# Patient Record
Sex: MALE | Race: BLACK OR AFRICAN AMERICAN | NOT HISPANIC OR LATINO | Employment: OTHER | URBAN - METROPOLITAN AREA
[De-identification: names, ages, dates, MRNs, and addresses within clinical notes are randomized per-mention and may not be internally consistent; named-entity substitution may affect disease eponyms.]

---

## 2018-05-12 ENCOUNTER — HOSPITAL ENCOUNTER (OUTPATIENT)
Dept: RADIOLOGY | Facility: MEDICAL CENTER | Age: 48
End: 2018-05-12

## 2018-05-12 ENCOUNTER — APPOINTMENT (OUTPATIENT)
Dept: RADIOLOGY | Facility: MEDICAL CENTER | Age: 48
DRG: 292 | End: 2018-05-12
Attending: INTERNAL MEDICINE
Payer: MEDICARE

## 2018-05-12 ENCOUNTER — HOSPITAL ENCOUNTER (INPATIENT)
Facility: MEDICAL CENTER | Age: 48
LOS: 3 days | DRG: 292 | End: 2018-05-15
Attending: EMERGENCY MEDICINE | Admitting: INTERNAL MEDICINE
Payer: MEDICARE

## 2018-05-12 DIAGNOSIS — I21.4 NON-STEMI (NON-ST ELEVATED MYOCARDIAL INFARCTION) (HCC): ICD-10-CM

## 2018-05-12 DIAGNOSIS — I50.9 CONGESTIVE HEART FAILURE, UNSPECIFIED HF CHRONICITY, UNSPECIFIED HEART FAILURE TYPE (HCC): ICD-10-CM

## 2018-05-12 PROBLEM — E11.29 TYPE 2 DIABETES MELLITUS WITH RENAL COMPLICATION (HCC): Status: ACTIVE | Noted: 2018-05-12

## 2018-05-12 PROBLEM — E11.9 TYPE 2 DIABETES MELLITUS WITHOUT COMPLICATION (HCC): Status: ACTIVE | Noted: 2018-05-12

## 2018-05-12 LAB
ALBUMIN SERPL BCP-MCNC: 3.5 G/DL (ref 3.2–4.9)
ALBUMIN/GLOB SERPL: 1 G/DL
ALP SERPL-CCNC: 156 U/L (ref 30–99)
ALT SERPL-CCNC: 139 U/L (ref 2–50)
ANION GAP SERPL CALC-SCNC: 10 MMOL/L (ref 0–11.9)
APTT PPP: 29.2 SEC (ref 24.7–36)
AST SERPL-CCNC: 130 U/L (ref 12–45)
BASOPHILS # BLD AUTO: 0.6 % (ref 0–1.8)
BASOPHILS # BLD: 0.03 K/UL (ref 0–0.12)
BILIRUB SERPL-MCNC: 1.7 MG/DL (ref 0.1–1.5)
BNP SERPL-MCNC: 871 PG/ML (ref 0–100)
BUN SERPL-MCNC: 24 MG/DL (ref 8–22)
CALCIUM SERPL-MCNC: 8.7 MG/DL (ref 8.5–10.5)
CHLORIDE SERPL-SCNC: 95 MMOL/L (ref 96–112)
CO2 SERPL-SCNC: 24 MMOL/L (ref 20–33)
CREAT SERPL-MCNC: 0.97 MG/DL (ref 0.5–1.4)
EKG IMPRESSION: NORMAL
EOSINOPHIL # BLD AUTO: 0.03 K/UL (ref 0–0.51)
EOSINOPHIL NFR BLD: 0.6 % (ref 0–6.9)
ERYTHROCYTE [DISTWIDTH] IN BLOOD BY AUTOMATED COUNT: 44.8 FL (ref 35.9–50)
ETHANOL BLD-MCNC: 0 G/DL
GLOBULIN SER CALC-MCNC: 3.4 G/DL (ref 1.9–3.5)
GLUCOSE SERPL-MCNC: 197 MG/DL (ref 65–99)
HCT VFR BLD AUTO: 43.3 % (ref 42–52)
HGB BLD-MCNC: 14.5 G/DL (ref 14–18)
IMM GRANULOCYTES # BLD AUTO: 0.01 K/UL (ref 0–0.11)
IMM GRANULOCYTES NFR BLD AUTO: 0.2 % (ref 0–0.9)
INR PPP: 1.72 (ref 0.87–1.13)
LIPASE SERPL-CCNC: 30 U/L (ref 11–82)
LYMPHOCYTES # BLD AUTO: 0.86 K/UL (ref 1–4.8)
LYMPHOCYTES NFR BLD: 17.8 % (ref 22–41)
MCH RBC QN AUTO: 29.1 PG (ref 27–33)
MCHC RBC AUTO-ENTMCNC: 33.5 G/DL (ref 33.7–35.3)
MCV RBC AUTO: 86.9 FL (ref 81.4–97.8)
MONOCYTES # BLD AUTO: 0.73 K/UL (ref 0–0.85)
MONOCYTES NFR BLD AUTO: 15.1 % (ref 0–13.4)
NEUTROPHILS # BLD AUTO: 3.17 K/UL (ref 1.82–7.42)
NEUTROPHILS NFR BLD: 65.7 % (ref 44–72)
NRBC # BLD AUTO: 0.03 K/UL
NRBC BLD-RTO: 0.6 /100 WBC
PLATELET # BLD AUTO: 194 K/UL (ref 164–446)
PMV BLD AUTO: 10.4 FL (ref 9–12.9)
POTASSIUM SERPL-SCNC: 4.3 MMOL/L (ref 3.6–5.5)
PROT SERPL-MCNC: 6.9 G/DL (ref 6–8.2)
PROTHROMBIN TIME: 19.8 SEC (ref 12–14.6)
RBC # BLD AUTO: 4.98 M/UL (ref 4.7–6.1)
SODIUM SERPL-SCNC: 129 MMOL/L (ref 135–145)
TROPONIN I SERPL-MCNC: 0.22 NG/ML (ref 0–0.04)
TROPONIN I SERPL-MCNC: 0.23 NG/ML (ref 0–0.04)
WBC # BLD AUTO: 4.8 K/UL (ref 4.8–10.8)

## 2018-05-12 PROCEDURE — 80053 COMPREHEN METABOLIC PANEL: CPT

## 2018-05-12 PROCEDURE — 700102 HCHG RX REV CODE 250 W/ 637 OVERRIDE(OP): Performed by: INTERNAL MEDICINE

## 2018-05-12 PROCEDURE — 94760 N-INVAS EAR/PLS OXIMETRY 1: CPT

## 2018-05-12 PROCEDURE — 85610 PROTHROMBIN TIME: CPT

## 2018-05-12 PROCEDURE — 700111 HCHG RX REV CODE 636 W/ 250 OVERRIDE (IP): Performed by: INTERNAL MEDICINE

## 2018-05-12 PROCEDURE — 770020 HCHG ROOM/CARE - TELE (206)

## 2018-05-12 PROCEDURE — 85730 THROMBOPLASTIN TIME PARTIAL: CPT

## 2018-05-12 PROCEDURE — 36415 COLL VENOUS BLD VENIPUNCTURE: CPT

## 2018-05-12 PROCEDURE — 99285 EMERGENCY DEPT VISIT HI MDM: CPT

## 2018-05-12 PROCEDURE — A9270 NON-COVERED ITEM OR SERVICE: HCPCS | Performed by: INTERNAL MEDICINE

## 2018-05-12 PROCEDURE — 93005 ELECTROCARDIOGRAM TRACING: CPT | Performed by: EMERGENCY MEDICINE

## 2018-05-12 PROCEDURE — 80307 DRUG TEST PRSMV CHEM ANLYZR: CPT

## 2018-05-12 PROCEDURE — 83690 ASSAY OF LIPASE: CPT

## 2018-05-12 PROCEDURE — 85025 COMPLETE CBC W/AUTO DIFF WBC: CPT

## 2018-05-12 PROCEDURE — 71046 X-RAY EXAM CHEST 2 VIEWS: CPT

## 2018-05-12 PROCEDURE — 84484 ASSAY OF TROPONIN QUANT: CPT

## 2018-05-12 PROCEDURE — 700111 HCHG RX REV CODE 636 W/ 250 OVERRIDE (IP): Performed by: STUDENT IN AN ORGANIZED HEALTH CARE EDUCATION/TRAINING PROGRAM

## 2018-05-12 PROCEDURE — 83880 ASSAY OF NATRIURETIC PEPTIDE: CPT

## 2018-05-12 PROCEDURE — 76700 US EXAM ABDOM COMPLETE: CPT

## 2018-05-12 RX ORDER — ATORVASTATIN CALCIUM 20 MG/1
20 TABLET, FILM COATED ORAL DAILY
COMMUNITY
End: 2018-06-18 | Stop reason: SDUPTHER

## 2018-05-12 RX ORDER — ACETAMINOPHEN 325 MG/1
650 TABLET ORAL EVERY 6 HOURS PRN
Status: DISCONTINUED | OUTPATIENT
Start: 2018-05-12 | End: 2018-05-15 | Stop reason: HOSPADM

## 2018-05-12 RX ORDER — FUROSEMIDE 40 MG/1
40 TABLET ORAL DAILY
Status: DISCONTINUED | OUTPATIENT
Start: 2018-05-13 | End: 2018-05-12

## 2018-05-12 RX ORDER — AMOXICILLIN 250 MG
2 CAPSULE ORAL 2 TIMES DAILY
Status: DISCONTINUED | OUTPATIENT
Start: 2018-05-12 | End: 2018-05-15 | Stop reason: HOSPADM

## 2018-05-12 RX ORDER — FUROSEMIDE 10 MG/ML
40 INJECTION INTRAMUSCULAR; INTRAVENOUS
Status: DISCONTINUED | OUTPATIENT
Start: 2018-05-12 | End: 2018-05-13

## 2018-05-12 RX ORDER — CARVEDILOL 6.25 MG/1
6.25 TABLET ORAL 2 TIMES DAILY WITH MEALS
Status: DISCONTINUED | OUTPATIENT
Start: 2018-05-12 | End: 2018-05-15 | Stop reason: HOSPADM

## 2018-05-12 RX ORDER — POTASSIUM CHLORIDE 20 MEQ/1
10 TABLET, EXTENDED RELEASE ORAL DAILY
Status: DISCONTINUED | OUTPATIENT
Start: 2018-05-13 | End: 2018-05-15 | Stop reason: HOSPADM

## 2018-05-12 RX ORDER — AMIODARONE HYDROCHLORIDE 200 MG/1
200 TABLET ORAL DAILY
Status: DISCONTINUED | OUTPATIENT
Start: 2018-05-13 | End: 2018-05-15 | Stop reason: HOSPADM

## 2018-05-12 RX ORDER — FUROSEMIDE 40 MG/1
40 TABLET ORAL DAILY
COMMUNITY
End: 2018-05-29

## 2018-05-12 RX ORDER — POLYETHYLENE GLYCOL 3350 17 G/17G
1 POWDER, FOR SOLUTION ORAL
Status: DISCONTINUED | OUTPATIENT
Start: 2018-05-12 | End: 2018-05-15 | Stop reason: HOSPADM

## 2018-05-12 RX ORDER — AMIODARONE HYDROCHLORIDE 200 MG/1
200 TABLET ORAL DAILY
Status: ON HOLD | COMMUNITY
End: 2018-06-12

## 2018-05-12 RX ORDER — AMOXICILLIN 250 MG
2 CAPSULE ORAL 2 TIMES DAILY
Status: CANCELLED | OUTPATIENT
Start: 2018-05-12

## 2018-05-12 RX ORDER — BISACODYL 10 MG
10 SUPPOSITORY, RECTAL RECTAL
Status: CANCELLED | OUTPATIENT
Start: 2018-05-12

## 2018-05-12 RX ORDER — ATORVASTATIN CALCIUM 20 MG/1
20 TABLET, FILM COATED ORAL DAILY
Status: DISCONTINUED | OUTPATIENT
Start: 2018-05-13 | End: 2018-05-15 | Stop reason: HOSPADM

## 2018-05-12 RX ORDER — BISACODYL 10 MG
10 SUPPOSITORY, RECTAL RECTAL
Status: DISCONTINUED | OUTPATIENT
Start: 2018-05-12 | End: 2018-05-15 | Stop reason: HOSPADM

## 2018-05-12 RX ORDER — POTASSIUM CHLORIDE 750 MG/1
10 TABLET, EXTENDED RELEASE ORAL DAILY
COMMUNITY
End: 2018-05-29

## 2018-05-12 RX ORDER — POLYETHYLENE GLYCOL 3350 17 G/17G
1 POWDER, FOR SOLUTION ORAL
Status: CANCELLED | OUTPATIENT
Start: 2018-05-12

## 2018-05-12 RX ORDER — CARVEDILOL 6.25 MG/1
6.25 TABLET ORAL 2 TIMES DAILY WITH MEALS
COMMUNITY
End: 2018-05-21

## 2018-05-12 RX ADMIN — CARVEDILOL 6.25 MG: 6.25 TABLET, FILM COATED ORAL at 18:13

## 2018-05-12 RX ADMIN — ENOXAPARIN SODIUM 40 MG: 100 INJECTION SUBCUTANEOUS at 18:13

## 2018-05-12 RX ADMIN — FUROSEMIDE 40 MG: 10 INJECTION, SOLUTION INTRAMUSCULAR; INTRAVENOUS at 18:13

## 2018-05-12 ASSESSMENT — LIFESTYLE VARIABLES
EVER_SMOKED: NEVER
DO YOU DRINK ALCOHOL: NO
SUBSTANCE_ABUSE: 0

## 2018-05-12 ASSESSMENT — COPD QUESTIONNAIRES
COPD SCREENING SCORE: 1
HAVE YOU SMOKED AT LEAST 100 CIGARETTES IN YOUR ENTIRE LIFE: NO/DON'T KNOW
DO YOU EVER COUGH UP ANY MUCUS OR PHLEGM?: NO/ONLY WITH OCCASIONAL COLDS OR INFECTIONS
DURING THE PAST 4 WEEKS HOW MUCH DID YOU FEEL SHORT OF BREATH: NONE/LITTLE OF THE TIME

## 2018-05-12 ASSESSMENT — ENCOUNTER SYMPTOMS
BACK PAIN: 0
SHORTNESS OF BREATH: 1
BLOOD IN STOOL: 0
ABDOMINAL PAIN: 0
DEPRESSION: 0
WEAKNESS: 0
DOUBLE VISION: 0
CLAUDICATION: 0
HALLUCINATIONS: 0
COUGH: 1
PALPITATIONS: 0
WEIGHT LOSS: 0
HEARTBURN: 0
LOSS OF CONSCIOUSNESS: 0
CONSTIPATION: 0
PHOTOPHOBIA: 0
NERVOUS/ANXIOUS: 0
SORE THROAT: 0
DIZZINESS: 0
SINUS PAIN: 0
NECK PAIN: 0
WHEEZING: 0
FOCAL WEAKNESS: 0
VOMITING: 0
SPEECH CHANGE: 0
TREMORS: 0
FEVER: 0
PND: 0
DIARRHEA: 0
HEADACHES: 0
TINGLING: 0
SPUTUM PRODUCTION: 0
SENSORY CHANGE: 0
BLURRED VISION: 0
NAUSEA: 0
MYALGIAS: 0
CHILLS: 0
BRUISES/BLEEDS EASILY: 0
HEMOPTYSIS: 0
ORTHOPNEA: 1
ORTHOPNEA: 0

## 2018-05-12 ASSESSMENT — PATIENT HEALTH QUESTIONNAIRE - PHQ9
2. FEELING DOWN, DEPRESSED, IRRITABLE, OR HOPELESS: NOT AT ALL
1. LITTLE INTEREST OR PLEASURE IN DOING THINGS: NOT AT ALL
SUM OF ALL RESPONSES TO PHQ9 QUESTIONS 1 AND 2: 0

## 2018-05-12 ASSESSMENT — PAIN SCALES - GENERAL
PAINLEVEL_OUTOF10: 0
PAINLEVEL_OUTOF10: 0

## 2018-05-12 NOTE — ED TRIAGE NOTES
.  Chief Complaint   Patient presents with   • Shortness of Breath   • Cough     x 2 days   • Peripheral Edema     x 1-2 days     Transferred from [place]. Pt reports 13 lb wt gain in the last week, significant non productive cough and sob a 2 days. Denies fever. Denies pain.

## 2018-05-12 NOTE — ED PROVIDER NOTES
ED Provider Note    Scribed for Harlan Weber M.D. by Lalo Barber. 5/12/2018  2:46 PM    Means of arrival: EMS  History obtained from: Patient and transferring physician  History limited by: None    CHIEF COMPLAINT  Chief Complaint   Patient presents with   • Shortness of Breath   • Cough     x 2 days   • Peripheral Edema     x 1-2 days       HPI  Lai Dailey is a 48 y.o. male with a history of heart failure and diabetes mellitus who presents to the Emergency Department as a transfer from [facility] complaining of shortness of breath, cough, and wheezing that began about one week ago. His shortness of breath is worse when laying supine and alleviated somewhat when sitting up. The patient reports associated 13 pounds of weight gain over the last two days and leg swelling. He denies chest pain or chest pressure. Patient was diagnosed with heart failure in 2007 after an episode of syncope. He has been compliant with his medications, including Lasix, since then. Patient recently returned from [place] after a two year stay for motorcycle [employer] school. He is otherwise retired.    REVIEW OF SYSTEMS  Pertinent positives include shortness of breath, cough, 13 pounds of weight gain, leg swelling, and wheezing. Pertinent negatives include no chest pain or chest pressure.  All other systems reviewed and negative.  C    PAST MEDICAL HISTORY   has a past medical history of Diabetes.heart failure    SURGICAL HISTORY  patient denies any surgical history    SOCIAL HISTORY  Social History   Substance Use Topics   • Smoking status: None noted.        • Alcohol use None noted.      FAMILY HISTORY  None noted.    CURRENT MEDICATIONS  No current facility-administered medications on file prior to encounter.      Current Outpatient Prescriptions on File Prior to Encounter   Medication Sig Dispense Refill   • aspirin  MG TBEC Take 1 Tab by mouth every day. 60 Tab 0   • metformin (GLUCOPHAGE) 500 MG TABS Take 1 Tab  "by mouth 2 times a day, with meals. 60 Tab 3       ALLERGIES  No Known Allergies    PHYSICAL EXAM  VITAL SIGNS: /92   Pulse 78   Temp 36.4 °C (97.6 °F)   Resp 20   Ht 1.753 m (5' 9\")   SpO2 98%     Vital signs reviewed.  Constitutional:  Sitting up in bed, no acute distress  Head: Atraumatic  Mouth/Throat: Oropharynx is moist.  Neck: Positive JVD  Cardiovascular: Regular rate and rhythm  Pulmonary/Chest: Bibasilar rales  Abdominal: Non tender  Musculoskeletal: 1+ pitting edema up to the knee bilaterally.  Lymphadenopathy: No lymphadenopathy noted.  Neurological: Normal strength and sensation  Skin: Warm and dry.    LABS  Results for orders placed or performed during the hospital encounter of 05/12/18   Troponin   Result Value Ref Range    Troponin I 0.22 (H) 0.00 - 0.04 ng/mL   Btype Natriuretic Peptide   Result Value Ref Range    B Natriuretic Peptide 871 (H) 0 - 100 pg/mL   CBC with Differential   Result Value Ref Range    WBC 4.8 4.8 - 10.8 K/uL    RBC 4.98 4.70 - 6.10 M/uL    Hemoglobin 14.5 14.0 - 18.0 g/dL    Hematocrit 43.3 42.0 - 52.0 %    MCV 86.9 81.4 - 97.8 fL    MCH 29.1 27.0 - 33.0 pg    MCHC 33.5 (L) 33.7 - 35.3 g/dL    RDW 44.8 35.9 - 50.0 fL    Platelet Count 194 164 - 446 K/uL    MPV 10.4 9.0 - 12.9 fL    Neutrophils-Polys 65.70 44.00 - 72.00 %    Lymphocytes 17.80 (L) 22.00 - 41.00 %    Monocytes 15.10 (H) 0.00 - 13.40 %    Eosinophils 0.60 0.00 - 6.90 %    Basophils 0.60 0.00 - 1.80 %    Immature Granulocytes 0.20 0.00 - 0.90 %    Nucleated RBC 0.60 /100 WBC    Neutrophils (Absolute) 3.17 1.82 - 7.42 K/uL    Lymphs (Absolute) 0.86 (L) 1.00 - 4.80 K/uL    Monos (Absolute) 0.73 0.00 - 0.85 K/uL    Eos (Absolute) 0.03 0.00 - 0.51 K/uL    Baso (Absolute) 0.03 0.00 - 0.12 K/uL    Immature Granulocytes (abs) 0.01 0.00 - 0.11 K/uL    NRBC (Absolute) 0.03 K/uL   Complete Metabolic Panel (CMP)   Result Value Ref Range    Sodium 129 (L) 135 - 145 mmol/L    Potassium 4.3 3.6 - 5.5 mmol/L    " Chloride 95 (L) 96 - 112 mmol/L    Co2 24 20 - 33 mmol/L    Anion Gap 10.0 0.0 - 11.9    Glucose 197 (H) 65 - 99 mg/dL    Bun 24 (H) 8 - 22 mg/dL    Creatinine 0.97 0.50 - 1.40 mg/dL    Calcium 8.7 8.5 - 10.5 mg/dL    AST(SGOT) 130 (H) 12 - 45 U/L    ALT(SGPT) 139 (H) 2 - 50 U/L    Alkaline Phosphatase 156 (H) 30 - 99 U/L    Total Bilirubin 1.7 (H) 0.1 - 1.5 mg/dL    Albumin 3.5 3.2 - 4.9 g/dL    Total Protein 6.9 6.0 - 8.2 g/dL    Globulin 3.4 1.9 - 3.5 g/dL    A-G Ratio 1.0 g/dL   Prothrombin Time   Result Value Ref Range    PT 19.8 (H) 12.0 - 14.6 sec    INR 1.72 (H) 0.87 - 1.13   APTT   Result Value Ref Range    APTT 29.2 24.7 - 36.0 sec   Lipase   Result Value Ref Range    Lipase 30 11 - 82 U/L   ESTIMATED GFR   Result Value Ref Range    GFR If African American >60 >60 mL/min/1.73 m 2    GFR If Non African American >60 >60 mL/min/1.73 m 2   DIAGNOSTIC ALCOHOL   Result Value Ref Range    Diagnostic Alcohol 0.00 0.00 g/dL   URINE DRUG SCREEN   Result Value Ref Range    Amphetamines Urine Negative Negative    Barbiturates Negative Negative    Benzodiazepines Negative Negative    Cocaine Metabolite Negative Negative    Methadone Negative Negative    Opiates Negative Negative    Oxycodone Negative Negative    Phencyclidine -Pcp Negative Negative    Propoxyphene Negative Negative    Cannabinoid Metab Negative Negative   TROPONIN   Result Value Ref Range    Troponin I 0.23 (H) 0.00 - 0.04 ng/mL   LIPID PROFILE   Result Value Ref Range    Cholesterol,Tot 76 (L) 100 - 199 mg/dL    Triglycerides 48 0 - 149 mg/dL    HDL 20 (A) >=40 mg/dL    LDL 46 <100 mg/dL   CBC WITH DIFFERENTIAL   Result Value Ref Range    WBC 4.4 (L) 4.8 - 10.8 K/uL    RBC 4.83 4.70 - 6.10 M/uL    Hemoglobin 14.1 14.0 - 18.0 g/dL    Hematocrit 41.8 (L) 42.0 - 52.0 %    MCV 86.5 81.4 - 97.8 fL    MCH 29.2 27.0 - 33.0 pg    MCHC 33.7 33.7 - 35.3 g/dL    RDW 44.3 35.9 - 50.0 fL    Platelet Count 197 164 - 446 K/uL    MPV 10.8 9.0 - 12.9 fL     Neutrophils-Polys 64.10 44.00 - 72.00 %    Lymphocytes 18.00 (L) 22.00 - 41.00 %    Monocytes 16.60 (H) 0.00 - 13.40 %    Eosinophils 0.20 0.00 - 6.90 %    Basophils 0.90 0.00 - 1.80 %    Immature Granulocytes 0.20 0.00 - 0.90 %    Nucleated RBC 0.50 /100 WBC    Neutrophils (Absolute) 2.82 1.82 - 7.42 K/uL    Lymphs (Absolute) 0.79 (L) 1.00 - 4.80 K/uL    Monos (Absolute) 0.73 0.00 - 0.85 K/uL    Eos (Absolute) 0.01 0.00 - 0.51 K/uL    Baso (Absolute) 0.04 0.00 - 0.12 K/uL    Immature Granulocytes (abs) 0.01 0.00 - 0.11 K/uL    NRBC (Absolute) 0.02 K/uL   COMP METABOLIC PANEL   Result Value Ref Range    Sodium 129 (L) 135 - 145 mmol/L    Potassium 3.8 3.6 - 5.5 mmol/L    Chloride 95 (L) 96 - 112 mmol/L    Co2 24 20 - 33 mmol/L    Anion Gap 10.0 0.0 - 11.9    Glucose 288 (H) 65 - 99 mg/dL    Bun 24 (H) 8 - 22 mg/dL    Creatinine 0.97 0.50 - 1.40 mg/dL    Calcium 8.3 (L) 8.5 - 10.5 mg/dL    AST(SGOT) 109 (H) 12 - 45 U/L    ALT(SGPT) 119 (H) 2 - 50 U/L    Alkaline Phosphatase 145 (H) 30 - 99 U/L    Total Bilirubin 1.6 (H) 0.1 - 1.5 mg/dL    Albumin 3.3 3.2 - 4.9 g/dL    Total Protein 6.6 6.0 - 8.2 g/dL    Globulin 3.3 1.9 - 3.5 g/dL    A-G Ratio 1.0 g/dL   TSH   Result Value Ref Range    TSH 1.160 0.380 - 5.330 uIU/mL   ESTIMATED GFR   Result Value Ref Range    GFR If African American >60 >60 mL/min/1.73 m 2    GFR If Non African American >60 >60 mL/min/1.73 m 2   COMP METABOLIC PANEL   Result Value Ref Range    Sodium 131 (L) 135 - 145 mmol/L    Potassium 3.6 3.6 - 5.5 mmol/L    Chloride 96 96 - 112 mmol/L    Co2 26 20 - 33 mmol/L    Anion Gap 9.0 0.0 - 11.9    Glucose 292 (H) 65 - 99 mg/dL    Bun 24 (H) 8 - 22 mg/dL    Creatinine 1.04 0.50 - 1.40 mg/dL    Calcium 8.6 8.5 - 10.5 mg/dL    AST(SGOT) 96 (H) 12 - 45 U/L    ALT(SGPT) 108 (H) 2 - 50 U/L    Alkaline Phosphatase 142 (H) 30 - 99 U/L    Total Bilirubin 1.7 (H) 0.1 - 1.5 mg/dL    Albumin 3.3 3.2 - 4.9 g/dL    Total Protein 6.2 6.0 - 8.2 g/dL    Globulin 2.9 1.9  - 3.5 g/dL    A-G Ratio 1.1 g/dL   D-DIMER   Result Value Ref Range    D-Dimer Screen 348 (H) <250 ng/mL(D-DU)   ESTIMATED GFR   Result Value Ref Range    GFR If African American >60 >60 mL/min/1.73 m 2    GFR If Non African American >60 >60 mL/min/1.73 m 2   ACCU-CHEK GLUCOSE   Result Value Ref Range    Glucose - Accu-Ck 208 (H) 65 - 99 mg/dL   ACCU-CHEK GLUCOSE   Result Value Ref Range    Glucose - Accu-Ck 286 (H) 65 - 99 mg/dL   EKG (ER)   Result Value Ref Range    Report       [facility] Emergency Dept.    Test Date:  2018  Pt Name:    CHESTER PICKARD                 Department: ER  MRN:        [id number]                      Room:       BL 22  Gender:     Male                         Technician: EDSFHR  :        1970                   Requested By:ER TRIAGE PROTOCOL  Order #:    [id number]                    Reading MD:    Measurements  Intervals                                Axis  Rate:       79                           P:          11  MT:         176                          QRS:        -47  QRSD:       126                          T:          59  QT:         460  QTc:        528    Interpretive Statements  SINUS RHYTHM  VENTRICULAR PREMATURE COMPLEX  NONSPECIFIC IVCD WITH LAD  LEFT VENTRICULAR HYPERTROPHY  Compared to ECG 2014 13:23:25  Ventricular premature complex(es) now present  Intraventricular conduction delay now present  T-wave abnormality no longer present  Possible ischemia no longer present     EKG   Result Value Ref Range    Report       [facility]    Test Date:  2018  Pt Name:    CHESTER PICKARD                 Department: 183  MRN:        [id number]                      Room:       T814  Gender:     Male                         Technician: AJ  :        1970                   Requested By:TOPHER NOVA  Order #:    [id number]                    Reading MD: Shon Morgan MD    Measurements  Intervals                                 Axis  Rate:       84                           P:          6  DE:         169                          QRS:        -35  QRSD:       136                          T:          69  QT:         458  QTc:        542    Interpretive Statements  SINUS RHYTHM  LEFT BUNDLE BRANCH BLOCK  Compared to ECG 05/12/2018 14:30:21  Left bundle-branch block now present  Ventricular premature complex(es) no longer present  Intraventricular conduction delay no longer present  Left ventricular hypertrophy no longer present    Electronically Signed On 5- 7:29:36 PDT by Shon Morgan MD         All labs reviewed by me.    EKG  12 Lead EKG interpreted by me to show sinus rhythm at 80. Normal P waves. Abnormal QRS with nonspecific intraventricular conduction delay in leads V1, V2, V3, I, and avl. Normal ST segments. T wave are inverted in v6. Normal EKG. Abnormal EKG showing intraventricular conduction delay.    RADIOLOGY  DX-CHEST-2 VIEWS   Final Result      1.  There is a large cardiac silhouette with central vascular congestion.      US-ABDOMEN COMPLETE SURVEY   Final Result      1.  There is hepatomegaly.   2.  Prominent IVC and hepatic veins with pulsatile hepatopedal flow suggesting possible right cardiac dysfunction.   3.  Gallbladder wall is minimally thickened. This can be seen with hepatitis or hypoalbuminemia.         OUTSIDE IMAGES-DX CHEST   Final Result      ECHOCARDIOGRAM COMP W/O CONT    (Results Pending)     The radiologist's interpretation of all radiological studies have been reviewed by me.    COURSE & MEDICAL DECISION MAKING  Pertinent Labs & Imaging studies reviewed. (See chart for details) The patient's [facility] and past medical records were reviewed. Review of ED tests from [facility] [place] reveals: WBC 5, HCT 46.4%, D-dimer slight elevated, electrolytes are normal, , , Troponin 0.18, Chest x-ray revealed moderate cardiac enlargement    2:46 PM - Patient seen and examined at bedside.  Ordered Troponin, BNP, CBC with differential, CMP, PTT, APTT, Lipase, and EKG to evaluate his symptoms. The differential diagnoses include but are not limited to: Heart failure, pneumonia, unstable angina    Patient was admitted in stable condition    FINAL IMPRESSION  1. Congestive heart failure, unspecified HF chronicity, unspecified heart failure type (HCC)    2. Non-STEMI (non-ST elevated myocardial infarction) (HCC)          Lalo ZAMORA (Scribe), am scribing for, and in the presence of, Hralan Weber M.D..    Electronically signed by: Lalo Barber (Scribe), 5/12/2018    IHarlan M.D. personally performed the services described in this documentation, as scribed by Lalo Barber in my presence, and it is both accurate and complete.    The note accurately reflects work and decisions made by me.  Harlan Weber  5/13/2018  3:15 PM

## 2018-05-12 NOTE — ED NOTES
Med rec complete per pt at bedside with RX bottles  Bottles reviewed and returned  Pt does not have aspirin or metformin with him  Allergies reviewed - NKDA  Pt reports he had some ABX for his tooth while he was in [place], but does not remember what it was  Has been in [place] for only one week. ABX sometime in the last few weeks   Pt reports he would like paper prescriptions if [place] is prescribed any [place]

## 2018-05-13 PROBLEM — Z95.810 IMPLANTABLE CARDIOVERTER-DEFIBRILLATOR (ICD) IN SITU: Status: RESOLVED | Noted: 2017-01-19 | Resolved: 2018-05-13

## 2018-05-13 PROBLEM — I10 ESSENTIAL HYPERTENSION: Status: ACTIVE | Noted: 2017-01-19

## 2018-05-13 PROBLEM — Z95.810 IMPLANTABLE CARDIOVERTER-DEFIBRILLATOR (ICD) IN SITU: Status: ACTIVE | Noted: 2017-01-19

## 2018-05-13 PROBLEM — I49.9 ARRHYTHMIA: Status: ACTIVE | Noted: 2018-05-13

## 2018-05-13 PROBLEM — I42.9 CARDIOMYOPATHY (HCC): Status: ACTIVE | Noted: 2017-01-16

## 2018-05-13 LAB
ALBUMIN SERPL BCP-MCNC: 3.3 G/DL (ref 3.2–4.9)
ALBUMIN SERPL BCP-MCNC: 3.3 G/DL (ref 3.2–4.9)
ALBUMIN/GLOB SERPL: 1 G/DL
ALBUMIN/GLOB SERPL: 1.1 G/DL
ALP SERPL-CCNC: 142 U/L (ref 30–99)
ALP SERPL-CCNC: 145 U/L (ref 30–99)
ALT SERPL-CCNC: 108 U/L (ref 2–50)
ALT SERPL-CCNC: 119 U/L (ref 2–50)
AMPHET UR QL SCN: NEGATIVE
ANION GAP SERPL CALC-SCNC: 10 MMOL/L (ref 0–11.9)
ANION GAP SERPL CALC-SCNC: 9 MMOL/L (ref 0–11.9)
AST SERPL-CCNC: 109 U/L (ref 12–45)
AST SERPL-CCNC: 96 U/L (ref 12–45)
BARBITURATES UR QL SCN: NEGATIVE
BASOPHILS # BLD AUTO: 0.9 % (ref 0–1.8)
BASOPHILS # BLD: 0.04 K/UL (ref 0–0.12)
BENZODIAZ UR QL SCN: NEGATIVE
BILIRUB SERPL-MCNC: 1.6 MG/DL (ref 0.1–1.5)
BILIRUB SERPL-MCNC: 1.7 MG/DL (ref 0.1–1.5)
BUN SERPL-MCNC: 24 MG/DL (ref 8–22)
BUN SERPL-MCNC: 24 MG/DL (ref 8–22)
BZE UR QL SCN: NEGATIVE
CALCIUM SERPL-MCNC: 8.3 MG/DL (ref 8.5–10.5)
CALCIUM SERPL-MCNC: 8.6 MG/DL (ref 8.5–10.5)
CANNABINOIDS UR QL SCN: NEGATIVE
CHLORIDE SERPL-SCNC: 95 MMOL/L (ref 96–112)
CHLORIDE SERPL-SCNC: 96 MMOL/L (ref 96–112)
CHOLEST SERPL-MCNC: 76 MG/DL (ref 100–199)
CO2 SERPL-SCNC: 24 MMOL/L (ref 20–33)
CO2 SERPL-SCNC: 26 MMOL/L (ref 20–33)
CREAT SERPL-MCNC: 0.97 MG/DL (ref 0.5–1.4)
CREAT SERPL-MCNC: 1.04 MG/DL (ref 0.5–1.4)
DEPRECATED D DIMER PPP IA-ACNC: 348 NG/ML(D-DU)
EKG IMPRESSION: NORMAL
EOSINOPHIL # BLD AUTO: 0.01 K/UL (ref 0–0.51)
EOSINOPHIL NFR BLD: 0.2 % (ref 0–6.9)
ERYTHROCYTE [DISTWIDTH] IN BLOOD BY AUTOMATED COUNT: 44.3 FL (ref 35.9–50)
EST. AVERAGE GLUCOSE BLD GHB EST-MCNC: 240 MG/DL
GLOBULIN SER CALC-MCNC: 2.9 G/DL (ref 1.9–3.5)
GLOBULIN SER CALC-MCNC: 3.3 G/DL (ref 1.9–3.5)
GLUCOSE BLD-MCNC: 184 MG/DL (ref 65–99)
GLUCOSE BLD-MCNC: 208 MG/DL (ref 65–99)
GLUCOSE BLD-MCNC: 209 MG/DL (ref 65–99)
GLUCOSE BLD-MCNC: 286 MG/DL (ref 65–99)
GLUCOSE SERPL-MCNC: 288 MG/DL (ref 65–99)
GLUCOSE SERPL-MCNC: 292 MG/DL (ref 65–99)
HBA1C MFR BLD: 10 % (ref 0–5.6)
HCT VFR BLD AUTO: 41.8 % (ref 42–52)
HDLC SERPL-MCNC: 20 MG/DL
HGB BLD-MCNC: 14.1 G/DL (ref 14–18)
IMM GRANULOCYTES # BLD AUTO: 0.01 K/UL (ref 0–0.11)
IMM GRANULOCYTES NFR BLD AUTO: 0.2 % (ref 0–0.9)
LDLC SERPL CALC-MCNC: 46 MG/DL
LV EJECT FRACT  99904: 20
LV EJECT FRACT MOD 2C 99903: 26.87
LV EJECT FRACT MOD 4C 99902: 30.66
LV EJECT FRACT MOD BP 99901: 41.51
LYMPHOCYTES # BLD AUTO: 0.79 K/UL (ref 1–4.8)
LYMPHOCYTES NFR BLD: 18 % (ref 22–41)
MCH RBC QN AUTO: 29.2 PG (ref 27–33)
MCHC RBC AUTO-ENTMCNC: 33.7 G/DL (ref 33.7–35.3)
MCV RBC AUTO: 86.5 FL (ref 81.4–97.8)
METHADONE UR QL SCN: NEGATIVE
MONOCYTES # BLD AUTO: 0.73 K/UL (ref 0–0.85)
MONOCYTES NFR BLD AUTO: 16.6 % (ref 0–13.4)
NEUTROPHILS # BLD AUTO: 2.82 K/UL (ref 1.82–7.42)
NEUTROPHILS NFR BLD: 64.1 % (ref 44–72)
NRBC # BLD AUTO: 0.02 K/UL
NRBC BLD-RTO: 0.5 /100 WBC
OPIATES UR QL SCN: NEGATIVE
OXYCODONE UR QL SCN: NEGATIVE
PCP UR QL SCN: NEGATIVE
PLATELET # BLD AUTO: 197 K/UL (ref 164–446)
PMV BLD AUTO: 10.8 FL (ref 9–12.9)
POTASSIUM SERPL-SCNC: 3.6 MMOL/L (ref 3.6–5.5)
POTASSIUM SERPL-SCNC: 3.8 MMOL/L (ref 3.6–5.5)
PROPOXYPH UR QL SCN: NEGATIVE
PROT SERPL-MCNC: 6.2 G/DL (ref 6–8.2)
PROT SERPL-MCNC: 6.6 G/DL (ref 6–8.2)
RBC # BLD AUTO: 4.83 M/UL (ref 4.7–6.1)
SODIUM SERPL-SCNC: 129 MMOL/L (ref 135–145)
SODIUM SERPL-SCNC: 131 MMOL/L (ref 135–145)
TRIGL SERPL-MCNC: 48 MG/DL (ref 0–149)
TSH SERPL DL<=0.005 MIU/L-ACNC: 1.16 UIU/ML (ref 0.38–5.33)
WBC # BLD AUTO: 4.4 K/UL (ref 4.8–10.8)

## 2018-05-13 PROCEDURE — 700111 HCHG RX REV CODE 636 W/ 250 OVERRIDE (IP): Performed by: INTERNAL MEDICINE

## 2018-05-13 PROCEDURE — 700102 HCHG RX REV CODE 250 W/ 637 OVERRIDE(OP): Performed by: INTERNAL MEDICINE

## 2018-05-13 PROCEDURE — 85025 COMPLETE CBC W/AUTO DIFF WBC: CPT

## 2018-05-13 PROCEDURE — 83036 HEMOGLOBIN GLYCOSYLATED A1C: CPT

## 2018-05-13 PROCEDURE — 700111 HCHG RX REV CODE 636 W/ 250 OVERRIDE (IP): Performed by: STUDENT IN AN ORGANIZED HEALTH CARE EDUCATION/TRAINING PROGRAM

## 2018-05-13 PROCEDURE — 93010 ELECTROCARDIOGRAM REPORT: CPT | Performed by: INTERNAL MEDICINE

## 2018-05-13 PROCEDURE — A9270 NON-COVERED ITEM OR SERVICE: HCPCS | Performed by: INTERNAL MEDICINE

## 2018-05-13 PROCEDURE — 93306 TTE W/DOPPLER COMPLETE: CPT | Mod: 26 | Performed by: INTERNAL MEDICINE

## 2018-05-13 PROCEDURE — 99223 1ST HOSP IP/OBS HIGH 75: CPT | Mod: GC | Performed by: INTERNAL MEDICINE

## 2018-05-13 PROCEDURE — A9270 NON-COVERED ITEM OR SERVICE: HCPCS | Performed by: STUDENT IN AN ORGANIZED HEALTH CARE EDUCATION/TRAINING PROGRAM

## 2018-05-13 PROCEDURE — 36415 COLL VENOUS BLD VENIPUNCTURE: CPT

## 2018-05-13 PROCEDURE — 700102 HCHG RX REV CODE 250 W/ 637 OVERRIDE(OP): Performed by: STUDENT IN AN ORGANIZED HEALTH CARE EDUCATION/TRAINING PROGRAM

## 2018-05-13 PROCEDURE — 770020 HCHG ROOM/CARE - TELE (206)

## 2018-05-13 PROCEDURE — 84443 ASSAY THYROID STIM HORMONE: CPT

## 2018-05-13 PROCEDURE — 80053 COMPREHEN METABOLIC PANEL: CPT

## 2018-05-13 PROCEDURE — 85379 FIBRIN DEGRADATION QUANT: CPT

## 2018-05-13 PROCEDURE — 80307 DRUG TEST PRSMV CHEM ANLYZR: CPT

## 2018-05-13 PROCEDURE — 93005 ELECTROCARDIOGRAM TRACING: CPT | Performed by: INTERNAL MEDICINE

## 2018-05-13 PROCEDURE — 82962 GLUCOSE BLOOD TEST: CPT | Mod: 91

## 2018-05-13 PROCEDURE — 93306 TTE W/DOPPLER COMPLETE: CPT

## 2018-05-13 PROCEDURE — 80061 LIPID PANEL: CPT

## 2018-05-13 RX ORDER — SPIRONOLACTONE 25 MG/1
25 TABLET ORAL
Status: DISCONTINUED | OUTPATIENT
Start: 2018-05-13 | End: 2018-05-15 | Stop reason: HOSPADM

## 2018-05-13 RX ORDER — DEXTROSE MONOHYDRATE 25 G/50ML
25 INJECTION, SOLUTION INTRAVENOUS
Status: DISCONTINUED | OUTPATIENT
Start: 2018-05-13 | End: 2018-05-15 | Stop reason: HOSPADM

## 2018-05-13 RX ORDER — DIPHENHYDRAMINE HCL 25 MG
25 TABLET ORAL
Status: COMPLETED | OUTPATIENT
Start: 2018-05-13 | End: 2018-05-13

## 2018-05-13 RX ORDER — FUROSEMIDE 10 MG/ML
40 INJECTION INTRAMUSCULAR; INTRAVENOUS
Status: DISCONTINUED | OUTPATIENT
Start: 2018-05-13 | End: 2018-05-14

## 2018-05-13 RX ADMIN — INSULIN HUMAN 2 UNITS: 100 INJECTION, SOLUTION PARENTERAL at 08:21

## 2018-05-13 RX ADMIN — ENOXAPARIN SODIUM 40 MG: 100 INJECTION SUBCUTANEOUS at 05:44

## 2018-05-13 RX ADMIN — ACETAMINOPHEN 650 MG: 325 TABLET, FILM COATED ORAL at 05:46

## 2018-05-13 RX ADMIN — INSULIN HUMAN 3 UNITS: 100 INJECTION, SOLUTION PARENTERAL at 11:27

## 2018-05-13 RX ADMIN — FUROSEMIDE 40 MG: 10 INJECTION, SOLUTION INTRAMUSCULAR; INTRAVENOUS at 05:43

## 2018-05-13 RX ADMIN — SPIRONOLACTONE 25 MG: 25 TABLET, FILM COATED ORAL at 18:01

## 2018-05-13 RX ADMIN — FUROSEMIDE 40 MG: 10 INJECTION, SOLUTION INTRAMUSCULAR; INTRAVENOUS at 16:48

## 2018-05-13 RX ADMIN — POTASSIUM CHLORIDE 10 MEQ: 1500 TABLET, EXTENDED RELEASE ORAL at 05:43

## 2018-05-13 RX ADMIN — CARVEDILOL 6.25 MG: 6.25 TABLET, FILM COATED ORAL at 05:42

## 2018-05-13 RX ADMIN — INSULIN HUMAN 2 UNITS: 100 INJECTION, SOLUTION PARENTERAL at 20:11

## 2018-05-13 RX ADMIN — DIPHENHYDRAMINE HCL 25 MG: 25 TABLET ORAL at 20:27

## 2018-05-13 RX ADMIN — ATORVASTATIN CALCIUM 20 MG: 20 TABLET, FILM COATED ORAL at 05:42

## 2018-05-13 RX ADMIN — ASPIRIN 325 MG: 325 TABLET, DELAYED RELEASE ORAL at 05:42

## 2018-05-13 RX ADMIN — INSULIN HUMAN 1 UNITS: 100 INJECTION, SOLUTION PARENTERAL at 16:46

## 2018-05-13 RX ADMIN — CARVEDILOL 6.25 MG: 6.25 TABLET, FILM COATED ORAL at 16:49

## 2018-05-13 RX ADMIN — AMIODARONE HYDROCHLORIDE 200 MG: 200 TABLET ORAL at 05:42

## 2018-05-13 ASSESSMENT — ENCOUNTER SYMPTOMS
ORTHOPNEA: 0
FOCAL WEAKNESS: 0
WHEEZING: 0
PSYCHIATRIC NEGATIVE: 1
TREMORS: 0
DIAPHORESIS: 0
SHORTNESS OF BREATH: 1
SHORTNESS OF BREATH: 0
WHEEZING: 1
SPUTUM PRODUCTION: 1
SORE THROAT: 0
TINGLING: 0
SPEECH CHANGE: 0
NAUSEA: 0
PND: 0
FEVER: 0
BRUISES/BLEEDS EASILY: 0
NERVOUS/ANXIOUS: 0
HEMOPTYSIS: 0
FLANK PAIN: 0
DIARRHEA: 0
FALLS: 0
MYALGIAS: 0
ORTHOPNEA: 1
NECK PAIN: 0
COUGH: 1
ABDOMINAL PAIN: 0
WEIGHT LOSS: 0
CHILLS: 0
INSOMNIA: 0
HEADACHES: 0
VOMITING: 0
PALPITATIONS: 0
BLOOD IN STOOL: 0
BACK PAIN: 0
EYE REDNESS: 0
BLURRED VISION: 0

## 2018-05-13 ASSESSMENT — PAIN SCALES - GENERAL
PAINLEVEL_OUTOF10: 0

## 2018-05-13 NOTE — H&P
Internal Medicine Admitting History and Physical    Note Author: Marco Bazzi M.D.       Name Lai Dailey 1970   Age/Sex 48 y.o. male   MRN [id number]   Code Status full     After 5PM or if no immediate response to page, please call for cross-coverage  Attending/Team:  See Patient List for primary contact information  Call [phone] to page    1st Call - Day Intern (R1):    2nd Call - Day Sr. Resident (R2/R3):          Chief Complaint:  SOB with wheeze since 1 week    HPI:   is a 48 year old male who has a history of congestive heart failure and diabetes mellitus who presented to the ED after being transferred from [facility] with complaints of SOB ,with intermittent cough and wheeze and progressive weight gain since one month.  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  Patient says that he is having SOB with swelling of his legs since one week which is progressively getting worse over time.and is associated with 2 pillow orthopnea ,no PND,and weight gain.Patient has a history of of a cough which is productive of mucoid sputum,not associated with a fever,but causing him to have a bad sleep,Patient denies any chest pains,palpitations,weakness or dizziness or syncopal attacks and says that he has gained more than 13 pounds since the last one week.Patient takes Metformin 500 mg twice daily for his diabetes and he denies any polyuria,polyphagia ,or polydipsia,or weakness in the recent past.  Patient has been on regular cardiology follow up and says that he takes his medications,regularly,but he has not established a  Cardiology follow up in [place].patient also has a history of diabetes on   Patient was seen in the ED  "with vitals of  /92   Pulse 78   Temp 36.4 °C (97.6 °F)   Resp 20   Ht 1.753 m (5' 9\")   SpO2 98% .Patient had EKG done in the ED which revealed abnormal QRS with nonspecific intraventricular conduction delay,and normal ST-T wave segments,and chest x-ray revealing a large cardiac silhouette with central vascular congestion.Abdominal ultrasound revealed hepatomegaly with a distended gallbladder.  Patient was admitted to the medical floor for treatment of his decompensated heart failure.          Review of Systems   Constitutional: Positive for malaise/fatigue. Negative for chills, fever and weight loss.   HENT: Negative for congestion, nosebleeds and sinus pain.    Eyes: Negative for blurred vision and double vision.   Respiratory: Positive for cough and shortness of breath. Negative for hemoptysis, sputum production and wheezing.    Cardiovascular: Positive for orthopnea and leg swelling. Negative for chest pain, palpitations, claudication and PND.   Gastrointestinal: Negative for abdominal pain, heartburn, nausea and vomiting.   Genitourinary: Negative for dysuria, frequency and urgency.   Musculoskeletal: Negative for back pain, myalgias and neck pain.   Skin: Negative for itching and rash.   Neurological: Negative for dizziness, tremors, speech change, weakness and headaches.   Endo/Heme/Allergies: Does not bruise/bleed easily.   Psychiatric/Behavioral: Negative for hallucinations and substance abuse. The patient is not nervous/anxious.              Past Medical History:   Past Medical History:   Diagnosis Date   • Diabetes        Past Surgical History:  No past surgical history on file.    Current Outpatient Medications:  Home Medications     Reviewed by Sobia Vasquez (Pharmacy Tech) on 05/12/18 at 1527  Med List Status: Complete   Medication Last Dose Status   amiodarone (CORDARONE) 200 MG Tab 5/12/2018 Active   aspirin  MG TBEC 5/12/2018 Active   atorvastatin (LIPITOR) 20 MG Tab " "5/12/2018 Active   carvedilol (COREG) 6.25 MG Tab 5/12/2018 Active   furosemide (LASIX) 40 MG Tab 5/12/2018 Active   metformin (GLUCOPHAGE) 500 MG TABS 5/12/2018 Active   potassium chloride SA (K-DUR) 10 MEQ Tab CR 5/12/2018 Active                Medication Allergy/Sensitivities:  No Known Allergies      Family History:  Mother was diagnosed with colon cancer 5 years ago  Father passed away at 73 due to stroke; had HTN   Denies family history of heart conditions      Social History:  Smoking: denies   Alcohol: about 4 beers/week  Illictis: denies   Other: denies   Living situation: moved into a home in [place]; lives with a roommate        Social History     Social History   • Marital status: Single     Spouse name: N/A   • Number of children: N/A   • Years of education: N/A     Occupational History   • Not on file.     Social History Main Topics   • Smoking status: Not on file   • Smokeless tobacco: Not on file   • Alcohol use Not on file   • Drug use: Unknown   • Sexual activity: Not on file     Other Topics Concern   • Not on file     Social History Narrative   • No narrative on file     Living situation: lives with a roommate in [place].  PCP :Pt States None        Physical Exam     Vitals:    05/12/18 1716 05/12/18 1730 05/12/18 1800 05/12/18 1940   BP:   142/95 102/67   Pulse: 77 74 73 77   Resp: 18 18 20 17   Temp:  36.7 °C (98.1 °F) 36.7 °C (98.1 °F) 37 °C (98.6 °F)   SpO2: 95% 92% 96% 94%   Weight:   100.8 kg (222 lb 3.6 oz)    Height:   1.753 m (5' 9.02\")      Body mass index is 32.8 kg/m².  /67   Pulse 77   Temp 37 °C (98.6 °F)   Resp 17   Ht 1.753 m (5' 9.02\")   Wt 100.8 kg (222 lb 3.6 oz)   SpO2 94%   BMI 32.80 kg/m²   O2 therapy: Pulse Oximetry: 94 %, O2 (LPM): 0, FiO2%: 21 %, O2 Delivery: None (Room Air)    Physical Exam   Constitutional: He is oriented to person, place, and time.   HENT:   Head: Normocephalic and atraumatic.   Eyes: EOM are normal. Pupils are equal, round, and reactive to " light.   Neck: Normal range of motion. Neck supple.   Cardiovascular: Normal rate, regular rhythm and normal heart sounds.    Pulmonary/Chest: Effort normal. He has rales.   Bibasilar crepitations seen(+)   Abdominal: Soft. Bowel sounds are normal. He exhibits distension. There is no rebound and no guarding.   Musculoskeletal: Normal range of motion. He exhibits edema. He exhibits no tenderness or deformity.   Bilateral pitting edema (+) to the knees.   Neurological: He is alert and oriented to person, place, and time. No cranial nerve deficit. GCS score is 15.   Skin: Skin is warm and dry.   Psychiatric: Mood and affect normal.             Data Review       Old Records Request:   Completed  Current Records review and summary: Completed    Lab Data Review:  Recent Results (from the past 24 hour(s))   EKG (ER)    Collection Time: 18  2:30 PM   Result Value Ref Range    Report       [facility] Emergency Dept.    Test Date:  2018  Pt Name:    CHESTER PICKARD                 Department: ER  MRN:        [id number]                      Room:        22  Gender:     Male                         Technician: EDSFHR  :        1970                   Requested By:ER TRIAGE PROTOCOL  Order #:    [id number]                    Reading MD:    Measurements  Intervals                                Axis  Rate:       79                           P:          11  ID:         [id number]                          QRS:        -47  QRSD:       126                          T:          59  QT:         460  QTc:        528    Interpretive Statements  SINUS RHYTHM  VENTRICULAR PREMATURE COMPLEX  NONSPECIFIC IVCD WITH LAD  LEFT VENTRICULAR HYPERTROPHY  Compared to ECG 2014 13:23:25  Ventricular premature complex(es) now present  Intraventricular conduction delay now present  T-wave abnormality no longer present  Possible ischemia no longer present     Troponin    Collection Time: 18  2:37 PM   Result Value  Ref Range    Troponin I 0.22 (H) 0.00 - 0.04 ng/mL   Btype Natriuretic Peptide    Collection Time: 05/12/18  2:37 PM   Result Value Ref Range    B Natriuretic Peptide 871 (H) 0 - 100 pg/mL   CBC with Differential    Collection Time: 05/12/18  2:37 PM   Result Value Ref Range    WBC 4.8 4.8 - 10.8 K/uL    RBC 4.98 4.70 - 6.10 M/uL    Hemoglobin 14.5 14.0 - 18.0 g/dL    Hematocrit 43.3 42.0 - 52.0 %    MCV 86.9 81.4 - 97.8 fL    MCH 29.1 27.0 - 33.0 pg    MCHC 33.5 (L) 33.7 - 35.3 g/dL    RDW 44.8 35.9 - 50.0 fL    Platelet Count 194 164 - 446 K/uL    MPV 10.4 9.0 - 12.9 fL    Neutrophils-Polys 65.70 44.00 - 72.00 %    Lymphocytes 17.80 (L) 22.00 - 41.00 %    Monocytes 15.10 (H) 0.00 - 13.40 %    Eosinophils 0.60 0.00 - 6.90 %    Basophils 0.60 0.00 - 1.80 %    Immature Granulocytes 0.20 0.00 - 0.90 %    Nucleated RBC 0.60 /100 WBC    Neutrophils (Absolute) 3.17 1.82 - 7.42 K/uL    Lymphs (Absolute) 0.86 (L) 1.00 - 4.80 K/uL    Monos (Absolute) 0.73 0.00 - 0.85 K/uL    Eos (Absolute) 0.03 0.00 - 0.51 K/uL    Baso (Absolute) 0.03 0.00 - 0.12 K/uL    Immature Granulocytes (abs) 0.01 0.00 - 0.11 K/uL    NRBC (Absolute) 0.03 K/uL   Complete Metabolic Panel (CMP)    Collection Time: 05/12/18  2:37 PM   Result Value Ref Range    Sodium 129 (L) 135 - 145 mmol/L    Potassium 4.3 3.6 - 5.5 mmol/L    Chloride 95 (L) 96 - 112 mmol/L    Co2 24 20 - 33 mmol/L    Anion Gap 10.0 0.0 - 11.9    Glucose 197 (H) 65 - 99 mg/dL    Bun 24 (H) 8 - 22 mg/dL    Creatinine 0.97 0.50 - 1.40 mg/dL    Calcium 8.7 8.5 - 10.5 mg/dL    AST(SGOT) 130 (H) 12 - 45 U/L    ALT(SGPT) 139 (H) 2 - 50 U/L    Alkaline Phosphatase 156 (H) 30 - 99 U/L    Total Bilirubin 1.7 (H) 0.1 - 1.5 mg/dL    Albumin 3.5 3.2 - 4.9 g/dL    Total Protein 6.9 6.0 - 8.2 g/dL    Globulin 3.4 1.9 - 3.5 g/dL    A-G Ratio 1.0 g/dL   Prothrombin Time    Collection Time: 05/12/18  2:37 PM   Result Value Ref Range    PT 19.8 (H) 12.0 - 14.6 sec    INR 1.72 (H) 0.87 - 1.13   APTT     Collection Time: 05/12/18  2:37 PM   Result Value Ref Range    APTT 29.2 24.7 - 36.0 sec   Lipase    Collection Time: 05/12/18  2:37 PM   Result Value Ref Range    Lipase 30 11 - 82 U/L   ESTIMATED GFR    Collection Time: 05/12/18  2:37 PM   Result Value Ref Range    GFR If African American >60 >60 mL/min/1.73 m 2    GFR If Non African American >60 >60 mL/min/1.73 m 2   DIAGNOSTIC ALCOHOL    Collection Time: 05/12/18  2:37 PM   Result Value Ref Range    Diagnostic Alcohol 0.00 0.00 g/dL       Imaging/Procedures Review:    ndependant Imaging Review: Completed  DX-CHEST-2 VIEWS   Final Result      1.  There is a large cardiac silhouette with central vascular congestion.      US-ABDOMEN COMPLETE SURVEY   Final Result      1.  There is hepatomegaly.   2.  Prominent IVC and hepatic veins with pulsatile hepatopedal flow suggesting possible right cardiac dysfunction.   3.  Gallbladder wall is minimally thickened. This can be seen with hepatitis or hypoalbuminemia.         OUTSIDE IMAGES-DX CHEST   Final Result      ECHOCARDIOGRAM COMP W/O CONT    (Results Pending)            EKG:   EKG Independant Review: Completed    Abnormal QRS with nonspecific intraventricular conduction delay in leads V1, V2, V3, I, and AVL. Normal ST segments. T wave are inverted in V6.           Assessment/Plan     Acute on chronic systolic (congestive) heart failure (HCC)- (present on admission)   Assessment & Plan    -SOB with cough and weight gain since one week  -CXR reveals,cardiomegaly with central vascular congestion.  -Elevated liver enzymes  -EKG showed nonspecific intraventricular conduction delay in leads V1-V3, I, and AVL.   -Abdominal U/S showed hepatomegaly with prominent IVC and hepatic veins suggesting Right sided cardiac failure.    -Patient has a history of an IVCD in place .  -BNP -872.  -Troponin-0.22.  Plan   Restrict fluid intake to 1.5 to 2 lts.  Low salt diet.  Iv Lasix 40 mg .  -ASA-81 mg.  -Atarvastatin 20 mg .  -carvidilol  6.25 mg bd.  -Echocardiogram awaited            Type 2 diabetes mellitus with renal complication (HCC)   Assessment & Plan    -patient has a history of Diabetes type 2 on treatment with metformin.  -Well controlled on metformin with blood glucose of 197 on arrival.  -Monitor for renal complications.,elevated BUN and creatinine  Plan:  -for Hba1c and review.  Continue metformin 500 mg bd            Anticipated Hospital stay:  >2 midnights        Quality Measures  Quality-Core Measures   Reviewed items::  EKG reviewed, Labs reviewed, Medications reviewed and Radiology images reviewed  DVT prophylaxis pharmacological::  Enoxaparin (Lovenox)

## 2018-05-13 NOTE — H&P
SENIOR ADMIT NOTE     DATE OF SERVICE: 5/12/18     CHIEF COMPLAINT: cough and worsening swelling of legs     HISTORY OF PRESENT ILLNESS: 48 yr old male patient with PMHx of Chronic systolic CHF,DM type 2, HTN, Afib, ICD was brought in from outside facility for evaluation of ongoing cough and worsening swelling of legs for a week. Associated with weight gain( more than 10 pounds since Friday last week), yellowish sputum production. Denies other symptoms relevant symptoms. At outside facility -WBC 5, HCT 46.4%, D-dimer slight elevated, electrolytes are normal, , , Troponin 0.18, Chest x-ray revealed moderate cardiac enlargement    PHYSICAL EXAMINATION:  VITAL SIGNS:  Blood pressure 102/67, heart rate 77, afebrile, respiratory rate   of 17, oxygen saturation of 94% on room air.  CONSTITUTIONAL:  Appears comfortable.  HEENT:  Normocephalic, atraumatic.  RESPIRATORY: Decreased breath sounds bilaterally with minimal bibasilar crackles.  CARDIOVASCULAR:  Normal rate and rhythm.  No murmurs, rubs or gallops.  ABDOMEN:  Soft, nontender.  Normal bowel sounds. +distended  EXTREMITIES:  + pitting pedal edema bilaterally.  NEUROLOGIC:  Cranial nerves II-XII grossly intact.  Sensation 5/5 and equal   bilaterally.  Reflexes are 2+.     LABORATORY DATA:  WBC 4.8, Hb/Hct 14.5/43.3, , K 4.3, CL 95, Gluc 197, Bun 24, Cr 0.97, , , Alk Phos 156, total [place] 1.7, trop 0.22,      IMAGING STUDIES:     DX-CHEST-2 VIEWS   Final Result      1.  There is a large cardiac silhouette with central vascular congestion.      US-ABDOMEN COMPLETE SURVEY   Final Result      1.  There is hepatomegaly.   2.  Prominent IVC and hepatic veins with pulsatile hepatopedal flow suggesting possible right cardiac dysfunction.   3.  Gallbladder wall is minimally  thickened. This can be seen with hepatitis or hypoalbuminemia.         OUTSIDE IMAGES-DX CHEST   Final Result      ECHOCARDIOGRAM COMP W/O CONT    (Results Pending)     ASSESSMENT AND PLAN:  1. Acute on Chronic systolic CHF with EF of 16% on 12/13/2016.   2. Uncontrolled DM type 2  3. Elevated troponins   4. Transaminitis 2/2 volume overload in the setting of severe systolic CHF   5. Hyperbilirubinemia  6. Mitral regurgitation  7. Cardiomyopathy with AICD    Plan  - Admit to Telemetry  - Strict I/O's, Daily weights, Diurese  - Fluid restriction to 1.5 L  - Echo ordered, repeat EKG in the AM  - continue ASA,Statin, Coreg with holding parameters  - insulin lispro sliding scale        Core measures have been addressed appropriately.The patient is full code.     Please refer to Dr. Rose Mary YUEN and P for more details.

## 2018-05-13 NOTE — PROGRESS NOTES
Internal Medicine Interval Note  Note Author: Marco Bazzi M.D.     Name Lai Dailey       1970   Age/Sex 48 y.o. male   MRN [id number]   Code Status full     After 5PM or if no immediate response to page, please call for cross-coverage  Attending/Team:  See Patient List for primary contact information  Call [phone] to page    1st Call - Day Intern (R1):    2nd Call - Day Sr. Resident (R2/R3):   .         Reason for interval visit  (Principal Problem)   SOB with Wheeze and leg swelling since I week    Interval Problem Daily Status Update  (24 hours)   Swelling in the legs is down and the patient says that his SOB and swelling is going down and he feels much better since being admitted.Patient has a bothersome mucoid cough that he says ,he would like to get treated.Patient had a good overnight sleep.    Review of Systems   Constitutional: Positive for malaise/fatigue. Negative for chills, fever and weight loss.   HENT: Negative for congestion and sore throat.    Eyes: Negative for blurred vision and redness.   Respiratory: Positive for cough, sputum production and wheezing. Negative for hemoptysis and shortness of breath.    Cardiovascular: Positive for orthopnea and leg swelling. Negative for chest pain, palpitations and PND.   Gastrointestinal: Negative for abdominal pain, blood in stool, nausea and vomiting.   Genitourinary: Positive for urgency. Negative for dysuria, flank pain, frequency and hematuria.   Musculoskeletal: Positive for joint pain. Negative for back pain, falls, myalgias and neck pain.   Skin: Negative for itching and rash.   Neurological: Negative for tingling, tremors, speech change, focal weakness and headaches.   Endo/Heme/Allergies: Does not bruise/bleed easily.   Psychiatric/Behavioral: Negative for suicidal ideas. The patient is not nervous/anxious and does not have insomnia.         Consultants/Specialty  none    Disposition  Inpatient pending tests.    Quality Measures  Quality-Core Measures   Reviewed items::  Labs reviewed, Medications reviewed, Radiology images reviewed and EKG reviewed  Jensen catheter::  No Jensen  DVT prophylaxis pharmacological::  Enoxaparin (Lovenox)  Ulcer Prophylaxis::  Not indicated          Physical Exam       Vitals:    05/12/18 1940 05/13/18 0015 05/13/18 0421 05/13/18 0755   BP: 102/67 131/85 130/95 120/79   Pulse: 77 80 84 71   Resp: 17 18 18 19   Temp: 37 °C (98.6 °F) 37.2 °C (99 °F) 36.7 °C (98 °F) 36.7 °C (98.1 °F)   SpO2: 94% 92% 94% 92%   Weight:       Height:         Body mass index is 32.8 kg/m². Weight: 100.8 kg (222 lb 3.6 oz)  Oxygen Therapy:  Pulse Oximetry: 92 %, O2 (LPM): 0, FiO2%: 21 %, O2 Delivery: None (Room Air)    Physical Exam   Constitutional: He is oriented to person, place, and time and well-developed, well-nourished, and in no distress.   HENT:   Head: Normocephalic and atraumatic.   Eyes: Pupils are equal, round, and reactive to light.   Neck: Normal range of motion.   Cardiovascular: Normal rate and regular rhythm.  Exam reveals friction rub.    No murmur heard.  Pulmonary/Chest: No respiratory distress. He has no wheezes. He has no rales.   Bibasilar crepitations with a mild intermittent wheeze(+)   Neurological: He is alert and oriented to person, place, and time. No cranial nerve deficit. Coordination normal.   Skin: Skin is warm and dry.   Psychiatric: Mood and affect normal.         Lab Data Review:         5/13/2018  12:12 PM    Recent Labs      05/12/18   1437  05/13/18   0249  05/13/18   1119   SODIUM  129*  129*  131*   POTASSIUM  4.3  3.8  3.6   CHLORIDE  95*  95*  96   CO2  24  24  26   BUN  24*  24*  24*   CREATININE  0.97  0.97  1.04   CALCIUM  8.7  8.3*  8.6       Recent Labs      05/12/18   1437  05/13/18   0249  05/13/18   1119   ALTSGPT  139*  119*  108*   ASTSGOT  130*  109*  96*   ALKPHOSPHAT  156*  145*  142*    TBILIRUBIN  1.7*  1.6*  1.7*   LIPASE  30   --    --    GLUCOSE  197*  288*  292*       Recent Labs      05/12/18   1437  05/13/18   0249   RBC  4.98  4.83   HEMOGLOBIN  14.5  14.1   HEMATOCRIT  43.3  41.8*   PLATELETCT  194  197   PROTHROMBTM  19.8*   --    APTT  29.2   --    INR  1.72*   --        Recent Labs      05/12/18   1437  05/13/18   0249  05/13/18   1119   WBC  4.8  4.4*   --    NEUTSPOLYS  65.70  64.10   --    LYMPHOCYTES  17.80*  18.00*   --    MONOCYTES  15.10*  16.60*   --    EOSINOPHILS  0.60  0.20   --    BASOPHILS  0.60  0.90   --    ASTSGOT  130*  109*  96*   ALTSGPT  139*  119*  108*   ALKPHOSPHAT  156*  145*  142*   TBILIRUBIN  1.7*  1.6*  1.7*           Assessment/Plan     Acute on chronic systolic (congestive) heart failure (HCC)- (present on admission)   Assessment & Plan    -SOB with cough and weight gain since one week.  -Patient has a history of weight gain since I week-patient has gained 13 pounds prior to presentation.  -Presents with pedal edema and bilateral knee edema.  -CXR reveals,cardiomegaly with central vascular congestion.  -Elevated liver enzymes secondary to right heart failure and hepatomegaly  -BNP-on admission -872  -Troponin-0.22 on admission and 0.23  on  5/13/18.-possibly due to demand ischemia.  Plan   -Restrict fluid intake to 1.5 to 2 lts.  -Low salt diet.  -Iv Lasix 40 mg twice daily.  -spirnolactone 25 mg once daily  -ASA-81 mg.  -Atarvastatin 20 mg .  -carvidilol 6.25 mg bd.  -Echocardiogram awaited.  -For ambulatory pulse oximeter trial today.  -Strict input -output charting.  -monitor weight gain on a daily basis.  -watch for signs of decompensation.  -Awaiting D-Dimers results -CT-PE if needed.          Arrhythmia   Assessment & Plan    -Patient has a history of  Atrial fibrillation with rapid ventricular rate.  -Patient on  Po amiodarone 200 mg.  -Diagnosed with acute-on-chronic congestive heart failure with dyspnea,.  -Recent echocardiogram showed an EF of  16% on 12/13/2016.   -Cardiology follow up on D/C.          Type 2 diabetes mellitus with renal complication (HCC)   Assessment & Plan    -Patient has a history of Diabetes type 2 on treatment with metformin 500 mg twice daily.  -Patient claims compliance on the medications,  -Well controlled on metformin with blood glucose of 197 on arrival.  -patient was started on ISS on hospital admission and Metformin was stopped.  Plan:  -Awaiting - Hba1c and review,to assess long term glycemic control.  -Monitor on sliding scale.  -To schedule retinal check as an outpatient..  -Monitor for long term diabetic sequlae.  -will monitor for hepatorenal syndrome.        AICD (automatic cardioverter/defibrillator) present- (present on admission)   Assessment & Plan    -Patient had a AICD put in place in 2008.  -low EF of 16% on echo.  -Awaiting repeat Echocardiogram.  -Eventually cardiac transplant a high possibility based on low EF and severe cardiomyopathy.  -Cardiology follow up as an outpatient.

## 2018-05-13 NOTE — ASSESSMENT & PLAN NOTE
-Patient has a history of  Atrial fibrillation with rapid ventricular rate.  -Patient on  Po amiodarone 200 mg.  -Diagnosed with acute-on-chronic congestive heart failure with dyspnea,.  -Recent echocardiogram showed an EF of 16% on 12/13/2016.   -Cardiology follow up on D/C.

## 2018-05-13 NOTE — ASSESSMENT & PLAN NOTE
-SOB with cough and weight gain since one week.  -Patient has a history of weight gain since I week-patient has gained 13 pounds prior to presentation.  -Presents with pedal edema and bilateral knee edema.  -CXR reveals,cardiomegaly with central vascular congestion.  -Elevated liver enzymes secondary to right heart failure and hepatomegaly  -BNP-on admission -872  -Troponin-0.22 on admission and 0.23  on  5/13/18.-possibly due to demand ischemia.  Plan   -Restrict fluid intake to 1.5 to 2 lts.  -Low salt diet.  -Iv Lasix 40 mg twice daily.  -spirnolactone 25 mg once daily  -ASA-81 mg.  -Atarvastatin 20 mg .  -carvidilol 6.25 mg bd.  -Echocardiogram awaited.  -For ambulatory pulse oximeter trial today.  -Strict input -output charting.  -monitor weight gain on a daily basis.  -watch for signs of decompensation.  - D-Dimers results-348.  -CT-PE deferred

## 2018-05-13 NOTE — ASSESSMENT & PLAN NOTE
-Patient has a history of Diabetes type 2 on treatment with metformin 500 mg twice daily.  -Patient claims compliance on the medications,  -Well controlled on metformin with blood glucose of 197 on arrival.  -patient was started on ISS on hospital admission and Metformin was stopped.  Plan:  -Awaiting - Hba1c and review,to assess long term glycemic control.  -Monitor on sliding scale.  -To schedule retinal check as an outpatient..  -Monitor for long term diabetic sequlae.  -will monitor for hepatorenal syndrome.

## 2018-05-13 NOTE — PROGRESS NOTES
Initial skin assessment performed alongside REGULO Finley. Patient found to have intact skin with the exception of some minor, scattered scabs on shins. Lower extremities with scattered calluses.

## 2018-05-13 NOTE — ASSESSMENT & PLAN NOTE
-Patient had a AICD put in place in 2008.  -low EF of 16% on echo.  -Awaiting repeat Echocardiogram.  -Eventually cardiac transplant a high possibility based on low EF and severe cardiomyopathy.  -Cardiology follow up as an outpatient.

## 2018-05-13 NOTE — NON-PROVIDER
Internal Medicine Medical Student Note  Note Author: Vik Irwin, Student    Name Lai Dailey       1970   Age/Sex 48 y.o. male   MRN [id number]   Code Status Full     After 5PM or if no immediate response to page, please call for cross-coverage  Attending/Team: Dr. Pham/Al See Patient List for primary contact information  Call [phone] to page after hours   1st Call - Day Intern (R1):   Dr. Bazzi 2nd Call - Day Sr. Resident (R2/R3):   Dr. Hawkins         Reason for interval visit  (Principal Problem)   CHF exacerbation    Interval Problem Daily Status Update  (24 hours)   JESSIKA overnight. Patient continues to have SOB and is complaining of a productive cough with white sputum. Patient states that his cough has worsened and he was unable to sleep last night, but that overall his sx are improving. LE edema and JVD have decreased significantly.    We will continue to treat patient with medical management and supportive therapy today.    Review of Systems   Constitutional: Negative for chills, diaphoresis and fever.   HENT: Negative for congestion.    Eyes:        Denies changes in vision.   Respiratory: Positive for cough, sputum production and shortness of breath. Negative for hemoptysis and wheezing.    Cardiovascular: Positive for leg swelling. Negative for chest pain, palpitations and orthopnea.   Gastrointestinal: Negative for abdominal pain, diarrhea, nausea and vomiting.   Genitourinary: Negative for dysuria.   Musculoskeletal: Negative for back pain.   Skin: Negative for rash.   Neurological: Negative for focal weakness and headaches.   Psychiatric/Behavioral: Negative.                Physical Exam       Vitals:    18 1940 18 0015 18 0421 18 0755   BP: 102/67 131/85 130/95 120/79   Pulse: 77 80 84 71   Resp:  18 19   Temp: 37 °C (98.6 °F) 37.2 °C (99 °F) 36.7 °C (98 °F) 36.7 °C (98.1 °F)   SpO2: 94% 92% 94% 92%   Weight:       Height:          Body mass index is 32.8 kg/m². Weight: 100.8 kg (222 lb 3.6 oz)  Oxygen Therapy:  Pulse Oximetry: 92 %, O2 (LPM): 0, O2 Delivery: None (Room Air)    Physical Exam   Constitutional:   Patient found resting comfortably in bed in no acute distress.   HENT:   Head: Normocephalic and atraumatic.   Eyes: EOM are normal. Pupils are equal, round, and reactive to light.   Neck: Normal range of motion.   Cardiovascular:   RRR, S1 S2 present, no m/r/g. Mild JVD noted on examination.   Pulmonary/Chest:   Decreased breath sounds in the apices bilaterally, otherwise no wheezes, crackles, rhonchi.   Abdominal:   Soft, nontender, nondistended, no rebound or guarding appreciated. BS present and normoactive.   Musculoskeletal: Normal range of motion.   1+ edema in LE noted bilaterally.   Neurological:   Physiologically intact, no focal deficits.   Skin: Skin is warm.   Psychiatric: Mood normal.             Assessment/Plan   # Acute on chronic systolic CHF  - Etiology continues to remain unknown. Patient states that [facility] [place] [facility] and that they were unable to find an etiology.   - Patient appears to be improving clinically, with decreased feeling of SOB.  - Will continue IV Lasix 40mg BID.  - Will start aldactone today to optimize medical management.  - Guaifenesin 600mg TID to help with sputum production.  - Continue fluid restriction, low salt diet.  - Continue ASA, Coreg, statin.   - Pending ECHO results.  - Repeat BNP tomorrow.    # Shortness of breath  - This is likely secondary to Problem #1, but due to patient's recent travel hx (multiple long road trips from [place] to [place]) recently, we have a low suspicion of a PE.  - D-dimer ordered, will follow up results.    # DM2  - Continue metformin 500 mg BID.    # Atrial fibrillation  - Patient has LCVD. Stable.  - Continue amiodarone.

## 2018-05-13 NOTE — NON-PROVIDER
"      Internal Medicine Medical Student Admitting History and Physical  Note Author: Sid Velazquez, Student    Name Lai Dailey       1970   Age/Sex 48 y.o. male   MRN [id number]   Code Status      After 5PM or if no immediate response to page, please call for cross-coverage  Attending/Team: Dr. Saini See Patient List for primary contact information  Call [phone] to page after hours   1st Call - Day Intern (R1):   Dr. Bazzi 2nd Call - Day Sr. Resident (R2/R3):   Dr. Hawkins       Chief Complaint:  Shortness of breath, cough, peripheral edema     HPI:  Mr. Dailey is a 49 yo male with PMH of congestive heart failure and diabetes who came to the ED with shortness of breath, cough, and peripheral edema. He moved to [place] from [place] last Friday (). Since moving to [place], the patient has had shortness of breath. Starting this past Thursday (5/10), the patient started having a cough. The cough would be associated with shortness of breath. It would disrupt his sleep and as a result the patient has not been sleeping well the past several days. The patient states that the shortness of breath would be somewhat relieved when he sits up, but the cough remains relatively constant. His cough would sometimes produce a \"milky white\" sputum, but denies fever or sick contact. The patient also that he has gained about 13 pounds since moving to [place] (over a one week period). The patient was diagnosed with heart failure in  after an episode of syncope. He has been seeing a cardiology every three months since and has been compliant with his medications. The patient also has a pacemaker. He hasn't established a cardiology yet in [place] but plans to do so. The patient does not have chest pain, abdominal pain, sensory changes, or muscle weakness.       Review of Systems   Constitutional: Negative for chills, fever and malaise/fatigue.   HENT: Negative for congestion, ear pain, hearing loss, sore throat and " "tinnitus.    Eyes: Negative for blurred vision, double vision and photophobia.   Respiratory: Positive for cough and shortness of breath. Negative for hemoptysis and sputum production.    Cardiovascular: Positive for leg swelling. Negative for chest pain, palpitations and orthopnea.   Gastrointestinal: Negative for abdominal pain, blood in stool, constipation, diarrhea, nausea and vomiting.   Genitourinary: Negative for dysuria, frequency, hematuria and urgency.   Musculoskeletal: Negative for joint pain and myalgias.   Neurological: Negative for dizziness, tingling, tremors, sensory change, focal weakness, loss of consciousness and headaches.   Psychiatric/Behavioral: Negative for depression and suicidal ideas.             Past Medical History  And current medications (link outpatient medications  with diagnosis)    Medical conditions: Congestive heart failure and diabetes   Medications: metformin, baby aspirin, carvedilol, atorvastatin, amiodarone, furosemide     Past Surgical History:  No past surgical history on file.    Medication Allergy/Sensitivities:  No Known Allergies      Family History:  Mother was diagnosed with colon cancer 5 years ago  Father passed away at 73 due to stroke; had HTN   Denies family history of heart conditions     Social History:  Smoking: denies   Alcohol: about 4 beers/week  Illictis: denies   Other: denies   Living situation: moved into a home in [place]; lives with a roommate     Physical Exam     Vitals:    05/12/18 1630 05/12/18 1700 05/12/18 1716 05/12/18 1730   BP:       Pulse: 74 70 77 74   Resp:   18 18   Temp:    36.7 °C (98.1 °F)   SpO2: 98% 94% 95% 92%   Weight:       Height:         Body mass index is 32.64 kg/m².  /92   Pulse 74   Temp 36.7 °C (98.1 °F)   Resp 18   Ht 1.753 m (5' 9\")   Wt 100.2 kg (221 lb)   SpO2 92%   BMI 32.64 kg/m²   O2 therapy: Pulse Oximetry: 92 %, O2 (LPM): 0, FiO2%: 21 %, O2 Delivery: None (Room Air)    Recent Labs      05/12/18   1437 "   WBC  4.8   RBC  4.98   HEMOGLOBIN  14.5   HEMATOCRIT  43.3   MCV  86.9   MCH  29.1   RDW  44.8   PLATELETCT  194   MPV  10.4   NEUTSPOLYS  65.70   LYMPHOCYTES  17.80*   MONOCYTES  15.10*   EOSINOPHILS  0.60   BASOPHILS  0.60     Recent Labs      05/12/18   1437   SODIUM  129*   POTASSIUM  4.3   CHLORIDE  95*   CO2  24   GLUCOSE  197*   BUN  24*     High AST (130)   High ALT (139)   High ALP (156)   High total bilirubin (1.7)   Negative alcohol test  Normal GFR   Lipase within normal limits   High BNP (891)   High Troponin (0.22)   High prothrombin time (19.8)  High INR  (1.72)   Normal APTT (29.2)     EKG findings   Abnormal QRS with nonspecific intraventricular conduction delay in leads V1, V2, V3, I, and AVL. Normal ST segments. T wave are inverted in V6.    Chest x-ray   There is a large cardiac silhouette with central vascular congestion.     Abdominal ultrasound   1.  There is hepatomegaly.  2.  Prominent IVC and hepatic veins with pulsatile hepatopedal flow suggesting possible right cardiac dysfunction.  3.  Gallbladder wall is minimally thickened. This can be seen with hepatitis or hypoalbuminemia.    Physical Exam   Constitutional: He is oriented to person, place, and time and well-developed, well-nourished, and in no distress.   HENT:   Head: Normocephalic.   Mouth/Throat: Oropharynx is clear and moist.   Eyes: Pupils are equal, round, and reactive to light.   Neck: No JVD present. No tracheal deviation present.   Cardiovascular: Normal rate, regular rhythm and normal heart sounds.    Pulmonary/Chest: Effort normal and breath sounds normal. No respiratory distress.   Abdominal: Soft. Bowel sounds are normal. He exhibits distension. He exhibits no mass. There is no tenderness. There is no rebound.   Musculoskeletal: Normal range of motion. He exhibits edema. He exhibits no tenderness or deformity.   Neurological: He is alert and oriented to person, place, and time.   Skin: Skin is warm and dry. No rash  noted. No erythema.       Assessment/Plan     #Acute on chronic systolic (congestive) heart failure   Patient with PMH of congestive heart failure came in with cough, shortness of breath, and peripheral edema that progressed over the past week. The patient reports that he gained 13 pounds in the past week.   -EKG showed nonspecific intraventricular conduction delay in leads V1-V3, I, and AVL.   -Chest x-ray showed large cardiac silhouette with central vascular congestion.    -increased troponin and BNP  -Abdominal U/S showed hepatomegaly with prominent IVC and hepatic veins suggesting right cardiac dysfunction.    -Gallbladder wall is minimally thickened.    -increased liver enzymes (AST, ALT, ALP), PT and INR    -suggests liver damage secondary to heart congestive heart failure   -increased cardiac enzymes and liver enzymes suggest worsening of congestive heart failure   -echocardiogram   -lipid panel pending   -amiodarone 200mg daily   -aspirin 325mg daily   -atorvastatin 20mg daily   -carvedilol 6.25 mg twice daily   -lovenox 40mg daily   -lasix 40mg daily   -potassium chloride 10mEq daily   -establish care with cardiology     #type II diabetes mellitus   Patient has a PMH of type 2 DM. Glucose 197 on admission  -hemoglobin A1C pending   -metformin     #renal failure   Renal failure is a concern due to the patient's history of congestive heart failure. He states that he has been seeing a PCP consistently in [place] due to renal function monitoring. High BUN on admission.   -establish care with PCP for renal function monitoring

## 2018-05-14 PROBLEM — J40 BRONCHITIS: Status: ACTIVE | Noted: 2018-05-14

## 2018-05-14 LAB
ANION GAP SERPL CALC-SCNC: 8 MMOL/L (ref 0–11.9)
BNP SERPL-MCNC: 729 PG/ML (ref 0–100)
BUN SERPL-MCNC: 25 MG/DL (ref 8–22)
CALCIUM SERPL-MCNC: 8.4 MG/DL (ref 8.5–10.5)
CHLORIDE SERPL-SCNC: 95 MMOL/L (ref 96–112)
CO2 SERPL-SCNC: 27 MMOL/L (ref 20–33)
CREAT SERPL-MCNC: 1.19 MG/DL (ref 0.5–1.4)
ERYTHROCYTE [DISTWIDTH] IN BLOOD BY AUTOMATED COUNT: 46 FL (ref 35.9–50)
GLUCOSE BLD-MCNC: 135 MG/DL (ref 65–99)
GLUCOSE BLD-MCNC: 197 MG/DL (ref 65–99)
GLUCOSE BLD-MCNC: 234 MG/DL (ref 65–99)
GLUCOSE BLD-MCNC: 258 MG/DL (ref 65–99)
GLUCOSE SERPL-MCNC: 155 MG/DL (ref 65–99)
HCT VFR BLD AUTO: 41.7 % (ref 42–52)
HGB BLD-MCNC: 14 G/DL (ref 14–18)
MCH RBC QN AUTO: 29.4 PG (ref 27–33)
MCHC RBC AUTO-ENTMCNC: 33.6 G/DL (ref 33.7–35.3)
MCV RBC AUTO: 87.4 FL (ref 81.4–97.8)
PLATELET # BLD AUTO: 192 K/UL (ref 164–446)
PMV BLD AUTO: 10.7 FL (ref 9–12.9)
POTASSIUM SERPL-SCNC: 3.8 MMOL/L (ref 3.6–5.5)
RBC # BLD AUTO: 4.77 M/UL (ref 4.7–6.1)
SODIUM SERPL-SCNC: 130 MMOL/L (ref 135–145)
WBC # BLD AUTO: 5.1 K/UL (ref 4.8–10.8)

## 2018-05-14 PROCEDURE — 80048 BASIC METABOLIC PNL TOTAL CA: CPT

## 2018-05-14 PROCEDURE — 85027 COMPLETE CBC AUTOMATED: CPT

## 2018-05-14 PROCEDURE — 700102 HCHG RX REV CODE 250 W/ 637 OVERRIDE(OP): Performed by: STUDENT IN AN ORGANIZED HEALTH CARE EDUCATION/TRAINING PROGRAM

## 2018-05-14 PROCEDURE — 82962 GLUCOSE BLOOD TEST: CPT | Mod: 91

## 2018-05-14 PROCEDURE — A9270 NON-COVERED ITEM OR SERVICE: HCPCS | Performed by: INTERNAL MEDICINE

## 2018-05-14 PROCEDURE — 36415 COLL VENOUS BLD VENIPUNCTURE: CPT

## 2018-05-14 PROCEDURE — 700111 HCHG RX REV CODE 636 W/ 250 OVERRIDE (IP): Performed by: INTERNAL MEDICINE

## 2018-05-14 PROCEDURE — 94640 AIRWAY INHALATION TREATMENT: CPT

## 2018-05-14 PROCEDURE — 770020 HCHG ROOM/CARE - TELE (206)

## 2018-05-14 PROCEDURE — 700102 HCHG RX REV CODE 250 W/ 637 OVERRIDE(OP): Performed by: INTERNAL MEDICINE

## 2018-05-14 PROCEDURE — 83880 ASSAY OF NATRIURETIC PEPTIDE: CPT

## 2018-05-14 PROCEDURE — A9270 NON-COVERED ITEM OR SERVICE: HCPCS | Performed by: STUDENT IN AN ORGANIZED HEALTH CARE EDUCATION/TRAINING PROGRAM

## 2018-05-14 PROCEDURE — 700101 HCHG RX REV CODE 250: Performed by: STUDENT IN AN ORGANIZED HEALTH CARE EDUCATION/TRAINING PROGRAM

## 2018-05-14 PROCEDURE — 99232 SBSQ HOSP IP/OBS MODERATE 35: CPT | Mod: GC | Performed by: INTERNAL MEDICINE

## 2018-05-14 PROCEDURE — 700111 HCHG RX REV CODE 636 W/ 250 OVERRIDE (IP): Performed by: STUDENT IN AN ORGANIZED HEALTH CARE EDUCATION/TRAINING PROGRAM

## 2018-05-14 RX ORDER — LOSARTAN POTASSIUM 50 MG/1
50 TABLET ORAL
Status: DISCONTINUED | OUTPATIENT
Start: 2018-05-14 | End: 2018-05-15 | Stop reason: HOSPADM

## 2018-05-14 RX ORDER — DIPHENHYDRAMINE HCL 25 MG
25 TABLET ORAL NIGHTLY PRN
Status: DISCONTINUED | OUTPATIENT
Start: 2018-05-14 | End: 2018-05-15 | Stop reason: HOSPADM

## 2018-05-14 RX ORDER — DEXTROMETHORPHAN HBR. AND GUAIFENESIN 10; 100 MG/5ML; MG/5ML
10 SOLUTION ORAL EVERY 6 HOURS PRN
Status: DISCONTINUED | OUTPATIENT
Start: 2018-05-14 | End: 2018-05-15

## 2018-05-14 RX ORDER — IPRATROPIUM BROMIDE AND ALBUTEROL SULFATE 2.5; .5 MG/3ML; MG/3ML
3 SOLUTION RESPIRATORY (INHALATION)
Status: DISCONTINUED | OUTPATIENT
Start: 2018-05-14 | End: 2018-05-15 | Stop reason: HOSPADM

## 2018-05-14 RX ORDER — FUROSEMIDE 10 MG/ML
40 INJECTION INTRAMUSCULAR; INTRAVENOUS ONCE
Status: COMPLETED | OUTPATIENT
Start: 2018-05-14 | End: 2018-05-14

## 2018-05-14 RX ORDER — AMOXICILLIN AND CLAVULANATE POTASSIUM 875; 125 MG/1; MG/1
1 TABLET, FILM COATED ORAL EVERY 12 HOURS
Status: DISCONTINUED | OUTPATIENT
Start: 2018-05-14 | End: 2018-05-15 | Stop reason: HOSPADM

## 2018-05-14 RX ORDER — BENZONATATE 100 MG/1
100 CAPSULE ORAL 3 TIMES DAILY PRN
Status: DISCONTINUED | OUTPATIENT
Start: 2018-05-14 | End: 2018-05-15

## 2018-05-14 RX ORDER — FUROSEMIDE 40 MG/1
40 TABLET ORAL
Status: DISCONTINUED | OUTPATIENT
Start: 2018-05-15 | End: 2018-05-15

## 2018-05-14 RX ADMIN — ASPIRIN 325 MG: 325 TABLET, DELAYED RELEASE ORAL at 05:54

## 2018-05-14 RX ADMIN — ACETAMINOPHEN 650 MG: 325 TABLET, FILM COATED ORAL at 16:27

## 2018-05-14 RX ADMIN — INSULIN HUMAN 2 UNITS: 100 INJECTION, SOLUTION PARENTERAL at 20:04

## 2018-05-14 RX ADMIN — INSULIN HUMAN 3 UNITS: 100 INJECTION, SOLUTION PARENTERAL at 11:07

## 2018-05-14 RX ADMIN — DEXTROMETHORPHAN HYDROBROMIDE AND GUAIFENESIN 10 ML: 10; 100 LIQUID ORAL at 07:50

## 2018-05-14 RX ADMIN — BENZONATATE 100 MG: 100 CAPSULE ORAL at 21:21

## 2018-05-14 RX ADMIN — SPIRONOLACTONE 25 MG: 25 TABLET, FILM COATED ORAL at 05:55

## 2018-05-14 RX ADMIN — CARVEDILOL 6.25 MG: 6.25 TABLET, FILM COATED ORAL at 05:53

## 2018-05-14 RX ADMIN — CARVEDILOL 6.25 MG: 6.25 TABLET, FILM COATED ORAL at 16:27

## 2018-05-14 RX ADMIN — ENOXAPARIN SODIUM 40 MG: 100 INJECTION SUBCUTANEOUS at 05:59

## 2018-05-14 RX ADMIN — FUROSEMIDE 40 MG: 10 INJECTION, SOLUTION INTRAMUSCULAR; INTRAVENOUS at 05:56

## 2018-05-14 RX ADMIN — FUROSEMIDE 40 MG: 10 INJECTION, SOLUTION INTRAMUSCULAR; INTRAVENOUS at 13:16

## 2018-05-14 RX ADMIN — IPRATROPIUM BROMIDE AND ALBUTEROL SULFATE 3 ML: .5; 3 SOLUTION RESPIRATORY (INHALATION) at 20:55

## 2018-05-14 RX ADMIN — AMOXICILLIN AND CLAVULANATE POTASSIUM 1 TABLET: 875; 125 TABLET, FILM COATED ORAL at 13:16

## 2018-05-14 RX ADMIN — INSULIN HUMAN 1 UNITS: 100 INJECTION, SOLUTION PARENTERAL at 16:33

## 2018-05-14 RX ADMIN — LOSARTAN POTASSIUM 50 MG: 50 TABLET ORAL at 13:16

## 2018-05-14 RX ADMIN — DEXTROMETHORPHAN HYDROBROMIDE AND GUAIFENESIN 10 ML: 10; 100 LIQUID ORAL at 13:22

## 2018-05-14 RX ADMIN — ATORVASTATIN CALCIUM 20 MG: 20 TABLET, FILM COATED ORAL at 05:55

## 2018-05-14 RX ADMIN — POTASSIUM CHLORIDE 10 MEQ: 1500 TABLET, EXTENDED RELEASE ORAL at 05:54

## 2018-05-14 RX ADMIN — AMIODARONE HYDROCHLORIDE 200 MG: 200 TABLET ORAL at 05:54

## 2018-05-14 ASSESSMENT — ENCOUNTER SYMPTOMS
WEIGHT LOSS: 0
SENSORY CHANGE: 0
COUGH: 1
ORTHOPNEA: 0
DEPRESSION: 0
SPEECH CHANGE: 0
CHILLS: 0
HEADACHES: 0
VOMITING: 0
SORE THROAT: 0
DIZZINESS: 0
MYALGIAS: 0
BRUISES/BLEEDS EASILY: 0
TREMORS: 0
HEMOPTYSIS: 0
BLURRED VISION: 0
BACK PAIN: 0
ABDOMINAL PAIN: 0
NAUSEA: 0
TINGLING: 0
HALLUCINATIONS: 0
PALPITATIONS: 0
HEARTBURN: 0
SPUTUM PRODUCTION: 1
NERVOUS/ANXIOUS: 0
FOCAL WEAKNESS: 0
DIARRHEA: 0
NECK PAIN: 0
DOUBLE VISION: 0
FEVER: 0
SHORTNESS OF BREATH: 1
PHOTOPHOBIA: 0

## 2018-05-14 ASSESSMENT — LIFESTYLE VARIABLES
SUBSTANCE_ABUSE: 0
EVER_SMOKED: NEVER

## 2018-05-14 ASSESSMENT — COGNITIVE AND FUNCTIONAL STATUS - GENERAL
DAILY ACTIVITIY SCORE: 24
SUGGESTED CMS G CODE MODIFIER DAILY ACTIVITY: CH
SUGGESTED CMS G CODE MODIFIER MOBILITY: CH
MOBILITY SCORE: 24

## 2018-05-14 ASSESSMENT — PAIN SCALES - GENERAL
PAINLEVEL_OUTOF10: 0

## 2018-05-14 NOTE — HEART FAILURE PROGRAM
Cardiovascular Nurse Navigator () Advanced Heart Failure Program Inpatient Progress Note:   Hospital Admit Date: 5/12/18  New Heart Failure Problems List Date: 5/12/18    Acutely decompensated HFrEF (20%). IV diuresis ordered for 1230 today, Cardiology not following yet. Resident notes were indicating that pt could f/u with cardiology outpatient.      Pending sale to [facility] Plan Notes:   none  Therapy Notes:   none  Insurance Coverage:  Medicare A&B  Patient Residence:  Demographics say MD Nieves notes say lives in [place] with a roommate.   Advanced Care Planning:  No AD on file. Full code status at the time of this note's filing.    The ACC recommends engaging palliative care as part of optimization of HFrEF treatment to solicit goals of care and focus on quality of life throughout the clinical course of HF.    Paged [facility] to inquire if they have had discussions with the patient about this diagnosis and prognosis and if they feel that an order for palliative to discuss Advanced Care Planning is appropriate at this time.     Discussed with Dr. Bazzi: the team did discuss with the patient but he's not sure the patient grasps the gravity of his situation. Dr Bazzi did agree that a palliative consult would be helpful and stated that he would order it.    Follow up appointment:   If discharged from acute care to home (exception hospice discharge), pt must have an appointment scheduled within 7 days of discharge (Cardiology, PCP, or DC Clinic).    If discharged to Transitional Care Facility (LTAC, SNF, IRH), appointment should be made about a month out for after TCF.     This [facility] has placed an order for Hospital Schedulers to arrange f/u appointment within seven calendar days of anticipated discharge date from acute on 5/17. Get into [facility] eventually if not for seven day appt for ongoing HF management.    Bedside Nursing Education:  Please provide HF packet and repeated, ongoing education while administering  "medications, weighing patient, discussing management of symptoms, diet and need to follow up and act on changes.     Bedside Nursing Weights:  Please weigh patient daily (standing scale if not clinically contraindicated) and have him write weight on HF Calendar. RN/CNA to document weight in Epic.    Please assess pt's understanding of what to do if weight gain occurs. Reinforce education to act (call telephone number on their HF Book) if they gain 3 or more pounds in a 24 hr period or 5 or more pounds in a week.    If pt does not own a working scale and cannot afford to purchase one, please provide one. Scales can be found in the Care Coordination Rooms on T[address].    Bedside Nursing Discharge:  When completing the after visit summary (discharge instructions) please select \"Cardiac Diagnosis, and Heart Failure\" in the special instructions section to populate the heart failure specific discharge instructions.     Referrals Placed:    Social Work   Please assess transportation and copay resources for: acquisition of medications and attendance at follow up appointments.  Registered Dietician  To provide education on HF diet.    Thank you and please call MAYELA Keller with any questions: [phone].   "

## 2018-05-14 NOTE — NON-PROVIDER
Internal Medicine Medical Student Note  Note Author: Sid Velazquez, Student    Name Lai Dailey       1970   Age/Sex 48 y.o. male   MRN [id number]   Code Status FULL     After 5PM or if no immediate response to page, please call for cross-coverage  Attending/Team: Dr. Pham See Patient List for primary contact information  Call [phone] to page after hours   1st Call - Day Intern (R1):   Dr. Bazzi 2nd Call - Day Sr. Resident (R2/R3):   Dr. [place]         Reason for interval visit  (Principal Problem)   <principal problem not specified>   CHF exacerbation    Interval Problem Daily Status Update  (24 hours)   No acute events overnight. He continues to have mild SOB and a productive cough with white sputum. He was not able to sleep well from the cough awakening him. However, the patient feels like he is improving from yesterday. His LE edema and JVD have decreased. He denies headache, chest pain, abdominal pain, or sensory changes.     Review of Systems   Constitutional: Negative for chills, fever and weight loss.   HENT: Negative for ear pain, hearing loss, sore throat and tinnitus.    Eyes: Negative for blurred vision, double vision and photophobia.   Respiratory: Positive for cough, sputum production and shortness of breath. Negative for hemoptysis.    Cardiovascular: Positive for leg swelling. Negative for chest pain, palpitations and orthopnea.   Gastrointestinal: Negative for abdominal pain, diarrhea, heartburn, nausea and vomiting.   Genitourinary: Negative for dysuria, frequency, hematuria and urgency.   Musculoskeletal: Negative for back pain, joint pain and neck pain.   Neurological: Negative for dizziness, tingling, tremors, sensory change and headaches.   Psychiatric/Behavioral: Negative for depression and suicidal ideas.       Physical Exam       Vitals:    18 2335 18 0335 18 0713 18 1102   BP: 121/89 143/95 151/123 118/79   Pulse: 77 76 72 69   Resp:  17 17 18 17   Temp: 36.9 °C (98.4 °F) 37.3 °C (99.1 °F) 36.7 °C (98 °F) 37.3 °C (99.1 °F)   SpO2: 93% 93% 93% 95%   Weight:       Height:         Body mass index is 32.61 kg/m². Weight: 100.2 kg (220 lb 14.4 oz)  Oxygen Therapy:  Pulse Oximetry: 95 %, O2 (LPM): 0, O2 Delivery: None (Room Air)     Recent Labs      05/12/18   1437  05/13/18   0249  05/14/18   0256   WBC  4.8  4.4*  5.1   RBC  4.98  4.83  4.77   HEMOGLOBIN  14.5  14.1  14.0   HEMATOCRIT  43.3  41.8*  41.7*   MCV  86.9  86.5  87.4   MCH  29.1  29.2  29.4   RDW  44.8  44.3  46.0   PLATELETCT  194  197  192   MPV  10.4  10.8  10.7   NEUTSPOLYS  65.70  64.10   --    LYMPHOCYTES  17.80*  18.00*   --    MONOCYTES  15.10*  16.60*   --    EOSINOPHILS  0.60  0.20   --    BASOPHILS  0.60  0.90   --      Recent Labs      05/13/18   0249  05/13/18   1119  05/14/18   0256   SODIUM  129*  131*  130*   POTASSIUM  3.8  3.6  3.8   CHLORIDE  95*  96  95*   CO2  24  26  27   GLUCOSE  288*  292*  155*   BUN  24*  24*  25*     High D-Dimer (348)    High BNP (729)   Echo:  Left ventricle is severely dilated. Mild concentric left ventricular   hypertrophy. Grade III diastolic dysfunction (restrictive pattern).   Severely reduced left ventricular systolic function. Left ventricular   ejection fraction is visually estimated to be 20%. Diffuse hypokinesis   with septal dyskinesis.  Severe mitral regurgitation.  Severe tricuspid regurgitation. Estimated right ventricular systolic   pressure  is 50 mmHg.  Compared to the report of the study done - the TR is now severe.     Physical Exam   Constitutional: He is oriented to person, place, and time and well-developed, well-nourished, and in no distress.   HENT:   Head: Normocephalic.   Eyes: Pupils are equal, round, and reactive to light.   Neck: Normal range of motion. JVD present. No tracheal deviation present.   Cardiovascular: Normal rate, regular rhythm, normal heart sounds and intact distal pulses.    Pulmonary/Chest: Effort  normal. He exhibits no tenderness.   Minimal crackles at lung bases   Abdominal: Soft. Bowel sounds are normal. He exhibits no distension. There is no tenderness. There is no rebound.   Musculoskeletal: Normal range of motion. He exhibits edema. He exhibits no tenderness or deformity.   Neurological: He is alert and oriented to person, place, and time.   Skin: Skin is warm and dry. No rash noted. No erythema.         Assessment/Plan     #Acute on chronic systolic CHF  SOB with cough and edema in the past week. Appears to be improving clinically, with decreased feeling of SOB.  -Echo showed dilatation of left ventricle, diastolic dysfunction, and reduced systolic function. EF about 20%.   -D-dimer elevated (348), but chest x-ray not suggestive of PE   -restrict fluid intake, low salt diet   -continue IV Lasix 40mg BID  -spironolactone 25mg once daily   -Continue ASA, Coreg, statin  -Guaifenesin 600mg TID for sputum  -monitor weight gain   -watch for signs of decompensation   -follow cardiology      #Atrial fibrillation  Patient has history of A-fib with rapid ventricular rate   -Continue amiodarone  -f/u with cardiology on d/c     #DM2  -Continue metformin 500 mg BID

## 2018-05-15 ENCOUNTER — APPOINTMENT (OUTPATIENT)
Dept: RADIOLOGY | Facility: MEDICAL CENTER | Age: 48
DRG: 292 | End: 2018-05-15
Attending: STUDENT IN AN ORGANIZED HEALTH CARE EDUCATION/TRAINING PROGRAM
Payer: MEDICARE

## 2018-05-15 ENCOUNTER — PATIENT OUTREACH (OUTPATIENT)
Dept: HEALTH INFORMATION MANAGEMENT | Facility: OTHER | Age: 48
End: 2018-05-15

## 2018-05-15 VITALS
BODY MASS INDEX: 32.59 KG/M2 | OXYGEN SATURATION: 91 % | HEIGHT: 69 IN | DIASTOLIC BLOOD PRESSURE: 74 MMHG | HEART RATE: 66 BPM | WEIGHT: 220.02 LBS | SYSTOLIC BLOOD PRESSURE: 112 MMHG | RESPIRATION RATE: 16 BRPM | TEMPERATURE: 98.3 F

## 2018-05-15 LAB
ANION GAP SERPL CALC-SCNC: 9 MMOL/L (ref 0–11.9)
BUN SERPL-MCNC: 34 MG/DL (ref 8–22)
CALCIUM SERPL-MCNC: 9.2 MG/DL (ref 8.5–10.5)
CHLORIDE SERPL-SCNC: 92 MMOL/L (ref 96–112)
CO2 SERPL-SCNC: 28 MMOL/L (ref 20–33)
CREAT SERPL-MCNC: 1.4 MG/DL (ref 0.5–1.4)
ERYTHROCYTE [DISTWIDTH] IN BLOOD BY AUTOMATED COUNT: 46.6 FL (ref 35.9–50)
GLUCOSE BLD-MCNC: 148 MG/DL (ref 65–99)
GLUCOSE BLD-MCNC: 160 MG/DL (ref 65–99)
GLUCOSE SERPL-MCNC: 163 MG/DL (ref 65–99)
HCT VFR BLD AUTO: 45.4 % (ref 42–52)
HGB BLD-MCNC: 14.7 G/DL (ref 14–18)
LACTATE BLD-SCNC: 2.7 MMOL/L (ref 0.5–2)
MCH RBC QN AUTO: 28.4 PG (ref 27–33)
MCHC RBC AUTO-ENTMCNC: 32.4 G/DL (ref 33.7–35.3)
MCV RBC AUTO: 87.8 FL (ref 81.4–97.8)
PLATELET # BLD AUTO: 208 K/UL (ref 164–446)
PMV BLD AUTO: 10.2 FL (ref 9–12.9)
POTASSIUM SERPL-SCNC: 4.5 MMOL/L (ref 3.6–5.5)
RBC # BLD AUTO: 5.17 M/UL (ref 4.7–6.1)
SODIUM SERPL-SCNC: 129 MMOL/L (ref 135–145)
WBC # BLD AUTO: 5.8 K/UL (ref 4.8–10.8)

## 2018-05-15 PROCEDURE — A9270 NON-COVERED ITEM OR SERVICE: HCPCS | Performed by: STUDENT IN AN ORGANIZED HEALTH CARE EDUCATION/TRAINING PROGRAM

## 2018-05-15 PROCEDURE — 700102 HCHG RX REV CODE 250 W/ 637 OVERRIDE(OP): Performed by: INTERNAL MEDICINE

## 2018-05-15 PROCEDURE — A9270 NON-COVERED ITEM OR SERVICE: HCPCS | Performed by: INTERNAL MEDICINE

## 2018-05-15 PROCEDURE — 83605 ASSAY OF LACTIC ACID: CPT

## 2018-05-15 PROCEDURE — 700111 HCHG RX REV CODE 636 W/ 250 OVERRIDE (IP): Performed by: STUDENT IN AN ORGANIZED HEALTH CARE EDUCATION/TRAINING PROGRAM

## 2018-05-15 PROCEDURE — 82962 GLUCOSE BLOOD TEST: CPT

## 2018-05-15 PROCEDURE — 700102 HCHG RX REV CODE 250 W/ 637 OVERRIDE(OP): Performed by: STUDENT IN AN ORGANIZED HEALTH CARE EDUCATION/TRAINING PROGRAM

## 2018-05-15 PROCEDURE — 36415 COLL VENOUS BLD VENIPUNCTURE: CPT

## 2018-05-15 PROCEDURE — 85027 COMPLETE CBC AUTOMATED: CPT

## 2018-05-15 PROCEDURE — 80048 BASIC METABOLIC PNL TOTAL CA: CPT

## 2018-05-15 PROCEDURE — 71046 X-RAY EXAM CHEST 2 VIEWS: CPT

## 2018-05-15 PROCEDURE — 99239 HOSP IP/OBS DSCHRG MGMT >30: CPT | Mod: GC | Performed by: INTERNAL MEDICINE

## 2018-05-15 RX ORDER — AMOXICILLIN AND CLAVULANATE POTASSIUM 875; 125 MG/1; MG/1
1 TABLET, FILM COATED ORAL 2 TIMES DAILY
Qty: 10 TAB | Refills: 0 | Status: SHIPPED | OUTPATIENT
Start: 2018-05-15 | End: 2018-05-20

## 2018-05-15 RX ORDER — FUROSEMIDE 40 MG/1
40 TABLET ORAL
Status: DISCONTINUED | OUTPATIENT
Start: 2018-05-16 | End: 2018-05-15 | Stop reason: HOSPADM

## 2018-05-15 RX ORDER — FAMOTIDINE 40 MG/1
40 TABLET, FILM COATED ORAL ONCE
Qty: 15 TAB | Refills: 0 | Status: SHIPPED | OUTPATIENT
Start: 2018-05-15 | End: 2018-05-15

## 2018-05-15 RX ORDER — SPIRONOLACTONE 25 MG/1
25 TABLET ORAL DAILY
Qty: 30 TAB | Refills: 1 | Status: SHIPPED | OUTPATIENT
Start: 2018-05-16 | End: 2018-06-18 | Stop reason: SDUPTHER

## 2018-05-15 RX ORDER — LOSARTAN POTASSIUM 50 MG/1
50 TABLET ORAL DAILY
Qty: 30 TAB | Refills: 1 | Status: SHIPPED | OUTPATIENT
Start: 2018-05-16 | End: 2018-05-21

## 2018-05-15 RX ORDER — GUAIFENESIN 600 MG/1
600 TABLET, EXTENDED RELEASE ORAL 3 TIMES DAILY
Status: DISCONTINUED | OUTPATIENT
Start: 2018-05-15 | End: 2018-05-15 | Stop reason: HOSPADM

## 2018-05-15 RX ORDER — GUAIFENESIN 600 MG/1
600 TABLET, EXTENDED RELEASE ORAL 3 TIMES DAILY
Qty: 15 TAB | Refills: 0 | Status: SHIPPED | OUTPATIENT
Start: 2018-05-15 | End: 2018-07-05

## 2018-05-15 RX ORDER — METHYLPREDNISOLONE 4 MG/1
TABLET ORAL
Qty: 1 KIT | Refills: 0 | Status: SHIPPED | OUTPATIENT
Start: 2018-05-15 | End: 2018-05-29

## 2018-05-15 RX ADMIN — AMIODARONE HYDROCHLORIDE 200 MG: 200 TABLET ORAL at 06:20

## 2018-05-15 RX ADMIN — AMOXICILLIN AND CLAVULANATE POTASSIUM 1 TABLET: 875; 125 TABLET, FILM COATED ORAL at 06:19

## 2018-05-15 RX ADMIN — AMOXICILLIN AND CLAVULANATE POTASSIUM 1 TABLET: 875; 125 TABLET, FILM COATED ORAL at 00:04

## 2018-05-15 RX ADMIN — LOSARTAN POTASSIUM 50 MG: 50 TABLET ORAL at 06:20

## 2018-05-15 RX ADMIN — FUROSEMIDE 40 MG: 40 TABLET ORAL at 06:20

## 2018-05-15 RX ADMIN — DEXTROMETHORPHAN HYDROBROMIDE AND GUAIFENESIN 10 ML: 10; 100 LIQUID ORAL at 00:07

## 2018-05-15 RX ADMIN — CARVEDILOL 6.25 MG: 6.25 TABLET, FILM COATED ORAL at 06:19

## 2018-05-15 RX ADMIN — POTASSIUM CHLORIDE 10 MEQ: 1500 TABLET, EXTENDED RELEASE ORAL at 06:19

## 2018-05-15 RX ADMIN — ATORVASTATIN CALCIUM 20 MG: 20 TABLET, FILM COATED ORAL at 06:20

## 2018-05-15 RX ADMIN — INSULIN HUMAN 1 UNITS: 100 INJECTION, SOLUTION PARENTERAL at 11:13

## 2018-05-15 RX ADMIN — ENOXAPARIN SODIUM 40 MG: 100 INJECTION SUBCUTANEOUS at 06:13

## 2018-05-15 RX ADMIN — SPIRONOLACTONE 25 MG: 25 TABLET, FILM COATED ORAL at 06:19

## 2018-05-15 RX ADMIN — ASPIRIN 325 MG: 325 TABLET, DELAYED RELEASE ORAL at 06:20

## 2018-05-15 RX ADMIN — GUAIFENESIN 600 MG: 600 TABLET, EXTENDED RELEASE ORAL at 12:56

## 2018-05-15 ASSESSMENT — PAIN SCALES - GENERAL
PAINLEVEL_OUTOF10: 0

## 2018-05-15 NOTE — ASSESSMENT & PLAN NOTE
-Patient has a history of intermittent cough with whitish sputum.  -occ wheeze seen (+).  On augumentin/guanphesin.  -Will start duonebs as the cough is not resolving.  CTM closely

## 2018-05-15 NOTE — PROGRESS NOTES
1830- Pt's roommate called to say patient fell on floor. Pt reports he was standing up to throw trash away and when he turned to go back to bed he stepped on his bedside table which is on wheels, this tripped him and he had a ground level fall landing on his buttocks. The pt denies hitting his head. Dr Bazzi came to bedside and assessed patient fully. Pt denies any pain or injury. Vitals stable- 110/72, 74, 18, 95% on RA. Pt's mobility status has been up self since admission. Pt is steady on feet and has denied dizziness since admission. Pt calls appropriately when needing assistance. Bed in low position and call light in reach. Pt refuses bed alarm at this time. Pt declines having family notified of event.

## 2018-05-15 NOTE — PROGRESS NOTES
Pt discharged home.  Tele box removed.  IV DC'ed.  Discharge instructions and scripts provided to pt and wife, they verbalize understanding.  Pt walked out with wife.

## 2018-05-15 NOTE — PROGRESS NOTES
Internal Medicine Interval Note  Note Author: Marco Bazzi M.D.     Name Lai Dailey       1970   Age/Sex 48 y.o. male   MRN [id number]   Code Status full     After 5PM or if no immediate response to page, please call for cross-coverage  Attending/Team:  See Patient List for primary contact information  Call [phone] to page    1st Call - Day Intern (R1):    2nd Call - Day Sr. Resident (R2/R3):   Dr.Jaffar Zimmerman         Reason for interval visit  (Principal Problem)   Acute on chronic systolic heart failure.    Interval Problem Daily Status Update  (24 hours)   Patient continues to have mild SOB and a productive cough with white sputum. He was not able to sleep well from the cough awakening him. However, the patient feels like he is improving from yesterday. His LE edema and JVD have decreased. He denies headache, chest pain, abdominal pain, or sensory changes.        Review of Systems   Constitutional: Negative for chills and fever.   HENT: Negative for hearing loss and nosebleeds.    Eyes: Negative for blurred vision and double vision.   Respiratory: Positive for cough and sputum production.    Cardiovascular: Negative for chest pain and palpitations.   Gastrointestinal: Negative for nausea and vomiting.   Genitourinary: Negative for frequency and urgency.   Musculoskeletal: Negative for back pain, myalgias and neck pain.   Skin: Negative for itching and rash.   Neurological: Negative for tingling, tremors, speech change and focal weakness.   Endo/Heme/Allergies: Negative for environmental allergies. Does not bruise/bleed easily.   Psychiatric/Behavioral: Negative for hallucinations and substance abuse. The patient is not nervous/anxious.        Consultants/Specialty      Disposition  inpatient    Quality Measures  Quality-Core Measures   Reviewed items::  EKG reviewed, Labs reviewed, Medications reviewed and Radiology images reviewed  DVT  prophylaxis pharmacological::  Enoxaparin (Lovenox)          Physical Exam       Vitals:    05/14/18 0335 05/14/18 0713 05/14/18 1102 05/14/18 1537   BP: 143/95 151/123 118/79 117/88   Pulse: 76 72 69 85   Resp: 17 18 17 18   Temp: 37.3 °C (99.1 °F) 36.7 °C (98 °F) 37.3 °C (99.1 °F) 36.8 °C (98.3 °F)   SpO2: 93% 93% 95% 95%   Weight:       Height:         Body mass index is 32.61 kg/m². Weight: 100.2 kg (220 lb 14.4 oz)  Oxygen Therapy:  Pulse Oximetry: 95 %, O2 (LPM): 1, O2 Delivery: Silicone Nasal Cannula    Physical Exam   Constitutional: He is oriented to person, place, and time and well-developed, well-nourished, and in no distress.   HENT:   Head: Normocephalic and atraumatic.   Eyes: Pupils are equal, round, and reactive to light.   Neck: Normal range of motion. Neck supple.   Cardiovascular: Normal rate, regular rhythm and normal heart sounds.  Exam reveals no gallop and no friction rub.    No murmur heard.  Pulmonary/Chest: Effort normal and breath sounds normal. No respiratory distress.   Occasional bibasilar fine crepitations with wheeze(+)   Abdominal: Soft. Bowel sounds are normal. He exhibits no distension. There is no tenderness. There is no rebound and no guarding.   Genitourinary: Penis normal.   Musculoskeletal: Normal range of motion.   Neurological: He is alert and oriented to person, place, and time. No cranial nerve deficit. Coordination normal. GCS score is 15.   Skin: Skin is warm and dry.   Psychiatric: Mood and affect normal.         Lab Data Review:         5/14/2018  5:43 PM    Recent Labs      05/13/18   0249  05/13/18   1119  05/14/18   0256   SODIUM  129*  131*  130*   POTASSIUM  3.8  3.6  3.8   CHLORIDE  95*  96  95*   CO2  24  26  27   BUN  24*  24*  25*   CREATININE  0.97  1.04  1.19   CALCIUM  8.3*  8.6  8.4*       Recent Labs      05/12/18   1437  05/13/18   0249  05/13/18   1119  05/14/18   0256   ALTSGPT  139*  119*  108*   --    ASTSGOT  130*  109*  96*   --    ALKPHOSPHAT   156*  145*  142*   --    TBILIRUBIN  1.7*  1.6*  1.7*   --    LIPASE  30   --    --    --    GLUCOSE  197*  288*  292*  155*       Recent Labs      05/12/18 1437 05/13/18 0249 05/14/18   0256   RBC  4.98  4.83  4.77   HEMOGLOBIN  14.5  14.1  14.0   HEMATOCRIT  43.3  41.8*  41.7*   PLATELETCT  194  197  192   PROTHROMBTM  19.8*   --    --    APTT  29.2   --    --    INR  1.72*   --    --        Recent Labs      05/12/18   1437 05/13/18 0249 05/13/18   1119  05/14/18   0256   WBC  4.8  4.4*   --   5.1   NEUTSPOLYS  65.70  64.10   --    --    LYMPHOCYTES  17.80*  18.00*   --    --    MONOCYTES  15.10*  16.60*   --    --    EOSINOPHILS  0.60  0.20   --    --    BASOPHILS  0.60  0.90   --    --    ASTSGOT  130*  109*  96*   --    ALTSGPT  139*  119*  108*   --    ALKPHOSPHAT  156*  145*  142*   --    TBILIRUBIN  1.7*  1.6*  1.7*   --            Assessment/Plan     Acute on chronic systolic (congestive) heart failure (HCC)- (present on admission)   Assessment & Plan    -SOB with cough and weight gain since one week.  -Patient has a history of weight gain since I week-patient has gained 13 pounds prior to presentation.  -Presents with pedal edema and bilateral knee edema.  -CXR reveals,cardiomegaly with central vascular congestion.  -Elevated liver enzymes secondary to right heart failure and hepatomegaly  -BNP-on admission -872  -Troponin-0.22 on admission and 0.23  on  5/13/18.-possibly due to demand ischemia.  Plan   -Restrict fluid intake to 1.5 to 2 lts.  -Low salt diet.  -Iv Lasix 40 mg twice daily.  -spirnolactone 25 mg once daily  -ASA-81 mg.  -Atarvastatin 20 mg .  -carvidilol 6.25 mg bd.  -Echocardiogram awaited.  -For ambulatory pulse oximeter trial today.  -Strict input -output charting.  -monitor weight gain on a daily basis.  -watch for signs of decompensation.  - D-Dimers results-348.  -CT-PE deferred          Arrhythmia   Assessment & Plan    -Patient has a history of  Atrial fibrillation with  rapid ventricular rate.  -Patient on  Po amiodarone 200 mg.  -Diagnosed with acute-on-chronic congestive heart failure with dyspnea,.  -Recent echocardiogram showed an EF of 16% on 12/13/2016.   -Cardiology follow up on D/C.          Type 2 diabetes mellitus with renal complication (HCC)   Assessment & Plan    -Patient has a history of Diabetes type 2 on treatment with metformin 500 mg twice daily.  -Patient claims compliance on the medications,  -Well controlled on metformin with blood glucose of 197 on arrival.  -patient was started on ISS on hospital admission and Metformin was stopped.  Plan:  -Awaiting - Hba1c and review,to assess long term glycemic control.  -Monitor on sliding scale.  -To schedule retinal check as an outpatient..  -Monitor for long term diabetic sequlae.  -will monitor for hepatorenal syndrome.        Bronchitis   Assessment & Plan    -Patient has a history of intermittent cough with whitish sputum.  -occ wheeze seen (+).  On augumentin/guanphesin.  -Will start duonebs as the cough is not resolving.  CTM closely        AICD (automatic cardioverter/defibrillator) present- (present on admission)   Assessment & Plan    -Patient had a AICD put in place in 2008.  -low EF of 16% on echo.  -Awaiting repeat Echocardiogram.  -Eventually cardiac transplant a high possibility based on low EF and severe cardiomyopathy.  -Cardiology follow up as an outpatient.

## 2018-05-15 NOTE — PROGRESS NOTES
2043- Dr. Taylor notified of continued cough post guaifenesin administration. New order for benzonatate received.

## 2018-05-15 NOTE — DISCHARGE SUMMARY
Internal Medicine Discharge Summary  Note Author: Marco Bazzi M.D.       Admit Date:  5/12/2018       Discharge Date: 5/15/18    Service:   R Internal Medicine Green Team  Attending Physician(s):       Senior Resident(s): Dr.Jaafar Bustillos  Jered Resident(s):       Primary Diagnosis:  Acute on chronic systolic heart failure.    Secondary Diagnoses:                Active Problems:    Acute on chronic systolic (congestive) heart failure (HCC) POA: Yes    Type 2 diabetes mellitus with renal complication (HCC) POA: Unknown      Overview: Patient has a history of diabetes type 2  On treatment with metformin.      Denies any sequalae of neuropathy ,nephropathy,or retinopathy.      Patient had a blood glucose of    Arrhythmia POA: Unknown    AICD (automatic cardioverter/defibrillator) present POA: Yes    Essential hypertension POA: Yes    Bronchitis POA: Unknown  Resolved Problems:    Implantable cardioverter-defibrillator (ICD) in situ POA: Yes      Hospital Summary (Brief Narrative):          is a 48 year old male who has a history of congestive heart failure and diabetes mellitus who presented to the ED after being transferred from [facility] with complaints of SOB ,with intermittent cough and wheeze and progressive weight gain since one month.  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  Patient said  that he was  having SOB with swelling of his legs since one week which was progressively getting worse over time.and is associated with 2 pillow orthopnea ,no PND,and weight gain.Patient had a history  of a cough which was productive of mucoid sputum,not associated with a fever,but causing him to have a bad sleep,Patient denied  any  "chest pains,palpitations,weakness or dizziness or syncopal attacks and says that he had gained more than 13 pounds since the last one week.Patient takes Metformin 500 mg twice daily for his diabetes and he denied any polyuria,polyphagia ,or polydipsia,or weakness in the recent past,on presentation.  Patient was seen in the ED with vitals of  /92   Pulse 78   Temp 36.4 °C (97.6 °F)   Resp 20   Ht 1.753 m (5' 9\")   SpO2 98% .Patient had EKG done in the ED which and chest x-ray revealing a large cardiac silhouette with central vascular congestion.Abdominal ultrasound revealed hepatomegaly with a distended gallbladder.    Patient was admitted to the medical floor for treatment of his decompensated heart failure,and he was started on lasix 40 mg twice daily,Carvidilol 6.25 mg twice daily,losartan 50 mg once daily,and continued on his oral potasium of 10 meq daily,ASA ,and amiodarone 200 mg once daily for his chronic atrial fibrillation .Patient had an Echo done which revealed an EF of 20%.,and this  was explained in detail along with  the prognosis and possible interventions that he might need have to discuss with his cardiologist  including a heart transplant and the possibility of palliative care..Patient said  that he would like to see his cardiologist as an outpatient before he makes the decision.  Patient was treated with  Augumentin, one tablet twice daily and Guaifenesin 600 mg tid to take care of his recurrent cough with expectoration while on the medical floor.Patient had repeat X-rays of the chest which revealed clearing  of the bronchitis on the 5/15/18 and he was discharged in stable condition with a advice to follow up with his primary care and Cardiologist in one week time,on follow up antibiotics,and antihypertensives and cough suppressants.    Patient /Hospital Summary (Details -- Problem Oriented) :          No new Assessment & Plan notes have been filed under this hospital service since the " last note was generated.  Service: Hospital Medicine    Acute on chronic systolic (congestive) heart failure (HCC)- (present on admission)   Assessment & Plan     -SOB with cough and weight gain since one week.  -Patient has a history of weight gain since I week-patient has gained 13 pounds prior to presentation.  -Presents with pedal edema and bilateral knee edema.  -CXR reveals,cardiomegaly with central vascular congestion.  -Elevated liver enzymes secondary to right heart failure and hepatomegaly  -BNP-on admission -872  -Troponin-0.22 on admission and 0.23  on  5/13/18.-possibly due to demand ischemia.  Plan   -Restrict fluid intake to 1.5 to 2 lts.  -Low salt diet.  - PO Lasix 40 mg  daily.  -spirnolactone 25 mg once daily  -ASA-81 mg.  -Atarvastatin 20 mg .  -carvidilol 6.25 mg bd.  -Echocardiogram awaited.  -For ambulatory pulse oximeter trial today.  -Strict input -output charting.  -monitor weight gain on a daily basis.  -watch for signs of decompensation.  - D-Dimers results-348.  -CT-PE deferred             Arrhythmia   Assessment & Plan     -Patient has a history of  Atrial fibrillation with rapid ventricular rate.  -Patient on  Po amiodarone 200 mg.  -Diagnosed with acute-on-chronic congestive heart failure with dyspnea,.  -Recent echocardiogram showed an EF of 16% on 12/13/2016.   -Cardiology follow up on D/C.             Type 2 diabetes mellitus with renal complication (HCC)   Assessment & Plan     -Patient has a history of Diabetes type 2 on treatment with metformin 500 mg twice daily.  -Patient claims compliance on the medications,  -Well controlled on metformin with blood glucose of 197 on arrival.  -patient was started on ISS on hospital admission and Metformin was stopped.  Plan:  -Awaiting - Hba1c and review,to assess long term glycemic control.  -Monitor on sliding scale.  -To schedule retinal check as an outpatient..  -Monitor for long term diabetic sequlae.  -will monitor for hepatorenal  syndrome.          Bronchitis   Assessment & Plan     -Patient has a history of intermittent cough with whitish sputum.  -occ wheeze seen (+).  On augumentin/guanphesin.  -Will start duonebs as the cough is not resolving.  CTM closely          AICD (automatic cardioverter/defibrillator) present- (present on admission)   Assessment & Plan     -Patient had a AICD put in place in 2008.  -low EF of 16% on echo.  -Awaiting repeat Echocardiogram.  -Eventually cardiac transplant a high possibility based on low EF and severe cardiomyopathy.  -Cardiology follow up as an outpatient.              Consultants:     None      Procedures:        none    Imaging/ Testing:      Abdominal ultrasound.  Chest  X-ray.  Echocardiogram.    Discharge Medications:         Medication Reconciliation: Completed       Medication List      START taking these medications      Instructions   amoxicillin-clavulanate 875-125 MG Tabs  Commonly known as:  AUGMENTIN   Take 1 Tab by mouth 2 times a day for 5 days.  Dose:  1 Tab     famotidine 40 MG Tabs  Commonly known as:  PEPCID   Take 1 Tab by mouth Once for 1 dose.  Dose:  40 mg     guaiFENesin  MG Tb12  Commonly known as:  MUCINEX   Take 1 Tab by mouth 3 times a day.  Dose:  600 mg     losartan 50 MG Tabs  Start taking on:  5/16/2018  Commonly known as:  COZAAR   Take 1 Tab by mouth every day.  Dose:  50 mg     MethylPREDNISolone 4 MG Tbpk  Commonly known as:  MEDROL DOSEPAK   DOSE PER PHARMACY     spironolactone 25 MG Tabs  Start taking on:  5/16/2018  Commonly known as:  ALDACTONE   Take 1 Tab by mouth every day.  Dose:  25 mg        CONTINUE taking these medications      Instructions   amiodarone 200 MG Tabs  Commonly known as:  CORDARONE   Take 200 mg by mouth every day.  Dose:  200 mg     Aspirin 325 MG Tbec   Take 1 Tab by mouth every day.  Dose:  325 mg     atorvastatin 20 MG Tabs  Commonly known as:  LIPITOR   Take 20 mg by mouth every day.  Dose:  20 mg     carvedilol 6.25 MG  Tabs  Commonly known as:  COREG   Take 6.25 mg by mouth 2 times a day, with meals.  Dose:  6.25 mg     furosemide 40 MG Tabs  Commonly known as:  LASIX   Take 40 mg by mouth every day.  Dose:  40 mg     metFORMIN 500 MG Tabs  Commonly known as:  GLUCOPHAGE   Take 1 Tab by mouth 2 times a day, with meals.  Dose:  500 mg     potassium chloride SA 10 MEQ Tbcr  Commonly known as:  K-DUR   Take 10 mEq by mouth every day.  Dose:  10 mEq            Can use .DISCHARGEMEDSLIST if going to another facility         Disposition:inpatient for home.    Diet: Cardiac-Low salt diet/Diabetic Diet.      Activity: as tolerated    Instructions:      Follow up with primary care and cardiologist as scheduled.  The patient was instructed to return to the ER in the event of worsening symptoms. I have counseled the patient on the importance of compliance and the patient has agreed to proceed with all medical recommendations and follow up plan indicated above.   The patient understands that all medications come with benefits and risks. Risks may include permanent injury or death and these risks can be minimized with close reassessment and monitoring.        Primary Care Provider:  Tor Rdz M.D.  Discharge summary faxed to primary care provider:  Completed  Copy of discharge summary given to the patient: Completed      Follow up appointment details :      As Scheduled with cardiology and primary care.    Pending Studies:        NONE    Time spent on discharge day patient visit, preparing discharge paperwork and arranging for patient follow up.  30  MINUTES.  Summary of follow up issues:   Follow up advised with cardiology    Discharge Time (Minutes) :45 MIN  Hospital Course Type: Inpatient Stay < 2 midnights, patient recovered more rapidly than anticipated      Condition on Discharge    stable  ______________________________________________________________________    Interval history/exam for day of discharge:     Patient continues  to have mild productive cough with white sputum. He was  able to sleep well and had a normal bowel movement.Patient feels like he is improving from yesterday. His LE edema and JVD have decreased. He denies headache, chest pain, abdominal pain, or sensory changes.           Vitals:    05/15/18 0400 05/15/18 0615 05/15/18 0708 05/15/18 1123   BP: 115/86 111/73 131/79 112/74   Pulse: 85 83 70 66   Resp: 16  16 16   Temp: 36.8 °C (98.3 °F)  37.2 °C (99 °F) 36.8 °C (98.3 °F)   SpO2: 92%  97% 91%   Weight:       Height:         Weight/BMI: Body mass index is 32.48 kg/m².  Pulse Oximetry: 91 %, O2 (LPM): 0, O2 Delivery: None (Room Air)    Physical Exam   Constitutional: He is oriented to person, place, and time and well-developed, well-nourished, and in no distress.   HENT:   Head: Normocephalic and atraumatic.   Eyes: Pupils are equal, round, and reactive to light.   Neck: Normal range of motion. Neck supple.   Cardiovascular: Normal rate, regular rhythm and normal heart sounds.  Exam reveals no gallop and no friction rub.    No murmur heard.  Pulmonary/Chest: Effort normal and breath sounds normal. No respiratory distress.   Occasional bibasilar fine crepitations with wheeze absent.  Abdominal: Soft. Bowel sounds are normal. He exhibits no distension. There is no tenderness. There is no rebound and no guarding.   Genitourinary: Penis normal.   Musculoskeletal: Normal range of motion.   Neurological: He is alert and oriented to person, place, and time. No cranial nerve deficit. Coordination normal. GCS score is 15.   Skin: Skin is warm and dry.   Psychiatric: Mood and affect normal.          Most Recent Labs:    Lab Results   Component Value Date/Time    WBC 5.8 05/15/2018 02:36 AM    RBC 5.17 05/15/2018 02:36 AM    HEMOGLOBIN 14.7 05/15/2018 02:36 AM    HEMATOCRIT 45.4 05/15/2018 02:36 AM    MCV 87.8 05/15/2018 02:36 AM    MCH 28.4 05/15/2018 02:36 AM    MCHC 32.4 (L) 05/15/2018 02:36 AM    MPV 10.2 05/15/2018 02:36 AM     NEUTSPOLYS 64.10 05/13/2018 02:49 AM    LYMPHOCYTES 18.00 (L) 05/13/2018 02:49 AM    MONOCYTES 16.60 (H) 05/13/2018 02:49 AM    EOSINOPHILS 0.20 05/13/2018 02:49 AM    BASOPHILS 0.90 05/13/2018 02:49 AM    HYPOCHROMIA 1+ 03/26/2014 02:15 AM      Lab Results   Component Value Date/Time    SODIUM 129 (L) 05/15/2018 02:36 AM    POTASSIUM 4.5 05/15/2018 02:36 AM    CHLORIDE 92 (L) 05/15/2018 02:36 AM    CO2 28 05/15/2018 02:36 AM    GLUCOSE 163 (H) 05/15/2018 02:36 AM    BUN 34 (H) 05/15/2018 02:36 AM    CREATININE 1.40 05/15/2018 02:36 AM      Lab Results   Component Value Date/Time    ALTSGPT 108 (H) 05/13/2018 11:19 AM    ASTSGOT 96 (H) 05/13/2018 11:19 AM    ALKPHOSPHAT 142 (H) 05/13/2018 11:19 AM    TBILIRUBIN 1.7 (H) 05/13/2018 11:19 AM    LIPASE 30 05/12/2018 02:37 PM    ALBUMIN 3.3 05/13/2018 11:19 AM    GLOBULIN 2.9 05/13/2018 11:19 AM    INR 1.72 (H) 05/12/2018 02:37 PM     Lab Results   Component Value Date/Time    PROTHROMBTM 19.8 (H) 05/12/2018 02:37 PM    INR 1.72 (H) 05/12/2018 02:37 PM      DX-CHEST-2 VIEWS   Final Result      Enlarged cardiac silhouette without evidence of acute disease.      ECHOCARDIOGRAM COMP W/O CONT   Final Result      DX-CHEST-2 VIEWS   Final Result      1.  There is a large cardiac silhouette with central vascular congestion.      US-ABDOMEN COMPLETE SURVEY   Final Result      1.  There is hepatomegaly.   2.  Prominent IVC and hepatic veins with pulsatile hepatopedal flow suggesting possible right cardiac dysfunction.   3.  Gallbladder wall is minimally thickened. This can be seen with hepatitis or hypoalbuminemia.         OUTSIDE IMAGES-DX CHEST   Final Result

## 2018-05-15 NOTE — CARE PLAN
Problem: Safety  Goal: Will remain free from falls  Outcome: PROGRESSING AS EXPECTED  Patient fell during the day without injury, educated on fall prevention and precautions. Refused use of bed alarm. Patient ambulating in room with steady gait, treaded socks on, call light within reach.    Problem: Respiratory:  Goal: Respiratory status will improve  Patient on oral antibiotics for treatment of bronchitis. PRN duonebs, tessalon added to medications for cough.

## 2018-05-15 NOTE — DISCHARGE INSTRUCTIONS
Discharge Instructions    Discharged to home by car with relative. Discharged via wheelchair, hospital escort: Yes.  Special equipment needed: Not Applicable    Be sure to schedule a follow-up appointment with your primary care doctor or any specialists as instructed.     Discharge Plan:   Diet Plan: Discussed  Activity Level: Discussed  Confirmed Follow up Appointment: Appointment Scheduled  Confirmed Symptoms Management: Discussed  Medication Reconciliation Updated: Yes  Pneumococcal Vaccine Administered/Refused: Not given - Patient refused pneumococcal vaccine  Influenza Vaccine Indication: Patient Refuses    I understand that a diet low in cholesterol, fat, and sodium is recommended for good health. Unless I have been given specific instructions below for another diet, I accept this instruction as my diet prescription.   Other diet: Cardiac    Special Instructions:   HF Patient Discharge Instructions  · Monitor your weight daily, and maintain a weight chart, to track your weight changes.   · Activity as tolerated, unless your Doctor has ordered otherwise. Other activity order: As tolerated.  · Follow a low fat, low cholesterol, low salt diet unless instructed otherwise by your Doctor. Read the labels on the back of food products and track your intake of fat, cholesterol and salt.   · Fluid Restriction No. If a Fluid Restriction has been ordered by your Doctor, measure fluids with a measuring cup to ensure that you are not exceeding the restriction.   · No smoking.  · Oxygen No. If your Doctor has ordered that you wear Oxygen at home, it is important to wear it as ordered.  · Did you receive an explanation from staff on the importance of taking each of your medications and why it is necessary to keep taking them unless your doctor says to stop? Yes  · Were all of your questions answered about how to manage your heart failure and what to do if you have increased signs and symptoms after you go home? Yes  · Do you  feel like your heart failure care team involved you in the care treatment plan and allowed you to make decisions regarding your care while in the hospital and addressed any discharge needs you might have? Yes    See the educational handout provided at discharge for more information on monitoring your daily weight, activity and diet. This also explains more about Heart Failure, symptoms of a flare-up and some of the tests that you have undergone.     Warning Signs of a Flare-Up include:  · Swelling in the ankles or lower legs.  · Shortness of breath, while at rest, or while doing normal activities.   · Shortness of breath at night when in bed, or coughing in bed.   · Requiring more pillows to sleep at night, or needing to sit up at night to sleep.  · Feeling weak, dizzy or fatigued.     When to call your Doctor:  · Call [facility] seven days a week from 8:00 a.m. to 8:00 p.m. for medical questions [phone].  · Call your Primary Care Physician or Cardiologist if:   1. You experience any pain radiating to your jaw or neck.  2. You have any difficulty breathing.  3. You experience weight gain of 3 lbs in a day or 5 lbs in a week.   4. You feel any palpitations or irregular heartbeats.  5. You become dizzy or lose consciousness.   If you have had an angiogram or had a pacemaker or AICD placed, and experience:  1. Bleeding, drainage or swelling at the surgical / puncture site.  2. Fever greater than 100.0 F  3. Shock from internal defibrillator.  4. Cool and / or numb extremities.      · Is patient discharged on Warfarin / Coumadin?   No     Depression / Suicide Risk    As you are discharged from this [facility], it is important to learn how to keep safe from harming yourself.    Recognize the warning signs:  · Abrupt changes in personality, positive or negative- including increase in energy   · Giving away possessions  · Change in eating patterns- significant weight changes-  positive or  negative  · Change in sleeping patterns- unable to sleep or sleeping all the time   · Unwillingness or inability to communicate  · Depression  · Unusual sadness, discouragement and loneliness  · Talk of wanting to die  · Neglect of personal appearance   · Rebelliousness- reckless behavior  · Withdrawal from people/activities they love  · Confusion- inability to concentrate     If you or a loved one observes any of these behaviors or has concerns about self-harm, here's what you can do:  · Talk about it- your feelings and reasons for harming yourself  · Remove any means that you might use to hurt yourself (examples: pills, rope, extension cords, firearm)  · Get professional help from the community (Mental Health, Substance Abuse, psychological counseling)  · Do not be alone:Call your Safe Contact- someone whom you trust who will be there for you.  · Call your local CRISIS HOTLINE 536-9364 or 150-353-7441  · Call your local Children's Mobile Crisis Response Team [place] (492) 830-5082 or www.[employer]  · Call the toll free National Suicide Prevention Hotlines   · National Suicide Prevention Lifeline 899-726-BVGN (8616)  · National Hope Line Network 800-SUICIDE (615-2591)

## 2018-05-15 NOTE — HEART FAILURE PROGRAM
Cardiovascular Nurse Navigator () Advanced Heart Failure Program Inpatient Progress Note:    May 21, 2018  1:15 PM PDT  Heart Failure New Patient with Joss Solis M.D.  [facility] (--) [address], [address]  [place] [address]  [phone]       No palliative order or consult note at the time of this note's filing.    Thank you and please call with questions, Arianna

## 2018-05-16 NOTE — DOCUMENTATION QUERY
DOCUMENTATION QUERY    PROVIDERS: Please select “Cosign w/ note” to reply to query.    To better represent the severity of illness of your patient, please review the following information and exercise your independent professional judgment in responding to this query.     Non-STEMI is documented in the ED provider note and elevated troponin probably demand related is noted in the H&P. Based upon the clinical findings, risk factors, and treatment, can this diagnosis be further specified?    · Non-STEMI due to demand ischemia (Type 2 MI)  · Demand ischemia without MI  · Findings of no clinical significance  · Other explanation of clinical findings  · Unable to determine    The medical record reflects the following:   Clinical Findings Troponin 0.22 - 0.23    H&P:  · Elevated troponin probably demand related  · No fever or chest pain  · Troponin 0.22    5/13-5/14 Hosp PNs and DC summary:  · Troponin 0.22 on admission and 0.23 on 5/13/18 - possibly due to demand ischemia    ED provider note: Non-STEMI   Treatment ECHO  ASA   EKG  Labs   Risk Factors Acute systolic heart failure  Cardiomyopathy  Hypertension  Mitral regurgitation  Atrial fibrillation   Location within medical record History and Physical, Progress Notes, Discharge Summary and Lab Results      Thank you,   Pau Garrido, RN, BSN  Clinical   [phone]

## 2018-05-21 ENCOUNTER — OFFICE VISIT (OUTPATIENT)
Dept: CARDIOLOGY | Facility: MEDICAL CENTER | Age: 48
End: 2018-05-21
Payer: MEDICARE

## 2018-05-21 VITALS
WEIGHT: 207 LBS | BODY MASS INDEX: 29.63 KG/M2 | HEART RATE: 94 BPM | OXYGEN SATURATION: 93 % | HEIGHT: 70 IN | DIASTOLIC BLOOD PRESSURE: 102 MMHG | SYSTOLIC BLOOD PRESSURE: 130 MMHG

## 2018-05-21 DIAGNOSIS — I48.0 PAROXYSMAL ATRIAL FIBRILLATION (HCC): ICD-10-CM

## 2018-05-21 DIAGNOSIS — Z95.810 AICD (AUTOMATIC CARDIOVERTER/DEFIBRILLATOR) PRESENT: ICD-10-CM

## 2018-05-21 DIAGNOSIS — I50.23 ACUTE ON CHRONIC SYSTOLIC (CONGESTIVE) HEART FAILURE (HCC): ICD-10-CM

## 2018-05-21 DIAGNOSIS — I10 ESSENTIAL HYPERTENSION: ICD-10-CM

## 2018-05-21 PROCEDURE — 99204 OFFICE O/P NEW MOD 45 MIN: CPT | Mod: 25 | Performed by: INTERNAL MEDICINE

## 2018-05-21 PROCEDURE — 94618 PULMONARY STRESS TESTING: CPT | Performed by: INTERNAL MEDICINE

## 2018-05-21 RX ORDER — LOSARTAN POTASSIUM 100 MG/1
100 TABLET ORAL DAILY
Qty: 30 TAB | Refills: 11 | Status: SHIPPED | OUTPATIENT
Start: 2018-05-21 | End: 2018-05-29

## 2018-05-21 RX ORDER — CARVEDILOL 12.5 MG/1
12.5 TABLET ORAL 2 TIMES DAILY WITH MEALS
Qty: 60 TAB | Refills: 11 | Status: ON HOLD | OUTPATIENT
Start: 2018-05-21 | End: 2018-06-12

## 2018-05-21 ASSESSMENT — MINNESOTA LIVING WITH HEART FAILURE QUESTIONNAIRE (MLHF)
MAKING YOU FEEL DEPRESSED: 0
COSTING YOU MONEY FOR MEDICAL CARE: 1
TOTAL_SCORE: 23
EATING LESS FOODS YOU LIKE: 3
WORKING AROUND THE HOUSE OR YARD DIFFICULT: 2
FEELING LIKE A BURDEN TO FAMILY AND FRIENDS: 0
GIVING YOU SIDE EFFECTS FROM TREATMENTS: 0
SWELLING IN ANKLES OR LEGS: 2
WALKING ABOUT OR CLIMBING STAIRS DIFFICULT: 0
DIFFICULTY WORKING TO EARN A LIVING: 3
DIFFICULTY GOING AWAY FROM HOME: 0
HAVING TO SIT OR LIE DOWN DURING THE DAY: 0
LOSS OF SELF CONTROL IN YOUR LIFE: 0
DIFFICULTY SLEEPING WELL AT NIGHT: 4
DIFFICULTY TO CONCENTRATE OR REMEMBERING THINGS: 0
MAKING YOU STAY IN A HOSPITAL: 4
DIFFICULTY SOCIALIZING WITH FAMILY OR FRIENDS: 0
MAKING YOU SHORT OF BREATH: 1
DIFFICULTY WITH SEXUAL ACTIVITIES: 2
TIRED, FATIGUED OR LOW ON ENERGY: 1
MAKING YOU WORRY: 0
DIFFICULTY WITH RECREATIONAL PASTIMES, SPORTS, HOBBIES: 0

## 2018-05-21 ASSESSMENT — ENCOUNTER SYMPTOMS
ABDOMINAL PAIN: 0
EYE PAIN: 0
DEPRESSION: 0
VOMITING: 0
FEVER: 0
NERVOUS/ANXIOUS: 0
CHILLS: 0
PALPITATIONS: 0
BLURRED VISION: 0
HEMOPTYSIS: 0
COUGH: 0
WHEEZING: 0
BRUISES/BLEEDS EASILY: 0
LOSS OF CONSCIOUSNESS: 0
MYALGIAS: 0
EYE DISCHARGE: 0
NAUSEA: 0
SPEECH CHANGE: 0

## 2018-05-21 ASSESSMENT — 6 MINUTE WALK TEST (6MWT): TOTAL DISTANCE WALKED (METERS): 329

## 2018-05-21 NOTE — LETTER
[facility]   [address]  [place] [address]  Phone: [phone]  Fax: [phone]              Lai Dailey  1970    Encounter Date: 5/21/2018    Joss Solis M.D.          PROGRESS NOTE:  Chief Complaint   Patient presents with   • Congestive Heart Failure     NEW HEART FAILURE       Subjective:   Lai Dailey is a 48 y.o. male who presents today for new patient evaluation heart failure clinic because of a recent admission for congestive heart failure.  He is routinely followed by a cardiologist at [facility].  He does not recall the name and has not been seen for a couple of years because he moved to [place].  He moved back to [place] about a week before his hospitalization.    He has a history of a cardiomyopathy since 2007.  He apparently had cardiac catheterization at some point in time and no significant coronary disease.    He made multiple trips back and forth to [place] during his move.  During that time he noted increasing edema and about a 13 pound weight gain.  He actually had no difficulty with dyspnea.    He was awakening at night coughing but had no dyspnea.  He also denies any dyspnea on exertion.  He is noted no palpitations, lightheadedness or chest discomfort.    Past Medical History:   Diagnosis Date   • CHF (congestive heart failure) (HCC)    • Diabetes (HCC)    • Hyperlipidemia    • Hypertension      Past Surgical History:   Procedure Laterality Date   • AICD IMPLANT       Family History   Problem Relation Age of Onset   • Heart Disease Neg Hx      Social History     Social History   • Marital status: Single     Spouse name: N/A   • Number of children: N/A   • Years of education: N/A     Occupational History   • Not on file.     Social History Main Topics   • Smoking status: Never Smoker   • Smokeless tobacco: Never Used   • Alcohol use Yes      Comment: 2 times a week-beer   • Drug use: No   •  Sexual activity: Not on file     Other Topics Concern   • Not on file     Social History Narrative   • No narrative on file     No Known Allergies  Outpatient Encounter Prescriptions as of 5/21/2018   Medication Sig Dispense Refill   • losartan (COZAAR) 50 MG Tab Take 1 Tab by mouth every day. 30 Tab 1   • guaiFENesin LA (MUCINEX) 600 MG TABLET SR 12 HR Take 1 Tab by mouth 3 times a day. 15 Tab 0   • spironolactone (ALDACTONE) 25 MG Tab Take 1 Tab by mouth every day. 30 Tab 1   • MethylPREDNISolone (MEDROL DOSEPAK) 4 MG Tablet Therapy Pack DOSE PER PHARMACY 1 Kit 0   • potassium chloride SA (K-DUR) 10 MEQ Tab CR Take 10 mEq by mouth every day.     • furosemide (LASIX) 40 MG Tab Take 40 mg by mouth every day.     • carvedilol (COREG) 6.25 MG Tab Take 6.25 mg by mouth 2 times a day, with meals.     • atorvastatin (LIPITOR) 20 MG Tab Take 20 mg by mouth every day.     • amiodarone (CORDARONE) 200 MG Tab Take 200 mg by mouth every day.     • aspirin  MG TBEC Take 1 Tab by mouth every day. (Patient taking differently: Take 81 mg by mouth every day.) 60 Tab 0   • metformin (GLUCOPHAGE) 500 MG TABS Take 1 Tab by mouth 2 times a day, with meals. 60 Tab 3     No facility-administered encounter medications on file as of 5/21/2018.      Review of Systems   Constitutional: Negative for chills and fever.   HENT: Negative for congestion.    Eyes: Negative for blurred vision, pain and discharge.   Respiratory: Negative for cough, hemoptysis and wheezing.    Cardiovascular: Negative for chest pain and palpitations.   Gastrointestinal: Negative for abdominal pain, nausea and vomiting.   Musculoskeletal: Negative for joint pain and myalgias.   Skin: Negative for itching and rash.   Neurological: Negative for speech change and loss of consciousness.   Endo/Heme/Allergies: Does not bruise/bleed easily.   Psychiatric/Behavioral: Negative for depression. The patient is not nervous/anxious.    All other systems reviewed and are  "negative.       Objective:   /102   Pulse 94   Ht 1.765 m (5' 9.5\")   Wt 93.9 kg (207 lb)   SpO2 93%   BMI 30.13 kg/m²      Physical Exam   Constitutional: He is oriented to person, place, and time. He appears well-developed and well-nourished. No distress.   HENT:   Head: Normocephalic and atraumatic.   Mouth/Throat: Mucous membranes are normal.   Neck: No JVD present. No thyromegaly present.   Cardiovascular: Normal rate, regular rhythm and intact distal pulses.  Exam reveals no gallop.    Murmur (2/6 to 3/6 holosystolic murmur noted at the apex and radiating towards the left axilla) heard.  Pulmonary/Chest: Effort normal and breath sounds normal. He has no rales.   Abdominal: Soft. There is no splenomegaly or hepatomegaly.   Musculoskeletal: Normal range of motion. He exhibits no edema.   Lymphadenopathy:     He has no cervical adenopathy.   Neurological: He is alert and oriented to person, place, and time. He has normal strength. He exhibits normal muscle tone.   Skin: Skin is warm and dry. No rash noted.   Psychiatric: He has a normal mood and affect. His behavior is normal.     Lab Results   Component Value Date/Time    CHOLSTRLTOT 76 (L) 05/13/2018 02:49 AM    LDL 46 05/13/2018 02:49 AM    HDL 20 (A) 05/13/2018 02:49 AM    TRIGLYCERIDE 48 05/13/2018 02:49 AM       Lab Results   Component Value Date/Time    SODIUM 129 (L) 05/15/2018 02:36 AM    POTASSIUM 4.5 05/15/2018 02:36 AM    CHLORIDE 92 (L) 05/15/2018 02:36 AM    CO2 28 05/15/2018 02:36 AM    GLUCOSE 163 (H) 05/15/2018 02:36 AM    BUN 34 (H) 05/15/2018 02:36 AM    CREATININE 1.40 05/15/2018 02:36 AM     Lab Results   Component Value Date/Time    ALKPHOSPHAT 142 (H) 05/13/2018 11:19 AM    ASTSGOT 96 (H) 05/13/2018 11:19 AM    ALTSGPT 108 (H) 05/13/2018 11:19 AM    TBILIRUBIN 1.7 (H) 05/13/2018 11:19 AM      Lab Results   Component Value Date/Time    BNPBTYPENAT 729 (H) 05/14/2018 02:56 AM    Transthoracic  Echo Report      Echocardiography " Laboratory    CONCLUSIONS  Left ventricle is severely dilated. Mild concentric left ventricular   hypertrophy. Grade III diastolic dysfunction (restrictive pattern).   Severely reduced left ventricular systolic function. Left ventricular   ejection fraction is visually estimated to be 20%. Diffuse hypokinesis   with septal dyskinesis.  Severe mitral regurgitation.  Severe tricuspid regurgitation. Estimated right ventricular systolic   pressure  is 50 mmHg.  Compared to the report of the study done - the TR is now severe.     CHESTER PICKARD  Exam Date:         05/13/2018      Assessment:     1. Acute on chronic systolic (congestive) heart failure (HCC)     2. Paroxysmal atrial fibrillation (HCC)     3. AICD (automatic cardioverter/defibrillator) present     4. Essential hypertension         Medical Decision Making:  Today's Assessment / Status / Plan:     Mr. Pickard is clinically stable.  However, he has developed severe mitral and tricuspid insufficiency.  He has hypertensive and will try to increase his medical therapy.  We will start by increasing carvedilol to 12.5 mg twice daily.  We will also increase losartan to 100 mg daily.  He will follow-up in about a week with a BMP.  Once she is maximized his medical therapy, we will repeat an echocardiogram to reevaluate his mitral insufficiency.  He might be a candidate for mitral valve repair or at least a mitral valve clip.  We will also schedule him for an AICD check.      Tor Rdz M.D.  [address] [facility] [address]  VIA Facsimile: [phone]

## 2018-05-21 NOTE — PROGRESS NOTES
Chief Complaint   Patient presents with   • Congestive Heart Failure     NEW HEART FAILURE       Subjective:   Lai Dailey is a 48 y.o. male who presents today for new patient evaluation heart failure clinic because of a recent admission for congestive heart failure.  He is routinely followed by a cardiologist at [facility].  He does not recall the name and has not been seen for a couple of years because he moved to [place].  He moved back to [place] about a week before his hospitalization.    He has a history of a cardiomyopathy since 2007.  He apparently had cardiac catheterization at some point in time and no significant coronary disease.    He made multiple trips back and forth to [place] during his move.  During that time he noted increasing edema and about a 13 pound weight gain.  He actually had no difficulty with dyspnea.    He was awakening at night coughing but had no dyspnea.  He also denies any dyspnea on exertion.  He is noted no palpitations, lightheadedness or chest discomfort.    Past Medical History:   Diagnosis Date   • CHF (congestive heart failure) (HCC)    • Diabetes (HCC)    • Hyperlipidemia    • Hypertension      Past Surgical History:   Procedure Laterality Date   • AICD IMPLANT       Family History   Problem Relation Age of Onset   • Heart Disease Neg Hx      Social History     Social History   • Marital status: Single     Spouse name: N/A   • Number of children: N/A   • Years of education: N/A     Occupational History   • Not on file.     Social History Main Topics   • Smoking status: Never Smoker   • Smokeless tobacco: Never Used   • Alcohol use Yes      Comment: 2 times a week-beer   • Drug use: No   • Sexual activity: Not on file     Other Topics Concern   • Not on file     Social History Narrative   • No narrative on file     No Known Allergies  Outpatient Encounter Prescriptions as of 5/21/2018   Medication Sig Dispense Refill   • losartan (COZAAR) 50 MG Tab  "Take 1 Tab by mouth every day. 30 Tab 1   • guaiFENesin LA (MUCINEX) 600 MG TABLET SR 12 HR Take 1 Tab by mouth 3 times a day. 15 Tab 0   • spironolactone (ALDACTONE) 25 MG Tab Take 1 Tab by mouth every day. 30 Tab 1   • MethylPREDNISolone (MEDROL DOSEPAK) 4 MG Tablet Therapy Pack DOSE PER PHARMACY 1 Kit 0   • potassium chloride SA (K-DUR) 10 MEQ Tab CR Take 10 mEq by mouth every day.     • furosemide (LASIX) 40 MG Tab Take 40 mg by mouth every day.     • carvedilol (COREG) 6.25 MG Tab Take 6.25 mg by mouth 2 times a day, with meals.     • atorvastatin (LIPITOR) 20 MG Tab Take 20 mg by mouth every day.     • amiodarone (CORDARONE) 200 MG Tab Take 200 mg by mouth every day.     • aspirin  MG TBEC Take 1 Tab by mouth every day. (Patient taking differently: Take 81 mg by mouth every day.) 60 Tab 0   • metformin (GLUCOPHAGE) 500 MG TABS Take 1 Tab by mouth 2 times a day, with meals. 60 Tab 3     No facility-administered encounter medications on file as of 5/21/2018.      Review of Systems   Constitutional: Negative for chills and fever.   HENT: Negative for congestion.    Eyes: Negative for blurred vision, pain and discharge.   Respiratory: Negative for cough, hemoptysis and wheezing.    Cardiovascular: Negative for chest pain and palpitations.   Gastrointestinal: Negative for abdominal pain, nausea and vomiting.   Musculoskeletal: Negative for joint pain and myalgias.   Skin: Negative for itching and rash.   Neurological: Negative for speech change and loss of consciousness.   Endo/Heme/Allergies: Does not bruise/bleed easily.   Psychiatric/Behavioral: Negative for depression. The patient is not nervous/anxious.    All other systems reviewed and are negative.       Objective:   /102   Pulse 94   Ht 1.765 m (5' 9.5\")   Wt 93.9 kg (207 lb)   SpO2 93%   BMI 30.13 kg/m²     Physical Exam   Constitutional: He is oriented to person, place, and time. He appears well-developed and well-nourished. No distress. "   HENT:   Head: Normocephalic and atraumatic.   Mouth/Throat: Mucous membranes are normal.   Neck: No JVD present. No thyromegaly present.   Cardiovascular: Normal rate, regular rhythm and intact distal pulses.  Exam reveals no gallop.    Murmur (2/6 to 3/6 holosystolic murmur noted at the apex and radiating towards the left axilla) heard.  Pulmonary/Chest: Effort normal and breath sounds normal. He has no rales.   Abdominal: Soft. There is no splenomegaly or hepatomegaly.   Musculoskeletal: Normal range of motion. He exhibits no edema.   Lymphadenopathy:     He has no cervical adenopathy.   Neurological: He is alert and oriented to person, place, and time. He has normal strength. He exhibits normal muscle tone.   Skin: Skin is warm and dry. No rash noted.   Psychiatric: He has a normal mood and affect. His behavior is normal.     Lab Results   Component Value Date/Time    CHOLSTRLTOT 76 (L) 05/13/2018 02:49 AM    LDL 46 05/13/2018 02:49 AM    HDL 20 (A) 05/13/2018 02:49 AM    TRIGLYCERIDE 48 05/13/2018 02:49 AM       Lab Results   Component Value Date/Time    SODIUM 129 (L) 05/15/2018 02:36 AM    POTASSIUM 4.5 05/15/2018 02:36 AM    CHLORIDE 92 (L) 05/15/2018 02:36 AM    CO2 28 05/15/2018 02:36 AM    GLUCOSE 163 (H) 05/15/2018 02:36 AM    BUN 34 (H) 05/15/2018 02:36 AM    CREATININE 1.40 05/15/2018 02:36 AM     Lab Results   Component Value Date/Time    ALKPHOSPHAT 142 (H) 05/13/2018 11:19 AM    ASTSGOT 96 (H) 05/13/2018 11:19 AM    ALTSGPT 108 (H) 05/13/2018 11:19 AM    TBILIRUBIN 1.7 (H) 05/13/2018 11:19 AM      Lab Results   Component Value Date/Time    BNPBTYPENAT 729 (H) 05/14/2018 02:56 AM    Transthoracic  Echo Report      Echocardiography Laboratory    CONCLUSIONS  Left ventricle is severely dilated. Mild concentric left ventricular   hypertrophy. Grade III diastolic dysfunction (restrictive pattern).   Severely reduced left ventricular systolic function. Left ventricular   ejection fraction is visually  estimated to be 20%. Diffuse hypokinesis   with septal dyskinesis.  Severe mitral regurgitation.  Severe tricuspid regurgitation. Estimated right ventricular systolic   pressure  is 50 mmHg.  Compared to the report of the study done - the TR is now severe.     CHESTER PICKARD  Exam Date:         05/13/2018      Assessment:     1. Acute on chronic systolic (congestive) heart failure (HCC)     2. Paroxysmal atrial fibrillation (HCC)     3. AICD (automatic cardioverter/defibrillator) present     4. Essential hypertension         Medical Decision Making:  Today's Assessment / Status / Plan:     Mr. Pickard is clinically stable.  However, he has developed severe mitral and tricuspid insufficiency, possibly secondary to his severe left ventricular dysfunction.  He is hypertensive and we will try to increase his medical therapy.  We will start by increasing carvedilol to 12.5 mg twice daily.  We will also increase losartan to 100 mg daily.  He will follow-up in about a week with a BMP.  Once he is maximized on his medical therapy, we will repeat an echocardiogram to reevaluate his mitral insufficiency.  He might be a candidate for mitral valve repair or at least a mitral valve clip.  We will also schedule him for an AICD check.

## 2018-05-23 NOTE — PROGRESS NOTES
"Heart Failure New Appointment     6MWT- 329 meters  MLWHF- 23    OP Heart Failure  Vitals  Appointment Type: Heart Failure New  Weight: 93.9 kg (207 lb)  Weight Source: Stand Up Scale  Height: 176.5 cm (5' 9.5\")  BMI (Calculated): 30.13  Blood Pressure: 130/102  Pulse: 94    System Assessment  NYHA Functional Class Assessment:  (not yet staged/classed)  ACC/AHA HF Stage:  (HFrEF)    Smoking Hx  Have you Ever Smoked: Never     Alcohol Hx  Do you Drink?: No    Illicit Drug Hx  Illicit Drug History: No    Social Hx  Social History: Lives with Other  Level of Support: Unknown  Advance Directives: Began discussion, Paperwork provided    Education  Symptoms: Verbalizes understanding  Weighing: Verbalizes Understanding  Weight Gain Response: Verbalizes Understanding   Recording Data: Verbalizes understanding  Teach Back Failures: Teach Back Successful  Compliance:  (Plans to be compliant from now on)     Medications  Medication Reconciliation : Complete  Medication Counseling Provided: Needs Reinforcement  Able to Accurately Identify Medication Indications: Some  Medication Discrepancies: None  Is Patient on an Evidence Based Beta Blocker: Yes  Is Patient on ACE-1 or ARB: Yes    Dietary Assessment  Food Labels: Verbalizes Understanding  Foods High in Sodium: Verbalizes Understanding  Daily Sodium Intake: Verbalizes Understanding  Diet: Verbalizes Understanding  Food Preparation: Verbalizes Understanding  Eating Out Plan: Verbalizes understanding  Healthier Options: Verbalizes Understanding  Fluid Restriction: Not Applicable    [place] Living with Heart Failure  Swelling in Ankles or Legs: 2  Having to Sit or Lie Down During the Day: 0  Walking About or Climbing Stairs Difficult: 0  Working Around the House or Yard Difficult: 2  Difficulty Going Away from Home: 0  Difficulty Sleeping Well at Night: 4  Difficulty Socializing with Family or Friends: 0  Difficulty Working to Earn a Living: 3  Difficulty with Recreational Pastimes, " Sports, Hobbies: 0  Difficulty with Sexual Activities: 2  Eating Less Foods You Like: 3  Making you Short of Breath: 1  Tired, Fatigued or Low on Energy: 1  Making you Stay in a Hospital: 4  Costing you Money for Medical Care?: 1  Giving you Side Effects from Treatments: 0  Feeling like a [place] to Family and Friends: 0  Loss of Self Control in your Life: 0  Making You Worry: 0  Difficulty to Concentrate or Remembering Things: 0  Making you Feel Depressed: 0  MLHF Total Score : 23    6 Minute Walk Test  Baseline to end of test: 6:00  Total meters walked: 329       Education Narrative  Reviewed anatomy and physiology of heart failure with patient. Went over heart failure worksheet and patient's individual HF diagnosis, EF, risk factors, general medication classes and indications, as well as personal goals.  Goals: Patient's primary goal is to maintain his current state of health.    Discussed daily weights, sodium restriction, worsening signs and symptoms to report to physician, heart medications, and importance of adherence to medication regimen. Emphasized recommendation from AHA/AAHFN to keep daily sodium intake between 1500mg-2000mg.     Reviewed dietary handouts, advanced care planning classes, and advance directive planning handout. Discussed dietary considerations and reviewed Seven Day Heart Healthy Meal Plan by [employer].    Invited patient and family members/friends to HF support group and encouraged patient to call Heart Failure clinic during normal business hours with any questions.  Heart Failure program card with number given to patient.    Patient very knowledgeable about heart failure, able to perform teach-back easily.     Patient states full understanding of all information given.     Mary HF RN  [phone]

## 2018-05-26 ENCOUNTER — HOSPITAL ENCOUNTER (OUTPATIENT)
Dept: LAB | Facility: MEDICAL CENTER | Age: 48
End: 2018-05-26
Attending: INTERNAL MEDICINE
Payer: MEDICARE

## 2018-05-26 DIAGNOSIS — I50.23 ACUTE ON CHRONIC SYSTOLIC (CONGESTIVE) HEART FAILURE (HCC): ICD-10-CM

## 2018-05-26 LAB
ANION GAP SERPL CALC-SCNC: 12 MMOL/L (ref 0–11.9)
BUN SERPL-MCNC: 32 MG/DL (ref 8–22)
CALCIUM SERPL-MCNC: 9.5 MG/DL (ref 8.5–10.5)
CHLORIDE SERPL-SCNC: 86 MMOL/L (ref 96–112)
CO2 SERPL-SCNC: 22 MMOL/L (ref 20–33)
CREAT SERPL-MCNC: 1.29 MG/DL (ref 0.5–1.4)
GLUCOSE SERPL-MCNC: 667 MG/DL (ref 65–99)
POTASSIUM SERPL-SCNC: 6 MMOL/L (ref 3.6–5.5)
SODIUM SERPL-SCNC: 120 MMOL/L (ref 135–145)

## 2018-05-26 PROCEDURE — 36415 COLL VENOUS BLD VENIPUNCTURE: CPT

## 2018-05-26 PROCEDURE — 80048 BASIC METABOLIC PNL TOTAL CA: CPT

## 2018-05-27 ENCOUNTER — TELEPHONE (OUTPATIENT)
Dept: CARDIOLOGY | Facility: MEDICAL CENTER | Age: 48
End: 2018-05-27

## 2018-05-27 NOTE — TELEPHONE ENCOUNTER
Patient has critical lab values.  A potassium of 6 and sodium of 120 with a glucose of 667.  Dr. Solis attempted yesterday to contact the patient numerous times.  Patient's mailbox was full and not responding to his calls.  REGGIE attempted 3 times to contact the patient with no success.  Called his emergency contact Mary Lou and left a voice message to call back as soon as possible.  We will keep trying today to contact the patient.

## 2018-05-29 ENCOUNTER — TELEPHONE (OUTPATIENT)
Dept: CARDIOLOGY | Facility: MEDICAL CENTER | Age: 48
End: 2018-05-29

## 2018-05-29 ENCOUNTER — HOSPITAL ENCOUNTER (OUTPATIENT)
Dept: LAB | Facility: MEDICAL CENTER | Age: 48
End: 2018-05-29
Attending: NURSE PRACTITIONER
Payer: MEDICARE

## 2018-05-29 ENCOUNTER — OFFICE VISIT (OUTPATIENT)
Dept: CARDIOLOGY | Facility: MEDICAL CENTER | Age: 48
End: 2018-05-29
Payer: MEDICARE

## 2018-05-29 VITALS
HEIGHT: 70 IN | BODY MASS INDEX: 29.2 KG/M2 | WEIGHT: 204 LBS | SYSTOLIC BLOOD PRESSURE: 82 MMHG | DIASTOLIC BLOOD PRESSURE: 50 MMHG | HEART RATE: 54 BPM | OXYGEN SATURATION: 94 %

## 2018-05-29 DIAGNOSIS — E87.5 HIGH SERUM POTASSIUM LEVEL: ICD-10-CM

## 2018-05-29 DIAGNOSIS — I50.9 HEART FAILURE, NYHA CLASS 2 (HCC): ICD-10-CM

## 2018-05-29 DIAGNOSIS — Z95.810 AICD (AUTOMATIC CARDIOVERTER/DEFIBRILLATOR) PRESENT: ICD-10-CM

## 2018-05-29 DIAGNOSIS — I42.0 DILATED CARDIOMYOPATHY (HCC): ICD-10-CM

## 2018-05-29 DIAGNOSIS — I50.20 ACC/AHA STAGE C SYSTOLIC HEART FAILURE (HCC): ICD-10-CM

## 2018-05-29 LAB
ANION GAP SERPL CALC-SCNC: 12 MMOL/L (ref 0–11.9)
BUN SERPL-MCNC: 52 MG/DL (ref 8–22)
CALCIUM SERPL-MCNC: 9.8 MG/DL (ref 8.5–10.5)
CHLORIDE SERPL-SCNC: 80 MMOL/L (ref 96–112)
CO2 SERPL-SCNC: 26 MMOL/L (ref 20–33)
CREAT SERPL-MCNC: 1.87 MG/DL (ref 0.5–1.4)
EKG IMPRESSION: NORMAL
GLUCOSE SERPL-MCNC: 593 MG/DL (ref 65–99)
POTASSIUM SERPL-SCNC: 5.4 MMOL/L (ref 3.6–5.5)
SODIUM SERPL-SCNC: 118 MMOL/L (ref 135–145)

## 2018-05-29 PROCEDURE — 36415 COLL VENOUS BLD VENIPUNCTURE: CPT

## 2018-05-29 PROCEDURE — 80048 BASIC METABOLIC PNL TOTAL CA: CPT

## 2018-05-29 PROCEDURE — 99214 OFFICE O/P EST MOD 30 MIN: CPT | Performed by: NURSE PRACTITIONER

## 2018-05-29 PROCEDURE — 93000 ELECTROCARDIOGRAM COMPLETE: CPT | Performed by: NURSE PRACTITIONER

## 2018-05-29 RX ORDER — LOSARTAN POTASSIUM 50 MG/1
50 TABLET ORAL DAILY
Qty: 30 TAB | Refills: 11 | Status: SHIPPED | OUTPATIENT
Start: 2018-05-29 | End: 2018-06-18 | Stop reason: SDUPTHER

## 2018-05-29 ASSESSMENT — ENCOUNTER SYMPTOMS
ABDOMINAL PAIN: 0
DIZZINESS: 0
CLAUDICATION: 0
SHORTNESS OF BREATH: 1
COUGH: 1
PALPITATIONS: 0
SPUTUM PRODUCTION: 1
FEVER: 0
ORTHOPNEA: 0
PND: 0
MYALGIAS: 0

## 2018-05-29 NOTE — TELEPHONE ENCOUNTER
critical results - sodium & glucose   Received: Today   Message Contents   Nathalia Ornelas R.N.   Phone Number: [phone]             DANELLE/diego Perez from [facility] with critical results on sodium (118) & glucose (593) - Please call Ana [phone] to confirm receipt.      Returned call, spoke with Vilma and confirmed above.  APRN  Notified.

## 2018-05-29 NOTE — PROGRESS NOTES
Chief Complaint   Patient presents with   • Pacemaker Check/Dysfunction     cough(pain on ribs)-getting worse       Subjective:   Lai Dailey is a 48 y.o. male who presents today for follow up on his heart failure with his wife, Mary Lou.    New patient to the heart failure clinic. Pt was last seen 5/21/18 with Dr. Solis. During that visit, his medications were adjusted, carvedilol was increased to 12.5 mg twice daily and losartan was increased to 100 mg daily.  Patient reports he been tolerating the dose increase without difficulty.    Patient had some critical labs 3 days ago.  Patient denies any chest pain, palpitations, muscle cramping, orthopnea, PND or edema.  Patient states he was given a prescription of prednisone and would drink orange juice with the medication.  Patient stopped that medication on Friday.    Patient does report a dry cough in the day and some white sputum at night. He does report right sided lower muscle pain with cough.  He does get winded with some of his ADLs and with exertion. He is fatigued.      His home weights have been between 196-200 pounds.    Additonally, patient has the following medical problems:    -Has ICD for dilated cardiomyopathy and nonsustained VT also taking amiodarone    -Diabetes: Taking metformin    -Hypertension    -Hyperlipidemia: Taking atorvastatin 20 mg daily    Past Medical History:   Diagnosis Date   • CHF (congestive heart failure) (HCC)    • Diabetes (HCC)    • Hyperlipidemia    • Hypertension      Past Surgical History:   Procedure Laterality Date   • AICD IMPLANT       Family History   Problem Relation Age of Onset   • Heart Disease Neg Hx      Social History     Social History   • Marital status: Single     Spouse name: N/A   • Number of children: N/A   • Years of education: N/A     Occupational History   • Not on file.     Social History Main Topics   • Smoking status: Never Smoker   • Smokeless tobacco: Never Used   • Alcohol use Yes      Comment: 2  "times a week-beer   • Drug use: No   • Sexual activity: Not on file     Other Topics Concern   • Not on file     Social History Narrative   • No narrative on file     No Known Allergies  Outpatient Encounter Prescriptions as of 5/29/2018   Medication Sig Dispense Refill   • losartan (COZAAR) 100 MG Tab Take 1 Tab by mouth every day. 30 Tab 11   • carvedilol (COREG) 12.5 MG Tab Take 1 Tab by mouth 2 times a day, with meals. 60 Tab 11   • guaiFENesin LA (MUCINEX) 600 MG TABLET SR 12 HR Take 1 Tab by mouth 3 times a day. 15 Tab 0   • spironolactone (ALDACTONE) 25 MG Tab Take 1 Tab by mouth every day. 30 Tab 1   • potassium chloride SA (K-DUR) 10 MEQ Tab CR Take 10 mEq by mouth every day.     • furosemide (LASIX) 40 MG Tab Take 40 mg by mouth every day.     • atorvastatin (LIPITOR) 20 MG Tab Take 20 mg by mouth every day.     • amiodarone (CORDARONE) 200 MG Tab Take 200 mg by mouth every day.     • aspirin  MG TBEC Take 1 Tab by mouth every day. (Patient taking differently: Take 81 mg by mouth every day.) 60 Tab 0   • metformin (GLUCOPHAGE) 500 MG TABS Take 1 Tab by mouth 2 times a day, with meals. 60 Tab 3   • MethylPREDNISolone (MEDROL DOSEPAK) 4 MG Tablet Therapy Pack DOSE PER PHARMACY 1 Kit 0     No facility-administered encounter medications on file as of 5/29/2018.      Review of Systems   Constitutional: Positive for malaise/fatigue. Negative for fever.   Respiratory: Positive for cough, sputum production (white sputum at night) and shortness of breath.    Cardiovascular: Negative for chest pain, palpitations, orthopnea, claudication, leg swelling and PND.   Gastrointestinal: Negative for abdominal pain.   Musculoskeletal: Negative for myalgias.   Neurological: Negative for dizziness.   All other systems reviewed and are negative.       Objective:   BP (!) 82/50   Pulse (!) 54   Ht 1.765 m (5' 9.5\")   Wt 92.5 kg (204 lb)   SpO2 94%   BMI 29.69 kg/m²     Physical Exam   Constitutional: He is oriented to " person, place, and time. He appears well-developed and well-nourished.   HENT:   Head: Normocephalic and atraumatic.   Eyes: EOM are normal. Pupils are equal, round, and reactive to light.   Neck: Normal range of motion. Neck supple. No JVD present.   Cardiovascular: Normal rate, regular rhythm and normal heart sounds.    Pulmonary/Chest: Effort normal and breath sounds normal. No respiratory distress. He has no wheezes. He has no rales.   Musculoskeletal: He exhibits no edema.   Neurological: He is alert and oriented to person, place, and time.   Skin: Skin is warm and dry.   Psychiatric: He has a normal mood and affect. His behavior is normal.     Lab Results   Component Value Date/Time    CHOLSTRLTOT 76 (L) 05/13/2018 02:49 AM    LDL 46 05/13/2018 02:49 AM    HDL 20 (A) 05/13/2018 02:49 AM    TRIGLYCERIDE 48 05/13/2018 02:49 AM       Lab Results   Component Value Date/Time    SODIUM 120 (L) 05/26/2018 11:44 AM    POTASSIUM 6.0 (H) 05/26/2018 11:44 AM    CHLORIDE 86 (L) 05/26/2018 11:44 AM    CO2 22 05/26/2018 11:44 AM    GLUCOSE 667 (HH) 05/26/2018 11:44 AM    BUN 32 (H) 05/26/2018 11:44 AM    CREATININE 1.29 05/26/2018 11:44 AM     Lab Results   Component Value Date/Time    ALKPHOSPHAT 142 (H) 05/13/2018 11:19 AM    ASTSGOT 96 (H) 05/13/2018 11:19 AM    ALTSGPT 108 (H) 05/13/2018 11:19 AM    TBILIRUBIN 1.7 (H) 05/13/2018 11:19 AM      Echocardiogram 3/23/2014  CONCLUSIONS  No prior echo  4 chamber dilation  Left ventricular ejection fraction is 15% to 20%.   Grade III-IV diastolic dysfunction is present. c/w elevated LAp and   LVEDP.   Severe mitral regurgitation.      Transthoracic Echo Report 5/13/18  Left ventricle is severely dilated. Mild concentric left ventricular   hypertrophy. Grade III diastolic dysfunction (restrictive pattern).   Severely reduced left ventricular systolic function. Left ventricular   ejection fraction is visually estimated to be 20%. Diffuse hypokinesis   with septal  dyskinesis.  Severe mitral regurgitation.  Severe tricuspid regurgitation. Estimated right ventricular systolic   pressure  is 50 mmHg.  Compared to the report of the study done - the TR is now severe.     Assessment:     1. ACC/AHA stage C systolic heart failure (HCC)  losartan (COZAAR) 50 MG Tab    BASIC METABOLIC PANEL    [facility] EKG (Clinic Performed)    BASIC METABOLIC PANEL   2. Heart failure, NYHA class 2 (HCC)  losartan (COZAAR) 50 MG Tab    BASIC METABOLIC PANEL    [facility] EKG (Clinic Performed)    BASIC METABOLIC PANEL   3. AICD (automatic cardioverter/defibrillator) present     4. High serum potassium level  BASIC METABOLIC PANEL    [facility] EKG (Clinic Performed)    BASIC METABOLIC PANEL   5. Dilated cardiomyopathy (HCC)         Medical Decision Making:  Today's Assessment / Status / Plan:   1. HFrEF, stage C, class 2: Based on physical examination findings, patient is euvolemic. No JVD, lungs are clear to auscultation, no pitting edema in bilateral lower extremities, no ascites. EKG , pt likely dehydrated  -Stop Potassium and lasix (furosemide)  -Decrease Losartan to 50 mg Daily  -BMP today-STAT  -Repeat BMP in 1 week  -Hold BP/ HF medications if BP < 110 systolic  -Stay Hydrated  -Continue spironolactone 25 mg daily (for now)  -Continue carvedilol 12.5 mg twice a day  Call clinic with problems   -Reinforced s/sx of worsening heart failure with patient and weight monitoring. Pt verbalizes understanding. Pt to call office or RTC if present.   -Has ICD-Needs to Have device check    2. High Potassium level: Patient is currently asymptomatic  -Discussed ER precautions  -Recommendations per above    FU in clinic in 1 week with labs. Sooner if needed.    Patient verbalizes understanding and agrees with the plan of care.     Collaborating MD: Rosales Arriaga MD    Critical labs at about 1:36 pm. Sodium 118 and Glucose 593. Changes per above and repeat labs in 1 week. Pt to follow up with PCP  about high bloodsugars.

## 2018-05-29 NOTE — PATIENT INSTRUCTIONS
Stop Potassium and lasix (furosemide)  Decrease Losartan to 50 mg Daily  BMP today   Repeat BMP in 1 week  Hold BP/ HF medications if BP < 110 systolic  Stay Hydrated  Call clinic with problems

## 2018-05-29 NOTE — LETTER
[facility]   [address] [place] [address]  [place] Hdz [address]  Phone: [phone]  Fax: [phone]              Lai Dailey  1970    Encounter Date: 5/29/2018    SANYA Balderrama          PROGRESS NOTE:  Chief Complaint   Patient presents with   • Pacemaker Check/Dysfunction     cough(pain on ribs)-getting worse       Subjective:   Lai Dailey is a 48 y.o. male who presents today for follow up on his heart failure with his wife, Mary Lou.    New patient to the heart failure clinic. Pt was last seen 5/21/18 with Dr. Solis. During that visit, his medications were adjusted, carvedilol was increased to 12.5 mg twice daily and losartan was increased to 100 mg daily.  Patient reports he been tolerating the dose increase without difficulty.    Patient had some critical labs 3 days ago.  Patient denies any chest pain, palpitations, muscle cramping, orthopnea, PND or edema.  Patient states he was given a prescription of prednisone and would drink orange juice with the medication.  Patient stopped that medication on Friday.    Patient does report a dry cough in the day and some white sputum at night. He does report right sided lower muscle pain with cough.  He does get winded with some of his ADLs and with exertion. He is fatigued.      His home weights have been between 196-200 pounds.    Additonally, patient has the following medical problems:    -Has ICD for dilated cardiomyopathy and nonsustained VT also taking amiodarone    -Diabetes: Taking metformin    -Hypertension    -Hyperlipidemia: Taking atorvastatin 20 mg daily    Past Medical History:   Diagnosis Date   • CHF (congestive heart failure) (HCC)    • Diabetes (HCC)    • Hyperlipidemia    • Hypertension      Past Surgical History:   Procedure Laterality Date   • AICD IMPLANT       Family History   Problem Relation Age of Onset   • Heart Disease Neg Hx      Social History     Social History   • Marital status:  Single     Spouse name: N/A   • Number of children: N/A   • Years of education: N/A     Occupational History   • Not on file.     Social History Main Topics   • Smoking status: Never Smoker   • Smokeless tobacco: Never Used   • Alcohol use Yes      Comment: 2 times a week-beer   • Drug use: No   • Sexual activity: Not on file     Other Topics Concern   • Not on file     Social History Narrative   • No narrative on file     No Known Allergies  Outpatient Encounter Prescriptions as of 5/29/2018   Medication Sig Dispense Refill   • losartan (COZAAR) 100 MG Tab Take 1 Tab by mouth every day. 30 Tab 11   • carvedilol (COREG) 12.5 MG Tab Take 1 Tab by mouth 2 times a day, with meals. 60 Tab 11   • guaiFENesin LA (MUCINEX) 600 MG TABLET SR 12 HR Take 1 Tab by mouth 3 times a day. 15 Tab 0   • spironolactone (ALDACTONE) 25 MG Tab Take 1 Tab by mouth every day. 30 Tab 1   • potassium chloride SA (K-DUR) 10 MEQ Tab CR Take 10 mEq by mouth every day.     • furosemide (LASIX) 40 MG Tab Take 40 mg by mouth every day.     • atorvastatin (LIPITOR) 20 MG Tab Take 20 mg by mouth every day.     • amiodarone (CORDARONE) 200 MG Tab Take 200 mg by mouth every day.     • aspirin  MG TBEC Take 1 Tab by mouth every day. (Patient taking differently: Take 81 mg by mouth every day.) 60 Tab 0   • metformin (GLUCOPHAGE) 500 MG TABS Take 1 Tab by mouth 2 times a day, with meals. 60 Tab 3   • MethylPREDNISolone (MEDROL DOSEPAK) 4 MG Tablet Therapy Pack DOSE PER PHARMACY 1 Kit 0     No facility-administered encounter medications on file as of 5/29/2018.      Review of Systems   Constitutional: Positive for malaise/fatigue. Negative for fever.   Respiratory: Positive for cough, sputum production (white sputum at night) and shortness of breath.    Cardiovascular: Negative for chest pain, palpitations, orthopnea, claudication, leg swelling and PND.   Gastrointestinal: Negative for abdominal pain.   Musculoskeletal: Negative for myalgias.    "  Neurological: Negative for dizziness.   All other systems reviewed and are negative.       Objective:   BP (!) 82/50   Pulse (!) 54   Ht 1.765 m (5' 9.5\")   Wt 92.5 kg (204 lb)   SpO2 94%   BMI 29.69 kg/m²      Physical Exam   Constitutional: He is oriented to person, place, and time. He appears well-developed and well-nourished.   HENT:   Head: Normocephalic and atraumatic.   Eyes: EOM are normal. Pupils are equal, round, and reactive to light.   Neck: Normal range of motion. Neck supple. No JVD present.   Cardiovascular: Normal rate, regular rhythm and normal heart sounds.    Pulmonary/Chest: Effort normal and breath sounds normal. No respiratory distress. He has no wheezes. He has no rales.   Musculoskeletal: He exhibits no edema.   Neurological: He is alert and oriented to person, place, and time.   Skin: Skin is warm and dry.   Psychiatric: He has a normal mood and affect. His behavior is normal.     Lab Results   Component Value Date/Time    CHOLSTRLTOT 76 (L) 05/13/2018 02:49 AM    LDL 46 05/13/2018 02:49 AM    HDL 20 (A) 05/13/2018 02:49 AM    TRIGLYCERIDE 48 05/13/2018 02:49 AM       Lab Results   Component Value Date/Time    SODIUM 120 (L) 05/26/2018 11:44 AM    POTASSIUM 6.0 (H) 05/26/2018 11:44 AM    CHLORIDE 86 (L) 05/26/2018 11:44 AM    CO2 22 05/26/2018 11:44 AM    GLUCOSE 667 (HH) 05/26/2018 11:44 AM    BUN 32 (H) 05/26/2018 11:44 AM    CREATININE 1.29 05/26/2018 11:44 AM     Lab Results   Component Value Date/Time    ALKPHOSPHAT 142 (H) 05/13/2018 11:19 AM    ASTSGOT 96 (H) 05/13/2018 11:19 AM    ALTSGPT 108 (H) 05/13/2018 11:19 AM    TBILIRUBIN 1.7 (H) 05/13/2018 11:19 AM      Echocardiogram 3/23/2014  CONCLUSIONS  No prior echo  4 chamber dilation  Left ventricular ejection fraction is 15% to 20%.   Grade III-IV diastolic dysfunction is present. c/w elevated LAp and   LVEDP.   Severe mitral regurgitation.      Transthoracic Echo Report 5/13/18  Left ventricle is severely dilated. Mild " concentric left ventricular   hypertrophy. Grade III diastolic dysfunction (restrictive pattern).   Severely reduced left ventricular systolic function. Left ventricular   ejection fraction is visually estimated to be 20%. Diffuse hypokinesis   with septal dyskinesis.  Severe mitral regurgitation.  Severe tricuspid regurgitation. Estimated right ventricular systolic   pressure  is 50 mmHg.  Compared to the report of the study done - the TR is now severe.     Assessment:     1. ACC/AHA stage C systolic heart failure (HCC)  losartan (COZAAR) 50 MG Tab    BASIC METABOLIC PANEL    [facility] EKG (Clinic Performed)    BASIC METABOLIC PANEL   2. Heart failure, NYHA class 2 (HCC)  losartan (COZAAR) 50 MG Tab    BASIC METABOLIC PANEL    [facility] EKG (Clinic Performed)    BASIC METABOLIC PANEL   3. AICD (automatic cardioverter/defibrillator) present     4. High serum potassium level  BASIC METABOLIC PANEL    [facility] EKG (Clinic Performed)    BASIC METABOLIC PANEL   5. Dilated cardiomyopathy (HCC)         Medical Decision Making:  Today's Assessment / Status / Plan:   1. HFrEF, stage C, class 2: Based on physical examination findings, patient is euvolemic. No JVD, lungs are clear to auscultation, no pitting edema in bilateral lower extremities, no ascites. EKG , pt likely dehydrated  -Stop Potassium and lasix (furosemide)  -Decrease Losartan to 50 mg Daily  -BMP today-STAT  -Repeat BMP in 1 week  -Hold BP/ HF medications if BP < 110 systolic  -Stay Hydrated  -Continue spironolactone 25 mg daily (for now)  -Continue carvedilol 12.5 mg twice a day  Call clinic with problems   -Reinforced s/sx of worsening heart failure with patient and weight monitoring. Pt verbalizes understanding. Pt to call office or RTC if present.   -Has ICD-Needs to Have device check    2. High Potassium level: Patient is currently asymptomatic  -Discussed ER precautions  -Recommendations per above    FU in clinic in 1 week with labs.  Sooner if needed.    Patient verbalizes understanding and agrees with the plan of care.     Collaborating MD: Rosales Arriaga MD    Critical labs at about 1:36 pm. Sodium 118 and Glucose 593. Changes per above and repeat labs in 1 week. Pt to follow up with PCP about high bloodsugars.         Tor Rdz M.D.  [address] [facility] [address]  VIA Facsimile: [phone]

## 2018-06-04 ENCOUNTER — HOSPITAL ENCOUNTER (OUTPATIENT)
Dept: LAB | Facility: MEDICAL CENTER | Age: 48
DRG: 853 | End: 2018-06-04
Attending: NURSE PRACTITIONER
Payer: MEDICARE

## 2018-06-04 DIAGNOSIS — E87.5 HIGH SERUM POTASSIUM LEVEL: ICD-10-CM

## 2018-06-04 DIAGNOSIS — I50.9 HEART FAILURE, NYHA CLASS 2 (HCC): ICD-10-CM

## 2018-06-04 DIAGNOSIS — I50.20 ACC/AHA STAGE C SYSTOLIC HEART FAILURE (HCC): ICD-10-CM

## 2018-06-04 PROCEDURE — 80048 BASIC METABOLIC PNL TOTAL CA: CPT

## 2018-06-04 PROCEDURE — 36415 COLL VENOUS BLD VENIPUNCTURE: CPT

## 2018-06-05 ENCOUNTER — HOSPITAL ENCOUNTER (INPATIENT)
Facility: MEDICAL CENTER | Age: 48
LOS: 7 days | DRG: 853 | End: 2018-06-12
Attending: EMERGENCY MEDICINE | Admitting: INTERNAL MEDICINE
Payer: MEDICARE

## 2018-06-05 ENCOUNTER — TELEPHONE (OUTPATIENT)
Dept: CARDIOLOGY | Facility: MEDICAL CENTER | Age: 48
End: 2018-06-05

## 2018-06-05 ENCOUNTER — APPOINTMENT (OUTPATIENT)
Dept: RADIOLOGY | Facility: MEDICAL CENTER | Age: 48
DRG: 853 | End: 2018-06-05
Attending: EMERGENCY MEDICINE
Payer: MEDICARE

## 2018-06-05 ENCOUNTER — OFFICE VISIT (OUTPATIENT)
Dept: CARDIOLOGY | Facility: MEDICAL CENTER | Age: 48
End: 2018-06-05
Payer: MEDICARE

## 2018-06-05 VITALS
HEART RATE: 149 BPM | SYSTOLIC BLOOD PRESSURE: 85 MMHG | DIASTOLIC BLOOD PRESSURE: 60 MMHG | BODY MASS INDEX: 29.33 KG/M2 | OXYGEN SATURATION: 94 % | HEIGHT: 69 IN | WEIGHT: 198 LBS

## 2018-06-05 DIAGNOSIS — R09.02 HYPOXIA: ICD-10-CM

## 2018-06-05 DIAGNOSIS — R06.02 SHORTNESS OF BREATH: ICD-10-CM

## 2018-06-05 DIAGNOSIS — R00.0 TACHYCARDIA: ICD-10-CM

## 2018-06-05 DIAGNOSIS — Z95.810 AICD (AUTOMATIC CARDIOVERTER/DEFIBRILLATOR) PRESENT: ICD-10-CM

## 2018-06-05 DIAGNOSIS — R57.0 CARDIOGENIC SHOCK (HCC): ICD-10-CM

## 2018-06-05 DIAGNOSIS — R42 DIZZINESS: ICD-10-CM

## 2018-06-05 DIAGNOSIS — R73.9 HYPERGLYCEMIA: ICD-10-CM

## 2018-06-05 DIAGNOSIS — I50.20 NYHA CLASS 3 SYSTOLIC CONGESTIVE HEART FAILURE WITH REDUCED LEFT VENTRICULAR FUNCTION (HCC): ICD-10-CM

## 2018-06-05 DIAGNOSIS — R31.9 HEMATURIA, UNSPECIFIED TYPE: ICD-10-CM

## 2018-06-05 DIAGNOSIS — I50.20 ACC/AHA STAGE C SYSTOLIC HEART FAILURE (HCC): ICD-10-CM

## 2018-06-05 PROBLEM — A41.9 SEPSIS (HCC): Status: ACTIVE | Noted: 2018-06-05

## 2018-06-05 LAB
ALBUMIN SERPL BCP-MCNC: 3.3 G/DL (ref 3.2–4.9)
ALBUMIN/GLOB SERPL: 0.6 G/DL
ALP SERPL-CCNC: 224 U/L (ref 30–99)
ALT SERPL-CCNC: 20 U/L (ref 2–50)
ANION GAP SERPL CALC-SCNC: 12 MMOL/L (ref 0–11.9)
ANION GAP SERPL CALC-SCNC: 14 MMOL/L (ref 0–11.9)
ANION GAP SERPL CALC-SCNC: 14 MMOL/L (ref 0–11.9)
APPEARANCE UR: ABNORMAL
APTT PPP: 30 SEC (ref 24.7–36)
AST SERPL-CCNC: 20 U/L (ref 12–45)
BACTERIA #/AREA URNS HPF: ABNORMAL /HPF
BASOPHILS # BLD AUTO: 0.7 % (ref 0–1.8)
BASOPHILS # BLD: 0.13 K/UL (ref 0–0.12)
BILIRUB SERPL-MCNC: 1.7 MG/DL (ref 0.1–1.5)
BILIRUB UR QL STRIP.AUTO: NEGATIVE
BNP SERPL-MCNC: 422 PG/ML (ref 0–100)
BUN SERPL-MCNC: 78 MG/DL (ref 8–22)
BUN SERPL-MCNC: 84 MG/DL (ref 8–22)
BUN SERPL-MCNC: 85 MG/DL (ref 8–22)
CALCIUM SERPL-MCNC: 8.8 MG/DL (ref 8.5–10.5)
CALCIUM SERPL-MCNC: 9.4 MG/DL (ref 8.5–10.5)
CALCIUM SERPL-MCNC: 9.6 MG/DL (ref 8.5–10.5)
CHLORIDE SERPL-SCNC: 81 MMOL/L (ref 96–112)
CHLORIDE SERPL-SCNC: 83 MMOL/L (ref 96–112)
CHLORIDE SERPL-SCNC: 86 MMOL/L (ref 96–112)
CO2 SERPL-SCNC: 21 MMOL/L (ref 20–33)
CO2 SERPL-SCNC: 23 MMOL/L (ref 20–33)
CO2 SERPL-SCNC: 24 MMOL/L (ref 20–33)
COLOR UR: YELLOW
CREAT SERPL-MCNC: 1.62 MG/DL (ref 0.5–1.4)
CREAT SERPL-MCNC: 1.65 MG/DL (ref 0.5–1.4)
CREAT SERPL-MCNC: 1.89 MG/DL (ref 0.5–1.4)
EKG IMPRESSION: NORMAL
EOSINOPHIL # BLD AUTO: 0.01 K/UL (ref 0–0.51)
EOSINOPHIL NFR BLD: 0.1 % (ref 0–6.9)
EPI CELLS #/AREA URNS HPF: NEGATIVE /HPF
ERYTHROCYTE [DISTWIDTH] IN BLOOD BY AUTOMATED COUNT: 48.6 FL (ref 35.9–50)
GLOBULIN SER CALC-MCNC: 5.2 G/DL (ref 1.9–3.5)
GLUCOSE BLD-MCNC: 422 MG/DL (ref 65–99)
GLUCOSE SERPL-MCNC: 582 MG/DL (ref 65–99)
GLUCOSE SERPL-MCNC: 597 MG/DL (ref 65–99)
GLUCOSE SERPL-MCNC: 616 MG/DL (ref 65–99)
GLUCOSE UR STRIP.AUTO-MCNC: >=1000 MG/DL
HCT VFR BLD AUTO: 52.1 % (ref 42–52)
HGB BLD-MCNC: 17.4 G/DL (ref 14–18)
HYALINE CASTS #/AREA URNS LPF: ABNORMAL /LPF
IMM GRANULOCYTES # BLD AUTO: 0.33 K/UL (ref 0–0.11)
IMM GRANULOCYTES NFR BLD AUTO: 1.7 % (ref 0–0.9)
INR PPP: 1.28 (ref 0.87–1.13)
KETONES UR STRIP.AUTO-MCNC: NEGATIVE MG/DL
LACTATE BLD-SCNC: 2.1 MMOL/L (ref 0.5–2)
LACTATE BLD-SCNC: 2.5 MMOL/L (ref 0.5–2)
LEUKOCYTE ESTERASE UR QL STRIP.AUTO: NEGATIVE
LYMPHOCYTES # BLD AUTO: 1.84 K/UL (ref 1–4.8)
LYMPHOCYTES NFR BLD: 9.7 % (ref 22–41)
MAGNESIUM SERPL-MCNC: 2.4 MG/DL (ref 1.5–2.5)
MCH RBC QN AUTO: 27.4 PG (ref 27–33)
MCHC RBC AUTO-ENTMCNC: 33.4 G/DL (ref 33.7–35.3)
MCV RBC AUTO: 82.2 FL (ref 81.4–97.8)
MICRO URNS: ABNORMAL
MONOCYTES # BLD AUTO: 0.91 K/UL (ref 0–0.85)
MONOCYTES NFR BLD AUTO: 4.8 % (ref 0–13.4)
NEUTROPHILS # BLD AUTO: 15.73 K/UL (ref 1.82–7.42)
NEUTROPHILS NFR BLD: 83 % (ref 44–72)
NITRITE UR QL STRIP.AUTO: NEGATIVE
NRBC # BLD AUTO: 0 K/UL
NRBC BLD-RTO: 0 /100 WBC
PH UR STRIP.AUTO: 5 [PH]
PHOSPHATE SERPL-MCNC: 3.4 MG/DL (ref 2.5–4.5)
PLATELET # BLD AUTO: 347 K/UL (ref 164–446)
PMV BLD AUTO: 11.9 FL (ref 9–12.9)
POTASSIUM SERPL-SCNC: 4.9 MMOL/L (ref 3.6–5.5)
POTASSIUM SERPL-SCNC: 5.1 MMOL/L (ref 3.6–5.5)
POTASSIUM SERPL-SCNC: 5.2 MMOL/L (ref 3.6–5.5)
PROCALCITONIN SERPL-MCNC: 3.55 NG/ML
PROT SERPL-MCNC: 8.5 G/DL (ref 6–8.2)
PROT UR QL STRIP: NEGATIVE MG/DL
PROTHROMBIN TIME: 15.7 SEC (ref 12–14.6)
RBC # BLD AUTO: 6.34 M/UL (ref 4.7–6.1)
RBC # URNS HPF: >150 /HPF
RBC UR QL AUTO: ABNORMAL
SODIUM SERPL-SCNC: 118 MMOL/L (ref 135–145)
SODIUM SERPL-SCNC: 119 MMOL/L (ref 135–145)
SODIUM SERPL-SCNC: 121 MMOL/L (ref 135–145)
SP GR UR STRIP.AUTO: 1.02
TROPONIN I SERPL-MCNC: 0.07 NG/ML (ref 0–0.04)
TROPONIN I SERPL-MCNC: 0.1 NG/ML (ref 0–0.04)
TSH SERPL DL<=0.005 MIU/L-ACNC: 2.68 UIU/ML (ref 0.38–5.33)
UROBILINOGEN UR STRIP.AUTO-MCNC: 1 MG/DL
WBC # BLD AUTO: 19 K/UL (ref 4.8–10.8)
WBC #/AREA URNS HPF: ABNORMAL /HPF

## 2018-06-05 PROCEDURE — 80053 COMPREHEN METABOLIC PANEL: CPT

## 2018-06-05 PROCEDURE — 700105 HCHG RX REV CODE 258: Performed by: EMERGENCY MEDICINE

## 2018-06-05 PROCEDURE — 82962 GLUCOSE BLOOD TEST: CPT

## 2018-06-05 PROCEDURE — 700101 HCHG RX REV CODE 250: Performed by: EMERGENCY MEDICINE

## 2018-06-05 PROCEDURE — 96375 TX/PRO/DX INJ NEW DRUG ADDON: CPT

## 2018-06-05 PROCEDURE — 96374 THER/PROPH/DIAG INJ IV PUSH: CPT

## 2018-06-05 PROCEDURE — 85610 PROTHROMBIN TIME: CPT

## 2018-06-05 PROCEDURE — 770022 HCHG ROOM/CARE - ICU (200)

## 2018-06-05 PROCEDURE — 96372 THER/PROPH/DIAG INJ SC/IM: CPT

## 2018-06-05 PROCEDURE — 700102 HCHG RX REV CODE 250 W/ 637 OVERRIDE(OP): Performed by: EMERGENCY MEDICINE

## 2018-06-05 PROCEDURE — 99214 OFFICE O/P EST MOD 30 MIN: CPT | Performed by: NURSE PRACTITIONER

## 2018-06-05 PROCEDURE — 85025 COMPLETE CBC W/AUTO DIFF WBC: CPT

## 2018-06-05 PROCEDURE — 93000 ELECTROCARDIOGRAM COMPLETE: CPT | Performed by: NURSE PRACTITIONER

## 2018-06-05 PROCEDURE — 700102 HCHG RX REV CODE 250 W/ 637 OVERRIDE(OP): Performed by: INTERNAL MEDICINE

## 2018-06-05 PROCEDURE — 84484 ASSAY OF TROPONIN QUANT: CPT

## 2018-06-05 PROCEDURE — 87040 BLOOD CULTURE FOR BACTERIA: CPT

## 2018-06-05 PROCEDURE — 71045 X-RAY EXAM CHEST 1 VIEW: CPT

## 2018-06-05 PROCEDURE — 700105 HCHG RX REV CODE 258: Performed by: INTERNAL MEDICINE

## 2018-06-05 PROCEDURE — 84145 PROCALCITONIN (PCT): CPT

## 2018-06-05 PROCEDURE — 93005 ELECTROCARDIOGRAM TRACING: CPT | Performed by: EMERGENCY MEDICINE

## 2018-06-05 PROCEDURE — 36415 COLL VENOUS BLD VENIPUNCTURE: CPT

## 2018-06-05 PROCEDURE — 700111 HCHG RX REV CODE 636 W/ 250 OVERRIDE (IP): Performed by: INTERNAL MEDICINE

## 2018-06-05 PROCEDURE — 700111 HCHG RX REV CODE 636 W/ 250 OVERRIDE (IP): Performed by: EMERGENCY MEDICINE

## 2018-06-05 PROCEDURE — 84443 ASSAY THYROID STIM HORMONE: CPT

## 2018-06-05 PROCEDURE — 99291 CRITICAL CARE FIRST HOUR: CPT

## 2018-06-05 PROCEDURE — 93005 ELECTROCARDIOGRAM TRACING: CPT

## 2018-06-05 PROCEDURE — 81001 URINALYSIS AUTO W/SCOPE: CPT

## 2018-06-05 PROCEDURE — A9270 NON-COVERED ITEM OR SERVICE: HCPCS | Performed by: INTERNAL MEDICINE

## 2018-06-05 PROCEDURE — 83735 ASSAY OF MAGNESIUM: CPT

## 2018-06-05 PROCEDURE — 84100 ASSAY OF PHOSPHORUS: CPT

## 2018-06-05 PROCEDURE — 83880 ASSAY OF NATRIURETIC PEPTIDE: CPT

## 2018-06-05 PROCEDURE — 85730 THROMBOPLASTIN TIME PARTIAL: CPT

## 2018-06-05 PROCEDURE — 99291 CRITICAL CARE FIRST HOUR: CPT | Performed by: INTERNAL MEDICINE

## 2018-06-05 PROCEDURE — 96376 TX/PRO/DX INJ SAME DRUG ADON: CPT

## 2018-06-05 PROCEDURE — A9270 NON-COVERED ITEM OR SERVICE: HCPCS | Performed by: EMERGENCY MEDICINE

## 2018-06-05 PROCEDURE — 80048 BASIC METABOLIC PNL TOTAL CA: CPT

## 2018-06-05 PROCEDURE — 83605 ASSAY OF LACTIC ACID: CPT

## 2018-06-05 RX ORDER — METOPROLOL TARTRATE 1 MG/ML
5 INJECTION, SOLUTION INTRAVENOUS
Status: DISCONTINUED | OUTPATIENT
Start: 2018-06-05 | End: 2018-06-05

## 2018-06-05 RX ORDER — DEXTROSE MONOHYDRATE 25 G/50ML
25 INJECTION, SOLUTION INTRAVENOUS
Status: DISCONTINUED | OUTPATIENT
Start: 2018-06-05 | End: 2018-06-07

## 2018-06-05 RX ORDER — AMIODARONE HYDROCHLORIDE 200 MG/1
200 TABLET ORAL DAILY
Status: DISCONTINUED | OUTPATIENT
Start: 2018-06-06 | End: 2018-06-11

## 2018-06-05 RX ORDER — DOXYCYCLINE 100 MG/1
100 TABLET ORAL EVERY 12 HOURS
Status: DISCONTINUED | OUTPATIENT
Start: 2018-06-05 | End: 2018-06-06

## 2018-06-05 RX ORDER — ATORVASTATIN CALCIUM 20 MG/1
20 TABLET, FILM COATED ORAL DAILY
Status: DISCONTINUED | OUTPATIENT
Start: 2018-06-06 | End: 2018-06-12 | Stop reason: HOSPADM

## 2018-06-05 RX ORDER — SODIUM CHLORIDE 9 MG/ML
INJECTION, SOLUTION INTRAVENOUS ONCE
Status: COMPLETED | OUTPATIENT
Start: 2018-06-05 | End: 2018-06-05

## 2018-06-05 RX ORDER — CEFTRIAXONE 2 G/1
2 INJECTION, POWDER, FOR SOLUTION INTRAMUSCULAR; INTRAVENOUS ONCE
Status: COMPLETED | OUTPATIENT
Start: 2018-06-05 | End: 2018-06-05

## 2018-06-05 RX ORDER — AMIODARONE HYDROCHLORIDE 200 MG/1
200 TABLET ORAL ONCE
Status: COMPLETED | OUTPATIENT
Start: 2018-06-05 | End: 2018-06-05

## 2018-06-05 RX ORDER — SODIUM CHLORIDE 9 MG/ML
INJECTION, SOLUTION INTRAVENOUS CONTINUOUS
Status: DISCONTINUED | OUTPATIENT
Start: 2018-06-05 | End: 2018-06-07

## 2018-06-05 RX ORDER — HEPARIN SODIUM 1000 [USP'U]/ML
3200 INJECTION, SOLUTION INTRAVENOUS; SUBCUTANEOUS PRN
Status: DISCONTINUED | OUTPATIENT
Start: 2018-06-06 | End: 2018-06-07

## 2018-06-05 RX ADMIN — METOPROLOL TARTRATE 5 MG: 1 INJECTION, SOLUTION INTRAVENOUS at 15:06

## 2018-06-05 RX ADMIN — HEPARIN SODIUM 1200 UNITS/HR: 5000 INJECTION, SOLUTION INTRAVENOUS; SUBCUTANEOUS at 19:37

## 2018-06-05 RX ADMIN — SODIUM CHLORIDE: 9 INJECTION, SOLUTION INTRAVENOUS at 14:30

## 2018-06-05 RX ADMIN — AMIODARONE HYDROCHLORIDE 200 MG: 200 TABLET ORAL at 16:41

## 2018-06-05 RX ADMIN — INSULIN HUMAN 9 UNITS: 100 INJECTION, SOLUTION PARENTERAL at 20:24

## 2018-06-05 RX ADMIN — INSULIN HUMAN 8 UNITS: 100 INJECTION, SOLUTION PARENTERAL at 16:42

## 2018-06-05 RX ADMIN — DOXYCYCLINE 100 MG: 100 TABLET ORAL at 20:25

## 2018-06-05 RX ADMIN — SODIUM CHLORIDE: 9 INJECTION, SOLUTION INTRAVENOUS at 19:37

## 2018-06-05 RX ADMIN — METOPROLOL TARTRATE 5 MG: 1 INJECTION, SOLUTION INTRAVENOUS at 16:01

## 2018-06-05 RX ADMIN — CEFTRIAXONE SODIUM 2 G: 2 INJECTION, POWDER, FOR SOLUTION INTRAMUSCULAR; INTRAVENOUS at 16:41

## 2018-06-05 ASSESSMENT — LIFESTYLE VARIABLES
TOTAL SCORE: 0
ON A TYPICAL DAY WHEN YOU DRINK ALCOHOL HOW MANY DRINKS DO YOU HAVE: 1
HAVE YOU EVER FELT YOU SHOULD CUT DOWN ON YOUR DRINKING: NO
DO YOU DRINK ALCOHOL: YES
EVER FELT BAD OR GUILTY ABOUT YOUR DRINKING: NO
EVER HAD A DRINK FIRST THING IN THE MORNING TO STEADY YOUR NERVES TO GET RID OF A HANGOVER: NO
TOTAL SCORE: 0
HOW MANY TIMES IN THE PAST YEAR HAVE YOU HAD 5 OR MORE DRINKS IN A DAY: 0
HAVE PEOPLE ANNOYED YOU BY CRITICIZING YOUR DRINKING: NO
AVERAGE NUMBER OF DAYS PER WEEK YOU HAVE A DRINK CONTAINING ALCOHOL: 1
TOTAL SCORE: 0
CONSUMPTION TOTAL: NEGATIVE

## 2018-06-05 ASSESSMENT — ENCOUNTER SYMPTOMS
SPUTUM PRODUCTION: 1
CLAUDICATION: 0
ORTHOPNEA: 1
COUGH: 1
DIZZINESS: 0
MYALGIAS: 0
ABDOMINAL PAIN: 0
PALPITATIONS: 0
WEAKNESS: 1
PND: 1
FEVER: 0
SHORTNESS OF BREATH: 1

## 2018-06-05 ASSESSMENT — PAIN SCALES - GENERAL
PAINLEVEL_OUTOF10: 0

## 2018-06-05 NOTE — TELEPHONE ENCOUNTER
[facility] has critical glucose results   Received: Today   Message Contents   CANDIDA Rubin/Tessa     Please call Samantha at the [facility] at [phone] asap. She has critical results on the patient's glucose.      Returned call.     Glucose is 582, BUN 78     DANELLE aware.

## 2018-06-05 NOTE — ED PROVIDER NOTES
ED Provider Note    Scribed for Pipe Tucker M.D. by Vi Shay. 6/5/2018  2:52 PM    Primary care provider: Tor Rdz M.D.  Means of arrival: Ambulance  History obtained from: Patient  History limited by: None    CHIEF COMPLAINT  Chief Complaint   Patient presents with   • Cough     PT REPORTS COUGH FOR TWO DAYS WITH BLOOD IN SPUTUM       HPI  Lai Dailey is a 48 y.o. male who presents to the Emergency Department via ambulance for evaluation of exertional shortness of breath, onset 1 week ago. Patient has right lower lateral rib pain secondary to coughing. He has been coughing since early May. He noticed blood in his urine and sputum 2 days ago. He was sent here from his heart doctor's office for A-Fib. Patient had a pacer placed in 2015.     REVIEW OF SYSTEMS  Pertinent negatives include no fevers. As above, all other systems reviewed and are negative.   See HPI for further details. C.     PAST MEDICAL HISTORY   has a past medical history of CHF (congestive heart failure) (HCC); Diabetes (HCC); Hyperlipidemia; and Hypertension.    SURGICAL HISTORY   has a past surgical history that includes aicd implant (2010).    SOCIAL HISTORY  Social History   Substance Use Topics   • Smoking status: Never Smoker   • Smokeless tobacco: Never Used   • Alcohol use No      Comment: 2 times a week-beer, NO ALCOHOL AT THIS TIME      History   Drug Use No     Comment: Marijuana use as youth       FAMILY HISTORY  Family History   Problem Relation Age of Onset   • Colon Cancer Mother    • Hypertension Sister    • Stroke Brother    • Heart Disease Neg Hx        CURRENT MEDICATIONS  No current facility-administered medications for this encounter.     Current Outpatient Prescriptions:   •  aspirin EC (ECOTRIN) 81 MG Tablet Delayed Response, Take 81 mg by mouth every day., Disp: , Rfl:   •  losartan (COZAAR) 50 MG Tab, Take 1 Tab by mouth every day., Disp: 30 Tab, Rfl: 11  •  carvedilol (COREG) 12.5 MG Tab, Take 1 Tab by  "mouth 2 times a day, with meals., Disp: 60 Tab, Rfl: 11  •  guaiFENesin LA (MUCINEX) 600 MG TABLET SR 12 HR, Take 1 Tab by mouth 3 times a day., Disp: 15 Tab, Rfl: 0  •  spironolactone (ALDACTONE) 25 MG Tab, Take 1 Tab by mouth every day., Disp: 30 Tab, Rfl: 1  •  atorvastatin (LIPITOR) 20 MG Tab, Take 20 mg by mouth every day., Disp: , Rfl:   •  amiodarone (CORDARONE) 200 MG Tab, Take 200 mg by mouth every day., Disp: , Rfl:   •  metformin (GLUCOPHAGE) 500 MG TABS, Take 1 Tab by mouth 2 times a day, with meals., Disp: 60 Tab, Rfl: 3    ALLERGIES  No Known Allergies    PHYSICAL EXAM  VITAL SIGNS: /60   Pulse (!) 130   Temp 36.7 °C (98 °F)   Resp 20   Ht 1.753 m (5' 9\")   Wt 87.5 kg (193 lb)   BMI 28.50 kg/m²     Constitutional: Well developed, Well nourished, somewhat uncomfortable appearing, no respiratory distress  HENT: Normocephalic, Atraumatic, Bilateral external ears normal, Oropharynx is clear mucous membranes are moist. No oral exudates or nasal discharge.   Cardiovascular: Tachycardic, irregular rhythm without murmur rub or gallop.  Thorax & Lungs: Clear breath sounds bilaterally without wheezes, rhonchi or rales, no respiratory distress, AICD to upper anterior left chest wall.  Abdomen: Soft non-tender non-distended. There is no rebound or guarding. No organomegaly is appreciated. Bowel sounds are normal.  Skin: Normal without rash.    Extremities: Intact distal pulses, No edema, No tenderness, No cyanosis, No clubbing. Capillary refill is less than 2 seconds.  Neurologic: Alert & oriented x 3, Normal motor function, Normal sensory function, No focal deficits noted. Reflexes are normal.  Psychiatric: Pleasant and normal      DIAGNOSTIC STUDIES / PROCEDURES    LABS  Labs Reviewed   CBC WITH DIFFERENTIAL - Abnormal; Notable for the following:        Result Value    WBC 19.0 (*)     RBC 6.34 (*)     Hematocrit 52.1 (*)     MCHC 33.4 (*)     Neutrophils-Polys 83.00 (*)     Lymphocytes 9.70 (*)     " Immature Granulocytes 1.70 (*)     Neutrophils (Absolute) 15.73 (*)     Monos (Absolute) 0.91 (*)     Baso (Absolute) 0.13 (*)     Immature Granulocytes (abs) 0.33 (*)     All other components within normal limits   BTYPE NATRIURETIC PEPTIDE   COMP METABOLIC PANEL   BLOOD CULTURE   BLOOD CULTURE   TROPONIN    All labs reviewed by me.    EKG Interpretation:  EKG Interpretation    Interpreted by emergency department physician    Rhythm: atrial fibrillation - rapid  Rate: tachycardia  Axis: normal  Ectopy: none  Conduction: normal  ST Segments: no acute change  T Waves: no acute change  Q Waves: nonspecific    Clinical Impression: non-specific EKG        RADIOLOGY  DX-CHEST-LIMITED (1 VIEW)   Final Result      1.  Stable enlarged cardiac silhouette.      The radiologist's interpretation of all radiological studies have been reviewed by me.    COURSE & MEDICAL DECISION MAKING  Nursing notes, VS, PMSFHx reviewed in chart.    2:52 PM Patient seen and examined at bedside. Ordered for DX Chest, BNP, Blood Culture x2, CBC, CMP, EKG to evaluate. Patient was treated with Lopressor 5 mg for his symptoms. Will resuscitate with IV fluids for hypotension.    3:27 PM Patient re-evaluated. Heart rate improved at 120 with second dose of Lopressor 5 mg and Amiodarone 200 mg. Blood pressure 104/56 mmHg. Wife states that patient was instructed not to take his blood pressure medication as his blood pressure was very low last Tuesday, 5/29.     3:55 PM Per nurse, blood pressure at 61/50 mmHg after giving Metoprolol.     4:05 PM Patient reassessed. Blood pressure improved at 92/74 mmHg. Patient is feeling fine. Reviewed lab and imaging results as noted above. Patient with stable, enlarged cardiac silhouette. [facility]. Will treat with 2 g Rocephin.     4:11 PM Patient hypoxic at 88%. Will have pace maker interrogated.     4:15 PM Discussed patient's case with Dr. Shukla (Cardiology) who agreed to see patient at bedside.  Patient has history of cardiomyopathy and CHF. He moved to [place] recently after living in [place] for 2 years. He was seen by Dr. Solis (Cardiology) 5/21. His BNP at that time was 729, ejection fraction 20%. Will perform bedside ultrasound.     Bedside ultrasound reveals an IVC that is distended but still having about 10-15% collapse with cardiac cycle. I will not give any further fluids as a result as I think he is normovolemic or perhaps even slightly hypervolemic.    4:23 PM Dr. Shukla at patient's bedside. Bedside ultrasound shows distended IVC. [facility]  Patient's BNP is elevated at 422 but this is actually down from previous as high as 700s. Troponin is minimally bumped at 0.10. He is hypo-hyponatremic at 118. No evidence of significant acidosis but there is some renal insufficiency with a BUN and creatinine of 84 and 1.89. Glucose is elevated at 616 and I gave him 8 units of regular insulin IV. He will be on sliding scale.    4:49 PM Discussed patient's case and above findings with Dr. Taylor, Hospitalist, who agreed to admit patient.  On reassessment approximately 5:30 PM the patient's blood pressure has stabilized. [employer] notified me that interrogation revealed that he has atrial fibrillation rather than atrial flutter. Patient is pending disposition to the CICU    CRITICAL CARE  The very real possibility of a deterioration of this patient's condition required the highest level of my preparedness for sudden, emergent intervention.  I provided critical care services, which included medication orders, frequent reevaluations of the patient's condition and response to treatment, ordering and reviewing test results, and discussing the case with various consultants.  The critical care time associated with the care of the patient was 30 minutes. Review chart for interventions. This time is exclusive of any other billable procedures.       DISPOSITION:  Patient will be admitted to Dr. Taylor,  Hospitalist, in guarded condition.    FINAL IMPRESSION  1. Cough    2. Leukocytosis, unspecified type    3. Shortness of breath    4. Hypoxia    5. Critical care time, 30 minutes     IVi (Papitoibfidel), am scribing for, and in the presence of, Pipe Tucker M.D..    Electronically signed by: Vi Shay (Papitoibe), 6/5/2018    IPipe M.D. personally performed the services described in this documentation, as scribed by Vi Shay in my presence, and it is both accurate and complete.    The note accurately reflects work and decisions made by me.  Pipe Tucker  6/5/2018  5:34 PM

## 2018-06-05 NOTE — ED NOTES
Barrington from Lab called with critical result of sodium 118, glucose, 616 and Bun 84 at 1624. Critical lab result read back to [facility].   Dr. Tucker notified of critical lab result at 1625.  Critical lab result read back by Dr. Tucker.

## 2018-06-05 NOTE — PROGRESS NOTES
Chief Complaint   Patient presents with   • Congestive Heart Failure     fv, spitting up blood(starting yesterday yesterday), peeing blood(started last night), pain on left side,        Subjective:   Lai Dailey is a 48 y.o. male who presents today for follow up on his heart failure with his wife, Mary Lou.    New patient to the heart failure clinic. Pt was last seen 5/29/18. During that visit, patient was having low blood pressure, his losartan was decreased to 50 mg daily and he was continued on carvedilol 12.5 mg twice a day and spironolactone 25 mg daily.  Patient was recommended to hold BP meds if BP less than 110 systolic.  Patient encouraged to stay hydrated.    Patient and wife report that they held his BP meds for 1 night and his blood pressure improved.  They resumed his medications the following day and have been noticed a slow trend of his blood pressures downward.     Patient reporting bloody cough, and frequent bloody urine. He states he has an infected wisdom tooth that has not been taken care of.  Patient also reporting dizziness, some shortness of breath on occasion at rest but with ADLs and exertion.  Patient also feeling weak today and reporting chest pain with his cough.    Patient denies other chest pain, palpitations, lower extremity edema or dizziness/lightheadedness.    Patient ran out of metformin 2 days ago.    Additonally, patient has the following medical problems:    -Has ICD for dilated cardiomyopathy and nonsustained VT also taking amiodarone    -Diabetes: Taking metformin    -Hypertension    -Hyperlipidemia: Taking atorvastatin 20 mg daily    Past Medical History:   Diagnosis Date   • CHF (congestive heart failure) (HCC)    • Diabetes (HCC)    • Hyperlipidemia    • Hypertension      Past Surgical History:   Procedure Laterality Date   • AICD IMPLANT  2010     Family History   Problem Relation Age of Onset   • Colon Cancer Mother    • Hypertension Sister    • Stroke Brother    • Heart  Disease Neg Hx      Social History     Social History   • Marital status: Single     Spouse name: N/A   • Number of children: N/A   • Years of education: N/A     Occupational History   • Not on file.     Social History Main Topics   • Smoking status: Never Smoker   • Smokeless tobacco: Never Used   • Alcohol use No      Comment: 2 times a week-beer, NO ALCOHOL AT THIS TIME   • Drug use: No      Comment: Marijuana use as youth   • Sexual activity: Not on file     Other Topics Concern   • Not on file     Social History Narrative   • No narrative on file     No Known Allergies  Outpatient Encounter Prescriptions as of 6/5/2018   Medication Sig Dispense Refill   • aspirin EC (ECOTRIN) 81 MG Tablet Delayed Response Take 81 mg by mouth every day.     • losartan (COZAAR) 50 MG Tab Take 1 Tab by mouth every day. 30 Tab 11   • carvedilol (COREG) 12.5 MG Tab Take 1 Tab by mouth 2 times a day, with meals. 60 Tab 11   • atorvastatin (LIPITOR) 20 MG Tab Take 20 mg by mouth every day.     • amiodarone (CORDARONE) 200 MG Tab Take 200 mg by mouth every day.     • guaiFENesin LA (MUCINEX) 600 MG TABLET SR 12 HR Take 1 Tab by mouth 3 times a day. 15 Tab 0   • spironolactone (ALDACTONE) 25 MG Tab Take 1 Tab by mouth every day. 30 Tab 1   • metformin (GLUCOPHAGE) 500 MG TABS Take 1 Tab by mouth 2 times a day, with meals. 60 Tab 3     No facility-administered encounter medications on file as of 6/5/2018.      Review of Systems   Constitutional: Positive for malaise/fatigue. Negative for fever.   Respiratory: Positive for cough, sputum production (bloody sputum) and shortness of breath.    Cardiovascular: Positive for chest pain (with coughing), orthopnea and PND. Negative for palpitations, claudication and leg swelling.   Gastrointestinal: Negative for abdominal pain.   Genitourinary: Positive for frequency and hematuria.   Musculoskeletal: Negative for myalgias.   Neurological: Positive for weakness. Negative for dizziness.   All other  "systems reviewed and are negative.       Objective:   BP (!) 85/60   Pulse (!) 149   Ht 1.753 m (5' 9\")   Wt 89.8 kg (198 lb)   SpO2 94%   BMI 29.24 kg/m²     Physical Exam   Constitutional: He is oriented to person, place, and time. He appears well-developed and well-nourished.   HENT:   Head: Normocephalic and atraumatic.   Eyes: EOM are normal. Pupils are equal, round, and reactive to light.   Neck: Normal range of motion. Neck supple. No JVD present.   Cardiovascular: Regular rhythm and normal heart sounds.  Tachycardia present.    Pulmonary/Chest: Effort normal and breath sounds normal. No respiratory distress. He has no wheezes. He has no rales.   Musculoskeletal: He exhibits no edema.   Neurological: He is alert and oriented to person, place, and time.   Skin: Skin is warm and dry.   Psychiatric: He has a normal mood and affect. His behavior is normal.     Lab Results   Component Value Date/Time    CHOLSTRLTOT 76 (L) 05/13/2018 02:49 AM    LDL 46 05/13/2018 02:49 AM    HDL 20 (A) 05/13/2018 02:49 AM    TRIGLYCERIDE 48 05/13/2018 02:49 AM       Lab Results   Component Value Date/Time    SODIUM 121 (L) 06/04/2018 09:33 AM    POTASSIUM 4.9 06/04/2018 09:33 AM    CHLORIDE 83 (L) 06/04/2018 09:33 AM    CO2 24 06/04/2018 09:33 AM    GLUCOSE 582 (HH) 06/04/2018 09:33 AM    BUN 78 (HH) 06/04/2018 09:33 AM    CREATININE 1.62 (H) 06/04/2018 09:33 AM     Lab Results   Component Value Date/Time    ALKPHOSPHAT 142 (H) 05/13/2018 11:19 AM    ASTSGOT 96 (H) 05/13/2018 11:19 AM    ALTSGPT 108 (H) 05/13/2018 11:19 AM    TBILIRUBIN 1.7 (H) 05/13/2018 11:19 AM      Echocardiogram 3/23/2014  CONCLUSIONS  No prior echo  4 chamber dilation  Left ventricular ejection fraction is 15% to 20%.   Grade III-IV diastolic dysfunction is present. c/w elevated LAp and   LVEDP.   Severe mitral regurgitation.      Transthoracic Echo Report 5/13/18  Left ventricle is severely dilated. Mild concentric left ventricular   hypertrophy. Grade " III diastolic dysfunction (restrictive pattern).   Severely reduced left ventricular systolic function. Left ventricular   ejection fraction is visually estimated to be 20%. Diffuse hypokinesis   with septal dyskinesis.  Severe mitral regurgitation.  Severe tricuspid regurgitation. Estimated right ventricular systolic   pressure  is 50 mmHg.  Compared to the report of the study done - the TR is now severe.     Assessment:     1. Dizziness  [facility] EPIPHANY EKG (Clinic Performed)   2. ACC/AHA stage C systolic heart failure (HCC)     3. NYHA class 3 systolic congestive heart failure with reduced left ventricular function (HCC)         Medical Decision Making:  Today's Assessment / Status / Plan:   EKG shows A. fib with a rate of 140s-150s.  With patient's symptoms and elevated glucose, patient recommended to go the emergency room for further evaluation.    MAMADOU called and transported patient to the ER at [facility].    1. HFrEF, stage C, class 3: Patient does not appear to be having a heart failure exacerbation.  Patient could be dehydrated.  Based on physical examination findings, patient is euvolemic. No JVD, lungs are clear to auscultation, no pitting edema in bilateral lower extremities, no ascites.     FU in clinic after hospitalization. Sooner if needed.    Patient verbalizes understanding and agrees with the plan of care.     Collaborating MD: Joslyn Pérez MD

## 2018-06-05 NOTE — ED NOTES
Med rec updated and complete.  Allergies reviewed.  Pt unable to participate in an interview.  Interviewed ( family) wife at bedside.  Wife stated her  ran out of his metformin and guaifenesin.  Last doses were on 05/29/18.

## 2018-06-05 NOTE — TELEPHONE ENCOUNTER
1400 called to patient room by APRN to double check BP, upon arrival, EKG in progress and APRN asks that I call [facility] for transport.    EMS called.    BP double checked. 80/65 right arm with large cuff.    18 G IV established in right forearm.    1410 Report called to Tammy TADEO-charge RN [facility] ER

## 2018-06-05 NOTE — ED TRIAGE NOTES
Bib ems for cough x 2 days and SOB  pt reporting blood in sputum and urine. Pt was at a check up for heart failure with his PCP and was in A-FIB rate 130-155 and was sent by them to our ER.  jennifer CP, N/V. Pt placed on cardiac monitor, BP cuff and pulse ox, EKG performed.

## 2018-06-06 ENCOUNTER — APPOINTMENT (OUTPATIENT)
Dept: RADIOLOGY | Facility: MEDICAL CENTER | Age: 48
DRG: 853 | End: 2018-06-06
Attending: INTERNAL MEDICINE
Payer: MEDICARE

## 2018-06-06 PROBLEM — R79.89 TROPONIN I ABOVE REFERENCE RANGE: Status: ACTIVE | Noted: 2018-06-06

## 2018-06-06 PROBLEM — R65.20 SEVERE SEPSIS (HCC): Status: ACTIVE | Noted: 2018-06-05

## 2018-06-06 PROBLEM — R17 TOTAL BILIRUBIN, ELEVATED: Status: ACTIVE | Noted: 2018-06-06

## 2018-06-06 PROBLEM — R94.31 PROLONGED Q-T INTERVAL ON ECG: Status: ACTIVE | Noted: 2018-06-06

## 2018-06-06 PROBLEM — E87.1 HYPONATREMIA: Status: ACTIVE | Noted: 2018-06-06

## 2018-06-06 LAB
ANION GAP SERPL CALC-SCNC: 9 MMOL/L (ref 0–11.9)
APTT PPP: 44.1 SEC (ref 24.7–36)
APTT PPP: 46.5 SEC (ref 24.7–36)
APTT PPP: 69.9 SEC (ref 24.7–36)
BASOPHILS # BLD AUTO: 0.3 % (ref 0–1.8)
BASOPHILS # BLD: 0.05 K/UL (ref 0–0.12)
BUN SERPL-MCNC: 68 MG/DL (ref 8–22)
CALCIUM SERPL-MCNC: 9 MG/DL (ref 8.5–10.5)
CHLORIDE SERPL-SCNC: 95 MMOL/L (ref 96–112)
CO2 SERPL-SCNC: 23 MMOL/L (ref 20–33)
CREAT SERPL-MCNC: 1.18 MG/DL (ref 0.5–1.4)
CREAT UR-MCNC: 95.7 MG/DL
EKG IMPRESSION: NORMAL
EKG IMPRESSION: NORMAL
EOSINOPHIL # BLD AUTO: 0.02 K/UL (ref 0–0.51)
EOSINOPHIL NFR BLD: 0.1 % (ref 0–6.9)
ERYTHROCYTE [DISTWIDTH] IN BLOOD BY AUTOMATED COUNT: 46.5 FL (ref 35.9–50)
GLUCOSE BLD-MCNC: 156 MG/DL (ref 65–99)
GLUCOSE BLD-MCNC: 193 MG/DL (ref 65–99)
GLUCOSE BLD-MCNC: 196 MG/DL (ref 65–99)
GLUCOSE BLD-MCNC: 405 MG/DL (ref 65–99)
GLUCOSE SERPL-MCNC: 250 MG/DL (ref 65–99)
HCT VFR BLD AUTO: 43.4 % (ref 42–52)
HGB BLD-MCNC: 15 G/DL (ref 14–18)
IMM GRANULOCYTES # BLD AUTO: 0.21 K/UL (ref 0–0.11)
IMM GRANULOCYTES NFR BLD AUTO: 1.1 % (ref 0–0.9)
LACTATE BLD-SCNC: 1.4 MMOL/L (ref 0.5–2)
LACTATE BLD-SCNC: 2.1 MMOL/L (ref 0.5–2)
LACTATE BLD-SCNC: 2.4 MMOL/L (ref 0.5–2)
LACTATE BLD-SCNC: 2.6 MMOL/L (ref 0.5–2)
LYMPHOCYTES # BLD AUTO: 1.9 K/UL (ref 1–4.8)
LYMPHOCYTES NFR BLD: 10.4 % (ref 22–41)
MAGNESIUM SERPL-MCNC: 2.4 MG/DL (ref 1.5–2.5)
MCH RBC QN AUTO: 28.1 PG (ref 27–33)
MCHC RBC AUTO-ENTMCNC: 34.6 G/DL (ref 33.7–35.3)
MCV RBC AUTO: 81.4 FL (ref 81.4–97.8)
MONOCYTES # BLD AUTO: 1.06 K/UL (ref 0–0.85)
MONOCYTES NFR BLD AUTO: 5.8 % (ref 0–13.4)
NEUTROPHILS # BLD AUTO: 15.05 K/UL (ref 1.82–7.42)
NEUTROPHILS NFR BLD: 82.3 % (ref 44–72)
NRBC # BLD AUTO: 0 K/UL
NRBC BLD-RTO: 0 /100 WBC
OSMOLALITY UR: 631 MOSM/KG H2O (ref 300–900)
PHOSPHATE SERPL-MCNC: 2.5 MG/DL (ref 2.5–4.5)
PLATELET # BLD AUTO: 294 K/UL (ref 164–446)
PMV BLD AUTO: 12.1 FL (ref 9–12.9)
POTASSIUM SERPL-SCNC: 4.3 MMOL/L (ref 3.6–5.5)
RBC # BLD AUTO: 5.33 M/UL (ref 4.7–6.1)
SODIUM SERPL-SCNC: 127 MMOL/L (ref 135–145)
SODIUM UR-SCNC: <10 MMOL/L
TROPONIN I SERPL-MCNC: 0.08 NG/ML (ref 0–0.04)
TROPONIN I SERPL-MCNC: 0.08 NG/ML (ref 0–0.04)
WBC # BLD AUTO: 18.3 K/UL (ref 4.8–10.8)

## 2018-06-06 PROCEDURE — 99291 CRITICAL CARE FIRST HOUR: CPT | Performed by: INTERNAL MEDICINE

## 2018-06-06 PROCEDURE — 85730 THROMBOPLASTIN TIME PARTIAL: CPT | Mod: 91

## 2018-06-06 PROCEDURE — 83735 ASSAY OF MAGNESIUM: CPT

## 2018-06-06 PROCEDURE — 93010 ELECTROCARDIOGRAM REPORT: CPT | Performed by: INTERNAL MEDICINE

## 2018-06-06 PROCEDURE — 83935 ASSAY OF URINE OSMOLALITY: CPT

## 2018-06-06 PROCEDURE — 700111 HCHG RX REV CODE 636 W/ 250 OVERRIDE (IP): Performed by: INTERNAL MEDICINE

## 2018-06-06 PROCEDURE — A9270 NON-COVERED ITEM OR SERVICE: HCPCS | Performed by: INTERNAL MEDICINE

## 2018-06-06 PROCEDURE — 82570 ASSAY OF URINE CREATININE: CPT

## 2018-06-06 PROCEDURE — 99223 1ST HOSP IP/OBS HIGH 75: CPT | Mod: AI | Performed by: INTERNAL MEDICINE

## 2018-06-06 PROCEDURE — 84300 ASSAY OF URINE SODIUM: CPT

## 2018-06-06 PROCEDURE — 700105 HCHG RX REV CODE 258: Performed by: INTERNAL MEDICINE

## 2018-06-06 PROCEDURE — 700102 HCHG RX REV CODE 250 W/ 637 OVERRIDE(OP): Performed by: INTERNAL MEDICINE

## 2018-06-06 PROCEDURE — 85025 COMPLETE CBC W/AUTO DIFF WBC: CPT

## 2018-06-06 PROCEDURE — 84484 ASSAY OF TROPONIN QUANT: CPT

## 2018-06-06 PROCEDURE — 82962 GLUCOSE BLOOD TEST: CPT | Mod: 91

## 2018-06-06 PROCEDURE — 83605 ASSAY OF LACTIC ACID: CPT | Mod: 91

## 2018-06-06 PROCEDURE — 80048 BASIC METABOLIC PNL TOTAL CA: CPT

## 2018-06-06 PROCEDURE — 93005 ELECTROCARDIOGRAM TRACING: CPT | Performed by: INTERNAL MEDICINE

## 2018-06-06 PROCEDURE — 770022 HCHG ROOM/CARE - ICU (200)

## 2018-06-06 PROCEDURE — 84100 ASSAY OF PHOSPHORUS: CPT

## 2018-06-06 PROCEDURE — 76700 US EXAM ABDOM COMPLETE: CPT

## 2018-06-06 RX ORDER — DIGOXIN 0.25 MG/ML
250 INJECTION INTRAMUSCULAR; INTRAVENOUS EVERY 6 HOURS
Status: COMPLETED | OUTPATIENT
Start: 2018-06-06 | End: 2018-06-06

## 2018-06-06 RX ORDER — SODIUM CHLORIDE 9 MG/ML
500 INJECTION, SOLUTION INTRAVENOUS ONCE
Status: COMPLETED | OUTPATIENT
Start: 2018-06-06 | End: 2018-06-06

## 2018-06-06 RX ORDER — DOXYCYCLINE 100 MG/1
100 TABLET ORAL EVERY 12 HOURS
Status: DISCONTINUED | OUTPATIENT
Start: 2018-06-06 | End: 2018-06-07

## 2018-06-06 RX ORDER — DIGOXIN 125 MCG
125 TABLET ORAL EVERY MORNING
Status: DISCONTINUED | OUTPATIENT
Start: 2018-06-07 | End: 2018-06-08

## 2018-06-06 RX ORDER — DIGOXIN 0.25 MG/ML
500 INJECTION INTRAMUSCULAR; INTRAVENOUS EVERY 6 HOURS
Status: COMPLETED | OUTPATIENT
Start: 2018-06-06 | End: 2018-06-06

## 2018-06-06 RX ORDER — DEXTROSE MONOHYDRATE 50 MG/ML
INJECTION, SOLUTION INTRAVENOUS CONTINUOUS
Status: DISCONTINUED | OUTPATIENT
Start: 2018-06-06 | End: 2018-06-06

## 2018-06-06 RX ADMIN — METOPROLOL TARTRATE 12.5 MG: 25 TABLET, FILM COATED ORAL at 14:47

## 2018-06-06 RX ADMIN — ATORVASTATIN CALCIUM 20 MG: 20 TABLET, FILM COATED ORAL at 07:49

## 2018-06-06 RX ADMIN — DIGOXIN 500 MCG: 0.25 INJECTION INTRAMUSCULAR; INTRAVENOUS at 06:21

## 2018-06-06 RX ADMIN — AMIODARONE HYDROCHLORIDE 150 MG: 50 INJECTION, SOLUTION INTRAVENOUS at 04:00

## 2018-06-06 RX ADMIN — DIGOXIN 250 MCG: 0.25 INJECTION INTRAMUSCULAR; INTRAVENOUS at 11:13

## 2018-06-06 RX ADMIN — SODIUM CHLORIDE 500 ML: 9 INJECTION, SOLUTION INTRAVENOUS at 00:50

## 2018-06-06 RX ADMIN — DEXTROSE MONOHYDRATE: 50 INJECTION, SOLUTION INTRAVENOUS at 04:00

## 2018-06-06 RX ADMIN — INSULIN HUMAN 2 UNITS: 100 INJECTION, SOLUTION PARENTERAL at 23:35

## 2018-06-06 RX ADMIN — AMIODARONE HYDROCHLORIDE 200 MG: 200 TABLET ORAL at 07:49

## 2018-06-06 RX ADMIN — INSULIN HUMAN 2 UNITS: 100 INJECTION, SOLUTION PARENTERAL at 11:11

## 2018-06-06 RX ADMIN — CEFTRIAXONE 2 G: 2 INJECTION, POWDER, FOR SOLUTION INTRAMUSCULAR; INTRAVENOUS at 07:54

## 2018-06-06 RX ADMIN — INSULIN HUMAN 2 UNITS: 100 INJECTION, SOLUTION PARENTERAL at 06:20

## 2018-06-06 RX ADMIN — ASPIRIN 81 MG: 81 TABLET, COATED ORAL at 07:50

## 2018-06-06 RX ADMIN — METOPROLOL TARTRATE 12.5 MG: 25 TABLET, FILM COATED ORAL at 21:45

## 2018-06-06 RX ADMIN — DIGOXIN 250 MCG: 0.25 INJECTION INTRAMUSCULAR; INTRAVENOUS at 17:41

## 2018-06-06 RX ADMIN — SODIUM CHLORIDE: 9 INJECTION, SOLUTION INTRAVENOUS at 07:52

## 2018-06-06 RX ADMIN — HEPARIN SODIUM 1450 UNITS/HR: 5000 INJECTION, SOLUTION INTRAVENOUS; SUBCUTANEOUS at 14:46

## 2018-06-06 RX ADMIN — HEPARIN SODIUM 3200 UNITS: 1000 INJECTION, SOLUTION INTRAVENOUS; SUBCUTANEOUS at 17:40

## 2018-06-06 RX ADMIN — INSULIN HUMAN 2 UNITS: 100 INJECTION, SOLUTION PARENTERAL at 17:35

## 2018-06-06 RX ADMIN — DOXYCYCLINE 100 MG: 100 TABLET ORAL at 07:49

## 2018-06-06 RX ADMIN — INSULIN HUMAN 9 UNITS: 100 INJECTION, SOLUTION PARENTERAL at 00:46

## 2018-06-06 RX ADMIN — DOXYCYCLINE 100 MG: 100 TABLET ORAL at 21:45

## 2018-06-06 RX ADMIN — HEPARIN SODIUM 3200 UNITS: 1000 INJECTION, SOLUTION INTRAVENOUS; SUBCUTANEOUS at 03:01

## 2018-06-06 ASSESSMENT — ENCOUNTER SYMPTOMS
MYALGIAS: 0
WEAKNESS: 1
SHORTNESS OF BREATH: 0
DIZZINESS: 0
HEADACHES: 0
SPUTUM PRODUCTION: 1
HEMOPTYSIS: 0
NAUSEA: 0
HEMOPTYSIS: 1
VOMITING: 0
BLURRED VISION: 0
DOUBLE VISION: 0
NERVOUS/ANXIOUS: 0
FEVER: 0
DIARRHEA: 0
COUGH: 0
COUGH: 1
STRIDOR: 0
SHORTNESS OF BREATH: 1
CHILLS: 0
PALPITATIONS: 0
ABDOMINAL PAIN: 0

## 2018-06-06 ASSESSMENT — PAIN SCALES - GENERAL
PAINLEVEL_OUTOF10: 0

## 2018-06-06 NOTE — DISCHARGE PLANNING
Medical SW    Sw attended AM IDT Rounds.    Per face sheet, pt is resident of [facility], admitted 6/5 for sepsis, single 49yo male, who carries Medicare and  INS.     RN reports, pt on 2 LPM nasal canula     Plan: Sw to assist w/ d/c planning as needed.

## 2018-06-06 NOTE — ASSESSMENT & PLAN NOTE
A. fib RVR , rate uncontrolled, EP consulting for biventricular pacemaker placement, NPO at midnight   Cardiology consulting  metoprolol, digoxin, amiodarone, apixaban  Monitoring on telemetry  Normal TSH  SW assisting with apixaban prior auth

## 2018-06-06 NOTE — HEART FAILURE PROGRAM
Cardiovascular Nurse Navigator () Advanced Heart Failure Program Inpatient Progress Note:    Pt sent from [facility] office yesterday. REGGIE Flores noted that patient did not appear to be in HF exacerbation at the time, in fact, appeared to be dehydrated with dizziness, hypotension. She also noted absence of JVD, lungs were clear, no pitting edema in BLE's, no ascites.    Also, noted at this office visit was that patient ran out of his metformin two days prior, not all of his meds four days prior.    Admitting hospitalist same day diagnosed AOC HF in her H&P.    Dr. Rae in A&P of consult note indicates that patient is volume depleted and in AF c RVR.    Therefore, there are conflicting documentations at this time with outpatient and inpatient cardiology saying volume depletion and hospitalist saying HF exacerbation.    Respectfully request clarification as inpatient and follow up plans of care are different for acute vs chronic HF.    Thank you and please call with questions, Arianna

## 2018-06-06 NOTE — ASSESSMENT & PLAN NOTE
This is severe sepsis with the following associated acute organ dysfunction(s): acute kidney failure. Improved   Procalcitonin is elevated and patient did have leukocytosis upon admission  abx dc'ed as cultures negative to date  Care with fluid hydration secondary to congestive heart failure  Monitor vitals and strict I's and O's

## 2018-06-06 NOTE — PROGRESS NOTES
Cardiologist MD Pérez updated regarding patients HR afib 150's with blood pressure drop to 70/50, orders received to give 500 cc bolus.

## 2018-06-06 NOTE — PROGRESS NOTES
Myrna from Lab called with critical result of Na 119, Glc 597, BUN 85 at 1930. Critical lab result read back to Myrna.   Dr. Madrid notified of critical lab result at 2000.  Critical lab result read back by Dr. Madrid. See MAR for new orders.

## 2018-06-06 NOTE — H&P
Hospital Medicine History and Physical    Date of Service  6/6/2018    Chief Complaint  Chief Complaint   Patient presents with   • Cough     PT REPORTS COUGH FOR TWO DAYS WITH BLOOD IN SPUTUM       History of Presenting Illness  48 y.o. male who presented 6/5/2018 with Chest pain and shortness of breath.     He has history of CHF EF 20% status post ICD, hypertension and diabetes.  Patient complains of several days cough right lower chest pain and shortness of breath. More recently he has noticed hemoptysis with this cough. He says he ran out of his medications for the past several days. In the ED he was found hypotensive with A. fib RVR to 140s and hypoxic to 88% on room air. He had a leukocytosis and lactate of 2.5. Glucose was 161 with anion gap of 14, CO2 was 23 and UA was negative for ketones. Chest x-ray showed cardiomegaly. He was given IV metoprolol with improvement of his heart rate and IV fluids with improvement of his blood pressure.    Primary Care Physician  Tor Rdz M.D.    Consultants  Cardiology  intensivist    Code Status  Full    Review of Systems  Review of Systems   Constitutional: Positive for malaise/fatigue. Negative for chills and fever.   Eyes: Negative for blurred vision and double vision.   Respiratory: Positive for cough, hemoptysis and shortness of breath.    Cardiovascular: Positive for chest pain. Negative for leg swelling.   Gastrointestinal: Negative for abdominal pain, diarrhea, nausea and vomiting.   Genitourinary: Positive for hematuria. Negative for dysuria.   Skin: Negative for rash.   Neurological: Positive for weakness. Negative for dizziness and headaches.        Past Medical History  Past Medical History:   Diagnosis Date   • CHF (congestive heart failure) (HCC)    • Diabetes (HCC)    • Hyperlipidemia    • Hypertension        Surgical History  Past Surgical History:   Procedure Laterality Date   • AICD IMPLANT  2010       Medications  No current  facility-administered medications on file prior to encounter.      Current Outpatient Prescriptions on File Prior to Encounter   Medication Sig Dispense Refill   • aspirin EC (ECOTRIN) 81 MG Tablet Delayed Response Take 81 mg by mouth every day.     • losartan (COZAAR) 50 MG Tab Take 1 Tab by mouth every day. 30 Tab 11   • carvedilol (COREG) 12.5 MG Tab Take 1 Tab by mouth 2 times a day, with meals. 60 Tab 11   • guaiFENesin LA (MUCINEX) 600 MG TABLET SR 12 HR Take 1 Tab by mouth 3 times a day. 15 Tab 0   • spironolactone (ALDACTONE) 25 MG Tab Take 1 Tab by mouth every day. 30 Tab 1   • atorvastatin (LIPITOR) 20 MG Tab Take 20 mg by mouth every day.     • amiodarone (CORDARONE) 200 MG Tab Take 200 mg by mouth every day.     • metformin (GLUCOPHAGE) 500 MG TABS Take 1 Tab by mouth 2 times a day, with meals. 60 Tab 3       Family History  Family History   Problem Relation Age of Onset   • Colon Cancer Mother    • Hypertension Sister    • Stroke Brother    • Heart Disease Neg Hx        Social History  Social History   Substance Use Topics   • Smoking status: Never Smoker   • Smokeless tobacco: Never Used   • Alcohol use No      Comment: 2 times a week-beer, NO ALCOHOL AT THIS TIME       Allergies  No Known Allergies     Physical Exam  Laboratory   Hemodynamics  Temp (24hrs), Av.8 °C (98.2 °F), Min:36.7 °C (98 °F), Max:36.8 °C (98.2 °F)   Temperature: 36.8 °C (98.2 °F)  Pulse  Av.5  Min: 46  Max: 143 Heart Rate (Monitored): (!) 122  Blood Pressure: 100/60, NIBP: (!) 97/74      Respiratory      Respiration: (!) 30, Pulse Oximetry: 99 %        RUL Breath Sounds: Clear, RML Breath Sounds: Clear, RLL Breath Sounds: Crackles, MADONNA Breath Sounds: Clear, LLL Breath Sounds: Crackles    Physical Exam   Constitutional: He is oriented to person, place, and time. He appears well-developed and well-nourished. He is cooperative.   Appears uncomfortable   HENT:   Head: Normocephalic and atraumatic.   Eyes: Conjunctivae and EOM  are normal.   Cardiovascular: Intact distal pulses.    Irregularly irregular   Pulmonary/Chest: Effort normal and breath sounds normal. He has no wheezes. He has no rales. He exhibits no tenderness.   Abdominal: Soft. There is no tenderness. There is no rebound and no guarding.   Musculoskeletal: He exhibits no edema.   Neurological: He is alert and oriented to person, place, and time.   Skin: Skin is warm and dry.   Normal cap refill   Nursing note and vitals reviewed.      Recent Labs      06/05/18   1501   WBC  19.0*   RBC  6.34*   HEMOGLOBIN  17.4   HEMATOCRIT  52.1*   MCV  82.2   MCH  27.4   MCHC  33.4*   RDW  48.6   PLATELETCT  347   MPV  11.9     Recent Labs      06/04/18   0933  06/05/18   1501  06/05/18   1749   SODIUM  121*  118*  119*   POTASSIUM  4.9  5.2  5.1   CHLORIDE  83*  81*  86*   CO2  24  23  21   GLUCOSE  582*  616*  597*   BUN  78*  84*  85*   CREATININE  1.62*  1.89*  1.65*   CALCIUM  9.4  9.6  8.8     Recent Labs      06/04/18   0933  06/05/18   1501  06/05/18   1749   ALTSGPT   --   20   --    ASTSGOT   --   20   --    ALKPHOSPHAT   --   224*   --    TBILIRUBIN   --   1.7*   --    GLUCOSE  582*  616*  597*     Recent Labs      06/05/18   1501   APTT  30.0   INR  1.28*     Recent Labs      06/05/18   1501   BNPBTYPENAT  422*         Lab Results   Component Value Date    TROPONINI 0.07 (H) 06/05/2018     Urinalysis:    Lab Results  Component Value Date/Time   SPECGRAVITY 1.023 06/05/2018 1749   GLUCOSEUR >=1000 (A) 06/05/2018 1749   KETONES Negative 06/05/2018 1749   NITRITE Negative 06/05/2018 1749   WBCURINE 2-5 (A) 06/05/2018 1749   RBCURINE >150 (A) 06/05/2018 1749   BACTERIA Few (A) 06/05/2018 1749   EPITHELCELL Negative 06/05/2018 1749        Imaging  DX-CHEST-LIMITED (1 VIEW)   Final Result      1.  Stable enlarged cardiac silhouette.      US-LIVER AND BILIARY TREE    (Results Pending)   US-RENAL    (Results Pending)        Assessment/Plan     I anticipate this patient will require at  least two midnights for appropriate medical management, necessitating inpatient admission.    * Severe sepsis (HCC)- (present on admission)   Assessment & Plan    This is severe sepsis with the following associated acute organ dysfunction(s): acute kidney failure.   Though patient had unremarkable chest x-ray, he has clinical symptoms of pneumonia and will start on ceftriaxone and doxycycline  Elevated T bili and alk phos, checking liver US  UA neg for infection  Blood cultures collected  Trending lactate  Cautious fluid resuscitation given severe systolic heart failure        Paroxysmal atrial fibrillation (HCC)- (present on admission)   Assessment & Plan    A. fib RVR controlled with IV metoprolol given hypotension will avoid AV pato blockers for now  Cardiology was consulted, continue on amiodarone  Heparin ggt        Acute on chronic systolic (congestive) heart failure (HCC)- (present on admission)   Assessment & Plan    Recent TTE showed EF 20%, severe systolic heart failure  BNP is elevated to 400s though has been higher before.  They have been decompensated by medication noncompliance, A. fib RVR, pneumonia complicated by severe MR  Given hypotension we will hold Coreg, losartan, diuretics for now  Cardiology has been consulted        Type 2 diabetes mellitus with renal complication (HCC)- (present on admission)   Assessment & Plan    A1c 10%. Ran out of metformin.  Glucose 600s, normal CO2 and no ketones on UA. Anion gap 14.  ISS        Troponin I above reference range   Assessment & Plan    Trop up to 0.10. Likely demand due to afib RVR and hypotension. Denies CP.  Continue to trend  Is on asa, statin, heparin        Hyponatremia   Assessment & Plan    In setting of heart failure and severe sepsis  Has mild improved after fluid resuscitation  Will check urine studies  Trend BMP          Total bilirubin, elevated   Assessment & Plan    Mild elevation  Check liver US        Prolonged Q-T interval on ECG    Assessment & Plan    QT >600  Maintain normal lytes  Monitor daily EKG        Acute renal failure (HCC)- (present on admission)   Assessment & Plan    Likely prerenal due to hypotension. UA showed hematuria  Monitor BMP  Avoid nephrotoxic medications  Renal US ordered  FeNa ordered        Cardiomyopathy (HCC)- (present on admission)   Assessment & Plan    See above heart failure notes        AICD (automatic cardioverter/defibrillator) present- (present on admission)   Assessment & Plan    ERP requested ICD interrogation, showed atrial fibrillation.            VTE prophylaxis: heparin.

## 2018-06-06 NOTE — PROGRESS NOTES
Pulmonary Critical Care Progress Note        DOA: 6/5/2018    Chief Complaint: Hypotensive and A-fib    History of Present Illness: 48 y.o. male past medical history of nonischemic cardiomyopathy, EF 20%, diabetes, hypertension, hyperlipidemia; presenting to the emergency department from his cardiologist's office after he was found to be hypotensive in A. fib with RVR.  The patient admits to approximately 10 days of worsening weakness, fatigue, malaise, rhinorrhea, right maxillary tooth pain and several days of cough with increasing dyspnea.  He does admit to right chest wall pain which is worsened by deep breaths and coughing.  The patient was given fluid, metoprolol, amiodarone in the emergency department with transiently decreased blood pressure to 61/50.  Cardiology was contacted and a bedside echocardiogram was performed which demonstrated a distended IVC.  His chest x-ray was unremarkable with the exception of cardiomegaly, his labs were notable for a troponin of 0.1, BNP of 422 down from 729, hyponatremia with a sodium of 120, hypochloremia at 80, anion gap 12, glucose 593, BUN, creatinine 1.87, AST and ALT were normal at 20, , total bilirubin 1.7, CBC 19, with 83% neutrophils.  His blood pressure has rebounded, his AICD has been interrogated demonstrating A. fib, and cardiology is seeing the patient.  He is to be admitted to the ICU for further care and stabilization of his hemodynamics status.      ROS:  Respiratory: positive cough, negative pleuritic chest pain and negative shortness of breath, Cardiac: negative chest pain and positive palpitations, GI: negative nausea, negative vomiting and negative abdominal pain.  All other systems negative.    Interval Events:  24 hour interval history reviewed    - fib with RVR (HR 130s with borderline hypotension)   - AAOx4   - pain in R chest from coughing   - decreasing WBC, AF   - abd/renal US pending   - 1 L UOP   - heparin gtt   - increased Na to 127   -  glucose down to 250 (20 units coverage)   - day 2 C3/doxy   - trop remains mildly elevated   - lactic acid remains mildly elevated as well    PFSH:  No change.    Respiratory:   2 lpm n/c  Pulse Oximetry: 95 %          Exam: unlabored respirations, no intercostal retractions or accessory muscle use and rales bibasilar  ImagingCXR  I have personally reviewed the chest x-ray my impression is (Compared to prior film) enlarged cardiac silhouette with mild edema, pacemaker noted        Invalid input(s): [id number]    HemoDynamics:  Pulse: (!) 48, Heart Rate (Monitored): (!) 133  Blood Pressure: 100/60, NIBP: 123/71   Heparin drip, p.o. amiodarone and metoprolol and digoxin    Exam: No edema, atrial fibrillation, murmur, tachycardia  Imaging: Available data reviewed  Recent Labs      06/05/18   1501  06/05/18   2030  06/06/18   0433   TROPONINI  0.10*  0.07*  0.08*   BNPBTYPENAT  422*   --    --        Neuro:  GCS  15       Exam: no focal deficits noted mental status intact oriented for age x3  Imaging: Available data reviewed    Fluids:  Intake/Output       06/04/18 0700 - 06/05/18 0659 (Not Admitted) 06/05/18 0700 - 06/06/18 0659 06/06/18 0700 - 06/07/18 0659      [id number] 0203-6615 Total 4768-5999 7494-0513 Total 6077-2787 1412-9558 Total       Intake    P.O.  --  -- --  --  100 100  --  -- --    P.O. -- -- -- -- 100 100 -- -- --    I.V.  --  -- --  --  953.2 953.2  --  -- --    Heparin Volume -- -- -- -- 153.2 153.2 -- -- --    IV Volume (IVMF) -- -- -- -- 800 800 -- -- --    Total Intake -- -- -- -- 1053.2 1053.2 -- -- --       Output    Urine  --  -- --  --  1000 1000  150  -- 150    Urine Void (mL) (non-catheter) -- -- -- -- 1000 1000 150 -- 150    Total Output -- -- -- -- 1000 1000 150 -- 150       Net I/O     -- -- -- -- 53.2 53.2 -150 -- -150        Weight: 85 kg (187 lb 6.3 oz)  Recent Labs      06/05/18   1501  06/05/18   1749  06/05/18   2030  06/06/18   0433   SODIUM  118*  119*   --   127*   POTASSIUM   5.2  5.1   --   4.3   CHLORIDE  81*  86*   --   95*   CO2  23  21   --   23   BUN  84*  85*   --   68*   CREATININE  1.89*  1.65*   --   1.18   MAGNESIUM   --    --   2.4  2.4   PHOSPHORUS   --    --   3.4  2.5   CALCIUM  9.6  8.8   --   9.0       GI/Nutrition:  Exam: abdomen is soft and non-tender, normal active bowel sounds  Imaging: Available data reviewed  taking PO  Liver Function  Recent Labs      18   1501  18   1749  18   0433   ALTSGPT  20   --    --    ASTSGOT  20   --    --    ALKPHOSPHAT  224*   --    --    TBILIRUBIN  1.7*   --    --    GLUCOSE  616*  597*  250*       Heme:  Recent Labs      18   1501  18   0209  18   0433   RBC  6.34*   --   5.33   HEMOGLOBIN  17.4   --   15.0   HEMATOCRIT  52.1*   --   43.4   PLATELETCT  347   --   294   PROTHROMBTM  15.7*   --    --    APTT  30.0  44.1*   --    INR  1.28*   --    --        Infectious Disease:  Temp  Av.8 °C (98.2 °F)  Min: 36.7 °C (98 °F)  Max: 36.9 °C (98.4 °F)  Micro: reviewed no growth to date  Recent Labs      18   1501  18   0433   WBC  19.0*  18.3*   NEUTSPOLYS  83.00*  82.30*   LYMPHOCYTES  9.70*  10.40*   MONOCYTES  4.80  5.80   EOSINOPHILS  0.10  0.10   BASOPHILS  0.70  0.30   ASTSGOT  20   --    ALTSGPT  20   --    ALKPHOSPHAT  224*   --    TBILIRUBIN  1.7*   --      Current Facility-Administered Medications   Medication Dose Frequency Provider Last Rate Last Dose   • digoxin (LANOXIN) injection 250 mcg  250 mcg Q6HR Bhavin Rae M.D.        Followed by   • digoxin (LANOXIN) injection 250 mcg  250 mcg Q6HR Bhavin Rae M.D.        Followed by   • [START ON 2018] digoxin (LANOXIN) tablet 125 mcg  125 mcg QAM Bhavin Rae M.D.       • metoprolol (LOPRESSOR) tablet 12.5 mg  12.5 mg Q8HRS Bhavin Rae M.D.   Stopped at 18 0630   • amiodarone (CORDARONE) tablet 200 mg  200 mg DAILY Carlos Eduardo Taylor M.D.   200 mg at 18 0749   • aspirin EC (ECOTRIN)  tablet 81 mg  81 mg DAILY Carlos Eduardo Taylor M.D.   81 mg at 06/06/18 0750   • atorvastatin (LIPITOR) tablet 20 mg  20 mg DAILY Carlos Eduardo Taylor M.D.   20 mg at 06/06/18 0749   • NS infusion   Continuous Bhavin Rae M.D. 50 mL/hr at 06/06/18 0752     • heparin injection 3,200 Units  3,200 Units PRN Bhavin Rae M.D.   3,200 Units at 06/06/18 0301    And   • heparin infusion 25,000 units in 500 ml 0.45% nacl   Continuous Bhavin Rae M.D. 29 mL/hr at 06/06/18 0300 1,450 Units/hr at 06/06/18 0300   • cefTRIAXone (ROCEPHIN) syringe 2 g  2 g Q24HRS Flynn Madrid Jr., D.O.   2 g at 06/06/18 0754   • doxycycline monohydrate (ADOXA) tablet 100 mg  100 mg Q12HRS Flynn Madrid Jr., D.O.   100 mg at 06/06/18 0749   • insulin regular (HUMULIN R) injection 2-9 Units  2-9 Units Q6HRS Flynn Madrid Jr., D.O.   2 Units at 06/06/18 0620    And   • glucose 4 g chewable tablet 16 g  16 g Q15 MIN PRN Flynn Madrid Jr., D.O.        And   • dextrose 50% (D50W) injection 25 mL  25 mL Q15 MIN PRN Flynn Madrid Jr., D.O.         Last reviewed on 6/5/2018  4:45 PM by Sobia Houston    Quality  Measures:  Medications reviewed, Labs reviewed and Radiology images reviewed  Jensen catheter: No Jensen      DVT Prophylaxis: Heparin  DVT prophylaxis - mechanical: SCDs  Ulcer prophylaxis: Not indicated  Antibiotics: Treating active infection/contamination beyond 24 hours perioperative coverage  Assessed for rehab: Patient returned to prior level of function, rehabilitation not indicated at this time      Assessment/Plan:  SIRS with possible pneumonia              -Continue empiric antibiotics for today but will likely discontinue tomorrow pending final cultures   New onset A. fib with RVR              -Continue amiodarone, metoprolol, digoxin              - heparin gtt with transition to oral anticoagulant              Acute exacerbation of HFrEF              -Continue to hold losartan, carvedilol, atorvastatin,  spironolactone given kidney injury              -Discontinue IV fluids   Lactic acidosis -secondary to hypoperfusion, doubt sepsis -resolved  Leukocytosis              - Unknown source, query stress response   -Monitor  Acute kidney injury -improving              - renal dose meds, avoid nephrotoxins, maintain perfusion  Elevated alkaline phosphatase - ?  Hepatic congestion   -Avoid hepatotoxins, monitor daily   Hyponatremia -acute on chronic--> improving              - monitor  Severe MR -caution with fluid status  Diabetes              - sliding scale insulin, accuchecks              - hypoglycemia protocol  Hyperlipidemia -atorvastatin   Prophylaxis, diet    Patient remains critically ill today requiring my active management of his arrhythmia, infectious workup, strict fluid status.    Discussed patient condition and risk of morbidity and/or mortality with RN, RT, Pharmacy, Charge nurse / hot rounds, Patient and cardiology and hospitalist.    The patient remains critically ill.  Critical care time = 35 minutes in directly providing and coordinating critical care and extensive data review.  No time overlap and excludes procedures.

## 2018-06-06 NOTE — PROGRESS NOTES
Patient transported to unit via staff. Bedside report received from ER RN. BP stable. SPO2 WNL on 2L. HR 90 - 130 upon transition to unit. Patient alert, oriented, and able to answer questions. Wife at bedside. Denies pain at this time.

## 2018-06-06 NOTE — PROGRESS NOTES
Updated MD Resendez regarding patient blood sugar >400 for last two sticks.  Orders received for one time dose of 8 units Humulin R.

## 2018-06-06 NOTE — PROGRESS NOTES
Cardiology Progress Note               Author: Bhavin Rae Date & Time created: 2018  8:44 AM     Interval History:   The patient is a 48-year-old -American   male with a history of nonischemic cardiomyopathy, severe systolic congestive   heart failure, left ventricular ejection fraction 20%, severe mitral   regurgitation, severe tricuspid regurgitation, AICD implantation, diabetes   mellitus, hypertension, and hyperlipidemia, who presents to [facility] with at least 10-day to 2-week history of progressive fatigue,   weakness, no energy and anorexia in addition to new onset of atrial fibrillation with a rapid ventricular response with a new left bundle branch block.  : Overall feeling better less tired.  No cardiac symptoms.    Chief Complaint:  Tired and dizzy    Review of Systems   Constitutional: Positive for malaise/fatigue.   Respiratory: Negative for cough and shortness of breath.    Cardiovascular: Negative for chest pain and palpitations.   Musculoskeletal: Negative for myalgias.   Neurological: Negative for dizziness and headaches.       Physical Exam   Constitutional: He is oriented to person, place, and time. He appears well-nourished. No distress.   HENT:   Head: Normocephalic and atraumatic.   Eyes: EOM are normal. No scleral icterus.   Neck: No JVD present. Carotid bruit is not present.       Cardiovascular: Normal rate, S1 normal, S2 normal and intact distal pulses.  An irregularly irregular rhythm present. Exam reveals no gallop and no friction rub.    Murmur heard.  Pulmonary/Chest: Effort normal and breath sounds normal. He has no wheezes. He has no rhonchi. He has no rales.   Musculoskeletal: He exhibits no edema.   Neurological: He is alert and oriented to person, place, and time.   Skin: Skin is warm and dry.   Psychiatric: He has a normal mood and affect. His behavior is normal.       Hemodynamics:  Temp (24hrs), Av.8 °C (98.2 °F), Min:36.7 °C (98 °F),  Max:36.9 °C (98.4 °F)  Temperature: 36.9 °C (98.4 °F)  Pulse  Av.2  Min: 37  Max: 148Heart Rate (Monitored): (!) 133  Blood Pressure: 100/60, NIBP: 123/71     Respiratory:    Respiration: (!) 26, Pulse Oximetry: 95 %        RUL Breath Sounds: Clear, RML Breath Sounds: Diminished, RLL Breath Sounds: Diminished, MADONNA Breath Sounds: Diminished, LLL Breath Sounds: Diminished  Fluids:  Date 18 07 - 18 0659   Shift 1284-5153 2457-4032 0468-1922 24 Hour Total   I  N  T  A  K  E   Shift Total       O  U  T  P  U  T   Urine 150   150    Shift Total 150   150   Weight (kg) 85 85 85 85       Weight: 85 kg (187 lb 6.3 oz)  GI/Nutrition:  Orders Placed This Encounter   Procedures   • DIET NPO     Standing Status:   Standing     Number of Occurrences:   1     Order Specific Question:   Restrict to:     Answer:   Strict [1]     Lab Results:  Recent Labs      18   1501  18   0433   WBC  19.0*  18.3*   RBC  6.34*  5.33   HEMOGLOBIN  17.4  15.0   HEMATOCRIT  52.1*  43.4   MCV  82.2  81.4   MCH  27.4  28.1   MCHC  33.4*  34.6   RDW  48.6  46.5   PLATELETCT  347  294   MPV  11.9  12.1     Recent Labs      18   1501  18   1749  18   0433   SODIUM  118*  119*  127*   POTASSIUM  5.2  5.1  4.3   CHLORIDE  81*  86*  95*   CO2  23  21  23   GLUCOSE  616*  597*  250*   BUN  84*  85*  68*   CREATININE  1.89*  1.65*  1.18   CALCIUM  9.6  8.8  9.0     Recent Labs      18   1501  18   0209   APTT  30.0  44.1*   INR  1.28*   --      Recent Labs      18   1501   BNPBTYPENAT  422*     Recent Labs      18   1501  18   2030  18   0433   TROPONINI  0.10*  0.07*  0.08*   BNPBTYPENAT  422*   --    --              Medical Decision Making, by Problem:  Active Hospital Problems    Diagnosis   • *Severe sepsis (HCC) [A41.9, R65.20]   • Paroxysmal atrial fibrillation (HCC) [I48.0]   • Acute on chronic systolic (congestive) heart failure (HCC) [I50.23]   • Type 2 diabetes  mellitus with renal complication (HCC) [E11.29]   • Cardiomyopathy (HCC) [I42.9]   • AICD (automatic cardioverter/defibrillator) present [Z95.810]   • Prolonged Q-T interval on ECG [R94.31]   • Total bilirubin, elevated [R17]   • Hyponatremia [E87.1]   • Troponin I above reference range [R74.8]   • Acute renal failure (HCC) [N17.9]       Assessment and plan:  Atrial fibrillation.  Persistent.  Rapid ventricular response.    Amiodarone therapy.  Chronic since 2016.    Systolic CHF with low cardiac output syndrome with relative volume depletion.    Nonischemic cardiomyopathy.  EF 20%.  Severe.    Mitral regurgitation.  Severe.    Uncontrolled diabetes mellitus.    Renal insufficiency.  Acute on chronic.  Improved.    AICD.  Single chamber.  Functioning normally by interrogation.    Sepsis syndrome.    Plan:  1.  Attempt to improve rate control with metoprolol 12.5 mg 3 times daily and IV digoxin.  2.  Continue amiodarone.  3.  Reintroduce heart failure regimen as hemodynamic status and renal function allow.  4.  Obtain medical records from [facility].  Reviewed and discussed with patient and RN.  Quality-Core Measures

## 2018-06-06 NOTE — ASSESSMENT & PLAN NOTE
Mild elevation, improving  Abdominal ultrasound unremarkable  Likely hepatic congestion  Monitor CMP

## 2018-06-06 NOTE — PROGRESS NOTES
MD Pérez updated regarding HR Afib 140s sustained. Orders received to give amiodarone bolus see MAR.

## 2018-06-06 NOTE — CONSULTS
"DATE OF SERVICE:  06/05/2018    REQUESTING PHYSICIAN:  Pipe Tucker MD, ER physician.    REASON FOR CONSULTATION:  Wide QRS tachycardia, hypotension, respiratory   insufficiency.    HISTORY OF PRESENT ILLNESS:  The patient is a 48-year-old -American   male with a history of nonischemic cardiomyopathy, severe systolic congestive   heart failure, left ventricular ejection fraction 20%, severe mitral   regurgitation, severe tricuspid regurgitation, AICD implantation, diabetes   mellitus, hypertension, and hyperlipidemia, who presents to [facility] with at least 10-day to 2-week history of progressive fatigue,   weakness, no energy and anorexia.  Additionally, the patient reports an intermittent   cough with hemoptysis over the past 2 days and some hematuria.         In the ER today repeat EKG showed a wide QRS, somewhat irregular tachycardia   with a left bundle-branch block pattern and a marginal blood pressure 100/60.  The   patient received one liter of IV fluids and IV metoprolol 10 mg per Dr. Tucker.      He currently denies chest pain, shortness of breath or syncope.  For unclear reasons, the patient has been off of his metformin for approximately 2 weeks.    The patient has been closely followed in the [facility] cardiology clinic on a weekly   basis since his hospital discharge in May for acute systolic congestive heart failure.   He was seen today in the heart failure clinic with continued weakness, fatigue,   dizziness and found to be hypotensive with a BP of 80 systolic and an EKG showing   atrial fibrillation with a rapid ventricular response and was sent to the ER.    Prior to that he was seen on 5/29/2018 with similar symptoms with an EKG showing   \"sinus tachycardia\" with a new left bundle branch block but retrospectively this was   mostly likely atrial flutter. Recent laboratory results on 5/26/2018 were abnormal with   a hyperkalemia of 6.0, hyponatremia of 120, renal insufficiency and " severe hyperglycemia   with a blood glucose of 667. Some of his cardiac medications were adjusted.     ALLERGIES:  No known drug allergies.    PAST MEDICAL HISTORY:  1.  Nonischemic cardiomyopathy diagnosed in 2007 with a reported cardiac   catheterization showing no coronary artery disease.  2.  AICD implantation.  3.  Diabetes mellitus.  4.  Hypertension.  5.  Hyperlipidemia.  6.  Chronic amiodarone therapy. Started in [place] in 2016 for unclear reasons.    CURRENT MEDICATIONS:  1.  Losartan 50 mg daily.  2.  Carvedilol 12.5 mg twice daily.  3.  Aldactone 25 mg daily.  4.  Metformin 500 mg twice daily.  5.  Aspirin 81 mg daily.  6.  Atorvastatin 20 mg daily.  7.  Amiodarone 200 mg daily.    SOCIAL HISTORY:  Never smoked cigarettes, does not drink alcohol.    FAMILY HISTORY:  Mother alive with a history of colon cancer.  Sister with a   history of hypertension, brother with history of stroke.    REVIEW OF SYSTEMS:  GENERAL:  Denies fevers, flu, or infection.  RESPIRATORY:  No history of DVT or pulmonary emboli.  CARDIAC:  As above.  GASTROINTESTINAL:  No nausea, vomiting or diarrhea, see above.  GENITOURINARY:  No kidney or bladder problems.  NEUROLOGIC:  Denies history of stroke or seizures.  ENDOCRINE:  No thyroid disease.  MUSCULOSKELETAL:  No arthritis or gout.  EYES:  No acute visual disturbances.  SKIN:  No rashes.    PHYSICAL EXAMINATION:  VITAL SIGNS:  Blood pressure 92/62, pulse 115.  GENERAL:  The patient is lying comfortably supine in his hospital bed in no   respiratory distress.  HEENT:  Extraocular movements are intact.  Nonicteric sclerae.  Oropharynx,   dry mucous membranes.  NECK:  Slightly elevated jugular venous pressure.  1+ carotid pulses.  CHEST:  Symmetrical.  AICD device in the left subclavian area.  CARDIAC:  Distant heart sounds.  Regular rate and rhythm with a faint systolic   murmur.  No diastolic murmurs, gallops, or rubs.  ABDOMEN:  Soft, nontender, no guarding.  EXTREMITIES:  No  clubbing, cyanosis or edema.  Pulses 1+ radial, femoral, and   pedal pulses.  NEUROLOGIC:  Cranial nerves intact.  Motor function symmetrical.  No   lateralizing findings.  SKIN:  Warm and dry.  Echocardiogram on 05/13/2018, severe left ventricular enlargement, mild   concentric left ventricular hypertrophy, left ventricular ejection fraction   20%, grade III diastolic dysfunction, severe mitral regurgitation, severe   tricuspid regurgitation, estimated right ventricular systolic pressure 20   mmHg.    EKG, wide irregular QRS tachycardia, rate 149 beats per minute.    Rhythm monitor, atrial fibrillation with left bundle-branch block pattern.    LABORATORY DATA:  WBC 19,000, hemoglobin 17, platelets 347,000.  Sodium 118,   potassium 5.2, BUN 84, creatinine 1.89, glucose 616, anion gap 14.    Coagulation pending.  Recent TSH 1.1.  Chest x-ray on 06/05/2018,   cardiomegaly.    ASSESSMENT:  1.  Atrial fibrillation, newly diagnosed, duration unknown, rapid ventricular   response.  2.  Severe systolic congestive heart failure.  3.  Hypotension. Low cardiac output syndrome multifactorial including relative   hypovolemia superimposed on severe left ventricular systolic dysfunction,   severe mitral regurgitation further aggravated by atrial fibrillation.  4.  Probable lactic acidosis.  5.  Renal insufficiency, severe, acute on chronic.  6.  Severe mitral regurgitation.  7.  Automatic implantable cardioverter-defibrillator.  8   Left bundle branch block. New.  9.  Hyponatremia. Secondary to elevated blood sugar.  10. Hyperkalemia.  11.  Uncontrolled diabetes mellitus.   12.  Leukocytosis.    RECOMMENDATIONS:  1.  Admit to CCU.  2.  AICD interrogation.  3.  Continue amiodarone.  4.  Hold current outpatient heart failure regimen including losartan,   carvedilol, spironolactone, and atorvastatin.  5.  Await coagulation profile in the context of prior to consideration of   systemic anticoagulation in the setting of hemoptysis and  hematuria.  6.  Hospitalist service to address the patient's uncontrolled diabetes mellitus and possible infection.  7. The patient may require some vasopressor support to improve cardiac output.  8.  IV fluids with cautious fluid hydration.  9.  Optimally, we would like to attempt to restore normal sinus rhythm, but for   now, will settle for rate control.       ____________________________________     MD KARL IGNACIO / NIKITA    DD:  06/05/2018 17:14:20  DT:  06/05/2018 17:52:23    D#:  [id number]  Job#:  [id number]

## 2018-06-06 NOTE — CARE PLAN
Problem: Safety  Goal: Will remain free from falls  Bed in lowest position. Non-skid socks in place. Personal possessions within reach. Mobility sign on door. Bed-alarm on. Call light within reach. Pt educated regarding fall prevention and states understanding. Aguilar fall assessment complete.     Problem: Infection  Goal: Will remain free from infection  Patients individual infection risk assessed. Monitored for signs and symptoms of infection throughout shift. Washed hands or foamed in and out every encounter. Utilized universal precautions. Scrubbed hub before access to IV lines per protocol.

## 2018-06-06 NOTE — CONSULTS
Pulmonary & Critical Care Consult Note    DATE OF CONSULTATION:  6/5/2018     REFERRING PHYSICIAN:  Carlos Eduardo Taylor MD     CONSULTANT:  Flynn Madrid DO     REASON FOR CONSULTATION: Hypotension, sepsis     HISTORY OF PRESENT ILLNESS: 48-year-old male past medical history of nonischemic cardiomyopathy, EF 20%, diabetes, hypertension, hyperlipidemia; presenting to the emergency department from his cardiologist's office after he was found to be hypotensive in A. fib with RVR.  The patient admits to approximately 10 days of worsening weakness, fatigue, malaise, rhinorrhea, right maxillary tooth pain and several days of cough with increasing dyspnea.  He does admit to right chest wall pain which is worsened by deep breaths and coughing.  The patient was given fluid, metoprolol, amiodarone in the emergency department with transiently decreased blood pressure to 61/50.  Cardiology was contacted and a bedside echocardiogram was performed which demonstrated a distended IVC.  His chest x-ray was unremarkable with the exception of cardiomegaly, his labs were notable for a troponin of 0.1, BNP of 422 down from 729, hyponatremia with a sodium of 120, hypochloremia at 80, anion gap 12, glucose 593, BUN, creatinine 1.87, AST and ALT were normal at 20, , total bilirubin 1.7, CBC 19, with 83% neutrophils.  His blood pressure has rebounded, his AICD has been interrogated demonstrating A. fib, and cardiology is seeing the patient.  He is to be admitted to the ICU for further care and stabilization of his hemodynamics status.     PAST MEDICAL HISTORY:  Past Medical History:   Diagnosis Date   • CHF (congestive heart failure) (HCC)    • Diabetes (HCC)    • Hyperlipidemia    • Hypertension         PAST SURGICAL HISTORY:    Past Surgical History:   Procedure Laterality Date   • AICD IMPLANT  2010        ALLERGIES:    Patient has no known allergies.     MEDICATIONS PRIOR TO ADMISSION:   No current facility-administered medications on  file prior to encounter.      Current Outpatient Prescriptions on File Prior to Encounter   Medication Sig Dispense Refill   • aspirin EC (ECOTRIN) 81 MG Tablet Delayed Response Take 81 mg by mouth every day.     • losartan (COZAAR) 50 MG Tab Take 1 Tab by mouth every day. 30 Tab 11   • carvedilol (COREG) 12.5 MG Tab Take 1 Tab by mouth 2 times a day, with meals. 60 Tab 11   • guaiFENesin LA (MUCINEX) 600 MG TABLET SR 12 HR Take 1 Tab by mouth 3 times a day. 15 Tab 0   • spironolactone (ALDACTONE) 25 MG Tab Take 1 Tab by mouth every day. 30 Tab 1   • atorvastatin (LIPITOR) 20 MG Tab Take 20 mg by mouth every day.     • amiodarone (CORDARONE) 200 MG Tab Take 200 mg by mouth every day.     • metformin (GLUCOPHAGE) 500 MG TABS Take 1 Tab by mouth 2 times a day, with meals. 60 Tab 3       SOCIAL HISTORY:    Social History     Social History   • Marital status: Single     Spouse name: N/A   • Number of children: N/A   • Years of education: N/A     Occupational History   • Not on file.     Social History Main Topics   • Smoking status: Never Smoker   • Smokeless tobacco: Never Used   • Alcohol use No      Comment: 2 times a week-beer, NO ALCOHOL AT THIS TIME   • Drug use: No      Comment: Marijuana use as youth   • Sexual activity: Not on file     Other Topics Concern   • Not on file     Social History Narrative   • No narrative on file       FAMILY HISTORY:   Family History   Problem Relation Age of Onset   • Colon Cancer Mother    • Hypertension Sister    • Stroke Brother    • Heart Disease Neg Hx         REVIEW OF SYSTEMS:    Constitutional: No fever, no chills, +malaise and fatigue  Respiratory: +cough, + mild hemoptysis, +dyspnea  Cardiovascular: +chest pain, no palpitations, +orthopnea, no PND, no lower extremity swelling  GI: No nausea, no vomiting, no abdominal pain, no diarrhea, no constipation, no hematochezia, no melena  : no dysuria, no frequency, no urgency  Endocrine: No polyuria, no polydipsia, no hair  "loss  Hematology: No easy bruising, no bleeding  Musculoskeletal: No joint swelling, no joint erythema, no arthralgia, no myalgias  Neuro: No seizures, no numbness, no tingling sensation, no extremity weakness, no change in vision.  Psychiatry: no depression, no suicidal ideation     PHYSICAL EXAMINATION:  /60   Pulse (!) 55   Temp 36.8 °C (98.2 °F)   Resp 18   Ht 1.753 m (5' 9\")   Wt 85 kg (187 lb 6.3 oz)   SpO2 99%   BMI 27.67 kg/m²   GENERAL: Well-nourished, well-developed, age-appropriate appearing male, in obvious distress  HEENT: Normocephalic, atraumatic, Derrick, EOMI, external ears normal, external nose normal, moist mucous membranes, diffuse dental caries with no erythema or swelling surrounding molars of the right maxillary region where patient indicates pain is most significant.  NECK: Supple, trachea midline, JVD present  PULM:   Equal breath sounds, symmetric expansion of chest wall, diminished breath sounds bilaterally, trace rales  CVS: Tachycardia, irregular regular rhythm, trace systolic murmur  ABDOMEN: No distention, tenderness or guarding  EXTREMITIES: No clubbing, no cyanosis, no edema  SKIN: Warm, dry  NEURO: Alert and oriented ×4, cranial nerves grossly intact, no focal neurologic deficit    LABORATORY DATA:    Lab Results   Component Value Date/Time    WBC 19.0 (H) 06/05/2018 03:01 PM    RBC 6.34 (H) 06/05/2018 03:01 PM    HEMOGLOBIN 17.4 06/05/2018 03:01 PM    HEMATOCRIT 52.1 (H) 06/05/2018 03:01 PM    MCV 82.2 06/05/2018 03:01 PM    MCH 27.4 06/05/2018 03:01 PM    MCHC 33.4 (L) 06/05/2018 03:01 PM    MPV 11.9 06/05/2018 03:01 PM    NEUTSPOLYS 83.00 (H) 06/05/2018 03:01 PM    LYMPHOCYTES 9.70 (L) 06/05/2018 03:01 PM    MONOCYTES 4.80 06/05/2018 03:01 PM    EOSINOPHILS 0.10 06/05/2018 03:01 PM    BASOPHILS 0.70 06/05/2018 03:01 PM    HYPOCHROMIA 1+ 03/26/2014 02:15 AM      Lab Results   Component Value Date/Time    SODIUM 118 (LL) 06/05/2018 03:01 PM    POTASSIUM 5.2 06/05/2018 " 03:01 PM    CHLORIDE 81 (L) 06/05/2018 03:01 PM    CO2 23 06/05/2018 03:01 PM    GLUCOSE 616 (HH) 06/05/2018 03:01 PM    BUN 84 (HH) 06/05/2018 03:01 PM    CREATININE 1.89 (H) 06/05/2018 03:01 PM      Lab Results   Component Value Date/Time    PROTHROMBTM 15.7 (H) 06/05/2018 03:01 PM    INR 1.28 (H) 06/05/2018 03:01 PM         IMAGING:   CXR (personally reviewed)  DX-CHEST-LIMITED (1 VIEW)   Final Result      1.  Stable enlarged cardiac silhouette.         ASSESSMENT/PLAN:  SIRS   - Leukocytosis, tachycardia, tachypnea with hypotension   - Source likely pulmonary given cough and dyspnea; ALP also elevated with right lower chest wall pain, will obtain ultrasound to rule out biliary etiology   - source targeted antibiotics: Rocephin, doxycycline   - 30 mL/kg crystalloid bolus unable to be provided given severe cardiomyopathy   - Pressors if needed to maintain MAP >65 mmHg   - blood, respiratory and urine cultures   - lactate every 4 hours until normalized or downtrending    A. fib with RVR   - Rate control, rhythm control when stable   - heparin gtt   - [id number]: 3; 3.2% CVA risk/year     Acute exacerbation of HFrEF   - Hold losartan, carvedilol, atorvastatin, spironolactone given kidney injury   - Judicious IV fluid use    Lactic acidosis   -Mild, improve hemodynamics   -Trend    Leukocytosis   - Unknown source, labs pending     Acute kidney injury   - renal dose meds, avoid nephrotoxins   - urine studies    Elevated alkaline phosphatase   - ?  Hepatic congestion    Hyponatremia   - acute on chronic   - monitor    Severe MR    Diabetes   - HgA1c 10 3 weeks ago   - sliding scale insulin, accuchecks   - hypoglycemia protocol    Hyperlipidemia    Prophylaxis: heparin    Patient is critically ill at this time.  I have spent 37 minutes examining this patient, all lab data, x-ray, and discussion with RN, RT, ED physician, hospitalist. Critical care time: 37 min. No time overlap. Procedures not included in time. Thank  you for asking me to consult on the patient.  I appreciate the opportunity to assist in their care and will follow along closely with you.    Flynn Madrid, DO  Critical Care Medicine    Please note that this dictation was created using voice recognition software. The accuracy of the dictation is limited to the abilities of the software.I have made every reasonable attempt to correct obvious errors, but I expect that there are errors of grammar and possibly content that I did not discover before finalizing the note.

## 2018-06-06 NOTE — ASSESSMENT & PLAN NOTE
Uncontrolled with hyperglycemia HgbA1c: 15.7  On metformin, monitor accuchecks and cover with SSI, lantus 10 units  Diabetic education ordered

## 2018-06-06 NOTE — ASSESSMENT & PLAN NOTE
5/13/18 transthoracic echocardiogram showed EF 20%, severe systolic heart failure  Cardiology consulting  Metoprolol, apixaban, digoxin, amiodarone  Spironolactone dc'ed due to hyponatremia and concern for hyperkalemia   Monitor vitals, strict I's, daily labs  Monitoring on telemetry

## 2018-06-06 NOTE — ASSESSMENT & PLAN NOTE
Secondary to congestive heart failure and uncontrolled heart rate, improved  Monitor BMP and vitals  Strict I's and O

## 2018-06-06 NOTE — PROGRESS NOTES
[facility] Hospitalist Progress Note    Date of Service: 2018    Chief Complaint  48 y.o. male admitted 2018 with hx of CHF prior EF 20%, DMII, and prior AICD placement here with increase in SOB after today concern of cough with some mild blood in the sputum after significant coughing episodes. He was found to be in atrial fibrillation with a rapid ventricular heart rate of 180 and SPO2 of 88% on room air..    Interval Problem Update  Afib w/ RVR in 130-140's overnight.  BP: 70's overnight but b/n  systolic this am.  Pain in R lower chest from cough.  NPO but awaits U/S renal.  Heparin drip. Up and ambulates on his own.  On 2 L NC.  CXR showed enlarged cardiac silouette.     Consultants/Specialty  Cardiology    Disposition  Monitor in ICU for now.        Review of Systems   Constitutional: Negative for fever and malaise/fatigue.   Respiratory: Positive for cough and sputum production. Negative for hemoptysis, shortness of breath and stridor.    Cardiovascular: Negative for chest pain, palpitations and leg swelling.   Gastrointestinal: Negative for abdominal pain, diarrhea and nausea.   Genitourinary: Negative for dysuria and hematuria.   Musculoskeletal: Negative for joint pain and myalgias.   Neurological: Negative for dizziness and headaches.   Psychiatric/Behavioral: The patient is not nervous/anxious.       Physical Exam  Laboratory/Imaging   Hemodynamics  Temp (24hrs), Av.8 °C (98.2 °F), Min:36.7 °C (98 °F), Max:36.9 °C (98.4 °F)   Temperature: 36.9 °C (98.4 °F)  Pulse  Av.2  Min: 37  Max: 148 Heart Rate (Monitored): (!) 134  Blood Pressure: (!) 95/75, NIBP: 105/77      Respiratory      Respiration: 20, Pulse Oximetry: 93 %        RUL Breath Sounds: Clear, RML Breath Sounds: Diminished, RLL Breath Sounds: Diminished, MADONNA Breath Sounds: Diminished, LLL Breath Sounds: Diminished    Fluids    Intake/Output Summary (Last 24 hours) at 18 1428  Last data filed at 18 1300   Gross per 24  hour   Intake           1173.2 ml   Output             1450 ml   Net           -276.8 ml       Nutrition  Orders Placed This Encounter   Procedures   • DIET ORDER     Standing Status:   Standing     Number of Occurrences:   1     Order Specific Question:   Diet:     Answer:   Cardiac [6]     Order Specific Question:   Diet:     Answer:   Diabetic [3]     Physical Exam   Constitutional: He is oriented to person, place, and time. He appears well-developed and well-nourished. No distress.   HENT:   Head: Normocephalic and atraumatic.   Nose: Nose normal.   Mouth/Throat: Oropharynx is clear and moist.   Eyes: Conjunctivae and EOM are normal. Right eye exhibits no discharge. Left eye exhibits no discharge. No scleral icterus.   Neck: No tracheal deviation present.   Cardiovascular: Normal rate, normal heart sounds and intact distal pulses.  An irregularly irregular rhythm present.   No murmur heard.  Pulses:       Radial pulses are 2+ on the right side, and 2+ on the left side.        Dorsalis pedis pulses are 2+ on the right side, and 2+ on the left side.   Pulmonary/Chest: Effort normal and breath sounds normal. No respiratory distress. He has no wheezes. He has no rales.   Abdominal: Soft. Bowel sounds are normal. He exhibits no distension. There is no tenderness.   Musculoskeletal: He exhibits no edema.   Lymphadenopathy:     He has no cervical adenopathy.   Neurological: He is alert and oriented to person, place, and time. No cranial nerve deficit. Coordination normal.   Skin: Skin is warm. He is not diaphoretic.   Psychiatric: He has a normal mood and affect. His behavior is normal. Thought content normal.   Vitals reviewed.      Recent Labs      06/05/18   1501  06/06/18   0433   WBC  19.0*  18.3*   RBC  6.34*  5.33   HEMOGLOBIN  17.4  15.0   HEMATOCRIT  52.1*  43.4   MCV  82.2  81.4   MCH  27.4  28.1   MCHC  33.4*  34.6   RDW  48.6  46.5   PLATELETCT  347  294   MPV  11.9  12.1     Recent Labs      06/05/18    1501  06/05/18   1749  06/06/18   0433   SODIUM  118*  119*  127*   POTASSIUM  5.2  5.1  4.3   CHLORIDE  81*  86*  95*   CO2  23  21  23   GLUCOSE  616*  597*  250*   BUN  84*  85*  68*   CREATININE  1.89*  1.65*  1.18   CALCIUM  9.6  8.8  9.0     Recent Labs      06/05/18   1501  06/06/18   0209  06/06/18   0915   APTT  30.0  44.1*  69.9*   INR  1.28*   --    --      Recent Labs      06/05/18   1501   BNPBTYPENAT  422*              Assessment/Plan     * Severe sepsis (HCC)- (present on admission)   Assessment & Plan    This is severe sepsis with the following associated acute organ dysfunction(s): acute kidney failure.   Chest x-ray w/ cardiomegally,   the procalcitonin is elevated and patient did have leukocytosis upon admission  Continue antibiotics for now  Care with fluid hydration secondary to congestive heart failure  Monitor vitals and strict I's and O's          Paroxysmal atrial fibrillation (HCC)- (present on admission)   Assessment & Plan    A. fib RVR   Cardiology consulting  Heparin drip, metoprolol, digoxin, amiodarone  Monitoring on telemetry  Normal TSH        Acute on chronic systolic (congestive) heart failure (HCC)- (present on admission)   Assessment & Plan    5/13/18 transthoracic echocardiogram showed EF 20%, severe systolic heart failure  Cardiology consulting  Metoprolol, heparin drip, digoxin, amiodarone  Monitor vitals, strict I's, daily labs  Monitoring on telemetry        Type 2 diabetes mellitus with renal complication (HCC)- (present on admission)   Assessment & Plan    A1c 10%. Ran out of metformin.  Glucose 600s, normal CO2 and no ketones on UA. Anion gap 14.  ISS        Troponin I above reference range   Assessment & Plan    Aspirin, statin, heparin  Concern of demand ischemia due to rapid ventricular rate.  Monitor on telemetry  Downtrending troponin        Hyponatremia   Assessment & Plan    Patient has had improvement with fluids initially  Monitor daily BMP          Total  bilirubin, elevated   Assessment & Plan    Mild elevation  Abdominal ultrasound unremarkable  Likely hepatic congestion  Monitor CMP        Prolonged Q-T interval on ECG   Assessment & Plan    QT >600  Monitor daily EKG and on telemetry        Acute renal failure (HCC)- (present on admission)   Assessment & Plan    Secondary to congestive heart failure and uncontrolled heart rate  Rate control, diuresis  Monitor daily BMP and vitals  Strict I's and O          Cardiomyopathy (HCC)- (present on admission)   Assessment & Plan    See above heart failure notes        AICD (automatic cardioverter/defibrillator) present- (present on admission)   Assessment & Plan    ERP requested ICD interrogation, showed atrial fibrillation.          Quality-Core Measures   Reviewed items::  Labs reviewed, Medications reviewed and Radiology images reviewed  Jensen catheter::  No Jensen  DVT prophylaxis pharmacological::  Heparin  Antibiotics:  Treating active infection/contamination beyond 24 hours perioperative coverage

## 2018-06-06 NOTE — ASSESSMENT & PLAN NOTE
Aspirin, statin, heparin  Due demand ischemia due to afib with rapid ventricular rate.  Monitor on telemetry  Downtrending troponin

## 2018-06-07 LAB
ANION GAP SERPL CALC-SCNC: 10 MMOL/L (ref 0–11.9)
APTT PPP: 47 SEC (ref 24.7–36)
APTT PPP: 66.5 SEC (ref 24.7–36)
BASOPHILS # BLD AUTO: 0.2 % (ref 0–1.8)
BASOPHILS # BLD: 0.03 K/UL (ref 0–0.12)
BUN SERPL-MCNC: 33 MG/DL (ref 8–22)
CALCIUM SERPL-MCNC: 7.3 MG/DL (ref 8.5–10.5)
CHLORIDE SERPL-SCNC: 104 MMOL/L (ref 96–112)
CO2 SERPL-SCNC: 18 MMOL/L (ref 20–33)
CREAT SERPL-MCNC: 0.67 MG/DL (ref 0.5–1.4)
EKG IMPRESSION: NORMAL
EOSINOPHIL # BLD AUTO: 0 K/UL (ref 0–0.51)
EOSINOPHIL NFR BLD: 0 % (ref 0–6.9)
ERYTHROCYTE [DISTWIDTH] IN BLOOD BY AUTOMATED COUNT: 47.5 FL (ref 35.9–50)
GLUCOSE BLD-MCNC: 190 MG/DL (ref 65–99)
GLUCOSE BLD-MCNC: 204 MG/DL (ref 65–99)
GLUCOSE BLD-MCNC: 319 MG/DL (ref 65–99)
GLUCOSE BLD-MCNC: 322 MG/DL (ref 65–99)
GLUCOSE BLD-MCNC: 346 MG/DL (ref 65–99)
GLUCOSE SERPL-MCNC: 251 MG/DL (ref 65–99)
HCT VFR BLD AUTO: 40.9 % (ref 42–52)
HGB BLD-MCNC: 13.9 G/DL (ref 14–18)
IMM GRANULOCYTES # BLD AUTO: 0.15 K/UL (ref 0–0.11)
IMM GRANULOCYTES NFR BLD AUTO: 1 % (ref 0–0.9)
LYMPHOCYTES # BLD AUTO: 1.6 K/UL (ref 1–4.8)
LYMPHOCYTES NFR BLD: 10.2 % (ref 22–41)
MAGNESIUM SERPL-MCNC: 1.5 MG/DL (ref 1.5–2.5)
MCH RBC QN AUTO: 27.9 PG (ref 27–33)
MCHC RBC AUTO-ENTMCNC: 34 G/DL (ref 33.7–35.3)
MCV RBC AUTO: 82.1 FL (ref 81.4–97.8)
MONOCYTES # BLD AUTO: 0.75 K/UL (ref 0–0.85)
MONOCYTES NFR BLD AUTO: 4.8 % (ref 0–13.4)
NEUTROPHILS # BLD AUTO: 13.12 K/UL (ref 1.82–7.42)
NEUTROPHILS NFR BLD: 83.8 % (ref 44–72)
NRBC # BLD AUTO: 0 K/UL
NRBC BLD-RTO: 0 /100 WBC
PHOSPHATE SERPL-MCNC: 1.9 MG/DL (ref 2.5–4.5)
PLATELET # BLD AUTO: 204 K/UL (ref 164–446)
PMV BLD AUTO: 11 FL (ref 9–12.9)
POTASSIUM SERPL-SCNC: 3.9 MMOL/L (ref 3.6–5.5)
RBC # BLD AUTO: 4.98 M/UL (ref 4.7–6.1)
SODIUM SERPL-SCNC: 132 MMOL/L (ref 135–145)
WBC # BLD AUTO: 15.7 K/UL (ref 4.8–10.8)

## 2018-06-07 PROCEDURE — 85025 COMPLETE CBC W/AUTO DIFF WBC: CPT

## 2018-06-07 PROCEDURE — 700111 HCHG RX REV CODE 636 W/ 250 OVERRIDE (IP): Performed by: HOSPITALIST

## 2018-06-07 PROCEDURE — 99233 SBSQ HOSP IP/OBS HIGH 50: CPT | Performed by: INTERNAL MEDICINE

## 2018-06-07 PROCEDURE — 80048 BASIC METABOLIC PNL TOTAL CA: CPT

## 2018-06-07 PROCEDURE — 83735 ASSAY OF MAGNESIUM: CPT

## 2018-06-07 PROCEDURE — 700102 HCHG RX REV CODE 250 W/ 637 OVERRIDE(OP): Performed by: INTERNAL MEDICINE

## 2018-06-07 PROCEDURE — 93005 ELECTROCARDIOGRAM TRACING: CPT | Performed by: INTERNAL MEDICINE

## 2018-06-07 PROCEDURE — A9270 NON-COVERED ITEM OR SERVICE: HCPCS | Performed by: HOSPITALIST

## 2018-06-07 PROCEDURE — 82962 GLUCOSE BLOOD TEST: CPT

## 2018-06-07 PROCEDURE — 700105 HCHG RX REV CODE 258: Performed by: HOSPITALIST

## 2018-06-07 PROCEDURE — 700111 HCHG RX REV CODE 636 W/ 250 OVERRIDE (IP): Performed by: INTERNAL MEDICINE

## 2018-06-07 PROCEDURE — 85730 THROMBOPLASTIN TIME PARTIAL: CPT

## 2018-06-07 PROCEDURE — 93010 ELECTROCARDIOGRAM REPORT: CPT | Performed by: INTERNAL MEDICINE

## 2018-06-07 PROCEDURE — 99232 SBSQ HOSP IP/OBS MODERATE 35: CPT | Performed by: HOSPITALIST

## 2018-06-07 PROCEDURE — A9270 NON-COVERED ITEM OR SERVICE: HCPCS | Performed by: INTERNAL MEDICINE

## 2018-06-07 PROCEDURE — 700102 HCHG RX REV CODE 250 W/ 637 OVERRIDE(OP): Performed by: HOSPITALIST

## 2018-06-07 PROCEDURE — 84100 ASSAY OF PHOSPHORUS: CPT

## 2018-06-07 PROCEDURE — 700101 HCHG RX REV CODE 250: Performed by: HOSPITALIST

## 2018-06-07 PROCEDURE — 700105 HCHG RX REV CODE 258: Performed by: INTERNAL MEDICINE

## 2018-06-07 PROCEDURE — 770020 HCHG ROOM/CARE - TELE (206)

## 2018-06-07 RX ORDER — SODIUM CHLORIDE 9 MG/ML
500 INJECTION, SOLUTION INTRAVENOUS
Status: DISCONTINUED | OUTPATIENT
Start: 2018-06-07 | End: 2018-06-07

## 2018-06-07 RX ORDER — ACETAMINOPHEN 325 MG/1
650 TABLET ORAL EVERY 6 HOURS PRN
Status: DISCONTINUED | OUTPATIENT
Start: 2018-06-07 | End: 2018-06-12 | Stop reason: HOSPADM

## 2018-06-07 RX ORDER — PROMETHAZINE HYDROCHLORIDE 25 MG/1
12.5-25 SUPPOSITORY RECTAL EVERY 4 HOURS PRN
Status: DISCONTINUED | OUTPATIENT
Start: 2018-06-07 | End: 2018-06-12 | Stop reason: HOSPADM

## 2018-06-07 RX ORDER — DEXTROSE MONOHYDRATE 25 G/50ML
25 INJECTION, SOLUTION INTRAVENOUS
Status: DISCONTINUED | OUTPATIENT
Start: 2018-06-07 | End: 2018-06-11

## 2018-06-07 RX ORDER — ONDANSETRON 2 MG/ML
4 INJECTION INTRAMUSCULAR; INTRAVENOUS EVERY 4 HOURS PRN
Status: DISCONTINUED | OUTPATIENT
Start: 2018-06-07 | End: 2018-06-12 | Stop reason: HOSPADM

## 2018-06-07 RX ORDER — MAGNESIUM SULFATE HEPTAHYDRATE 40 MG/ML
4 INJECTION, SOLUTION INTRAVENOUS ONCE
Status: COMPLETED | OUTPATIENT
Start: 2018-06-07 | End: 2018-06-07

## 2018-06-07 RX ORDER — DEXTROSE MONOHYDRATE 25 G/50ML
25 INJECTION, SOLUTION INTRAVENOUS
Status: DISCONTINUED | OUTPATIENT
Start: 2018-06-07 | End: 2018-06-12 | Stop reason: HOSPADM

## 2018-06-07 RX ORDER — ONDANSETRON 4 MG/1
4 TABLET, ORALLY DISINTEGRATING ORAL EVERY 4 HOURS PRN
Status: DISCONTINUED | OUTPATIENT
Start: 2018-06-07 | End: 2018-06-12 | Stop reason: HOSPADM

## 2018-06-07 RX ORDER — BISACODYL 10 MG
10 SUPPOSITORY, RECTAL RECTAL
Status: DISCONTINUED | OUTPATIENT
Start: 2018-06-07 | End: 2018-06-12 | Stop reason: HOSPADM

## 2018-06-07 RX ORDER — GUAIFENESIN 600 MG/1
600 TABLET, EXTENDED RELEASE ORAL EVERY 12 HOURS
Status: DISCONTINUED | OUTPATIENT
Start: 2018-06-07 | End: 2018-06-12 | Stop reason: HOSPADM

## 2018-06-07 RX ORDER — HEPARIN SODIUM 5000 [USP'U]/ML
5000 INJECTION, SOLUTION INTRAVENOUS; SUBCUTANEOUS EVERY 8 HOURS
Status: DISCONTINUED | OUTPATIENT
Start: 2018-06-07 | End: 2018-06-07

## 2018-06-07 RX ORDER — AMOXICILLIN 250 MG
2 CAPSULE ORAL 2 TIMES DAILY
Status: DISCONTINUED | OUTPATIENT
Start: 2018-06-07 | End: 2018-06-12 | Stop reason: HOSPADM

## 2018-06-07 RX ORDER — POLYETHYLENE GLYCOL 3350 17 G/17G
1 POWDER, FOR SOLUTION ORAL
Status: DISCONTINUED | OUTPATIENT
Start: 2018-06-07 | End: 2018-06-12 | Stop reason: HOSPADM

## 2018-06-07 RX ORDER — PROMETHAZINE HYDROCHLORIDE 25 MG/1
12.5-25 TABLET ORAL EVERY 4 HOURS PRN
Status: DISCONTINUED | OUTPATIENT
Start: 2018-06-07 | End: 2018-06-12 | Stop reason: HOSPADM

## 2018-06-07 RX ADMIN — INSULIN HUMAN 4 UNITS: 100 INJECTION, SOLUTION PARENTERAL at 17:28

## 2018-06-07 RX ADMIN — INSULIN HUMAN 4 UNITS: 100 INJECTION, SOLUTION PARENTERAL at 20:55

## 2018-06-07 RX ADMIN — INSULIN HUMAN 3 UNITS: 100 INJECTION, SOLUTION PARENTERAL at 05:44

## 2018-06-07 RX ADMIN — APIXABAN 5 MG: 5 TABLET, FILM COATED ORAL at 08:27

## 2018-06-07 RX ADMIN — DIGOXIN 125 MCG: 125 TABLET ORAL at 08:28

## 2018-06-07 RX ADMIN — METOPROLOL TARTRATE 12.5 MG: 25 TABLET, FILM COATED ORAL at 05:36

## 2018-06-07 RX ADMIN — ASPIRIN 81 MG: 81 TABLET, COATED ORAL at 08:28

## 2018-06-07 RX ADMIN — APIXABAN 5 MG: 5 TABLET, FILM COATED ORAL at 20:58

## 2018-06-07 RX ADMIN — MAGNESIUM SULFATE IN WATER 4 G: 40 INJECTION, SOLUTION INTRAVENOUS at 11:48

## 2018-06-07 RX ADMIN — GUAIFENESIN 600 MG: 600 TABLET, EXTENDED RELEASE ORAL at 20:58

## 2018-06-07 RX ADMIN — CEFTRIAXONE 2 G: 2 INJECTION, POWDER, FOR SOLUTION INTRAMUSCULAR; INTRAVENOUS at 08:28

## 2018-06-07 RX ADMIN — ATORVASTATIN CALCIUM 20 MG: 20 TABLET, FILM COATED ORAL at 08:28

## 2018-06-07 RX ADMIN — POTASSIUM PHOSPHATE, MONOBASIC AND POTASSIUM PHOSPHATE, DIBASIC 30 MMOL: 224; 236 INJECTION, SOLUTION INTRAVENOUS at 11:50

## 2018-06-07 RX ADMIN — METOPROLOL TARTRATE 12.5 MG: 25 TABLET, FILM COATED ORAL at 14:31

## 2018-06-07 RX ADMIN — HEPARIN SODIUM 3200 UNITS: 1000 INJECTION, SOLUTION INTRAVENOUS; SUBCUTANEOUS at 07:06

## 2018-06-07 RX ADMIN — DOXYCYCLINE 100 MG: 100 TABLET ORAL at 08:28

## 2018-06-07 RX ADMIN — AMIODARONE HYDROCHLORIDE 200 MG: 200 TABLET ORAL at 08:27

## 2018-06-07 RX ADMIN — SODIUM CHLORIDE: 9 INJECTION, SOLUTION INTRAVENOUS at 05:50

## 2018-06-07 ASSESSMENT — ENCOUNTER SYMPTOMS
NERVOUS/ANXIOUS: 0
SHORTNESS OF BREATH: 0
COUGH: 0
COUGH: 1
FEVER: 0
DIARRHEA: 0
STRIDOR: 0
PALPITATIONS: 0
NAUSEA: 0
MYALGIAS: 0
ABDOMINAL PAIN: 0
HEADACHES: 0
DIZZINESS: 0

## 2018-06-07 ASSESSMENT — PATIENT HEALTH QUESTIONNAIRE - PHQ9
SUM OF ALL RESPONSES TO PHQ9 QUESTIONS 1 AND 2: 0
2. FEELING DOWN, DEPRESSED, IRRITABLE, OR HOPELESS: NOT AT ALL
1. LITTLE INTEREST OR PLEASURE IN DOING THINGS: NOT AT ALL

## 2018-06-07 ASSESSMENT — PAIN SCALES - GENERAL
PAINLEVEL_OUTOF10: 0

## 2018-06-07 ASSESSMENT — LIFESTYLE VARIABLES: EVER_SMOKED: NEVER

## 2018-06-07 NOTE — PROGRESS NOTES
Pulmonary Critical Care Progress Note        DOA: 6/5/2018    Chief Complaint: Hypotensive and A-fib    History of Present Illness: 48 y.o. male past medical history of nonischemic cardiomyopathy, EF 20%, diabetes, hypertension, hyperlipidemia; presenting to the emergency department from his cardiologist's office after he was found to be hypotensive in A. fib with RVR.  The patient admits to approximately 10 days of worsening weakness, fatigue, malaise, rhinorrhea, right maxillary tooth pain and several days of cough with increasing dyspnea.  He does admit to right chest wall pain which is worsened by deep breaths and coughing.  The patient was given fluid, metoprolol, amiodarone in the emergency department with transiently decreased blood pressure to 61/50.  Cardiology was contacted and a bedside echocardiogram was performed which demonstrated a distended IVC.  His chest x-ray was unremarkable with the exception of cardiomegaly, his labs were notable for a troponin of 0.1, BNP of 422 down from 729, hyponatremia with a sodium of 120, hypochloremia at 80, anion gap 12, glucose 593, BUN, creatinine 1.87, AST and ALT were normal at 20, , total bilirubin 1.7, CBC 19, with 83% neutrophils.  His blood pressure has rebounded, his AICD has been interrogated demonstrating A. fib, and cardiology is seeing the patient.  He is to be admitted to the ICU for further care and stabilization of his hemodynamics status.      ROS:  Respiratory: negative cough, negative hemoptysis and negative shortness of breath, Cardiac: negative orthopnea and negative leg swelling, GI: negative abdominal pain and negative constipation.  All other systems reviewed with patient and are negative.    Interval Events:  24 hour interval history reviewed    - AAOx4   - remains in A-fib with -130 --> eliquis (heparin gtt d/c'd), PO amio   - good UOP   - good diet   - improving WBC, AF (day 2/5 C3/doxy)   - low K/phos/Mag   - elevated glucose  _-> increase SSI to aggressive    PFSH:  No change.    Physical Exam   Constitutional: He is oriented to person, place, and time. He appears well-developed and well-nourished. No distress.   Pleasant, good mood and energy   HENT:   Head: Normocephalic and atraumatic.   Mouth/Throat: Oropharynx is clear and moist.   Eyes: Conjunctivae are normal. Pupils are equal, round, and reactive to light.   Neck: Neck supple. No JVD present.   Cardiovascular: An irregularly irregular rhythm present.  Occasional extrasystoles are present. Tachycardia present.  PMI is displaced.  Exam reveals no distant heart sounds.    No murmur heard.  Pulmonary/Chest: Effort normal. No stridor. No respiratory distress. He has no wheezes. He has rales. He exhibits no tenderness.   Abdominal: Soft. Bowel sounds are normal. He exhibits no distension.   Musculoskeletal: He exhibits no edema or tenderness.   Neurological: He is alert and oriented to person, place, and time. No cranial nerve deficit or sensory deficit.   Skin: Skin is warm and dry. Capillary refill takes less than 2 seconds. No pallor.   Psychiatric: He has a normal mood and affect. His behavior is normal.   Nursing note and vitals reviewed.      Respiratory:   2 lpm n/c  Pulse Oximetry: 94 %          ImagingCXR  I have personally reviewed the chest x-ray my impression is (Compared to prior film) no film today        Invalid input(s): [id number]    HemoDynamics:  Pulse: (!) 113, Heart Rate (Monitored): (!) 113  Blood Pressure: (!) 95/75, NIBP: 125/58   Eliquis, p.o. amiodarone and metoprolol and digoxin    Imaging: Available data reviewed  Recent Labs      06/05/18   1501  06/05/18   2030  06/06/18   0433  06/06/18   0915   TROPONINI  0.10*  0.07*  0.08*  0.08*   BNPBTYPENAT  422*   --    --    --        Neuro:  GCS  15       Imaging: Available data reviewed    Fluids:  Intake/Output       06/05/18 0700 - 06/06/18 0659 06/06/18 0700 - 06/07/18 0659 06/07/18 0700 - 06/08/18 0659       0700-1859 1900-0659 Total 0700-1859 1900-0659 Total 0700-1859 1900-0659 Total       Intake    P.O.  --  100 100  300  250 550  --  -- --    P.O. -- 100 100 300 250 550 -- -- --    I.V.  --  953.2 953.2  500  830.7 1330.7  --  -- --    Heparin Volume -- 153.2 153.2 -- 330.7 330.7 -- -- --    IV Volume (IVMF) -- 800 800  -- -- --    Total Intake -- 1053.2 1053.2 800 1080.7 1880.7 -- -- --       Output    Urine  --  1000 1000  750  1100 1850  --  -- --    Number of Times Voided -- -- -- -- 4 x 4 x -- -- --    Urine Void (mL) (non-catheter) -- 1000 5760 470 1889 1850 -- -- --    Total Output -- 1000 4822 774 8443 1850 -- -- --       Net I/O     -- 53.2 53.2 50 -19.3 30.7 -- -- --           Recent Labs      06/05/18   1749  06/05/18   2030  06/06/18 0433 06/07/18   0545   SODIUM  119*   --   127*  132*   POTASSIUM  5.1   --   4.3  3.9   CHLORIDE  86*   --   95*  104   CO2  21   --   23  18*   BUN  85*   --   68*  33*   CREATININE  1.65*   --   1.18  0.67   MAGNESIUM   --   2.4  2.4  1.5   PHOSPHORUS   --   3.4  2.5  1.9*   CALCIUM  8.8   --   9.0  7.3*       GI/Nutrition:    Imaging: Available data reviewed  taking PO  Liver Function  Recent Labs      06/05/18   1501  06/05/18   1749 06/06/18 0433 06/07/18   0545   ALTSGPT  20   --    --    --    ASTSGOT  20   --    --    --    ALKPHOSPHAT  224*   --    --    --    TBILIRUBIN  1.7*   --    --    --    GLUCOSE  616*  597*  250*  251*       Heme:  Recent Labs      06/05/18   1501   06/06/18   0433   06/06/18   1608  06/06/18   2340  06/07/18   0545   RBC  6.34*   --   5.33   --    --    --   4.98   HEMOGLOBIN  17.4   --   15.0   --    --    --   13.9*   HEMATOCRIT  52.1*   --   43.4   --    --    --   40.9*   PLATELETCT  347   --   294   --    --    --   204   PROTHROMBTM  15.7*   --    --    --    --    --    --    APTT  30.0   < >   --    < >  46.5*  66.5*  47.0*   INR  1.28*   --    --    --    --    --    --     < > = values in this interval not  displayed.       Infectious Disease:  Temp  Av.8 °C (98.3 °F)  Min: 36.8 °C (98.2 °F)  Max: 36.9 °C (98.5 °F)  Micro: reviewed remains no growth to date  Recent Labs      18   1501  18   0433  18   0545   WBC  19.0*  18.3*  15.7*   NEUTSPOLYS  83.00*  82.30*  83.80*   LYMPHOCYTES  9.70*  10.40*  10.20*   MONOCYTES  4.80  5.80  4.80   EOSINOPHILS  0.10  0.10  0.00   BASOPHILS  0.70  0.30  0.20   ASTSGOT  20   --    --    ALTSGPT  20   --    --    ALKPHOSPHAT  224*   --    --    TBILIRUBIN  1.7*   --    --      Current Facility-Administered Medications   Medication Dose Frequency Provider Last Rate Last Dose   • digoxin (LANOXIN) tablet 125 mcg  125 mcg QAM Bhavin Rae M.D.       • metoprolol (LOPRESSOR) tablet 12.5 mg  12.5 mg Q8HRS Bhavin Rae M.D.   12.5 mg at 18 0536   • cefTRIAXone (ROCEPHIN) syringe 2 g  2 g Q24HRS Jeremy M Gonda, M.D.       • doxycycline monohydrate (ADOXA) tablet 100 mg  100 mg Q12HRS Jeremy M Gonda, M.D.   100 mg at 18 2145   • amiodarone (CORDARONE) tablet 200 mg  200 mg DAILY Carlos Eduardo Taylor M.D.   200 mg at 18 0749   • aspirin EC (ECOTRIN) tablet 81 mg  81 mg DAILY Carlos Eduardo Taylor M.D.   81 mg at 18 0750   • atorvastatin (LIPITOR) tablet 20 mg  20 mg DAILY Carlos Eduardo Taylor M.D.   20 mg at 18 0749   • NS infusion   Continuous Bhavin Rae M.D. 50 mL/hr at 18 0550     • heparin injection 3,200 Units  3,200 Units PRN Bhavin Rae M.D.   3,200 Units at 18 0706    And   • heparin infusion 25,000 units in 500 ml 0.45% nacl   Continuous Bhavin Rae M.D. 35 mL/hr at 18 1,750 Units/hr at 18   • insulin regular (HUMULIN R) injection 2-9 Units  2-9 Units Q6HRS Flynn Madrid Jr., D.O.   3 Units at 18 0544    And   • glucose 4 g chewable tablet 16 g  16 g Q15 MIN PRN Flynn Madrid Jr., D.O.        And   • dextrose 50% (D50W) injection 25 mL  25 mL Q15 MIN PRN Flynn Madrid  CORINA SalcidoO.         Last reviewed on 6/5/2018  4:45 PM by Gianna Houser [facility]    Quality  Measures:  Medications reviewed, Labs reviewed and Radiology images reviewed  Jensen catheter: No Jensen      DVT Prophylaxis: Heparin  DVT prophylaxis - mechanical: Not indicated at this time, ambulatory  Ulcer prophylaxis: Not indicated    Assessed for rehab: Patient returned to prior level of function, rehabilitation not indicated at this time      Assessment/Plan:  SIRS with possible pneumonia - ruled out              - discontinue abx today and monitor   New onset A. fib with RVR - improving HR control              - Continue amiodarone, metoprolol, digoxin              - Eliquis              Acute exacerbation of HFrEF              -Continue to hold losartan, carvedilol, atorvastatin, spironolactone given kidney injury              -diuresis  Lactic acidosis -secondary to hypoperfusion, doubt sepsis -resolved  Leukocytosis - improving   - monitor daily for now  Acute kidney injury - resolved              - renal dose meds, avoid nephrotoxins  Elevated alkaline phosphatase - ?  Hepatic congestion   -Avoid hepatotoxins  Hyponatremia -acute on chronic--> improving              - monitor with diuresis  Severe MR -caution with fluid status  Diabetes              - increase aggressiveness of sliding scale insulin, accuchecks              - hypoglycemia protocol  Hyperlipidemia -atorvastatin   HypoK/Phos/Mag - repletion and monitor daily  Prophylaxis, diet    Ok to transfer patient out of ICU today. [facility] will sign off at transfer. Please call with questions.    Discussed patient condition and risk of morbidity and/or mortality with RN, RT, Pharmacy, Charge nurse / hot rounds, Patient and cardiology and hospitalist.

## 2018-06-07 NOTE — PROGRESS NOTES
[facility] Hospitalist Progress Note    Date of Service: 2018  Note signed: 2018    Chief Complaint  48 y.o. male admitted 2018 with hx of CHF prior EF 20%, DMII, and prior AICD placement here with increase in SOB after today concern of cough with some mild blood in the sputum after significant coughing episodes. He was found to be in atrial fibrillation with a rapid ventricular heart rate of 180 and SPO2 of 88% on room air..    Interval Problem Update  Remains in afib.  On 2L NC.  Tolerating oral diet.  Off heparin drip and on eliquis.  Oral amiodarone.  He is awake and alert and with clear speech.  Ambulates on own.      Consultants/Specialty  Cardiology    Disposition  Transferred to telemetry.        Review of Systems   Constitutional: Negative for fever and malaise/fatigue.   HENT: Negative for sore throat.    Respiratory: Positive for cough. Negative for sputum production, shortness of breath and stridor.    Cardiovascular: Negative for chest pain, palpitations and leg swelling.   Gastrointestinal: Negative for abdominal pain, diarrhea and nausea.   Genitourinary: Negative for dysuria.   Musculoskeletal: Negative for myalgias.   Neurological: Negative for dizziness and headaches.   Psychiatric/Behavioral: The patient is not nervous/anxious.       Physical Exam  Laboratory/Imaging   Hemodynamics  Temp (24hrs), Av.4 °C (97.5 °F), Min:35.6 °C (96 °F), Max:36.8 °C (98.2 °F)   Temperature: 36.3 °C (97.3 °F)  Pulse  Av.1  Min: 37  Max: 148 Heart Rate (Monitored): (!) 103  Blood Pressure: 112/69, NIBP: 104/71      Respiratory      Respiration: 18, Pulse Oximetry: 93 %        RUL Breath Sounds: Diminished, RML Breath Sounds: Diminished, RLL Breath Sounds: Diminished, MADONNA Breath Sounds: Diminished, LLL Breath Sounds: Diminished    Fluids    Intake/Output Summary (Last 24 hours) at 18 0620  Last data filed at 18 0000   Gross per 24 hour   Intake             1820 ml   Output             1000  ml   Net              820 ml       Nutrition  Orders Placed This Encounter   Procedures   • Diet Order     Standing Status:   Standing     Number of Occurrences:   1     Order Specific Question:   Diet:     Answer:   Diabetic [3]     Physical Exam   Constitutional: He is oriented to person, place, and time. He appears well-developed and well-nourished. No distress.   HENT:   Head: Normocephalic and atraumatic.   Nose: Nose normal.   Mouth/Throat: Oropharynx is clear and moist.   Eyes: Conjunctivae and EOM are normal. Right eye exhibits no discharge. Left eye exhibits no discharge. No scleral icterus.   Neck: No tracheal deviation present.   Cardiovascular: Normal rate, normal heart sounds and intact distal pulses.  An irregularly irregular rhythm present.   No murmur heard.  Pulses:       Radial pulses are 2+ on the right side, and 2+ on the left side.        Dorsalis pedis pulses are 2+ on the right side, and 2+ on the left side.   Pulmonary/Chest: Effort normal and breath sounds normal. No respiratory distress. He has no wheezes. He has no rales.   Abdominal: Soft. Bowel sounds are normal. He exhibits no distension. There is no tenderness.   Musculoskeletal: He exhibits no edema.   Lymphadenopathy:     He has no cervical adenopathy.   Neurological: He is alert and oriented to person, place, and time. No cranial nerve deficit. Coordination normal.   Skin: Skin is warm. He is not diaphoretic.   Psychiatric: He has a normal mood and affect. His behavior is normal. Thought content normal.   Vitals reviewed.      Recent Labs      06/06/18   0433  06/07/18   0545  06/08/18   0540   WBC  18.3*  15.7*  16.8*   RBC  5.33  4.98  5.51   HEMOGLOBIN  15.0  13.9*  15.3   HEMATOCRIT  43.4  40.9*  45.9   MCV  81.4  82.1  83.3   MCH  28.1  27.9  27.8   MCHC  34.6  34.0  33.3*   RDW  46.5  47.5  48.4   PLATELETCT  294  204  233   MPV  12.1  11.0  10.8     Recent Labs      06/05/18   1749  06/06/18   0433  06/07/18   0545   SODIUM   119*  127*  132*   POTASSIUM  5.1  4.3  3.9   CHLORIDE  86*  95*  104   CO2  21  23  18*   GLUCOSE  597*  250*  251*   BUN  85*  68*  33*   CREATININE  1.65*  1.18  0.67   CALCIUM  8.8  9.0  7.3*     Recent Labs      06/05/18   1501   06/06/18   1608  06/06/18   2340  06/07/18   0545   APTT  30.0   < >  46.5*  66.5*  47.0*   INR  1.28*   --    --    --    --     < > = values in this interval not displayed.     Recent Labs      06/05/18   1501   BNPBTYPENAT  422*              Assessment/Plan     * Severe sepsis (HCC)- (present on admission)   Assessment & Plan    This is severe sepsis with the following associated acute organ dysfunction(s): acute kidney failure.   Chest x-ray w/ cardiomegally,   the procalcitonin is elevated and patient did have leukocytosis upon admission  Continue antibiotics for now  Care with fluid hydration secondary to congestive heart failure  Monitor vitals and strict I's and O's          Paroxysmal atrial fibrillation (HCC)- (present on admission)   Assessment & Plan    A. fib RVR   Cardiology consulting  metoprolol, digoxin, amiodarone, apixaban  Monitoring on telemetry  Normal TSH        Acute on chronic systolic (congestive) heart failure (HCC)- (present on admission)   Assessment & Plan    5/13/18 transthoracic echocardiogram showed EF 20%, severe systolic heart failure  Cardiology consulting  Metoprolol, apixaban, digoxin, amiodarone  Monitor vitals, strict I's, daily labs  Monitoring on telemetry        Type 2 diabetes mellitus with renal complication (HCC)- (present on admission)   Assessment & Plan    HgbA1c: 10%. In past month  Restart metformin, monitor accuchecks and cover with SSI        Troponin I above reference range   Assessment & Plan    Aspirin, statin, heparin  Concern of demand ischemia due to rapid ventricular rate.  Monitor on telemetry  Downtrending troponin        Hyponatremia   Assessment & Plan    Patient has had improvement with fluids initially  Monitor daily  BMP          Total bilirubin, elevated   Assessment & Plan    Mild elevation  Abdominal ultrasound unremarkable  Likely hepatic congestion  Monitor CMP        Prolonged Q-T interval on ECG   Assessment & Plan    QT >600  Monitor daily EKG and on telemetry        Acute renal failure (HCC)- (present on admission)   Assessment & Plan    Secondary to congestive heart failure and uncontrolled heart rate  Rate control, diuresis  Monitor daily BMP and vitals  Strict I's and O          Cardiomyopathy (HCC)- (present on admission)   Assessment & Plan    See above heart failure notes        AICD (automatic cardioverter/defibrillator) present- (present on admission)   Assessment & Plan    ERP requested ICD interrogation, showed atrial fibrillation.          Quality-Core Measures   Reviewed items::  Labs reviewed, Medications reviewed and Radiology images reviewed  Jensen catheter::  No Jensen  DVT prophylaxis pharmacological::  Heparin  Antibiotics:  Treating active infection/contamination beyond 24 hours perioperative coverage

## 2018-06-07 NOTE — PROGRESS NOTES
Cardiology Progress Note               Author: Bhavin Rae Date & Time created: 6/7/2018  8:03 AM     Interval History:   The patient is a 48-year-old -American   male with a history of nonischemic cardiomyopathy, severe systolic congestive   heart failure, left ventricular ejection fraction 20%, severe mitral   regurgitation, severe tricuspid regurgitation, AICD implantation, diabetes   mellitus, hypertension, and hyperlipidemia, who presents to [facility] with at least 10-day to 2-week history of progressive fatigue,   weakness, no energy and anorexia in addition to new onset of atrial fibrillation with a rapid ventricular response with a new left bundle branch block.  6/6: Overall feeling better less tired.  No cardiac symptoms.  6/7: /90.  Pulse 103.  Atrial fibrillation rate improved.  Symptomatically feels much better.  No cardiac symptoms.    Chief Complaint:  Tired and dizzy    Review of Systems   Constitutional: Positive for malaise/fatigue.   Respiratory: Negative for cough and shortness of breath.    Cardiovascular: Negative for chest pain and palpitations.   Musculoskeletal: Negative for myalgias.   Neurological: Negative for dizziness and headaches.       Physical Exam   Constitutional: He is oriented to person, place, and time. He appears well-nourished. No distress.   HENT:   Head: Normocephalic and atraumatic.   Neck: No JVD present. Carotid bruit is not present.       Cardiovascular: Normal rate, S1 normal, S2 normal and intact distal pulses.  An irregularly irregular rhythm present. Exam reveals no gallop and no friction rub.    Murmur heard.  Pulmonary/Chest: Effort normal and breath sounds normal. He has no wheezes. He has no rhonchi. He has no rales.   Musculoskeletal: He exhibits no edema.   Neurological: He is alert and oriented to person, place, and time.   Skin: Skin is warm and dry.   Psychiatric: He has a normal mood and affect. His behavior is normal.        Hemodynamics:  Temp (24hrs), Av.8 °C (98.3 °F), Min:36.8 °C (98.2 °F), Max:36.9 °C (98.5 °F)  Temperature: 36.8 °C (98.2 °F)  Pulse  Av.9  Min: 37  Max: 148Heart Rate (Monitored): (!) 113  Blood Pressure: (!) 95/75, NIBP: 125/58     Respiratory:    Respiration: 12, Pulse Oximetry: 94 %        RUL Breath Sounds: Clear, RML Breath Sounds: Diminished, RLL Breath Sounds: Diminished, MADONNA Breath Sounds: Clear, LLL Breath Sounds: Diminished  Fluids:  Date 18 0700 - 18 0659   Shift 8068-4162 3733-1536 6460-9689 24 Hour Total   I  N  T  A  K  E   Shift Total       O  U  T  P  U  T   Urine 150   150    Shift Total 150   150   Weight (kg) 85 85 85 85          GI/Nutrition:  Orders Placed This Encounter   Procedures   • DIET ORDER     Standing Status:   Standing     Number of Occurrences:   1     Order Specific Question:   Diet:     Answer:   Cardiac [6]     Order Specific Question:   Diet:     Answer:   Diabetic [3]     Lab Results:  Recent Labs      18   1501  18   0433  18   0545   WBC  19.0*  18.3*  15.7*   RBC  6.34*  5.33  4.98   HEMOGLOBIN  17.4  15.0  13.9*   HEMATOCRIT  52.1*  43.4  40.9*   MCV  82.2  81.4  82.1   MCH  27.4  28.1  27.9   MCHC  33.4*  34.6  34.0   RDW  48.6  46.5  47.5   PLATELETCT  347  294  204   MPV  11.9  12.1  11.0     Recent Labs      18   1749  18   0433  18   0545   SODIUM  119*  127*  132*   POTASSIUM  5.1  4.3  3.9   CHLORIDE  86*  95*  104   CO2  21  23  18*   GLUCOSE  597*  250*  251*   BUN  85*  68*  33*   CREATININE  1.65*  1.18  0.67   CALCIUM  8.8  9.0  7.3*     Recent Labs      18   1501   18   1608  18   2340  18   0545   APTT  30.0   < >  46.5*  66.5*  47.0*   INR  1.28*   --    --    --    --     < > = values in this interval not displayed.     Recent Labs      18   1501   BNPBTYPENAT  422*     Recent Labs      18   1501  18   2030  18   0433  18   0915   TROPONINI   0.10*  0.07*  0.08*  0.08*   BNPBTYPENAT  422*   --    --    --              Medical Decision Making, by Problem:  Active Hospital Problems    Diagnosis   • *Severe sepsis (HCC) [A41.9, R65.20]   • Paroxysmal atrial fibrillation (HCC) [I48.0]   • Acute on chronic systolic (congestive) heart failure (HCC) [I50.23]   • Type 2 diabetes mellitus with renal complication (HCC) [E11.29]   • Cardiomyopathy (HCC) [I42.9]   • AICD (automatic cardioverter/defibrillator) present [Z95.810]   • Prolonged Q-T interval on ECG [R94.31]   • Total bilirubin, elevated [R17]   • Hyponatremia [E87.1]   • Troponin I above reference range [R74.8]   • Acute renal failure (HCC) [N17.9]       Assessment and plan:  Atrial fibrillation.  Persistent.  Heart rate improved.    Amiodarone therapy.  Chronic since 2016.    Systolic CHF with low cardiac output syndrome with relative volume depletion.  Improved.    Nonischemic cardiomyopathy.  EF 20%.  Severe.    Mitral regurgitation.  Severe.    Left bundle branch block.    Anticoagulation IV heparin.    Uncontrolled diabetes mellitus.    Renal insufficiency.  Acute on chronic.  Improved.    AICD.  Single chamber.  Functioning normally by interrogation.    Sepsis syndrome.  Improving.    Plan:  1.  Continue rate control with amiodarone and metoprolol.  2.  Add daily digoxin.  3.  Switch IV Lasix to oral anticoagulation with Eliquis.  4.  DC IV fluids.  5.  Stable to be transferred to telemetry unit.  Quality-Core Measures   Reviewed items::  EKG reviewed, Radiology images reviewed, Labs reviewed and Medications reviewed

## 2018-06-07 NOTE — CARE PLAN
Problem: Safety  Goal: Will remain free from injury  Pt mobility assessed at the beginning of the shift. Fall precautions in place, non-slip socks on, bed in lowest, locked position, and call light is within reach. Pt educated to call for assistance, and verbalizes understanding.    Problem: Knowledge Deficit  Goal: Knowledge of disease process/condition, treatment plan, diagnostic tests, and medications will improve  Educated pt on disease process, treatment plan, and reason for medications he is on. Pt educated how to deep breath and cough.

## 2018-06-07 NOTE — PROGRESS NOTES
Pt arrived to the unit. VSS, pt is in a fib. A&O x 4. No signs of distress noted at this time. Tele monitor in place, monitor room notified. Pt denies pain. Pt is oriented to the unit, call light, phone system. Fall precaution in place and appropriate signs in place. Call light within reach. Bed is locked and in the lowest position. Pt is educated regarding fall precautions and importance of calling for assistance. Pt denies any additional needs at this time. Will continue to monitor.

## 2018-06-07 NOTE — PROGRESS NOTES
Report called to [place] RAJPUT. All question answered. Pt is stable and all needs are met. Pt left with 2 black cell phones and black back pack. Pt left via WC. Pt voiding without issues and steady on feet.

## 2018-06-07 NOTE — CARE PLAN
Problem: Pain  Goal: Alleviation of Pain or a reduction in pain to the patient's comfort goal  Assess pain every two hours. Non pharmacological techniques utilized. Reassess pain within two hours of pharmacological administration.     Problem: Self Care Deficit  Goal: Ability to toilet independently or with assistance  Outcome: PROGRESSING AS EXPECTED

## 2018-06-07 NOTE — PROGRESS NOTES
2 RN skin check w/Kayla RAJPUT  Back of the head - old, healed scar  Elbows, knees, feet - dry, flaky  Lower extremities bilateral - dry scabs.

## 2018-06-08 PROBLEM — D72.829 LEUKOCYTOSIS: Status: ACTIVE | Noted: 2018-06-08

## 2018-06-08 LAB
ALBUMIN SERPL BCP-MCNC: 2.7 G/DL (ref 3.2–4.9)
ALBUMIN/GLOB SERPL: 0.7 G/DL
ALP SERPL-CCNC: 183 U/L (ref 30–99)
ALT SERPL-CCNC: 17 U/L (ref 2–50)
ANION GAP SERPL CALC-SCNC: 9 MMOL/L (ref 0–11.9)
AST SERPL-CCNC: 22 U/L (ref 12–45)
BASOPHILS # BLD AUTO: 0.4 % (ref 0–1.8)
BASOPHILS # BLD: 0.06 K/UL (ref 0–0.12)
BILIRUB SERPL-MCNC: 1.3 MG/DL (ref 0.1–1.5)
BNP SERPL-MCNC: 1214 PG/ML (ref 0–100)
BUN SERPL-MCNC: 25 MG/DL (ref 8–22)
CALCIUM SERPL-MCNC: 8.8 MG/DL (ref 8.5–10.5)
CHLORIDE SERPL-SCNC: 99 MMOL/L (ref 96–112)
CO2 SERPL-SCNC: 20 MMOL/L (ref 20–33)
CREAT SERPL-MCNC: 0.8 MG/DL (ref 0.5–1.4)
EOSINOPHIL # BLD AUTO: 0.01 K/UL (ref 0–0.51)
EOSINOPHIL NFR BLD: 0.1 % (ref 0–6.9)
ERYTHROCYTE [DISTWIDTH] IN BLOOD BY AUTOMATED COUNT: 48.4 FL (ref 35.9–50)
EST. AVERAGE GLUCOSE BLD GHB EST-MCNC: 404 MG/DL
GLOBULIN SER CALC-MCNC: 4 G/DL (ref 1.9–3.5)
GLUCOSE BLD-MCNC: 200 MG/DL (ref 65–99)
GLUCOSE BLD-MCNC: 256 MG/DL (ref 65–99)
GLUCOSE BLD-MCNC: 276 MG/DL (ref 65–99)
GLUCOSE BLD-MCNC: 316 MG/DL (ref 65–99)
GLUCOSE SERPL-MCNC: 275 MG/DL (ref 65–99)
HBA1C MFR BLD: 15.7 % (ref 0–5.6)
HCT VFR BLD AUTO: 45.9 % (ref 42–52)
HGB BLD-MCNC: 15.3 G/DL (ref 14–18)
IMM GRANULOCYTES # BLD AUTO: 0.11 K/UL (ref 0–0.11)
IMM GRANULOCYTES NFR BLD AUTO: 0.7 % (ref 0–0.9)
LYMPHOCYTES # BLD AUTO: 1.74 K/UL (ref 1–4.8)
LYMPHOCYTES NFR BLD: 10.4 % (ref 22–41)
MAGNESIUM SERPL-MCNC: 1.9 MG/DL (ref 1.5–2.5)
MCH RBC QN AUTO: 27.8 PG (ref 27–33)
MCHC RBC AUTO-ENTMCNC: 33.3 G/DL (ref 33.7–35.3)
MCV RBC AUTO: 83.3 FL (ref 81.4–97.8)
MONOCYTES # BLD AUTO: 0.9 K/UL (ref 0–0.85)
MONOCYTES NFR BLD AUTO: 5.4 % (ref 0–13.4)
NEUTROPHILS # BLD AUTO: 13.98 K/UL (ref 1.82–7.42)
NEUTROPHILS NFR BLD: 83 % (ref 44–72)
NRBC # BLD AUTO: 0 K/UL
NRBC BLD-RTO: 0 /100 WBC
PHOSPHATE SERPL-MCNC: 2.6 MG/DL (ref 2.5–4.5)
PLATELET # BLD AUTO: 233 K/UL (ref 164–446)
PMV BLD AUTO: 10.8 FL (ref 9–12.9)
POTASSIUM SERPL-SCNC: 4.6 MMOL/L (ref 3.6–5.5)
PROCALCITONIN SERPL-MCNC: 0.87 NG/ML
PROT SERPL-MCNC: 6.7 G/DL (ref 6–8.2)
RBC # BLD AUTO: 5.51 M/UL (ref 4.7–6.1)
SODIUM SERPL-SCNC: 128 MMOL/L (ref 135–145)
WBC # BLD AUTO: 16.8 K/UL (ref 4.8–10.8)

## 2018-06-08 PROCEDURE — 82962 GLUCOSE BLOOD TEST: CPT | Mod: 91

## 2018-06-08 PROCEDURE — A9270 NON-COVERED ITEM OR SERVICE: HCPCS | Performed by: INTERNAL MEDICINE

## 2018-06-08 PROCEDURE — 93010 ELECTROCARDIOGRAM REPORT: CPT | Performed by: INTERNAL MEDICINE

## 2018-06-08 PROCEDURE — 93005 ELECTROCARDIOGRAM TRACING: CPT | Performed by: INTERNAL MEDICINE

## 2018-06-08 PROCEDURE — 700102 HCHG RX REV CODE 250 W/ 637 OVERRIDE(OP): Performed by: HOSPITALIST

## 2018-06-08 PROCEDURE — 99233 SBSQ HOSP IP/OBS HIGH 50: CPT | Performed by: INTERNAL MEDICINE

## 2018-06-08 PROCEDURE — 83880 ASSAY OF NATRIURETIC PEPTIDE: CPT

## 2018-06-08 PROCEDURE — 80053 COMPREHEN METABOLIC PANEL: CPT

## 2018-06-08 PROCEDURE — 770020 HCHG ROOM/CARE - TELE (206)

## 2018-06-08 PROCEDURE — 84100 ASSAY OF PHOSPHORUS: CPT

## 2018-06-08 PROCEDURE — 85025 COMPLETE CBC W/AUTO DIFF WBC: CPT

## 2018-06-08 PROCEDURE — 83036 HEMOGLOBIN GLYCOSYLATED A1C: CPT

## 2018-06-08 PROCEDURE — 84145 PROCALCITONIN (PCT): CPT

## 2018-06-08 PROCEDURE — 83735 ASSAY OF MAGNESIUM: CPT

## 2018-06-08 PROCEDURE — 700102 HCHG RX REV CODE 250 W/ 637 OVERRIDE(OP): Performed by: INTERNAL MEDICINE

## 2018-06-08 PROCEDURE — A9270 NON-COVERED ITEM OR SERVICE: HCPCS | Performed by: HOSPITALIST

## 2018-06-08 PROCEDURE — 36415 COLL VENOUS BLD VENIPUNCTURE: CPT

## 2018-06-08 RX ORDER — DIGOXIN 125 MCG
250 TABLET ORAL EVERY MORNING
Status: DISCONTINUED | OUTPATIENT
Start: 2018-06-09 | End: 2018-06-08

## 2018-06-08 RX ORDER — METOPROLOL SUCCINATE 25 MG/1
25 TABLET, EXTENDED RELEASE ORAL
Status: DISCONTINUED | OUTPATIENT
Start: 2018-06-08 | End: 2018-06-10

## 2018-06-08 RX ORDER — DIGOXIN 125 MCG
125 TABLET ORAL EVERY MORNING
Status: DISCONTINUED | OUTPATIENT
Start: 2018-06-09 | End: 2018-06-12

## 2018-06-08 RX ADMIN — APIXABAN 5 MG: 5 TABLET, FILM COATED ORAL at 18:03

## 2018-06-08 RX ADMIN — GUAIFENESIN 600 MG: 600 TABLET, EXTENDED RELEASE ORAL at 06:16

## 2018-06-08 RX ADMIN — AMIODARONE HYDROCHLORIDE 200 MG: 200 TABLET ORAL at 06:15

## 2018-06-08 RX ADMIN — INSULIN HUMAN 4 UNITS: 100 INJECTION, SOLUTION PARENTERAL at 20:55

## 2018-06-08 RX ADMIN — ACETAMINOPHEN 650 MG: 325 TABLET, FILM COATED ORAL at 02:53

## 2018-06-08 RX ADMIN — INSULIN HUMAN 3 UNITS: 100 INJECTION, SOLUTION PARENTERAL at 18:17

## 2018-06-08 RX ADMIN — ACETAMINOPHEN 650 MG: 325 TABLET, FILM COATED ORAL at 18:04

## 2018-06-08 RX ADMIN — INSULIN HUMAN 1 UNITS: 100 INJECTION, SOLUTION PARENTERAL at 08:49

## 2018-06-08 RX ADMIN — APIXABAN 5 MG: 5 TABLET, FILM COATED ORAL at 06:15

## 2018-06-08 RX ADMIN — DIGOXIN 125 MCG: 125 TABLET ORAL at 06:16

## 2018-06-08 RX ADMIN — ASPIRIN 81 MG: 81 TABLET, COATED ORAL at 06:15

## 2018-06-08 RX ADMIN — INSULIN HUMAN 3 UNITS: 100 INJECTION, SOLUTION PARENTERAL at 12:46

## 2018-06-08 RX ADMIN — SENNOSIDES AND DOCUSATE SODIUM 2 TABLET: 8.6; 5 TABLET ORAL at 06:16

## 2018-06-08 RX ADMIN — METOPROLOL TARTRATE 12.5 MG: 25 TABLET, FILM COATED ORAL at 06:16

## 2018-06-08 RX ADMIN — ATORVASTATIN CALCIUM 20 MG: 20 TABLET, FILM COATED ORAL at 06:15

## 2018-06-08 RX ADMIN — METFORMIN HYDROCHLORIDE 500 MG: 500 TABLET ORAL at 18:04

## 2018-06-08 RX ADMIN — GUAIFENESIN 600 MG: 600 TABLET, EXTENDED RELEASE ORAL at 18:03

## 2018-06-08 RX ADMIN — METFORMIN HYDROCHLORIDE 500 MG: 500 TABLET ORAL at 08:51

## 2018-06-08 ASSESSMENT — PAIN SCALES - GENERAL
PAINLEVEL_OUTOF10: 0
PAINLEVEL_OUTOF10: 0
PAINLEVEL_OUTOF10: 5
PAINLEVEL_OUTOF10: 0
PAINLEVEL_OUTOF10: 0

## 2018-06-08 ASSESSMENT — ENCOUNTER SYMPTOMS
COUGH: 0
CONSTIPATION: 0
MYALGIAS: 0
WEAKNESS: 0
DIZZINESS: 0
EYE PAIN: 0
INSOMNIA: 0
SPUTUM PRODUCTION: 0
CHILLS: 0
NAUSEA: 0
PALPITATIONS: 0
FEVER: 0
SHORTNESS OF BREATH: 0
ABDOMINAL PAIN: 0
NERVOUS/ANXIOUS: 0
HEADACHES: 0
SORE THROAT: 0

## 2018-06-08 NOTE — PROGRESS NOTES
Notified by monitor room of ST elevation on monitor throughout the night. Pt denies chest pain, SOB, and is asymptomatic. STAT EKG ordered.

## 2018-06-08 NOTE — DIETARY
"Nutrition services: Day 3 of admit.  Lai Dailey is a 48 y.o. male with admitting DX of Sepsis.    Unplanned wt loss noted on nutrition admit screen.  Attempted to speak with pt at bedside, but pt was sleeping and did not rouse to name - unable to obtain wt and PO hx.  Pt appeared well nourished.  Per chart review, pt wt on 5/21 was 93.9 kg (207 lbs) - current wt is 89.4 kg (197 lbs).       Assessment:  Height: 175.3 cm (5' 9\")  Weight: 89.4 kg (197 lb 1.5 oz)  Body mass index is 29.11 kg/m².   Diet/Intake: Diabetic; PO >50%    Evaluation:   1. Hx of diabetes.  2. Wt loss of 5% in three weeks is significant.   3. Sodium 128, Glucose 275, BUN 25, Alk phos 183, Albumin 2.7  4. Pertinent meds:  Aspirin, SSI, Metformin, Pericolace    Recommendations/Plan:  1. Encourage intake of meals.  2. Document intake of all meals as % taken in ADLs to provide interdisciplinary communication across all shifts.   3. Monitor weight.  4. Nutrition rep will continue to see patient for ongoing meal and snack preferences.           "

## 2018-06-08 NOTE — PROGRESS NOTES
[facility]    Date of Service: 2018  Note signed: 2018    Chief Complaint  48 y.o. male admitted 2018 with hx of CHF prior EF 20%, DMII, and prior AICD placement here with acute on chronic systolic heart failure, atrial fibrillation with rapid ventricular rate    Interval Problem Update    Patient was transferred out of the ICU yesterday. Patient's heart rate uncontrolled in 110s overnight. Patient on Eliqi,  assisting with prior authorization.       Consultants/Specialty  Cardiology    Disposition  TBD - home once medically clear.        Review of Systems   Constitutional: Negative for chills, fever and malaise/fatigue.   HENT: Negative for congestion.    Eyes: Negative for pain.   Respiratory: Negative for cough and shortness of breath.    Cardiovascular: Negative for chest pain, palpitations and leg swelling.   Gastrointestinal: Negative for abdominal pain, constipation and nausea.   Musculoskeletal: Negative for joint pain.   Neurological: Negative for dizziness, weakness and headaches.   Psychiatric/Behavioral: The patient is not nervous/anxious and does not have insomnia.       Physical Exam  Laboratory/Imaging   Hemodynamics  Temp (24hrs), Av.5 °C (97.7 °F), Min:36.2 °C (97.2 °F), Max:36.8 °C (98.2 °F)   Temperature: 36.6 °C (97.8 °F)  Pulse  Av.7  Min: 37  Max: 148   Blood Pressure: (!) 97/84      Respiratory      Respiration: 19, Pulse Oximetry: 95 %        RUL Breath Sounds: Diminished, RML Breath Sounds: Diminished, RLL Breath Sounds: Diminished, MADONNA Breath Sounds: Diminished, LLL Breath Sounds: Diminished    Fluids    Intake/Output Summary (Last 24 hours) at 18 1413  Last data filed at 18 1000   Gross per 24 hour   Intake             1450 ml   Output              350 ml   Net             1100 ml       Nutrition  Orders Placed This Encounter   Procedures   • Diet Order     Standing Status:   Standing     Number of Occurrences:   1     Order  Specific Question:   Diet:     Answer:   Diabetic [3]     Physical Exam   Constitutional: He is oriented to person, place, and time. He appears well-developed and well-nourished. No distress.   HENT:   Head: Normocephalic and atraumatic.   Right Ear: External ear normal.   Left Ear: External ear normal.   Nose: Nose normal.   Mouth/Throat: Oropharynx is clear and moist.   Eyes: Conjunctivae are normal. Right eye exhibits no discharge. Left eye exhibits no discharge.   Neck: No JVD present.   Cardiovascular: Normal rate, normal heart sounds and intact distal pulses.  An irregularly irregular rhythm present.   No murmur heard.  Pulmonary/Chest: Effort normal and breath sounds normal. No respiratory distress.   Abdominal: Soft. Bowel sounds are normal. There is no tenderness.   Musculoskeletal: He exhibits no edema.   Neurological: He is alert and oriented to person, place, and time.   Skin: Skin is warm. He is not diaphoretic.   Psychiatric: He has a normal mood and affect. His behavior is normal. Thought content normal.   Vitals reviewed.      Recent Labs      06/06/18   0433  06/07/18   0545  06/08/18   0540   WBC  18.3*  15.7*  16.8*   RBC  5.33  4.98  5.51   HEMOGLOBIN  15.0  13.9*  15.3   HEMATOCRIT  43.4  40.9*  45.9   MCV  81.4  82.1  83.3   MCH  28.1  27.9  27.8   MCHC  34.6  34.0  33.3*   RDW  46.5  47.5  48.4   PLATELETCT  294  204  233   MPV  12.1  11.0  10.8     Recent Labs      06/06/18   0433  06/07/18   0545  06/08/18   0540   SODIUM  127*  132*  128*   POTASSIUM  4.3  3.9  4.6   CHLORIDE  95*  104  99   CO2  23  18*  20   GLUCOSE  250*  251*  275*   BUN  68*  33*  25*   CREATININE  1.18  0.67  0.80   CALCIUM  9.0  7.3*  8.8     Recent Labs      06/05/18   1501   06/06/18   1608  06/06/18   2340  06/07/18   0545   APTT  30.0   < >  46.5*  66.5*  47.0*   INR  1.28*   --    --    --    --     < > = values in this interval not displayed.     Recent Labs      06/05/18   1501  06/08/18   0540   BNPBTYPENAT   422*  1214*              Assessment/Plan     * Severe sepsis (HCC)- (present on admission)   Assessment & Plan    This is severe sepsis with the following associated acute organ dysfunction(s): acute kidney failure. Improved   Procalcitonin is elevated and patient did have leukocytosis upon admission  abx dc'ed as cultures negative to date  Care with fluid hydration secondary to congestive heart failure  Monitor vitals and strict I's and O's          Paroxysmal atrial fibrillation (HCC)- (present on admission)   Assessment & Plan    A. fib RVR , rate uncontrolled  Cardiology consulting  metoprolol, digoxin, amiodarone, apixaban  Monitoring on telemetry  Normal TSH  SW assisting with apixaban prior auth        Acute on chronic systolic (congestive) heart failure (HCC)- (present on admission)   Assessment & Plan    5/13/18 transthoracic echocardiogram showed EF 20%, severe systolic heart failure  Cardiology consulting  Metoprolol, apixaban, digoxin, amiodarone  Spironolactone dc'ed due to hyponatremia and concern for hyperkalemia   Monitor vitals, strict I's, daily labs  Monitoring on telemetry        Type 2 diabetes mellitus with renal complication (HCC)- (present on admission)   Assessment & Plan    HgbA1c: 10 May 2018  On metformin, monitor accuchecks and cover with SSI        Leukocytosis   Assessment & Plan    Secondary to above        Troponin I above reference range   Assessment & Plan    Aspirin, statin, heparin  Due demand ischemia due to afib with rapid ventricular rate.  Monitor on telemetry  Downtrending troponin        Hyponatremia   Assessment & Plan    Patient has had improvement with fluids initially  Monitor daily BMP          Total bilirubin, elevated   Assessment & Plan    Mild elevation, improving  Abdominal ultrasound unremarkable  Likely hepatic congestion  Monitor CMP        Prolonged Q-T interval on ECG   Assessment & Plan    QTc improving, monitor on telemetry        Acute renal failure (HCC)-  (present on admission)   Assessment & Plan    Secondary to congestive heart failure and uncontrolled heart rate, improved  Rate control, diuresis  Monitor daily BMP and vitals  Strict I's and O          Cardiomyopathy (HCC)- (present on admission)   Assessment & Plan    See above heart failure notes        AICD (automatic cardioverter/defibrillator) present- (present on admission)   Assessment & Plan    ERP requested ICD interrogation, showed atrial fibrillation.          Quality-Core Measures   Reviewed items::  Labs reviewed and Medications reviewed  Jensen catheter::  No Jensen  DVT prophylaxis pharmacological::  Heparin  Ulcer Prophylaxis::  Not indicated  Antibiotics:  Treating active infection/contamination beyond 24 hours perioperative coverage

## 2018-06-08 NOTE — PROGRESS NOTES
Cardiology Progress Note               Author: Bhavin Rae Date & Time created: 6/8/2018  11:03 AM     Interval History:   The patient is a 48-year-old -American   male with a history of nonischemic cardiomyopathy, severe systolic congestive   heart failure, left ventricular ejection fraction 20%, severe mitral   regurgitation, severe tricuspid regurgitation, AICD implantation, diabetes   mellitus, hypertension, and hyperlipidemia, who presents to [facility] with at least 10-day to 2-week history of progressive fatigue,   weakness, no energy and anorexia in addition to new onset of atrial fibrillation with a rapid ventricular response with a new left bundle branch block.  6/6: Overall feeling better less tired.  No cardiac symptoms.  6/7: /90.  Pulse 103.  Atrial fibrillation rate improved.  Symptomatically feels much better.  No cardiac symptoms.  6/8: /64.  Pulse 123.  Atrial fibrillation.  Continues to improve feeling better.    Chief Complaint:  Tired and dizzy    Review of Systems   Constitutional: Positive for malaise/fatigue.   Respiratory: Negative for cough and shortness of breath.    Cardiovascular: Negative for chest pain and palpitations.   Musculoskeletal: Negative for myalgias.   Neurological: Negative for dizziness and headaches.       Physical Exam   Constitutional: He is oriented to person, place, and time. He appears well-nourished. No distress.   HENT:   Head: Normocephalic and atraumatic.   Neck: No JVD present.       Cardiovascular: Normal rate, S1 normal, S2 normal and intact distal pulses.  An irregularly irregular rhythm present. Exam reveals no gallop and no friction rub.    Murmur heard.  Pulmonary/Chest: Effort normal and breath sounds normal. He has no wheezes. He has no rhonchi. He has no rales.   Musculoskeletal: He exhibits no edema.   Neurological: He is alert and oriented to person, place, and time.   Skin: Skin is warm and dry.   Psychiatric:  He has a normal mood and affect. His behavior is normal.       Hemodynamics:  Temp (24hrs), Av.5 °C (97.7 °F), Min:36.2 °C (97.2 °F), Max:36.8 °C (98.2 °F)  Temperature: 36.4 °C (97.6 °F)  Pulse  Av.2  Min: 37  Max: 148Heart Rate (Monitored): (!) 103  Blood Pressure: 103/64, NIBP: 104/71     Respiratory:    Respiration: 18, Pulse Oximetry: 95 %        RUL Breath Sounds: Diminished, RML Breath Sounds: Diminished, RLL Breath Sounds: Diminished, MADONNA Breath Sounds: Diminished, LLL Breath Sounds: Diminished  Fluids:  Date 18 07 - 18 0659   Shift 5526-7036 5830-8041 1437-9266 24 Hour Total   I  N  T  A  K  E   Shift Total       O  U  T  P  U  T   Urine 150   150    Shift Total 150   150   Weight (kg) 85 85 85 85       Weight: 89.4 kg (197 lb 1.5 oz)  GI/Nutrition:  Orders Placed This Encounter   Procedures   • Diet Order     Standing Status:   Standing     Number of Occurrences:   1     Order Specific Question:   Diet:     Answer:   Diabetic [3]     Lab Results:  Recent Labs      18   0433  18   0545  18   0540   WBC  18.3*  15.7*  16.8*   RBC  5.33  4.98  5.51   HEMOGLOBIN  15.0  13.9*  15.3   HEMATOCRIT  43.4  40.9*  45.9   MCV  81.4  82.1  83.3   MCH  28.1  27.9  27.8   MCHC  34.6  34.0  33.3*   RDW  46.5  47.5  48.4   PLATELETCT  294  204  233   MPV  12.1  11.0  10.8     Recent Labs      18   0433  18   0545  18   0540   SODIUM  127*  132*  128*   POTASSIUM  4.3  3.9  4.6   CHLORIDE  95*  104  99   CO2  23  18*  20   GLUCOSE  250*  251*  275*   BUN  68*  33*  25*   CREATININE  1.18  0.67  0.80   CALCIUM  9.0  7.3*  8.8     Recent Labs      18   1501   06/06/18   1608  18   2340  18   0545   APTT  30.0   < >  46.5*  66.5*  47.0*   INR  1.28*   --    --    --    --     < > = values in this interval not displayed.     Recent Labs      18   1501  18   0540   BNPBTYPENAT  422*  1214*     Recent Labs      18   1501  18    2030  06/06/18   0433  06/06/18   0915  06/08/18   0540   TROPONINI  0.10*  0.07*  0.08*  0.08*   --    BNPBTYPENAT  422*   --    --    --   1214*             Medical Decision Making, by Problem:  Active Hospital Problems    Diagnosis   • *Severe sepsis (HCC) [A41.9, R65.20]   • Paroxysmal atrial fibrillation (HCC) [I48.0]   • Acute on chronic systolic (congestive) heart failure (HCC) [I50.23]   • Type 2 diabetes mellitus with renal complication (HCC) [E11.29]   • Cardiomyopathy (HCC) [I42.9]   • AICD (automatic cardioverter/defibrillator) present [Z95.810]   • Prolonged Q-T interval on ECG [R94.31]   • Total bilirubin, elevated [R17]   • Hyponatremia [E87.1]   • Troponin I above reference range [R74.8]   • Acute renal failure (HCC) [N17.9]       Assessment and plan:  Atrial fibrillation.  Persistent.  Heart rate improved.    Amiodarone therapy.  Chronic since 2016.    Systolic CHF with low cardiac output syndrome with relative volume depletion.  Improved.    Nonischemic cardiomyopathy.  EF 20%.  Severe.    Mitral regurgitation.  Severe.    Left bundle branch block.    Anticoagulation.  On Eliquis.    Uncontrolled diabetes mellitus.  Improving.    Renal insufficiency.  Acute on chronic.  Improved.    AICD.  Single chamber.  Functioning normally by interrogation.    Sepsis syndrome.  Improving.    Plan:  1.  Continue digoxin, amiodarone and metoprolol.  2.  Change metoprolol to metoprolol SR 25 mg.  3.  Spironolactone not indicated because of hyponatremia of 128 and a propensity for hyperkalemia as noted at the time of his 5/2018 hospitalization.      Quality-Core Measures   Reviewed items::  EKG reviewed, Radiology images reviewed, Labs reviewed and Medications reviewed

## 2018-06-08 NOTE — CARE PLAN
Problem: Safety  Goal: Will remain free from falls  Outcome: PROGRESSING AS EXPECTED  Fall precautions in place, call light w/in reach.  Pt calls appropriately.    Problem: Knowledge Deficit  Goal: Knowledge of the prescribed therapeutic regimen will improve  Outcome: PROGRESSING AS EXPECTED  Discussed POC, verbalized understanding.  No further questions at this time.

## 2018-06-08 NOTE — PROGRESS NOTES
Bedside report received from day nurse. Assumed care of pt. Pt awake, laying in bed. A/Ox4, VSS. No complaints at this time. POC reviewed and white board updated. Tele box on. Call light in reach. Bed locked in lowest position with 2 upper bed rails up.

## 2018-06-08 NOTE — PROGRESS NOTES
Updated Dr. Rae of pt's MEWS, bp in the 90's and hr afib 110-130s.  Pt denies any pain or SOB.  MD to place parameters for metoprolol.  Will follow through.  Will continue to monitor.

## 2018-06-09 LAB
ANION GAP SERPL CALC-SCNC: 8 MMOL/L (ref 0–11.9)
BASOPHILS # BLD AUTO: 0.3 % (ref 0–1.8)
BASOPHILS # BLD: 0.05 K/UL (ref 0–0.12)
BUN SERPL-MCNC: 21 MG/DL (ref 8–22)
CALCIUM SERPL-MCNC: 8.7 MG/DL (ref 8.5–10.5)
CHLORIDE SERPL-SCNC: 99 MMOL/L (ref 96–112)
CO2 SERPL-SCNC: 22 MMOL/L (ref 20–33)
CREAT SERPL-MCNC: 0.73 MG/DL (ref 0.5–1.4)
EKG IMPRESSION: NORMAL
EOSINOPHIL # BLD AUTO: 0.02 K/UL (ref 0–0.51)
EOSINOPHIL NFR BLD: 0.1 % (ref 0–6.9)
ERYTHROCYTE [DISTWIDTH] IN BLOOD BY AUTOMATED COUNT: 48.2 FL (ref 35.9–50)
GLUCOSE BLD-MCNC: 184 MG/DL (ref 65–99)
GLUCOSE BLD-MCNC: 238 MG/DL (ref 65–99)
GLUCOSE BLD-MCNC: 334 MG/DL (ref 65–99)
GLUCOSE BLD-MCNC: 353 MG/DL (ref 65–99)
GLUCOSE SERPL-MCNC: 228 MG/DL (ref 65–99)
HCT VFR BLD AUTO: 44.8 % (ref 42–52)
HGB BLD-MCNC: 14.7 G/DL (ref 14–18)
IMM GRANULOCYTES # BLD AUTO: 0.08 K/UL (ref 0–0.11)
IMM GRANULOCYTES NFR BLD AUTO: 0.5 % (ref 0–0.9)
LYMPHOCYTES # BLD AUTO: 1.96 K/UL (ref 1–4.8)
LYMPHOCYTES NFR BLD: 13.4 % (ref 22–41)
MAGNESIUM SERPL-MCNC: 1.5 MG/DL (ref 1.5–2.5)
MCH RBC QN AUTO: 27.3 PG (ref 27–33)
MCHC RBC AUTO-ENTMCNC: 32.8 G/DL (ref 33.7–35.3)
MCV RBC AUTO: 83.3 FL (ref 81.4–97.8)
MONOCYTES # BLD AUTO: 0.88 K/UL (ref 0–0.85)
MONOCYTES NFR BLD AUTO: 6 % (ref 0–13.4)
NEUTROPHILS # BLD AUTO: 11.67 K/UL (ref 1.82–7.42)
NEUTROPHILS NFR BLD: 79.7 % (ref 44–72)
NRBC # BLD AUTO: 0 K/UL
NRBC BLD-RTO: 0 /100 WBC
PHOSPHATE SERPL-MCNC: 2.6 MG/DL (ref 2.5–4.5)
PLATELET # BLD AUTO: 237 K/UL (ref 164–446)
PMV BLD AUTO: 11.1 FL (ref 9–12.9)
POTASSIUM SERPL-SCNC: 4.5 MMOL/L (ref 3.6–5.5)
RBC # BLD AUTO: 5.38 M/UL (ref 4.7–6.1)
SODIUM SERPL-SCNC: 129 MMOL/L (ref 135–145)
WBC # BLD AUTO: 14.7 K/UL (ref 4.8–10.8)

## 2018-06-09 PROCEDURE — A9270 NON-COVERED ITEM OR SERVICE: HCPCS | Performed by: HOSPITALIST

## 2018-06-09 PROCEDURE — 85025 COMPLETE CBC W/AUTO DIFF WBC: CPT

## 2018-06-09 PROCEDURE — 700102 HCHG RX REV CODE 250 W/ 637 OVERRIDE(OP): Performed by: HOSPITALIST

## 2018-06-09 PROCEDURE — 700102 HCHG RX REV CODE 250 W/ 637 OVERRIDE(OP): Performed by: INTERNAL MEDICINE

## 2018-06-09 PROCEDURE — 84100 ASSAY OF PHOSPHORUS: CPT

## 2018-06-09 PROCEDURE — A9270 NON-COVERED ITEM OR SERVICE: HCPCS | Performed by: INTERNAL MEDICINE

## 2018-06-09 PROCEDURE — 80048 BASIC METABOLIC PNL TOTAL CA: CPT

## 2018-06-09 PROCEDURE — 99233 SBSQ HOSP IP/OBS HIGH 50: CPT | Performed by: INTERNAL MEDICINE

## 2018-06-09 PROCEDURE — 36415 COLL VENOUS BLD VENIPUNCTURE: CPT

## 2018-06-09 PROCEDURE — 82962 GLUCOSE BLOOD TEST: CPT | Mod: 91

## 2018-06-09 PROCEDURE — 700101 HCHG RX REV CODE 250: Performed by: INTERNAL MEDICINE

## 2018-06-09 PROCEDURE — 83735 ASSAY OF MAGNESIUM: CPT

## 2018-06-09 PROCEDURE — 770020 HCHG ROOM/CARE - TELE (206)

## 2018-06-09 RX ORDER — METOPROLOL TARTRATE 1 MG/ML
5 INJECTION, SOLUTION INTRAVENOUS EVERY 6 HOURS PRN
Status: DISCONTINUED | OUTPATIENT
Start: 2018-06-09 | End: 2018-06-12 | Stop reason: HOSPADM

## 2018-06-09 RX ADMIN — INSULIN HUMAN 4 UNITS: 100 INJECTION, SOLUTION PARENTERAL at 17:40

## 2018-06-09 RX ADMIN — METFORMIN HYDROCHLORIDE 500 MG: 500 TABLET ORAL at 17:36

## 2018-06-09 RX ADMIN — INSULIN HUMAN 1 UNITS: 100 INJECTION, SOLUTION PARENTERAL at 08:24

## 2018-06-09 RX ADMIN — ACETAMINOPHEN 650 MG: 325 TABLET, FILM COATED ORAL at 20:25

## 2018-06-09 RX ADMIN — GUAIFENESIN 600 MG: 600 TABLET, EXTENDED RELEASE ORAL at 06:34

## 2018-06-09 RX ADMIN — AMIODARONE HYDROCHLORIDE 200 MG: 200 TABLET ORAL at 06:35

## 2018-06-09 RX ADMIN — METOPROLOL TARTRATE 5 MG: 1 INJECTION, SOLUTION INTRAVENOUS at 00:54

## 2018-06-09 RX ADMIN — DIGOXIN 125 MCG: 125 TABLET ORAL at 06:35

## 2018-06-09 RX ADMIN — ASPIRIN 81 MG: 81 TABLET, COATED ORAL at 06:36

## 2018-06-09 RX ADMIN — ATORVASTATIN CALCIUM 20 MG: 20 TABLET, FILM COATED ORAL at 06:36

## 2018-06-09 RX ADMIN — APIXABAN 5 MG: 5 TABLET, FILM COATED ORAL at 20:25

## 2018-06-09 RX ADMIN — METFORMIN HYDROCHLORIDE 500 MG: 500 TABLET ORAL at 08:22

## 2018-06-09 RX ADMIN — GUAIFENESIN 600 MG: 600 TABLET, EXTENDED RELEASE ORAL at 20:25

## 2018-06-09 RX ADMIN — INSULIN HUMAN 2 UNITS: 100 INJECTION, SOLUTION PARENTERAL at 12:01

## 2018-06-09 RX ADMIN — METOPROLOL SUCCINATE 25 MG: 25 TABLET, EXTENDED RELEASE ORAL at 06:34

## 2018-06-09 RX ADMIN — INSULIN HUMAN 5 UNITS: 100 INJECTION, SOLUTION PARENTERAL at 20:29

## 2018-06-09 RX ADMIN — APIXABAN 5 MG: 5 TABLET, FILM COATED ORAL at 06:35

## 2018-06-09 RX ADMIN — ACETAMINOPHEN 650 MG: 325 TABLET, FILM COATED ORAL at 00:54

## 2018-06-09 ASSESSMENT — ENCOUNTER SYMPTOMS
FEVER: 0
CONSTIPATION: 0
HEADACHES: 0
MYALGIAS: 0
SHORTNESS OF BREATH: 0
COUGH: 0
NAUSEA: 0
PALPITATIONS: 0
DIZZINESS: 0
CHILLS: 0
INSOMNIA: 0

## 2018-06-09 ASSESSMENT — PATIENT HEALTH QUESTIONNAIRE - PHQ9
2. FEELING DOWN, DEPRESSED, IRRITABLE, OR HOPELESS: NOT AT ALL
SUM OF ALL RESPONSES TO PHQ9 QUESTIONS 1 AND 2: 0
1. LITTLE INTEREST OR PLEASURE IN DOING THINGS: NOT AT ALL

## 2018-06-09 ASSESSMENT — PAIN SCALES - GENERAL
PAINLEVEL_OUTOF10: 0
PAINLEVEL_OUTOF10: 8
PAINLEVEL_OUTOF10: 0

## 2018-06-09 NOTE — PROGRESS NOTES
Patient received a.fib on the monitor on room air, vitals wnl, denies all pain and sob, alert times 4, compliant with all am meds, encouraged to remain in bed until heart rate is under control, patient appears anxious and restless at times, call light within reach, will continue to monitor.

## 2018-06-09 NOTE — PROGRESS NOTES
Cardiology Progress Note               Author: Bhavin Rae Date & Time created: 6/9/2018  10:50 AM     Interval History:   The patient is a 48-year-old -American   male with a history of nonischemic cardiomyopathy, severe systolic congestive   heart failure, left ventricular ejection fraction 20%, severe mitral   regurgitation, severe tricuspid regurgitation, AICD implantation, diabetes   mellitus, hypertension, and hyperlipidemia, who presents to [facility] with at least 10-day to 2-week history of progressive fatigue,   weakness, no energy and anorexia in addition to new onset of atrial fibrillation with a rapid ventricular response with a new left bundle branch block.  6/6: Overall feeling better less tired.  No cardiac symptoms.  6/7: /90.  Pulse 103.  Atrial fibrillation rate improved.  Symptomatically feels much better.  No cardiac symptoms.  6/8: /64.  Pulse 123.  Atrial fibrillation.  Continues to improve feeling better.  6/8: /88.  Pulse 115.  Atrial fibrillation.  Symptomatically continues to feel stronger.  Ambulating in the hallway.  With ambulation patient's heart rate increases to 160s-170s.  Medications including metoprolol intermittently held due to asymptomatic low blood pressure  Chief Complaint:  Tired and dizzy    Review of Systems   Constitutional: Negative for malaise/fatigue.   Respiratory: Negative for cough and shortness of breath.    Cardiovascular: Negative for chest pain and palpitations.   Musculoskeletal: Negative for myalgias.   Neurological: Negative for dizziness.       Physical Exam   Constitutional: He is oriented to person, place, and time. He appears well-nourished. No distress.   HENT:   Head: Normocephalic and atraumatic.   Neck: No JVD present.       Cardiovascular: Normal rate, S1 normal, S2 normal and intact distal pulses.  An irregularly irregular rhythm present. Exam reveals no gallop and no friction rub.    Murmur  heard.  Pulmonary/Chest: Effort normal and breath sounds normal. He has no wheezes. He has no rhonchi. He has no rales.   Musculoskeletal: He exhibits no edema.   Neurological: He is alert and oriented to person, place, and time.   Skin: Skin is warm and dry.   Psychiatric: He has a normal mood and affect. His behavior is normal.       Hemodynamics:  Temp (24hrs), Av.2 °C (97.2 °F), Min:35.9 °C (96.6 °F), Max:36.6 °C (97.8 °F)  Temperature: 36.2 °C (97.2 °F)  Pulse  Av.8  Min: 37  Max: 148  Blood Pressure: 107/88     Respiratory:    Respiration: 18, Pulse Oximetry: 90 %        RUL Breath Sounds: Diminished, RML Breath Sounds: Diminished, RLL Breath Sounds: Diminished, MADONNA Breath Sounds: Diminished, LLL Breath Sounds: Diminished  Fluids:  Date 18 07 - 18 0659   Shift 8451-2440 8029-4559 7305-5440 24 Hour Total   I  N  T  A  K  E   Shift Total       O  U  T  P  U  T   Urine 150   150    Shift Total 150   150   Weight (kg) 85 85 85 85       Weight: 90 kg (198 lb 6.6 oz)  GI/Nutrition:  Orders Placed This Encounter   Procedures   • Diet Order     Standing Status:   Standing     Number of Occurrences:   1     Order Specific Question:   Diet:     Answer:   Diabetic [3]     Lab Results:  Recent Labs      18   0545  18   0540  18   0505   WBC  15.7*  16.8*  14.7*   RBC  4.98  5.51  5.38   HEMOGLOBIN  13.9*  15.3  14.7   HEMATOCRIT  40.9*  45.9  44.8   MCV  82.1  83.3  83.3   MCH  27.9  27.8  27.3   MCHC  34.0  33.3*  32.8*   RDW  47.5  48.4  48.2   PLATELETCT  204  233  237   MPV  11.0  10.8  11.1     Recent Labs      18   0545  18   0540  18   0505   SODIUM  132*  128*  129*   POTASSIUM  3.9  4.6  4.5   CHLORIDE  104  99  99   CO2  18*  20  22   GLUCOSE  251*  275*  228*   BUN  33*  25*  21   CREATININE  0.67  0.80  0.73   CALCIUM  7.3*  8.8  8.7     Recent Labs      18   1608  18   2340  18   0545   APTT  46.5*  66.5*  47.0*     Recent Labs       06/08/18   0540   BNPBTYPENAT  1214*     Recent Labs      06/08/18   0540   BNPBTYPENAT  1214*             Medical Decision Making, by Problem:  Active Hospital Problems    Diagnosis   • *Severe sepsis (HCC) [A41.9, R65.20]   • Paroxysmal atrial fibrillation (HCC) [I48.0]   • Acute on chronic systolic (congestive) heart failure (HCC) [I50.23]   • Type 2 diabetes mellitus with renal complication (HCC) [E11.29]   • Cardiomyopathy (HCC) [I42.9]   • AICD (automatic cardioverter/defibrillator) present [Z95.810]   • Prolonged Q-T interval on ECG [R94.31]   • Total bilirubin, elevated [R17]   • Hyponatremia [E87.1]   • Troponin I above reference range [R74.8]   • Acute renal failure (HCC) [N17.9]       Assessment and plan:  Atrial fibrillation.  Persistent.  Labile ventricular response.    Amiodarone therapy.  Chronic since 2016.    Systolic CHF with low cardiac output syndrome with relative volume depletion.  Clinically resolving.    Nonischemic cardiomyopathy.  EF 20%.  Severe.    Mitral regurgitation.  Severe.    Left bundle branch block.    Anticoagulation.  On Eliquis.    Uncontrolled diabetes mellitus.  Improving.    Renal insufficiency.  Acute on chronic.  Resolved.    AICD.  Single chamber.  Functioning normally by interrogation.    Sepsis syndrome.  Resolving.    Discussion and Plan:  1.  Atrial fibrillation rate difficult to control despite comprehensive rate control therapy, limited due to low blood pressure.  2.  Options include:  A.  Attempting to restore normal sinus rhythm with electrical cardioversion however based on echocardiographic findings likelihood of acute restoration and long-term maintenance of normal sinus rhythm unlikely.  B.  EP consultation for consideration of AV pato ablation with AICD upgrade to biventricular pacemaker.  3.  Presently continue current therapy.  4.  EP consultation on Monday.  5.  Spironolactone not indicated because of hyponatremia of 128 and a propensity for hyperkalemia  as noted at the time of his 5/2018 hospitalization.      Quality-Core Measures   Reviewed items::  EKG reviewed, Radiology images reviewed, Labs reviewed and Medications reviewed

## 2018-06-09 NOTE — PROGRESS NOTES
[facility]    Date of Service: 2018  Note signed: 2018    Chief Complaint  48 y.o. male admitted 2018 with hx of CHF prior EF 20%, DMII, and prior AICD placement here with acute on chronic systolic heart failure, atrial fibrillation with rapid ventricular rate    Interval Problem Update    Patient's rate uncontrolled 110-130s, electrophysiology consulted for possible AV node ablation with biventricular pacemaker placement. Continue to monitor patient closely.    Consultants/Specialty  Cardiology    Disposition  TBD - home once medically clear.        Review of Systems   Constitutional: Negative for chills and fever.   Respiratory: Negative for cough and shortness of breath.    Cardiovascular: Negative for chest pain, palpitations and leg swelling.   Gastrointestinal: Negative for constipation and nausea.   Neurological: Negative for dizziness and headaches.   Psychiatric/Behavioral: The patient does not have insomnia.       Physical Exam  Laboratory/Imaging   Hemodynamics  Temp (24hrs), Av.2 °C (97.2 °F), Min:35.9 °C (96.6 °F), Max:36.6 °C (97.8 °F)   Temperature: 36.2 °C (97.2 °F)  Pulse  Av.8  Min: 37  Max: 148   Blood Pressure: 107/88      Respiratory      Respiration: 18, Pulse Oximetry: 90 %        RUL Breath Sounds: Diminished, RML Breath Sounds: Diminished, RLL Breath Sounds: Diminished, MADONNA Breath Sounds: Diminished, LLL Breath Sounds: Diminished    Fluids  No intake or output data in the 24 hours ending 18 1117    Nutrition  Orders Placed This Encounter   Procedures   • Diet Order     Standing Status:   Standing     Number of Occurrences:   1     Order Specific Question:   Diet:     Answer:   Diabetic [3]     Physical Exam   Constitutional: He is oriented to person, place, and time. He appears well-developed and well-nourished. No distress.   HENT:   Head: Normocephalic and atraumatic.   Right Ear: External ear normal.   Left Ear: External ear normal.    Mouth/Throat: Oropharynx is clear and moist.   Eyes: Conjunctivae are normal. Right eye exhibits no discharge. Left eye exhibits no discharge.   Neck: Neck supple. No JVD present.   Cardiovascular: Normal heart sounds and intact distal pulses.  An irregularly irregular rhythm present. Tachycardia present.    No murmur heard.  Pacemaker left anterior chest wall   Pulmonary/Chest: Effort normal and breath sounds normal. No respiratory distress.   Abdominal: Soft. Bowel sounds are normal. There is no tenderness.   Musculoskeletal: He exhibits no edema.   Neurological: He is alert and oriented to person, place, and time. No cranial nerve deficit.   Skin: Skin is warm. No rash noted. He is not diaphoretic.   Psychiatric: He has a normal mood and affect. His behavior is normal. Thought content normal.   Vitals reviewed.      Recent Labs      06/07/18   0545  06/08/18   0540  06/09/18   0505   WBC  15.7*  16.8*  14.7*   RBC  4.98  5.51  5.38   HEMOGLOBIN  13.9*  15.3  14.7   HEMATOCRIT  40.9*  45.9  44.8   MCV  82.1  83.3  83.3   MCH  27.9  27.8  27.3   MCHC  34.0  33.3*  32.8*   RDW  47.5  48.4  48.2   PLATELETCT  204  233  237   MPV  11.0  10.8  11.1     Recent Labs      06/07/18   0545  06/08/18   0540  06/09/18   0505   SODIUM  132*  128*  129*   POTASSIUM  3.9  4.6  4.5   CHLORIDE  104  99  99   CO2  18*  20  22   GLUCOSE  251*  275*  228*   BUN  33*  25*  21   CREATININE  0.67  0.80  0.73   CALCIUM  7.3*  8.8  8.7     Recent Labs      06/06/18   1608  06/06/18   2340  06/07/18   0545   APTT  46.5*  66.5*  47.0*     Recent Labs      06/08/18   0540   BNPBTYPENAT  1214*              Assessment/Plan     * Severe sepsis (HCC)- (present on admission)   Assessment & Plan    This is severe sepsis with the following associated acute organ dysfunction(s): acute kidney failure. Improved   Procalcitonin is elevated and patient did have leukocytosis upon admission  abx dc'ed as cultures negative to date  Care with fluid  hydration secondary to congestive heart failure  Monitor vitals and strict I's and O's          Paroxysmal atrial fibrillation (HCC)- (present on admission)   Assessment & Plan    A. fib RVR , rate uncontrolled, EP consulted for possible AV pato ablation with biventricular pacemaker placement   Cardiology consulting  metoprolol, digoxin, amiodarone, apixaban  Monitoring on telemetry  Normal TSH  SW assisting with apixaban prior auth        Acute on chronic systolic (congestive) heart failure (HCC)- (present on admission)   Assessment & Plan    5/13/18 transthoracic echocardiogram showed EF 20%, severe systolic heart failure  Cardiology consulting  Metoprolol, apixaban, digoxin, amiodarone  Spironolactone dc'ed due to hyponatremia and concern for hyperkalemia   Monitor vitals, strict I's, daily labs  Monitoring on telemetry        Type 2 diabetes mellitus with renal complication (HCC)- (present on admission)   Assessment & Plan    HgbA1c: 10 May 2018  On metformin, monitor accuchecks and cover with SSI        Leukocytosis   Assessment & Plan    Secondary to above        Troponin I above reference range   Assessment & Plan    Aspirin, statin, heparin  Due demand ischemia due to afib with rapid ventricular rate.  Monitor on telemetry  Downtrending troponin        Hyponatremia   Assessment & Plan    Patient has had improvement with fluids initially  Monitor  BMP          Total bilirubin, elevated   Assessment & Plan    Mild elevation, improving  Abdominal ultrasound unremarkable  Likely hepatic congestion  Monitor CMP        Prolonged Q-T interval on ECG   Assessment & Plan    QTc improving, monitor on telemetry        Acute renal failure (HCC)- (present on admission)   Assessment & Plan    Secondary to congestive heart failure and uncontrolled heart rate, improved  Monitor BMP and vitals  Strict I's and O          Cardiomyopathy (HCC)- (present on admission)   Assessment & Plan    See above heart failure notes         AICD (automatic cardioverter/defibrillator) present- (present on admission)   Assessment & Plan    ERP requested ICD interrogation, showed atrial fibrillation.          Quality-Core Measures   Reviewed items::  Labs reviewed and Medications reviewed  Jensen catheter::  No Jensen  DVT: Eliquis.  Ulcer Prophylaxis::  Not indicated

## 2018-06-09 NOTE — PROGRESS NOTES
Received report from Herminia at change of shift. Pt sitting on side of bed, call light within reach. Family at bedside. No complaints of pain or sob.

## 2018-06-09 NOTE — PROGRESS NOTES
Called Dr. Keith at 0025. Told him pt had been sustaining in the 120s-130s for the past hour or so. He had been walking earlier this evening and gone up to 160s but he has been sleeping for the past hour. Told him pt had not received po metoprolol today do to low bp. bp now is 111/79 hr 130s. Ordered iv metoprolol.

## 2018-06-10 PROBLEM — E83.42 HYPOMAGNESEMIA: Status: ACTIVE | Noted: 2018-06-10

## 2018-06-10 LAB
ANION GAP SERPL CALC-SCNC: 7 MMOL/L (ref 0–11.9)
BACTERIA BLD CULT: NORMAL
BACTERIA BLD CULT: NORMAL
BASOPHILS # BLD AUTO: 0.2 % (ref 0–1.8)
BASOPHILS # BLD: 0.03 K/UL (ref 0–0.12)
BUN SERPL-MCNC: 16 MG/DL (ref 8–22)
CALCIUM SERPL-MCNC: 8.7 MG/DL (ref 8.5–10.5)
CHLORIDE SERPL-SCNC: 100 MMOL/L (ref 96–112)
CO2 SERPL-SCNC: 23 MMOL/L (ref 20–33)
CREAT SERPL-MCNC: 0.64 MG/DL (ref 0.5–1.4)
DIGOXIN SERPL-MCNC: 0.7 NG/ML (ref 0.8–2)
EOSINOPHIL # BLD AUTO: 0.01 K/UL (ref 0–0.51)
EOSINOPHIL NFR BLD: 0.1 % (ref 0–6.9)
ERYTHROCYTE [DISTWIDTH] IN BLOOD BY AUTOMATED COUNT: 46.3 FL (ref 35.9–50)
GLUCOSE BLD-MCNC: 159 MG/DL (ref 65–99)
GLUCOSE BLD-MCNC: 307 MG/DL (ref 65–99)
GLUCOSE BLD-MCNC: 375 MG/DL (ref 65–99)
GLUCOSE SERPL-MCNC: 188 MG/DL (ref 65–99)
HCT VFR BLD AUTO: 42.9 % (ref 42–52)
HGB BLD-MCNC: 14.7 G/DL (ref 14–18)
IMM GRANULOCYTES # BLD AUTO: 0.06 K/UL (ref 0–0.11)
IMM GRANULOCYTES NFR BLD AUTO: 0.5 % (ref 0–0.9)
LYMPHOCYTES # BLD AUTO: 1.98 K/UL (ref 1–4.8)
LYMPHOCYTES NFR BLD: 15.3 % (ref 22–41)
MAGNESIUM SERPL-MCNC: 1.3 MG/DL (ref 1.5–2.5)
MCH RBC QN AUTO: 27.9 PG (ref 27–33)
MCHC RBC AUTO-ENTMCNC: 34.3 G/DL (ref 33.7–35.3)
MCV RBC AUTO: 81.6 FL (ref 81.4–97.8)
MONOCYTES # BLD AUTO: 0.92 K/UL (ref 0–0.85)
MONOCYTES NFR BLD AUTO: 7.1 % (ref 0–13.4)
NEUTROPHILS # BLD AUTO: 9.9 K/UL (ref 1.82–7.42)
NEUTROPHILS NFR BLD: 76.8 % (ref 44–72)
NRBC # BLD AUTO: 0 K/UL
NRBC BLD-RTO: 0 /100 WBC
PHOSPHATE SERPL-MCNC: 3 MG/DL (ref 2.5–4.5)
PLATELET # BLD AUTO: 235 K/UL (ref 164–446)
PMV BLD AUTO: 11.5 FL (ref 9–12.9)
POTASSIUM SERPL-SCNC: 4.3 MMOL/L (ref 3.6–5.5)
RBC # BLD AUTO: 5.26 M/UL (ref 4.7–6.1)
SIGNIFICANT IND 70042: NORMAL
SIGNIFICANT IND 70042: NORMAL
SITE SITE: NORMAL
SITE SITE: NORMAL
SODIUM SERPL-SCNC: 130 MMOL/L (ref 135–145)
SOURCE SOURCE: NORMAL
SOURCE SOURCE: NORMAL
WBC # BLD AUTO: 12.9 K/UL (ref 4.8–10.8)

## 2018-06-10 PROCEDURE — 700102 HCHG RX REV CODE 250 W/ 637 OVERRIDE(OP): Performed by: INTERNAL MEDICINE

## 2018-06-10 PROCEDURE — A9270 NON-COVERED ITEM OR SERVICE: HCPCS | Performed by: HOSPITALIST

## 2018-06-10 PROCEDURE — 82962 GLUCOSE BLOOD TEST: CPT | Mod: 91

## 2018-06-10 PROCEDURE — 700111 HCHG RX REV CODE 636 W/ 250 OVERRIDE (IP): Performed by: INTERNAL MEDICINE

## 2018-06-10 PROCEDURE — 99233 SBSQ HOSP IP/OBS HIGH 50: CPT | Performed by: INTERNAL MEDICINE

## 2018-06-10 PROCEDURE — A9270 NON-COVERED ITEM OR SERVICE: HCPCS | Performed by: INTERNAL MEDICINE

## 2018-06-10 PROCEDURE — 85025 COMPLETE CBC W/AUTO DIFF WBC: CPT

## 2018-06-10 PROCEDURE — 80048 BASIC METABOLIC PNL TOTAL CA: CPT

## 2018-06-10 PROCEDURE — 770020 HCHG ROOM/CARE - TELE (206)

## 2018-06-10 PROCEDURE — 700102 HCHG RX REV CODE 250 W/ 637 OVERRIDE(OP): Performed by: HOSPITALIST

## 2018-06-10 PROCEDURE — 80162 ASSAY OF DIGOXIN TOTAL: CPT

## 2018-06-10 PROCEDURE — 84100 ASSAY OF PHOSPHORUS: CPT

## 2018-06-10 PROCEDURE — 83735 ASSAY OF MAGNESIUM: CPT

## 2018-06-10 PROCEDURE — 36415 COLL VENOUS BLD VENIPUNCTURE: CPT

## 2018-06-10 RX ORDER — SPIRONOLACTONE 25 MG/1
25 TABLET ORAL
Status: DISCONTINUED | OUTPATIENT
Start: 2018-06-10 | End: 2018-06-12 | Stop reason: HOSPADM

## 2018-06-10 RX ORDER — INSULIN GLARGINE 100 [IU]/ML
10 INJECTION, SOLUTION SUBCUTANEOUS EVERY EVENING
Status: DISCONTINUED | OUTPATIENT
Start: 2018-06-10 | End: 2018-06-12 | Stop reason: HOSPADM

## 2018-06-10 RX ORDER — MAGNESIUM SULFATE HEPTAHYDRATE 40 MG/ML
4 INJECTION, SOLUTION INTRAVENOUS ONCE
Status: COMPLETED | OUTPATIENT
Start: 2018-06-10 | End: 2018-06-10

## 2018-06-10 RX ORDER — METOPROLOL SUCCINATE 25 MG/1
25 TABLET, EXTENDED RELEASE ORAL 2 TIMES DAILY
Status: DISCONTINUED | OUTPATIENT
Start: 2018-06-10 | End: 2018-06-11

## 2018-06-10 RX ADMIN — METOPROLOL SUCCINATE 25 MG: 25 TABLET, EXTENDED RELEASE ORAL at 06:00

## 2018-06-10 RX ADMIN — INSULIN HUMAN 4 UNITS: 100 INJECTION, SOLUTION PARENTERAL at 19:35

## 2018-06-10 RX ADMIN — ACETAMINOPHEN 650 MG: 325 TABLET, FILM COATED ORAL at 19:30

## 2018-06-10 RX ADMIN — AMIODARONE HYDROCHLORIDE 200 MG: 200 TABLET ORAL at 05:10

## 2018-06-10 RX ADMIN — METOPROLOL SUCCINATE 25 MG: 25 TABLET, EXTENDED RELEASE ORAL at 17:06

## 2018-06-10 RX ADMIN — GUAIFENESIN 600 MG: 600 TABLET, EXTENDED RELEASE ORAL at 05:11

## 2018-06-10 RX ADMIN — ASPIRIN 81 MG: 81 TABLET, COATED ORAL at 05:10

## 2018-06-10 RX ADMIN — APIXABAN 5 MG: 5 TABLET, FILM COATED ORAL at 05:12

## 2018-06-10 RX ADMIN — INSULIN GLARGINE 10 UNITS: 100 INJECTION, SOLUTION SUBCUTANEOUS at 19:35

## 2018-06-10 RX ADMIN — GUAIFENESIN 600 MG: 600 TABLET, EXTENDED RELEASE ORAL at 19:30

## 2018-06-10 RX ADMIN — INSULIN HUMAN 5 UNITS: 100 INJECTION, SOLUTION PARENTERAL at 16:59

## 2018-06-10 RX ADMIN — METFORMIN HYDROCHLORIDE 500 MG: 500 TABLET ORAL at 17:30

## 2018-06-10 RX ADMIN — MAGNESIUM SULFATE IN WATER 4 G: 40 INJECTION, SOLUTION INTRAVENOUS at 08:44

## 2018-06-10 RX ADMIN — METFORMIN HYDROCHLORIDE 500 MG: 500 TABLET ORAL at 08:56

## 2018-06-10 RX ADMIN — SPIRONOLACTONE 25 MG: 25 TABLET, FILM COATED ORAL at 15:03

## 2018-06-10 RX ADMIN — DIGOXIN 125 MCG: 125 TABLET ORAL at 05:10

## 2018-06-10 RX ADMIN — APIXABAN 5 MG: 5 TABLET, FILM COATED ORAL at 17:05

## 2018-06-10 RX ADMIN — ATORVASTATIN CALCIUM 20 MG: 20 TABLET, FILM COATED ORAL at 05:11

## 2018-06-10 ASSESSMENT — ENCOUNTER SYMPTOMS
DEPRESSION: 0
ABDOMINAL PAIN: 0
CONSTIPATION: 0
DIZZINESS: 0
COUGH: 0
INSOMNIA: 0
PALPITATIONS: 0
SHORTNESS OF BREATH: 0
HEADACHES: 0
NAUSEA: 0
CHILLS: 0
FEVER: 0
MYALGIAS: 0

## 2018-06-10 ASSESSMENT — PAIN SCALES - GENERAL
PAINLEVEL_OUTOF10: 0
PAINLEVEL_OUTOF10: 8

## 2018-06-10 NOTE — PROGRESS NOTES
Bedside report received, Pt care assumed. Tele box on. VSS. Pt assessment complete. Pt AOX4, no signs of distress noted at this time.  Pt c/o of 8/10 pain in lower back. Tylenol prn administered per patient request see MAR. Pt denies any additional needs at this time. Bed in lowest position, ambulates with standby assistance. POC discussed with Pt/family, verbalizes understanding, no questions at this time. Pt educated on fall risk and verbalizes understanding, call light within reach, will continue to monitor.

## 2018-06-10 NOTE — CARE PLAN
Problem: Safety  Goal: Will remain free from injury  Outcome: PROGRESSING AS EXPECTED  Appropriate fall precautions in place. Bed in lowest position. Treaded slipper socks on both feet. Call light within reach. Hourly rounding in place.    Problem: Venous Thromboembolism (VTW)/Deep Vein Thrombosis (DVT) Prevention:  Goal: Patient will participate in Venous Thrombosis (VTE)/Deep Vein Thrombosis (DVT)Prevention Measures  Outcome: PROGRESSING AS EXPECTED  Pt is on apixaban

## 2018-06-10 NOTE — PROGRESS NOTES
Cardiology Progress Note               Author: Bhavin Rae Date & Time created: 6/10/2018  1:46 PM     Interval History:   The patient is a 48-year-old -American   male with a history of nonischemic cardiomyopathy, severe systolic congestive   heart failure, left ventricular ejection fraction 20%, severe mitral   regurgitation, severe tricuspid regurgitation, AICD implantation, diabetes   mellitus, hypertension, and hyperlipidemia, who presents to [facility] with at least 10-day to 2-week history of progressive fatigue,   weakness, no energy and anorexia in addition to new onset of atrial fibrillation with a rapid ventricular response with a new left bundle branch block.  6/6: Overall feeling better less tired.  No cardiac symptoms.  6/7: /90.  Pulse 103.  Atrial fibrillation rate improved.  Symptomatically feels much better.  No cardiac symptoms.  6/8: /64.  Pulse 123.  Atrial fibrillation.  Continues to improve feeling better.  6/9: /88.  Pulse 115.  Atrial fibrillation.  Symptomatically continues to feel stronger.  Ambulating in the hallway.  With ambulation patient's heart rate increases to 160s-170s.  Medications including metoprolol intermittently held due to asymptomatic low blood pressure.  6/10: /88.  Pulse 114.  No cardiac symptoms.    Chief Complaint:  Tired and dizzy    Review of Systems   Constitutional: Negative for malaise/fatigue.   Respiratory: Negative for cough and shortness of breath.    Cardiovascular: Negative for chest pain and palpitations.   Musculoskeletal: Negative for myalgias.   Neurological: Negative for dizziness.       Physical Exam   Constitutional: He is oriented to person, place, and time. He appears well-nourished. No distress.   HENT:   Head: Normocephalic and atraumatic.   Neck: No JVD present.       Cardiovascular: Normal rate, S1 normal, S2 normal and intact distal pulses.  An irregularly irregular rhythm present. Exam  reveals no gallop and no friction rub.    Murmur heard.  Pulmonary/Chest: Effort normal and breath sounds normal. He has no wheezes. He has no rhonchi. He has no rales.   Musculoskeletal: He exhibits no edema.   Neurological: He is alert and oriented to person, place, and time.   Skin: Skin is warm and dry.   Psychiatric: He has a normal mood and affect. His behavior is normal.       Hemodynamics:  Temp (24hrs), Av.4 °C (97.6 °F), Min:36.1 °C (97 °F), Max:36.8 °C (98.2 °F)  Temperature: 36.3 °C (97.3 °F)  Pulse  Av.9  Min: 37  Max: 148  Blood Pressure: 121/88     Respiratory:    Respiration: 18, Pulse Oximetry: 97 %        RUL Breath Sounds: Diminished, RML Breath Sounds: Diminished, RLL Breath Sounds: Diminished, MADONNA Breath Sounds: Diminished, LLL Breath Sounds: Diminished  Fluids:  Date 18 0700 - 18 0659   Shift 6405-6969 6852-5515 0605-5363 24 Hour Total   I  N  T  A  K  E   Shift Total       O  U  T  P  U  T   Urine 150   150    Shift Total 150   150   Weight (kg) 85 85 85 85          GI/Nutrition:  Orders Placed This Encounter   Procedures   • Diet Order     Standing Status:   Standing     Number of Occurrences:   1     Order Specific Question:   Diet:     Answer:   Diabetic [3]     Lab Results:  Recent Labs      18   0540  18   0505  06/10/18   0457   WBC  16.8*  14.7*  12.9*   RBC  5.51  5.38  5.26   HEMOGLOBIN  15.3  14.7  14.7   HEMATOCRIT  45.9  44.8  42.9   MCV  83.3  83.3  81.6   MCH  27.8  27.3  27.9   MCHC  33.3*  32.8*  34.3   RDW  48.4  48.2  46.3   PLATELETCT  233  237  235   MPV  10.8  11.1  11.5     Recent Labs      18   0540  18   0505  06/10/18   0457   SODIUM  128*  129*  130*   POTASSIUM  4.6  4.5  4.3   CHLORIDE  99  99  100   CO2  20  22  23   GLUCOSE  275*  228*  188*   BUN  25*  21  16   CREATININE  0.80  0.73  0.64   CALCIUM  8.8  8.7  8.7         Recent Labs      18   0540   BNPBTYPENAT  1214*     Recent Labs      18   0540    BNPBTYPENAT  1214*             Medical Decision Making, by Problem:  Active Hospital Problems    Diagnosis   • *Severe sepsis (HCC) [A41.9, R65.20]   • Paroxysmal atrial fibrillation (HCC) [I48.0]   • Acute on chronic systolic (congestive) heart failure (HCC) [I50.23]   • Type 2 diabetes mellitus with renal complication (HCC) [E11.29]   • Cardiomyopathy (HCC) [I42.9]   • AICD (automatic cardioverter/defibrillator) present [Z95.810]   • Prolonged Q-T interval on ECG [R94.31]   • Total bilirubin, elevated [R17]   • Hyponatremia [E87.1]   • Troponin I above reference range [R74.8]   • Acute renal failure (HCC) [N17.9]       Assessment and plan:  Atrial fibrillation.  Persistent.  Labile ventricular response.    Amiodarone therapy.  Chronic since 2016.    Systolic CHF with low cardiac output syndrome with relative volume depletion.  Clinically resolving.    Nonischemic cardiomyopathy.  EF 20%.  Severe.    Mitral regurgitation.  Severe.    Left bundle branch block.    Anticoagulation.  On Eliquis.    Uncontrolled diabetes mellitus.  Improving.    Renal insufficiency.  Acute on chronic.  Resolved.    AICD.  Single chamber.  Functioning normally by interrogation.    Sepsis syndrome.  Resolving.    Discussion and Plan:  1.  Atrial fibrillation variable rate difficult to control despite comprehensive rate control therapy, limited due to low blood pressure.  2.  Options include:  A.  Attempting to restore normal sinus rhythm with electrical cardioversion however based on echocardiographic findings likelihood of acute restoration and long-term maintenance of normal sinus rhythm unlikely.  B.  EP consultation for consideration of AV pato ablation with AICD upgrade to biventricular pacemaker.  3.  Presently continue current therapy.  4.  EP consultation on Monday.  5.  We will try low-dose spironolactone 12.5 mg daily.  6.  We will try increasing metoprolol SR to 25 mg twice daily.  7.  We will try to obtain previous medical  records from [facility] in [facility] with the patient indicates that he had his previous cardiac procedures performed.      Quality-Core Measures   Reviewed items::  EKG reviewed, Radiology images reviewed, Labs reviewed and Medications reviewed

## 2018-06-10 NOTE — PROGRESS NOTES
Received bedside report from RN, pt care assumed, VSS, pt assessment complete. Pt AAOx4, no c/o  pain at this time. No signs of acute distress noted at this time. POC discussed with pt and verbalizes no questions. Pt denies any additional needs at this time. Bed in lowest position, bed alarm on, pt educated on fall risk and verbalized understanding, call light within reach, hourly rounding initiated. In an atrial fib.

## 2018-06-10 NOTE — PROGRESS NOTES
[facility]    Date of Service: 2018  Note signed: 6/10/2018    Chief Complaint  48 y.o. male admitted 2018 with hx of CHF prior EF 20%, DMII, and prior AICD placement here with acute on chronic systolic heart failure, atrial fibrillation with rapid ventricular rate    Interval Problem Update    Patient's rate 70s-120s overnight. Patient to be evaluated by EP tomm for possible AV node ablation and biventricular pacemaker placement. No acute events overnight, pt eating breakfast in bed. Pt's Mg 1.3 this am, repleting.    Consultants/Specialty  Cardiology    Disposition  TBD - home once medically clear.        Review of Systems   Constitutional: Negative for chills and fever.   Respiratory: Negative for shortness of breath.    Cardiovascular: Negative for chest pain, palpitations and leg swelling.   Gastrointestinal: Negative for abdominal pain, constipation and nausea.   Neurological: Negative for headaches.   Psychiatric/Behavioral: Negative for depression. The patient does not have insomnia.       Physical Exam  Laboratory/Imaging   Hemodynamics  Temp (24hrs), Av.4 °C (97.6 °F), Min:36.1 °C (97 °F), Max:36.8 °C (98.2 °F)   Temperature: 36.6 °C (97.8 °F)  Pulse  Av.3  Min: 37  Max: 148   Blood Pressure: 101/82      Respiratory      Respiration: 17, Pulse Oximetry: 97 %        RUL Breath Sounds: Diminished, RML Breath Sounds: Diminished, RLL Breath Sounds: Diminished, MADONNA Breath Sounds: Diminished, LLL Breath Sounds: Diminished    Fluids  No intake or output data in the 24 hours ending 06/10/18 1249    Nutrition  Orders Placed This Encounter   Procedures   • Diet Order     Standing Status:   Standing     Number of Occurrences:   1     Order Specific Question:   Diet:     Answer:   Diabetic [3]     Physical Exam   Constitutional: He is oriented to person, place, and time. He appears well-developed and well-nourished. No distress.   HENT:   Head: Normocephalic and atraumatic.    Right Ear: External ear normal.   Left Ear: External ear normal.   Mouth/Throat: Oropharynx is clear and moist.   Eyes: Conjunctivae are normal. Right eye exhibits no discharge. Left eye exhibits no discharge.   Neck: Neck supple.   Cardiovascular: Normal heart sounds and intact distal pulses.  An irregularly irregular rhythm present.   No murmur heard.  Pacemaker left anterior chest wall   Pulmonary/Chest: Effort normal and breath sounds normal. No respiratory distress.   Abdominal: Soft. Bowel sounds are normal. There is no tenderness.   Musculoskeletal: He exhibits no edema.   Neurological: He is alert and oriented to person, place, and time. No cranial nerve deficit.   Skin: Skin is warm. No rash noted. He is not diaphoretic.   Psychiatric: He has a normal mood and affect. His behavior is normal. Thought content normal.   Vitals reviewed.      Recent Labs      06/08/18   0540  06/09/18   0505  06/10/18   0457   WBC  16.8*  14.7*  12.9*   RBC  5.51  5.38  5.26   HEMOGLOBIN  15.3  14.7  14.7   HEMATOCRIT  45.9  44.8  42.9   MCV  83.3  83.3  81.6   MCH  27.8  27.3  27.9   MCHC  33.3*  32.8*  34.3   RDW  48.4  48.2  46.3   PLATELETCT  233  237  235   MPV  10.8  11.1  11.5     Recent Labs      06/08/18   0540  06/09/18   0505  06/10/18   0457   SODIUM  128*  129*  130*   POTASSIUM  4.6  4.5  4.3   CHLORIDE  99  99  100   CO2  20  22  23   GLUCOSE  275*  228*  188*   BUN  25*  21  16   CREATININE  0.80  0.73  0.64   CALCIUM  8.8  8.7  8.7         Recent Labs      06/08/18   0540   BNPBTYPENAT  1214*              Assessment/Plan     * Severe sepsis (HCC)- (present on admission)   Assessment & Plan    This is severe sepsis with the following associated acute organ dysfunction(s): acute kidney failure. Improved   Procalcitonin is elevated and patient did have leukocytosis upon admission  abx dc'ed as cultures negative to date  Care with fluid hydration secondary to congestive heart failure  Monitor vitals and strict  I's and O's          Paroxysmal atrial fibrillation (HCC)- (present on admission)   Assessment & Plan    A. fib RVR , rate uncontrolled, EP consulted for possible AV pato ablation with biventricular pacemaker placement   Cardiology consulting  metoprolol, digoxin, amiodarone, apixaban  Monitoring on telemetry  Normal TSH  SW assisting with apixaban prior auth        Acute on chronic systolic (congestive) heart failure (HCC)- (present on admission)   Assessment & Plan    5/13/18 transthoracic echocardiogram showed EF 20%, severe systolic heart failure  Cardiology consulting  Metoprolol, apixaban, digoxin, amiodarone  Spironolactone dc'ed due to hyponatremia and concern for hyperkalemia   Monitor vitals, strict I's, daily labs  Monitoring on telemetry        Type 2 diabetes mellitus with renal complication (HCC)- (present on admission)   Assessment & Plan    Uncontrolled with hyperglycemia HgbA1c: 15.7  On metformin, monitor accuchecks and cover with SSI, lantus 10 units  Diabetic education ordered        Hypomagnesemia   Assessment & Plan    Replacing, monitor closely on tele        Leukocytosis   Assessment & Plan    Secondary to above        Troponin I above reference range   Assessment & Plan    Aspirin, statin, heparin  Due demand ischemia due to afib with rapid ventricular rate.  Monitor on telemetry  Downtrending troponin        Hyponatremia   Assessment & Plan    Patient has had improvement with fluids initially  Monitor  BMP          Total bilirubin, elevated   Assessment & Plan    Mild elevation, improving  Abdominal ultrasound unremarkable  Likely hepatic congestion  Monitor CMP        Prolonged Q-T interval on ECG   Assessment & Plan    QTc improving, monitor on telemetry        Acute renal failure (HCC)- (present on admission)   Assessment & Plan    Secondary to congestive heart failure and uncontrolled heart rate, improved  Monitor BMP and vitals  Strict I's and O          Cardiomyopathy (HCC)- (present  on admission)   Assessment & Plan    See above heart failure notes        AICD (automatic cardioverter/defibrillator) present- (present on admission)   Assessment & Plan    ERP requested ICD interrogation, showed atrial fibrillation.          Quality-Core Measures   Reviewed items::  Labs reviewed and Medications reviewed  Jensen catheter::  No Jensen  DVT: Eliquis.  Ulcer Prophylaxis::  Not indicated

## 2018-06-11 ENCOUNTER — APPOINTMENT (OUTPATIENT)
Dept: RADIOLOGY | Facility: MEDICAL CENTER | Age: 48
DRG: 853 | End: 2018-06-11
Attending: INTERNAL MEDICINE
Payer: MEDICARE

## 2018-06-11 LAB
ANION GAP SERPL CALC-SCNC: 9 MMOL/L (ref 0–11.9)
BASOPHILS # BLD AUTO: 0.3 % (ref 0–1.8)
BASOPHILS # BLD: 0.04 K/UL (ref 0–0.12)
BUN SERPL-MCNC: 21 MG/DL (ref 8–22)
CALCIUM SERPL-MCNC: 8.8 MG/DL (ref 8.5–10.5)
CHLORIDE SERPL-SCNC: 94 MMOL/L (ref 96–112)
CO2 SERPL-SCNC: 21 MMOL/L (ref 20–33)
CREAT SERPL-MCNC: 0.89 MG/DL (ref 0.5–1.4)
EKG IMPRESSION: NORMAL
EOSINOPHIL # BLD AUTO: 0 K/UL (ref 0–0.51)
EOSINOPHIL NFR BLD: 0 % (ref 0–6.9)
ERYTHROCYTE [DISTWIDTH] IN BLOOD BY AUTOMATED COUNT: 47.9 FL (ref 35.9–50)
GLUCOSE BLD-MCNC: 165 MG/DL (ref 65–99)
GLUCOSE BLD-MCNC: 181 MG/DL (ref 65–99)
GLUCOSE BLD-MCNC: 183 MG/DL (ref 65–99)
GLUCOSE SERPL-MCNC: 239 MG/DL (ref 65–99)
HCT VFR BLD AUTO: 42.3 % (ref 42–52)
HGB BLD-MCNC: 14.1 G/DL (ref 14–18)
IMM GRANULOCYTES # BLD AUTO: 0.05 K/UL (ref 0–0.11)
IMM GRANULOCYTES NFR BLD AUTO: 0.4 % (ref 0–0.9)
LYMPHOCYTES # BLD AUTO: 2.31 K/UL (ref 1–4.8)
LYMPHOCYTES NFR BLD: 18.9 % (ref 22–41)
MAGNESIUM SERPL-MCNC: 1.6 MG/DL (ref 1.5–2.5)
MCH RBC QN AUTO: 27.6 PG (ref 27–33)
MCHC RBC AUTO-ENTMCNC: 33.3 G/DL (ref 33.7–35.3)
MCV RBC AUTO: 82.9 FL (ref 81.4–97.8)
MONOCYTES # BLD AUTO: 0.92 K/UL (ref 0–0.85)
MONOCYTES NFR BLD AUTO: 7.5 % (ref 0–13.4)
NEUTROPHILS # BLD AUTO: 8.91 K/UL (ref 1.82–7.42)
NEUTROPHILS NFR BLD: 72.9 % (ref 44–72)
NRBC # BLD AUTO: 0 K/UL
NRBC BLD-RTO: 0 /100 WBC
PHOSPHATE SERPL-MCNC: 2.9 MG/DL (ref 2.5–4.5)
PLATELET # BLD AUTO: 246 K/UL (ref 164–446)
PMV BLD AUTO: 10.9 FL (ref 9–12.9)
POTASSIUM SERPL-SCNC: 4.8 MMOL/L (ref 3.6–5.5)
RBC # BLD AUTO: 5.1 M/UL (ref 4.7–6.1)
SODIUM SERPL-SCNC: 124 MMOL/L (ref 135–145)
SODIUM SERPL-SCNC: 126 MMOL/L (ref 135–145)
WBC # BLD AUTO: 12.2 K/UL (ref 4.8–10.8)

## 2018-06-11 PROCEDURE — 33249 INSJ/RPLCMT DEFIB W/LEAD(S): CPT

## 2018-06-11 PROCEDURE — 99153 MOD SED SAME PHYS/QHP EA: CPT

## 2018-06-11 PROCEDURE — 700102 HCHG RX REV CODE 250 W/ 637 OVERRIDE(OP): Performed by: HOSPITALIST

## 2018-06-11 PROCEDURE — 33264 RMVL & RPLCMT DFB GEN MLT LD: CPT

## 2018-06-11 PROCEDURE — 700101 HCHG RX REV CODE 250

## 2018-06-11 PROCEDURE — 4A0234Z MEASUREMENT OF CARDIAC ELECTRICAL ACTIVITY, PERCUTANEOUS APPROACH: ICD-10-PCS | Performed by: INTERNAL MEDICINE

## 2018-06-11 PROCEDURE — B51NZZZ FLUOROSCOPY OF LEFT UPPER EXTREMITY VEINS: ICD-10-PCS | Performed by: INTERNAL MEDICINE

## 2018-06-11 PROCEDURE — 02H43KZ INSERTION OF DEFIBRILLATOR LEAD INTO CORONARY VEIN, PERCUTANEOUS APPROACH: ICD-10-PCS | Performed by: INTERNAL MEDICINE

## 2018-06-11 PROCEDURE — 700102 HCHG RX REV CODE 250 W/ 637 OVERRIDE(OP): Performed by: INTERNAL MEDICINE

## 2018-06-11 PROCEDURE — C2628 CATHETER, OCCLUSION: HCPCS

## 2018-06-11 PROCEDURE — 99233 SBSQ HOSP IP/OBS HIGH 50: CPT | Performed by: INTERNAL MEDICINE

## 2018-06-11 PROCEDURE — A9270 NON-COVERED ITEM OR SERVICE: HCPCS | Performed by: INTERNAL MEDICINE

## 2018-06-11 PROCEDURE — 360979 HCHG DIAGNOSTIC CATH

## 2018-06-11 PROCEDURE — 93640 EP EVAL 1/2CHMBR PACG CVDFB: CPT

## 2018-06-11 PROCEDURE — 305383 HCHG CARTO3 REF PATCH

## 2018-06-11 PROCEDURE — 02K83ZZ MAP CONDUCTION MECHANISM, PERCUTANEOUS APPROACH: ICD-10-PCS | Performed by: INTERNAL MEDICINE

## 2018-06-11 PROCEDURE — 93650 ICAR CATH ABLTJ AV NODE FUNC: CPT

## 2018-06-11 PROCEDURE — 304952 HCHG R 2 PADS

## 2018-06-11 PROCEDURE — 700111 HCHG RX REV CODE 636 W/ 250 OVERRIDE (IP): Performed by: INTERNAL MEDICINE

## 2018-06-11 PROCEDURE — 02WA3MZ REVISION OF CARDIAC LEAD IN HEART, PERCUTANEOUS APPROACH: ICD-10-PCS | Performed by: INTERNAL MEDICINE

## 2018-06-11 PROCEDURE — 83735 ASSAY OF MAGNESIUM: CPT

## 2018-06-11 PROCEDURE — 99152 MOD SED SAME PHYS/QHP 5/>YRS: CPT

## 2018-06-11 PROCEDURE — 0JPT0PZ REMOVAL OF CARDIAC RHYTHM RELATED DEVICE FROM TRUNK SUBCUTANEOUS TISSUE AND FASCIA, OPEN APPROACH: ICD-10-PCS | Performed by: INTERNAL MEDICINE

## 2018-06-11 PROCEDURE — 80048 BASIC METABOLIC PNL TOTAL CA: CPT

## 2018-06-11 PROCEDURE — C1882 AICD, OTHER THAN SING/DUAL: HCPCS

## 2018-06-11 PROCEDURE — C1894 INTRO/SHEATH, NON-LASER: HCPCS

## 2018-06-11 PROCEDURE — 71045 X-RAY EXAM CHEST 1 VIEW: CPT

## 2018-06-11 PROCEDURE — C1769 GUIDE WIRE: HCPCS

## 2018-06-11 PROCEDURE — C1887 CATHETER, GUIDING: HCPCS

## 2018-06-11 PROCEDURE — 700105 HCHG RX REV CODE 258: Performed by: INTERNAL MEDICINE

## 2018-06-11 PROCEDURE — 82962 GLUCOSE BLOOD TEST: CPT | Mod: 91

## 2018-06-11 PROCEDURE — 770020 HCHG ROOM/CARE - TELE (206)

## 2018-06-11 PROCEDURE — 700111 HCHG RX REV CODE 636 W/ 250 OVERRIDE (IP)

## 2018-06-11 PROCEDURE — C1732 CATH, EP, DIAG/ABL, 3D/VECT: HCPCS

## 2018-06-11 PROCEDURE — 700117 HCHG RX CONTRAST REV CODE 255: Performed by: INTERNAL MEDICINE

## 2018-06-11 PROCEDURE — C1900 LEAD, CORONARY VENOUS: HCPCS

## 2018-06-11 PROCEDURE — 305387 HCHG SUTURES

## 2018-06-11 PROCEDURE — 33241 REMOVE PULSE GENERATOR: CPT

## 2018-06-11 PROCEDURE — 02583ZZ DESTRUCTION OF CONDUCTION MECHANISM, PERCUTANEOUS APPROACH: ICD-10-PCS | Performed by: INTERNAL MEDICINE

## 2018-06-11 PROCEDURE — 33215 REPOSITION PACING-DEFIB LEAD: CPT

## 2018-06-11 PROCEDURE — C1892 INTRO/SHEATH,FIXED,PEEL-AWAY: HCPCS

## 2018-06-11 PROCEDURE — A9270 NON-COVERED ITEM OR SERVICE: HCPCS | Performed by: HOSPITALIST

## 2018-06-11 PROCEDURE — 93005 ELECTROCARDIOGRAM TRACING: CPT | Performed by: INTERNAL MEDICINE

## 2018-06-11 PROCEDURE — C1893 INTRO/SHEATH, FIXED,NON-PEEL: HCPCS

## 2018-06-11 PROCEDURE — 33216 INSERT 1 ELECTRODE PM-DEFIB: CPT

## 2018-06-11 PROCEDURE — 0JH609Z INSERTION OF CARDIAC RESYNCHRONIZATION DEFIBRILLATOR PULSE GENERATOR INTO CHEST SUBCUTANEOUS TISSUE AND FASCIA, OPEN APPROACH: ICD-10-PCS | Performed by: INTERNAL MEDICINE

## 2018-06-11 PROCEDURE — 84100 ASSAY OF PHOSPHORUS: CPT

## 2018-06-11 PROCEDURE — 84295 ASSAY OF SERUM SODIUM: CPT

## 2018-06-11 PROCEDURE — 85025 COMPLETE CBC W/AUTO DIFF WBC: CPT

## 2018-06-11 PROCEDURE — 33225 L VENTRIC PACING LEAD ADD-ON: CPT

## 2018-06-11 PROCEDURE — 93010 ELECTROCARDIOGRAM REPORT: CPT | Performed by: INTERNAL MEDICINE

## 2018-06-11 PROCEDURE — 304853 HCHG PPM TEST CABLE

## 2018-06-11 PROCEDURE — C1898 LEAD, PMKR, OTHER THAN TRANS: HCPCS

## 2018-06-11 PROCEDURE — 36415 COLL VENOUS BLD VENIPUNCTURE: CPT

## 2018-06-11 PROCEDURE — 02H63KZ INSERTION OF DEFIBRILLATOR LEAD INTO RIGHT ATRIUM, PERCUTANEOUS APPROACH: ICD-10-PCS | Performed by: INTERNAL MEDICINE

## 2018-06-11 RX ORDER — LIDOCAINE HYDROCHLORIDE 20 MG/ML
INJECTION, SOLUTION INFILTRATION; PERINEURAL
Status: COMPLETED
Start: 2018-06-11 | End: 2018-06-11

## 2018-06-11 RX ORDER — METOPROLOL SUCCINATE 25 MG/1
25 TABLET, EXTENDED RELEASE ORAL 3 TIMES DAILY
Status: DISCONTINUED | OUTPATIENT
Start: 2018-06-11 | End: 2018-06-12 | Stop reason: HOSPADM

## 2018-06-11 RX ORDER — HEPARIN SODIUM,PORCINE 1000/ML
VIAL (ML) INJECTION
Status: COMPLETED
Start: 2018-06-11 | End: 2018-06-11

## 2018-06-11 RX ORDER — LOSARTAN POTASSIUM 25 MG/1
25 TABLET ORAL
Status: DISCONTINUED | OUTPATIENT
Start: 2018-06-11 | End: 2018-06-12

## 2018-06-11 RX ORDER — BUPIVACAINE HYDROCHLORIDE 2.5 MG/ML
INJECTION, SOLUTION EPIDURAL; INFILTRATION; INTRACAUDAL
Status: COMPLETED
Start: 2018-06-11 | End: 2018-06-11

## 2018-06-11 RX ORDER — MIDAZOLAM HYDROCHLORIDE 1 MG/ML
INJECTION INTRAMUSCULAR; INTRAVENOUS
Status: COMPLETED
Start: 2018-06-11 | End: 2018-06-11

## 2018-06-11 RX ORDER — CEFAZOLIN SODIUM 2 G/100ML
2 INJECTION, SOLUTION INTRAVENOUS
Status: DISCONTINUED | OUTPATIENT
Start: 2018-06-11 | End: 2018-06-12 | Stop reason: HOSPADM

## 2018-06-11 RX ADMIN — DIGOXIN 125 MCG: 125 TABLET ORAL at 04:30

## 2018-06-11 RX ADMIN — INSULIN HUMAN 1 UNITS: 100 INJECTION, SOLUTION PARENTERAL at 09:30

## 2018-06-11 RX ADMIN — MIDAZOLAM 2 MG: 1 INJECTION INTRAMUSCULAR; INTRAVENOUS at 15:21

## 2018-06-11 RX ADMIN — GUAIFENESIN 100 MG: 100 SOLUTION ORAL at 04:50

## 2018-06-11 RX ADMIN — MIDAZOLAM 1 MG: 1 INJECTION INTRAMUSCULAR; INTRAVENOUS at 16:18

## 2018-06-11 RX ADMIN — ATORVASTATIN CALCIUM 20 MG: 20 TABLET, FILM COATED ORAL at 04:30

## 2018-06-11 RX ADMIN — BUPIVACAINE HYDROCHLORIDE: 2.5 INJECTION, SOLUTION EPIDURAL; INFILTRATION; INTRACAUDAL; PERINEURAL at 13:44

## 2018-06-11 RX ADMIN — VANCOMYCIN HYDROCHLORIDE 1000 MG: 1 INJECTION, POWDER, LYOPHILIZED, FOR SOLUTION INTRAVENOUS at 13:44

## 2018-06-11 RX ADMIN — FENTANYL CITRATE 25 MCG: 50 INJECTION, SOLUTION INTRAMUSCULAR; INTRAVENOUS at 16:18

## 2018-06-11 RX ADMIN — METOPROLOL SUCCINATE 25 MG: 25 TABLET, EXTENDED RELEASE ORAL at 04:29

## 2018-06-11 RX ADMIN — ACETAMINOPHEN 650 MG: 325 TABLET, FILM COATED ORAL at 04:29

## 2018-06-11 RX ADMIN — FENTANYL CITRATE 200 MCG: 50 INJECTION, SOLUTION INTRAMUSCULAR; INTRAVENOUS at 14:16

## 2018-06-11 RX ADMIN — SPIRONOLACTONE 25 MG: 25 TABLET, FILM COATED ORAL at 04:29

## 2018-06-11 RX ADMIN — LIDOCAINE HYDROCHLORIDE: 20 INJECTION, SOLUTION INFILTRATION; PERINEURAL at 15:51

## 2018-06-11 RX ADMIN — INSULIN GLARGINE 10 UNITS: 100 INJECTION, SOLUTION SUBCUTANEOUS at 23:46

## 2018-06-11 RX ADMIN — VANCOMYCIN HYDROCHLORIDE 1000 MG: 1 INJECTION, POWDER, LYOPHILIZED, FOR SOLUTION INTRAVENOUS at 15:51

## 2018-06-11 RX ADMIN — MAGNESIUM SULFATE HEPTAHYDRATE 2 G: 500 INJECTION, SOLUTION INTRAMUSCULAR; INTRAVENOUS at 09:20

## 2018-06-11 RX ADMIN — LIDOCAINE HYDROCHLORIDE: 20 INJECTION, SOLUTION INFILTRATION; PERINEURAL at 13:44

## 2018-06-11 RX ADMIN — GUAIFENESIN 600 MG: 600 TABLET, EXTENDED RELEASE ORAL at 21:59

## 2018-06-11 RX ADMIN — APIXABAN 5 MG: 5 TABLET, FILM COATED ORAL at 04:29

## 2018-06-11 RX ADMIN — INSULIN HUMAN 1 UNITS: 100 INJECTION, SOLUTION PARENTERAL at 22:03

## 2018-06-11 RX ADMIN — ASPIRIN 81 MG: 81 TABLET, COATED ORAL at 04:30

## 2018-06-11 RX ADMIN — IOHEXOL 12 ML: 350 INJECTION, SOLUTION INTRAVENOUS at 14:48

## 2018-06-11 RX ADMIN — GUAIFENESIN 600 MG: 600 TABLET, EXTENDED RELEASE ORAL at 04:29

## 2018-06-11 RX ADMIN — MIDAZOLAM 6 MG: 1 INJECTION INTRAMUSCULAR; INTRAVENOUS at 14:17

## 2018-06-11 RX ADMIN — METOPROLOL SUCCINATE 25 MG: 25 TABLET, EXTENDED RELEASE ORAL at 18:30

## 2018-06-11 RX ADMIN — AMIODARONE HYDROCHLORIDE 200 MG: 200 TABLET ORAL at 04:30

## 2018-06-11 RX ADMIN — BUPIVACAINE HYDROCHLORIDE: 2.5 INJECTION, SOLUTION EPIDURAL; INFILTRATION; INTRACAUDAL; PERINEURAL at 15:51

## 2018-06-11 RX ADMIN — APIXABAN 5 MG: 5 TABLET, FILM COATED ORAL at 18:31

## 2018-06-11 RX ADMIN — HEPARIN SODIUM 2000 UNITS: 200 INJECTION, SOLUTION INTRAVENOUS at 13:45

## 2018-06-11 RX ADMIN — FENTANYL CITRATE 100 MCG: 50 INJECTION, SOLUTION INTRAMUSCULAR; INTRAVENOUS at 15:21

## 2018-06-11 ASSESSMENT — PAIN SCALES - GENERAL
PAINLEVEL_OUTOF10: 0
PAINLEVEL_OUTOF10: 0

## 2018-06-11 ASSESSMENT — ENCOUNTER SYMPTOMS
CHILLS: 0
ABDOMINAL PAIN: 0
INSOMNIA: 0
NEUROLOGICAL NEGATIVE: 1
CARDIOVASCULAR NEGATIVE: 1
BRUISES/BLEEDS EASILY: 0
COUGH: 1
SHORTNESS OF BREATH: 0
FEVER: 0
SPUTUM PRODUCTION: 0
NAUSEA: 0
PALPITATIONS: 0

## 2018-06-11 NOTE — OP REPORT
[facility] ELECTROPHYSIOLOGY PROCEDURE NOTE    Procedure(s) Performed:   AV node ablation  Mercedes-procedural PPM programing     Indications:   Systolic CHF  Persistent AF with RVR    Physician(s): Angelo Diaz M.D.     Resident/Assistant(s): None     Anesthesia: Local and moderate sedation (start/stop time and total dose to be documented in atrial lead revision note)    Specimen(s) Removed: None     Estimated Blood Loss:  20cc     Procedure:     After informed written consent, the patient was brought to the EP lab in the fasting, non-sedated state. The patient was prepped and draped in the usual sterile fashion. Femoral venous access was obtained using the modified Seldinger technique. In the left femoral vein, an 8 Fr was inserted over 0.035” guidewires. The device was programmed to VVI 40. During insertion of the guidewire, the atrial lead was dislodged and fell into the RV. One 8Fr non-irrigated ablation catheter with 8 mm distal electrode (Bisoense EZ Steer) was inserted into an 8Fr sheath (SR0) for radiofrequency ablation. The his signal was mapped to the high interatrial septum and ablation at just behind and below this signal resulted in complete AV block at At the end of the procedure, the catheter and sheaths were removed, and hemostasis was achieved by manual compression.    Ablation:  Radiofrequency energy was applied using 8 mm non-irrigated catheter, power 60, total 5 RF applications, RF time 155 seconds    Post Ablation Testin. No recurrent AV conduction after > 20 minutes    Fluroscopy time: 2.8 minutes    Complications:  Atrial lead dislodgment    Impressions:    1. Successful AV node ablation     Plan:   1. Atrial lead revision  2. Bedrest x 4 hours

## 2018-06-11 NOTE — OP REPORT
"Electrophysiology Procedure Note  [facility]    Patient ID:  Name:             Lai Dailey     YOB: 1970  Age:                 48 y.o.  male   MRN:               [id number]    Date of procedure: 6/11/2018     Procedure(s) Performed:   1) ICD generator insertion  2) Atrial lead insertion  3) CS lead insertion at time of generator change    Indication(s):  Systolic CHF 2/2 NICM  LBBB  AF with RVR    Physician(s): Angelo Diaz M.D.     Resident/Assistant(s): None     Anesthesia: Local and moderate sedation given at my direction (start and stop time to be documented at end of AV node ablation)     Specimen(s) Removed: None     Estimated Blood Loss:  30cc    Complications: None.    DESCRIPTION OF PROCEDURE: After informed written consent, the patient was brought to the electrophysiology lab in the fasting, unsedated state. The patient was prepped and draped in the usual sterile fashion. The procedure was performed under moderate sedation with local anesthetic. A left upper extremity venogram was performed, demonstrating a patent subclavian vein. A left infraclavicular incision was made with a scalpel and the old pre-pectoral device pocket was accessed using a combination of blunt dissection and electrocautery. The old device was removed from the pocket and the RV lead disconnected from the generator. The modified Seldinger technique was used to gain access to the left axillary vein x 2. A peel-away hemostasis sheath was placed in the vein. Under fluoroscopic guidance, the CS was accessed using the standard curve [place] [employer]ammot[employer] CS sheath and an 0.035\" glide-wire. The CS had a inferior take off and was quite vertical with a stenosis about 3-4 cm in. A venogram was performed demonstrating very small veins posterolaterally. There was a large branch coming from off the MCV that migrated posteriorly to the mid-anterolateral portion of the LV that collateralized with an anterior branch. A 90 " "degree sub-selector was used to direct the 0.035\" glide-wire into this branch and the sub-selector was advanced to the lateral portion of this vein. A spiral curved, tinned, CS lead was advanced to the distal lateral portion of this branch. The lead was tested and had satisfactory pacing parameters. High output ventricular pacing did not produce extracardiac stimulation. The sub-selector and the outer sheath were slit with the supplied cutting tool. The lead was sutured to the underlying pectoral muscle with interrupted silk over a silastic suture sleeve. The atrial lead was advanced to the RAA. Sensing and impedance were within range. High output ventricular pacing did not produce extracardiac stimulation. he lead was sutured to the underlying pectoral muscle with interrupted silk over a silastic suture sleeve. The device pocket was irrigated with antibiotic solution, inspected, and no bleeding was seen. The leads were connected to the new BiV ICD pulse generator and the device was inserted into the pocket. The wound was closed with three layers of absorbable sutures and covered with Steri-Strips. Following device were are proceeding with AV node ablation.    EXPLANTED DEVICE INFORMATION:  [employer] model E160, serial number [id number]    IMPLANTED DEVICE INFORMATION:    Pulse generator is a [employer] model 0293   Serial number [id number]    EXISTING LEAD INFORMATION:    Right ventricular lead is a [employer] model 0293, serial number [id number], R wave 11.7 millivolts, threshold 1.0 Volts at 0.4 milliseconds, pacing impedance 397 Ohms.     NEW LEAD INFORMATION:  1. Right atrial lead is a [employer] model 7741, serial number [id number], P wave 2.0 millivolts, threshold not tested due to AF, pacing impedance 838 Ohms.  2. Coronary sinus lead is a [employer] Sci Model number 4672, serial number [id number], threshold 1.5 volts a@0.4 milliseconds, pacing impedance 1004 Ohms.     DEVICE PROGRAMMING:    Zelalem therapy: DDD  with AMS rate of 90 (VVI " 40 for the duration of the AV node ablation procedure)  Tachy therapy:    VF zone  185 bpm, defib 41 J x 8  VT zone 160 bpm  Monitor only    DEFIBRILLATION THRESHOLD TESTING:    Deferred    FLUOROSCOPY TIME: 12.6 minutes    SUMMARY/CONCLUSIONS:  1. Successful upgrade of single chamber ICD to CRT-D    RECOMMENDATIONS:  1. Proceed with AV node ablation  2. Chest x-ray  3. Implantable cardioverter defibrillator interrogation prior to hospital discharge.  4. Follow-up in device clinic for wound check and device interrogation.

## 2018-06-11 NOTE — PROGRESS NOTES
[facility]    Date of Service: 2018  Note signed: 2018    Chief Complaint  48 y.o. male admitted 2018 with hx of CHF prior EF 20%, DMII, and prior AICD placement here with acute on chronic systolic heart failure, atrial fibrillation with rapid ventricular rate    Interval Problem Update  No acute events overnight. Patient will be made nothing by mouth at midnight, scheduled for biventricular pacemaker tomorrow with Dr. Diaz.    Consultants/Specialty  Cardiology    Disposition  TBD - home once medically clear.        Review of Systems   Constitutional: Negative for chills and fever.   Respiratory: Positive for cough. Negative for sputum production and shortness of breath.    Cardiovascular: Negative for chest pain, palpitations and leg swelling.   Gastrointestinal: Negative for abdominal pain and nausea.   Psychiatric/Behavioral: The patient does not have insomnia.       Physical Exam  Laboratory/Imaging   Hemodynamics  Temp (24hrs), Av.4 °C (97.6 °F), Min:36.2 °C (97.1 °F), Max:36.7 °C (98.1 °F)   Temperature:  (off the floor, cath lab)  Pulse  Av.8  Min: 37  Max: 148   Blood Pressure: 100/82      Respiratory      Respiration: 18, Pulse Oximetry: 95 %        RUL Breath Sounds: Clear, RML Breath Sounds: Clear, RLL Breath Sounds: Diminished, MADONNA Breath Sounds: Clear, LLL Breath Sounds: Diminished    Fluids  No intake or output data in the 24 hours ending 18 1408    Nutrition  Orders Placed This Encounter   Procedures   • DIET NPO     Standing Status:   Standing     Number of Occurrences:   8     Order Specific Question:   Restrict to:     Answer:   Sips with Medications [3]     Physical Exam   Constitutional: He is oriented to person, place, and time. He appears well-developed and well-nourished. No distress.   HENT:   Head: Normocephalic and atraumatic.   Right Ear: External ear normal.   Left Ear: External ear normal.   Mouth/Throat: No oropharyngeal exudate.   Eyes:  Conjunctivae are normal. Right eye exhibits no discharge. Left eye exhibits no discharge. No scleral icterus.   Neck: Neck supple.   Cardiovascular: Normal heart sounds and intact distal pulses.  An irregularly irregular rhythm present.   No murmur heard.  Pacemaker left anterior chest wall   Pulmonary/Chest: Effort normal and breath sounds normal. No respiratory distress.   Abdominal: Soft. Bowel sounds are normal. He exhibits no distension. There is no tenderness.   Musculoskeletal: He exhibits no edema or tenderness.   Neurological: He is alert and oriented to person, place, and time.   Skin: Skin is warm. No rash noted. He is not diaphoretic.   Psychiatric: He has a normal mood and affect. His behavior is normal. Thought content normal.   Vitals reviewed.      Recent Labs      06/09/18   0505  06/10/18   0457  06/11/18   0504   WBC  14.7*  12.9*  12.2*   RBC  5.38  5.26  5.10   HEMOGLOBIN  14.7  14.7  14.1   HEMATOCRIT  44.8  42.9  42.3   MCV  83.3  81.6  82.9   MCH  27.3  27.9  27.6   MCHC  32.8*  34.3  33.3*   RDW  48.2  46.3  47.9   PLATELETCT  237  235  246   MPV  11.1  11.5  10.9     Recent Labs      06/09/18   0505  06/10/18   0457  06/11/18   0504  06/11/18   0806   SODIUM  129*  130*  124*  126*   POTASSIUM  4.5  4.3  4.8   --    CHLORIDE  99  100  94*   --    CO2  22  23  21   --    GLUCOSE  228*  188*  239*   --    BUN  21  16  21   --    CREATININE  0.73  0.64  0.89   --    CALCIUM  8.7  8.7  8.8   --                       Assessment/Plan     * Severe sepsis (HCC)- (present on admission)   Assessment & Plan    This is severe sepsis with the following associated acute organ dysfunction(s): acute kidney failure. Improved   Procalcitonin is elevated and patient did have leukocytosis upon admission  abx dc'ed as cultures negative to date  Care with fluid hydration secondary to congestive heart failure  Monitor vitals and strict I's and O's          Paroxysmal atrial fibrillation (HCC)- (present on  admission)   Assessment & Plan    A. fib RVR , rate uncontrolled, EP consulting for biventricular pacemaker placement, NPO at midnight   Cardiology consulting  metoprolol, digoxin, amiodarone, apixaban  Monitoring on telemetry  Normal TSH  SW assisting with apixaban prior auth        Acute on chronic systolic (congestive) heart failure (HCC)- (present on admission)   Assessment & Plan    5/13/18 transthoracic echocardiogram showed EF 20%, severe systolic heart failure  Cardiology consulting  Metoprolol, apixaban, digoxin, amiodarone  Spironolactone dc'ed due to hyponatremia and concern for hyperkalemia   Monitor vitals, strict I's, daily labs  Monitoring on telemetry        Type 2 diabetes mellitus with renal complication (HCC)- (present on admission)   Assessment & Plan    Uncontrolled with hyperglycemia HgbA1c: 15.7  On metformin, monitor accuchecks and cover with SSI, lantus 10 units  Diabetic education ordered        Hypomagnesemia   Assessment & Plan    Replacing, monitor closely on tele        Leukocytosis   Assessment & Plan    Secondary to above        Troponin I above reference range   Assessment & Plan    Aspirin, statin, heparin  Due demand ischemia due to afib with rapid ventricular rate.  Monitor on telemetry  Downtrending troponin        Hyponatremia   Assessment & Plan    Patient has had improvement with fluids initially  Monitor  BMP          Total bilirubin, elevated   Assessment & Plan    Mild elevation, improving  Abdominal ultrasound unremarkable  Likely hepatic congestion  Monitor CMP        Prolonged Q-T interval on ECG   Assessment & Plan    QTc improving, monitor on telemetry        Acute renal failure (HCC)- (present on admission)   Assessment & Plan    Secondary to congestive heart failure and uncontrolled heart rate, improved  Monitor BMP and vitals  Strict I's and O          Cardiomyopathy (HCC)- (present on admission)   Assessment & Plan    See above heart failure notes        AICD  (automatic cardioverter/defibrillator) present- (present on admission)   Assessment & Plan    ERP requested ICD interrogation, showed atrial fibrillation.          Quality-Core Measures   Reviewed items::  Labs reviewed and Medications reviewed  Jensen catheter::  No Jensen  DVT: Eliquis.  Ulcer Prophylaxis::  Not indicated

## 2018-06-11 NOTE — CARE PLAN
Problem: Safety  Goal: Will remain free from injury  Outcome: PROGRESSING AS EXPECTED  Appropriate fall precautions in place. Bed in lowest position. Call light within reach. Treaded slipper socks on both feet. Hourly rounding in place.     Problem: Pain Management  Goal: Pain level will decrease to patient's comfort goal  Outcome: PROGRESSING AS EXPECTED  Pt c/o of chronic lower back pain. Administered tylenol prn as ordered. Pt states relief achieved.

## 2018-06-11 NOTE — CARE PLAN
Problem: Safety  Goal: Will remain free from falls  Outcome: PROGRESSING AS EXPECTED  [place] fall precautions in place.  Call light w/in reach.  Will continue to monitor.    Problem: Knowledge Deficit  Goal: Knowledge of the prescribed therapeutic regimen will improve  Outcome: PROGRESSING AS EXPECTED  Discussed POC, pt verbalized understanding.

## 2018-06-11 NOTE — OP REPORT
[facility] ELECTROPHYSIOLOGY PROCEDURE NOTE    PROCEDURE PERFORMED:   Atrial lead revision    INDICATION(S): Atrial lead dislodgment    DATE OF SERVICE: 6/11/2018    : Angelo Diaz MD    ASSISTANT: None    ANESTHESIA: Local and moderate sedation (start time 1332, stop time 1626, total dose given 8 mg IV versed 275 IV fentanyl, this was total for 3 procedures)    EBL: 30 cc    SPECIMENS: None    DESCRIPTION OF PROCEDURE:  The patient was prepped and draped in the usual sterile fashion. The procedure was performed under moderate sedation with local anesthetic. The pectoral device pocket was acessed using a combination of blunt dissection and electrocautery. The sutures were removed from the suture sleeve on the atrial lead and the lead disconnected from the header. Over a pre-formed stylet the atrial lead was re-positioned on R atrial appendage. The lead was tested and had satisfactory sensingparameters. High output ventricular pacing did not produce extracardiac stimulation. The leads were sutured to the underlying pectoral muscle with interrupted silk over a silastic suture sleeve. The device pocket was irrigated with antibiotic solution, inspected, and no bleeding was seen. The lead was re-connected to the pacemaker pulse generator and the device was inserted into the pocket. The wound was closed with three layers of absorbable sutures. Following recovery from sedation, the patient was transferred to a monitored bed in good condition.     REVISED LEAD INFORMATION:  Right atrial lead is a [employer] model 7741, serial number [id number], P wave 0.9 millivolts (AF) , threshold not tested due to AF, pacing impedance 640 Ohms.    Please see prior note for generator and other lead information, these remained stable.    DEVICE PROGRAMMING:  Zelalem therapy: DDD  with AMS rate at 90  Tachy therapy:    VF zone  185 bpm, defib 41 J x 8  VT zone 160 bpm  Monitor only    FLUOROSCOPY TIME: 2.1  minutes    IMPRESSIONS:  1. Successful atrial lead revision    RECOMMENDATIONS:  1. Transfer to monitored bed  2. Chest x-ray  3. Device interrogation prior to hospital discharge  4. Followup in device clinic

## 2018-06-11 NOTE — PROGRESS NOTES
Cardiology Progress Note               Author: Flynn Sanon Date & Time created: 2018  12:23 PM       Chief Complaint:  Dyspnea and dependent edema.  48-year-old gentleman with history of known nonischemic myopathy admitted on  with the above complaints.  He was found in atrial fibrillation with rapid ventricular response. Echo shows severe LV dysfunction with EF of 20% range and severe mitral insufficiency. Pulmonary pressures estimated at 50.  Since admission, he is feeling better and has less dyspnea.  He has a primary prevention AICD in place.    Review of Systems   Constitutional: Negative for fever.   Respiratory: Negative for sputum production.    Cardiovascular: Negative.    Neurological: Negative.    Endo/Heme/Allergies: Does not bruise/bleed easily.       Physical Exam   Constitutional: He is oriented to person, place, and time. No distress.   HENT:   Head: Normocephalic and atraumatic.   Eyes: No scleral icterus.   Neck: No JVD present.   Cardiovascular: An irregularly irregular rhythm present. Tachycardia present.    Pulmonary/Chest: No respiratory distress. He has no wheezes.   Abdominal: Soft. He exhibits no distension.   Musculoskeletal: He exhibits no edema.   Neurological: He is alert and oriented to person, place, and time.   Skin: Skin is warm and dry.   Psychiatric: He has a normal mood and affect.       Hemodynamics:  Temp (24hrs), Av.4 °C (97.6 °F), Min:36.2 °C (97.1 °F), Max:36.7 °C (98.1 °F)  Temperature: 36.2 °C (97.1 °F)  Pulse  Av.8  Min: 37  Max: 148  Blood Pressure: 100/82     Respiratory:    Respiration: 18, Pulse Oximetry: 95 %        RUL Breath Sounds: Clear, RML Breath Sounds: Clear, RLL Breath Sounds: Diminished, MADONNA Breath Sounds: Clear, LLL Breath Sounds: Diminished  Fluids:     Weight: 91.3 kg (201 lb 4.5 oz)  GI/Nutrition:  Orders Placed This Encounter   Procedures   • DIET NPO     Standing Status:   Standing     Number of Occurrences:   8     Order  Specific Question:   Restrict to:     Answer:   Sips with Medications [3]     Lab Results:  Recent Labs      06/09/18   0505  06/10/18   0457  06/11/18   0504   WBC  14.7*  12.9*  12.2*   RBC  5.38  5.26  5.10   HEMOGLOBIN  14.7  14.7  14.1   HEMATOCRIT  44.8  42.9  42.3   MCV  83.3  81.6  82.9   MCH  27.3  27.9  27.6   MCHC  32.8*  34.3  33.3*   RDW  48.2  46.3  47.9   PLATELETCT  237  235  246   MPV  11.1  11.5  10.9     Recent Labs      06/09/18   0505  06/10/18   0457  06/11/18   0504  06/11/18   0806   SODIUM  129*  130*  124*  126*   POTASSIUM  4.5  4.3  4.8   --    CHLORIDE  99  100  94*   --    CO2  22  23  21   --    GLUCOSE  228*  188*  239*   --    BUN  21  16  21   --    CREATININE  0.73  0.64  0.89   --    CALCIUM  8.7  8.7  8.8   --                          Medical Decision Making, by Problem:  Active Hospital Problems    Diagnosis   • *Severe sepsis (HCC) [A41.9, R65.20]   • Paroxysmal atrial fibrillation (HCC) [I48.0]   • Acute on chronic systolic (congestive) heart failure (HCC) [I50.23]   • Type 2 diabetes mellitus with renal complication (HCC) [E11.29]   • Cardiomyopathy (HCC) [I42.9]   • AICD (automatic cardioverter/defibrillator) present [Z95.810]   • Hypomagnesemia [E83.42]   • Leukocytosis [D72.829]   • Prolonged Q-T interval on ECG [R94.31]   • Total bilirubin, elevated [R17]   • Hyponatremia [E87.1]   • Troponin I above reference range [R74.8]   • Acute renal failure (HCC) [N17.9]       Plan:  Cardiology myopathy: Severely reduced LV systolic function with EF of 20% range. We have asked electrophysiology to review the case for consideration of placement of biventricular device. He was also on losartan 50 mg a day as an outpatient. This drug will be restarted, initially at a lower dose.    Atrial fibrillation: Now under better control heart rate is still slightly fast in the 100 range. He was on amiodarone for rate control. Recommend stopping this and increase his metoprolol and using  sustained-release because of his LV dysfunction.    Chronic anticoagulation: He is on a NOAC  Increase activity today. EP will evaluate regarding upgrading to a biventricular device.    Quality-Core Measures

## 2018-06-11 NOTE — CONSULTS
EP Consult Note    DOS: [id number]    Consulting physician: Bhavin Arvizu    Chief complaint/Reason for consult: AF w RVR, decompensated CHF    HPI:  Patient is a 47 yo w history of systolic CHF 2/2 NICM (remote cath showing no obstructive CAD per patient). He has had this issue for many years and has been on optimal medical management and is s/p PPM. 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 He is s/p single chamber [employer] ICD. He said his last shock therapy on it was several years ago. He has been on amiodarone as well. He has a history of AF that he thinks is paroxysmal. For the last 3 weeks he has been feeling weaker, more short of breath, increasing orthopnea and PND. He presented with acutely decompensated chronic systolic CHF. He has been found to have AF with difficult to control ventricular rates, LBBB, and EF about 20% with significant MR. EP asked to evaluate for BiV upgrade + AV node ablation.    ROS (+ highlighted in red):  Constitutional: Fevers/chills/fatigue/weightloss  HEENT: Blurry vision/eye pain/sore throat/hearing loss  Respiratory: Shortness of breath/cough  Cardiovascular: Chest pain/palpitations/edema/orthopnea/syncope  GI: Nausea/vomitting/diarrhea  MSK: Arthralgias/myagias/muscle weakness  Skin: Rash/sores  Neurological: Numbness/tremors/vertigo  Endocrine: Excessive thirst/polyuria/cold intolerance/heat intolerance  Psych: Depression/anxiety    Past Medical History:   Diagnosis Date   • CHF (congestive heart failure) (HCC)    • Diabetes (HCC)    • Hyperlipidemia    • Hypertension        Past Surgical History:   Procedure Laterality Date   • AICD IMPLANT  2010       Social History     Social History   • Marital status: Single     Spouse name: N/A   • Number of children: N/A   • Years of education: N/A     Occupational History   • Not on file.     Social History Main Topics   • Smoking status: Never Smoker   • Smokeless tobacco:  Never Used   • Alcohol use No      Comment: 2 times a week-beer, NO ALCOHOL AT THIS TIME   • Drug use: No      Comment: Marijuana use as youth   • Sexual activity: Not on file     Other Topics Concern   • Not on file     Social History Narrative   • No narrative on file       Family History   Problem Relation Age of Onset   • Colon Cancer Mother    • Hypertension Sister    • Stroke Brother    • Heart Disease Neg Hx        No Known Allergies    Current Facility-Administered Medications   Medication Dose Route Frequency Provider Last Rate Last Dose   • HEPARIN 1000 UNITS/ML OR USE ONLY            • HEPARIN (PORCINE) IN NACL 2-0.9 UNIT/ML-% INJ SOLN            • LIDOCAINE HCL 2 % INJ SOLN            • guaiFENesin (ROBITUSSIN) 100 MG/5ML solution 100 mg  5 mL Oral Q4HRS PRN Samantha Patel M.D.   100 mg at 06/11/18 0450   • ceFAZolin (ANCEF) IVPB 2 g  2 g Intravenous Pre-Op Once PRN Ashleypasha Kenny, A.P.R.N.       • vancomycin 1,400 mg in  mL IVPB  15 mg/kg Intravenous Pre-Op Once PRN Ashley L Efraín, A.P.R.N.       • BUPIVACAINE HCL (PF) 0.25 % INJ SOLN            • LIDOCAINE HCL 2 % INJ SOLN            • metoprolol SR (TOPROL XL) tablet 25 mg  25 mg Oral TID Flynn Sanon M.D.       • VANCOMYCIN HCL 1000 MG IV SOLR            • losartan (COZAAR) tablet 25 mg  25 mg Oral Q DAY Flynn Sanon M.D.       • insulin glargine (LANTUS) injection 10 Units  10 Units Subcutaneous Q EVENING Lola Bearden M.D.   10 Units at 06/10/18 1935   • spironolactone (ALDACTONE) tablet 25 mg  25 mg Oral Q DAY Bhavin Rae M.D.   25 mg at 06/11/18 0429   • Metoprolol Tartrate (LOPRESSOR) injection 5 mg  5 mg Intravenous Q6HRS PRN Jaycee Abdalla M.D.   5 mg at 06/09/18 0054   • digoxin (LANOXIN) tablet 125 mcg  125 mcg Oral QAM Bhavin Rae M.D.   125 mcg at 06/11/18 0430   • senna-docusate (PERICOLACE or SENOKOT S) 8.6-50 MG per tablet 2 Tab  2 Tab Oral BID Carlos Eduardo Taylor M.D.   2 Tab at 06/08/18 0616    And   •  polyethylene glycol/lytes (MIRALAX) PACKET 1 Packet  1 Packet Oral QDAY PETER Taylor M.D.        And   • magnesium hydroxide (MILK OF MAGNESIA) suspension 30 mL  30 mL Oral QDAY PETER Taylor M.D.        And   • bisacodyl (DULCOLAX) suppository 10 mg  10 mg Rectal QDAY PETER Taylor M.D.       • acetaminophen (TYLENOL) tablet 650 mg  650 mg Oral Q6HRS PETER Taylor M.D.   650 mg at 06/11/18 0429   • ondansetron (ZOFRAN) syringe/vial injection 4 mg  4 mg Intravenous Q4HRS PETER Taylor M.D.       • ondansetron (ZOFRAN ODT) dispertab 4 mg  4 mg Oral Q4HRS PETER Taylor M.D.       • promethazine (PHENERGAN) tablet 12.5-25 mg  12.5-25 mg Oral Q4HRS PETER Taylor M.D.       • promethazine (PHENERGAN) suppository 12.5-25 mg  12.5-25 mg Rectal Q4HRS PETER Taylor M.D.       • prochlorperazine (COMPAZINE) injection 5-10 mg  5-10 mg Intravenous Q4HRS PETER Taylor M.D.       • insulin regular (HUMULIN R) injection 1-6 Units  1-6 Units Subcutaneous 4X/DAY KWADWO Taylor M.D.   1 Units at 06/11/18 0930    And   • glucose 4 g chewable tablet 16 g  16 g Oral Q15 MIN PETER Taylor M.D.        And   • dextrose 50% (D50W) injection 25 mL  25 mL Intravenous Q15 MIN PETER Taylor M.D.       • apixaban (ELIQUIS) tablet 5 mg  5 mg Oral BID Bhavin Rae M.D.   5 mg at 06/11/18 0429   • metFORMIN (GLUCOPHAGE) tablet 500 mg  500 mg Oral BID WITH MEALS Girma Stuart D.O.   Stopped at 06/11/18 0730   • guaiFENesin LA (MUCINEX) tablet 600 mg  600 mg Oral Q12HRS Girma Stuart D.O.   600 mg at 06/11/18 0429   • aspirin EC (ECOTRIN) tablet 81 mg  81 mg Oral DAILY Carlos Eduardo Taylor M.D.   81 mg at 06/11/18 0430   • atorvastatin (LIPITOR) tablet 20 mg  20 mg Oral DAILY Carlos Eduardo Taylor M.D.   20 mg at 06/11/18 0430       Physical Exam:  Vitals:    06/10/18 2000 06/11/18 0000 06/11/18 0400 06/11/18 0912   BP: 116/88 119/75 113/81 100/82   Pulse: (!) 112 96 94 (!) 55   Resp: 20 16 16 18   Temp: 36.7 °C (98.1 °F) 36.4 °C  (97.6 °F) 36.6 °C (97.9 °F) 36.2 °C (97.1 °F)   SpO2: 97% 96% 92% 95%   Weight: 91.3 kg (201 lb 4.5 oz)      Height:         General appearance: NAD, conversant   Eyes: anicteric sclerae, moist conjunctivae; no lid-lag; PERRLA  HENT: Atraumatic; oropharynx clear with moist mucous membranes and no mucosal ulcerations; normal hard and soft palate  Neck: Trachea midline; FROM, supple, no thyromegaly or lymphadenopathy  Lungs: CTA, with normal respiratory effort and no intercostal retractions  CV: irregularly irregular, tachy, no MRGs, +JVD   Abdomen: Soft, non-tender; no masses or HSM  Extremities: Cool to touch. 1+ bilateral peripheral edema. No extremity lymphadenopathy  Skin: Normal temperature, turgor and texture; no rash, ulcers or subcutaneous nodules  Psych: Appropriate affect, alert and oriented to person, place and time    Data:  Labs reviewed    Prior echo/stress results reviewed:  LVEF 20%    CXR interpreted by me:  Cardiomegaly    EKG interpreted by me:   AF, RVR, LBBB    Impression/Plan:  1)Acute on chronic systolic CHF/Non-ischemic caridomyopathy  2)AF w RVR  3)LBBB  5)Chronic amiodarone therapy  6)Chronic anticoagulation    -He meets indications with his LBBB/NICM/LVEF 20% with NYHA III symptoms for CRT-D upgrade  -His rate control is limited by BP room (I think we can push this, but I think the upgrade is reasonable even without the RVR issue)  -I think rhythm control right now will be difficult as he has been on long term amiodarone and still in fairly rapid AF  -I think his BiV pacing percentage will be low if we cannot control is ventricular rate, so I think AV node ablation is not unreasonable  -That being said, I think if he responds to BiV pacing and his rate is controlled, his cardiomyopathy may reverse remodel at which point rhythm control may be more feasible  -In which case we will plan on atrial lead for this possibility  -Risks/benefits explained to patient, all his questions answered, will  proceed with upgrade + AVFANNY Diaz MD

## 2018-06-11 NOTE — DISCHARGE PLANNING
Transitional Care Navigator:    Chart reviewed for post acute needs.  Pt is a 48 yom who lives in [place] with his s/o, admitted for CHF. LACE+ is 77, and mobility is limited to 9 feet. This patient is an appropriate candidate for home health support to monitor his CHF.  Please consider an order for referral to HH.

## 2018-06-11 NOTE — PROGRESS NOTES
Pt c/o persistent cough with productive clear sputum. Lung sounds shallow but clear bilaterally. Paged Dr. Patel at [phone]. Call back received. Orders for Robitussin received.

## 2018-06-11 NOTE — PROGRESS NOTES
Bedside report received, Pt care assumed. Tele box on. VSS. Pt assessment complete. Pt AOX4, no signs of distress noted at this time.  Pt c/o of 8/10 pain in lower back administered prn tylenol as ordered see MAR. Pt denies any additional needs at this time. Bed in lowest position, ambulates with standby assistance. POC discussed with Pt/family, verbalizes understanding, questions answered regarding potential AV ablation tomorrow AM (decision pending). Pt educated on fall risk and verbalizes understanding, call light within reach, will continue to monitor.

## 2018-06-12 ENCOUNTER — PATIENT OUTREACH (OUTPATIENT)
Dept: HEALTH INFORMATION MANAGEMENT | Facility: OTHER | Age: 48
End: 2018-06-12

## 2018-06-12 ENCOUNTER — APPOINTMENT (OUTPATIENT)
Dept: RADIOLOGY | Facility: MEDICAL CENTER | Age: 48
DRG: 853 | End: 2018-06-12
Attending: INTERNAL MEDICINE
Payer: MEDICARE

## 2018-06-12 VITALS
DIASTOLIC BLOOD PRESSURE: 67 MMHG | HEART RATE: 73 BPM | WEIGHT: 201.28 LBS | HEIGHT: 69 IN | SYSTOLIC BLOOD PRESSURE: 101 MMHG | TEMPERATURE: 97.2 F | OXYGEN SATURATION: 98 % | RESPIRATION RATE: 18 BRPM | BODY MASS INDEX: 29.81 KG/M2

## 2018-06-12 PROBLEM — R65.20 SEVERE SEPSIS (HCC): Status: RESOLVED | Noted: 2018-06-05 | Resolved: 2018-06-12

## 2018-06-12 PROBLEM — A41.9 SEVERE SEPSIS (HCC): Status: RESOLVED | Noted: 2018-06-05 | Resolved: 2018-06-12

## 2018-06-12 LAB
ANION GAP SERPL CALC-SCNC: 10 MMOL/L (ref 0–11.9)
BASOPHILS # BLD AUTO: 0.4 % (ref 0–1.8)
BASOPHILS # BLD: 0.04 K/UL (ref 0–0.12)
BUN SERPL-MCNC: 20 MG/DL (ref 8–22)
CALCIUM SERPL-MCNC: 8.8 MG/DL (ref 8.5–10.5)
CHLORIDE SERPL-SCNC: 94 MMOL/L (ref 96–112)
CO2 SERPL-SCNC: 21 MMOL/L (ref 20–33)
CREAT SERPL-MCNC: 0.86 MG/DL (ref 0.5–1.4)
EKG IMPRESSION: NORMAL
EOSINOPHIL # BLD AUTO: 0.02 K/UL (ref 0–0.51)
EOSINOPHIL NFR BLD: 0.2 % (ref 0–6.9)
ERYTHROCYTE [DISTWIDTH] IN BLOOD BY AUTOMATED COUNT: 49.5 FL (ref 35.9–50)
GLUCOSE BLD-MCNC: 159 MG/DL (ref 65–99)
GLUCOSE BLD-MCNC: 198 MG/DL (ref 65–99)
GLUCOSE BLD-MCNC: 203 MG/DL (ref 65–99)
GLUCOSE SERPL-MCNC: 267 MG/DL (ref 65–99)
HCT VFR BLD AUTO: 43.3 % (ref 42–52)
HGB BLD-MCNC: 14.3 G/DL (ref 14–18)
IMM GRANULOCYTES # BLD AUTO: 0.05 K/UL (ref 0–0.11)
IMM GRANULOCYTES NFR BLD AUTO: 0.5 % (ref 0–0.9)
LYMPHOCYTES # BLD AUTO: 1.71 K/UL (ref 1–4.8)
LYMPHOCYTES NFR BLD: 15.8 % (ref 22–41)
MAGNESIUM SERPL-MCNC: 1.5 MG/DL (ref 1.5–2.5)
MCH RBC QN AUTO: 27.9 PG (ref 27–33)
MCHC RBC AUTO-ENTMCNC: 33 G/DL (ref 33.7–35.3)
MCV RBC AUTO: 84.4 FL (ref 81.4–97.8)
MONOCYTES # BLD AUTO: 0.78 K/UL (ref 0–0.85)
MONOCYTES NFR BLD AUTO: 7.2 % (ref 0–13.4)
NEUTROPHILS # BLD AUTO: 8.21 K/UL (ref 1.82–7.42)
NEUTROPHILS NFR BLD: 75.9 % (ref 44–72)
NRBC # BLD AUTO: 0 K/UL
NRBC BLD-RTO: 0 /100 WBC
PHOSPHATE SERPL-MCNC: 3.2 MG/DL (ref 2.5–4.5)
PLATELET # BLD AUTO: 245 K/UL (ref 164–446)
PMV BLD AUTO: 11.1 FL (ref 9–12.9)
POTASSIUM SERPL-SCNC: 4.9 MMOL/L (ref 3.6–5.5)
RBC # BLD AUTO: 5.13 M/UL (ref 4.7–6.1)
SODIUM SERPL-SCNC: 125 MMOL/L (ref 135–145)
WBC # BLD AUTO: 10.8 K/UL (ref 4.8–10.8)

## 2018-06-12 PROCEDURE — 99239 HOSP IP/OBS DSCHRG MGMT >30: CPT | Performed by: HOSPITALIST

## 2018-06-12 PROCEDURE — 83735 ASSAY OF MAGNESIUM: CPT

## 2018-06-12 PROCEDURE — A9270 NON-COVERED ITEM OR SERVICE: HCPCS | Performed by: INTERNAL MEDICINE

## 2018-06-12 PROCEDURE — 71045 X-RAY EXAM CHEST 1 VIEW: CPT

## 2018-06-12 PROCEDURE — 82962 GLUCOSE BLOOD TEST: CPT | Mod: 91

## 2018-06-12 PROCEDURE — 36415 COLL VENOUS BLD VENIPUNCTURE: CPT

## 2018-06-12 PROCEDURE — 700102 HCHG RX REV CODE 250 W/ 637 OVERRIDE(OP): Performed by: INTERNAL MEDICINE

## 2018-06-12 PROCEDURE — 85025 COMPLETE CBC W/AUTO DIFF WBC: CPT

## 2018-06-12 PROCEDURE — 80048 BASIC METABOLIC PNL TOTAL CA: CPT

## 2018-06-12 PROCEDURE — A9270 NON-COVERED ITEM OR SERVICE: HCPCS | Performed by: HOSPITALIST

## 2018-06-12 PROCEDURE — 700102 HCHG RX REV CODE 250 W/ 637 OVERRIDE(OP): Performed by: HOSPITALIST

## 2018-06-12 PROCEDURE — 700102 HCHG RX REV CODE 250 W/ 637 OVERRIDE(OP): Performed by: NURSE PRACTITIONER

## 2018-06-12 PROCEDURE — 84100 ASSAY OF PHOSPHORUS: CPT

## 2018-06-12 PROCEDURE — 93010 ELECTROCARDIOGRAM REPORT: CPT | Performed by: INTERNAL MEDICINE

## 2018-06-12 PROCEDURE — 93005 ELECTROCARDIOGRAM TRACING: CPT | Performed by: INTERNAL MEDICINE

## 2018-06-12 PROCEDURE — A9270 NON-COVERED ITEM OR SERVICE: HCPCS | Performed by: NURSE PRACTITIONER

## 2018-06-12 RX ORDER — LOSARTAN POTASSIUM 50 MG/1
50 TABLET ORAL
Status: DISCONTINUED | OUTPATIENT
Start: 2018-06-13 | End: 2018-06-12 | Stop reason: HOSPADM

## 2018-06-12 RX ORDER — METOPROLOL SUCCINATE 25 MG/1
25 TABLET, EXTENDED RELEASE ORAL 3 TIMES DAILY
Qty: 90 TAB | Refills: 0 | Status: SHIPPED | OUTPATIENT
Start: 2018-06-12 | End: 2018-06-18

## 2018-06-12 RX ORDER — DIGOXIN 125 MCG
125 TABLET ORAL EVERY MORNING
Qty: 30 TAB | Refills: 0 | Status: SHIPPED | OUTPATIENT
Start: 2018-06-13 | End: 2018-06-18 | Stop reason: SDUPTHER

## 2018-06-12 RX ORDER — DOXYCYCLINE 100 MG/1
100 TABLET ORAL EVERY 12 HOURS
Qty: 8 TAB | Refills: 0 | Status: SHIPPED | OUTPATIENT
Start: 2018-06-12 | End: 2018-06-16

## 2018-06-12 RX ORDER — INSULIN GLARGINE 100 [IU]/ML
10 INJECTION, SOLUTION SUBCUTANEOUS EVERY EVENING
Qty: 10 ML | Refills: 0 | Status: ON HOLD | OUTPATIENT
Start: 2018-06-12 | End: 2024-01-18

## 2018-06-12 RX ORDER — DOXYCYCLINE 100 MG/1
100 TABLET ORAL EVERY 12 HOURS
Status: DISCONTINUED | OUTPATIENT
Start: 2018-06-12 | End: 2018-06-12 | Stop reason: HOSPADM

## 2018-06-12 RX ADMIN — ATORVASTATIN CALCIUM 20 MG: 20 TABLET, FILM COATED ORAL at 06:05

## 2018-06-12 RX ADMIN — INSULIN HUMAN 1 UNITS: 100 INJECTION, SOLUTION PARENTERAL at 08:56

## 2018-06-12 RX ADMIN — LOSARTAN POTASSIUM 25 MG: 25 TABLET, FILM COATED ORAL at 06:05

## 2018-06-12 RX ADMIN — ASPIRIN 81 MG: 81 TABLET, COATED ORAL at 06:05

## 2018-06-12 RX ADMIN — DIGOXIN 125 MCG: 125 TABLET ORAL at 06:05

## 2018-06-12 RX ADMIN — DOXYCYCLINE 100 MG: 100 TABLET ORAL at 08:53

## 2018-06-12 RX ADMIN — ACETAMINOPHEN 650 MG: 325 TABLET, FILM COATED ORAL at 10:09

## 2018-06-12 RX ADMIN — SPIRONOLACTONE 25 MG: 25 TABLET, FILM COATED ORAL at 06:06

## 2018-06-12 RX ADMIN — GUAIFENESIN 600 MG: 600 TABLET, EXTENDED RELEASE ORAL at 06:05

## 2018-06-12 RX ADMIN — INSULIN HUMAN 2 UNITS: 100 INJECTION, SOLUTION PARENTERAL at 13:27

## 2018-06-12 RX ADMIN — METFORMIN HYDROCHLORIDE 500 MG: 500 TABLET ORAL at 08:53

## 2018-06-12 RX ADMIN — APIXABAN 5 MG: 5 TABLET, FILM COATED ORAL at 06:05

## 2018-06-12 RX ADMIN — METOPROLOL SUCCINATE 25 MG: 25 TABLET, EXTENDED RELEASE ORAL at 06:05

## 2018-06-12 RX ADMIN — ACETAMINOPHEN 650 MG: 325 TABLET, FILM COATED ORAL at 03:51

## 2018-06-12 ASSESSMENT — PAIN SCALES - GENERAL
PAINLEVEL_OUTOF10: 8
PAINLEVEL_OUTOF10: 3
PAINLEVEL_OUTOF10: 8
PAINLEVEL_OUTOF10: 3
PAINLEVEL_OUTOF10: 2

## 2018-06-12 ASSESSMENT — ENCOUNTER SYMPTOMS
SPEECH CHANGE: 0
NAUSEA: 0
HEARTBURN: 0
COUGH: 0
WEAKNESS: 0
FEVER: 0
CHILLS: 0
SPUTUM PRODUCTION: 0
VOMITING: 0
TINGLING: 0
BRUISES/BLEEDS EASILY: 0
NEUROLOGICAL NEGATIVE: 1
SORE THROAT: 0
SHORTNESS OF BREATH: 0
STRIDOR: 0
SENSORY CHANGE: 0
DOUBLE VISION: 0
ORTHOPNEA: 0
BLURRED VISION: 0
ABDOMINAL PAIN: 0
WHEEZING: 0
PND: 0
FOCAL WEAKNESS: 0
BLOOD IN STOOL: 0
WEIGHT LOSS: 0
DIARRHEA: 0
LOSS OF CONSCIOUSNESS: 0
HEADACHES: 0
PALPITATIONS: 0
TREMORS: 0
DIZZINESS: 0

## 2018-06-12 NOTE — FACE TO FACE
Face to Face Supporting Documentation - Home Health    The encounter with this patient was in whole or in part the primary reason for home health admission.    Date of encounter:   Patient:                    MRN:                       YOB: 2020  Lai Dailey  [id number]  1970     Home health to see patient for:  Skilled Nursing care for assessment, interventions & education and Wound Care    Skilled need for:  Surgical Aftercare s/p Bivent ICD    Skilled nursing interventions to include:  Wound Care    Homebound status evidenced by:  Need the aid of supportive devices such as crutches, canes, wheelchairs or walkers. Leaving home requires a considerable and taxing effort. There is a normal inability to leave the home.    Community Physician to provide follow up care: Tor Rdz M.D.     Optional Interventions? No      I certify the face to face encounter for this home health care referral meets the CMS requirements and the encounter/clinical assessment with the patient was, in whole, or in part, for the medical condition(s) listed above, which is the primary reason for home health care. Based on my clinical findings: the service(s) are medically necessary, support the need for home health care, and the homebound criteria are met.  I certify that this patient has had a face to face encounter by myself.  Senthil Villavicencio M.D. - NPI: [id number]

## 2018-06-12 NOTE — PROGRESS NOTES
Cardiology Progress Note               Author: Ashley Kenny Date & Time created: 2018  9:11 AM     Interval History:  Patient is a 49 yo w history of systolic CHF 2/2 NICM (remote cath showing no obstructive CAD per patient). He has had this issue for many years and has been on optimal medical management and is s/p PPM. 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 He is s/p single chamber [place] [employer] ICD. He said his last shock therapy on it was several years ago. He has been on amiodarone as well. He has a history of AF that he thinks is paroxysmal. For the last 3 weeks he has been feeling weaker, more short of breath, increasing orthopnea and PND. He presented with acutely decompensated chronic systolic CHF. He has been found to have AF with difficult to control ventricular rates, LBBB, and EF about 20% with significant MR. EP asked to evaluate for BiV upgrade + AV node ablation.    18:  Upgrade to [employer]one CRT-D and AV node ablation by Dr. Diaz.  Right Atrial lead revision as well.  Monitored rhythm: 100% V paced 90.  No ectopy.    CXR Conclusions:  1.  Pulmonary edema and/or infiltrates are identified, which are stable since the prior exam.  2.  Cardiomegaly    BSI Device Interrogation:   Device is working normally.  No programing changes were made.   A: Sensin.0 mV, Threshold: N/A, Impedance: 676 ohms.  RV: Sensing: N/A, Threshold: 1.1 V, Impedance: 398 ohms.   LV: Sensing: N/A, Threshold: 1.8 V, Impedance 768 ohms.     Chief Complaint:  Afib with RVR, decompensated CHF.     Review of Systems   Constitutional: Negative for chills, fever, malaise/fatigue and weight loss.   HENT: Negative for congestion, nosebleeds, sore throat and tinnitus.    Eyes: Negative for blurred vision and double vision.   Respiratory: Negative for cough, sputum production, shortness of breath, wheezing and stridor.    Cardiovascular: Negative for chest pain,  palpitations, orthopnea, leg swelling and PND.   Gastrointestinal: Negative for abdominal pain, blood in stool, diarrhea, heartburn, melena, nausea and vomiting.   Skin: Negative for rash.   Neurological: Negative for dizziness, tingling, tremors, sensory change, speech change, focal weakness, loss of consciousness, weakness and headaches.       Physical Exam   Constitutional: He is oriented to person, place, and time. He appears well-developed and well-nourished.   HENT:   Head: Normocephalic and atraumatic.   Eyes: Conjunctivae and EOM are normal. Pupils are equal, round, and reactive to light.   Neck: Normal range of motion. Neck supple. No JVD present. No tracheal deviation present.   Cardiovascular: Normal rate, regular rhythm, normal heart sounds and intact distal pulses.  Exam reveals no gallop and no friction rub.    No murmur heard.  Pulmonary/Chest: Effort normal and breath sounds normal. No stridor. No respiratory distress. He has no wheezes. He has no rales. He exhibits no tenderness.   L chest ICD site C/D/I, soft.  No erythema or drainage.  Minimal edema. Dressing changed.   Abdominal: Soft. Bowel sounds are normal.   Musculoskeletal: Normal range of motion. He exhibits no edema.   Neurological: He is alert and oriented to person, place, and time.   Skin: Skin is warm and dry. No erythema.   Psychiatric: He has a normal mood and affect. His behavior is normal. Judgment and thought content normal.       Hemodynamics:  Temp (24hrs), Av.6 °C (97.8 °F), Min:36.2 °C (97.1 °F), Max:37.1 °C (98.7 °F)  Temperature: 36.6 °C (97.8 °F)  Pulse  Av.5  Min: 37  Max: 148  Blood Pressure: 113/65     Respiratory:    Respiration: 18, Pulse Oximetry: 97 %        RUL Breath Sounds: Clear, RML Breath Sounds: Clear, RLL Breath Sounds: Diminished, MADONNA Breath Sounds: Clear, LLL Breath Sounds: Diminished  Fluids:        GI/Nutrition:  Orders Placed This Encounter   Procedures   • Diet Order     Standing Status:    Standing     Number of Occurrences:   1     Order Specific Question:   Diet:     Answer:   Cardiac [6]     Lab Results:  Recent Labs      06/10/18   0457  06/11/18   0504  06/12/18   0445   WBC  12.9*  12.2*  10.8   RBC  5.26  5.10  5.13   HEMOGLOBIN  14.7  14.1  14.3   HEMATOCRIT  42.9  42.3  43.3   MCV  81.6  82.9  84.4   MCH  27.9  27.6  27.9   MCHC  34.3  33.3*  33.0*   RDW  46.3  47.9  49.5   PLATELETCT  235  246  245   MPV  11.5  10.9  11.1     Recent Labs      06/10/18   0457  06/11/18   0504  06/11/18   0806  06/12/18   0445   SODIUM  130*  124*  126*  125*   POTASSIUM  4.3  4.8   --   4.9   CHLORIDE  100  94*   --   94*   CO2  23  21   --   21   GLUCOSE  188*  239*   --   267*   BUN  16  21   --   20   CREATININE  0.64  0.89   --   0.86   CALCIUM  8.7  8.8   --   8.8                         Medical Decision Making, by Problem:  Active Hospital Problems    Diagnosis   • *Severe sepsis (HCC) [A41.9, R65.20]   • Paroxysmal atrial fibrillation (HCC) [I48.0]   • Acute on chronic systolic (congestive) heart failure (HCC) [I50.23]   • Type 2 diabetes mellitus with renal complication (HCC) [E11.29]   • Cardiomyopathy (HCC) [I42.9]   • AICD (automatic cardioverter/defibrillator) present [Z95.810]   • Hypomagnesemia [E83.42]   • Leukocytosis [D72.829]   • Prolonged Q-T interval on ECG [R94.31]   • Total bilirubin, elevated [R17]   • Hyponatremia [E87.1]   • Troponin I above reference range [R74.8]   • Acute renal failure (HCC) [N17.9]       Plan:  1. Afib w RVR:  - S/P AV node ablation by Dr. Diaz yesterday.   - Continue Toprol.  - Continue Eliquis.      2. ICD  - S/P upgrade to BiV ICD in the setting of AV node ablation and associated pacemaker dependency.   - Device Interrogation reveals normal function of Device this AM.   - No late PTX on CXR.  Lead position appears stable this AM.    3. Systolic CHF:  - Defer cards management.      ICD restrictions reviewed with patient. Do not raise affected arm above head or  behind back for six weeks. May remove arm sling after 24 hrs, please wear if trouble remembering arm limitations and as needed at night. No heavy lifting/pushing for six weeks. No driving for one week. No showers until seen in follow up. Keep dressing on, clean, and dry until sees us in follow up. Wound care reviewed. Instructed to report s/s of infection such as warmth/redness/drainage/swelling at site or fever/chills.  ICD therapy reviewed with patient: 1 shock: if feeling fine can notify cardiology office and be seen for interrogation.  If feeling poorly needs to call 911.  2 Shocks in 24 hours: call 911.  Patient verbalizes understanding.     EP will sign off.  Thank you for consult to  Dailey's care.  Please call with any questions.  Follow up is arranged in device clinic.     Quality-Core Measures   Reviewed items::  EKG reviewed, Radiology images reviewed, Labs reviewed and Medications reviewed

## 2018-06-12 NOTE — DOCUMENTATION QUERY
"DOCUMENTATION QUERY    PROVIDERS: Please select “Cosign w/ note”to reply to query.    To better represent the severity of illness of your patient, please review the following information and exercise your independent professional judgment in responding to this query.     \"Clinical symptoms of PNA\" (H&P) and \"Pneumonia is ruled out\" (Pulmonary note 6/7) are documented. Based upon the clinical findings, risk factors, and treatment, can this diagnosis be further specified?    • Pneumonia has resolved  • Pneumonia has been ruled in  • Pneumonia has been ruled out  • Other explanation of clinical findings  • Unable to determine      The medical record reflects the following:   Clinical Findings H&P  - clinical symptoms of PNA  -patient had unremarkable chest x-ray    Pulmonology 6/7  -SIRS with possible pneumonia - ruled out   -Lactic acidosis -secondary to hypoperfusion, doubt sepsis -resolved     Treatment ceftriaxone and doxycycline, Sepsis protocol   Risk Factors  hemoptysis with cough, CHF, dyspnea   Location within medical record  History and Physical and Progress Notes     Thank you,   Carla Ravi MSN, RN, CNL, CNML  Clinical   [email or web address]  [phone]            "

## 2018-06-12 NOTE — PROGRESS NOTES
Bedside report received. Family at bedside. Patient sleeping in bed. Post procedure vitals in place. Call light within reach. Need for bed alarm assessed; fall precautions in place as appropriate. Non verbal pain scale indicates no assumed pain present.

## 2018-06-12 NOTE — CARE PLAN
Problem: Knowledge Deficit  Goal: Knowledge of disease process/condition, treatment plan, diagnostic tests, and medications will improve    Intervention: Assess knowledge level of disease process/condition, treatment plan, diagnostic tests, and medications  Rn will provided education to patient regarding disease process, treatment plan, diagnostic tests and medications pertinent to the patient's condition. RN will answer questions that the patient has related to their condition.         Problem: Pain Management  Goal: Pain level will decrease to patient's comfort goal    Intervention: Follow pain managment plan developed in collaboration with patient and Interdisciplinary Team  RN will assess patient's pain level and implement pain strategies according to MAR. Rn will also educate patient regarding both nonpharmacologic and pharmacologic pain strategies.

## 2018-06-12 NOTE — DISCHARGE PLANNING
Received Choice form at 0908  Agency/Facility Name: [facility]  Referral NOT sent need order, YAMILETH Zuñiga notified via voicemail at [phone]

## 2018-06-12 NOTE — PROGRESS NOTES
Shoulder immobilizer was delivered and fitted to patient LUE.  If any further assistance needed, please call extension [phone] or place order for Ortho Technician assistance as a communication order in [employer].

## 2018-06-12 NOTE — DISCHARGE INSTRUCTIONS
Discharge Instructions    Discharged to home by car with relative. Discharged via wheelchair, hospital escort: Yes.  Special equipment needed: Immobilizer    Be sure to schedule a follow-up appointment with your primary care doctor or any specialists as instructed.     Discharge Plan:   Diet Plan: Discussed  Activity Level: Discussed  Confirmed Follow up Appointment: Appointment Scheduled  Confirmed Symptoms Management: Discussed  Medication Reconciliation Updated: Yes  Pneumococcal Vaccine Administered/Refused: Not given - Patient refused pneumococcal vaccine  Influenza Vaccine Indication: Not indicated: Previously immunized this influenza season and > 8 years of age    I understand that a diet low in cholesterol, fat, and sodium is recommended for good health. Unless I have been given specific instructions below for another diet, I accept this instruction as my diet prescription.   Other diet: Diabetic/Cardiac    Special Instructions:   HF Patient Discharge Instructions  · Monitor your weight daily, and maintain a weight chart, to track your weight changes.   · Activity as tolerated, unless your Doctor has ordered otherwise. Other activity order: Cardiac/Diabetic.  · Follow a low fat, low cholesterol, low salt diet unless instructed otherwise by your Doctor. Read the labels on the back of food products and track your intake of fat, cholesterol and salt.   · Fluid Restriction No. If a Fluid Restriction has been ordered by your Doctor, measure fluids with a measuring cup to ensure that you are not exceeding the restriction.   · No smoking.  · Oxygen No. If your Doctor has ordered that you wear Oxygen at home, it is important to wear it as ordered.  · Did you receive an explanation from staff on the importance of taking each of your medications and why it is necessary to keep taking them unless your doctor says to stop? Yes  · Were all of your questions answered about how to manage your heart failure and what to do  if you have increased signs and symptoms after you go home? Yes  · Do you feel like your heart failure care team involved you in the care treatment plan and allowed you to make decisions regarding your care while in the hospital and addressed any discharge needs you might have? Yes    See the educational handout provided at discharge for more information on monitoring your daily weight, activity and diet. This also explains more about Heart Failure, symptoms of a flare-up and some of the tests that you have undergone.     Warning Signs of a Flare-Up include:  · Swelling in the ankles or lower legs.  · Shortness of breath, while at rest, or while doing normal activities.   · Shortness of breath at night when in bed, or coughing in bed.   · Requiring more pillows to sleep at night, or needing to sit up at night to sleep.  · Feeling weak, dizzy or fatigued.     When to call your Doctor:  · Call [facility] seven days a week from 8:00 a.m. to 8:00 p.m. for medical questions [phone].  · Call your Primary Care Physician or Cardiologist if:   1. You experience any pain radiating to your jaw or neck.  2. You have any difficulty breathing.  3. You experience weight gain of 3 lbs in a day or 5 lbs in a week.   4. You feel any palpitations or irregular heartbeats.  5. You become dizzy or lose consciousness.   If you have had an angiogram or had a pacemaker or AICD placed, and experience:  1. Bleeding, drainage or swelling at the surgical / puncture site.  2. Fever greater than 100.0 F  3. Shock from internal defibrillator.  4. Cool and / or numb extremities.      · Is patient discharged on Warfarin / Coumadin?   No       Pacemaker Implantation, Care After  Refer to this sheet over the next few weeks. These instructions provide you with information on caring for yourself after the procedure. Your health care provider may also give you more specific instructions. Your treatment has been planned according to  current medical practices, but problems sometimes occur. Call your health care provider if you have any problems or questions regarding your pacemaker.   WHAT TO EXPECT AFTER THE PROCEDURE  · You may feel pain. Some pain is normal. It may last a few days.  · A slight bump may be seen over the skin where the device was placed. Sometimes, it is possible to feel the device under the skin. This is normal.  · In the months and years afterward, your health care provider will check the device, the leads, and the battery every few months. Eventually, when the battery is low, the device will be replaced.  HOME CARE INSTRUCTIONS  Medicines  · Take medicines only as directed by your health care provider.  · If you were prescribed an antibiotic medicine, finish it all even if you start to feel better.  · Do not take any other medicines without asking your health care provider first. Some medicines, including certain painkillers, can cause bleeding in your stomach after surgery.  Wound Care  · Do not remove the bandage on your chest until directed to do so by your health care provider.  · After your bandage is removed, you may see pieces of tape called skin adhesive strips over the area where the cut was made (incision site). Let them fall off on their own.  · Check the incision site every day to make sure it is not infected, bleeding, or starting to pull apart.  · Do not use lotions or ointments near the incision site unless directed to do so.  · Keep the incision area clean and dry for 2-3 days after the procedure or as directed by your health care provider. It takes several weeks for the incision site to completely heal.  · Do not take baths, swim, or use a hot tub until your health care provider approves.  Activities  · Try to walk a little every day. Exercising is important after this procedure. It is also important to use your shoulder on the side of the pacemaker in daily tasks that do not require exaggerated  motion.  · Avoid sudden jerking, pulling, or chopping movements that pull your upper arm far away from your body for at least 6 weeks.  · Do not lift your upper arm above your shoulders for at least 6 weeks. This means no tennis, golf, or swimming for this period of time. If you sleep with the arm above your head, use a restraint to prevent this from happening as you sleep.  · You may go back to work when your health care provider says it is okay. Check with your health care provider before you start to drive or play sports.  Other Instructions  · Follow diet instructions if they were provided. You should be able to eat what you usually do right away, but you may need to limit your salt intake.  · Weigh yourself every day. If you suddenly gain weight, fluid may be building up in your body.  · Always carry your pacemaker identification card with you. The card should list the implant date, device model, and . Consider wearing a medical alert bracelet or necklace.  · Tell all health care providers that you have a pacemaker. This may prevent them from giving you a magnetic resource imaging scan (MRI) because of the strong magnets used during that test.  · If you must pass through a metal detector, quickly walk through it. Do not stop under the detector or stand near it.  · Avoid places or objects with a strong electric or magnetic field, including:  ¨ Airport security ortega. When at the airport, let officials know you have a pacemaker. Your ID card will let you be checked in a way that is safe for you and that will not damage your pacemaker. Also, do not let a security person wave a magnetic wand near your pacemaker. That can make it stop working.  ¨ Power plants.  ¨ Large electrical generators.  ¨ Radiofrequency transmission towers, such as cell phone and radio towers.  · Do not use amateur (ham) radio equipment or electric (arc) welding torches. Some devices are safe to use if held at least 1 foot from  your pacemaker. These include power tools, lawn mowers, and speakers. If you are unsure of whether something is safe to use, ask your health care provider.  · You may safely use electric blankets, heating pads, computers, and microwave ovens.  · When using your cell phone, hold it to the ear opposite the pacemaker. Do not leave your cell phone in a pocket over the pacemaker.  · Keep all follow-up visits as directed by your health care provider. This is how your health care provider makes sure your chest is healing the way it should. Ask your health care provider when you should come back to have your stitches or staples taken out.  · Have your pacemaker checked every 3-6 months or as directed by your health care provider. Most pacemakers last for 4-8 years before a new one is needed.  SEEK MEDICAL CARE IF:  · You gain weight suddenly.  · Your legs or feet swell more than they have before.  · It feels like your heart is fluttering or skipping beats (heart palpitations).  · You have a fever.  SEEK IMMEDIATE MEDICAL CARE IF:  · You have chest pain.  · You feel more short of breath than you have felt before.  · You feel more light-headed than you have felt before.  · You have problems with your incision site, such as swelling or bleeding, or it starts to open up.  · You have drainage, redness, swelling, or pain at your incision site.     This information is not intended to replace advice given to you by your health care provider. Make sure you discuss any questions you have with your health care provider.     Document Released: 07/07/2006 Document Revised: 01/08/2016 Document Reviewed: 04/19/2013  Elsevier Interactive Patient Education ©2016 [employer]    Atrial Fibrillation  Atrial fibrillation is a type of irregular or rapid heartbeat (arrhythmia). In atrial fibrillation, the heart quivers continuously in a chaotic pattern. This occurs when parts of the heart receive disorganized signals that make the heart unable  to pump blood normally. This can increase the risk for stroke, heart failure, and other heart-related conditions. There are different types of atrial fibrillation, including:  · Paroxysmal atrial fibrillation. This type starts suddenly, and it usually stops on its own shortly after it starts.  · Persistent atrial fibrillation. This type often lasts longer than a week. It may stop on its own or with treatment.  · Long-lasting persistent atrial fibrillation. This type lasts longer than 12 months.  · Permanent atrial fibrillation. This type does not go away.  Talk with your health care provider to learn about the type of atrial fibrillation that you have.  What are the causes?  This condition is caused by some heart-related conditions or procedures, including:  · A heart attack.  · Coronary artery disease.  · Heart failure.  · Heart valve conditions.  · High blood pressure.  · Inflammation of the sac that surrounds the heart (pericarditis).  · Heart surgery.  · Certain heart rhythm disorders, such as Jim-Parkinson-White syndrome.  Other causes include:  · Pneumonia.  · Obstructive sleep apnea.  · Blockage of an artery in the lungs (pulmonary embolism, or PE).  · Lung cancer.  · Chronic lung disease.  · Thyroid problems, especially if the thyroid is overactive (hyperthyroidism).  · Caffeine.  · Excessive alcohol use or illegal drug use.  · Use of some medicines, including certain decongestants and diet pills.  Sometimes, the cause cannot be found.  What increases the risk?  This condition is more likely to develop in:  · People who are older in age.  · People who smoke.  · People who have diabetes mellitus.  · People who are overweight (obese).  · Athletes who exercise vigorously.  What are the signs or symptoms?  Symptoms of this condition include:  · A feeling that your heart is beating rapidly or irregularly.  · A feeling of discomfort or pain in your chest.  · Shortness of breath.  · Sudden light-headedness or  weakness.  · Getting tired easily during exercise.  In some cases, there are no symptoms.  How is this diagnosed?  Your health care provider may be able to detect atrial fibrillation when taking your pulse. If detected, this condition may be diagnosed with:  · An electrocardiogram (ECG).  · A Holter monitor test that records your heartbeat patterns over a 24-hour period.  · Transthoracic echocardiogram (TTE) to evaluate how blood flows through your heart.  · Transesophageal echocardiogram (EMILY) to view more detailed images of your heart.  · A stress test.  · Imaging tests, such as a CT scan or chest X-ray.  · Blood tests.  How is this treated?  The main goals of treatment are to prevent blood clots from forming and to keep your heart beating at a normal rate and rhythm. The type of treatment that you receive depends on many factors, such as your underlying medical conditions and how you feel when you are experiencing atrial fibrillation.  This condition may be treated with:  · Medicine to slow down the heart rate, bring the heart’s rhythm back to normal, or prevent clots from forming.  · Electrical cardioversion. This is a procedure that resets your heart’s rhythm by delivering a controlled, low-energy shock to the heart through your skin.  · Different types of ablation, such as catheter ablation, catheter ablation with pacemaker, or surgical ablation. These procedures destroy the heart tissues that send abnormal signals. When the pacemaker is used, it is placed under your skin to help your heart beat in a regular rhythm.  Follow these instructions at home:  · Take over-the counter and prescription medicines only as told by your health care provider.  · If your health care provider prescribed a blood-thinning medicine (anticoagulant), take it exactly as told. Taking too much blood-thinning medicine can cause bleeding. If you do not take enough blood-thinning medicine, you will not have the protection that you need  against stroke and other problems.  · Do not use tobacco products, including cigarettes, chewing tobacco, and e-cigarettes. If you need help quitting, ask your health care provider.  · If you have obstructive sleep apnea, manage your condition as told by your health care provider.  · Do not drink alcohol.  · Do not drink beverages that contain caffeine, such as coffee, soda, and tea.  · Maintain a healthy weight. Do not use diet pills unless your health care provider approves. Diet pills may make heart problems worse.  · Follow diet instructions as told by your health care provider.  · Exercise regularly as told by your health care provider.  · Keep all follow-up visits as told by your health care provider. This is important.  How is this prevented?  · Avoid drinking beverages that contain caffeine or alcohol.  · Avoid certain medicines, especially medicines that are used for breathing problems.  · Avoid certain herbs and herbal medicines, such as those that contain ephedra or ginseng.  · Do not use illegal drugs, such as cocaine and amphetamines.  · Do not smoke.  · Manage your high blood pressure.  Contact a health care provider if:  · You notice a change in the rate, rhythm, or strength of your heartbeat.  · You are taking an anticoagulant and you notice increased bruising.  · You tire more easily when you exercise or exert yourself.  Get help right away if:  · You have chest pain, abdominal pain, sweating, or weakness.  · You feel nauseous.  · You notice blood in your vomit, bowel movement, or urine.  · You have shortness of breath.  · You suddenly have swollen feet and ankles.  · You feel dizzy.  · You have sudden weakness or numbness of the face, arm, or leg, especially on one side of the body.  · You have trouble speaking, trouble understanding, or both (aphasia).  · Your face or your eyelid droops on one side.  These symptoms may represent a serious problem that is an emergency. Do not wait to see if the  symptoms will go away. Get medical help right away. Call your local emergency services (911 in the [place] Do not drive yourself to the hospital.   This information is not intended to replace advice given to you by your health care provider. Make sure you discuss any questions you have with your health care provider.  Document Released: 12/18/2006 Document Revised: 04/26/2017 Document Reviewed: 04/13/2016  [employer] Interactive Patient Education © 2017 [employer]      Sepsis, Adult  Sepsis is a serious infection of your blood or tissues that affects your whole body. The infection that causes sepsis may be bacterial, viral, fungal, or parasitic. Sepsis may be life threatening. Sepsis can cause your blood pressure to drop. This may result in shock. Shock causes your central nervous system and your organs to stop working correctly.  What increases the risk?  Sepsis can happen in anyone, but it is more likely to happen in people who have weakened immune systems.  What are the signs or symptoms?  Symptoms of sepsis can include:  · Fever or low body temperature (hypothermia).  · Rapid breathing (hyperventilation).  · Chills.  · Rapid heartbeat (tachycardia).  · Confusion or light-headedness.  · Trouble breathing.  · Urinating much less than usual.  · Cool, clammy skin or red, flushed skin.  · Other problems with the heart, kidneys, or brain.  How is this diagnosed?  Your health care provider will likely do tests to look for an infection, to see if the infection has spread to your blood, and to see how serious your condition is. Tests can include:  · Blood tests, including cultures of your blood.  · Cultures of other fluids from your body, such as:  ¨ Urine.  ¨ Pus from wounds.  ¨ Mucus coughed up from your lungs.  · Urine tests other than cultures.  · X-ray exams or other imaging tests.  How is this treated?  Treatment will begin with elimination of the source of infection. If your sepsis is likely caused by a bacterial  or fungal infection, you will be given antibiotic or antifungal medicines.  You may also receive:  · Oxygen.  · Fluids through an IV tube.  · Medicines to increase your blood pressure.  · A machine to clean your blood (dialysis) if your kidneys fail.  · A machine to help you breathe if your lungs fail.  Get help right away if:  You get an infection or develop any of the signs and symptoms of sepsis after surgery or a hospitalization.  This information is not intended to replace advice given to you by your health care provider. Make sure you discuss any questions you have with your health care provider.  Document Released: 09/15/2004 Document Revised: 05/25/2017 Document Reviewed: 08/25/2014  [employer] Interactive Patient Education © 2017 [employer]      Depression / Suicide Risk    As you are discharged from this [facility] facility, it is important to learn how to keep safe from harming yourself.    Recognize the warning signs:  · Abrupt changes in personality, positive or negative- including increase in energy   · Giving away possessions  · Change in eating patterns- significant weight changes-  positive or negative  · Change in sleeping patterns- unable to sleep or sleeping all the time   · Unwillingness or inability to communicate  · Depression  · Unusual sadness, discouragement and loneliness  · Talk of wanting to die  · Neglect of personal appearance   · Rebelliousness- reckless behavior  · Withdrawal from people/activities they love  · Confusion- inability to concentrate     If you or a loved one observes any of these behaviors or has concerns about self-harm, here's what you can do:  · Talk about it- your feelings and reasons for harming yourself  · Remove any means that you might use to hurt yourself (examples: pills, rope, extension cords, firearm)  · Get professional help from the community (Mental Health, Substance Abuse, psychological counseling)  · Do not be alone:Call your Safe Contact- someone  whom you trust who will be there for you.  · Call your local CRISIS HOTLINE 118-6886 or 162-286-0428  · Call your local Children's Mobile Crisis Response Team [place] (347) 707-8163 or www.[employer]  · Call the toll free National Suicide Prevention Hotlines   · National Suicide Prevention Lifeline 117-519-EKCX (9168)  · [employer] [facility] Network 800-SUICIDE (248-6896)

## 2018-06-12 NOTE — DISCHARGE SUMMARY
Discharge Summary    CHIEF COMPLAINT ON ADMISSION  Chief Complaint   Patient presents with   • Cough     PT REPORTS COUGH FOR TWO DAYS WITH BLOOD IN SPUTUM       Reason for Admission  Coughing    Admission Date  6/5/2018    CODE STATUS  Full Code    HPI & HOSPITAL COURSE  This is a 48 y.o. male here with chest pain shortness of breath and a cough. He has a history of an ejection fraction of 20% status post AICD. When he presented he had reported that he ran out of his medications for a few days. In the emergency department he was found to have A. fib with RVR and hypoxia. He is admitted with a diagnosis of severe sepsis due to clinical signs of a pneumonia. His A. fib was controlled with IV metoprolol and his hypotension improved. Cardiology was subsequently consulted and the patient was admitted to telemetry    Over the course of the hospitalization patient slowly improved. He remained in atrial fibrillation and his rate was difficult to control. Symptomatically he continued to feel better throughout the hospitalization but he remained in atrial fibrillation. He would have low blood pressure and would complain of being tired and dizzy. Therefore EP was consulted.    Patient eventually underwent AV pato ablation and his defibrillator was upgraded to a biventricular device. After the procedure he felt much better. His vitals improved quite significantly. After the procedure his device was interrogated and it reveals normal functioning. A chest x-ray was obtained and this did not show any evidence of a pneumothorax and the lead positions appear to be stable. After discussing this with the EP as well as cardiology the patient was cleared for discharge home with home health. He will have ICD restrictions which are listed below. This has been reviewed with the patient.    Regarding his sepsis he has a few more days of antibiotic therapy. His cultures remain negative. The sepsis resolved by the time of discharge. Source  is from a probable pneumonia despite cultures being negative       Therefore, he is discharged in good and stable condition to home with organized home healthcare and close outpatient follow-up.    The patient met 2-midnight criteria for an inpatient stay at the time of discharge.    Discharge Date  June 12, 2018    FOLLOW UP ITEMS POST DISCHARGE  Cardiology follow-up  PCP follow-up  EP follow-up    ICD restrictions reviewed with patient. Do not raise affected arm above head or behind back for six weeks. May remove arm sling after 24 hrs, please wear if trouble remembering arm limitations and as needed at night. No heavy lifting/pushing for six weeks. No driving for one week. No showers until seen in follow up. Keep dressing on, clean, and dry until sees us in follow up. Wound care reviewed. Instructed to report s/s of infection such as warmth/redness/drainage/swelling at site or fever/chills.  ICD therapy reviewed with patient: 1 shock: if feeling fine can notify cardiology office and be seen for interrogation.  If feeling poorly needs to call 911.  2 Shocks in 24 hours: call 911.  Patient verbalizes understanding.     DISCHARGE DIAGNOSES  Principal Problem (Resolved):    Severe sepsis (HCC) POA: Yes  Active Problems:    Acute on chronic systolic (congestive) heart failure (HCC) POA: Yes    Paroxysmal atrial fibrillation (HCC) POA: Yes    Type 2 diabetes mellitus with renal complication (HCC) POA: Yes      Overview: Patient has a history of diabetes type 2  On treatment with metformin.      Denies any sequalae of neuropathy ,nephropathy,or retinopathy.      Patient had a blood glucose of    AICD (automatic cardioverter/defibrillator) present POA: Yes    Cardiomyopathy (HCC) POA: Yes    Acute renal failure (HCC) POA: Yes      Overview: ICD-10 transition    Prolonged Q-T interval on ECG POA: Unknown    Total bilirubin, elevated POA: Unknown    Hyponatremia POA: Unknown    Troponin I above reference range POA:  Unknown    Leukocytosis POA: Unknown    Hypomagnesemia POA: Unknown      FOLLOW UP  Future Appointments  Date Time Provider Department Center   6/18/2018 1:00 PM PACER CHECK-[facility] None   6/18/2018 1:45 PM Joss Solis M.D. [facility] None     Tor Rdz M.D.  [address] [facility] [address]  [phone]      The hospital  left a voicemail with the office to call you directly to schedule a follow up appointment. If you do not receive a call within 2 days please call to arrange a follow up.      MEDICATIONS ON DISCHARGE     Medication List      START taking these medications      Instructions   apixaban 5mg Tabs  Commonly known as:  ELIQUIS   Take 1 Tab by mouth 2 Times a Day.  Dose:  5 mg     digoxin 125 MCG Tabs  Start taking on:  6/13/2018  Commonly known as:  LANOXIN   Take 1 Tab by mouth every morning.  Dose:  125 mcg     doxycycline monohydrate 100 MG tablet  Commonly known as:  ADOXA   Take 1 Tab by mouth every 12 hours for 8 doses.  Dose:  100 mg     insulin glargine 100 UNIT/ML Soln  Commonly known as:  LANTUS   Inject 10 Units as instructed every evening.  Dose:  10 Units     metoprolol SR 25 MG Tb24  Commonly known as:  TOPROL XL   Take 1 Tab by mouth 3 times a day.  Dose:  25 mg        CONTINUE taking these medications      Instructions   aspirin EC 81 MG Tbec  Commonly known as:  ECOTRIN   Take 81 mg by mouth every day.  Dose:  81 mg     atorvastatin 20 MG Tabs  Commonly known as:  LIPITOR   Take 20 mg by mouth every day.  Dose:  20 mg     guaiFENesin  MG Tb12  Commonly known as:  MUCINEX   Take 1 Tab by mouth 3 times a day.  Dose:  600 mg     losartan 50 MG Tabs  Commonly known as:  COZAAR   Take 1 Tab by mouth every day.  Dose:  50 mg     metFORMIN 500 MG Tabs  Commonly known as:  GLUCOPHAGE   Take 1 Tab by mouth 2 times a day, with meals.  Dose:  500 mg     spironolactone 25 MG Tabs  Commonly known as:  ALDACTONE   Take 1 Tab by mouth every day.  Dose:  25 mg         STOP taking these medications    amiodarone 200 MG Tabs  Commonly known as:  CORDARONE     carvedilol 12.5 MG Tabs  Commonly known as:  COREG            Allergies  No Known Allergies    DIET  Orders Placed This Encounter   Procedures   • Diet Order     Standing Status:   Standing     Number of Occurrences:   1     Order Specific Question:   Diet:     Answer:   Cardiac [6]       ACTIVITY  As tolerated.  Weight bearing as tolerated    CONSULTATIONS  Cardiology  EP    PROCEDURES  As per HPI    LABORATORY  Lab Results   Component Value Date    SODIUM 125 (L) 06/12/2018    POTASSIUM 4.9 06/12/2018    CHLORIDE 94 (L) 06/12/2018    CO2 21 06/12/2018    GLUCOSE 267 (H) 06/12/2018    BUN 20 06/12/2018    CREATININE 0.86 06/12/2018        Lab Results   Component Value Date    WBC 10.8 06/12/2018    HEMOGLOBIN 14.3 06/12/2018    HEMATOCRIT 43.3 06/12/2018    PLATELETCT 245 06/12/2018        Total time of the discharge process exceeds 35 minutes.

## 2018-06-12 NOTE — PROGRESS NOTES
Dr. Villavicencio aware of Bilat Upper extremity swelling. Pt denies any pain, does c/o of tightness on left arm.  Order received to continue monitoring BUE.  Will follow through.

## 2018-06-12 NOTE — PROGRESS NOTES
Cardiology Progress Note               Author: Flynn Sanon Date & Time created: 2018  12:20 PM     Interval History:    Chief complaint: Dyspnea and dependent edema.  Patient has a history of known nonischemic myopathy and was admitted with atrial fibrillation rapid ventricular response. EF 20% range by echo.  He underwent AV pato ablation and upgrade of his defibrillator to biventricular device as today. He is feeling much better.  Review of Systems   Constitutional: Negative for fever.   Respiratory: Negative for shortness of breath.    Cardiovascular: Negative for chest pain and palpitations.   Neurological: Negative.    Endo/Heme/Allergies: Does not bruise/bleed easily.       Physical Exam   Constitutional: He is oriented to person, place, and time. No distress.   HENT:   Head: Normocephalic and atraumatic.   Eyes: No scleral icterus.   Neck: No JVD present.   Cardiovascular: Normal rate and regular rhythm.    Pulmonary/Chest: No respiratory distress. He has no wheezes.   No hematoma at the incision site in the left upper chest   Abdominal: Soft. He exhibits no distension.   Musculoskeletal: He exhibits no edema.   Neurological: He is alert and oriented to person, place, and time.   Skin: Skin is warm and dry.   Psychiatric: He has a normal mood and affect.       Hemodynamics:  Temp (24hrs), Av.5 °C (97.7 °F), Min:36.1 °C (96.9 °F), Max:37.1 °C (98.7 °F)  Temperature: 36.1 °C (96.9 °F)  Pulse  Av.4  Min: 37  Max: 148  Blood Pressure: 116/81     Respiratory:    Respiration: 18, Pulse Oximetry: 93 %        RUL Breath Sounds: Clear, RML Breath Sounds: Clear, RLL Breath Sounds: Diminished, MADONNA Breath Sounds: Clear, LLL Breath Sounds: Diminished  Fluids:        GI/Nutrition:  Orders Placed This Encounter   Procedures   • Diet Order     Standing Status:   Standing     Number of Occurrences:   1     Order Specific Question:   Diet:     Answer:   Cardiac [6]     Lab Results:  Recent Labs       06/10/18   0457  06/11/18   0504  06/12/18   0445   WBC  12.9*  12.2*  10.8   RBC  5.26  5.10  5.13   HEMOGLOBIN  14.7  14.1  14.3   HEMATOCRIT  42.9  42.3  43.3   MCV  81.6  82.9  84.4   MCH  27.9  27.6  27.9   MCHC  34.3  33.3*  33.0*   RDW  46.3  47.9  49.5   PLATELETCT  235  246  245   MPV  11.5  10.9  11.1     Recent Labs      06/10/18   0457  06/11/18   0504  06/11/18   0806  06/12/18   0445   SODIUM  130*  124*  126*  125*   POTASSIUM  4.3  4.8   --   4.9   CHLORIDE  100  94*   --   94*   CO2  23  21   --   21   GLUCOSE  188*  239*   --   267*   BUN  16  21   --   20   CREATININE  0.64  0.89   --   0.86   CALCIUM  8.7  8.8   --   8.8                         Medical Decision Making, by Problem:  Active Hospital Problems    Diagnosis   • *Severe sepsis (HCC) [A41.9, R65.20]   • Paroxysmal atrial fibrillation (HCC) [I48.0]   • Acute on chronic systolic (congestive) heart failure (HCC) [I50.23]   • Type 2 diabetes mellitus with renal complication (HCC) [E11.29]   • Cardiomyopathy (HCC) [I42.9]   • AICD (automatic cardioverter/defibrillator) present [Z95.810]   • Hypomagnesemia [E83.42]   • Leukocytosis [D72.829]   • Prolonged Q-T interval on ECG [R94.31]   • Total bilirubin, elevated [R17]   • Hyponatremia [E87.1]   • Troponin I above reference range [R74.8]   • Acute renal failure (HCC) [N17.9]       Plan:  Dilated cardiopathy: No evidence of volume overload. Will increase losartan to 50 mg a day.    Atrial fibrillation: Recently difficult to control he underwent AV pato ablation. His AICD was upgraded to a biventricular devic  Chronic anticoagulation: Aspirin will be discontinued at this point.    Discussed with hospitalist and patient. He can be discharged today we will arrange follow-up with EP and with general cardiology  Quality-Core Measures

## 2018-06-12 NOTE — PROGRESS NOTES
Patient continued to sleep throughout evening. Patient had lead revision for his pacer early on in the day. RN assessed patients LOC; he is easily awakened by calling his name and is able to follow commands. He has not had any new complaints of pain. Patient declines any further needs at this time. Call light within reach. Fall precautions in place.

## 2018-06-12 NOTE — DISCHARGE PLANNING
Received Choice form at 0195  Agency/Facility Name: [facility]  Referral sent per Choice form @ [phone]

## 2018-06-12 NOTE — DISCHARGE PLANNING
Transitional Care Navigator:    TCN met with pt at bedside to discuss transitional care services for discharge planning.  Home health level of care has been recommended.  TCN explained HH level of care in detail.  Pt in agreement.  Choice is [facility].  Choice form completed and faxed to [email or web address] will follow-up as needed.

## 2018-06-13 NOTE — CARE PLAN
Problem: Safety  Goal: Will remain free from falls  Outcome: PROGRESSING AS EXPECTED  Fall precautions in place, call light w/in reach.  Will continue to monitor.    Problem: Discharge Barriers/Planning  Goal: Patient's continuum of care needs will be met  Outcome: PROGRESSING AS EXPECTED  Discussed POC, verbalized understanding and agreement.

## 2018-06-15 ENCOUNTER — TELEPHONE (OUTPATIENT)
Dept: CARDIOLOGY | Facility: MEDICAL CENTER | Age: 48
End: 2018-06-15

## 2018-06-15 NOTE — TELEPHONE ENCOUNTER
[facility]  Address: [address] [place] [address]  Phone: [phone]  MR Dept Fax: [phone]    I sent an TRACI to obtain all cardiac records from 2015-current. Records will be sent to [phone]. I scan the TRACI when the records are received.

## 2018-06-18 ENCOUNTER — NON-PROVIDER VISIT (OUTPATIENT)
Dept: CARDIOLOGY | Facility: MEDICAL CENTER | Age: 48
End: 2018-06-18
Payer: MEDICARE

## 2018-06-18 ENCOUNTER — OFFICE VISIT (OUTPATIENT)
Dept: CARDIOLOGY | Facility: MEDICAL CENTER | Age: 48
End: 2018-06-18
Payer: MEDICARE

## 2018-06-18 VITALS
BODY MASS INDEX: 30.21 KG/M2 | DIASTOLIC BLOOD PRESSURE: 86 MMHG | HEIGHT: 69 IN | OXYGEN SATURATION: 97 % | WEIGHT: 204 LBS | HEART RATE: 92 BPM | SYSTOLIC BLOOD PRESSURE: 130 MMHG

## 2018-06-18 DIAGNOSIS — I50.9 HEART FAILURE, NYHA CLASS 2 (HCC): ICD-10-CM

## 2018-06-18 DIAGNOSIS — I50.23 ACUTE ON CHRONIC SYSTOLIC (CONGESTIVE) HEART FAILURE (HCC): ICD-10-CM

## 2018-06-18 DIAGNOSIS — I50.20 ACC/AHA STAGE C SYSTOLIC HEART FAILURE (HCC): ICD-10-CM

## 2018-06-18 DIAGNOSIS — I10 ESSENTIAL HYPERTENSION: ICD-10-CM

## 2018-06-18 DIAGNOSIS — I48.0 PAROXYSMAL ATRIAL FIBRILLATION (HCC): ICD-10-CM

## 2018-06-18 DIAGNOSIS — Z95.810 AICD (AUTOMATIC CARDIOVERTER/DEFIBRILLATOR) PRESENT: ICD-10-CM

## 2018-06-18 DIAGNOSIS — Z95.810 BIVENTRICULAR AUTOMATIC IMPLANTABLE CARDIOVERTER DEFIBRILLATOR IN SITU: ICD-10-CM

## 2018-06-18 PROCEDURE — 99214 OFFICE O/P EST MOD 30 MIN: CPT | Performed by: INTERNAL MEDICINE

## 2018-06-18 PROCEDURE — 93283 PRGRMG EVAL IMPLANTABLE DFB: CPT | Performed by: INTERNAL MEDICINE

## 2018-06-18 RX ORDER — SPIRONOLACTONE 25 MG/1
25 TABLET ORAL DAILY
Qty: 30 TAB | Refills: 11 | Status: SHIPPED | OUTPATIENT
Start: 2018-06-18 | End: 2019-12-03 | Stop reason: SDUPTHER

## 2018-06-18 RX ORDER — METOPROLOL SUCCINATE 50 MG/1
50 TABLET, EXTENDED RELEASE ORAL DAILY
Qty: 30 TAB | Refills: 11 | Status: SHIPPED | OUTPATIENT
Start: 2018-06-18 | End: 2018-07-13 | Stop reason: SDUPTHER

## 2018-06-18 RX ORDER — ATORVASTATIN CALCIUM 20 MG/1
20 TABLET, FILM COATED ORAL DAILY
Qty: 30 TAB | Refills: 11 | Status: SHIPPED | OUTPATIENT
Start: 2018-06-18 | End: 2018-07-13 | Stop reason: SINTOL

## 2018-06-18 RX ORDER — LOSARTAN POTASSIUM 50 MG/1
50 TABLET ORAL DAILY
Qty: 30 TAB | Refills: 11 | Status: SHIPPED | OUTPATIENT
Start: 2018-06-18 | End: 2018-10-25

## 2018-06-18 RX ORDER — DIGOXIN 125 MCG
125 TABLET ORAL EVERY MORNING
Qty: 30 TAB | Refills: 11 | Status: SHIPPED | OUTPATIENT
Start: 2018-06-18 | End: 2019-12-03 | Stop reason: SDUPTHER

## 2018-06-18 NOTE — LETTER
[facility]   [address]  [place] [address]  Phone: [phone]  Fax: [phone]              Lai Dailey  1970    Encounter Date: 6/18/2018    Joss Solis M.D.          PROGRESS NOTE:  Chief Complaint   Patient presents with   • Congestive Heart Failure     HF est.       Subjective:   Lai Dailey is a 48 y.o. male who presents today for follow-up evaluation heart failure clinic because of a recent admission for congestive heart failure.  He was recently admitted for atrial fibrillation with a rapid ventricular response.  He did undergo an upgrade of his AICD to a biventricular device.    He is feeling somewhat better.  He has dyspnea on exertion at about 2 blocks.  He denies any PND orthopnea but is having difficulty with cough productive of whitish sputum.  He denies any edema.  He has had no chest discomfort, palpitations or lightheadedness.    During his admission, cardioversion was attempted but not successful.    Past Medical History:   Diagnosis Date   • CHF (congestive heart failure) (HCC)    • Diabetes (HCC)    • Hyperlipidemia    • Hypertension      Past Surgical History:   Procedure Laterality Date   • AICD IMPLANT  2010     Family History   Problem Relation Age of Onset   • Colon Cancer Mother    • Hypertension Sister    • Stroke Brother    • Heart Disease Neg Hx      Social History     Social History   • Marital status: Single     Spouse name: N/A   • Number of children: N/A   • Years of education: N/A     Occupational History   • Not on file.     Social History Main Topics   • Smoking status: Never Smoker   • Smokeless tobacco: Never Used   • Alcohol use No      Comment: 2 times a week-beer, NO ALCOHOL AT THIS TIME   • Drug use: No      Comment: Marijuana use as youth   • Sexual activity: Not on file     Other Topics Concern   • Not on file     Social History Narrative   • No narrative on file     No Known Allergies  Outpatient  "Encounter Prescriptions as of 6/18/2018   Medication Sig Dispense Refill   • Dextromethorphan-Guaifenesin (ROBITUSSIN DM SUGAR FREE PO) Take  by mouth.     • metoprolol SR (TOPROL XL) 25 MG TABLET SR 24 HR Take 1 Tab by mouth 3 times a day. 90 Tab 0   • insulin glargine (LANTUS) 100 UNIT/ML Solution Inject 10 Units as instructed every evening. 10 mL 0   • digoxin (LANOXIN) 125 MCG Tab Take 1 Tab by mouth every morning. 30 Tab 0   • apixaban (ELIQUIS) 5mg Tab Take 1 Tab by mouth 2 Times a Day. 60 Tab 0   • aspirin EC (ECOTRIN) 81 MG Tablet Delayed Response Take 81 mg by mouth every day.     • losartan (COZAAR) 50 MG Tab Take 1 Tab by mouth every day. 30 Tab 11   • spironolactone (ALDACTONE) 25 MG Tab Take 1 Tab by mouth every day. 30 Tab 1   • atorvastatin (LIPITOR) 20 MG Tab Take 20 mg by mouth every day.     • guaiFENesin LA (MUCINEX) 600 MG TABLET SR 12 HR Take 1 Tab by mouth 3 times a day. 15 Tab 0   • metformin (GLUCOPHAGE) 500 MG TABS Take 1 Tab by mouth 2 times a day, with meals. 60 Tab 3     No facility-administered encounter medications on file as of 6/18/2018.      ROS     Objective:   /86   Pulse 92   Ht 1.753 m (5' 9\")   Wt 92.5 kg (204 lb)   SpO2 97%   BMI 30.13 kg/m²      Physical Exam   Constitutional: He appears well-developed and well-nourished.   Neck: No JVD present.   Cardiovascular: Normal rate.  An irregularly irregular rhythm present.   No murmur heard.  Pulmonary/Chest: Effort normal and breath sounds normal. He has no rales.   Abdominal: Soft. There is no tenderness.   Musculoskeletal: He exhibits no edema.     Lab Results   Component Value Date/Time    CHOLSTRLTOT 76 (L) 05/13/2018 02:49 AM    LDL 46 05/13/2018 02:49 AM    HDL 20 (A) 05/13/2018 02:49 AM    TRIGLYCERIDE 48 05/13/2018 02:49 AM       Lab Results   Component Value Date/Time    SODIUM 125 (L) 06/12/2018 04:45 AM    POTASSIUM 4.9 06/12/2018 04:45 AM    CHLORIDE 94 (L) 06/12/2018 04:45 AM    CO2 21 06/12/2018 04:45 AM    " GLUCOSE 267 (H) 06/12/2018 04:45 AM    BUN 20 06/12/2018 04:45 AM    CREATININE 0.86 06/12/2018 04:45 AM     Lab Results   Component Value Date/Time    ALKPHOSPHAT 183 (H) 06/08/2018 05:40 AM    ASTSGOT 22 06/08/2018 05:40 AM    ALTSGPT 17 06/08/2018 05:40 AM    TBILIRUBIN 1.3 06/08/2018 05:40 AM      Lab Results   Component Value Date/Time    BNPBTYPENAT 1214 (H) 06/08/2018 05:40 AM        Transthoracic  Echo Report      Echocardiography Laboratory    CONCLUSIONS  Left ventricle is severely dilated. Mild concentric left ventricular   hypertrophy. Grade III diastolic dysfunction (restrictive pattern).   Severely reduced left ventricular systolic function. Left ventricular   ejection fraction is visually estimated to be 20%. Diffuse hypokinesis   with septal dyskinesis.  Severe mitral regurgitation.  Severe tricuspid regurgitation. Estimated right ventricular systolic   pressure  is 50 mmHg.  Compared to the report of the study done - the TR is now severe.     CHESTER PICKARD  Exam Date:         05/13/2018    Assessment:     1. Acute on chronic systolic (congestive) heart failure (HCC)     2. Paroxysmal atrial fibrillation (HCC)     3. AICD (automatic cardioverter/defibrillator) present     4. Essential hypertension         Medical Decision Making:  Today's Assessment / Status / Plan:     Mr. Pickard has had some improvement in his functional status since his biventricular upgrade to his AICD.  He is not well beta blocked and we will increase metoprolol at the present time.  He will follow-up in about a week with a BMP and we will consider progressively increasing his medications.      Tor Rdz M.D.  [address] [facility] [address]  VIA Facsimile: [phone]

## 2018-06-18 NOTE — NON-PROVIDER
S/p new CRT-D, implanted on 6-. Appropriate device function demonstrated. Wound site appears clear. Advised to watch for redness, swelling, oozing or fever. Pt verbalized understanding. See back in 5 weeks for final testing.

## 2018-06-18 NOTE — PROGRESS NOTES
Chief Complaint   Patient presents with   • Congestive Heart Failure     HF est.       Subjective:   Lai Dailey is a 48 y.o. male who presents today for follow-up evaluation heart failure clinic because of a recent admission for congestive heart failure.  He was recently admitted for atrial fibrillation with a rapid ventricular response.  He did undergo an upgrade of his AICD to a biventricular device.    He is feeling somewhat better.  He has dyspnea on exertion at about 2 blocks.  He denies any PND orthopnea but is having difficulty with cough productive of whitish sputum.  He denies any edema.  He has had no chest discomfort, palpitations or lightheadedness.    During his admission, cardioversion was attempted but not successful.    Past Medical History:   Diagnosis Date   • CHF (congestive heart failure) (HCC)    • Diabetes (HCC)    • Hyperlipidemia    • Hypertension      Past Surgical History:   Procedure Laterality Date   • AICD IMPLANT  2010     Family History   Problem Relation Age of Onset   • Colon Cancer Mother    • Hypertension Sister    • Stroke Brother    • Heart Disease Neg Hx      Social History     Social History   • Marital status: Single     Spouse name: N/A   • Number of children: N/A   • Years of education: N/A     Occupational History   • Not on file.     Social History Main Topics   • Smoking status: Never Smoker   • Smokeless tobacco: Never Used   • Alcohol use No      Comment: 2 times a week-beer, NO ALCOHOL AT THIS TIME   • Drug use: No      Comment: Marijuana use as youth   • Sexual activity: Not on file     Other Topics Concern   • Not on file     Social History Narrative   • No narrative on file     No Known Allergies  Outpatient Encounter Prescriptions as of 6/18/2018   Medication Sig Dispense Refill   • Dextromethorphan-Guaifenesin (ROBITUSSIN DM SUGAR FREE PO) Take  by mouth.     • metoprolol SR (TOPROL XL) 25 MG TABLET SR 24 HR Take 1 Tab by mouth 3 times a day. 90 Tab 0   •  "insulin glargine (LANTUS) 100 UNIT/ML Solution Inject 10 Units as instructed every evening. 10 mL 0   • digoxin (LANOXIN) 125 MCG Tab Take 1 Tab by mouth every morning. 30 Tab 0   • apixaban (ELIQUIS) 5mg Tab Take 1 Tab by mouth 2 Times a Day. 60 Tab 0   • aspirin EC (ECOTRIN) 81 MG Tablet Delayed Response Take 81 mg by mouth every day.     • losartan (COZAAR) 50 MG Tab Take 1 Tab by mouth every day. 30 Tab 11   • spironolactone (ALDACTONE) 25 MG Tab Take 1 Tab by mouth every day. 30 Tab 1   • atorvastatin (LIPITOR) 20 MG Tab Take 20 mg by mouth every day.     • guaiFENesin LA (MUCINEX) 600 MG TABLET SR 12 HR Take 1 Tab by mouth 3 times a day. 15 Tab 0   • metformin (GLUCOPHAGE) 500 MG TABS Take 1 Tab by mouth 2 times a day, with meals. 60 Tab 3     No facility-administered encounter medications on file as of 6/18/2018.      ROS     Objective:   /86   Pulse 92   Ht 1.753 m (5' 9\")   Wt 92.5 kg (204 lb)   SpO2 97%   BMI 30.13 kg/m²     Physical Exam   Constitutional: He appears well-developed and well-nourished.   Neck: No JVD present.   Cardiovascular: Normal rate.  An irregularly irregular rhythm present.   No murmur heard.  Pulmonary/Chest: Effort normal and breath sounds normal. He has no rales.   Abdominal: Soft. There is no tenderness.   Musculoskeletal: He exhibits no edema.     Lab Results   Component Value Date/Time    CHOLSTRLTOT 76 (L) 05/13/2018 02:49 AM    LDL 46 05/13/2018 02:49 AM    HDL 20 (A) 05/13/2018 02:49 AM    TRIGLYCERIDE 48 05/13/2018 02:49 AM       Lab Results   Component Value Date/Time    SODIUM 125 (L) 06/12/2018 04:45 AM    POTASSIUM 4.9 06/12/2018 04:45 AM    CHLORIDE 94 (L) 06/12/2018 04:45 AM    CO2 21 06/12/2018 04:45 AM    GLUCOSE 267 (H) 06/12/2018 04:45 AM    BUN 20 06/12/2018 04:45 AM    CREATININE 0.86 06/12/2018 04:45 AM     Lab Results   Component Value Date/Time    ALKPHOSPHAT 183 (H) 06/08/2018 05:40 AM    ASTSGOT 22 06/08/2018 05:40 AM    ALTSGPT 17 06/08/2018 " 05:40 AM    TBILIRUBIN 1.3 06/08/2018 05:40 AM      Lab Results   Component Value Date/Time    BNPBTYPENAT 1214 (H) 06/08/2018 05:40 AM        Transthoracic  Echo Report      Echocardiography Laboratory    CONCLUSIONS  Left ventricle is severely dilated. Mild concentric left ventricular   hypertrophy. Grade III diastolic dysfunction (restrictive pattern).   Severely reduced left ventricular systolic function. Left ventricular   ejection fraction is visually estimated to be 20%. Diffuse hypokinesis   with septal dyskinesis.  Severe mitral regurgitation.  Severe tricuspid regurgitation. Estimated right ventricular systolic   pressure  is 50 mmHg.  Compared to the report of the study done - the TR is now severe.     CHESTER PICKARD  Exam Date:         05/13/2018    Assessment:     1. Acute on chronic systolic (congestive) heart failure (HCC)     2. Paroxysmal atrial fibrillation (HCC)     3. AICD (automatic cardioverter/defibrillator) present     4. Essential hypertension         Medical Decision Making:  Today's Assessment / Status / Plan:     Mr. Pickard has had some improvement in his functional status since his biventricular upgrade to his AICD.  He is not well beta blocked and we will increase metoprolol at the present time.  He will follow-up in about a week with a BMP and we will consider progressively increasing his medications.

## 2018-06-25 ENCOUNTER — HOSPITAL ENCOUNTER (OUTPATIENT)
Dept: LAB | Facility: MEDICAL CENTER | Age: 48
End: 2018-06-25
Attending: INTERNAL MEDICINE
Payer: MEDICARE

## 2018-06-25 DIAGNOSIS — I50.23 ACUTE ON CHRONIC SYSTOLIC (CONGESTIVE) HEART FAILURE (HCC): ICD-10-CM

## 2018-06-25 DIAGNOSIS — I50.20 ACC/AHA STAGE C SYSTOLIC HEART FAILURE (HCC): ICD-10-CM

## 2018-06-25 LAB
ANION GAP SERPL CALC-SCNC: 11 MMOL/L (ref 0–11.9)
BUN SERPL-MCNC: 19 MG/DL (ref 8–22)
CALCIUM SERPL-MCNC: 9 MG/DL (ref 8.5–10.5)
CHLORIDE SERPL-SCNC: 102 MMOL/L (ref 96–112)
CO2 SERPL-SCNC: 23 MMOL/L (ref 20–33)
CREAT SERPL-MCNC: 1.08 MG/DL (ref 0.5–1.4)
GLUCOSE SERPL-MCNC: 150 MG/DL (ref 65–99)
POTASSIUM SERPL-SCNC: 4.3 MMOL/L (ref 3.6–5.5)
SODIUM SERPL-SCNC: 136 MMOL/L (ref 135–145)

## 2018-06-25 PROCEDURE — 36415 COLL VENOUS BLD VENIPUNCTURE: CPT

## 2018-06-25 PROCEDURE — 80048 BASIC METABOLIC PNL TOTAL CA: CPT

## 2018-06-27 ENCOUNTER — OFFICE VISIT (OUTPATIENT)
Dept: CARDIOLOGY | Facility: MEDICAL CENTER | Age: 48
End: 2018-06-27
Payer: MEDICARE

## 2018-06-27 VITALS
HEART RATE: 88 BPM | WEIGHT: 203 LBS | SYSTOLIC BLOOD PRESSURE: 98 MMHG | HEIGHT: 69 IN | OXYGEN SATURATION: 93 % | DIASTOLIC BLOOD PRESSURE: 70 MMHG | BODY MASS INDEX: 30.07 KG/M2

## 2018-06-27 DIAGNOSIS — I50.22 STAGE C CHRONIC SYSTOLIC CONGESTIVE HEART FAILURE (HCC): ICD-10-CM

## 2018-06-27 DIAGNOSIS — I50.9 CONGESTIVE HEART FAILURE, NYHA CLASS 2 AND ACC/AHA STAGE C (HCC): ICD-10-CM

## 2018-06-27 DIAGNOSIS — I48.0 PAROXYSMAL ATRIAL FIBRILLATION (HCC): ICD-10-CM

## 2018-06-27 PROCEDURE — 99214 OFFICE O/P EST MOD 30 MIN: CPT | Performed by: INTERNAL MEDICINE

## 2018-06-27 RX ORDER — FUROSEMIDE 20 MG/1
20 TABLET ORAL 2 TIMES DAILY
Qty: 60 TAB | Refills: 11 | Status: SHIPPED | OUTPATIENT
Start: 2018-06-27 | End: 2018-11-27 | Stop reason: SDUPTHER

## 2018-06-27 NOTE — PROGRESS NOTES
Chief Complaint   Patient presents with   • Congestive Heart Failure     F/V: 1 WEEK/ LABS IN EPIC       Subjective:   Lai Dailey is a 48 y.o. male who presents today for follow-up evaluation heart failure clinic because of a admission on June 5, 2018 for congestive heart failure.  He was recently admitted for atrial fibrillation with a rapid ventricular response.  He did undergo an upgrade of his AICD to a biventricular device.    He has had some fall and his exercise tolerance.  He feels he would be dyspneic if he walked only a block.  Over the last week, he is noted some ankle edema.  He has had some orthopnea but no PND.  He is noted no chest discomfort, palpitations or lightheadedness.        Past Medical History:   Diagnosis Date   • CHF (congestive heart failure) (HCC)    • Diabetes (HCC)    • Hyperlipidemia    • Hypertension      Past Surgical History:   Procedure Laterality Date   • AICD IMPLANT  2010     Family History   Problem Relation Age of Onset   • Colon Cancer Mother    • Hypertension Sister    • Stroke Brother    • Heart Disease Neg Hx      Social History     Social History   • Marital status: Single     Spouse name: N/A   • Number of children: N/A   • Years of education: N/A     Occupational History   • Not on file.     Social History Main Topics   • Smoking status: Never Smoker   • Smokeless tobacco: Never Used   • Alcohol use No      Comment: 2 times a week-beer, NO ALCOHOL AT THIS TIME   • Drug use: No      Comment: Marijuana use as youth   • Sexual activity: Not on file     Other Topics Concern   • Not on file     Social History Narrative   • No narrative on file     No Known Allergies  Outpatient Encounter Prescriptions as of 6/27/2018   Medication Sig Dispense Refill   • Dextromethorphan-Guaifenesin (ROBITUSSIN DM SUGAR FREE PO) Take  by mouth.     • atorvastatin (LIPITOR) 20 MG Tab Take 1 Tab by mouth every day. 30 Tab 11   • spironolactone (ALDACTONE) 25 MG Tab Take 1 Tab by mouth every  "day. 30 Tab 11   • losartan (COZAAR) 50 MG Tab Take 1 Tab by mouth every day. 30 Tab 11   • digoxin (LANOXIN) 125 MCG Tab Take 1 Tab by mouth every morning. 30 Tab 11   • apixaban (ELIQUIS) 5mg Tab Take 1 Tab by mouth 2 Times a Day. 60 Tab 11   • metoprolol SR (TOPROL XL) 50 MG TABLET SR 24 HR Take 1 Tab by mouth every day. 30 Tab 11   • insulin glargine (LANTUS) 100 UNIT/ML Solution Inject 10 Units as instructed every evening. 10 mL 0   • aspirin EC (ECOTRIN) 81 MG Tablet Delayed Response Take 81 mg by mouth every day.     • metformin (GLUCOPHAGE) 500 MG TABS Take 1 Tab by mouth 2 times a day, with meals. 60 Tab 3   • guaiFENesin LA (MUCINEX) 600 MG TABLET SR 12 HR Take 1 Tab by mouth 3 times a day. 15 Tab 0     No facility-administered encounter medications on file as of 6/27/2018.      ROS     Objective:   BP (!) 98/70   Pulse 88   Ht 1.753 m (5' 9\")   Wt 92.1 kg (203 lb)   SpO2 93%   BMI 29.98 kg/m²     Physical Exam   Constitutional: He appears well-developed and well-nourished.   Neck: JVD (To about the mid neck at 45°) present.   Cardiovascular: Normal rate.  An irregular rhythm present.   Murmur (3/6 holosystolic murmur noted at the apex) heard.  Pulmonary/Chest: Effort normal and breath sounds normal. He has no rales.   Abdominal: Soft. There is no tenderness.   Musculoskeletal: He exhibits edema (1+ pretibial).     Lab Results   Component Value Date/Time    CHOLSTRLTOT 76 (L) 05/13/2018 02:49 AM    LDL 46 05/13/2018 02:49 AM    HDL 20 (A) 05/13/2018 02:49 AM    TRIGLYCERIDE 48 05/13/2018 02:49 AM       Lab Results   Component Value Date/Time    SODIUM 136 06/25/2018 10:25 AM    POTASSIUM 4.3 06/25/2018 10:25 AM    CHLORIDE 102 06/25/2018 10:25 AM    CO2 23 06/25/2018 10:25 AM    GLUCOSE 150 (H) 06/25/2018 10:25 AM    BUN 19 06/25/2018 10:25 AM    CREATININE 1.08 06/25/2018 10:25 AM     Lab Results   Component Value Date/Time    ALKPHOSPHAT 183 (H) 06/08/2018 05:40 AM    ASTSGOT 22 06/08/2018 05:40 AM "    ALTSGPT 17 06/08/2018 05:40 AM    TBILIRUBIN 1.3 06/08/2018 05:40 AM      Lab Results   Component Value Date/Time    BNPBTYPENAT 1214 (H) 06/08/2018 05:40 AM      Transthoracic  Echo Report      Echocardiography Laboratory    CONCLUSIONS  Left ventricle is severely dilated. Mild concentric left ventricular   hypertrophy. Grade III diastolic dysfunction (restrictive pattern).   Severely reduced left ventricular systolic function. Left ventricular   ejection fraction is visually estimated to be 20%. Diffuse hypokinesis   with septal dyskinesis.  Severe mitral regurgitation.  Severe tricuspid regurgitation. Estimated right ventricular systolic   pressure  is 50 mmHg.  Compared to the report of the study done - the TR is now severe.     CHESTER PICKARD  Exam Date:         05/13/2018     Assessment:     1. Stage C chronic systolic congestive heart failure (HCC)     2. Paroxysmal atrial fibrillation (HCC)     3. Congestive heart failure, NYHA class 2 and ACC/AHA stage C (HCC)         Medical Decision Making:  Today's Assessment / Status / Plan:     Mr. Pickard is having difficulty with some increasing dyspnea on exertion, orthopnea and edema.  We will start him back on diuretic therapy with furosemide 20 mg twice daily.  He will follow-up in about a week with lab work.  Hopefully we can improve the degree of mitral insufficiency by better diuresis.  His heart rate is irregularly today but there are times where it is more regular.  It is unclear whether or not he is in atrial fibrillation.  We will defer an EKG.

## 2018-06-27 NOTE — LETTER
[facility]   [address]  [place] [address]  Phone: [phone]  Fax: [phone]              Lai Dailey  1970    Encounter Date: 6/27/2018    Joss Solis M.D.          PROGRESS NOTE:  Chief Complaint   Patient presents with   • Congestive Heart Failure     F/V: 1 WEEK/ LABS IN EPIC       Subjective:   Lai Dailey is a 48 y.o. male who presents today for follow-up evaluation heart failure clinic because of a admission on June 5, 2018 for congestive heart failure.  He was recently admitted for atrial fibrillation with a rapid ventricular response.  He did undergo an upgrade of his AICD to a biventricular device.    He has had some fall and his exercise tolerance.  He feels he would be dyspneic if he walked only a block.  Over the last week, he is noted some ankle edema.  He has had some orthopnea but no PND.  He is noted no chest discomfort, palpitations or lightheadedness.        Past Medical History:   Diagnosis Date   • CHF (congestive heart failure) (HCC)    • Diabetes (HCC)    • Hyperlipidemia    • Hypertension      Past Surgical History:   Procedure Laterality Date   • AICD IMPLANT  2010     Family History   Problem Relation Age of Onset   • Colon Cancer Mother    • Hypertension Sister    • Stroke Brother    • Heart Disease Neg Hx      Social History     Social History   • Marital status: Single     Spouse name: N/A   • Number of children: N/A   • Years of education: N/A     Occupational History   • Not on file.     Social History Main Topics   • Smoking status: Never Smoker   • Smokeless tobacco: Never Used   • Alcohol use No      Comment: 2 times a week-beer, NO ALCOHOL AT THIS TIME   • Drug use: No      Comment: Marijuana use as youth   • Sexual activity: Not on file     Other Topics Concern   • Not on file     Social History Narrative   • No narrative on file     No Known Allergies  Outpatient Encounter Prescriptions as of  "6/27/2018   Medication Sig Dispense Refill   • Dextromethorphan-Guaifenesin (ROBITUSSIN DM SUGAR FREE PO) Take  by mouth.     • atorvastatin (LIPITOR) 20 MG Tab Take 1 Tab by mouth every day. 30 Tab 11   • spironolactone (ALDACTONE) 25 MG Tab Take 1 Tab by mouth every day. 30 Tab 11   • losartan (COZAAR) 50 MG Tab Take 1 Tab by mouth every day. 30 Tab 11   • digoxin (LANOXIN) 125 MCG Tab Take 1 Tab by mouth every morning. 30 Tab 11   • apixaban (ELIQUIS) 5mg Tab Take 1 Tab by mouth 2 Times a Day. 60 Tab 11   • metoprolol SR (TOPROL XL) 50 MG TABLET SR 24 HR Take 1 Tab by mouth every day. 30 Tab 11   • insulin glargine (LANTUS) 100 UNIT/ML Solution Inject 10 Units as instructed every evening. 10 mL 0   • aspirin EC (ECOTRIN) 81 MG Tablet Delayed Response Take 81 mg by mouth every day.     • metformin (GLUCOPHAGE) 500 MG TABS Take 1 Tab by mouth 2 times a day, with meals. 60 Tab 3   • guaiFENesin LA (MUCINEX) 600 MG TABLET SR 12 HR Take 1 Tab by mouth 3 times a day. 15 Tab 0     No facility-administered encounter medications on file as of 6/27/2018.      ROS     Objective:   BP (!) 98/70   Pulse 88   Ht 1.753 m (5' 9\")   Wt 92.1 kg (203 lb)   SpO2 93%   BMI 29.98 kg/m²      Physical Exam   Constitutional: He appears well-developed and well-nourished.   Neck: JVD (To about the mid neck at 45°) present.   Cardiovascular: Normal rate.  An irregular rhythm present.   Murmur (3/6 holosystolic murmur noted at the apex) heard.  Pulmonary/Chest: Effort normal and breath sounds normal. He has no rales.   Abdominal: Soft. There is no tenderness.   Musculoskeletal: He exhibits edema (1+ pretibial).     Lab Results   Component Value Date/Time    CHOLSTRLTOT 76 (L) 05/13/2018 02:49 AM    LDL 46 05/13/2018 02:49 AM    HDL 20 (A) 05/13/2018 02:49 AM    TRIGLYCERIDE 48 05/13/2018 02:49 AM       Lab Results   Component Value Date/Time    SODIUM 136 06/25/2018 10:25 AM    POTASSIUM 4.3 06/25/2018 10:25 AM    CHLORIDE 102 " 06/25/2018 10:25 AM    CO2 23 06/25/2018 10:25 AM    GLUCOSE 150 (H) 06/25/2018 10:25 AM    BUN 19 06/25/2018 10:25 AM    CREATININE 1.08 06/25/2018 10:25 AM     Lab Results   Component Value Date/Time    ALKPHOSPHAT 183 (H) 06/08/2018 05:40 AM    ASTSGOT 22 06/08/2018 05:40 AM    ALTSGPT 17 06/08/2018 05:40 AM    TBILIRUBIN 1.3 06/08/2018 05:40 AM      Lab Results   Component Value Date/Time    BNPBTYPENAT 1214 (H) 06/08/2018 05:40 AM      Transthoracic  Echo Report      Echocardiography Laboratory    CONCLUSIONS  Left ventricle is severely dilated. Mild concentric left ventricular   hypertrophy. Grade III diastolic dysfunction (restrictive pattern).   Severely reduced left ventricular systolic function. Left ventricular   ejection fraction is visually estimated to be 20%. Diffuse hypokinesis   with septal dyskinesis.  Severe mitral regurgitation.  Severe tricuspid regurgitation. Estimated right ventricular systolic   pressure  is 50 mmHg.  Compared to the report of the study done - the TR is now severe.     CHESTER PICKARD  Exam Date:         05/13/2018     Assessment:     1. Stage C chronic systolic congestive heart failure (HCC)     2. Paroxysmal atrial fibrillation (HCC)     3. Congestive heart failure, NYHA class 2 and ACC/AHA stage C (HCC)         Medical Decision Making:  Today's Assessment / Status / Plan:     Mr. Pickard is having difficulty with some increasing dyspnea on exertion, orthopnea and and edema.  We will start him back on diuretic therapy with furosemide 20 mg twice daily.  He will follow-up in about a week with lab work.  Hopefully we can improve the degree of mitral insufficiency by better diuresis.  His heart rate is irregularly today but there are times where it is more regular.  It is unclear whether or not he is in atrial fibrillation.  We will defer an EKG.      Tor Rdz M.D.  [address] [address]  VIA Facsimile: [phone]

## 2018-07-05 ENCOUNTER — OFFICE VISIT (OUTPATIENT)
Dept: CARDIOLOGY | Facility: MEDICAL CENTER | Age: 48
End: 2018-07-05
Payer: MEDICARE

## 2018-07-05 ENCOUNTER — HOSPITAL ENCOUNTER (OUTPATIENT)
Dept: LAB | Facility: MEDICAL CENTER | Age: 48
End: 2018-07-05
Attending: INTERNAL MEDICINE
Payer: MEDICARE

## 2018-07-05 VITALS
HEART RATE: 88 BPM | BODY MASS INDEX: 28.58 KG/M2 | SYSTOLIC BLOOD PRESSURE: 104 MMHG | WEIGHT: 193 LBS | OXYGEN SATURATION: 100 % | DIASTOLIC BLOOD PRESSURE: 78 MMHG | HEIGHT: 69 IN

## 2018-07-05 DIAGNOSIS — I42.0 DILATED CARDIOMYOPATHY (HCC): ICD-10-CM

## 2018-07-05 DIAGNOSIS — I10 ESSENTIAL HYPERTENSION: ICD-10-CM

## 2018-07-05 DIAGNOSIS — I48.0 PAROXYSMAL ATRIAL FIBRILLATION (HCC): ICD-10-CM

## 2018-07-05 DIAGNOSIS — I50.9 CONGESTIVE HEART FAILURE, NYHA CLASS 2 AND ACC/AHA STAGE C (HCC): ICD-10-CM

## 2018-07-05 DIAGNOSIS — I50.22 STAGE C CHRONIC SYSTOLIC CONGESTIVE HEART FAILURE (HCC): ICD-10-CM

## 2018-07-05 DIAGNOSIS — Z95.810 AICD (AUTOMATIC CARDIOVERTER/DEFIBRILLATOR) PRESENT: ICD-10-CM

## 2018-07-05 PROBLEM — I49.9 ARRHYTHMIA: Status: RESOLVED | Noted: 2018-05-13 | Resolved: 2018-07-05

## 2018-07-05 PROBLEM — E87.1 HYPONATREMIA: Status: RESOLVED | Noted: 2018-06-06 | Resolved: 2018-07-05

## 2018-07-05 LAB
ALBUMIN SERPL BCP-MCNC: 3.6 G/DL (ref 3.2–4.9)
ALP SERPL-CCNC: 296 U/L (ref 30–99)
ALT SERPL-CCNC: 79 U/L (ref 2–50)
ANION GAP SERPL CALC-SCNC: 10 MMOL/L (ref 0–11.9)
AST SERPL-CCNC: 60 U/L (ref 12–45)
BILIRUB CONJ SERPL-MCNC: 0.4 MG/DL (ref 0.1–0.5)
BILIRUB INDIRECT SERPL-MCNC: 0.7 MG/DL (ref 0–1)
BILIRUB SERPL-MCNC: 1.1 MG/DL (ref 0.1–1.5)
BNP SERPL-MCNC: 845 PG/ML (ref 0–100)
BUN SERPL-MCNC: 22 MG/DL (ref 8–22)
CALCIUM SERPL-MCNC: 9.4 MG/DL (ref 8.5–10.5)
CHLORIDE SERPL-SCNC: 98 MMOL/L (ref 96–112)
CO2 SERPL-SCNC: 25 MMOL/L (ref 20–33)
CREAT SERPL-MCNC: 1.18 MG/DL (ref 0.5–1.4)
GLUCOSE SERPL-MCNC: 273 MG/DL (ref 65–99)
MAGNESIUM SERPL-MCNC: 1.7 MG/DL (ref 1.5–2.5)
POTASSIUM SERPL-SCNC: 4.1 MMOL/L (ref 3.6–5.5)
PROT SERPL-MCNC: 8.1 G/DL (ref 6–8.2)
SODIUM SERPL-SCNC: 133 MMOL/L (ref 135–145)

## 2018-07-05 PROCEDURE — 83880 ASSAY OF NATRIURETIC PEPTIDE: CPT

## 2018-07-05 PROCEDURE — 80048 BASIC METABOLIC PNL TOTAL CA: CPT

## 2018-07-05 PROCEDURE — 83735 ASSAY OF MAGNESIUM: CPT

## 2018-07-05 PROCEDURE — 99214 OFFICE O/P EST MOD 30 MIN: CPT | Performed by: INTERNAL MEDICINE

## 2018-07-05 PROCEDURE — 80076 HEPATIC FUNCTION PANEL: CPT

## 2018-07-05 PROCEDURE — 36415 COLL VENOUS BLD VENIPUNCTURE: CPT

## 2018-07-05 NOTE — PROGRESS NOTES
Cardiology Follow-up Consultation Note    Date of note:    7/5/2018    Primary Care Provider: Tor Rdz M.D.  Referring Provider: Joss Solis M.D.     Patient Name: Lai Dailey     YOB: 1970  MRN:              [id number]    Chief Complaint: CHF    History of Present Illness: Lai Dailey is a 48 y.o. male whose current medical problems include systolic CHF (EF 20%) secondary to non-ischemic cardiomyopathy, ICD placement, atrial fibrillation s/p AV node ablation, diabetes, hyperlipidemia, and hypertension who is here for follow-up.    Last seen by Dr. Solis on 6/27/2018.    Interim Events:  In terms of CHF, weight down 10 pounds. Can walk 2-3 blocks up from 1 block. LE swelling much improved.  3 heart failure hospitalizations in the last year.     In terms of atrial fibrillation, no palpitations.    In terms of defibrillator, no shocks.  Last shock was years ago.     Patient denies chest pain, palpitations, pre-syncope, syncope, or recent weight gain.     In terms of hypertension, well controlled.  BP cuff broke, so has not measured recently.       ROS  Constitution: Negative for chills, fever and night sweats.   HENT: Negative for nosebleeds.    Eyes: Negative for vision loss in left eye and vision loss in right eye.   Respiratory: Negative for hemoptysis.    Gastrointestinal: Negative for hematemesis, hematochezia and melena.   Genitourinary: Negative for hematuria.   Neurological: Negative for focal weakness, numbness and paresthesias.       Past Medical History:   Diagnosis Date   • CHF (congestive heart failure) (HCC)    • Diabetes (HCC)    • Hyperlipidemia    • Hypertension          Past Surgical History:   Procedure Laterality Date   • AICD IMPLANT  2010         Current Outpatient Prescriptions   Medication Sig Dispense Refill   • furosemide (LASIX) 20 MG Tab Take 1 Tab by mouth 2 times a day. 60 Tab 11   • atorvastatin (LIPITOR) 20 MG Tab Take 1 Tab by mouth every day. 30  "Tab 11   • spironolactone (ALDACTONE) 25 MG Tab Take 1 Tab by mouth every day. 30 Tab 11   • losartan (COZAAR) 50 MG Tab Take 1 Tab by mouth every day. 30 Tab 11   • digoxin (LANOXIN) 125 MCG Tab Take 1 Tab by mouth every morning. 30 Tab 11   • apixaban (ELIQUIS) 5mg Tab Take 1 Tab by mouth 2 Times a Day. 60 Tab 11   • metoprolol SR (TOPROL XL) 50 MG TABLET SR 24 HR Take 1 Tab by mouth every day. 30 Tab 11   • insulin glargine (LANTUS) 100 UNIT/ML Solution Inject 10 Units as instructed every evening. 10 mL 0   • aspirin EC (ECOTRIN) 81 MG Tablet Delayed Response Take 81 mg by mouth every day.     • metformin (GLUCOPHAGE) 500 MG TABS Take 1 Tab by mouth 2 times a day, with meals. 60 Tab 3   • Dextromethorphan-Guaifenesin (ROBITUSSIN DM SUGAR FREE PO) Take  by mouth.     • guaiFENesin LA (MUCINEX) 600 MG TABLET SR 12 HR Take 1 Tab by mouth 3 times a day. 15 Tab 0     No current facility-administered medications for this visit.          No Known Allergies      Family History   Problem Relation Age of Onset   • Colon Cancer Mother    • Hypertension Sister    • Stroke Brother    • Heart Disease Neg Hx          Social History     Social History   • Marital status: Single     Spouse name: N/A   • Number of children: N/A   • Years of education: N/A     Occupational History   • Not on file.     Social History Main Topics   • Smoking status: Never Smoker   • Smokeless tobacco: Never Used   • Alcohol use No      Comment: 2 times a week-beer, NO ALCOHOL AT THIS TIME   • Drug use: No      Comment: Marijuana use as youth   • Sexual activity: Not on file     Other Topics Concern   • Not on file     Social History Narrative   • No narrative on file         Physical Exam:  Ambulatory Vitals  Blood pressure 104/78, pulse 88, height 1.753 m (5' 9\"), weight 87.5 kg (193 lb), SpO2 100 %.   Oxygen Therapy:  Pulse Oximetry: 100 %  BP Readings from Last 4 Encounters:   07/05/18 104/78   06/27/18 (!) 98/70   06/18/18 130/86   06/12/18 " 101/67       Weight/BMI: Body mass index is 28.5 kg/m².  Wt Readings from Last 4 Encounters:   07/05/18 87.5 kg (193 lb)   06/27/18 92.1 kg (203 lb)   06/18/18 92.5 kg (204 lb)   06/10/18 91.3 kg (201 lb 4.5 oz)       General: Well appearing and in no apparent distress  Eyes: nl conjunctiva  ENT: OP clear, normal external appearance of nose and ears  Neck: JVP 6-7 cm H2O  Lungs: normal respiratory effort, CTAB  Heart: RRR, 1/6 systolic murmur at apex,  no rubs or gallops, trace edema bilateral lower extremities. No LV/RV heave on cardiac palpatation. 2+ bilateral radial pulses.  2+ bilateral dp pulses.   Abdomen: soft, non tender, non distended, no masses, normal bowel sounds.  No HSM.  Extremities/MSK: no clubbing, no cyanosis  Neurological: No focal sensory deficits  Psychiatric: Appropriate affect, A/O x 3, intact judgement and insight  Skin: Warm extremities    Lab Data Review:  Lab Results   Component Value Date/Time    CHOLSTRLTOT 76 (L) 05/13/2018 02:49 AM    LDL 46 05/13/2018 02:49 AM    HDL 20 (A) 05/13/2018 02:49 AM    TRIGLYCERIDE 48 05/13/2018 02:49 AM       Lab Results   Component Value Date/Time    SODIUM 136 06/25/2018 10:25 AM    POTASSIUM 4.3 06/25/2018 10:25 AM    CHLORIDE 102 06/25/2018 10:25 AM    CO2 23 06/25/2018 10:25 AM    GLUCOSE 150 (H) 06/25/2018 10:25 AM    BUN 19 06/25/2018 10:25 AM    CREATININE 1.08 06/25/2018 10:25 AM     Lab Results   Component Value Date/Time    ALKPHOSPHAT 183 (H) 06/08/2018 05:40 AM    ASTSGOT 22 06/08/2018 05:40 AM    ALTSGPT 17 06/08/2018 05:40 AM    TBILIRUBIN 1.3 06/08/2018 05:40 AM      Lab Results   Component Value Date/Time    WBC 10.8 06/12/2018 04:45 AM     No components found for: HBGA1C  No components found for: TROPONIN  No components found for: BNP      Cardiac Imaging and Procedures Review:    EKG dated 6/12/2018:  AFIB/FLUT AND V-PACED COMPLEXES     Echo dated 5/13/2018:   FINDINGS  Left Ventricle  Left ventricle is severely dilated. Mild concentric  left ventricular   hypertrophy. Grade III diastolic dysfunction (restrictive pattern).   Severely reduced left ventricular systolic function. Left ventricular   ejection fraction is visually estimated to be 20%. Diffuse hypokinesis   with septal dyskinesis.    Right Ventricle  Mildly dilated right ventricle. Normal right ventricular systolic   function. Pacer/ICD wire seen in right ventricle.    Right Atrium  Enlarged right atrium. Dilated inferior vena cava without inspiratory   collapse. Catheter/pacemaker wire present in the right atrial cavity.    Left Atrium  Severely dilated left atrium.    Mitral Valve  Thickened mitral valve leaflets. Severe mitral regurgitation. The   regurgitation volume is 66 ml. The calculated effective regurgitant   orifice is  .5 sq cm.    Aortic Valve  Aortic sclerosis without stenosis or regurgitation.    Tricuspid Valve  Severe tricuspid regurgitation. Estimated right ventricular systolic   pressure  is 50 mmHg.    Pulmonic Valve  Structurally normal pulmonic valve without significant stenosis or   regurgitation.    Pericardium  Normal pericardium without effusion.    Aorta  The aortic root is normal.      Radiology test Review:  CXR: 2018  1.  Pulmonary edema and/or infiltrates are identified, which are stable since the prior exam.  2.  Cardiomegaly       Medical Decision Makin. Stage C chronic systolic congestive heart failure (HCC)  NYHA class III symptoms, improving.  -s/p CRT-D upgrade 2018, will recheck EF 10/2018  -continue lasix 20mg PO bid, extra doses for weight gain  -continue spironolactone 25mg PO daily  -continue metoprolol XL 50mg PO daily.  Could possibly uptitrate in the future.   -continue losartan 50mg PO daily, consider switch to ARNI if labs stable, he will get them checked today.   -digoxin to help prevent rehospitalizations  -discussed possibility of transplant eval referral if LVEF and symptoms don't recover significantly.  -c/b severe TR/MR, in  setting of volume overload, will re-evaluation with next echo  -added mag, LFTs, BNP onto labs today.     2. Paroxysmal atrial fibrillation (HCC)  s/p AV pato ablation  -continue apixaban  -stop aspirin due to risk of bleeding.  No clinical atherosclerosis.     3. Essential hypertension  Well controlled    4. Dilated cardiomyopathy (HCC)  Unknown etiology per patient    5. AICD (automatic cardioverter/defibrillator) present  f/u with ICD clinic 2 weeks.       Return in about 2 weeks (around 7/19/2018).      Karl Holley MD, [facility] [address]  [address] [address]  Phone: [phone]  Fax: [phone]

## 2018-07-11 ENCOUNTER — TELEPHONE (OUTPATIENT)
Dept: CARDIOLOGY | Facility: MEDICAL CENTER | Age: 48
End: 2018-07-11

## 2018-07-11 NOTE — TELEPHONE ENCOUNTER
bnp improving, electrolytes stable.    Noted rising alk phos and LFTs.  Please have him stop lipitor, and Gianna maybe you can see if he's having any abdominal symptoms which need investigation when you see him on Friday. Thanks!

## 2018-07-11 NOTE — TELEPHONE ENCOUNTER
----- Message from Tessa Ornelas R.N. sent at 7/6/2018 12:12 PM PDT -----  Pt scheduled to see DANELLE 7/13

## 2018-07-11 NOTE — TELEPHONE ENCOUNTER
Pt called. Pt informed of recent laboratory changes. Pt understands to stop Lipitor (Atorvastatin) at once. Pt spells it back to me to make sure we are discussing the same medication and he is stopping the correct one. Discussed his 7/13 740 am FU appt x 2 and pt confirms he will be here at that time.

## 2018-07-13 ENCOUNTER — OFFICE VISIT (OUTPATIENT)
Dept: CARDIOLOGY | Facility: MEDICAL CENTER | Age: 48
End: 2018-07-13
Payer: MEDICARE

## 2018-07-13 VITALS
OXYGEN SATURATION: 99 % | HEIGHT: 69 IN | WEIGHT: 189.2 LBS | SYSTOLIC BLOOD PRESSURE: 108 MMHG | DIASTOLIC BLOOD PRESSURE: 88 MMHG | BODY MASS INDEX: 28.02 KG/M2 | HEART RATE: 98 BPM

## 2018-07-13 DIAGNOSIS — I50.22 STAGE C CHRONIC SYSTOLIC CONGESTIVE HEART FAILURE (HCC): ICD-10-CM

## 2018-07-13 DIAGNOSIS — I42.0 DILATED CARDIOMYOPATHY (HCC): ICD-10-CM

## 2018-07-13 DIAGNOSIS — I48.0 PAROXYSMAL ATRIAL FIBRILLATION (HCC): ICD-10-CM

## 2018-07-13 DIAGNOSIS — Z95.810 BIVENTRICULAR AUTOMATIC IMPLANTABLE CARDIOVERTER DEFIBRILLATOR IN SITU: ICD-10-CM

## 2018-07-13 DIAGNOSIS — I50.9 HEART FAILURE, NYHA CLASS 2 (HCC): ICD-10-CM

## 2018-07-13 DIAGNOSIS — I10 ESSENTIAL HYPERTENSION: ICD-10-CM

## 2018-07-13 PROCEDURE — 99214 OFFICE O/P EST MOD 30 MIN: CPT | Performed by: NURSE PRACTITIONER

## 2018-07-13 RX ORDER — METOPROLOL SUCCINATE 100 MG/1
100 TABLET, EXTENDED RELEASE ORAL DAILY
Qty: 30 TAB | Refills: 11 | Status: SHIPPED | OUTPATIENT
Start: 2018-07-13 | End: 2018-09-13 | Stop reason: SDUPTHER

## 2018-07-13 ASSESSMENT — ENCOUNTER SYMPTOMS
PALPITATIONS: 0
MYALGIAS: 0
FEVER: 0
CLAUDICATION: 0
PND: 0
ABDOMINAL PAIN: 0
SHORTNESS OF BREATH: 0
COUGH: 0
DIZZINESS: 0
ORTHOPNEA: 0
NAUSEA: 0

## 2018-07-13 NOTE — PROGRESS NOTES
Chief Complaint   Patient presents with   • Congestive Heart Failure     HF est.       Subjective:   Lai Dailey is a 48 y.o. male who presents today for follow-up on his heart failure with his wife, Mary Lou.    Patient of the heart failure clinic, patient was last seen in clinic on 7/5/2018.  Patient did obtain some lab testing after his last visit and had some elevated liver function tests.  Patient was recommended to stop his atorvastatin.  Patient has stopped his atorvastatin.  Patient denies any abdominal pain or discomfort.    Since his last visit, patient has been feeling much better.  He reports his shortness of breath has improved.  He he denies shortness of breath with exertion however, patient has not exerted himself too much these days.  Patient is slowly doing more.  Patient denies fatigue, chest pain, palpitations, orthopnea, PND and dizziness/lightheadedness. Patient reports his lower extremity edema comes and goes but is mild.    Patient denies any shocks from his device/ICD.    Patient reports his home weights are stable between 185-189 pounds.    Patient is planning on following up with his PCP soon for his diabetes.    Additonally, patient has the following medical problems:     -Has ICD for dilated cardiomyopathy and nonsustained VT also taking amiodarone     -Diabetes: Taking metformin, insulin     -Hypertension     -Hyperlipidemia: Was taking atorvastatin, stopped at this time due to increased liver function tests    Past Medical History:   Diagnosis Date   • CHF (congestive heart failure) (HCC)    • Diabetes (HCC)    • Hyperlipidemia    • Hypertension      Past Surgical History:   Procedure Laterality Date   • AICD IMPLANT  2010     Family History   Problem Relation Age of Onset   • Colon Cancer Mother    • Hypertension Sister    • Stroke Brother    • Heart Disease Neg Hx      Social History     Social History   • Marital status: Single     Spouse name: N/A   • Number of children: N/A   • Years  "of education: N/A     Occupational History   • Not on file.     Social History Main Topics   • Smoking status: Never Smoker   • Smokeless tobacco: Never Used   • Alcohol use No      Comment: 2 times a week-beer, NO ALCOHOL AT THIS TIME   • Drug use: No      Comment: Marijuana use as youth   • Sexual activity: Not on file     Other Topics Concern   • Not on file     Social History Narrative   • No narrative on file     No Known Allergies  Outpatient Encounter Prescriptions as of 7/13/2018   Medication Sig Dispense Refill   • furosemide (LASIX) 20 MG Tab Take 1 Tab by mouth 2 times a day. 60 Tab 11   • spironolactone (ALDACTONE) 25 MG Tab Take 1 Tab by mouth every day. 30 Tab 11   • losartan (COZAAR) 50 MG Tab Take 1 Tab by mouth every day. 30 Tab 11   • digoxin (LANOXIN) 125 MCG Tab Take 1 Tab by mouth every morning. 30 Tab 11   • apixaban (ELIQUIS) 5mg Tab Take 1 Tab by mouth 2 Times a Day. 60 Tab 11   • metoprolol SR (TOPROL XL) 50 MG TABLET SR 24 HR Take 1 Tab by mouth every day. 30 Tab 11   • insulin glargine (LANTUS) 100 UNIT/ML Solution Inject 10 Units as instructed every evening. 10 mL 0   • metformin (GLUCOPHAGE) 500 MG TABS Take 1 Tab by mouth 2 times a day, with meals. 60 Tab 3   • atorvastatin (LIPITOR) 20 MG Tab Take 1 Tab by mouth every day. 30 Tab 11     No facility-administered encounter medications on file as of 7/13/2018.      Review of Systems   Constitutional: Negative for fever and malaise/fatigue.   Respiratory: Negative for cough and shortness of breath.    Cardiovascular: Positive for leg swelling (Comes and goes). Negative for chest pain, palpitations, orthopnea, claudication and PND.   Gastrointestinal: Negative for abdominal pain and nausea.   Musculoskeletal: Negative for myalgias.   Neurological: Negative for dizziness.   All other systems reviewed and are negative.       Objective:   /88   Pulse 98   Ht 1.753 m (5' 9\")   Wt 85.8 kg (189 lb 3.2 oz)   SpO2 99%   BMI 27.94 kg/m² "     Physical Exam   Constitutional: He is oriented to person, place, and time. He appears well-developed and well-nourished.   HENT:   Head: Normocephalic and atraumatic.   Eyes: EOM are normal. Pupils are equal, round, and reactive to light.   Neck: Normal range of motion. Neck supple. JVD present.   Cardiovascular: Normal rate, regular rhythm and normal heart sounds.    Pulmonary/Chest: Effort normal and breath sounds normal. No respiratory distress. He has no wheezes. He has no rales.   Left chest ICD-no erosion, drainage or erythema   Abdominal: Soft. Bowel sounds are normal.   Musculoskeletal: He exhibits edema (Trace bilateral ankle edema).   Neurological: He is alert and oriented to person, place, and time.   Skin: Skin is warm and dry.   Psychiatric: He has a normal mood and affect. His behavior is normal.   Vitals reviewed.    Lab Results   Component Value Date/Time    CHOLSTRLTOT 76 (L) 05/13/2018 02:49 AM    LDL 46 05/13/2018 02:49 AM    HDL 20 (A) 05/13/2018 02:49 AM    TRIGLYCERIDE 48 05/13/2018 02:49 AM       Lab Results   Component Value Date/Time    SODIUM 133 (L) 07/05/2018 02:27 PM    POTASSIUM 4.1 07/05/2018 02:27 PM    CHLORIDE 98 07/05/2018 02:27 PM    CO2 25 07/05/2018 02:27 PM    GLUCOSE 273 (H) 07/05/2018 02:27 PM    BUN 22 07/05/2018 02:27 PM    CREATININE 1.18 07/05/2018 02:27 PM     Lab Results   Component Value Date/Time    ALKPHOSPHAT 296 (H) 07/05/2018 02:28 PM    ASTSGOT 60 (H) 07/05/2018 02:28 PM    ALTSGPT 79 (H) 07/05/2018 02:28 PM    TBILIRUBIN 1.1 07/05/2018 02:28 PM      Echocardiogram 3/23/2014  CONCLUSIONS  No prior echo  4 chamber dilation  Left ventricular ejection fraction is 15% to 20%.   Grade III-IV diastolic dysfunction is present. c/w elevated LAp and   LVEDP.   Severe mitral regurgitation.        Transthoracic Echo Report 5/13/18  Left ventricle is severely dilated. Mild concentric left ventricular hypertrophy. Grade III diastolic dysfunction (restrictive pattern).    Severely reduced left ventricular systolic function. Left ventricular ejection fraction is visually estimated to be 20%. Diffuse hypokinesis with septal dyskinesis.  Severe mitral regurgitation.  Severe tricuspid regurgitation. Estimated right ventricular systolic pressure  is 50 mmHg.  Compared to the report of the study done - the TR is now severe.      Assessment:     1. Stage C chronic systolic congestive heart failure (HCC)  metoprolol SR (TOPROL XL) 100 MG TABLET SR 24 HR   2. Heart failure, NYHA class 2 (HCC)  metoprolol SR (TOPROL XL) 100 MG TABLET SR 24 HR   3. Dilated cardiomyopathy (HCC)     4. Biventricular automatic implantable cardioverter defibrillator in situ     5. Paroxysmal atrial fibrillation (HCC)     6. Essential hypertension         Medical Decision Making:  Today's Assessment / Status / Plan:   1. HFrEF, Stage C, Class 2, LVEF 20%: Patient does have some JVD.  -Increase metoprolol XL to 100 mg daily (recommended to patient to start taking at night if fatigue develops.  Patient and wife to monitor BP and if symptoms are low BP, to call office)  -Continue losartan 50 mg daily  -Continue spironolactone 25 mg daily  -Continue digoxin 125 mcg daily  -Continue furosemide 20 mg twice a day  -Has BiV ICD-next device check 7/20/2018  -Reinforced s/sx of worsening heart failure with patient and weight monitoring. Pt verbalizes understanding. Pt to call office or RTC if present.   -Patient symptoms better, will defer referral for transplant/advanced heart failure therapies at this time.  -Repeat echo in the next 2 months  -Patient has follow-up labs do with PCP in the next few weeks, patient to provide results at his next visit    2.  Paroxysmal A. Fib, s/p AV node ablation:  Continue Eliquis 5 mg twice a day-    3 hypertension: Stable  -Recommendations per above    FU in clinic in 1 month. Sooner if needed.    Patient verbalizes understanding and agrees with the plan of care.     Collaborating MD:  Bhavin Rae MD

## 2018-07-20 ENCOUNTER — NON-PROVIDER VISIT (OUTPATIENT)
Dept: CARDIOLOGY | Facility: MEDICAL CENTER | Age: 48
End: 2018-07-20
Payer: MEDICARE

## 2018-07-20 ENCOUNTER — TELEPHONE (OUTPATIENT)
Dept: CARDIOLOGY | Facility: MEDICAL CENTER | Age: 48
End: 2018-07-20

## 2018-07-20 DIAGNOSIS — Z95.810 AICD (AUTOMATIC CARDIOVERTER/DEFIBRILLATOR) PRESENT: ICD-10-CM

## 2018-07-20 PROCEDURE — 93283 PRGRMG EVAL IMPLANTABLE DFB: CPT | Performed by: INTERNAL MEDICINE

## 2018-07-20 NOTE — NON-PROVIDER
Final threshold testing. Appropriate device function demonstrated. Wound site appears clear. See back in one month along with DANELLE.

## 2018-07-20 NOTE — TELEPHONE ENCOUNTER
"CRT-D, implanted on 6-. AF/, pt states \"feeling better.\"  LR 90 ppm, pt rtc in one month. ? Decrease LR at that time to 80 ppm?  No episodes (perm AF.)  ramez vela  "

## 2018-08-03 ENCOUNTER — HOSPITAL ENCOUNTER (OUTPATIENT)
Dept: LAB | Facility: MEDICAL CENTER | Age: 48
End: 2018-08-03
Attending: INTERNAL MEDICINE
Payer: MEDICARE

## 2018-08-03 LAB
ALBUMIN SERPL BCP-MCNC: 3.8 G/DL (ref 3.2–4.9)
ALBUMIN/GLOB SERPL: 0.9 G/DL
ALP SERPL-CCNC: 244 U/L (ref 30–99)
ALT SERPL-CCNC: 17 U/L (ref 2–50)
ANION GAP SERPL CALC-SCNC: 8 MMOL/L (ref 0–11.9)
AST SERPL-CCNC: 21 U/L (ref 12–45)
BASOPHILS # BLD AUTO: 0.5 % (ref 0–1.8)
BASOPHILS # BLD: 0.06 K/UL (ref 0–0.12)
BILIRUB SERPL-MCNC: 1.8 MG/DL (ref 0.1–1.5)
BUN SERPL-MCNC: 21 MG/DL (ref 8–22)
CALCIUM SERPL-MCNC: 9.5 MG/DL (ref 8.5–10.5)
CHLORIDE SERPL-SCNC: 97 MMOL/L (ref 96–112)
CHOLEST SERPL-MCNC: 107 MG/DL (ref 100–199)
CO2 SERPL-SCNC: 27 MMOL/L (ref 20–33)
CREAT SERPL-MCNC: 0.93 MG/DL (ref 0.5–1.4)
CREAT UR-MCNC: 119.5 MG/DL
EOSINOPHIL # BLD AUTO: 0.1 K/UL (ref 0–0.51)
EOSINOPHIL NFR BLD: 0.8 % (ref 0–6.9)
ERYTHROCYTE [DISTWIDTH] IN BLOOD BY AUTOMATED COUNT: 59.8 FL (ref 35.9–50)
EST. AVERAGE GLUCOSE BLD GHB EST-MCNC: 226 MG/DL
GLOBULIN SER CALC-MCNC: 4.3 G/DL (ref 1.9–3.5)
GLUCOSE SERPL-MCNC: 116 MG/DL (ref 65–99)
HBA1C MFR BLD: 9.5 % (ref 0–5.6)
HCT VFR BLD AUTO: 40.5 % (ref 42–52)
HDLC SERPL-MCNC: 29 MG/DL
HGB BLD-MCNC: 13.1 G/DL (ref 14–18)
IMM GRANULOCYTES # BLD AUTO: 0.04 K/UL (ref 0–0.11)
IMM GRANULOCYTES NFR BLD AUTO: 0.3 % (ref 0–0.9)
LDLC SERPL CALC-MCNC: 69 MG/DL
LYMPHOCYTES # BLD AUTO: 3.19 K/UL (ref 1–4.8)
LYMPHOCYTES NFR BLD: 27.1 % (ref 22–41)
MCH RBC QN AUTO: 28.1 PG (ref 27–33)
MCHC RBC AUTO-ENTMCNC: 32.3 G/DL (ref 33.7–35.3)
MCV RBC AUTO: 86.7 FL (ref 81.4–97.8)
MICROALBUMIN UR-MCNC: 1.6 MG/DL
MICROALBUMIN/CREAT UR: 13 MG/G (ref 0–30)
MONOCYTES # BLD AUTO: 0.95 K/UL (ref 0–0.85)
MONOCYTES NFR BLD AUTO: 8.1 % (ref 0–13.4)
NEUTROPHILS # BLD AUTO: 7.45 K/UL (ref 1.82–7.42)
NEUTROPHILS NFR BLD: 63.2 % (ref 44–72)
NRBC # BLD AUTO: 0 K/UL
NRBC BLD-RTO: 0 /100 WBC
PLATELET # BLD AUTO: 246 K/UL (ref 164–446)
PMV BLD AUTO: 10.3 FL (ref 9–12.9)
POTASSIUM SERPL-SCNC: 4.6 MMOL/L (ref 3.6–5.5)
PROT SERPL-MCNC: 8.1 G/DL (ref 6–8.2)
PSA SERPL-MCNC: 0.25 NG/ML (ref 0–4)
RBC # BLD AUTO: 4.67 M/UL (ref 4.7–6.1)
SODIUM SERPL-SCNC: 132 MMOL/L (ref 135–145)
TRIGL SERPL-MCNC: 47 MG/DL (ref 0–149)
WBC # BLD AUTO: 11.8 K/UL (ref 4.8–10.8)

## 2018-08-03 PROCEDURE — 80061 LIPID PANEL: CPT

## 2018-08-03 PROCEDURE — 82043 UR ALBUMIN QUANTITATIVE: CPT

## 2018-08-03 PROCEDURE — 83036 HEMOGLOBIN GLYCOSYLATED A1C: CPT | Mod: GA

## 2018-08-03 PROCEDURE — 36415 COLL VENOUS BLD VENIPUNCTURE: CPT

## 2018-08-03 PROCEDURE — 84153 ASSAY OF PSA TOTAL: CPT | Mod: GA

## 2018-08-03 PROCEDURE — 85025 COMPLETE CBC W/AUTO DIFF WBC: CPT

## 2018-08-03 PROCEDURE — 82570 ASSAY OF URINE CREATININE: CPT

## 2018-08-03 PROCEDURE — 80053 COMPREHEN METABOLIC PANEL: CPT

## 2018-09-13 ENCOUNTER — NON-PROVIDER VISIT (OUTPATIENT)
Dept: CARDIOLOGY | Facility: MEDICAL CENTER | Age: 48
End: 2018-09-13
Payer: MEDICARE

## 2018-09-13 ENCOUNTER — OFFICE VISIT (OUTPATIENT)
Dept: CARDIOLOGY | Facility: MEDICAL CENTER | Age: 48
End: 2018-09-13
Payer: MEDICARE

## 2018-09-13 VITALS
OXYGEN SATURATION: 96 % | SYSTOLIC BLOOD PRESSURE: 120 MMHG | HEART RATE: 100 BPM | WEIGHT: 184 LBS | HEIGHT: 69 IN | DIASTOLIC BLOOD PRESSURE: 68 MMHG | BODY MASS INDEX: 27.25 KG/M2

## 2018-09-13 DIAGNOSIS — I48.0 PAROXYSMAL ATRIAL FIBRILLATION (HCC): ICD-10-CM

## 2018-09-13 DIAGNOSIS — Z95.810 BIVENTRICULAR AUTOMATIC IMPLANTABLE CARDIOVERTER DEFIBRILLATOR IN SITU: ICD-10-CM

## 2018-09-13 DIAGNOSIS — I50.9 HEART FAILURE, NYHA CLASS 2 (HCC): ICD-10-CM

## 2018-09-13 DIAGNOSIS — I44.2 AV BLOCK, 3RD DEGREE (HCC): ICD-10-CM

## 2018-09-13 DIAGNOSIS — I50.22 STAGE C CHRONIC SYSTOLIC CONGESTIVE HEART FAILURE (HCC): ICD-10-CM

## 2018-09-13 DIAGNOSIS — E78.2 MIXED HYPERLIPIDEMIA: ICD-10-CM

## 2018-09-13 DIAGNOSIS — Z95.810 PRESENCE OF BIVENTRICULAR AUTOMATIC CARDIOVERTER/DEFIBRILLATOR (AICD): ICD-10-CM

## 2018-09-13 DIAGNOSIS — I42.0 DILATED CARDIOMYOPATHY (HCC): ICD-10-CM

## 2018-09-13 DIAGNOSIS — I10 ESSENTIAL HYPERTENSION: ICD-10-CM

## 2018-09-13 DIAGNOSIS — R06.09 DYSPNEA ON EXERTION: ICD-10-CM

## 2018-09-13 PROCEDURE — 94618 PULMONARY STRESS TESTING: CPT | Mod: GZ | Performed by: NURSE PRACTITIONER

## 2018-09-13 PROCEDURE — 99214 OFFICE O/P EST MOD 30 MIN: CPT | Mod: 25 | Performed by: NURSE PRACTITIONER

## 2018-09-13 PROCEDURE — 93283 PRGRMG EVAL IMPLANTABLE DFB: CPT | Performed by: INTERNAL MEDICINE

## 2018-09-13 RX ORDER — METOPROLOL SUCCINATE 200 MG/1
200 TABLET, EXTENDED RELEASE ORAL DAILY
Qty: 30 TAB | Refills: 11 | Status: SHIPPED | OUTPATIENT
Start: 2018-09-13 | End: 2019-12-03 | Stop reason: SDUPTHER

## 2018-09-13 RX ORDER — TAMSULOSIN HYDROCHLORIDE 0.4 MG/1
0.4 CAPSULE ORAL EVERY MORNING
COMMUNITY
Start: 2018-09-11 | End: 2020-04-03

## 2018-09-13 RX ORDER — SULFAMETHOXAZOLE AND TRIMETHOPRIM 800; 160 MG/1; MG/1
TABLET ORAL
COMMUNITY
Start: 2018-09-11 | End: 2018-09-13

## 2018-09-13 ASSESSMENT — MINNESOTA LIVING WITH HEART FAILURE QUESTIONNAIRE (MLHF)
MAKING YOU STAY IN A HOSPITAL: 0
WORKING AROUND THE HOUSE OR YARD DIFFICULT: 0
GIVING YOU SIDE EFFECTS FROM TREATMENTS: 0
FEELING LIKE A BURDEN TO FAMILY AND FRIENDS: 0
EATING LESS FOODS YOU LIKE: 4
MAKING YOU FEEL DEPRESSED: 0
TOTAL_SCORE: 7
MAKING YOU SHORT OF BREATH: 1
COSTING YOU MONEY FOR MEDICAL CARE: 0
DIFFICULTY WORKING TO EARN A LIVING: 0
TIRED, FATIGUED OR LOW ON ENERGY: 1
WALKING ABOUT OR CLIMBING STAIRS DIFFICULT: 1
DIFFICULTY WITH SEXUAL ACTIVITIES: 0
DIFFICULTY SLEEPING WELL AT NIGHT: 0
DIFFICULTY TO CONCENTRATE OR REMEMBERING THINGS: 0
HAVING TO SIT OR LIE DOWN DURING THE DAY: 0
DIFFICULTY SOCIALIZING WITH FAMILY OR FRIENDS: 0
SWELLING IN ANKLES OR LEGS: 0
LOSS OF SELF CONTROL IN YOUR LIFE: 0
DIFFICULTY WITH RECREATIONAL PASTIMES, SPORTS, HOBBIES: 0
MAKING YOU WORRY: 0
DIFFICULTY GOING AWAY FROM HOME: 0

## 2018-09-13 ASSESSMENT — ENCOUNTER SYMPTOMS
ORTHOPNEA: 0
DIZZINESS: 0
PND: 0
CLAUDICATION: 0
PALPITATIONS: 0
MYALGIAS: 0
ABDOMINAL PAIN: 0
SHORTNESS OF BREATH: 1
FEVER: 0
COUGH: 0

## 2018-09-13 ASSESSMENT — 6 MINUTE WALK TEST (6MWT): TOTAL DISTANCE WALKED (METERS): 372

## 2018-09-13 NOTE — PROGRESS NOTES
Chief Complaint   Patient presents with   • Congestive Heart Failure       Subjective:   Lai Dailey is a 48 y.o. male who presents today for follow up on his Heart Failure.    Patient of the heart failure clinic.  Patient was last seen in clinic on 7/13/2018.  During his last visit, Toprol-XL was increased to 100 mg daily.  Patient reports no problems with the dose increase.    Since his last visit, patient reports he has been feeling much better.  Patient reports he has been exercising and reports minimal shortness breath with exertion.  He denies chest pain, palpitations, orthopnea, PND, edema or dizziness/lightheadedness.    Patient has also lost some weight and his weights have been stable around 179 pounds.    Patient does report he has been having difficulty with his prostate.  Patient is going to follow-up with the urologist.  Patient currently has an indwelling catheter.    Patient denies any shocks from his ICD.    Patient also reports his diabetes has been more stable and controlled.    At 6 minute walk test re-evaluation, patient was able to complete 372 m during his 6 minute walk test. His O2 saturation at baseline was 96% and at the end of the test, the O2 saturation was 96%. He reported level 1 of dyspnea on [place] scale. MLWHF 7    Additonally, patient has the following medical problems:     -Has ICD for dilated cardiomyopathy and nonsustained VT also taking amiodarone     -Diabetes: Taking metformin, insulin     -Hypertension: Taking losartan, metoprolol XL, spironolactone     -Hyperlipidemia: Was taking atorvastatin, stopped at this time due to increased liver function tests       Past Medical History:   Diagnosis Date   • CHF (congestive heart failure) (HCC)    • Diabetes (HCC)    • Hyperlipidemia    • Hypertension      Past Surgical History:   Procedure Laterality Date   • AICD IMPLANT  2010     Family History   Problem Relation Age of Onset   • Colon Cancer Mother    • Hypertension Sister    •  Stroke Brother    • Heart Disease Neg Hx      Social History     Social History   • Marital status: Single     Spouse name: N/A   • Number of children: N/A   • Years of education: N/A     Occupational History   • Not on file.     Social History Main Topics   • Smoking status: Never Smoker   • Smokeless tobacco: Never Used   • Alcohol use No      Comment: 2 times a week-beer, NO ALCOHOL AT THIS TIME   • Drug use: No      Comment: Marijuana use as youth   • Sexual activity: Not on file     Other Topics Concern   • Not on file     Social History Narrative   • No narrative on file     No Known Allergies  Outpatient Encounter Prescriptions as of 9/13/2018   Medication Sig Dispense Refill   • tamsulosin (FLOMAX) 0.4 MG capsule      • metoprolol SR (TOPROL XL) 100 MG TABLET SR 24 HR Take 1 Tab by mouth every day. 30 Tab 11   • furosemide (LASIX) 20 MG Tab Take 1 Tab by mouth 2 times a day. 60 Tab 11   • spironolactone (ALDACTONE) 25 MG Tab Take 1 Tab by mouth every day. 30 Tab 11   • losartan (COZAAR) 50 MG Tab Take 1 Tab by mouth every day. 30 Tab 11   • digoxin (LANOXIN) 125 MCG Tab Take 1 Tab by mouth every morning. 30 Tab 11   • apixaban (ELIQUIS) 5mg Tab Take 1 Tab by mouth 2 Times a Day. 60 Tab 11   • insulin glargine (LANTUS) 100 UNIT/ML Solution Inject 10 Units as instructed every evening. 10 mL 0   • metformin (GLUCOPHAGE) 500 MG TABS Take 1 Tab by mouth 2 times a day, with meals. 60 Tab 3   • sulfamethoxazole-trimethoprim (BACTRIM DS) 800-160 MG tablet        No facility-administered encounter medications on file as of 9/13/2018.      Review of Systems   Constitutional: Negative for fever and malaise/fatigue.   Respiratory: Positive for shortness of breath (mild). Negative for cough.    Cardiovascular: Negative for chest pain, palpitations, orthopnea, claudication, leg swelling and PND.   Gastrointestinal: Negative for abdominal pain.   Musculoskeletal: Negative for myalgias.   Neurological: Negative for  "dizziness.   All other systems reviewed and are negative.       Objective:   /68   Pulse 100   Ht 1.753 m (5' 9\")   Wt 83.5 kg (184 lb)   SpO2 96%   BMI 27.17 kg/m²     Physical Exam   Constitutional: He is oriented to person, place, and time. He appears well-developed and well-nourished.   HENT:   Head: Normocephalic and atraumatic.   Eyes: Pupils are equal, round, and reactive to light. EOM are normal.   Neck: Normal range of motion. Neck supple. No JVD present.   Cardiovascular: Normal rate, regular rhythm and normal heart sounds.    Pulmonary/Chest: Effort normal and breath sounds normal. No respiratory distress. He has no wheezes. He has no rales.   Left chest ICD-no erosion, drainage or Erythema   Abdominal: Soft. Bowel sounds are normal.   Musculoskeletal: He exhibits no edema.   Neurological: He is alert and oriented to person, place, and time.   Skin: Skin is warm and dry.   Psychiatric: He has a normal mood and affect. His behavior is normal.   Vitals reviewed.    Lab Results   Component Value Date/Time    CHOLSTRLTOT 107 08/03/2018 10:12 AM    LDL 69 08/03/2018 10:12 AM    HDL 29 (A) 08/03/2018 10:12 AM    TRIGLYCERIDE 47 08/03/2018 10:12 AM       Lab Results   Component Value Date/Time    SODIUM 132 (L) 08/03/2018 10:12 AM    POTASSIUM 4.6 08/03/2018 10:12 AM    CHLORIDE 97 08/03/2018 10:12 AM    CO2 27 08/03/2018 10:12 AM    GLUCOSE 116 (H) 08/03/2018 10:12 AM    BUN 21 08/03/2018 10:12 AM    CREATININE 0.93 08/03/2018 10:12 AM     Lab Results   Component Value Date/Time    ALKPHOSPHAT 244 (H) 08/03/2018 10:12 AM    ASTSGOT 21 08/03/2018 10:12 AM    ALTSGPT 17 08/03/2018 10:12 AM    TBILIRUBIN 1.8 (H) 08/03/2018 10:12 AM        Heart Cath 3/24/2014  FINDINGS:  Right heart catheterization wedge pressure was about 21.  Pulmonary   artery pressure is 51/40.  RV pressure is 50/4.  Right atrial pressure was   about 10.  The left ventricle was severely dilated with global hypokinesis and   ejection " fraction of 13%.  There is mild-to-moderate mitral regurgitation   noted; however, it may be because the ventricle is under field.  The left   coronary ostium is normal without significant atherosclerosis.  It divides   into large circumflex branch, ramus branch, and then LAD that gives off large   diagonal branch.  There is no atherosclerosis in any of these vessels.  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  There is no   atherosclerosis in the right coronary.       CONCLUSION:  Severe nonischemic cardiomyopathy with ejection fraction about   13%, at least mild-to-moderate mitral regurgitation, and no gradient across   the aortic valve, and elevated right-sided pressures.  The patient will need   aggressive heart failure treatment.    Echocardiogram 3/23/2014  CONCLUSIONS  No prior echo  4 chamber dilation  Left ventricular ejection fraction is 15% to 20%.   Grade III-IV diastolic dysfunction is present. c/w elevated LAp and   LVEDP.   Severe mitral regurgitation.        Transthoracic Echo Report 5/13/18  Left ventricle is severely dilated. Mild concentric left ventricular hypertrophy. Grade III diastolic dysfunction (restrictive pattern).   Severely reduced left ventricular systolic function. Left ventricular ejection fraction is visually estimated to be 20%. Diffuse hypokinesis with septal dyskinesis.  Severe mitral regurgitation.  Severe tricuspid regurgitation. Estimated right ventricular systolic pressure  is 50 mmHg.  Compared to the report of the study done - the TR is now severe.      Assessment:     1. Stage C chronic systolic congestive heart failure (HCC)  Echocardiogram Comp w/o Cont    BASIC METABOLIC PANEL    metoprolol SR (TOPROL-XL) 200 MG XL tablet   2. Heart failure, NYHA class 2 (HCC)  Echocardiogram Comp w/o Cont    BASIC METABOLIC PANEL    metoprolol SR (TOPROL-XL) 200 MG XL tablet   3. Dilated cardiomyopathy (HCC)     4. Biventricular automatic implantable  cardioverter defibrillator in situ     5. Paroxysmal atrial fibrillation (HCC)     6. Essential hypertension     7. Mixed hyperlipidemia     8. Dyspnea on exertion         Medical Decision Making:  Today's Assessment / Status / Plan:   1. HFrEF, Stage C, Class 1-2, LVEF 20%: Based on physical examination findings, patient is euvolemic. No JVD, lungs are clear to auscultation, no pitting edema in bilateral lower extremities, no ascites.  -Increase metoprolol XL to 200 mg daily  -Continue losartan 50 mg daily  -Continue spironolactone 25 mg daily  -Continue digoxin 125 mcg daily  -Continue furosemide 20 mg twice a day  -Has a bi-V ICD-next check today  -BMP in 1 month  -Repeat echocardiogram  -Reinforced s/sx of worsening heart failure with patient and weight monitoring. Pt verbalizes understanding. Pt to call office or RTC if present.     2.  Paroxysmal A. Fib, s/p AV node ablation:  -Continue Eliquis 5 mg twice a day    3.  Hypertension: Stable  -Recommendations per above    4.  Hyperlipidemia: Last LDL 69 on 8/3/2018  -Not currently taking atorvastatin due to elevated liver function tests.  -Patient to follow-up with PCP regarding elevated liver function    FU in clinic in 4-6 weeks with Dr. Solis after testing completed. Sooner if needed.    Patient verbalizes understanding and agrees with the plan of care.     Collaborating MD: Shon Morgan MD

## 2018-10-25 ENCOUNTER — OFFICE VISIT (OUTPATIENT)
Dept: CARDIOLOGY | Facility: MEDICAL CENTER | Age: 48
End: 2018-10-25
Payer: MEDICARE

## 2018-10-25 VITALS
BODY MASS INDEX: 27.99 KG/M2 | HEIGHT: 69 IN | SYSTOLIC BLOOD PRESSURE: 112 MMHG | OXYGEN SATURATION: 96 % | WEIGHT: 189 LBS | HEART RATE: 78 BPM | DIASTOLIC BLOOD PRESSURE: 84 MMHG

## 2018-10-25 DIAGNOSIS — I51.89 LEFT VENTRICULAR SYSTOLIC DYSFUNCTION, NYHA CLASS 2: ICD-10-CM

## 2018-10-25 DIAGNOSIS — I10 ESSENTIAL HYPERTENSION: ICD-10-CM

## 2018-10-25 DIAGNOSIS — I48.0 PAROXYSMAL ATRIAL FIBRILLATION (HCC): ICD-10-CM

## 2018-10-25 DIAGNOSIS — Z95.810 AICD (AUTOMATIC CARDIOVERTER/DEFIBRILLATOR) PRESENT: ICD-10-CM

## 2018-10-25 DIAGNOSIS — I50.22 STAGE C CHRONIC SYSTOLIC CONGESTIVE HEART FAILURE (HCC): ICD-10-CM

## 2018-10-25 PROCEDURE — 99214 OFFICE O/P EST MOD 30 MIN: CPT | Performed by: INTERNAL MEDICINE

## 2018-10-25 RX ORDER — LOSARTAN POTASSIUM 100 MG/1
100 TABLET ORAL DAILY
Qty: 30 TAB | Refills: 11 | Status: SHIPPED | OUTPATIENT
Start: 2018-10-25 | End: 2019-11-18

## 2018-10-25 NOTE — PROGRESS NOTES
Chief Complaint   Patient presents with   • Atrial Fibrillation   • HTN (Controlled)       Subjective:   Lai Dailey is a 48 y.o. male who presents today for follow-up evaluation heart failure clinic because of a admission on June 5, 2018 for congestive heart failure.  He was recently admitted for atrial fibrillation with a rapid ventricular response.  He did undergo an upgrade of his AICD to a biventricular device.  He states he did have an angiogram over 5 years ago which showed normal coronary arteries.    He has dyspnea on exertion at about 100-200 yards.  This is a significant improvement since his biventricular upgrade to his defibrillator.  He has had no PND orthopnea but occasionally notes some mild edema.  He denies any chest discomfort, palpitations or lightheadedness.      Past Medical History:   Diagnosis Date   • CHF (congestive heart failure) (HCC)    • Diabetes (HCC)    • Hyperlipidemia    • Hypertension      Past Surgical History:   Procedure Laterality Date   • AICD IMPLANT  2010     Family History   Problem Relation Age of Onset   • Colon Cancer Mother    • Hypertension Sister    • Stroke Brother    • Heart Disease Neg Hx      Social History     Social History   • Marital status: Single     Spouse name: N/A   • Number of children: N/A   • Years of education: N/A     Occupational History   • Not on file.     Social History Main Topics   • Smoking status: Never Smoker   • Smokeless tobacco: Never Used   • Alcohol use No      Comment: 2 times a week-beer, NO ALCOHOL AT THIS TIME   • Drug use: No      Comment: Marijuana use as youth   • Sexual activity: Not on file     Other Topics Concern   • Not on file     Social History Narrative   • No narrative on file     No Known Allergies  Outpatient Encounter Prescriptions as of 10/25/2018   Medication Sig Dispense Refill   • tamsulosin (FLOMAX) 0.4 MG capsule      • metoprolol SR (TOPROL-XL) 200 MG XL tablet Take 1 Tab by mouth every day. 30 Tab 11   •  "furosemide (LASIX) 20 MG Tab Take 1 Tab by mouth 2 times a day. (Patient taking differently: Take 20 mg by mouth every day.) 60 Tab 11   • spironolactone (ALDACTONE) 25 MG Tab Take 1 Tab by mouth every day. 30 Tab 11   • losartan (COZAAR) 50 MG Tab Take 1 Tab by mouth every day. 30 Tab 11   • digoxin (LANOXIN) 125 MCG Tab Take 1 Tab by mouth every morning. 30 Tab 11   • apixaban (ELIQUIS) 5mg Tab Take 1 Tab by mouth 2 Times a Day. 60 Tab 11   • insulin glargine (LANTUS) 100 UNIT/ML Solution Inject 10 Units as instructed every evening. 10 mL 0   • metformin (GLUCOPHAGE) 500 MG TABS Take 1 Tab by mouth 2 times a day, with meals. 60 Tab 3     No facility-administered encounter medications on file as of 10/25/2018.      ROS     Objective:   /84 (BP Location: Left arm, Patient Position: Sitting, BP Cuff Size: Adult)   Pulse 78   Ht 1.753 m (5' 9\")   Wt 85.7 kg (189 lb)   SpO2 96%   BMI 27.91 kg/m²     Physical Exam   Constitutional: He appears well-developed and well-nourished.   Neck: No JVD present.   Cardiovascular: Normal rate and regular rhythm.    Murmur (3/6 systolic at the apex and radiating towards the left sternal border.) heard.  Pulmonary/Chest: Effort normal and breath sounds normal. He has no rales.   Abdominal: Soft. There is no tenderness.   Musculoskeletal: He exhibits no edema.     Lab Results   Component Value Date/Time    CHOLSTRLTOT 107 08/03/2018 10:12 AM    LDL 69 08/03/2018 10:12 AM    HDL 29 (A) 08/03/2018 10:12 AM    TRIGLYCERIDE 47 08/03/2018 10:12 AM       Lab Results   Component Value Date/Time    SODIUM 132 (L) 08/03/2018 10:12 AM    POTASSIUM 4.6 08/03/2018 10:12 AM    CHLORIDE 97 08/03/2018 10:12 AM    CO2 27 08/03/2018 10:12 AM    GLUCOSE 116 (H) 08/03/2018 10:12 AM    BUN 21 08/03/2018 10:12 AM    CREATININE 0.93 08/03/2018 10:12 AM     Lab Results   Component Value Date/Time    ALKPHOSPHAT 244 (H) 08/03/2018 10:12 AM    ASTSGOT 21 08/03/2018 10:12 AM    ALTSGPT 17 08/03/2018 " 10:12 AM    TBILIRUBIN 1.8 (H) 08/03/2018 10:12 AM      Lab Results   Component Value Date/Time    BNPBTYPENAT 845 (H) 07/05/2018 02:28 PM      Transthoracic  Echo Report      Echocardiography Laboratory    CONCLUSIONS  Left ventricle is severely dilated. Mild concentric left ventricular   hypertrophy. Grade III diastolic dysfunction (restrictive pattern).   Severely reduced left ventricular systolic function. Left ventricular   ejection fraction is visually estimated to be 20%. Diffuse hypokinesis   with septal dyskinesis.  Severe mitral regurgitation.  Severe tricuspid regurgitation. Estimated right ventricular systolic   pressure  is 50 mmHg.  Compared to the report of the study done - the TR is now severe.     CHESTER PICKARD  Exam Date:         05/13/2018     Assessment:     1. Stage C chronic systolic congestive heart failure (HCC)     2. Left ventricular systolic dysfunction, NYHA class 2     3. AICD (automatic cardioverter/defibrillator) present     4. Essential hypertension     5. Paroxysmal atrial fibrillation (HCC)         Medical Decision Making:  Today's Assessment / Status / Plan:     Mr. Pickard is clinically stable.  He has had significant improvement in his exercise tolerance since his defibrillator was upgraded to a biventricular device.  However, he previously was noted to have severe mitral insufficiency.  I feel he should be reevaluated with an echocardiogram.  In addition, his blood pressure appears adequate for an increase in his ARB therapy.  He will follow-up in about a month with repeat lab work.

## 2018-11-26 ENCOUNTER — HOSPITAL ENCOUNTER (OUTPATIENT)
Dept: LAB | Facility: MEDICAL CENTER | Age: 48
End: 2018-11-26
Attending: INTERNAL MEDICINE
Payer: MEDICARE

## 2018-11-26 DIAGNOSIS — I50.22 STAGE C CHRONIC SYSTOLIC CONGESTIVE HEART FAILURE (HCC): ICD-10-CM

## 2018-11-26 DIAGNOSIS — I10 ESSENTIAL HYPERTENSION: ICD-10-CM

## 2018-11-26 DIAGNOSIS — I51.89 LEFT VENTRICULAR SYSTOLIC DYSFUNCTION, NYHA CLASS 2: ICD-10-CM

## 2018-11-26 DIAGNOSIS — I48.0 PAROXYSMAL ATRIAL FIBRILLATION (HCC): ICD-10-CM

## 2018-11-26 PROCEDURE — 36415 COLL VENOUS BLD VENIPUNCTURE: CPT

## 2018-11-26 PROCEDURE — 83880 ASSAY OF NATRIURETIC PEPTIDE: CPT

## 2018-11-26 PROCEDURE — 80053 COMPREHEN METABOLIC PANEL: CPT

## 2018-11-27 ENCOUNTER — OFFICE VISIT (OUTPATIENT)
Dept: CARDIOLOGY | Facility: MEDICAL CENTER | Age: 48
End: 2018-11-27
Payer: MEDICARE

## 2018-11-27 VITALS
DIASTOLIC BLOOD PRESSURE: 70 MMHG | SYSTOLIC BLOOD PRESSURE: 110 MMHG | HEIGHT: 69 IN | WEIGHT: 189.8 LBS | OXYGEN SATURATION: 97 % | HEART RATE: 80 BPM | BODY MASS INDEX: 28.11 KG/M2

## 2018-11-27 DIAGNOSIS — I10 ESSENTIAL HYPERTENSION: ICD-10-CM

## 2018-11-27 DIAGNOSIS — I51.89 LEFT VENTRICULAR SYSTOLIC DYSFUNCTION, NYHA CLASS 2: ICD-10-CM

## 2018-11-27 DIAGNOSIS — E78.2 MIXED HYPERLIPIDEMIA: ICD-10-CM

## 2018-11-27 DIAGNOSIS — I48.0 PAROXYSMAL ATRIAL FIBRILLATION (HCC): ICD-10-CM

## 2018-11-27 DIAGNOSIS — I42.0 DILATED CARDIOMYOPATHY (HCC): ICD-10-CM

## 2018-11-27 DIAGNOSIS — Z95.810 PRESENCE OF BIVENTRICULAR AUTOMATIC CARDIOVERTER/DEFIBRILLATOR (AICD): ICD-10-CM

## 2018-11-27 DIAGNOSIS — I50.22 STAGE C CHRONIC SYSTOLIC CONGESTIVE HEART FAILURE (HCC): ICD-10-CM

## 2018-11-27 LAB
ALBUMIN SERPL BCP-MCNC: 3.9 G/DL (ref 3.2–4.9)
ALBUMIN/GLOB SERPL: 1 G/DL
ALP SERPL-CCNC: 162 U/L (ref 30–99)
ALT SERPL-CCNC: 16 U/L (ref 2–50)
ANION GAP SERPL CALC-SCNC: 7 MMOL/L (ref 0–11.9)
AST SERPL-CCNC: 23 U/L (ref 12–45)
BILIRUB SERPL-MCNC: 0.7 MG/DL (ref 0.1–1.5)
BNP SERPL-MCNC: 1109 PG/ML (ref 0–100)
BUN SERPL-MCNC: 23 MG/DL (ref 8–22)
CALCIUM SERPL-MCNC: 9.7 MG/DL (ref 8.5–10.5)
CHLORIDE SERPL-SCNC: 105 MMOL/L (ref 96–112)
CO2 SERPL-SCNC: 27 MMOL/L (ref 20–33)
CREAT SERPL-MCNC: 0.88 MG/DL (ref 0.5–1.4)
FASTING STATUS PATIENT QL REPORTED: NORMAL
GLOBULIN SER CALC-MCNC: 4 G/DL (ref 1.9–3.5)
GLUCOSE SERPL-MCNC: 123 MG/DL (ref 65–99)
POTASSIUM SERPL-SCNC: 4.1 MMOL/L (ref 3.6–5.5)
PROT SERPL-MCNC: 7.9 G/DL (ref 6–8.2)
SODIUM SERPL-SCNC: 139 MMOL/L (ref 135–145)

## 2018-11-27 PROCEDURE — 99214 OFFICE O/P EST MOD 30 MIN: CPT | Performed by: NURSE PRACTITIONER

## 2018-11-27 RX ORDER — FUROSEMIDE 20 MG/1
20 TABLET ORAL
Qty: 30 TAB | Refills: 11 | Status: SHIPPED | OUTPATIENT
Start: 2018-11-27 | End: 2019-10-11

## 2018-11-27 ASSESSMENT — ENCOUNTER SYMPTOMS
ORTHOPNEA: 0
SHORTNESS OF BREATH: 1
MYALGIAS: 0
CLAUDICATION: 0
COUGH: 0
PALPITATIONS: 0
PND: 0
ABDOMINAL PAIN: 0
DIZZINESS: 0
FEVER: 0

## 2018-11-27 NOTE — PROGRESS NOTES
No chief complaint on file.      Subjective:   Lai Dailey is a 48 y.o. male who presents today for follow-up on his heart failure.    Patient in the heart failure clinic.  Patient was last seen in clinic on 10/25/2018 with Dr. Solis.  During his last visit, losartan was increased to 100 mg daily.  Patient reports no problems with the dose increase.  Patient did go for some lab testing yesterday.  Lab results are not available at the time of this visit.    For his symptoms, patient continues to report mild shortness of breath with exertion.  Otherwise, he denies chest pain, palpitations, orthopnea, PND, edema or dizziness/lightheadedness.    Patient reports his home weights have been stable between 180-185 pounds.    Patient reports he has been taking his furosemide daily as needed for increased weight, swelling or shortness of breath.  Patient reports only taking it a couple times.    He denies any shocks from his ICD.    Patient missed his appointment for his repeat echocardiogram.    Additonally, patient has the following medical problems:     -Has ICD for dilated cardiomyopathy and nonsustained VT also taking amiodarone     -Diabetes: Taking metformin, insulin     -Hypertension: Taking losartan, metoprolol XL, spironolactone     -Hyperlipidemia: Was taking atorvastatin, stopped at this time due to increased liver function tests       Past Medical History:   Diagnosis Date   • CHF (congestive heart failure) (HCC)    • Diabetes (HCC)    • Hyperlipidemia    • Hypertension      Past Surgical History:   Procedure Laterality Date   • AICD IMPLANT  2010     Family History   Problem Relation Age of Onset   • Colon Cancer Mother    • Hypertension Sister    • Stroke Brother    • Heart Disease Neg Hx      Social History     Social History   • Marital status: Single     Spouse name: N/A   • Number of children: N/A   • Years of education: N/A     Occupational History   • Not on file.     Social History Main Topics   •  "Smoking status: Never Smoker   • Smokeless tobacco: Never Used   • Alcohol use No      Comment: 2 times a week-beer, NO ALCOHOL AT THIS TIME   • Drug use: No      Comment: Marijuana use as youth   • Sexual activity: Not on file     Other Topics Concern   • Not on file     Social History Narrative   • No narrative on file     No Known Allergies  Outpatient Encounter Prescriptions as of 11/27/2018   Medication Sig Dispense Refill   • losartan (COZAAR) 100 MG Tab Take 1 Tab by mouth every day. 30 Tab 11   • tamsulosin (FLOMAX) 0.4 MG capsule      • metoprolol SR (TOPROL-XL) 200 MG XL tablet Take 1 Tab by mouth every day. 30 Tab 11   • furosemide (LASIX) 20 MG Tab Take 1 Tab by mouth 2 times a day. (Patient taking differently: Take 20 mg by mouth every day.) 60 Tab 11   • spironolactone (ALDACTONE) 25 MG Tab Take 1 Tab by mouth every day. 30 Tab 11   • digoxin (LANOXIN) 125 MCG Tab Take 1 Tab by mouth every morning. 30 Tab 11   • apixaban (ELIQUIS) 5mg Tab Take 1 Tab by mouth 2 Times a Day. 60 Tab 11   • insulin glargine (LANTUS) 100 UNIT/ML Solution Inject 10 Units as instructed every evening. 10 mL 0   • metformin (GLUCOPHAGE) 500 MG TABS Take 1 Tab by mouth 2 times a day, with meals. 60 Tab 3     No facility-administered encounter medications on file as of 11/27/2018.      Review of Systems   Constitutional: Negative for fever and malaise/fatigue.   Respiratory: Positive for shortness of breath (mild with exertion). Negative for cough.    Cardiovascular: Negative for chest pain, palpitations, orthopnea, claudication, leg swelling and PND.   Gastrointestinal: Negative for abdominal pain.   Musculoskeletal: Negative for myalgias.   Neurological: Negative for dizziness.   All other systems reviewed and are negative.       Objective:   /70 (BP Location: Left arm, Patient Position: Sitting, BP Cuff Size: Adult)   Pulse 80   Ht 1.753 m (5' 9\")   Wt 86.1 kg (189 lb 12.8 oz)   SpO2 97%   BMI 28.03 kg/m² "     Physical Exam   Constitutional: He is oriented to person, place, and time. He appears well-developed and well-nourished.   HENT:   Head: Normocephalic and atraumatic.   Eyes: Pupils are equal, round, and reactive to light. EOM are normal.   Neck: Normal range of motion. Neck supple. No JVD present.   Cardiovascular: Normal rate and regular rhythm.    Murmur heard.  Pulmonary/Chest: Effort normal and breath sounds normal. No respiratory distress. He has no wheezes. He has no rales.   Left chest ICD-no erosion, drainage or Erythema   Abdominal: Soft. Bowel sounds are normal.   Musculoskeletal: He exhibits no edema.   Neurological: He is alert and oriented to person, place, and time.   Skin: Skin is warm and dry.   Psychiatric: He has a normal mood and affect. His behavior is normal.   Vitals reviewed.    Lab Results   Component Value Date/Time    CHOLSTRLTOT 107 08/03/2018 10:12 AM    LDL 69 08/03/2018 10:12 AM    HDL 29 (A) 08/03/2018 10:12 AM    TRIGLYCERIDE 47 08/03/2018 10:12 AM       Lab Results   Component Value Date/Time    SODIUM 132 (L) 08/03/2018 10:12 AM    POTASSIUM 4.6 08/03/2018 10:12 AM    CHLORIDE 97 08/03/2018 10:12 AM    CO2 27 08/03/2018 10:12 AM    GLUCOSE 116 (H) 08/03/2018 10:12 AM    BUN 21 08/03/2018 10:12 AM    CREATININE 0.93 08/03/2018 10:12 AM     Lab Results   Component Value Date/Time    ALKPHOSPHAT 244 (H) 08/03/2018 10:12 AM    ASTSGOT 21 08/03/2018 10:12 AM    ALTSGPT 17 08/03/2018 10:12 AM    TBILIRUBIN 1.8 (H) 08/03/2018 10:12 AM      Heart Cath 3/24/2014  FINDINGS:  Right heart catheterization wedge pressure was about 21.  Pulmonary   artery pressure is 51/40.  RV pressure is 50/4.  Right atrial pressure was   about 10.  The left ventricle was severely dilated with global hypokinesis and   ejection fraction of 13%.  There is mild-to-moderate mitral regurgitation   noted; however, it may be because the ventricle is under field.  The left   coronary ostium is normal without  significant atherosclerosis.  It divides   into large circumflex branch, ramus branch, and then LAD that gives off large   diagonal branch.  There is no atherosclerosis in any of these vessels.  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  There is no   atherosclerosis in the right coronary.       CONCLUSION:  Severe nonischemic cardiomyopathy with ejection fraction about   13%, at least mild-to-moderate mitral regurgitation, and no gradient across   the aortic valve, and elevated right-sided pressures.  The patient will need   aggressive heart failure treatment.     Echocardiogram 3/23/2014  CONCLUSIONS  No prior echo  4 chamber dilation  Left ventricular ejection fraction is 15% to 20%.   Grade III-IV diastolic dysfunction is present. c/w elevated LAp and   LVEDP.   Severe mitral regurgitation.        Transthoracic Echo Report 5/13/18  Left ventricle is severely dilated. Mild concentric left ventricular hypertrophy. Grade III diastolic dysfunction (restrictive pattern).   Severely reduced left ventricular systolic function. Left ventricular ejection fraction is visually estimated to be 20%. Diffuse hypokinesis with septal dyskinesis.  Severe mitral regurgitation.  Severe tricuspid regurgitation. Estimated right ventricular systolic pressure  is 50 mmHg.  Compared to the report of the study done - the TR is now severe.      Assessment:     1. Stage C chronic systolic congestive heart failure (HCC)  furosemide (LASIX) 20 MG Tab   2. Left ventricular systolic dysfunction, NYHA class 2  furosemide (LASIX) 20 MG Tab   3. Dilated cardiomyopathy (HCC)  furosemide (LASIX) 20 MG Tab   4. Paroxysmal atrial fibrillation (HCC)     5. Presence of biventricular automatic cardioverter/defibrillator (AICD)     6. Essential hypertension     7. Mixed hyperlipidemia         Medical Decision Making:  Today's Assessment / Status / Plan:   1. HFrEF, Stage C, Class 2, LVEF 20%: Based on physical examination  findings, patient is euvolemic. No JVD, lungs are clear to auscultation, no pitting edema in bilateral lower extremities, no ascites.  -Pt is maximized on medical therapy at this time  -Continue Toprol- mg daily  -Continue losartan 100 mg daily  -Continue spironolactone 25 mg daily  -Continue digoxin 125 mcg daily  -Continue furosemide 20 mg Daily PRN  -Will try to add on a BMP to his labs that were done yesterday  -Has a biventricular ICD, patient denies shocks, has an appointment with Dr. Diaz on 1/4/2019  -Reschedule echocardiogram  -Reinforced s/sx of worsening heart failure with patient and weight monitoring. Pt verbalizes understanding. Pt to call office or RTC if present.     2.  Paroxysmal A. Fib, s/p AV node ablation:  -Continue Eliquis 5 mg twice a day    3.  Hypertension: Stable  -Continue recommendations per above    4.  Hyperlipidemia: Last LDL 69 on 8/3/2018  -Not currently on statin medication due to elevated liver function   -Continue to follow-up with PCP to follow.    FU in clinic in 3 months with Dr. Solis. Sooner if needed.    Patient verbalizes understanding and agrees with the plan of care.     Collaborating MD: Shon Morgan MD

## 2019-02-26 ENCOUNTER — OFFICE VISIT (OUTPATIENT)
Dept: CARDIOLOGY | Facility: MEDICAL CENTER | Age: 49
End: 2019-02-26
Payer: MEDICARE

## 2019-02-26 VITALS
OXYGEN SATURATION: 92 % | HEART RATE: 84 BPM | HEIGHT: 69 IN | DIASTOLIC BLOOD PRESSURE: 72 MMHG | WEIGHT: 200 LBS | BODY MASS INDEX: 29.62 KG/M2 | SYSTOLIC BLOOD PRESSURE: 100 MMHG

## 2019-02-26 DIAGNOSIS — I50.22 STAGE C CHRONIC SYSTOLIC CONGESTIVE HEART FAILURE (HCC): ICD-10-CM

## 2019-02-26 DIAGNOSIS — I48.0 PAROXYSMAL ATRIAL FIBRILLATION (HCC): ICD-10-CM

## 2019-02-26 DIAGNOSIS — I10 ESSENTIAL HYPERTENSION: ICD-10-CM

## 2019-02-26 DIAGNOSIS — Z95.810 AICD (AUTOMATIC CARDIOVERTER/DEFIBRILLATOR) PRESENT: ICD-10-CM

## 2019-02-26 DIAGNOSIS — I51.89 LEFT VENTRICULAR SYSTOLIC DYSFUNCTION, NYHA CLASS 2: ICD-10-CM

## 2019-02-26 PROCEDURE — 99214 OFFICE O/P EST MOD 30 MIN: CPT | Performed by: INTERNAL MEDICINE

## 2019-02-26 NOTE — PROGRESS NOTES
Chief Complaint   Patient presents with   • Congestive Heart Failure     F/V: 3 MO/ NO SHOWED ECHO       Subjective:   Lai Dailey is a 49 y.o. male who presents today for follow-up evaluation heart failure clinic because of a admission on June 5, 2018 for congestive heart failure.  He was recently admitted for atrial fibrillation with a rapid ventricular response.  He did undergo an upgrade of his AICD to a biventricular device.  He did have an angiogram in 2014 which showed normal coronary arteries.    He denies any chest discomfort, palpitations or lightheadedness.  He does have some occasional dependent edema.  He notes no dyspnea with walking but states that he will become dyspneic if he tries to jog.  He has had no PND or orthopnea.    His blood pressures been somewhat low over the last couple of weeks.  Is been running from approximately 100-115 systolic.  Unfortunately, he did not get his echocardiogram because of the weather.    Past Medical History:   Diagnosis Date   • CHF (congestive heart failure) (HCC)    • Diabetes (HCC)    • Hyperlipidemia    • Hypertension      Past Surgical History:   Procedure Laterality Date   • AICD IMPLANT  2010     Family History   Problem Relation Age of Onset   • Colon Cancer Mother    • Hypertension Sister    • Stroke Brother    • Heart Disease Neg Hx      Social History     Social History   • Marital status: Single     Spouse name: N/A   • Number of children: N/A   • Years of education: N/A     Occupational History   • Not on file.     Social History Main Topics   • Smoking status: Never Smoker   • Smokeless tobacco: Never Used   • Alcohol use No      Comment: 2 times a week-beer, NO ALCOHOL AT THIS TIME   • Drug use: No      Comment: Marijuana use as youth   • Sexual activity: Not on file     Other Topics Concern   • Not on file     Social History Narrative   • No narrative on file     No Known Allergies  Outpatient Encounter Prescriptions as of 2/26/2019   Medication  "Sig Dispense Refill   • losartan (COZAAR) 100 MG Tab Take 1 Tab by mouth every day. 30 Tab 11   • tamsulosin (FLOMAX) 0.4 MG capsule Take 0.4 mg by mouth every day.     • metoprolol SR (TOPROL-XL) 200 MG XL tablet Take 1 Tab by mouth every day. 30 Tab 11   • spironolactone (ALDACTONE) 25 MG Tab Take 1 Tab by mouth every day. 30 Tab 11   • digoxin (LANOXIN) 125 MCG Tab Take 1 Tab by mouth every morning. 30 Tab 11   • apixaban (ELIQUIS) 5mg Tab Take 1 Tab by mouth 2 Times a Day. 60 Tab 11   • insulin glargine (LANTUS) 100 UNIT/ML Solution Inject 10 Units as instructed every evening. 10 mL 0   • metformin (GLUCOPHAGE) 500 MG TABS Take 1 Tab by mouth 2 times a day, with meals. 60 Tab 3   • furosemide (LASIX) 20 MG Tab Take 1 Tab by mouth 1 time daily as needed. 30 Tab 11     No facility-administered encounter medications on file as of 2/26/2019.      ROS     Objective:   /72 (BP Location: Left arm, Patient Position: Sitting, BP Cuff Size: Adult)   Pulse 84   Ht 1.753 m (5' 9\")   Wt 90.7 kg (200 lb)   SpO2 92%   BMI 29.53 kg/m²     Physical Exam   Constitutional: He appears well-developed and well-nourished.   Neck: No JVD present.   Cardiovascular: Normal rate and regular rhythm.    No murmur heard.  Pulmonary/Chest: Effort normal and breath sounds normal. He has no rales.   Abdominal: Soft. There is no tenderness.   Musculoskeletal: He exhibits no edema.     Lab Results   Component Value Date/Time    CHOLSTRLTOT 107 08/03/2018 10:12 AM    LDL 69 08/03/2018 10:12 AM    HDL 29 (A) 08/03/2018 10:12 AM    TRIGLYCERIDE 47 08/03/2018 10:12 AM       Lab Results   Component Value Date/Time    SODIUM 139 11/26/2018 02:55 PM    POTASSIUM 4.1 11/26/2018 02:55 PM    CHLORIDE 105 11/26/2018 02:55 PM    CO2 27 11/26/2018 02:55 PM    GLUCOSE 123 (H) 11/26/2018 02:55 PM    BUN 23 (H) 11/26/2018 02:55 PM    CREATININE 0.88 11/26/2018 02:55 PM     Lab Results   Component Value Date/Time    ALKPHOSPHAT 162 (H) 11/26/2018 02:55 " PM    ASTSGOT 23 11/26/2018 02:55 PM    ALTSGPT 16 11/26/2018 02:55 PM    TBILIRUBIN 0.7 11/26/2018 02:55 PM      Lab Results   Component Value Date/Time    BNPBTYPENAT 1109 (H) 11/26/2018 02:55 PM          Assessment:     1. Stage C chronic systolic congestive heart failure (HCC)     2. Paroxysmal atrial fibrillation (HCC)     3. Left ventricular systolic dysfunction, NYHA class 2     4. AICD (automatic cardioverter/defibrillator) present     5. Essential hypertension         Medical Decision Making:  Today's Assessment / Status / Plan:     Mr. Dailey is clinically stable.  However, he has had a drop in his blood pressure.  In addition, he has not had a recent AICD check.  We will see if we can get him scheduled for the echocardiogram within the next few weeks.  He will follow-up in about a month with an AICD check prior.  He is not optimally beta blocked we may increase his beta-blocker therapy at the time of follow-up.

## 2019-02-26 NOTE — LETTER
[facility]   [address] [facility] [address]  [place] [address]  Phone: [phone]  Fax: [phone]              Lai Dailey  1970    Encounter Date: 2/26/2019    Joss Solis M.D.          PROGRESS NOTE:  Chief Complaint   Patient presents with   • Congestive Heart Failure     F/V: 3 MO/ NO SHOWED ECHO       Subjective:   Lai Dailey is a 49 y.o. male who presents today for follow-up evaluation heart failure clinic because of a admission on June 5, 2018 for congestive heart failure.  He was recently admitted for atrial fibrillation with a rapid ventricular response.  He did undergo an upgrade of his AICD to a biventricular device.  He did have an angiogram in 2014 which showed normal coronary arteries.    He denies any chest discomfort, palpitations or lightheadedness.  He does have some occasional dependent edema.  He notes no dyspnea with walking but states that he will become dyspneic if he tries to jog.  He has had no PND or orthopnea.    His blood pressures been somewhat low over the last couple of weeks.  Is been running from approximately 100-115 systolic.  Unfortunately, he did not get his echocardiogram because of the weather.    Past Medical History:   Diagnosis Date   • CHF (congestive heart failure) (HCC)    • Diabetes (HCC)    • Hyperlipidemia    • Hypertension      Past Surgical History:   Procedure Laterality Date   • AICD IMPLANT  2010     Family History   Problem Relation Age of Onset   • Colon Cancer Mother    • Hypertension Sister    • Stroke Brother    • Heart Disease Neg Hx      Social History     Social History   • Marital status: Single     Spouse name: N/A   • Number of children: N/A   • Years of education: N/A     Occupational History   • Not on file.     Social History Main Topics   • Smoking status: Never Smoker   • Smokeless tobacco: Never Used   • Alcohol use No      Comment: 2 times a week-beer, NO ALCOHOL AT THIS TIME   • Drug  "use: No      Comment: Marijuana use as youth   • Sexual activity: Not on file     Other Topics Concern   • Not on file     Social History Narrative   • No narrative on file     No Known Allergies  Outpatient Encounter Prescriptions as of 2/26/2019   Medication Sig Dispense Refill   • losartan (COZAAR) 100 MG Tab Take 1 Tab by mouth every day. 30 Tab 11   • tamsulosin (FLOMAX) 0.4 MG capsule Take 0.4 mg by mouth every day.     • metoprolol SR (TOPROL-XL) 200 MG XL tablet Take 1 Tab by mouth every day. 30 Tab 11   • spironolactone (ALDACTONE) 25 MG Tab Take 1 Tab by mouth every day. 30 Tab 11   • digoxin (LANOXIN) 125 MCG Tab Take 1 Tab by mouth every morning. 30 Tab 11   • apixaban (ELIQUIS) 5mg Tab Take 1 Tab by mouth 2 Times a Day. 60 Tab 11   • insulin glargine (LANTUS) 100 UNIT/ML Solution Inject 10 Units as instructed every evening. 10 mL 0   • metformin (GLUCOPHAGE) 500 MG TABS Take 1 Tab by mouth 2 times a day, with meals. 60 Tab 3   • furosemide (LASIX) 20 MG Tab Take 1 Tab by mouth 1 time daily as needed. 30 Tab 11     No facility-administered encounter medications on file as of 2/26/2019.      ROS     Objective:   /72 (BP Location: Left arm, Patient Position: Sitting, BP Cuff Size: Adult)   Pulse 84   Ht 1.753 m (5' 9\")   Wt 90.7 kg (200 lb)   SpO2 92%   BMI 29.53 kg/m²      Physical Exam   Constitutional: He appears well-developed and well-nourished.   Neck: No JVD present.   Cardiovascular: Normal rate and regular rhythm.    No murmur heard.  Pulmonary/Chest: Effort normal and breath sounds normal. He has no rales.   Abdominal: Soft. There is no tenderness.   Musculoskeletal: He exhibits no edema.     Lab Results   Component Value Date/Time    CHOLSTRLTOT 107 08/03/2018 10:12 AM    LDL 69 08/03/2018 10:12 AM    HDL 29 (A) 08/03/2018 10:12 AM    TRIGLYCERIDE 47 08/03/2018 10:12 AM       Lab Results   Component Value Date/Time    SODIUM 139 11/26/2018 02:55 PM    POTASSIUM 4.1 11/26/2018 02:55 PM "    CHLORIDE 105 11/26/2018 02:55 PM    CO2 27 11/26/2018 02:55 PM    GLUCOSE 123 (H) 11/26/2018 02:55 PM    BUN 23 (H) 11/26/2018 02:55 PM    CREATININE 0.88 11/26/2018 02:55 PM     Lab Results   Component Value Date/Time    ALKPHOSPHAT 162 (H) 11/26/2018 02:55 PM    ASTSGOT 23 11/26/2018 02:55 PM    ALTSGPT 16 11/26/2018 02:55 PM    TBILIRUBIN 0.7 11/26/2018 02:55 PM      Lab Results   Component Value Date/Time    BNPBTYPENAT 1109 (H) 11/26/2018 02:55 PM          Assessment:     1. Stage C chronic systolic congestive heart failure (HCC)     2. Paroxysmal atrial fibrillation (HCC)     3. Left ventricular systolic dysfunction, NYHA class 2     4. AICD (automatic cardioverter/defibrillator) present     5. Essential hypertension         Medical Decision Making:  Today's Assessment / Status / Plan:     Mr. Dailey is clinically stable.  However, he has had a drop in his blood pressure.  In addition, he has not had a recent AICD check.  We will see if we can get him scheduled for the echocardiogram within the next few weeks.  He will follow-up in about a month with an AICD check prior.  He is not optimally beta blocked we may increase his beta-blocker therapy at the time of follow-up.      Tor [place] M.D.  [address] [facility] [address]  VIA Facsimile: [phone]

## 2019-04-12 ENCOUNTER — HOSPITAL ENCOUNTER (OUTPATIENT)
Dept: LAB | Facility: MEDICAL CENTER | Age: 49
End: 2019-04-12
Attending: INTERNAL MEDICINE
Payer: MEDICARE

## 2019-04-12 LAB
EST. AVERAGE GLUCOSE BLD GHB EST-MCNC: 163 MG/DL
HBA1C MFR BLD: 7.3 % (ref 0–5.6)

## 2019-04-12 PROCEDURE — 83036 HEMOGLOBIN GLYCOSYLATED A1C: CPT

## 2019-04-12 PROCEDURE — 36415 COLL VENOUS BLD VENIPUNCTURE: CPT

## 2019-05-09 ENCOUNTER — NON-PROVIDER VISIT (OUTPATIENT)
Dept: CARDIOLOGY | Facility: MEDICAL CENTER | Age: 49
End: 2019-05-09
Payer: MEDICARE

## 2019-05-09 ENCOUNTER — OFFICE VISIT (OUTPATIENT)
Dept: CARDIOLOGY | Facility: MEDICAL CENTER | Age: 49
End: 2019-05-09
Payer: MEDICARE

## 2019-05-09 VITALS
DIASTOLIC BLOOD PRESSURE: 86 MMHG | OXYGEN SATURATION: 96 % | BODY MASS INDEX: 30.51 KG/M2 | HEIGHT: 69 IN | SYSTOLIC BLOOD PRESSURE: 104 MMHG | WEIGHT: 206 LBS | HEART RATE: 92 BPM

## 2019-05-09 DIAGNOSIS — I48.0 PAROXYSMAL ATRIAL FIBRILLATION (HCC): ICD-10-CM

## 2019-05-09 DIAGNOSIS — Z95.810 AICD (AUTOMATIC CARDIOVERTER/DEFIBRILLATOR) PRESENT: ICD-10-CM

## 2019-05-09 DIAGNOSIS — I42.0 DILATED CARDIOMYOPATHY (HCC): ICD-10-CM

## 2019-05-09 DIAGNOSIS — I10 ESSENTIAL HYPERTENSION: ICD-10-CM

## 2019-05-09 DIAGNOSIS — I51.89 LEFT VENTRICULAR SYSTOLIC DYSFUNCTION, NYHA CLASS 2: ICD-10-CM

## 2019-05-09 DIAGNOSIS — I50.22 STAGE C CHRONIC SYSTOLIC CONGESTIVE HEART FAILURE (HCC): ICD-10-CM

## 2019-05-09 PROCEDURE — 99214 OFFICE O/P EST MOD 30 MIN: CPT | Performed by: INTERNAL MEDICINE

## 2019-05-09 PROCEDURE — 93283 PRGRMG EVAL IMPLANTABLE DFB: CPT | Performed by: INTERNAL MEDICINE

## 2019-05-09 NOTE — PROGRESS NOTES
Chief Complaint   Patient presents with   • CHF (Systolic)     HF Est.        Subjective:   Lai Dailey is a 49 y.o. male who presents today for follow-up evaluation heart failure clinic because of a admission on June 5, 2018 for congestive heart failure.  He did undergo an upgrade of his AICD to a biventricular device.  He did have an angiogram in 2014 which showed normal coronary arteries.    He is walking about 5 days a week for a half a mile.  He becomes dyspneic about that distance.  Couple of times a week he will wake up dyspneic.  He will sit up and this resolves after about a minute.  He has noted some right ankle edema.  He denies any palpitations, lightheadedness or chest discomfort.        Past Medical History:   Diagnosis Date   • CHF (congestive heart failure) (HCC)    • Diabetes (HCC)    • Hyperlipidemia    • Hypertension      Past Surgical History:   Procedure Laterality Date   • AICD IMPLANT  2010     Family History   Problem Relation Age of Onset   • Colon Cancer Mother    • Hypertension Sister    • Stroke Brother    • Heart Disease Neg Hx      Social History     Social History   • Marital status: Single     Spouse name: N/A   • Number of children: N/A   • Years of education: N/A     Occupational History   • Not on file.     Social History Main Topics   • Smoking status: Never Smoker   • Smokeless tobacco: Never Used   • Alcohol use No      Comment: 2 times a week-beer, NO ALCOHOL AT THIS TIME   • Drug use: No      Comment: Marijuana use as youth   • Sexual activity: Not on file     Other Topics Concern   • Not on file     Social History Narrative   • No narrative on file     No Known Allergies  Outpatient Encounter Prescriptions as of 5/9/2019   Medication Sig Dispense Refill   • losartan (COZAAR) 100 MG Tab Take 1 Tab by mouth every day. 30 Tab 11   • tamsulosin (FLOMAX) 0.4 MG capsule Take 0.4 mg by mouth every day.     • metoprolol SR (TOPROL-XL) 200 MG XL tablet Take 1 Tab by mouth every day.  "30 Tab 11   • spironolactone (ALDACTONE) 25 MG Tab Take 1 Tab by mouth every day. 30 Tab 11   • digoxin (LANOXIN) 125 MCG Tab Take 1 Tab by mouth every morning. 30 Tab 11   • apixaban (ELIQUIS) 5mg Tab Take 1 Tab by mouth 2 Times a Day. 60 Tab 11   • insulin glargine (LANTUS) 100 UNIT/ML Solution Inject 10 Units as instructed every evening. 10 mL 0   • metformin (GLUCOPHAGE) 500 MG TABS Take 1 Tab by mouth 2 times a day, with meals. 60 Tab 3   • furosemide (LASIX) 20 MG Tab Take 1 Tab by mouth 1 time daily as needed. 30 Tab 11     No facility-administered encounter medications on file as of 5/9/2019.      ROS     Objective:   /86 (BP Location: Left arm, Patient Position: Sitting, BP Cuff Size: Adult)   Pulse 92   Ht 1.753 m (5' 9\")   Wt 93.4 kg (206 lb)   SpO2 96%   BMI 30.42 kg/m²     Physical Exam   Constitutional: He appears well-developed and well-nourished.   Neck: No JVD present.   Cardiovascular: Normal rate and regular rhythm.    No murmur heard.  Pulmonary/Chest: Effort normal and breath sounds normal. He has no rales.   Abdominal: Soft. There is no tenderness.   Musculoskeletal: He exhibits no edema (No pretibial edema is present.).     Lab Results   Component Value Date/Time    CHOLSTRLTOT 107 08/03/2018 10:12 AM    LDL 69 08/03/2018 10:12 AM    HDL 29 (A) 08/03/2018 10:12 AM    TRIGLYCERIDE 47 08/03/2018 10:12 AM       Lab Results   Component Value Date/Time    SODIUM 139 11/26/2018 02:55 PM    POTASSIUM 4.1 11/26/2018 02:55 PM    CHLORIDE 105 11/26/2018 02:55 PM    CO2 27 11/26/2018 02:55 PM    GLUCOSE 123 (H) 11/26/2018 02:55 PM    BUN 23 (H) 11/26/2018 02:55 PM    CREATININE 0.88 11/26/2018 02:55 PM     Lab Results   Component Value Date/Time    ALKPHOSPHAT 162 (H) 11/26/2018 02:55 PM    ASTSGOT 23 11/26/2018 02:55 PM    ALTSGPT 16 11/26/2018 02:55 PM    TBILIRUBIN 0.7 11/26/2018 02:55 PM      Lab Results   Component Value Date/Time    BNPBTYPENAT 1109 (H) 11/26/2018 02:55 PM    "   Echocardiography Laboratory    CONCLUSIONS  Left ventricle is severely dilated. Mild concentric left ventricular   hypertrophy. Grade III diastolic dysfunction (restrictive pattern).   Severely reduced left ventricular systolic function. Left ventricular   ejection fraction is visually estimated to be 20%. Diffuse hypokinesis   with septal dyskinesis.  Severe mitral regurgitation.  Severe tricuspid regurgitation. Estimated right ventricular systolic   pressure  is 50 mmHg.  Compared to the report of the study done - the TR is now severe.     CHESTER PICKARD  Exam Date:         05/13/2018     Assessment:     1. Stage C chronic systolic congestive heart failure (HCC)     2. Paroxysmal atrial fibrillation (HCC)     3. AICD (automatic cardioverter/defibrillator) present     4. Essential hypertension     5. Left ventricular systolic dysfunction, NYHA class 2     6. Dilated cardiomyopathy (HCC)         Medical Decision Making:  Today's Assessment / Status / Plan:     Mr. Pickard continues have difficulty with dyspnea on exertion.  He appears to be functional class II.  His heart rate is not under good control and we will start him on ivabradine 5 mg twice daily.  He will follow-up in a couple of weeks we may adjust this medication to improve his heart rate.  We will see what his back-up rate is on his ICD.  We may need to have this reduced to 60 bpm.  In addition, after we have a lower heart rate, we will consider changing him to Entresto.

## 2019-05-09 NOTE — LETTER
[facility]   [address] [facility] [address] [facility] [address]  [place] [address]  Phone: [phone]  Fax: [phone]              Lai Dailey  1970    Encounter Date: 5/9/2019    Joss Solis M.D.          PROGRESS NOTE:  Chief Complaint   Patient presents with   • CHF (Systolic)     HF Est.        Subjective:   Lai Dailey is a 49 y.o. male who presents today for follow-up evaluation heart failure clinic because of a admission on June 5, 2018 for congestive heart failure.  He did undergo an upgrade of his AICD to a biventricular device.  He did have an angiogram in 2014 which showed normal coronary arteries.    He is walking about 5 days a week for a half a mile.  He becomes dyspneic about that distance.  Couple of times a week he will wake up dyspneic.  He will sit up and this resolves after about a minute.  He has noted some right ankle edema.  He denies any palpitations, lightheadedness or chest discomfort.        Past Medical History:   Diagnosis Date   • CHF (congestive heart failure) (HCC)    • Diabetes (HCC)    • Hyperlipidemia    • Hypertension      Past Surgical History:   Procedure Laterality Date   • AICD IMPLANT  2010     Family History   Problem Relation Age of Onset   • Colon Cancer Mother    • Hypertension Sister    • Stroke Brother    • Heart Disease Neg Hx      Social History     Social History   • Marital status: Single     Spouse name: N/A   • Number of children: N/A   • Years of education: N/A     Occupational History   • Not on file.     Social History Main Topics   • Smoking status: Never Smoker   • Smokeless tobacco: Never Used   • Alcohol use No      Comment: 2 times a week-beer, NO ALCOHOL AT THIS TIME   • Drug use: No      Comment: Marijuana use as youth   • Sexual activity: Not on file     Other Topics Concern   • Not on file     Social History Narrative   • No narrative on file     No Known Allergies  Outpatient Encounter Prescriptions as of  "5/9/2019   Medication Sig Dispense Refill   • losartan (COZAAR) 100 MG Tab Take 1 Tab by mouth every day. 30 Tab 11   • tamsulosin (FLOMAX) 0.4 MG capsule Take 0.4 mg by mouth every day.     • metoprolol SR (TOPROL-XL) 200 MG XL tablet Take 1 Tab by mouth every day. 30 Tab 11   • spironolactone (ALDACTONE) 25 MG Tab Take 1 Tab by mouth every day. 30 Tab 11   • digoxin (LANOXIN) 125 MCG Tab Take 1 Tab by mouth every morning. 30 Tab 11   • apixaban (ELIQUIS) 5mg Tab Take 1 Tab by mouth 2 Times a Day. 60 Tab 11   • insulin glargine (LANTUS) 100 UNIT/ML Solution Inject 10 Units as instructed every evening. 10 mL 0   • metformin (GLUCOPHAGE) 500 MG TABS Take 1 Tab by mouth 2 times a day, with meals. 60 Tab 3   • furosemide (LASIX) 20 MG Tab Take 1 Tab by mouth 1 time daily as needed. 30 Tab 11     No facility-administered encounter medications on file as of 5/9/2019.      ROS     Objective:   /86 (BP Location: Left arm, Patient Position: Sitting, BP Cuff Size: Adult)   Pulse 92   Ht 1.753 m (5' 9\")   Wt 93.4 kg (206 lb)   SpO2 96%   BMI 30.42 kg/m²      Physical Exam   Constitutional: He appears well-developed and well-nourished.   Neck: No JVD present.   Cardiovascular: Normal rate and regular rhythm.    No murmur heard.  Pulmonary/Chest: Effort normal and breath sounds normal. He has no rales.   Abdominal: Soft. There is no tenderness.   Musculoskeletal: He exhibits no edema (No pretibial edema is present.).     Lab Results   Component Value Date/Time    CHOLSTRLTOT 107 08/03/2018 10:12 AM    LDL 69 08/03/2018 10:12 AM    HDL 29 (A) 08/03/2018 10:12 AM    TRIGLYCERIDE 47 08/03/2018 10:12 AM       Lab Results   Component Value Date/Time    SODIUM 139 11/26/2018 02:55 PM    POTASSIUM 4.1 11/26/2018 02:55 PM    CHLORIDE 105 11/26/2018 02:55 PM    CO2 27 11/26/2018 02:55 PM    GLUCOSE 123 (H) 11/26/2018 02:55 PM    BUN 23 (H) 11/26/2018 02:55 PM    CREATININE 0.88 11/26/2018 02:55 PM     Lab Results   Component " Value Date/Time    ALKPHOSPHAT 162 (H) 11/26/2018 02:55 PM    ASTSGOT 23 11/26/2018 02:55 PM    ALTSGPT 16 11/26/2018 02:55 PM    TBILIRUBIN 0.7 11/26/2018 02:55 PM      Lab Results   Component Value Date/Time    BNPBTYPENAT 1109 (H) 11/26/2018 02:55 PM      Echocardiography Laboratory    CONCLUSIONS  Left ventricle is severely dilated. Mild concentric left ventricular   hypertrophy. Grade III diastolic dysfunction (restrictive pattern).   Severely reduced left ventricular systolic function. Left ventricular   ejection fraction is visually estimated to be 20%. Diffuse hypokinesis   with septal dyskinesis.  Severe mitral regurgitation.  Severe tricuspid regurgitation. Estimated right ventricular systolic   pressure  is 50 mmHg.  Compared to the report of the study done - the TR is now severe.     CHESTER PICKARD  Exam Date:         05/13/2018     Assessment:     1. Stage C chronic systolic congestive heart failure (HCC)     2. Paroxysmal atrial fibrillation (HCC)     3. AICD (automatic cardioverter/defibrillator) present     4. Essential hypertension     5. Left ventricular systolic dysfunction, NYHA class 2     6. Dilated cardiomyopathy (HCC)         Medical Decision Making:  Today's Assessment / Status / Plan:     Mr. Pickard continues have difficulty with dyspnea on exertion.  He appears to be functional class II.  His heart rate is not under good control and we will start him on ivabradine 5 mg twice daily.  He will follow-up in a couple of weeks we may adjust this medication to improve his heart rate.  We will see what his back-up rate is on his ICD.  We may need to have this reduced to 60 bpm.  In addition, after we have a lower heart rate, we will consider changing him to Entresto.      Tor Rdz M.D.  [address] [facility] [address]  VIA Facsimile: [phone]

## 2019-07-26 ENCOUNTER — TELEPHONE (OUTPATIENT)
Dept: CARDIOLOGY | Facility: MEDICAL CENTER | Age: 49
End: 2019-07-26

## 2019-07-26 NOTE — TELEPHONE ENCOUNTER
Sent patient letter regarding [facility] late cancellations/no shows. Patient unable to schedule w/Cardiology w/o leadership approval.

## 2019-10-11 ENCOUNTER — APPOINTMENT (OUTPATIENT)
Dept: RADIOLOGY | Facility: MEDICAL CENTER | Age: 49
DRG: 291 | End: 2019-10-11
Attending: EMERGENCY MEDICINE
Payer: OTHER GOVERNMENT

## 2019-10-11 ENCOUNTER — HOSPITAL ENCOUNTER (INPATIENT)
Facility: MEDICAL CENTER | Age: 49
LOS: 3 days | DRG: 291 | End: 2019-10-14
Attending: EMERGENCY MEDICINE | Admitting: INTERNAL MEDICINE
Payer: OTHER GOVERNMENT

## 2019-10-11 DIAGNOSIS — I50.33 ACUTE ON CHRONIC DIASTOLIC ACC/AHA STAGE C CONGESTIVE HEART FAILURE (HCC): ICD-10-CM

## 2019-10-11 DIAGNOSIS — E83.42 HYPOMAGNESEMIA: ICD-10-CM

## 2019-10-11 DIAGNOSIS — R74.8 ALKALINE PHOSPHATASE ELEVATION: ICD-10-CM

## 2019-10-11 LAB
ALBUMIN SERPL BCP-MCNC: 4.3 G/DL (ref 3.2–4.9)
ALBUMIN/GLOB SERPL: 1.2 G/DL
ALP SERPL-CCNC: 137 U/L (ref 30–99)
ALT SERPL-CCNC: 27 U/L (ref 2–50)
ANION GAP SERPL CALC-SCNC: 11 MMOL/L (ref 0–11.9)
AST SERPL-CCNC: 45 U/L (ref 12–45)
BASOPHILS # BLD AUTO: 0.8 % (ref 0–1.8)
BASOPHILS # BLD: 0.05 K/UL (ref 0–0.12)
BILIRUB SERPL-MCNC: 2.7 MG/DL (ref 0.1–1.5)
BUN SERPL-MCNC: 17 MG/DL (ref 8–22)
CALCIUM SERPL-MCNC: 9.8 MG/DL (ref 8.5–10.5)
CHLORIDE SERPL-SCNC: 102 MMOL/L (ref 96–112)
CO2 SERPL-SCNC: 22 MMOL/L (ref 20–33)
CREAT SERPL-MCNC: 1.02 MG/DL (ref 0.5–1.4)
D DIMER PPP IA.FEU-MCNC: 1.05 UG/ML (FEU) (ref 0–0.5)
DIGOXIN SERPL-MCNC: <0.1 NG/ML (ref 0.8–2)
EKG IMPRESSION: NORMAL
EOSINOPHIL # BLD AUTO: 0.12 K/UL (ref 0–0.51)
EOSINOPHIL NFR BLD: 1.8 % (ref 0–6.9)
ERYTHROCYTE [DISTWIDTH] IN BLOOD BY AUTOMATED COUNT: 49.6 FL (ref 35.9–50)
GLOBULIN SER CALC-MCNC: 3.6 G/DL (ref 1.9–3.5)
GLUCOSE BLD-MCNC: 123 MG/DL (ref 65–99)
GLUCOSE SERPL-MCNC: 117 MG/DL (ref 65–99)
HCT VFR BLD AUTO: 47 % (ref 42–52)
HGB BLD-MCNC: 14.7 G/DL (ref 14–18)
IMM GRANULOCYTES # BLD AUTO: 0.02 K/UL (ref 0–0.11)
IMM GRANULOCYTES NFR BLD AUTO: 0.3 % (ref 0–0.9)
LYMPHOCYTES # BLD AUTO: 1.91 K/UL (ref 1–4.8)
LYMPHOCYTES NFR BLD: 29.2 % (ref 22–41)
MAGNESIUM SERPL-MCNC: 1.5 MG/DL (ref 1.5–2.5)
MCH RBC QN AUTO: 29.2 PG (ref 27–33)
MCHC RBC AUTO-ENTMCNC: 31.3 G/DL (ref 33.7–35.3)
MCV RBC AUTO: 93.4 FL (ref 81.4–97.8)
MONOCYTES # BLD AUTO: 0.69 K/UL (ref 0–0.85)
MONOCYTES NFR BLD AUTO: 10.6 % (ref 0–13.4)
NEUTROPHILS # BLD AUTO: 3.74 K/UL (ref 1.82–7.42)
NEUTROPHILS NFR BLD: 57.3 % (ref 44–72)
NRBC # BLD AUTO: 0.03 K/UL
NRBC BLD-RTO: 0.5 /100 WBC
NT-PROBNP SERPL IA-MCNC: 3563 PG/ML (ref 0–125)
PLATELET # BLD AUTO: 205 K/UL (ref 164–446)
PMV BLD AUTO: 10.8 FL (ref 9–12.9)
POTASSIUM SERPL-SCNC: 4.3 MMOL/L (ref 3.6–5.5)
PROCALCITONIN SERPL-MCNC: 0.08 NG/ML
PROT SERPL-MCNC: 7.9 G/DL (ref 6–8.2)
RBC # BLD AUTO: 5.03 M/UL (ref 4.7–6.1)
SODIUM SERPL-SCNC: 135 MMOL/L (ref 135–145)
TROPONIN T SERPL-MCNC: 29 NG/L (ref 6–19)
WBC # BLD AUTO: 6.5 K/UL (ref 4.8–10.8)

## 2019-10-11 PROCEDURE — 84145 PROCALCITONIN (PCT): CPT

## 2019-10-11 PROCEDURE — 700117 HCHG RX CONTRAST REV CODE 255: Performed by: EMERGENCY MEDICINE

## 2019-10-11 PROCEDURE — 36415 COLL VENOUS BLD VENIPUNCTURE: CPT

## 2019-10-11 PROCEDURE — 71045 X-RAY EXAM CHEST 1 VIEW: CPT

## 2019-10-11 PROCEDURE — 82962 GLUCOSE BLOOD TEST: CPT

## 2019-10-11 PROCEDURE — 700111 HCHG RX REV CODE 636 W/ 250 OVERRIDE (IP): Performed by: EMERGENCY MEDICINE

## 2019-10-11 PROCEDURE — 770020 HCHG ROOM/CARE - TELE (206)

## 2019-10-11 PROCEDURE — 87040 BLOOD CULTURE FOR BACTERIA: CPT

## 2019-10-11 PROCEDURE — 71275 CT ANGIOGRAPHY CHEST: CPT

## 2019-10-11 PROCEDURE — 93005 ELECTROCARDIOGRAM TRACING: CPT

## 2019-10-11 PROCEDURE — 85025 COMPLETE CBC W/AUTO DIFF WBC: CPT

## 2019-10-11 PROCEDURE — 93005 ELECTROCARDIOGRAM TRACING: CPT | Performed by: EMERGENCY MEDICINE

## 2019-10-11 PROCEDURE — 80053 COMPREHEN METABOLIC PANEL: CPT

## 2019-10-11 PROCEDURE — 96374 THER/PROPH/DIAG INJ IV PUSH: CPT

## 2019-10-11 PROCEDURE — 85379 FIBRIN DEGRADATION QUANT: CPT

## 2019-10-11 PROCEDURE — A9270 NON-COVERED ITEM OR SERVICE: HCPCS | Performed by: STUDENT IN AN ORGANIZED HEALTH CARE EDUCATION/TRAINING PROGRAM

## 2019-10-11 PROCEDURE — 80162 ASSAY OF DIGOXIN TOTAL: CPT

## 2019-10-11 PROCEDURE — 83735 ASSAY OF MAGNESIUM: CPT

## 2019-10-11 PROCEDURE — 83880 ASSAY OF NATRIURETIC PEPTIDE: CPT

## 2019-10-11 PROCEDURE — 700102 HCHG RX REV CODE 250 W/ 637 OVERRIDE(OP): Performed by: STUDENT IN AN ORGANIZED HEALTH CARE EDUCATION/TRAINING PROGRAM

## 2019-10-11 PROCEDURE — 84484 ASSAY OF TROPONIN QUANT: CPT

## 2019-10-11 PROCEDURE — 99285 EMERGENCY DEPT VISIT HI MDM: CPT

## 2019-10-11 RX ORDER — DIGOXIN 125 MCG
125 TABLET ORAL EVERY MORNING
Status: DISCONTINUED | OUTPATIENT
Start: 2019-10-12 | End: 2019-10-14 | Stop reason: HOSPADM

## 2019-10-11 RX ORDER — BISACODYL 10 MG
10 SUPPOSITORY, RECTAL RECTAL
Status: DISCONTINUED | OUTPATIENT
Start: 2019-10-11 | End: 2019-10-14 | Stop reason: HOSPADM

## 2019-10-11 RX ORDER — LOSARTAN POTASSIUM 50 MG/1
100 TABLET ORAL DAILY
Status: DISCONTINUED | OUTPATIENT
Start: 2019-10-12 | End: 2019-10-14 | Stop reason: HOSPADM

## 2019-10-11 RX ORDER — FUROSEMIDE 20 MG/1
10 TABLET ORAL
Status: DISCONTINUED | OUTPATIENT
Start: 2019-10-11 | End: 2019-10-11

## 2019-10-11 RX ORDER — TAMSULOSIN HYDROCHLORIDE 0.4 MG/1
0.4 CAPSULE ORAL EVERY MORNING
Status: DISCONTINUED | OUTPATIENT
Start: 2019-10-12 | End: 2019-10-14 | Stop reason: HOSPADM

## 2019-10-11 RX ORDER — AMOXICILLIN 250 MG
2 CAPSULE ORAL 2 TIMES DAILY
Status: DISCONTINUED | OUTPATIENT
Start: 2019-10-11 | End: 2019-10-14 | Stop reason: HOSPADM

## 2019-10-11 RX ORDER — POLYETHYLENE GLYCOL 3350 17 G/17G
1 POWDER, FOR SOLUTION ORAL
Status: DISCONTINUED | OUTPATIENT
Start: 2019-10-11 | End: 2019-10-14 | Stop reason: HOSPADM

## 2019-10-11 RX ORDER — FUROSEMIDE 10 MG/ML
20 INJECTION INTRAMUSCULAR; INTRAVENOUS
Status: DISCONTINUED | OUTPATIENT
Start: 2019-10-12 | End: 2019-10-13

## 2019-10-11 RX ORDER — SPIRONOLACTONE 25 MG/1
25 TABLET ORAL DAILY
Status: DISCONTINUED | OUTPATIENT
Start: 2019-10-12 | End: 2019-10-14 | Stop reason: HOSPADM

## 2019-10-11 RX ORDER — FUROSEMIDE 10 MG/ML
20 INJECTION INTRAMUSCULAR; INTRAVENOUS ONCE
Status: COMPLETED | OUTPATIENT
Start: 2019-10-11 | End: 2019-10-11

## 2019-10-11 RX ORDER — FUROSEMIDE 20 MG/1
10 TABLET ORAL 2 TIMES DAILY PRN
Status: ON HOLD | COMMUNITY
End: 2019-10-14 | Stop reason: SDUPTHER

## 2019-10-11 RX ORDER — METOPROLOL SUCCINATE 50 MG/1
200 TABLET, EXTENDED RELEASE ORAL DAILY
Status: DISCONTINUED | OUTPATIENT
Start: 2019-10-12 | End: 2019-10-14 | Stop reason: HOSPADM

## 2019-10-11 RX ADMIN — FUROSEMIDE 20 MG: 10 INJECTION, SOLUTION INTRAMUSCULAR; INTRAVENOUS at 20:04

## 2019-10-11 RX ADMIN — IOHEXOL 60 ML: 350 INJECTION, SOLUTION INTRAVENOUS at 21:05

## 2019-10-11 RX ADMIN — APIXABAN 5 MG: 5 TABLET, FILM COATED ORAL at 23:36

## 2019-10-11 ASSESSMENT — ENCOUNTER SYMPTOMS
PND: 1
NAUSEA: 0
ORTHOPNEA: 1
SHORTNESS OF BREATH: 1
PALPITATIONS: 0

## 2019-10-11 ASSESSMENT — LIFESTYLE VARIABLES: EVER_SMOKED: NEVER

## 2019-10-11 NOTE — ED TRIAGE NOTES
".  Chief Complaint   Patient presents with   • Shortness of Breath   • Weight Gain     .BP (!) 163/104   Pulse 75   Temp 36.8 °C (98.2 °F) (Temporal)   Resp 18   Ht 1.753 m (5' 9\")   Wt 104.7 kg (230 lb 13.2 oz)   SpO2 96%   BMI 34.09 kg/m²     Ambulatory to triage with above complaints, hx of CHF, taking Lasix as prescribed, called for EKG  "

## 2019-10-12 ENCOUNTER — APPOINTMENT (OUTPATIENT)
Dept: CARDIOLOGY | Facility: MEDICAL CENTER | Age: 49
DRG: 291 | End: 2019-10-12
Attending: STUDENT IN AN ORGANIZED HEALTH CARE EDUCATION/TRAINING PROGRAM
Payer: OTHER GOVERNMENT

## 2019-10-12 PROBLEM — I34.0 SEVERE MITRAL REGURGITATION: Status: ACTIVE | Noted: 2019-10-12

## 2019-10-12 PROBLEM — R74.8 ALKALINE PHOSPHATASE ELEVATION: Status: ACTIVE | Noted: 2019-10-12

## 2019-10-12 PROBLEM — I50.33 ACUTE ON CHRONIC DIASTOLIC ACC/AHA STAGE C CONGESTIVE HEART FAILURE (HCC): Status: ACTIVE | Noted: 2019-10-12

## 2019-10-12 LAB
GLUCOSE BLD-MCNC: 145 MG/DL (ref 65–99)
GLUCOSE BLD-MCNC: 170 MG/DL (ref 65–99)
GLUCOSE BLD-MCNC: 190 MG/DL (ref 65–99)
GLUCOSE BLD-MCNC: 239 MG/DL (ref 65–99)
LV EJECT FRACT  99904: 20

## 2019-10-12 PROCEDURE — 700102 HCHG RX REV CODE 250 W/ 637 OVERRIDE(OP): Performed by: STUDENT IN AN ORGANIZED HEALTH CARE EDUCATION/TRAINING PROGRAM

## 2019-10-12 PROCEDURE — 82962 GLUCOSE BLOOD TEST: CPT

## 2019-10-12 PROCEDURE — 93306 TTE W/DOPPLER COMPLETE: CPT

## 2019-10-12 PROCEDURE — 93306 TTE W/DOPPLER COMPLETE: CPT | Mod: 26 | Performed by: INTERNAL MEDICINE

## 2019-10-12 PROCEDURE — A9270 NON-COVERED ITEM OR SERVICE: HCPCS | Performed by: STUDENT IN AN ORGANIZED HEALTH CARE EDUCATION/TRAINING PROGRAM

## 2019-10-12 PROCEDURE — 770020 HCHG ROOM/CARE - TELE (206)

## 2019-10-12 PROCEDURE — 99223 1ST HOSP IP/OBS HIGH 75: CPT | Mod: GC | Performed by: INTERNAL MEDICINE

## 2019-10-12 PROCEDURE — 700111 HCHG RX REV CODE 636 W/ 250 OVERRIDE (IP): Performed by: STUDENT IN AN ORGANIZED HEALTH CARE EDUCATION/TRAINING PROGRAM

## 2019-10-12 PROCEDURE — 700117 HCHG RX CONTRAST REV CODE 255: Performed by: STUDENT IN AN ORGANIZED HEALTH CARE EDUCATION/TRAINING PROGRAM

## 2019-10-12 RX ORDER — MAGNESIUM SULFATE HEPTAHYDRATE 40 MG/ML
2 INJECTION, SOLUTION INTRAVENOUS ONCE
Status: COMPLETED | OUTPATIENT
Start: 2019-10-12 | End: 2019-10-12

## 2019-10-12 RX ADMIN — METOPROLOL SUCCINATE 200 MG: 50 TABLET, EXTENDED RELEASE ORAL at 05:10

## 2019-10-12 RX ADMIN — FUROSEMIDE 20 MG: 10 INJECTION, SOLUTION INTRAMUSCULAR; INTRAVENOUS at 17:22

## 2019-10-12 RX ADMIN — LOSARTAN POTASSIUM 100 MG: 50 TABLET ORAL at 05:11

## 2019-10-12 RX ADMIN — DIGOXIN 125 MCG: 125 TABLET ORAL at 05:11

## 2019-10-12 RX ADMIN — APIXABAN 5 MG: 5 TABLET, FILM COATED ORAL at 17:22

## 2019-10-12 RX ADMIN — TAMSULOSIN HYDROCHLORIDE 0.4 MG: 0.4 CAPSULE ORAL at 05:11

## 2019-10-12 RX ADMIN — FUROSEMIDE 20 MG: 10 INJECTION, SOLUTION INTRAMUSCULAR; INTRAVENOUS at 05:11

## 2019-10-12 RX ADMIN — INSULIN LISPRO 1 UNITS: 100 INJECTION, SOLUTION INTRAVENOUS; SUBCUTANEOUS at 20:53

## 2019-10-12 RX ADMIN — MAGNESIUM SULFATE IN WATER 2 G: 40 INJECTION, SOLUTION INTRAVENOUS at 03:41

## 2019-10-12 RX ADMIN — APIXABAN 5 MG: 5 TABLET, FILM COATED ORAL at 05:11

## 2019-10-12 RX ADMIN — HUMAN ALBUMIN MICROSPHERES AND PERFLUTREN 3 ML: 10; .22 INJECTION, SOLUTION INTRAVENOUS at 12:15

## 2019-10-12 RX ADMIN — INSULIN LISPRO 2 UNITS: 100 INJECTION, SOLUTION INTRAVENOUS; SUBCUTANEOUS at 13:12

## 2019-10-12 RX ADMIN — INSULIN LISPRO 1 UNITS: 100 INJECTION, SOLUTION INTRAVENOUS; SUBCUTANEOUS at 17:19

## 2019-10-12 RX ADMIN — SPIRONOLACTONE 25 MG: 25 TABLET ORAL at 05:11

## 2019-10-12 ASSESSMENT — ENCOUNTER SYMPTOMS
PALPITATIONS: 0
DIZZINESS: 0
HEADACHES: 0
CHILLS: 0
WEAKNESS: 0
FEVER: 0
DOUBLE VISION: 0
PHOTOPHOBIA: 0
HEARTBURN: 0
NAUSEA: 0
BLOOD IN STOOL: 0
BRUISES/BLEEDS EASILY: 0
SHORTNESS OF BREATH: 1
COUGH: 0
ORTHOPNEA: 0
DEPRESSION: 0
DIARRHEA: 0
BACK PAIN: 0
VOMITING: 0
SPUTUM PRODUCTION: 0
TINGLING: 0
WEIGHT LOSS: 0
FOCAL WEAKNESS: 0
ABDOMINAL PAIN: 0
MYALGIAS: 0
BLURRED VISION: 0
HEMOPTYSIS: 0

## 2019-10-12 ASSESSMENT — COGNITIVE AND FUNCTIONAL STATUS - GENERAL
SUGGESTED CMS G CODE MODIFIER DAILY ACTIVITY: CH
DAILY ACTIVITIY SCORE: 24
MOBILITY SCORE: 24
SUGGESTED CMS G CODE MODIFIER MOBILITY: CH

## 2019-10-12 ASSESSMENT — LIFESTYLE VARIABLES
SUBSTANCE_ABUSE: 0
DOES PATIENT WANT TO STOP DRINKING: NO
TOTAL SCORE: 0
TOTAL SCORE: 0
EVER FELT BAD OR GUILTY ABOUT YOUR DRINKING: NO
EVER HAD A DRINK FIRST THING IN THE MORNING TO STEADY YOUR NERVES TO GET RID OF A HANGOVER: NO
ON A TYPICAL DAY WHEN YOU DRINK ALCOHOL HOW MANY DRINKS DO YOU HAVE: 1
HOW MANY TIMES IN THE PAST YEAR HAVE YOU HAD 5 OR MORE DRINKS IN A DAY: 0
ALCOHOL_USE: NO
CONSUMPTION TOTAL: NEGATIVE
TOTAL SCORE: 0
HAVE PEOPLE ANNOYED YOU BY CRITICIZING YOUR DRINKING: NO
HAVE YOU EVER FELT YOU SHOULD CUT DOWN ON YOUR DRINKING: NO
AVERAGE NUMBER OF DAYS PER WEEK YOU HAVE A DRINK CONTAINING ALCOHOL: 2

## 2019-10-12 ASSESSMENT — PATIENT HEALTH QUESTIONNAIRE - PHQ9
1. LITTLE INTEREST OR PLEASURE IN DOING THINGS: NOT AT ALL
SUM OF ALL RESPONSES TO PHQ9 QUESTIONS 1 AND 2: 0
2. FEELING DOWN, DEPRESSED, IRRITABLE, OR HOPELESS: NOT AT ALL

## 2019-10-12 NOTE — PROGRESS NOTES
Internal Medicine Interval Note  Note Author: Jennifer Solis M.D.     Name Lai Dialey 1970   Age/Sex 49 y.o. male   MRN [id number]   Code Status FULL     After 5PM or if no immediate response to page, please call for cross-coverage  Attending/Team: Dr. Reyes/Marquis See Patient List for primary contact information  Call [phone] to page    1st Call - Day Intern (R1):   N/A 2nd Call - Day Sr. Resident (R2/R3):   Dr. 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   - he reports weight peaked at 230 lb; dry weight 195-200 lbs.  - started on 20 mg IV lasix at time of admission, has since put out >3L fluid. Weight down to 217 this morning. Patient feeling mildly improved, with decreased BLE edema and improving SOB; supplemental O2 being incrementally down-titrated as well.  - he reports previously told not a surgical candidate for MR about 10 years ago; consulting Cardiology today to assess whether MR may be contributing to sx and whether he would be a surgical candidate (possible clip?)    Review of Systems   Constitutional: Negative for chills and fever.   Respiratory: Positive for shortness of breath. Negative for cough.    Cardiovascular: Negative for chest pain and palpitations.   Gastrointestinal: Negative for abdominal pain, diarrhea, nausea and vomiting.   Neurological: Negative for dizziness and headaches.   All other systems reviewed and are negative.      Disposition/Barriers to discharge:   - requires further diuresis to dry weight    Consultants/Specialty  Cardiolgoy  PCP: Tor  DUSTIN Rdz M.D.      Quality Measures  Quality-Core Measures   Reviewed items::  EKG reviewed, Medications reviewed and Labs reviewed  Jensen catheter::  No Jensen  DVT: on eliquis (therapeutic for AF)          Physical Exam       Vitals:    10/12/19 0436 10/12/19 0800 10/12/19 0902 10/12/19 1150   BP: 147/95  128/92 112/71   Pulse: 80  81 82   Resp: 18  18 18   Temp: 36.7 °C (98 °F)  36.7 °C (98 °F) 36.7 °C (98.1 °F)   TempSrc: Temporal  Temporal Temporal   SpO2: 99%  93% 97%   Weight:  98.8 kg (217 lb 13 oz)     Height:         Body mass index is 32.17 kg/m². Weight: 98.8 kg (217 lb 13 oz)  Oxygen Therapy:  Pulse Oximetry: 97 %, O2 (LPM): 3, O2 Delivery: Oxymask    Physical Exam   Constitutional: He is oriented to person, place, and time.   Pleasant middle aged male in NAD wearing O2 mask   HENT:   Head: Normocephalic and atraumatic.   Eyes: Conjunctivae are normal. Right eye exhibits no discharge. Left eye exhibits no discharge. No scleral icterus.   Cardiovascular: Normal rate and regular rhythm. Exam reveals no gallop and no friction rub.   Murmur heard.  Pulmonary/Chest: Effort normal. No respiratory distress. He has no wheezes. He has rales.   Abdominal: Soft. Bowel sounds are normal. He exhibits no distension. There is no tenderness. There is no rebound and no guarding.   Musculoskeletal: He exhibits edema. He exhibits no tenderness or deformity.   1+ BLE edema   Neurological: He is alert and oriented to person, place, and time.   Skin: Skin is warm and dry. No rash noted. No erythema. No pallor.   Psychiatric: Mood, memory, affect and judgment normal.             Assessment/Plan     * Acute on chronic diastolic ACC/AHA stage C congestive heart failure (HCC)- (present on admission)  Assessment & Plan  Mr Dailey Presented to the ED for SOB on exertion for the past 48 hrs  Recent history of Road Trip to [place]  Edema of lower extremities and recent weight gain noticed.  Patient states compliance with Diet and  Medications  Trop: 29; BNP:3563; Most recent EF in 2018 20%               Plan:  - Lasix 20mg IV BID  - Resume Home Medications except Metformin  - Losartan, Metoprolol, Spironolactone, Apixaban, Digoxin, Flomax.  - Echo  - Fluid Restriction  - Low Salt Diet  - Telemetry  - Trend Troponin  - Routine Labs      Essential hypertension- (present on admission)  Assessment & Plan  Patient's BP in the ED at arrival was 163/104  Patient denied headache, tinnitus, nauseas or changes in vision.      Plan:  - Continue Home Meds  - Metoprolol, Losartan, Lasix  - Low Salt Diet  - Fluid restriction  - Monitoring      Alkaline phosphatase elevation- (present on admission)  Assessment & Plan  Patient's Alk Phosp is 137  History of Elevation previous labs, lower this time than previous readings.       Plan:  - Monitoring   - Routine Labs      Hypomagnesemia- (present on admission)  Assessment & Plan  Labs showed Magnesium of 1.5          PLAN:  -  Mag IV 2gr  - Monitor morning Mag and assess  - Trend morning labs    Diabetes (HCC)- (present on admission)  Assessment & Plan  History of DM requiring Insulin   Blood Glucose 117  Last A1C in April was 7.3      Plan:  - Insulin Lispro correction scale  - Monitor premeals BS  - Low Carbs Diet  - Hypoglycemia protocol  -

## 2019-10-12 NOTE — ASSESSMENT & PLAN NOTE
History of diabetes mellitus requiring insulin.  Hemoglobin A1c was less than 7.3 in April.  He was maintained on a lispro insulin sliding scale with good blood glucose control during his hospitalization.

## 2019-10-12 NOTE — ASSESSMENT & PLAN NOTE
Patient had a history of elevated alkaline phosphatase in the past.  His alkaline phosphatase levels remained in the level of 126-137.  AST and ALT levels were within normal limits indicating a cholestasis picture.  Recommend outpatient follow-up and further work-up if indicated.

## 2019-10-12 NOTE — SENIOR ADMIT NOTE
Senior Admit Note    Pertinent History  Lai Dailey is a 49 y.o . male with a history of dilated cardiomyopathy status post AICD, insulin dependent diabetes mellitus, hypertension, hyperlipidemia, atrial fibrillation on Eliquis who presented to the hospital with two day history of increased dyspnea with minimal exertion, weight gain (206 pounds at last cardiology visit) and orthopnea after he returned from driving from [place]. 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 He started noticing pedal edema about one week ago at which time he started taking double his Lasix dose. He reports that he had been taking his medications as prescribed, including Digoxin. Patient was last seen by cardiology in May at which time he was prescribed Ivabradine for better heart rate control, however patient reports that the [facility] did not have this medication in stock (patient is a ).    ROS: Patient reported that he had recent cold infection, but denies any recent fevers, chills, productive cough, chest pain, nausea and vomiting. Denies any shocks from device.     In the ED, patient was noted to be hemodynamically stable. He was noted to have elevated troponin of 29, BNP of 3563 and D-demier of 1.05. Chest x-ray showed cardiomegaly without any infiltrates. Due to elevated troponin, patient had a negative CTPE, but opacities in the right middle lobe and left upper lobes. EKG showed V paced rhythm, unchanged from previous EKG.     Last Echo: 5/2018: EF 20%, RVSP 50mmHg, grade III diastolic dysfunction, severe MR    Cath 2014: negative for macrovascular coronary artery disease     Physical Exam:   General: Sitting up in bed in no acute distress  Neck: JVD anterior to mid ear   Heart: Regular rhythm with holosystolic murmur, equal pulses bilaterally   Lungs: Clear to auscultation bilaterally   Extremities; 1+ pitting edema to mid  magali    Assessment & Plan  # Acute on chronic diastolic heart failure   # Elevated Troponin   # Subtherapeutic digoxin level   # Abnormal CT   Patient with history of dilated cardiomyopathy presents with fluid overload after traveling for the past one month to [place] and back. Patient lungs are clear and there is JVD/lower extremity edema present on exam. This is likely multifactorial to diastolic and right sided heart failure, perhaps secondary to the elevation change. Although it is unclear why patient's digoxin level is undetectable. Elevated troponin likely secondary to fluid overload, patient without chest pain or EKG changes.   - Admit to Telemetry   - Is/Os  Daily weights  Fluid/salt restriction  - Light diuresis with IV Lasix IV 20mg BID   - Continue home medications of Metoprolol, digoxin, Eliquis, Losartan and Aldactone   - Heart rate during encounter 70s, will hold off on starting Ivabradine   - Obtain Echocardiogram   - Trend troponin and EKG   Please see Dr. Nair' H&P for full details

## 2019-10-12 NOTE — ED NOTES
Rounded on patient and significant other, updated on POC, apologized for wait times, thanked for patience.

## 2019-10-12 NOTE — H&P
Internal Medicine Admitting History and Physical    Note Author: Victorino Nair M.D.       Name Lai Dailey       1970   Age/Sex 49 y.o. male   MRN [id number]   Code Status Full Code     After 5PM or if no immediate response to page, please call for cross-coverage  Attending/Team: Dr. Reyes/Marquis See Patient List for primary contact information  Call [phone] to page    1st Call - Day Intern (R1):   Dr. Solis 2nd Call - Day Sr. Resident (R2/R3):   Dr. Buck       Chief Complaint:   Shortness of Breath    HPI:  Mr Dailey is a 49 year old male, presents to the ED for Shortness of Breath with Exertion.  Patient has a significant medical history of Cardiomyopathy, HTN, DM, A Fib, Bronchitis, Stage C Chronic Systolic CHF, AICD placement.  Mr Dailey states that he recently went for a trip across the country ([place]), he left town on  and just came back from his road trip last Tuesday the  very early morning. Mr Dailey states that on his way back from [place], about half way the drive  he started noticing bilateral ankle edema, by the time of his arrival home his ankles were significantly swollen, stating that his socks were making marks on his skin. Tuesday night he started noticing Shortness of Breath with exertion, it happened when he was moving some heavy objects at home, he says that by doing so he was short of breath which is unusual for him, the following 48 hours he continued to experience Shortness of Breath with exertion and decided to present to the ED for medical evaluation.  He denies fever, chills. Night sweats, Chest Pain, SOB at rest, nauseas, vomiting, abdominal pain, diarrhea, or any urinary symptoms.  Patient states that he is complaint with his Home Medications, and Low sodium diet, also states that his normal weight is around 200 lbs, patient weigh today is 221, most likely fully clothing and shoes. Most recent weight at his Cardiology clinic was 206.  Patient is  alert and oriented times 4, he is lying on bed comfortably, does not show any signs of acute distress, he is RA with normal O2 Sats, denies any CP, SOB while on bed or other symptoms at this time.  In the ED Mr Dailey BP at presentation was 163/104; HR:75; O2:96%; RR:18; Temp:98.2  Labs: WBC:6.5; Hgb:14.7; Gluc:117; Alk P:137; TBil:2.7; Ma.5; Trop:29; BNP:3563; D Dim:1.05;              Procal:0.08;   EKG: Vent paced rhythm.  CXR: Enlarge Card Silhouette w/o evidence of Acute Disease.  CTA:  No evidence of PE.    Review of Systems   Constitutional: Positive for malaise/fatigue. Negative for chills, fever and weight loss.   HENT: Negative for ear discharge, ear pain, hearing loss, nosebleeds and tinnitus.    Eyes: Negative for blurred vision, double vision and photophobia.   Respiratory: Positive for shortness of breath. Negative for cough, hemoptysis and sputum production.    Cardiovascular: Positive for leg swelling. Negative for chest pain, palpitations and orthopnea.   Gastrointestinal: Negative for abdominal pain, blood in stool, heartburn, nausea and vomiting.   Genitourinary: Negative for dysuria, frequency, hematuria and urgency.   Musculoskeletal: Negative for back pain, joint pain and myalgias.   Skin: Negative for itching and rash.   Neurological: Negative for dizziness, tingling, focal weakness, weakness and headaches.   Endo/Heme/Allergies: Does not bruise/bleed easily.   Psychiatric/Behavioral: Negative for depression, substance abuse and suicidal ideas.             Past Medical History (Chronic medical problem, known complications and current treatment)    Chronic Stage C Sistolic CHF, Hypertension, Viventricular AICD placement, Hx of A Fib, Cardiomyopathy, DM requiring Insulin, Bronchitis.     Past Surgical History  AICD Placement  Umbilical Hernia Repair        Current Outpatient Medications:  Home Medications     Reviewed by Sobia Vasquez (Pharmacy Tech) on 10/11/19 at [facility]  Status: Complete   Medication Last Dose Status   apixaban (ELIQUIS) 5mg Tab 10/11/2019 Active   digoxin (LANOXIN) 125 MCG Tab 10/11/2019 Active   furosemide (LASIX) 20 MG Tab 10/11/2019 Active   insulin glargine (LANTUS) 100 UNIT/ML Solution 10/10/2019 Active   losartan (COZAAR) 100 MG Tab 10/11/2019 Active   metformin (GLUCOPHAGE) 500 MG TABS 10/11/2019 Active   metoprolol SR (TOPROL-XL) 200 MG XL tablet 10/11/2019 Active   spironolactone (ALDACTONE) 25 MG Tab 10/11/2019 Active   tamsulosin (FLOMAX) 0.4 MG capsule 10/11/2019 Active                Medication Allergy/Sensitivities:  No Known Allergies      Family History (mandatory)   Family History   Problem Relation Age of Onset   • Colon Cancer Mother    • Hypertension Sister    • Stroke Brother    • Heart Disease Neg Hx        Social History (mandatory)   ETOH: Occasionally use of alcohol, maybe once a month.  No Drugs or Smoking    lives in a house with his wife  Sexually active with wife      Living situation: Lives in [place] with his wife    PCP : Tor Rdz M.D.    Physical Exam     Vitals:    10/11/19 2230 10/11/19 2300 10/11/19 2330 10/12/19 0037   BP: 156/99 139/90 159/98    Pulse: 80 80 82    Resp: (!) 21 20 17    Temp:   37.6 °C (99.7 °F)    TempSrc:   Temporal    SpO2: 96% 93% 94%    Weight:   100.6 kg (221 lb 12.5 oz) 100.6 kg (221 lb 12.5 oz)   Height:         Body mass index is 32.75 kg/m².  O2 therapy: Pulse Oximetry: 94 %, O2 (LPM): 0, O2 Delivery: None (Room Air)    Physical Exam   Constitutional: He is oriented to person, place, and time and well-developed, well-nourished, and in no distress. No distress.   HENT:   Head: Normocephalic and atraumatic.   Mouth/Throat: Oropharynx is clear and moist. No oropharyngeal exudate.   Mallampati sore 2   Eyes: Pupils are equal, round, and reactive to light. Conjunctivae and EOM are normal. No scleral icterus.   Neck: Normal range of motion. Neck supple. JVD present.   JVD above  sternocleidomastoid muscle.   Cardiovascular: Normal rate, regular rhythm and intact distal pulses.   Murmur heard.  Pulmonary/Chest: Effort normal and breath sounds normal. No stridor. No respiratory distress. He has no wheezes. He has no rales. He exhibits no tenderness.   Abdominal: Soft. Bowel sounds are normal. He exhibits no distension and no mass. There is no tenderness. There is no rebound and no guarding.   Genitourinary: Rectum normal. Rectal exam shows guaiac negative stool.   Genitourinary Comments: Rectal exam performed, with next findings: minimal amount of stool in rectal vault, no masses or hemorrhoids appreciated, prostate mild/moderately enlarged, prostate not tender to palpation, rectal tone intact.   Musculoskeletal: Normal range of motion. He exhibits edema. He exhibits no tenderness or deformity.   Bilateral lower extremity edema to mid leg ++   Neurological: He is alert and oriented to person, place, and time. He has normal reflexes. He displays normal reflexes. No cranial nerve deficit. He exhibits normal muscle tone. Gait normal. Coordination normal. GCS score is 15.   Skin: Skin is warm. No rash noted. He is not diaphoretic. There is erythema.   Psychiatric: Mood, memory, affect and judgment normal.         Data Review       Old Records Request:   Completed  Current Records review/summary: Completed    Lab Data Review:  Recent Results (from the past 24 hour(s))   EKG (NOW)    Collection Time: 10/11/19  4:00 PM   Result Value Ref Range    Report       [facility]    Test Date:  2019-10-11  Pt Name:    CHESTER PICKARD                 Department: ER  MRN:        [id number]                      Room:  Gender:     Male                         Technician: [id number]  :        1970                   Requested By:ER TRIAGE PROTOCOL  Order #:    [id number]                    Jorge MD: Jyoti Claros MD    Measurements  Intervals                                 Axis  Rate:       80                           P:  ID:                                      QRS:        200  QRSD:       136                          T:          19  QT:         484  QTc:        559    Interpretive Statements  Ventricular paced rhythm, scarbssa criteria negative, underlying atrial  fibrillation  Electronically Signed On 10- 19:50:49 PDT by Jyoti Claros MD     CBC with Differential    Collection Time: 10/11/19  4:30 PM   Result Value Ref Range    WBC 6.5 4.8 - 10.8 K/uL    RBC 5.03 4.70 - 6.10 M/uL    Hemoglobin 14.7 14.0 - 18.0 g/dL    Hematocrit 47.0 42.0 - 52.0 %    MCV 93.4 81.4 - 97.8 fL    MCH 29.2 27.0 - 33.0 pg    MCHC 31.3 (L) 33.7 - 35.3 g/dL    RDW 49.6 35.9 - 50.0 fL    Platelet Count 205 164 - 446 K/uL    MPV 10.8 9.0 - 12.9 fL    Neutrophils-Polys 57.30 44.00 - 72.00 %    Lymphocytes 29.20 22.00 - 41.00 %    Monocytes 10.60 0.00 - 13.40 %    Eosinophils 1.80 0.00 - 6.90 %    Basophils 0.80 0.00 - 1.80 %    Immature Granulocytes 0.30 0.00 - 0.90 %    Nucleated RBC 0.50 /100 WBC    Neutrophils (Absolute) 3.74 1.82 - 7.42 K/uL    Lymphs (Absolute) 1.91 1.00 - 4.80 K/uL    Monos (Absolute) 0.69 0.00 - 0.85 K/uL    Eos (Absolute) 0.12 0.00 - 0.51 K/uL    Baso (Absolute) 0.05 0.00 - 0.12 K/uL    Immature Granulocytes (abs) 0.02 0.00 - 0.11 K/uL    NRBC (Absolute) 0.03 K/uL   Complete Metabolic Panel (CMP)    Collection Time: 10/11/19  4:30 PM   Result Value Ref Range    Sodium 135 135 - 145 mmol/L    Potassium 4.3 3.6 - 5.5 mmol/L    Chloride 102 96 - 112 mmol/L    Co2 22 20 - 33 mmol/L    Anion Gap 11.0 0.0 - 11.9    Glucose 117 (H) 65 - 99 mg/dL    Bun 17 8 - 22 mg/dL    Creatinine 1.02 0.50 - 1.40 mg/dL    Calcium 9.8 8.5 - 10.5 mg/dL    AST(SGOT) 45 12 - 45 U/L    ALT(SGPT) 27 2 - 50 U/L    Alkaline Phosphatase 137 (H) 30 - 99 U/L    Total Bilirubin 2.7 (H) 0.1 - 1.5 mg/dL    Albumin 4.3 3.2 - 4.9 g/dL    Total Protein 7.9 6.0 - 8.2 g/dL    Globulin 3.6 (H) 1.9 - 3.5 g/dL    A-G  Ratio 1.2 g/dL   Troponin    Collection Time: 10/11/19  4:30 PM   Result Value Ref Range    Troponin T 29 (H) 6 - 19 ng/L   ESTIMATED GFR    Collection Time: 10/11/19  4:30 PM   Result Value Ref Range    GFR If African American >60 >60 mL/min/1.73 m 2    GFR If Non African American >60 >60 mL/min/1.73 m 2   proBrain Natriuretic Peptide, NT    Collection Time: 10/11/19  4:30 PM   Result Value Ref Range    NT-proBNP 3563 (H) 0 - 125 pg/mL   D-DIMER    Collection Time: 10/11/19  7:28 PM   Result Value Ref Range    D-Dimer Screen 1.05 (H) 0.00 - 0.50 ug/mL (FEU)   DIGOXIN    Collection Time: 10/11/19  8:03 PM   Result Value Ref Range    Digoxin <0.1 (L) 0.8 - 2.0 ng/mL   PROCALCITONIN    Collection Time: 10/11/19 10:10 PM   Result Value Ref Range    Procalcitonin 0.08 <0.25 ng/mL   Magnesium    Collection Time: 10/11/19 10:10 PM   Result Value Ref Range    Magnesium 1.5 1.5 - 2.5 mg/dL   ACCU-CHEK GLUCOSE    Collection Time: 10/11/19 11:33 PM   Result Value Ref Range    Glucose - Accu-Ck 123 (H) 65 - 99 mg/dL       Imaging/Procedures Review:    Independant Imaging Review: Completed  CT-CTA CHEST PULMONARY ARTERY W/ RECONS   Final Result         1. No CT evidence of pulmonary embolism.      2. Patchy opacity in the right middle lobe and linear opacity in the left upper lobe could relate to atelectasis. Infection is in the differential.      3. Cardiomegaly.            DX-CHEST-PORTABLE (1 VIEW)   Final Result      Enlarged cardiac silhouette without evidence of acute disease.      EC-ECHOCARDIOGRAM COMPLETE W/O CONT    (Results Pending)            EKG:   EKG Independent Review: Completed  QTc:559, HR: 80, Venticular Rate paced, no ST/T changes     Records reviewed and summarized in current documentation :  Yes  [employer] teaching service handout given to patient:  Yes         Assessment/Plan     * Acute on chronic diastolic ACC/AHA stage C congestive heart failure (HCC)- (present on admission)  Assessment & Plan  Mr Dailey  Presented to the ED for SOB on exertion for the past 48 hrs  Recent history of Road Trip to [place]  Edema of lower extremities and recent weight gain noticed.  Patient states compliance with Diet and Medications  Trop: 29; BNP:3563; Most recent EF in 2018 20%               Plan:  - Lasix 20mg IV BID  - Resume Home Medications except Metformin  - Losartan, Metoprolol, Spironolactone, Apixaban, Digoxin, Flomax.  - Echo  - Fluid Restriction  - Low Salt Diet  - Telemetry  - Trend Troponin  - Routine Labs      Essential hypertension- (present on admission)  Assessment & Plan  Patient's BP in the ED at arrival was 163/104  Patient denied headache, tinnitus, nauseas or changes in vision.      Plan:  - Continue Home Meds  - Metoprolol, Losartan, Lasix  - Low Salt Diet  - Fluid restriction  - Monitoring      Alkaline phosphatase elevation- (present on admission)  Assessment & Plan  Patient's Alk Phosp is 137  History of Elevation previous labs, lower this time than previous readings.       Plan:  - Monitoring   - Routine Labs      Hypomagnesemia- (present on admission)  Assessment & Plan  Labs showed Magnesium of 1.5          PLAN:  -  Mag IV 2gr  - Monitor morning Mag and assess  - Trend morning labs    Diabetes (HCC)- (present on admission)  Assessment & Plan  History of DM requiring Insulin   Blood Glucose 117  Last A1C in April was 7.3      Plan:  - Insulin Lispro correction scale  - Monitor premeals BS  - Low Carbs Diet  - Hypoglycemia protocol  -      Anticipated Hospital stay:  >2 midnights        Quality Measures  Quality-Core Measures   Reviewed items::  EKG reviewed, Labs reviewed, Medications reviewed and Radiology images reviewed  Jensen catheter::  No Jensen  DVT: On chronic anticoagulation for A fib.  Ulcer Prophylaxis::  No    PCP: Tor Rdz M.D.

## 2019-10-12 NOTE — CARE PLAN
Problem: Respiratory:  Goal: Respiratory status will improve  Outcome: PROGRESSING AS EXPECTED     Problem: Fluid Volume:  Goal: Will maintain balanced intake and output  Outcome: PROGRESSING AS EXPECTED     Problem: Venous Thromboembolism (VTW)/Deep Vein Thrombosis (DVT) Prevention:  Goal: Patient will participate in Venous Thrombosis (VTE)/Deep Vein Thrombosis (DVT)Prevention Measures  Outcome: PROGRESSING AS EXPECTED

## 2019-10-12 NOTE — PROGRESS NOTES
Patient belongings on arrival: cell phone, cell phone , leather wallet, 2 credit cards, t-shirt, jeans, and black boots.

## 2019-10-12 NOTE — ASSESSMENT & PLAN NOTE
Presented with magnesium level 1.5 which was repleted to level of 1.7.  He was discharged on oral magnesium oxide for continued replacement.

## 2019-10-12 NOTE — ED NOTES
Pt resting quietly in gurney with eyes closed, full even resp noted. VSS. Tele notified pt is ready for transport.

## 2019-10-12 NOTE — ASSESSMENT & PLAN NOTE
Patient's BP in the ED at arrival was 163/104 and improved with further diuresis and continuation of his home medications.

## 2019-10-12 NOTE — PROGRESS NOTES
Bedside report received from night RN; assumed pt care. Pt assessment complete. Pt A&Ox4. Reviewed plan of care with pt. Tele box on and rhythm verified. Chart and labs reviewed. Bed in lowest position, and 2 side rails up. Pt educated on call light; call light with in reach.

## 2019-10-12 NOTE — ED PROVIDER NOTES
ED Provider Note    CHIEF COMPLAINT  Chief Complaint   Patient presents with   • Shortness of Breath   • Weight Gain       HPI  Lai Dailey is a 49 y.o. male who presents with chief complaint of decreased exertional capacity, shortness of breath, lower extremity edema and orthopnea.  Patient with long-standing history of heart failure, last echo with a EF of 20% done 2018.  Patient reports he takes his Lasix and medication regularly.  He reports that he drove all the way to [place] and back within the last month, he reports that he has been short of breath since that time.  He reports considerable orthopnea and is sleeping in a chair secondary to this.  He denies any associated chest pain.  He denies any unilateral extremity edema but does report bilateral extremity edema.  He reports he can only walk about half a block prior to fatigue.  He denies any major change in his medications.  He has a pacemaker placed for atrial fibrillation.  He is on Eliquis.  He has a history of mitral regurgitation.  He also has a history of diabetes.    REVIEW OF SYSTEMS  Review of Systems   Respiratory: Positive for shortness of breath.    Cardiovascular: Positive for orthopnea, leg swelling and PND. Negative for chest pain and palpitations.   Gastrointestinal: Negative for nausea.   Genitourinary: Negative for dysuria, frequency and urgency.       See HPI for further details. All other systems are negative.     PAST MEDICAL HISTORY   has a past medical history of CHF (congestive heart failure) ([facility]), Diabetes ([facility]), Hyperlipidemia, and Hypertension.    SOCIAL HISTORY  Social History     Tobacco Use   • Smoking status: Never Smoker   • Smokeless tobacco: Never Used   Substance and Sexual Activity   • Alcohol use: No     Comment: 2 times a week-beer, NO ALCOHOL AT THIS TIME   • Drug use: No     Comment: Marijuana use as youth   • Sexual activity: Not on file       SURGICAL HISTORY   has a past surgical history that includes aicd  implant (2010).    CURRENT MEDICATIONS  Home Medications     Reviewed by Joslyn Esparza R.N. (Registered Nurse) on 10/11/19 at 1617  Med List Status: Partial   Medication Last Dose Status   apixaban (ELIQUIS) 5mg Tab  Active   digoxin (LANOXIN) 125 MCG Tab  Active   furosemide (LASIX) 20 MG Tab 10/11/2019 Active   insulin glargine (LANTUS) 100 UNIT/ML Solution  Active   ivabradine (CORLANOR) 5 MG Tab tablet  Active   losartan (COZAAR) 100 MG Tab  Active   metformin (GLUCOPHAGE) 500 MG TABS  Active   metoprolol SR (TOPROL-XL) 200 MG XL tablet  Active   spironolactone (ALDACTONE) 25 MG Tab 10/11/2019 Active   tamsulosin (FLOMAX) 0.4 MG capsule  Active                ALLERGIES  No Known Allergies    PHYSICAL EXAM  Physical Exam   Constitutional: He is oriented to person, place, and time. He appears well-developed and well-nourished.   HENT:   Head: Normocephalic and atraumatic.   Eyes: Pupils are equal, round, and reactive to light. Conjunctivae are normal.   Neck: Normal range of motion. Neck supple.   Cardiovascular: Normal rate and regular rhythm. Exam reveals no gallop and no friction rub.   No murmur heard.  Pulmonary/Chest: Effort normal and breath sounds normal. No respiratory distress. He has no wheezes.   Minimal crackles bilaterally, when patient lies supine he developed considerable increased work of breathing   Abdominal: Soft. Bowel sounds are normal. He exhibits no distension. There is no tenderness. There is no rebound.   Musculoskeletal:   Trace pitting edema bilateral lower extrema   Neurological: He is alert and oriented to person, place, and time.   Skin: Skin is warm and dry.   Psychiatric: He has a normal mood and affect. His behavior is normal.         DIAGNOSTIC STUDIES / PROCEDURES    EKG  See below    LABS  Results for orders placed or performed during the hospital encounter of 10/11/19   CBC with Differential   Result Value Ref Range    WBC 6.5 4.8 - 10.8 K/uL    RBC 5.03 4.70 - 6.10 M/uL     Hemoglobin 14.7 14.0 - 18.0 g/dL    Hematocrit 47.0 42.0 - 52.0 %    MCV 93.4 81.4 - 97.8 fL    MCH 29.2 27.0 - 33.0 pg    MCHC 31.3 (L) 33.7 - 35.3 g/dL    RDW 49.6 35.9 - 50.0 fL    Platelet Count 205 164 - 446 K/uL    MPV 10.8 9.0 - 12.9 fL    Neutrophils-Polys 57.30 44.00 - 72.00 %    Lymphocytes 29.20 22.00 - 41.00 %    Monocytes 10.60 0.00 - 13.40 %    Eosinophils 1.80 0.00 - 6.90 %    Basophils 0.80 0.00 - 1.80 %    Immature Granulocytes 0.30 0.00 - 0.90 %    Nucleated RBC 0.50 /100 WBC    Neutrophils (Absolute) 3.74 1.82 - 7.42 K/uL    Lymphs (Absolute) 1.91 1.00 - 4.80 K/uL    Monos (Absolute) 0.69 0.00 - 0.85 K/uL    Eos (Absolute) 0.12 0.00 - 0.51 K/uL    Baso (Absolute) 0.05 0.00 - 0.12 K/uL    Immature Granulocytes (abs) 0.02 0.00 - 0.11 K/uL    NRBC (Absolute) 0.03 K/uL   Complete Metabolic Panel (CMP)   Result Value Ref Range    Sodium 135 135 - 145 mmol/L    Potassium 4.3 3.6 - 5.5 mmol/L    Chloride 102 96 - 112 mmol/L    Co2 22 20 - 33 mmol/L    Anion Gap 11.0 0.0 - 11.9    Glucose 117 (H) 65 - 99 mg/dL    Bun 17 8 - 22 mg/dL    Creatinine 1.02 0.50 - 1.40 mg/dL    Calcium 9.8 8.5 - 10.5 mg/dL    AST(SGOT) 45 12 - 45 U/L    ALT(SGPT) 27 2 - 50 U/L    Alkaline Phosphatase 137 (H) 30 - 99 U/L    Total Bilirubin 2.7 (H) 0.1 - 1.5 mg/dL    Albumin 4.3 3.2 - 4.9 g/dL    Total Protein 7.9 6.0 - 8.2 g/dL    Globulin 3.6 (H) 1.9 - 3.5 g/dL    A-G Ratio 1.2 g/dL   Troponin   Result Value Ref Range    Troponin T 29 (H) 6 - 19 ng/L   ESTIMATED GFR   Result Value Ref Range    GFR If African American >60 >60 mL/min/1.73 m 2    GFR If Non African American >60 >60 mL/min/1.73 m 2   proBrain Natriuretic Peptide, NT   Result Value Ref Range    NT-proBNP 3563 (H) 0 - 125 pg/mL   D-DIMER   Result Value Ref Range    D-Dimer Screen 1.05 (H) 0.00 - 0.50 ug/mL (FEU)   DIGOXIN   Result Value Ref Range    Digoxin <0.1 (L) 0.8 - 2.0 ng/mL   PROCALCITONIN   Result Value Ref Range    Procalcitonin 0.08 <0.25 ng/mL    Magnesium   Result Value Ref Range    Magnesium 1.5 1.5 - 2.5 mg/dL   ACCU-CHEK GLUCOSE   Result Value Ref Range    Glucose - Accu-Ck 123 (H) 65 - 99 mg/dL   EKG (NOW)   Result Value Ref Range    Report       [facility] Emergency Dept.    Test Date:  2019-10-11  Pt Name:    CHESTER PICKARD                 Department: ER  MRN:        [id number]                      Room:  Gender:     Male                         Technician: [id number]  :        1970                   Requested By:ER TRIAGE PROTOCOL  Order #:    [id number]                    Reading MD: Jyoti Claros MD    Measurements  Intervals                                Axis  Rate:       80                           P:  SD:                                      QRS:        200  QRSD:       136                          T:          19  QT:         484  QTc:        559    Interpretive Statements  Ventricular paced rhythm, scarbssa criteria negative, underlying atrial  fibrillation  Electronically Signed On 10- 19:50:49 PDT by Jyoti Claros MD           RADIOLOGY  CT-CTA CHEST PULMONARY ARTERY W/ RECONS   Final Result         1. No CT evidence of pulmonary embolism.      2. Patchy opacity in the right middle lobe and linear opacity in the left upper lobe could relate to atelectasis. Infection is in the differential.      3. Cardiomegaly.            DX-CHEST-PORTABLE (1 VIEW)   Final Result      Enlarged cardiac silhouette without evidence of acute disease.      EC-ECHOCARDIOGRAM COMPLETE W/O CONT    (Results Pending)           COURSE & MEDICAL DECISION MAKING  Pertinent Labs & Imaging studies reviewed. (See chart for details)  Patient here with symptoms consistent with heart failure exacerbation.  Heart failure exacerbation likely related to patient's long trip and probably poor diet while he was driving although patient does report he has had a reasonably good diet despite being on the road for as long as he was.  Patient did have a  incredibly long drive which would put him at risk for possible PE as a cause of etiology of patient's worsening heart failure, he does report that he has taking his medications regularly.  I will check a d-dimer given I have a low suspicion but pulmonary embolus remains on my differential.  Will give IV dose of patient's Lasix, 20 mg  Patient has had 2 episodes of nonsustained V. tach, longest run of 9 beats while here in the emergency department, will admit for ongoing telemetry and urgent echo  D-dimer is significantly elevated, checking CT PE  CT PE is negative for pulmonary embolus.  Patient troponin is elevated, this is consistent with heart failure, will trend on the floor.  Patient admitted to [facility] medicine for ongoing care telemetry monitoring and urgent echo tomorrow morning.      FINAL IMPRESSION  1.  Volume overload, elevated troponin    Electronically signed by: Harlan Montes, 10/11/2019 7:51 PM

## 2019-10-12 NOTE — ED NOTES
Pt ambulatory to room 2 with ED tech. Pt in gown, c/o SOB x 1.5 days, worse when laying flat. Bilat LE swelling x 3 days. Intermittent productive cough x 2 days. Denies chest pain or discomfort. PIV established, blood drawn, sent to lab.

## 2019-10-12 NOTE — ED NOTES
Pt had a 9 beat run of [employer], asymptomatic. Denies chest pain or discomfort. Dr. Montes notified.

## 2019-10-13 LAB
ALBUMIN SERPL BCP-MCNC: 4 G/DL (ref 3.2–4.9)
ALBUMIN/GLOB SERPL: 1 G/DL
ALP SERPL-CCNC: 126 U/L (ref 30–99)
ALT SERPL-CCNC: 23 U/L (ref 2–50)
ANION GAP SERPL CALC-SCNC: 8 MMOL/L (ref 0–11.9)
AST SERPL-CCNC: 33 U/L (ref 12–45)
BASOPHILS # BLD AUTO: 1 % (ref 0–1.8)
BASOPHILS # BLD: 0.05 K/UL (ref 0–0.12)
BILIRUB SERPL-MCNC: 1.8 MG/DL (ref 0.1–1.5)
BUN SERPL-MCNC: 21 MG/DL (ref 8–22)
CALCIUM SERPL-MCNC: 8.9 MG/DL (ref 8.5–10.5)
CHLORIDE SERPL-SCNC: 98 MMOL/L (ref 96–112)
CO2 SERPL-SCNC: 26 MMOL/L (ref 20–33)
CREAT SERPL-MCNC: 1.01 MG/DL (ref 0.5–1.4)
EOSINOPHIL # BLD AUTO: 0.09 K/UL (ref 0–0.51)
EOSINOPHIL NFR BLD: 1.8 % (ref 0–6.9)
ERYTHROCYTE [DISTWIDTH] IN BLOOD BY AUTOMATED COUNT: 49.4 FL (ref 35.9–50)
GLOBULIN SER CALC-MCNC: 3.9 G/DL (ref 1.9–3.5)
GLUCOSE BLD-MCNC: 132 MG/DL (ref 65–99)
GLUCOSE BLD-MCNC: 140 MG/DL (ref 65–99)
GLUCOSE BLD-MCNC: 142 MG/DL (ref 65–99)
GLUCOSE BLD-MCNC: 185 MG/DL (ref 65–99)
GLUCOSE SERPL-MCNC: 155 MG/DL (ref 65–99)
HCT VFR BLD AUTO: 50.3 % (ref 42–52)
HGB BLD-MCNC: 15.8 G/DL (ref 14–18)
IMM GRANULOCYTES # BLD AUTO: 0.01 K/UL (ref 0–0.11)
IMM GRANULOCYTES NFR BLD AUTO: 0.2 % (ref 0–0.9)
LYMPHOCYTES # BLD AUTO: 1.58 K/UL (ref 1–4.8)
LYMPHOCYTES NFR BLD: 31.4 % (ref 22–41)
MCH RBC QN AUTO: 29.3 PG (ref 27–33)
MCHC RBC AUTO-ENTMCNC: 31.4 G/DL (ref 33.7–35.3)
MCV RBC AUTO: 93.3 FL (ref 81.4–97.8)
MONOCYTES # BLD AUTO: 0.89 K/UL (ref 0–0.85)
MONOCYTES NFR BLD AUTO: 17.7 % (ref 0–13.4)
NEUTROPHILS # BLD AUTO: 2.41 K/UL (ref 1.82–7.42)
NEUTROPHILS NFR BLD: 47.9 % (ref 44–72)
NRBC # BLD AUTO: 0 K/UL
NRBC BLD-RTO: 0 /100 WBC
PLATELET # BLD AUTO: 202 K/UL (ref 164–446)
PMV BLD AUTO: 10.2 FL (ref 9–12.9)
POTASSIUM SERPL-SCNC: 4 MMOL/L (ref 3.6–5.5)
PROT SERPL-MCNC: 7.9 G/DL (ref 6–8.2)
RBC # BLD AUTO: 5.39 M/UL (ref 4.7–6.1)
SODIUM SERPL-SCNC: 132 MMOL/L (ref 135–145)
WBC # BLD AUTO: 5 K/UL (ref 4.8–10.8)

## 2019-10-13 PROCEDURE — 700111 HCHG RX REV CODE 636 W/ 250 OVERRIDE (IP): Performed by: STUDENT IN AN ORGANIZED HEALTH CARE EDUCATION/TRAINING PROGRAM

## 2019-10-13 PROCEDURE — 36415 COLL VENOUS BLD VENIPUNCTURE: CPT

## 2019-10-13 PROCEDURE — 700111 HCHG RX REV CODE 636 W/ 250 OVERRIDE (IP): Performed by: INTERNAL MEDICINE

## 2019-10-13 PROCEDURE — 99232 SBSQ HOSP IP/OBS MODERATE 35: CPT | Mod: GC | Performed by: INTERNAL MEDICINE

## 2019-10-13 PROCEDURE — 3E02340 INTRODUCTION OF INFLUENZA VACCINE INTO MUSCLE, PERCUTANEOUS APPROACH: ICD-10-PCS | Performed by: INTERNAL MEDICINE

## 2019-10-13 PROCEDURE — 770020 HCHG ROOM/CARE - TELE (206)

## 2019-10-13 PROCEDURE — 700102 HCHG RX REV CODE 250 W/ 637 OVERRIDE(OP): Performed by: STUDENT IN AN ORGANIZED HEALTH CARE EDUCATION/TRAINING PROGRAM

## 2019-10-13 PROCEDURE — 85025 COMPLETE CBC W/AUTO DIFF WBC: CPT

## 2019-10-13 PROCEDURE — 82962 GLUCOSE BLOOD TEST: CPT | Mod: 91

## 2019-10-13 PROCEDURE — A9270 NON-COVERED ITEM OR SERVICE: HCPCS | Performed by: STUDENT IN AN ORGANIZED HEALTH CARE EDUCATION/TRAINING PROGRAM

## 2019-10-13 PROCEDURE — 90686 IIV4 VACC NO PRSV 0.5 ML IM: CPT | Performed by: INTERNAL MEDICINE

## 2019-10-13 PROCEDURE — 80053 COMPREHEN METABOLIC PANEL: CPT

## 2019-10-13 RX ORDER — FUROSEMIDE 10 MG/ML
40 INJECTION INTRAMUSCULAR; INTRAVENOUS
Status: DISCONTINUED | OUTPATIENT
Start: 2019-10-13 | End: 2019-10-14 | Stop reason: HOSPADM

## 2019-10-13 RX ADMIN — TAMSULOSIN HYDROCHLORIDE 0.4 MG: 0.4 CAPSULE ORAL at 05:11

## 2019-10-13 RX ADMIN — LOSARTAN POTASSIUM 100 MG: 50 TABLET ORAL at 05:11

## 2019-10-13 RX ADMIN — INSULIN LISPRO 1 UNITS: 100 INJECTION, SOLUTION INTRAVENOUS; SUBCUTANEOUS at 20:46

## 2019-10-13 RX ADMIN — DIGOXIN 125 MCG: 125 TABLET ORAL at 05:11

## 2019-10-13 RX ADMIN — APIXABAN 5 MG: 5 TABLET, FILM COATED ORAL at 05:12

## 2019-10-13 RX ADMIN — APIXABAN 5 MG: 5 TABLET, FILM COATED ORAL at 17:57

## 2019-10-13 RX ADMIN — SPIRONOLACTONE 25 MG: 25 TABLET ORAL at 05:10

## 2019-10-13 RX ADMIN — FUROSEMIDE 20 MG: 10 INJECTION, SOLUTION INTRAMUSCULAR; INTRAVENOUS at 05:07

## 2019-10-13 RX ADMIN — INFLUENZA A VIRUS A/BRISBANE/02/2018 IVR-190 (H1N1) ANTIGEN (FORMALDEHYDE INACTIVATED), INFLUENZA A VIRUS A/KANSAS/14/2017 X-327 (H3N2) ANTIGEN (FORMALDEHYDE INACTIVATED), INFLUENZA B VIRUS B/PHUKET/3073/2013 ANTIGEN (FORMALDEHYDE INACTIVATED), AND INFLUENZA B VIRUS B/MARYLAND/15/2016 BX-69A ANTIGEN (FORMALDEHYDE INACTIVATED) 0.5 ML: 15; 15; 15; 15 INJECTION, SUSPENSION INTRAMUSCULAR at 16:28

## 2019-10-13 RX ADMIN — FUROSEMIDE 40 MG: 10 INJECTION, SOLUTION INTRAMUSCULAR; INTRAVENOUS at 16:28

## 2019-10-13 RX ADMIN — METOPROLOL SUCCINATE 200 MG: 50 TABLET, EXTENDED RELEASE ORAL at 05:10

## 2019-10-13 ASSESSMENT — ENCOUNTER SYMPTOMS
DIARRHEA: 0
HEADACHES: 0
SHORTNESS OF BREATH: 1
MYALGIAS: 0
PALPITATIONS: 0
DOUBLE VISION: 0
VOMITING: 0
CHILLS: 0
SPUTUM PRODUCTION: 0
NAUSEA: 0
ABDOMINAL PAIN: 0
HALLUCINATIONS: 0
COUGH: 1
HEMOPTYSIS: 0
FEVER: 0
DIZZINESS: 0
BLURRED VISION: 0

## 2019-10-13 NOTE — CARE PLAN
Problem: Safety  Goal: Will remain free from injury  Outcome: PROGRESSING AS EXPECTED   Patient will call for assistance as needed. Bed is locked and in lowest position. Call light within reach. Hourly rounds in place.       Problem: Knowledge Deficit  Goal: Knowledge of disease process/condition, treatment plan, diagnostic tests, and medications will improve  Outcome: PROGRESSING AS EXPECTED   Encourage pt to voice feelings or concerns regarding treatment plan, diagnostic testing and medications. Answer accordingly.

## 2019-10-13 NOTE — PROGRESS NOTES
Internal Medicine Interval Note  Note Author: Jennifer Solis M.D.     Name Lai Dailey 1970   Age/Sex 49 y.o. male   MRN [id number]   Code Status FULL     After 5PM or if no immediate response to page, please call for cross-coverage  Attending/Team: Dr. Reyes/Marquis See Patient List for primary contact information  Call [phone] to page    1st Call - Day Intern (R1):   N/A 2nd Call - Day Sr. Resident (R2/R3):   Dr. 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   - he reports weight peaked at 230 lb; dry weight 195-200 lbs.   - So far, weight has decreased from 230 on admission to 213 today, with 2.025L more out in last 24H (ins not being recorded, but he is on fluid-restricted diet). This is still 13-18 lbs above patient's reported dry weight.  - per CMP this morning patient's Cr and K remain stable.  - increasing lasix to 40 IV bid for tonight and tomorrow morning with possible discharge tomorrow pending diuresis to as close to dry weight as possible.  - O2 requirements increased last night from 0L to 3L, but unclear whether given for symptomatic reasons or true hypoxia (O2 sats quite high on 3L at 98-99%, so O2 likely can be titrated down)  - today will f/u Cardiology consult yesterday re: whether severe MR/TR may be contributing to patient's sx and whether he is possibly surgical candidate for mitral clip. If Cardiology not able to see patient prior to discharge recommend oncoming team refer to Cardiology outpatient for this evaluation.    Review of Systems   Constitutional: Negative  for chills and fever.   Eyes: Negative for blurred vision and double vision.   Respiratory: Positive for cough and shortness of breath. Negative for hemoptysis and sputum production.    Cardiovascular: Negative for chest pain and palpitations.   Gastrointestinal: Negative for abdominal pain, diarrhea, nausea and vomiting.   Musculoskeletal: Negative for joint pain and myalgias.        Peripheral edema much improved.   Neurological: Negative for dizziness and headaches.   Psychiatric/Behavioral: Negative for hallucinations.   All other systems reviewed and are negative.      Disposition/Barriers to discharge:   - requires further diuresis to dry weight    Consultants/Specialty  Cardiolgoy  PCP: Tor Rdz M.D.      Quality Measures  Quality-Core Measures   Reviewed items::  EKG reviewed, Medications reviewed and Labs reviewed  Jensen catheter::  No Jensen  DVT: on eliquis (therapeutic for AF)          Physical Exam       Vitals:    10/12/19 1548 10/12/19 2100 10/13/19 0100 10/13/19 0500   BP: 127/80 120/82 119/92 117/82   Pulse: 82 80 75 79   Resp: 20 18 16 16   Temp: 37.8 °C (100.1 °F) 37 °C (98.6 °F) 36.8 °C (98.3 °F) 36.2 °C (97.2 °F)   TempSrc: Temporal Temporal Temporal Temporal   SpO2: 100% 100% 100% 99%   Weight:  100.5 kg (221 lb 9 oz)     Height:         Body mass index is 32.72 kg/m². Weight: 100.5 kg (221 lb 9 oz)  Oxygen Therapy:  Pulse Oximetry: 99 %, O2 (LPM): 3, O2 Delivery: Silicone Nasal Cannula    Physical Exam   Constitutional: He is oriented to person, place, and time.   Pleasant middle aged male in NAD wearing O2 mask   HENT:   Head: Normocephalic and atraumatic.   Eyes: Conjunctivae are normal. Right eye exhibits no discharge. Left eye exhibits no discharge. No scleral icterus.   Cardiovascular: Normal rate and regular rhythm. Exam reveals no gallop and no friction rub.   Murmur heard.  Pulmonary/Chest: Effort normal. No respiratory distress. He has no wheezes. He has rales.   Abdominal:  Soft. Bowel sounds are normal. He exhibits no distension. There is no tenderness. There is no rebound and no guarding.   Musculoskeletal: He exhibits edema. He exhibits no tenderness or deformity.   Trace edema, much improved from prior.   Neurological: He is alert and oriented to person, place, and time.   Skin: Skin is warm and dry. No rash noted. No erythema. No pallor.   Psychiatric: Mood, memory, affect and judgment normal.             Assessment/Plan     * Acute on chronic diastolic ACC/AHA stage C congestive heart failure (HCC)- (present on admission)  Assessment & Plan  Mr Dailey Presented to the ED for SOB on exertion for the past 48 hrs  Recent history of Road Trip to [place]  Edema of lower extremities and recent weight gain noticed.  Patient states compliance with Diet and Medications  Trop: 29; BNP:3563; Most recent EF in 2018 20%               Plan:  - Resume Home Medications except Metformin  - Losartan, Metoprolol, Spironolactone, Apixaban, Digoxin, Flomax.  - continue to follow for TTE results  - cont fluid restriction and low Na diet  - cont to follow daily weights, I/O  - increased lasix to 40 bid for tonight and tomorrow morning to bring weight down closer to dry weight (currently 13-18 lbs above patient's reported dry weight after a significant diuresis so far).      Essential hypertension- (present on admission)  Assessment & Plan  Patient's BP in the ED at arrival was 163/104  Patient denied headache, tinnitus, nauseas or changes in vision.      Plan:  - Continue Home Meds  - Metoprolol, Losartan, Lasix  - Low Salt Diet  - Fluid restriction  - Monitoring      Severe mitral regurgitation  Assessment & Plan  Consulting Cardiology today (10/12) to assess whether MR may be contributing to sx and whether he would be a surgical candidate (possible clip?)    Alkaline phosphatase elevation- (present on admission)  Assessment & Plan  Patient's Alk Phosp is 137  History of Elevation previous labs,  lower this time than previous readings.       Plan:  - Monitoring   - Routine Labs      Hypomagnesemia- (present on admission)  Assessment & Plan  Labs showed Magnesium of 1.5          PLAN:  -  Mag IV 2gr  - Monitor morning Mag and assess  - Trend morning labs    Diabetes (HCC)- (present on admission)  Assessment & Plan  History of DM requiring Insulin   Blood Glucose 117  Last A1C in April was 7.3      Plan:  - Insulin Lispro correction scale  - Monitor premeals BS  - Low Carbs Diet  - Hypoglycemia protocol  -

## 2019-10-13 NOTE — PROGRESS NOTES
Handoff report received. Assumed patient care. Patient resting in bed. Patient not in distress, AAOX 4. Safety precautions in place. Call light and personal belongings within reach. Bed locked and in lowest position. Educated to call for assistance if needed.

## 2019-10-14 ENCOUNTER — PATIENT OUTREACH (OUTPATIENT)
Dept: HEALTH INFORMATION MANAGEMENT | Facility: OTHER | Age: 49
End: 2019-10-14

## 2019-10-14 VITALS
TEMPERATURE: 98.1 F | HEIGHT: 69 IN | DIASTOLIC BLOOD PRESSURE: 77 MMHG | OXYGEN SATURATION: 95 % | HEART RATE: 80 BPM | SYSTOLIC BLOOD PRESSURE: 107 MMHG | BODY MASS INDEX: 30.89 KG/M2 | WEIGHT: 208.56 LBS | RESPIRATION RATE: 16 BRPM

## 2019-10-14 PROBLEM — E83.42 HYPOMAGNESEMIA: Status: RESOLVED | Noted: 2018-06-10 | Resolved: 2019-10-14

## 2019-10-14 LAB
ANION GAP SERPL CALC-SCNC: 9 MMOL/L (ref 0–11.9)
BUN SERPL-MCNC: 22 MG/DL (ref 8–22)
CALCIUM SERPL-MCNC: 9.2 MG/DL (ref 8.5–10.5)
CHLORIDE SERPL-SCNC: 98 MMOL/L (ref 96–112)
CO2 SERPL-SCNC: 26 MMOL/L (ref 20–33)
CREAT SERPL-MCNC: 0.95 MG/DL (ref 0.5–1.4)
GLUCOSE BLD-MCNC: 171 MG/DL (ref 65–99)
GLUCOSE SERPL-MCNC: 148 MG/DL (ref 65–99)
MAGNESIUM SERPL-MCNC: 1.7 MG/DL (ref 1.5–2.5)
POTASSIUM SERPL-SCNC: 3.8 MMOL/L (ref 3.6–5.5)
SODIUM SERPL-SCNC: 133 MMOL/L (ref 135–145)

## 2019-10-14 PROCEDURE — 80048 BASIC METABOLIC PNL TOTAL CA: CPT

## 2019-10-14 PROCEDURE — A9270 NON-COVERED ITEM OR SERVICE: HCPCS | Performed by: STUDENT IN AN ORGANIZED HEALTH CARE EDUCATION/TRAINING PROGRAM

## 2019-10-14 PROCEDURE — 700102 HCHG RX REV CODE 250 W/ 637 OVERRIDE(OP): Performed by: STUDENT IN AN ORGANIZED HEALTH CARE EDUCATION/TRAINING PROGRAM

## 2019-10-14 PROCEDURE — 83735 ASSAY OF MAGNESIUM: CPT

## 2019-10-14 PROCEDURE — 36415 COLL VENOUS BLD VENIPUNCTURE: CPT

## 2019-10-14 PROCEDURE — 700111 HCHG RX REV CODE 636 W/ 250 OVERRIDE (IP): Performed by: STUDENT IN AN ORGANIZED HEALTH CARE EDUCATION/TRAINING PROGRAM

## 2019-10-14 PROCEDURE — 99239 HOSP IP/OBS DSCHRG MGMT >30: CPT | Mod: GC | Performed by: INTERNAL MEDICINE

## 2019-10-14 PROCEDURE — 99222 1ST HOSP IP/OBS MODERATE 55: CPT | Performed by: INTERNAL MEDICINE

## 2019-10-14 PROCEDURE — 82962 GLUCOSE BLOOD TEST: CPT

## 2019-10-14 RX ORDER — POTASSIUM CHLORIDE 20 MEQ/1
20 TABLET, EXTENDED RELEASE ORAL ONCE
Status: DISCONTINUED | OUTPATIENT
Start: 2019-10-14 | End: 2019-10-14

## 2019-10-14 RX ORDER — FUROSEMIDE 20 MG/1
20 TABLET ORAL 2 TIMES DAILY PRN
Qty: 60 TAB | Refills: 1 | Status: SHIPPED | OUTPATIENT
Start: 2019-10-14 | End: 2019-10-14 | Stop reason: SDUPTHER

## 2019-10-14 RX ORDER — FUROSEMIDE 20 MG/1
10 TABLET ORAL 2 TIMES DAILY PRN
Qty: 60 TAB | Refills: 1 | Status: SHIPPED | OUTPATIENT
Start: 2019-10-14 | End: 2019-10-14 | Stop reason: SDUPTHER

## 2019-10-14 RX ORDER — POTASSIUM CHLORIDE 20 MEQ/1
40 TABLET, EXTENDED RELEASE ORAL ONCE
Status: COMPLETED | OUTPATIENT
Start: 2019-10-14 | End: 2019-10-14

## 2019-10-14 RX ORDER — FUROSEMIDE 20 MG/1
10 TABLET ORAL 2 TIMES DAILY PRN
Qty: 60 TAB | Refills: 1 | Status: SHIPPED | OUTPATIENT
Start: 2019-10-14 | End: 2019-11-18

## 2019-10-14 RX ADMIN — DIGOXIN 125 MCG: 125 TABLET ORAL at 05:38

## 2019-10-14 RX ADMIN — SPIRONOLACTONE 25 MG: 25 TABLET ORAL at 05:38

## 2019-10-14 RX ADMIN — TAMSULOSIN HYDROCHLORIDE 0.4 MG: 0.4 CAPSULE ORAL at 06:00

## 2019-10-14 RX ADMIN — POTASSIUM CHLORIDE 40 MEQ: 1500 TABLET, EXTENDED RELEASE ORAL at 08:26

## 2019-10-14 RX ADMIN — METOPROLOL SUCCINATE 200 MG: 50 TABLET, EXTENDED RELEASE ORAL at 05:37

## 2019-10-14 RX ADMIN — APIXABAN 5 MG: 5 TABLET, FILM COATED ORAL at 05:38

## 2019-10-14 RX ADMIN — LOSARTAN POTASSIUM 100 MG: 50 TABLET ORAL at 05:37

## 2019-10-14 RX ADMIN — FUROSEMIDE 40 MG: 10 INJECTION, SOLUTION INTRAMUSCULAR; INTRAVENOUS at 05:38

## 2019-10-14 RX ADMIN — INSULIN LISPRO 1 UNITS: 100 INJECTION, SOLUTION INTRAVENOUS; SUBCUTANEOUS at 08:29

## 2019-10-14 NOTE — DISCHARGE INSTRUCTIONS
Discharge Instructions    Discharged to home by car with relative. Discharged via walking, hospital escort: Refused.  Special equipment needed: Not Applicable    Be sure to schedule a follow-up appointment with your primary care doctor or any specialists as instructed.     Discharge Plan:   Diet Plan: Discussed  Activity Level: Discussed  Confirmed Follow up Appointment: Appointment Scheduled  Confirmed Symptoms Management: Discussed  Medication Reconciliation Updated: Yes  Influenza Vaccine Indication: Not indicated: Previously immunized this influenza season and > 8 years of age  Influenza Vaccine Given - only chart on this line when given: Influenza Vaccine Given (See MAR)    I understand that a diet low in cholesterol, fat, and sodium is recommended for good health. Unless I have been given specific instructions below for another diet, I accept this instruction as my diet prescription.   Other diet: Low Salt    Special Instructions:   HF Patient Discharge Instructions  · Monitor your weight daily, and maintain a weight chart, to track your weight changes.   · Activity as tolerated, unless your Doctor has ordered otherwise. Other activity order: N/A.  · Follow a low fat, low cholesterol, low salt diet unless instructed otherwise by your Doctor. Read the labels on the back of food products and track your intake of fat, cholesterol and salt.   · Fluid Restriction Yes. If a Fluid Restriction has been ordered by your Doctor, measure fluids with a measuring cup to ensure that you are not exceeding the restriction.   · No smoking.  · Oxygen No. If your Doctor has ordered that you wear Oxygen at home, it is important to wear it as ordered.  · Did you receive an explanation from staff on the importance of taking each of your medications and why it is necessary to keep taking them unless your doctor says to stop? Yes  · Were all of your questions answered about how to manage your heart failure and what to do if you have  increased signs and symptoms after you go home? Yes  · Do you feel like your heart failure care team involved you in the care treatment plan and allowed you to make decisions regarding your care while in the hospital and addressed any discharge needs you might have? Yes    See the educational handout provided at discharge for more information on monitoring your daily weight, activity and diet. This also explains more about Heart Failure, symptoms of a flare-up and some of the tests that you have undergone.     Warning Signs of a Flare-Up include:  · Swelling in the ankles or lower legs.  · Shortness of breath, while at rest, or while doing normal activities.   · Shortness of breath at night when in bed, or coughing in bed.   · Requiring more pillows to sleep at night, or needing to sit up at night to sleep.  · Feeling weak, dizzy or fatigued.     When to call your Doctor:  · Call [facility] seven days a week from 8:00 a.m. to 8:00 p.m. for medical questions [phone].  · Call your Primary Care Physician or Cardiologist if:   1. You experience any pain radiating to your jaw or neck.  2. You have any difficulty breathing.  3. You experience weight gain of 3 lbs in a day or 5 lbs in a week.   4. You feel any palpitations or irregular heartbeats.  5. You become dizzy or lose consciousness.   If you have had an angiogram or had a pacemaker or AICD placed, and experience:  1. Bleeding, drainage or swelling at the surgical / puncture site.  2. Fever greater than 100.0 F  3. Shock from internal defibrillator.  4. Cool and / or numb extremities.      · Is patient discharged on Warfarin / Coumadin?   No     Depression / Suicide Risk    As you are discharged from this [facility], it is important to learn how to keep safe from harming yourself.    Recognize the warning signs:  · Abrupt changes in personality, positive or negative- including increase in energy   · Giving away possessions  · Change in  eating patterns- significant weight changes-  positive or negative  · Change in sleeping patterns- unable to sleep or sleeping all the time   · Unwillingness or inability to communicate  · Depression  · Unusual sadness, discouragement and loneliness  · Talk of wanting to die  · Neglect of personal appearance   · Rebelliousness- reckless behavior  · Withdrawal from people/activities they love  · Confusion- inability to concentrate     If you or a loved one observes any of these behaviors or has concerns about self-harm, here's what you can do:  · Talk about it- your feelings and reasons for harming yourself  · Remove any means that you might use to hurt yourself (examples: pills, rope, extension cords, firearm)  · Get professional help from the community (Mental Health, Substance Abuse, psychological counseling)  · Do not be alone:Call your Safe Contact- someone whom you trust who will be there for you.  · Call your local CRISIS HOTLINE 745-2952 or 740-890-5497  · Call your local Children's Mobile Crisis Response Team [place] (190) 353-2530 or [employer]  · Call the toll free National Suicide Prevention Hotlines   · National Suicide Prevention Lifeline 117-202-WTSX (1563)  · National Hope Line Network 800-SUICIDE (227-4671)                Heart Failure  Heart failure is a condition in which the heart has trouble pumping blood because it has become weak or stiff. This means that the heart does not pump blood efficiently for the body to work well. For some people with heart failure, fluid may back up into the lungs and there may be swelling (edema) in the lower legs. Heart failure is usually a long-term (chronic) condition. It is important for you to take good care of yourself and follow the treatment plan from your health care provider.  What are the causes?  This condition is caused by some health problems, including:  · High blood pressure (hypertension). Hypertension causes the heart muscle to work  harder than normal. High blood pressure eventually causes the heart to become stiff and weak.  · Coronary artery disease (CAD). CAD is the buildup of cholesterol and fat (plaques) in the arteries of the heart.  · Heart attack (myocardial infarction). Injured tissue, which is caused by the heart attack, does not contract as well and the heart's ability to pump blood is weakened.  · Abnormal heart valves. When the heart valves do not open and close properly, the heart muscle must pump harder to keep the blood flowing.  · Heart muscle disease (cardiomyopathy or myocarditis). Heart muscle disease is damage to the heart muscle from a variety of causes, such as drug or alcohol abuse, infections, or unknown causes. These can increase the risk of heart failure.  · Lung disease. When the lungs do not work properly, the heart must work harder.  What increases the risk?  Risk of heart failure increases as a person ages. This condition is also more likely to develop in people who:  · Are overweight.  · Are male.  · Smoke or chew tobacco.  · Abuse alcohol or illegal drugs.  · Have taken medicines that can damage the heart, such as chemotherapy drugs.  · Have diabetes.  ¨ High blood sugar (glucose) is associated with high fat (lipid) levels in the blood.  ¨ Diabetes can also damage tiny blood vessels that carry nutrients to the heart muscle.  · Have abnormal heart rhythms.  · Have thyroid problems.  · Have low blood counts (anemia).  What are the signs or symptoms?  Symptoms of this condition include:  · Shortness of breath with activity, such as when climbing stairs.  · Persistent cough.  · Swelling of the feet, ankles, legs, or abdomen.  · Unexplained weight gain.  · Difficulty breathing when lying flat (orthopnea).  · Waking from sleep because of the need to sit up and get more air.  · Rapid heartbeat.  · Fatigue and loss of energy.  · Feeling light-headed, dizzy, or close to fainting.  · Loss of  appetite.  · Nausea.  · Increased urination during the night (nocturia).  · Confusion.  How is this diagnosed?  This condition is diagnosed based on:  · Medical history, symptoms, and a physical exam.  · Diagnostic tests, which may include:  ¨ Echocardiogram.  ¨ Electrocardiogram (ECG).  ¨ Chest X-ray.  ¨ Blood tests.  ¨ Exercise stress test.  ¨ Radionuclide scans.  ¨ Cardiac catheterization and angiogram.  How is this treated?  Treatment for this condition is aimed at managing the symptoms of heart failure. Medicines, behavioral changes, or other treatments may be necessary to treat heart failure.  Medicines   These may include:  · Angiotensin-converting enzyme (ACE) inhibitors. This type of medicine blocks the effects of a blood protein called angiotensin-converting enzyme. ACE inhibitors relax (dilate) the blood vessels and help to lower blood pressure.  · Angiotensin receptor blockers (ARBs). This type of medicine blocks the actions of a blood protein called angiotensin. ARBs dilate the blood vessels and help to lower blood pressure.  · Water pills (diuretics). Diuretics cause the kidneys to remove salt and water from the blood. The extra fluid is removed through urination, leaving a lower volume of blood that the heart has to pump.  · Beta blockers. These improve heart muscle strength and they prevent the heart from beating too quickly.  · Digoxin. This increases the force of the heartbeat.  Healthy behavior changes   These may include:  · Reaching and maintaining a healthy weight.  · Stopping smoking or chewing tobacco.  · Eating heart-healthy foods.  · Limiting or avoiding alcohol.  · Stopping use of street drugs (illegal drugs).  · Physical activity.  Other treatments   These may include:  · Surgery to open blocked coronary arteries or repair damaged heart valves.  · Placement of a biventricular pacemaker to improve heart muscle function (cardiac resynchronization therapy). This device paces both the right  ventricle and left ventricle.  · Placement of a device to treat serious abnormal heart rhythms (implantable cardioverter defibrillator, or ICD).  · Placement of a device to improve the pumping ability of the heart (left ventricular assist device, or LVAD).  · Heart transplant. This can cure heart failure, and it is considered for certain patients who do not improve with other therapies.  Follow these instructions at home:  Medicines  · Take over-the-counter and prescription medicines only as told by your health care provider. Medicines are important in reducing the workload of your heart, slowing the progression of heart failure, and improving your symptoms.  ¨ Do not stop taking your medicine unless your health care provider told you to do that.  ¨ Do not skip any dose of medicine.  ¨ Refill your prescriptions before you run out of medicine. You need your medicines every day.  Eating and drinking  · Eat heart-healthy foods. Talk with a dietitian to make an eating plan that is right for you.  ¨ Choose foods that contain no trans fat and are low in saturated fat and cholesterol. Healthy choices include fresh or frozen fruits and vegetables, fish, lean meats, legumes, fat-free or low-fat dairy products, and whole-grain or high-fiber foods.  ¨ Limit salt (sodium) if directed by your health care provider. Sodium restriction may reduce symptoms of heart failure. Ask a dietitian to recommend heart-healthy seasonings.  ¨ Use healthy cooking methods instead of frying. Healthy methods include roasting, grilling, broiling, baking, poaching, steaming, and stir-frying.  · Limit your fluid intake if directed by your health care provider. Fluid restriction may reduce symptoms of heart failure.  Lifestyle  · Stop smoking or using chewing tobacco. Nicotine and tobacco can damage your heart and your blood vessels. Do not use nicotine gum or patches before talking to your health care provider.  · Limit alcohol intake to no more than  1 drink per day for non-pregnant women and 2 drinks per day for men. One drink equals 12 oz of beer, 5 oz of wine, or 1½ oz of hard liquor.  ¨ Drinking more than that is harmful to your heart. Tell your health care provider if you drink alcohol several times a week.  ¨ Talk with your health care provider about whether any level of alcohol use is safe for you.  ¨ If your heart has already been damaged by alcohol or you have severe heart failure, drinking alcohol should be stopped completely.  · Stop use of illegal drugs.  · Lose weight if directed by your health care provider. Weight loss may reduce symptoms of heart failure.  · Do moderate physical activity if directed by your health care provider. People who are elderly and people with severe heart failure should consult with a health care provider for physical activity recommendations.  Monitor important information  · Weigh yourself every day. Keeping track of your weight daily helps you to notice excess fluid sooner.  ¨ Weigh yourself every morning after you urinate and before you eat breakfast.  ¨ Wear the same amount of clothing each time you weigh yourself.  ¨ Record your daily weight. Provide your health care provider with your weight record.  · Monitor and record your blood pressure as told by your health care provider.  · Check your pulse as told by your health care provider.  Dealing with extreme temperatures  · If the weather is extremely hot:  ¨ Avoid vigorous physical activity.  ¨ Use air conditioning or fans or seek a cooler location.  ¨ Avoid caffeine and alcohol.  ¨ Wear loose-fitting, lightweight, and light-colored clothing.  · If the weather is extremely cold:  ¨ Avoid vigorous physical activity.  ¨ Layer your clothes.  ¨ Wear mittens or gloves, a hat, and a scarf when you go outside.  ¨ Avoid alcohol.  General instructions  · Manage other health conditions such as hypertension, diabetes, thyroid disease, or abnormal heart rhythms as told by your  health care provider.  · Learn to manage stress. If you need help to do this, ask your health care provider.  · Plan rest periods when fatigued.  · Get ongoing education and support as needed.  · Participate in or seek rehabilitation as needed to maintain or improve independence and quality of life.  · Stay up to date with immunizations. Keeping current on pneumococcal and influenza immunizations is especially important to prevent respiratory infections.  · Keep all follow-up visits as told by your health care provider. This is important.  Contact a health care provider if:  · You have a rapid weight gain.  · You have increasing shortness of breath that is unusual for you.  · You are unable to participate in your usual physical activities.  · You tire easily.  · You cough more than normal, especially with physical activity.  · You have any swelling or more swelling in areas such as your hands, feet, ankles, or abdomen.  · You are unable to sleep because it is hard to breathe.  · You feel like your heart is beating quickly (palpitations).  · You become dizzy or light-headed when you stand up.  Get help right away if:  · You have difficulty breathing.  · You notice or your family notices a change in your awareness, such as having trouble staying awake or having difficulty with concentration.  · You have pain or discomfort in your chest.  · You have an episode of fainting (syncope).  This information is not intended to replace advice given to you by your health care provider. Make sure you discuss any questions you have with your health care provider.  Document Released: 12/18/2006 Document Revised: 08/22/2017 Document Reviewed: 07/12/2017  [employer] Interactive Patient Education © 2017 [employer]            2 Gram Low Sodium Diet  A 2 gram sodium diet restricts the amount of sodium in the diet to no more than 2 g or 2000 mg daily. Limiting the amount of sodium is often used to help lower blood pressure. It is  "important if you have heart, liver, or kidney problems. Many foods contain sodium for flavor and sometimes as a preservative. When the amount of sodium in a diet needs to be low, it is important to know what to look for when choosing foods and drinks. The following includes some information and guidelines to help make it easier for you to adapt to a low sodium diet.  QUICK TIPS  · Do not add salt to food.  · Avoid convenience items and fast food.  · Choose unsalted snack foods.  · Buy lower sodium products, often labeled as \"lower sodium\" or \"no salt added.\"  · Check food labels to learn how much sodium is in 1 serving.  · When eating at a restaurant, ask that your food be prepared with less salt or none, if possible.  READING FOOD LABELS FOR SODIUM INFORMATION  The nutrition facts label is a good place to find how much sodium is in foods. Look for products with no more than 500 to 600 mg of sodium per meal and no more than 150 mg per serving.  Remember that 2 g = 2000 mg.  The food label may also list foods as:  · Sodium-free: Less than 5 mg in a serving.  · Very low sodium: 35 mg or less in a serving.  · Low-sodium: 140 mg or less in a serving.  · Light in sodium: 50% less sodium in a serving. For example, if a food that usually has 300 mg of sodium is changed to become light in sodium, it will have 150 mg of sodium.  · Reduced sodium: 25% less sodium in a serving. For example, if a food that usually has 400 mg of sodium is changed to reduced sodium, it will have 300 mg of sodium.  CHOOSING FOODS  Grains  · Avoid: Salted crackers and snack items. Some cereals, including instant hot cereals. Bread stuffing and biscuit mixes. Seasoned rice or pasta mixes.  · Choose: Unsalted snack items. Low-sodium cereals, oats, puffed wheat and rice, shredded wheat. English muffins and bread. Pasta.  Meats  · Avoid: Salted, canned, smoked, spiced, pickled meats, including fish and poultry. Conte, ham, sausage, cold cuts, hot dogs, " anchovies.  · Choose: Low-sodium canned tuna and salmon. Fresh or frozen meat, poultry, and fish.  Dairy  · Avoid: Processed cheese and spreads. Cottage cheese. Buttermilk and condensed milk. Regular cheese.  · Choose: Milk. Low-sodium cottage cheese. Yogurt. Sour cream. Low-sodium cheese.  Fruits and Vegetables  · Avoid: Regular canned vegetables. Regular canned tomato sauce and paste. Frozen vegetables in sauces. Olives. Pickles. Relishes. Sauerkraut.  · Choose: Low-sodium canned vegetables. Low-sodium tomato sauce and paste. Frozen or fresh vegetables. Fresh and frozen fruit.  Condiments  · Avoid: Canned and packaged gravies. Worcestershire sauce. Tartar sauce. Barbecue sauce. Soy sauce. Steak sauce. Ketchup. Onion, garlic, and table salt. Meat flavorings and tenderizers.  · Choose: Fresh and dried herbs and spices. Low-sodium varieties of mustard and ketchup. Lemon juice. Tabasco sauce. Horseradish.  SAMPLE 2 GRAM SODIUM MEAL PLAN  Breakfast / Sodium (mg)  · 1 cup low-fat milk / 143 mg  · 2 slices whole-wheat toast / 270 mg  · 1 tbs heart-healthy margarine / 153 mg  · 1 hard-boiled egg / 139 mg  · 1 small orange / 0 mg  Lunch / Sodium (mg)  · 1 cup raw carrots / 76 mg  · ½ cup hummus / 298 mg  · 1 cup low-fat milk / 143 mg  · ½ cup red grapes / 2 mg  · 1 whole-wheat alan bread / 356 mg  Dinner / Sodium (mg)  · 1 cup whole-wheat pasta / 2 mg  · 1 cup low-sodium tomato sauce / 73 mg  · 3 oz lean ground beef / 57 mg  · 1 small side salad (1 cup raw spinach leaves, ½ cup cucumber, ¼ cup yellow bell pepper) with 1 tsp olive oil and 1 tsp red wine vinegar / 25 mg  Snack / Sodium (mg)  · 1 container low-fat vanilla yogurt / 107 mg  · 3 eneida cracker squares / 127 mg  Nutrient Analysis  · Calories: 2033  · Protein: 77 g  · Carbohydrate: 282 g  · Fat: 72 g  · Sodium: 1971 mg  Document Released: 12/18/2006 Document Revised: 03/11/2013 Document Reviewed: 03/21/2011  [employer]® Patient Information ©2014 [facility]  [facility].

## 2019-10-14 NOTE — CARE PLAN
Problem: Safety  Goal: Will remain free from injury  Outcome: PROGRESSING AS EXPECTED  Note:   Patient's risk for injury and falls assessed. Appropriate safety precautions in place. Patient educated to utilize call light for needs. Patient verbalizes understanding.      Problem: Bowel/Gastric:  Goal: Will not experience complications related to bowel motility  Outcome: PROGRESSING AS EXPECTED  Note:   Patient bowel function is assessed. Education provided on diet, fluid intake and activities that promote bowel function. Toileting at scheduled intervals in place for patient. Patient verbalizes understanding.

## 2019-10-14 NOTE — DISCHARGE SUMMARY
Internal Medicine Discharge Summary  Note Author: Lai Buck M.D.       Name Lai Dailey 1970   Age/Sex 49 y.o. male   MRN [id number]         Admit Date:  10/11/2019       Discharge Date:   10/14/2019    Service:   [facility]  Attending Physician(s):   Elliot Reyes M.D., Jeb Posey M.D.       Senior Resident(s):   Jennifer Solis M.D., Lai Buck M.D.  Jered Resident(s):   Ani Coronel M.D.  PCP: Tor Rdz M.D.      Primary Diagnosis:   Acute on chronic diastolic ACC/AHA stage C congestive heart failure (HCC)    Secondary Diagnoses:                Principal Problem:    Acute on chronic diastolic ACC/AHA stage C congestive heart failure (HCC) POA: Yes  Active Problems:    Severe mitral regurgitation POA: Yes    Essential hypertension POA: Yes    Diabetes (HCC) POA: Yes    Alkaline phosphatase elevation POA: Yes  Resolved Problems:    Hypomagnesemia POA: Yes        Hospital Summary (Brief Narrative):       Mr. Dailey is a pleasant 49-year-old gentleman with a history of HFrEF 20% secondary to nonischemic cardiomyopathy status post AICD placement, paroxysmal atrial fibrillation on apixaban, on diabetes mellitus type 2, who was admitted for acute exacerbation of CHF.  His symptoms included worsening shortness of breath on exertion as well as increasing lower extremity edema and weight gain for past 48 hours.  BMP was 3563 with mild elevation of troponin T at 29 likely from demand ischemia.  EKG showed ventricular paced rhythm and underlying atrial fibrillation with no significant ST changes.  Patient was aggressively diuresed with IV furosemide up to 40 mg IV twice daily.  Patient's weight dropped from 230 pounds to a dry weight of 208 by the day of his discharge.  His home medications were resumed.  Repeat echocardiogram showed no significant change with EF 20%, global hypokinesis with regional variation, abnormal diastolic function, and reduced right  ventricular systolic function.  Patient was also noted to have severe mitral and tricuspid regurgitation.  Cardiology was consulted for consideration of severe mitral clipping.  The option of mitral valve clipping was discussed with the patient, but the final decision was deferred until further discussion with his regular cardiologist, Dr. Solis.  Patient continued to recover well and was discharged in stable medical condition.          Patient /Hospital Summary (Details -- Problem Oriented) :          Severe mitral regurgitation  Assessment & Plan  As per above.    * Acute on chronic diastolic ACC/AHA stage C congestive heart failure (HCC)  Assessment & Plan  As per above.    Essential hypertension  Assessment & Plan  Patient's BP in the ED at arrival was 163/104 and improved with further diuresis and continuation of his home medications.    Alkaline phosphatase elevation  Assessment & Plan  Patient had a history of elevated alkaline phosphatase in the past.  His alkaline phosphatase levels remained in the level of 126-137.  AST and ALT levels were within normal limits indicating a cholestasis picture.  Recommend outpatient follow-up and further work-up if indicated.    Diabetes (HCC)  Assessment & Plan  History of diabetes mellitus requiring insulin.  Hemoglobin A1c was less than 7.3 in April.  He was maintained on a lispro insulin sliding scale with good blood glucose control during his hospitalization.    Hypomagnesemia-resolved as of 10/14/2019  Assessment & Plan  Presented with magnesium level 1.5 which was repleted to level of 1.7.  He was discharged on oral magnesium oxide for continued replacement.      Consultants:     Cardiology-Alejandro Salas M.D.    Procedures:        None.    Imaging/ Testing:      EC-ECHOCARDIOGRAM COMPLETE W/ CONT   Final Result   Transthoracic  Echo Report      Echocardiography Laboratory    CONCLUSIONS  Severely reduced left ventricular systolic function.  Left ventricular  ejection fraction is visually estimated to be 20%.  Global hypokinesis with regional variation.  Diastolic function is abnormal, but grade cannot be determined.  Reduced right ventricular systolic function.  Severe mitral regurgitation.  Severe tricuspid regurgitation.  Compared to the images of the prior study done 5/13/18 -  there has   been no significant change.        CT-CTA CHEST PULMONARY ARTERY W/ RECONS   Final Result         1. No CT evidence of pulmonary embolism.      2. Patchy opacity in the right middle lobe and linear opacity in the left upper lobe could relate to atelectasis. Infection is in the differential.      3. Cardiomegaly.            DX-CHEST-PORTABLE (1 VIEW)   Final Result      Enlarged cardiac silhouette without evidence of acute disease.          Discharge Medications:         Medication Reconciliation: Completed       Medication List      Start taking these medications      Instructions   magnesium oxide 400 (241.3 Mg) MG Tabs tablet  Commonly known as:  MAG-OX   Take 0.5 Tabs by mouth 2 times a day.  Dose:  200 mg        Continue taking these medications      Instructions   apixaban 5mg Tabs  Commonly known as:  ELIQUIS   Take 1 Tab by mouth 2 Times a Day.  Dose:  5 mg     digoxin 125 MCG Tabs  Commonly known as:  LANOXIN   Take 1 Tab by mouth every morning.  Dose:  125 mcg     furosemide 20 MG Tabs  Commonly known as:  LASIX   Take 0.5 Tabs by mouth 2 times a day as needed (swelling).  Dose:  10 mg     insulin glargine 100 UNIT/ML Soln  Commonly known as:  LANTUS   Inject 10 Units as instructed every evening.  Dose:  10 Units     losartan 100 MG Tabs  Commonly known as:  COZAAR   Take 1 Tab by mouth every day.  Dose:  100 mg     metFORMIN 500 MG Tabs  Commonly known as:  GLUCOPHAGE   Take 1 Tab by mouth 2 times a day, with meals.  Dose:  500 mg     metoprolol 200 MG XL tablet  Commonly known as:  TOPROL-XL   Take 1 Tab by mouth every day.  Dose:  200 mg     spironolactone 25 MG  Tabs  Commonly known as:  ALDACTONE   Take 1 Tab by mouth every day.  Dose:  25 mg     tamsulosin 0.4 MG capsule  Commonly known as:  FLOMAX   Take 0.4 mg by mouth every morning.  Dose:  0.4 mg          Disposition:   To home.    Diet:   Diabetic, cardiac, low sodium diet.    Activity:   As tolerated    Instructions:      The patient was instructed to return to the ER in the event of worsening symptoms. I have counseled the patient on the importance of compliance and the patient has agreed to proceed with all medical recommendations and follow up plan indicated above.   The patient understands that all medications come with benefits and risks. Risks may include permanent injury or death and these risks can be minimized with close reassessment and monitoring.        Primary Care Provider:    Tor Rdz M.D.  Discharge summary faxed to primary care provider:  Completed  Copy of discharge summary given to the patient: Deferred      Follow up appointment details :      No future appointments.  Tor Rdz M.D.  [address] [facility] [address]  [phone]    Go on 10/17/2019  Please arrive at 8:30 am for your appointment . Thank you     Joss Solis M.D.  [address]  [address]  [facility] [address]  [phone]    In 1 week        Pending Studies:        None.    Time spent on discharge day patient visit, preparing discharge paperwork and arranging for patient follow up.    Summary of follow up issues:   -Consider increase in dose of furosemide if signs of fluid overload.  -Follow-up with Dr. Solis for further discussion regarding the option of a mitral valve clip.    Discharge Time (Minutes) :    52  Hospital Course Type:  Inpatient Stay >2 midnights      Condition on Discharge    ______________________________________________________________________    Interval history/exam for day of discharge:    Patient's lower extremity pitting edema resolved.  He had no JVD.  His shortness of breath  resolved.  He was ambulating and tolerating a diet.  He was resumed on his home medications on the day of his discharge.  Patient's weight was 217 pounds on the day of admission and 208 pounds on the day of discharge.      Vitals:    10/14/19 0325 10/14/19 0800 10/14/19 0900 10/14/19 1203   BP: 110/80 111/81  107/77   Pulse: 72 80  80   Resp: 18 18  16   Temp: 37 °C (98.6 °F) 37 °C (98.6 °F)  36.7 °C (98.1 °F)   TempSrc: Temporal Temporal  Temporal   SpO2: 96% 97%  95%   Weight:   94.6 kg (208 lb 8.9 oz)    Height:           Physical Exam   Constitutional: He is oriented to person, place, and time. He appears well-developed and well-nourished. No distress.   HENT:   Head: Normocephalic and atraumatic.   Right Ear: External ear normal.   Left Ear: External ear normal.   Nose: Nose normal.   Mouth/Throat: Oropharynx is clear and moist. No oropharyngeal exudate.   Eyes: Pupils are equal, round, and reactive to light. Conjunctivae and EOM are normal. Right eye exhibits no discharge. Left eye exhibits no discharge. No scleral icterus.   Neck: No JVD present. No tracheal deviation present. No thyromegaly present.   Cardiovascular: Normal rate, regular rhythm, normal heart sounds and intact distal pulses. Exam reveals no gallop and no friction rub.   No murmur heard.  Ventricularly paced   Pulmonary/Chest: Effort normal and breath sounds normal. No stridor. No respiratory distress. He has no wheezes. He has no rales.   Abdominal: Soft. Bowel sounds are normal. He exhibits no distension and no mass. There is no tenderness. There is no rebound and no guarding.   Musculoskeletal: He exhibits no edema, tenderness or deformity.   Lymphadenopathy:     He has no cervical adenopathy.   Neurological: He is alert and oriented to person, place, and time. No cranial nerve deficit. He exhibits normal muscle tone.   Skin: Skin is warm and dry. No rash noted. He is not diaphoretic. No erythema. No pallor.   Psychiatric: He has a normal  mood and affect. His behavior is normal. Judgment and thought content normal.   Nursing note and vitals reviewed.      Most Recent Labs:    Lab Results   Component Value Date/Time    WBC 5.0 10/13/2019 11:44 AM    RBC 5.39 10/13/2019 11:44 AM    HEMOGLOBIN 15.8 10/13/2019 11:44 AM    HEMATOCRIT 50.3 10/13/2019 11:44 AM    MCV 93.3 10/13/2019 11:44 AM    MCH 29.3 10/13/2019 11:44 AM    MCHC 31.4 (L) 10/13/2019 11:44 AM    MPV 10.2 10/13/2019 11:44 AM    NEUTSPOLYS 47.90 10/13/2019 11:44 AM    LYMPHOCYTES 31.40 10/13/2019 11:44 AM    MONOCYTES 17.70 (H) 10/13/2019 11:44 AM    EOSINOPHILS 1.80 10/13/2019 11:44 AM    BASOPHILS 1.00 10/13/2019 11:44 AM    HYPOCHROMIA 1+ 03/26/2014 02:15 AM      Lab Results   Component Value Date/Time    SODIUM 133 (L) 10/14/2019 03:38 AM    POTASSIUM 3.8 10/14/2019 03:38 AM    CHLORIDE 98 10/14/2019 03:38 AM    CO2 26 10/14/2019 03:38 AM    GLUCOSE 148 (H) 10/14/2019 03:38 AM    BUN 22 10/14/2019 03:38 AM    CREATININE 0.95 10/14/2019 03:38 AM      Lab Results   Component Value Date/Time    ALTSGPT 23 10/13/2019 11:44 AM    ASTSGOT 33 10/13/2019 11:44 AM    ALKPHOSPHAT 126 (H) 10/13/2019 11:44 AM    TBILIRUBIN 1.8 (H) 10/13/2019 11:44 AM    DBILIRUBIN 0.4 07/05/2018 02:28 PM    LIPASE 30 05/12/2018 02:37 PM    ALBUMIN 4.0 10/13/2019 11:44 AM    GLOBULIN 3.9 (H) 10/13/2019 11:44 AM    INR 1.28 (H) 06/05/2018 03:01 PM     Lab Results   Component Value Date/Time    PROTHROMBTM 15.7 (H) 06/05/2018 03:01 PM    INR 1.28 (H) 06/05/2018 03:01 PM

## 2019-10-14 NOTE — CONSULTS
"CARDIAC CONSULTATION    Requesting Provider: Dr. Buck  Reason for consultation: Severe mitral regurgitation, ? mitraclip    Impressions:  #. Acute decompensated systolic heart failure   No euvolemic  #. Non-ischemic cardiomyopathy with biventricular dysfunction   Severely Dilated LV and moderately dilated RV   Normal coronary angiogram in 2014  #. Chronic atrial fibrillation  #. Severe MR and TR  #. CRT-D Device in place with underlying heart block  #. Diabetes      Recommendations:  Resume home meds    Spoke to patient and son at length regarding mitraclip and its use in functional MR. We discussed the discordant clinical trials and my estimation that he is more like the participants in the Neutral byron-FR trial than the COAPT trial. Despite this, mitraclip could be used. I advised he discuss this with Dr. Solis at his next appointment.     Will sign off. Please call with questions. Thank you.     History: Lai Dailey is a 49 y.o. male with a past medical history of diabetes, systolic heart failure and non-ischemic cardiomyopathy who I have been consulted regarding mitraclip. Recently, he took a car trip to [place] during which he developed substantial edema and gained 30 lbs which did not respond to diuretic adjustments. He has been hospitalized since late last week and returned to his dry weight after a brisk diuresis. An echocardiogram showed stable cardiac function with chronic MR/TR.     ROS:   All other systems reviewed and negative except as per the HPI    PE:  /81   Pulse 80   Temp 37 °C (98.6 °F) (Temporal)   Resp 18   Ht 1.753 m (5' 9\")   Wt 94.6 kg (208 lb 8.9 oz)   SpO2 97%   BMI 30.80 kg/m²   GEN: Well appearing  HEENT: Symmetric face. Anicteric sclerae. Moist mucus membranes  NECK: No JVD No lymphadenopathy  CARDIAC: enlarged PMI, regular, normal S1, S2. Holosystolic murmur at the apex  VASCULATURE: small volume carotid amplitude without bruit. Peripheral pulses normal  RESP: Clear to " auscultation bilaterally  ABD: Soft, non-tender, non-distended  EXT: No edema, no clubbing or cyanosis  SKIN: Warm and dry  NEURO: No gross deficits   PSYCH: Appropriate affect, participates in conversation      Past Medical History:   Diagnosis Date   • CHF (congestive heart failure) (HCC)    • Diabetes (HCC)    • Hyperlipidemia    • Hypertension      Past Surgical History:   Procedure Laterality Date   • AICD IMPLANT  2010     No Known Allergies      Current Facility-Administered Medications:   •  furosemide (LASIX) injection 40 mg, 40 mg, Intravenous, BID DIURETIC, Jennifer Solis M.D., 40 mg at 10/14/19 0538  •  senna-docusate (PERICOLACE or SENOKOT S) 8.6-50 MG per tablet 2 Tab, 2 Tab, Oral, BID **AND** polyethylene glycol/lytes (MIRALAX) PACKET 1 Packet, 1 Packet, Oral, QDAY PRN **AND** magnesium hydroxide (MILK OF MAGNESIA) suspension 30 mL, 30 mL, Oral, QDAY PRN **AND** bisacodyl (DULCOLAX) suppository 10 mg, 10 mg, Rectal, QDAY PRN, Victorino Nair M.D.  •  apixaban (ELIQUIS) tablet 5 mg, 5 mg, Oral, BID, Victorino Nair M.D., 5 mg at 10/14/19 0538  •  digoxin (LANOXIN) tablet 125 mcg, 125 mcg, Oral, QALYNNETTE, Victorino Nair M.D., 125 mcg at 10/14/19 0538  •  tamsulosin (FLOMAX) capsule 0.4 mg, 0.4 mg, Oral, QAM, Victorino Nair M.D., 0.4 mg at 10/14/19 0600  •  spironolactone (ALDACTONE) tablet 25 mg, 25 mg, Oral, DAILY, Victorino Nair M.D., 25 mg at 10/14/19 0538  •  metoprolol SR (TOPROL XL) tablet 200 mg, 200 mg, Oral, DAILY, Victorino Nair M.D., 200 mg at 10/14/19 0537  •  losartan (COZAAR) tablet 100 mg, 100 mg, Oral, DAILY, Victorino Nair M.D., 100 mg at 10/14/19 0537  •  insulin lispro (HUMALOG) injection 1-6 Units, 1-6 Units, Subcutaneous, 4X/DAY ACHS, 1 Units at 10/14/19 0829 **AND** Accu-Chek ACHS, , , TID PC(S) **AND** NOTIFY MD and PharmD, , , Once **AND** glucose 4 g chewable tablet 16 g, 16 g, Oral, Q15 MIN PRN **AND** DEXTROSE 10% BOLUS 250 mL, 250 mL, Intravenous, Q15 MIN PRN, Victorino UMANA  TIFFANIE Nair  Medications Prior to Admission   Medication Sig Dispense Refill Last Dose   • furosemide (LASIX) 20 MG Tab Take 10 mg by mouth 2 times a day as needed (swelling).   10/11/2019 at 1500   • losartan (COZAAR) 100 MG Tab Take 1 Tab by mouth every day. 30 Tab 11 10/11/2019 at 0830   • tamsulosin (FLOMAX) 0.4 MG capsule Take 0.4 mg by mouth every morning.   10/11/2019 at 0830   • metoprolol SR (TOPROL-XL) 200 MG XL tablet Take 1 Tab by mouth every day. 30 Tab 11 10/11/2019 at 0830   • spironolactone (ALDACTONE) 25 MG Tab Take 1 Tab by mouth every day. 30 Tab 11 10/11/2019 at 0830   • digoxin (LANOXIN) 125 MCG Tab Take 1 Tab by mouth every morning. 30 Tab 11 10/11/2019 at 0830   • apixaban (ELIQUIS) 5mg Tab Take 1 Tab by mouth 2 Times a Day. 60 Tab 11 10/11/2019 at 0830   • insulin glargine (LANTUS) 100 UNIT/ML Solution Inject 10 Units as instructed every evening. 10 mL 0 10/10/2019 at pm   • metformin (GLUCOPHAGE) 500 MG TABS Take 1 Tab by mouth 2 times a day, with meals. 60 Tab 3 10/11/2019 at 0830        Family History   Problem Relation Age of Onset   • Colon Cancer Mother    • Hypertension Sister    • Stroke Brother    • Heart Disease Neg Hx      Social History     Socioeconomic History   • Marital status: Single     Spouse name: Not on file   • Number of children: Not on file   • Years of education: Not on file   • Highest education level: Not on file   Occupational History   • Not on file   Social Needs   • Financial resource strain: Not on file   • Food insecurity:     Worry: Not on file     Inability: Not on file   • Transportation needs:     Medical: Not on file     Non-medical: Not on file   Tobacco Use   • Smoking status: Never Smoker   • Smokeless tobacco: Never Used   Substance and Sexual Activity   • Alcohol use: No     Comment: 2 times a week-beer, NO ALCOHOL AT THIS TIME   • Drug use: No     Comment: Marijuana use as youth   • Sexual activity: Not on file   Lifestyle   • Physical activity:      Days per week: Not on file     Minutes per session: Not on file   • Stress: Not on file   Relationships   • Social connections:     Talks on phone: Not on file     Gets together: Not on file     Attends Buddhism service: Not on file     Active member of club or organization: Not on file     Attends meetings of clubs or organizations: Not on file     Relationship status: Not on file   • Intimate partner violence:     Fear of current or ex partner: Not on file     Emotionally abused: Not on file     Physically abused: Not on file     Forced sexual activity: Not on file   Other Topics Concern   • Not on file   Social History Narrative   • Not on file        Studies  Lab Results   Component Value Date/Time    CHOLSTRLTOT 107 08/03/2018 10:12 AM    LDL 69 08/03/2018 10:12 AM    HDL 29 (A) 08/03/2018 10:12 AM    TRIGLYCERIDE 47 08/03/2018 10:12 AM       Lab Results   Component Value Date/Time    SODIUM 133 (L) 10/14/2019 03:38 AM    POTASSIUM 3.8 10/14/2019 03:38 AM    CHLORIDE 98 10/14/2019 03:38 AM    CO2 26 10/14/2019 03:38 AM    GLUCOSE 148 (H) 10/14/2019 03:38 AM    BUN 22 10/14/2019 03:38 AM    CREATININE 0.95 10/14/2019 03:38 AM     Lab Results   Component Value Date/Time    ALKPHOSPHAT 126 (H) 10/13/2019 11:44 AM    ASTSGOT 33 10/13/2019 11:44 AM    ALTSGPT 23 10/13/2019 11:44 AM    TBILIRUBIN 1.8 (H) 10/13/2019 11:44 AM      Lab Results   Component Value Date/Time    BNPBTYPENAT 1109 (H) 11/26/2018 02:55 PM       For this encounter I directly reviewed ECG tracings, Echo images and medical records. I agree with the interpretations in the electronic health record      Case discussed with Dr. Buck

## 2019-10-14 NOTE — PROGRESS NOTES
Report received from night shift RN, assumed care of pt. Pt A&O4, in no apparent distress, no reports of pain. Plan of care discussed with pt, no questions or needs at this time. Tele box on, rhythm verified. Call light within reach, bed locked and in lowest position. All fall precautions and hourly rounding in place. Will continue to monitor.

## 2019-10-14 NOTE — PROGRESS NOTES
Pt A&Ox4, given discharge instructions. Discussed follow-up, diet, activity, symptoms and management and prescriptions provided, hard copy prescriptions given to patient. Pt verbalized understanding of all discharging instructions. All questions answered. IV d/c'd, tele monitor off, monitor room notified. Pt ambulated off unit with wife.

## 2019-10-14 NOTE — PROGRESS NOTES
Handoff report received. Assumed patient care. PT is sitting in a chair at bedside, dinner complete, AAOx4, no complaints of pain or discomfort. POC discussed with PT and all questions addressed. Safety precautions in place. Call light and personal belongings within reach. Educated to call for assistance if needed.

## 2019-10-14 NOTE — CARE PLAN
Problem: Fluid Volume:  Goal: Will maintain balanced intake and output  Outcome: PROGRESSING AS EXPECTED  Note:   Strict I&Os, pt having adequate urine output, on a 2L fluid restriction. No reports of SOB, pt on Ra.      Problem: Infection  Goal: Will remain free from infection  Outcome: PROGRESSING AS EXPECTED  Note:   Hand hygiene completed before and after patient care. Pt educated about infection prevention and verbalized understanding. RN follows all protocols for infection prevention.

## 2019-10-16 LAB
BACTERIA BLD CULT: NORMAL
BACTERIA BLD CULT: NORMAL
SIGNIFICANT IND 70042: NORMAL
SIGNIFICANT IND 70042: NORMAL
SITE SITE: NORMAL
SITE SITE: NORMAL
SOURCE SOURCE: NORMAL
SOURCE SOURCE: NORMAL

## 2019-11-18 ENCOUNTER — OFFICE VISIT (OUTPATIENT)
Dept: CARDIOLOGY | Facility: MEDICAL CENTER | Age: 49
End: 2019-11-18
Payer: OTHER GOVERNMENT

## 2019-11-18 VITALS
SYSTOLIC BLOOD PRESSURE: 130 MMHG | HEART RATE: 80 BPM | OXYGEN SATURATION: 95 % | WEIGHT: 219 LBS | BODY MASS INDEX: 32.44 KG/M2 | HEIGHT: 69 IN | DIASTOLIC BLOOD PRESSURE: 90 MMHG

## 2019-11-18 DIAGNOSIS — I42.8 NON-ISCHEMIC CARDIOMYOPATHY (HCC): ICD-10-CM

## 2019-11-18 DIAGNOSIS — Z95.810 AICD (AUTOMATIC CARDIOVERTER/DEFIBRILLATOR) PRESENT: ICD-10-CM

## 2019-11-18 DIAGNOSIS — I50.22 STAGE C CHRONIC SYSTOLIC CONGESTIVE HEART FAILURE (HCC): ICD-10-CM

## 2019-11-18 DIAGNOSIS — I48.11 LONGSTANDING PERSISTENT ATRIAL FIBRILLATION (HCC): ICD-10-CM

## 2019-11-18 DIAGNOSIS — I50.9 HEART FAILURE, NYHA CLASS 2 (HCC): ICD-10-CM

## 2019-11-18 DIAGNOSIS — Z95.810 PRESENCE OF BIVENTRICULAR AUTOMATIC CARDIOVERTER/DEFIBRILLATOR (AICD): ICD-10-CM

## 2019-11-18 PROCEDURE — 99215 OFFICE O/P EST HI 40 MIN: CPT | Performed by: INTERNAL MEDICINE

## 2019-11-18 RX ORDER — FUROSEMIDE 20 MG/1
10 TABLET ORAL DAILY
Qty: 30 TAB | Refills: 11 | Status: SHIPPED | OUTPATIENT
Start: 2019-11-18 | End: 2019-12-03 | Stop reason: SDUPTHER

## 2019-11-18 ASSESSMENT — ENCOUNTER SYMPTOMS
BRUISES/BLEEDS EASILY: 0
DIZZINESS: 0
MYALGIAS: 0
DEPRESSION: 0
SHORTNESS OF BREATH: 1
DIAPHORESIS: 0
ABDOMINAL PAIN: 0
BLURRED VISION: 0
FALLS: 0
HEADACHES: 0
MEMORY LOSS: 0
FEVER: 0
COUGH: 0
SENSORY CHANGE: 0
DOUBLE VISION: 0
PALPITATIONS: 0

## 2019-11-19 NOTE — PROGRESS NOTES
Chief Complaint   Patient presents with   • Congestive Heart Failure       Subjective:   Lai Dailey is a 49 y.o. male who presents today cardiac care management in a heart failure program due to prior history of nonischemic cardiomyopathy, mitral valve regurgitation and persistent atrial fibrillation.  Patient does have a left ventricular systolic function of 20%.  He was recently in the hospital for heart failure exacerbation in October 2019.  Per patient's report, he did miss some of his medications prior to his hospitalization because he started working again.    He did have a negative coronary angiogram in 2014 per report.    Patient is feeling better these days. Does get winded upon walking up inclines or for distance. No symptoms at rest or with daily living activities.    I personally interpreted his device interrogation which showed persistent atrial fibrillation, 98% ventricular pacing.    Past Medical History:   Diagnosis Date   • CHF (congestive heart failure) (HCC)    • Diabetes (HCC)    • Hyperlipidemia    • Hypertension      Past Surgical History:   Procedure Laterality Date   • AICD IMPLANT  2010     Family History   Problem Relation Age of Onset   • Colon Cancer Mother    • Hypertension Sister    • Stroke Brother    • Heart Disease Neg Hx      Social History     Socioeconomic History   • Marital status: Single     Spouse name: Not on file   • Number of children: Not on file   • Years of education: Not on file   • Highest education level: Not on file   Occupational History   • Not on file   Social Needs   • Financial resource strain: Not on file   • Food insecurity:     Worry: Not on file     Inability: Not on file   • Transportation needs:     Medical: Not on file     Non-medical: Not on file   Tobacco Use   • Smoking status: Never Smoker   • Smokeless tobacco: Never Used   Substance and Sexual Activity   • Alcohol use: No     Comment: 2 times a week-beer, NO ALCOHOL AT THIS TIME   • Drug use: No      Comment: Marijuana use as youth   • Sexual activity: Not on file   Lifestyle   • Physical activity:     Days per week: Not on file     Minutes per session: Not on file   • Stress: Not on file   Relationships   • Social connections:     Talks on phone: Not on file     Gets together: Not on file     Attends Oriental orthodox service: Not on file     Active member of club or organization: Not on file     Attends meetings of clubs or organizations: Not on file     Relationship status: Not on file   • Intimate partner violence:     Fear of current or ex partner: Not on file     Emotionally abused: Not on file     Physically abused: Not on file     Forced sexual activity: Not on file   Other Topics Concern   • Not on file   Social History Narrative   • Not on file     No Known Allergies  Outpatient Encounter Medications as of 11/18/2019   Medication Sig Dispense Refill   • sacubitril-valsartan (ENTRESTO) 49-51 MG Tab tablet Take 1 Tab by mouth 2 Times a Day. 60 Tab 11   • furosemide (LASIX) 20 MG Tab Take 0.5 Tabs by mouth every day. 30 Tab 11   • magnesium oxide (MAG-OX) 400 (241.3 Mg) MG Tab tablet Take 0.5 Tabs by mouth 2 times a day. 30 Tab 0   • tamsulosin (FLOMAX) 0.4 MG capsule Take 0.4 mg by mouth every morning.     • metoprolol SR (TOPROL-XL) 200 MG XL tablet Take 1 Tab by mouth every day. 30 Tab 11   • spironolactone (ALDACTONE) 25 MG Tab Take 1 Tab by mouth every day. 30 Tab 11   • digoxin (LANOXIN) 125 MCG Tab Take 1 Tab by mouth every morning. 30 Tab 11   • apixaban (ELIQUIS) 5mg Tab Take 1 Tab by mouth 2 Times a Day. 60 Tab 11   • insulin glargine (LANTUS) 100 UNIT/ML Solution Inject 10 Units as instructed every evening. 10 mL 0   • metformin (GLUCOPHAGE) 500 MG TABS Take 1 Tab by mouth 2 times a day, with meals. 60 Tab 3   • [DISCONTINUED] furosemide (LASIX) 20 MG Tab Take 0.5 Tabs by mouth 2 times a day as needed (swelling). 60 Tab 1   • [DISCONTINUED] losartan (COZAAR) 100 MG Tab Take 1 Tab by mouth every  "day. 30 Tab 11     No facility-administered encounter medications on file as of 11/18/2019.      Review of Systems   Constitutional: Negative for diaphoresis and fever.   HENT: Negative for nosebleeds.    Eyes: Negative for blurred vision and double vision.   Respiratory: Positive for shortness of breath. Negative for cough.    Cardiovascular: Negative for chest pain and palpitations.   Gastrointestinal: Negative for abdominal pain.   Genitourinary: Negative for dysuria and frequency.   Musculoskeletal: Negative for falls and myalgias.   Skin: Negative for rash.   Neurological: Negative for dizziness, sensory change and headaches.   Endo/Heme/Allergies: Does not bruise/bleed easily.   Psychiatric/Behavioral: Negative for depression and memory loss.        Objective:   /90 (BP Location: Right arm, Patient Position: Sitting, BP Cuff Size: Adult)   Pulse 80   Ht 1.753 m (5' 9\")   Wt 99.3 kg (219 lb)   SpO2 95%   BMI 32.34 kg/m²     Physical Exam   Constitutional: He is oriented to person, place, and time. No distress.   HENT:   Head: Normocephalic and atraumatic.   Right Ear: External ear normal.   Left Ear: External ear normal.   Eyes: Right eye exhibits no discharge. Left eye exhibits no discharge.   Neck: No JVD present. No thyromegaly present.   Cardiovascular: Normal rate, regular rhythm, normal heart sounds and intact distal pulses. Exam reveals no gallop and no friction rub.   No murmur heard.  Pulmonary/Chest: Breath sounds normal. No respiratory distress.   Abdominal: Bowel sounds are normal. He exhibits no distension. There is no tenderness.   Musculoskeletal:         General: No tenderness or edema.   Neurological: He is alert and oriented to person, place, and time. No cranial nerve deficit.   Skin: Skin is warm and dry. He is not diaphoretic.   Psychiatric: He has a normal mood and affect. His behavior is normal.   Nursing note and vitals reviewed.      Assessment:     1. Stage C chronic " systolic congestive heart failure (HCC)  Basic Metabolic Panel    REFERRAL TO SLEEP STUDIES   2. Heart failure, NYHA class 2 (HCC)  Basic Metabolic Panel    REFERRAL TO SLEEP STUDIES   3. Longstanding persistent atrial fibrillation  REFERRAL TO SLEEP STUDIES   4. AICD (automatic cardioverter/defibrillator) present     5. Non-ischemic cardiomyopathy (HCC)     6. Presence of biventricular automatic cardioverter/defibrillator (AICD)         Medical Decision Making:  Today's Assessment / Status / Plan:   Non-Ischemic Cardiomyopathy:  Chronically illed condition which requires ongoing close monitoring and treatment to improve survival rate along with decreasing risk of clinical decompensation and hospitalization.    Today, based on physical examination findings, patient is euvolemic. No JVD, lungs are clear to auscultation, no pitting edema in bilateral lower extremities, no ascites.     Dry weight is 219 lbs.    Will stop Losartan.    Will start Entresto 49/51 mg twice a day.     Continue Toprol 200 mg po daily.     Diuretic with Furosemide 20 mg 1x daily.     Aldactone antagonist with Spironolactone / Eplerenone 25 mg daily.    At this time, I want to optimize his medical therapy before thinking about mitral clip at this time.  Patient does have occasional missed dosing at home.  Therefore, I advised patient to take all of his medications as he is all the time without missing doses.  In the future, we will also consider cardioversion to treat persistent atrial fibrillation.  However, as mentioned above, I do want patient to be completely compliant with his current medical regimen before thinking about invasive procedures.    Persistent Atrial Fibrillation:  Anticoagulation with Eliquis 5 mg 2x daily.    Hypertension:  Optimize control using cardiomyopathy medical regimen as well.    Will continue to closely monitor for side effects of patient's high risk medication(s) including liver, renal function and  electrolytes.    I will see patient back in our Heart Failure Clinic with lab tests and studies results in 4 weeks.    I thank you for referring patient to our Heart Failure Clinic today.

## 2019-12-03 DIAGNOSIS — I50.9 HEART FAILURE, NYHA CLASS 2 (HCC): ICD-10-CM

## 2019-12-03 DIAGNOSIS — I50.22 STAGE C CHRONIC SYSTOLIC CONGESTIVE HEART FAILURE (HCC): ICD-10-CM

## 2019-12-03 RX ORDER — DIGOXIN 125 MCG
125 TABLET ORAL EVERY MORNING
Qty: 90 TAB | Refills: 3 | Status: SHIPPED | OUTPATIENT
Start: 2019-12-03 | End: 2021-03-29 | Stop reason: SDUPTHER

## 2019-12-03 RX ORDER — METOPROLOL SUCCINATE 200 MG/1
200 TABLET, EXTENDED RELEASE ORAL DAILY
Qty: 90 TAB | Refills: 3 | Status: SHIPPED | OUTPATIENT
Start: 2019-12-03 | End: 2021-03-29 | Stop reason: SDUPTHER

## 2019-12-03 RX ORDER — SPIRONOLACTONE 25 MG/1
25 TABLET ORAL DAILY
Qty: 90 TAB | Refills: 3 | Status: SHIPPED | OUTPATIENT
Start: 2019-12-03 | End: 2021-03-29 | Stop reason: SDUPTHER

## 2019-12-03 RX ORDER — FUROSEMIDE 20 MG/1
20 TABLET ORAL DAILY
Qty: 90 TAB | Refills: 3 | Status: SHIPPED | OUTPATIENT
Start: 2019-12-03 | End: 2021-03-29 | Stop reason: SDUPTHER

## 2019-12-03 NOTE — PROGRESS NOTES
Patient walks-in to clinic today and states he has been out of his medications because refills never reached his pharmacy at [facility]. He states the furosemide was received but the dose was incorrect. He states he ran out of Digoxin prior to his last visit here and has called and left messages but received no call back. After a thorough med rec review against MD note and recent hosp discharge. Patient reassured I would re-send all of his listed medications to [facility] again and to please check with them this afternoon re: receipt of prescriptions. Contact information provided to him on a card to call me if NOT received. Patient states understanding and is appreciative.

## 2019-12-16 ENCOUNTER — TELEPHONE (OUTPATIENT)
Dept: CARDIOLOGY | Facility: MEDICAL CENTER | Age: 49
End: 2019-12-16

## 2019-12-16 NOTE — TELEPHONE ENCOUNTER
S/w patient about non-fasting labs, he stated that he will be completing his labs tomorrow (12/17). Patient is aware of upcoming appt with REGGIE aBlderrama on 12/18.

## 2019-12-17 ENCOUNTER — HOSPITAL ENCOUNTER (OUTPATIENT)
Dept: LAB | Facility: MEDICAL CENTER | Age: 49
End: 2019-12-17
Attending: INTERNAL MEDICINE
Payer: OTHER GOVERNMENT

## 2019-12-17 DIAGNOSIS — I50.22 STAGE C CHRONIC SYSTOLIC CONGESTIVE HEART FAILURE (HCC): ICD-10-CM

## 2019-12-17 DIAGNOSIS — I50.9 HEART FAILURE, NYHA CLASS 2 (HCC): ICD-10-CM

## 2019-12-17 LAB
ANION GAP SERPL CALC-SCNC: 8 MMOL/L (ref 0–11.9)
BUN SERPL-MCNC: 21 MG/DL (ref 8–22)
CALCIUM SERPL-MCNC: 9.6 MG/DL (ref 8.5–10.5)
CHLORIDE SERPL-SCNC: 101 MMOL/L (ref 96–112)
CO2 SERPL-SCNC: 30 MMOL/L (ref 20–33)
CREAT SERPL-MCNC: 1.07 MG/DL (ref 0.5–1.4)
GLUCOSE SERPL-MCNC: 109 MG/DL (ref 65–99)
POTASSIUM SERPL-SCNC: 4.6 MMOL/L (ref 3.6–5.5)
SODIUM SERPL-SCNC: 139 MMOL/L (ref 135–145)

## 2019-12-17 PROCEDURE — 36415 COLL VENOUS BLD VENIPUNCTURE: CPT

## 2019-12-17 PROCEDURE — 80048 BASIC METABOLIC PNL TOTAL CA: CPT

## 2019-12-19 ENCOUNTER — OFFICE VISIT (OUTPATIENT)
Dept: CARDIOLOGY | Facility: MEDICAL CENTER | Age: 49
End: 2019-12-19
Payer: OTHER GOVERNMENT

## 2019-12-19 VITALS
SYSTOLIC BLOOD PRESSURE: 122 MMHG | WEIGHT: 216 LBS | OXYGEN SATURATION: 94 % | BODY MASS INDEX: 31.99 KG/M2 | HEIGHT: 69 IN | HEART RATE: 80 BPM | DIASTOLIC BLOOD PRESSURE: 80 MMHG

## 2019-12-19 DIAGNOSIS — E78.2 MIXED HYPERLIPIDEMIA: ICD-10-CM

## 2019-12-19 DIAGNOSIS — Z95.810 PRESENCE OF BIVENTRICULAR AUTOMATIC CARDIOVERTER/DEFIBRILLATOR (AICD): ICD-10-CM

## 2019-12-19 DIAGNOSIS — I48.19 PERSISTENT ATRIAL FIBRILLATION (HCC): ICD-10-CM

## 2019-12-19 DIAGNOSIS — Z79.899 HIGH RISK MEDICATION USE: ICD-10-CM

## 2019-12-19 DIAGNOSIS — R06.02 SOB (SHORTNESS OF BREATH): ICD-10-CM

## 2019-12-19 DIAGNOSIS — I42.8 NON-ISCHEMIC CARDIOMYOPATHY (HCC): ICD-10-CM

## 2019-12-19 DIAGNOSIS — I50.9 HEART FAILURE, NYHA CLASS 2 (HCC): ICD-10-CM

## 2019-12-19 DIAGNOSIS — I50.22 STAGE C CHRONIC SYSTOLIC CONGESTIVE HEART FAILURE (HCC): ICD-10-CM

## 2019-12-19 DIAGNOSIS — I10 ESSENTIAL HYPERTENSION: ICD-10-CM

## 2019-12-19 DIAGNOSIS — I48.11 LONGSTANDING PERSISTENT ATRIAL FIBRILLATION (HCC): ICD-10-CM

## 2019-12-19 LAB — EKG IMPRESSION: NORMAL

## 2019-12-19 PROCEDURE — 99214 OFFICE O/P EST MOD 30 MIN: CPT | Mod: 25 | Performed by: NURSE PRACTITIONER

## 2019-12-19 PROCEDURE — 93000 ELECTROCARDIOGRAM COMPLETE: CPT | Performed by: INTERNAL MEDICINE

## 2019-12-19 PROCEDURE — 94618 PULMONARY STRESS TESTING: CPT | Performed by: NURSE PRACTITIONER

## 2019-12-19 RX ORDER — LANOLIN ALCOHOL/MO/W.PET/CERES
CREAM (GRAM) TOPICAL
COMMUNITY
Start: 2019-12-03 | End: 2019-12-19

## 2019-12-19 ASSESSMENT — MINNESOTA LIVING WITH HEART FAILURE QUESTIONNAIRE (MLHF)
MAKING YOU SHORT OF BREATH: 4
FEELING LIKE A BURDEN TO FAMILY AND FRIENDS: 3
DIFFICULTY TO CONCENTRATE OR REMEMBERING THINGS: 2
TIRED, FATIGUED OR LOW ON ENERGY: 4
COSTING YOU MONEY FOR MEDICAL CARE: 4
GIVING YOU SIDE EFFECTS FROM TREATMENTS: 4
WORKING AROUND THE HOUSE OR YARD DIFFICULT: 4
DIFFICULTY WORKING TO EARN A LIVING: 5
DIFFICULTY SLEEPING WELL AT NIGHT: 5
WALKING ABOUT OR CLIMBING STAIRS DIFFICULT: 3
SWELLING IN ANKLES OR LEGS: 4
EATING LESS FOODS YOU LIKE: 4
HAVING TO SIT OR LIE DOWN DURING THE DAY: 4
TOTAL_SCORE: 73
MAKING YOU WORRY: 3
MAKING YOU STAY IN A HOSPITAL: 3
LOSS OF SELF CONTROL IN YOUR LIFE: 1
MAKING YOU FEEL DEPRESSED: 2
DIFFICULTY WITH RECREATIONAL PASTIMES, SPORTS, HOBBIES: 4
DIFFICULTY WITH SEXUAL ACTIVITIES: 5
DIFFICULTY GOING AWAY FROM HOME: 2
DIFFICULTY SOCIALIZING WITH FAMILY OR FRIENDS: 3

## 2019-12-19 ASSESSMENT — ENCOUNTER SYMPTOMS
SHORTNESS OF BREATH: 1
MYALGIAS: 0
PND: 0
CLAUDICATION: 0
COUGH: 0
DIZZINESS: 0
PALPITATIONS: 0
FEVER: 0
ORTHOPNEA: 0
ABDOMINAL PAIN: 0

## 2019-12-19 ASSESSMENT — 6 MINUTE WALK TEST (6MWT): TOTAL DISTANCE WALKED (METERS): 402

## 2019-12-19 NOTE — PROGRESS NOTES
Chief Complaint   Patient presents with   • Congestive Heart Failure       Subjective:   Lai Dailey is a 49 y.o. male who presents today for follow up on his Heart Failure.    Patient of the heart failure clinic.  Patient was last seen in clinic on 11/18/2019 with Dr. Arriaga.  During his last visit, his losartan was discontinued and he was started on Entresto 49/51 mg BID. He reports no problems with the med change, but states he is feeling more fatigued.     For his symptoms, he does report having Mild SIERRA. He states he walks his dogs daily for about 1 mile daily around his neighborhood.  He denies chest pain, palpitations, orthopnea, PND, edema or dizziness/lightheadedness.    Patient reports he only drinks about 1 bottle of water per day.    He reports his weights have been stable around 216 pounds at home.    Patient plans on scheduling a sleep study soon    Patient denies any shocks from his ICD.    At 6 minute walk test re-evaluation, patient was able to complete 402 m during his 6 minute walk test.  His O2 saturation at baseline was 94 % and at the end of the test, the O2 saturation was 93 %.  He reported level 3 of dyspnea on Kol[place] scale. MLWHF 73    Additonally, patient has the following medical problems:     -Has ICD for dilated cardiomyopathy and nonsustained VT     -Diabetes: Taking metformin, insulin, he reports controlled     -Hypertension: Taking metoprolol XL, spironolactone     -Hyperlipidemia: Was taking atorvastatin, stopped at this time due to increased liver function tests       Past Medical History:   Diagnosis Date   • CHF (congestive heart failure) (HCC)    • Diabetes (HCC)    • Hyperlipidemia    • Hypertension      Past Surgical History:   Procedure Laterality Date   • AICD IMPLANT  2010     Family History   Problem Relation Age of Onset   • Colon Cancer Mother    • Hypertension Sister    • Stroke Brother    • Heart Disease Neg Hx      Social History     Socioeconomic History   • Marital  status: Single     Spouse name: Not on file   • Number of children: Not on file   • Years of education: Not on file   • Highest education level: Not on file   Occupational History   • Not on file   Social Needs   • Financial resource strain: Not on file   • Food insecurity:     Worry: Not on file     Inability: Not on file   • Transportation needs:     Medical: Not on file     Non-medical: Not on file   Tobacco Use   • Smoking status: Never Smoker   • Smokeless tobacco: Never Used   Substance and Sexual Activity   • Alcohol use: No     Comment: 2 times a week-beer, NO ALCOHOL AT THIS TIME   • Drug use: No     Comment: Marijuana use as youth   • Sexual activity: Not on file   Lifestyle   • Physical activity:     Days per week: Not on file     Minutes per session: Not on file   • Stress: Not on file   Relationships   • Social connections:     Talks on phone: Not on file     Gets together: Not on file     Attends Hinduism service: Not on file     Active member of club or organization: Not on file     Attends meetings of clubs or organizations: Not on file     Relationship status: Not on file   • Intimate partner violence:     Fear of current or ex partner: Not on file     Emotionally abused: Not on file     Physically abused: Not on file     Forced sexual activity: Not on file   Other Topics Concern   • Not on file   Social History Narrative   • Not on file     No Known Allergies  Outpatient Encounter Medications as of 12/19/2019   Medication Sig Dispense Refill   • sacubitril-valsartan (ENTRESTO) 49-51 MG Tab tablet Take 1 Tab by mouth 2 Times a Day. 180 Tab 3   • furosemide (LASIX) 20 MG Tab Take 1 Tab by mouth every day. 90 Tab 3   • metoprolol (TOPROL-XL) 200 MG XL tablet Take 1 Tab by mouth every day. 90 Tab 3   • spironolactone (ALDACTONE) 25 MG Tab Take 1 Tab by mouth every day. 90 Tab 3   • digoxin (LANOXIN) 125 MCG Tab Take 1 Tab by mouth every morning. 90 Tab 3   • apixaban (ELIQUIS) 5mg Tab Take 1 Tab by  "mouth 2 Times a Day. 180 Tab 3   • magnesium oxide (MAG-OX) 400 (241.3 Mg) MG Tab tablet Take 0.5 Tabs by mouth 2 times a day. 30 Tab 0   • insulin glargine (LANTUS) 100 UNIT/ML Solution Inject 10 Units as instructed every evening. 10 mL 0   • metformin (GLUCOPHAGE) 500 MG TABS Take 1 Tab by mouth 2 times a day, with meals. 60 Tab 3   • Magnesium Oxide 400 (240 Mg) MG Tab      • tamsulosin (FLOMAX) 0.4 MG capsule Take 0.4 mg by mouth every morning.       No facility-administered encounter medications on file as of 12/19/2019.      Review of Systems   Constitutional: Positive for malaise/fatigue. Negative for fever.   Respiratory: Positive for shortness of breath (mild). Negative for cough.    Cardiovascular: Negative for chest pain, palpitations, orthopnea, claudication, leg swelling and PND.   Gastrointestinal: Negative for abdominal pain.   Musculoskeletal: Negative for myalgias.   Neurological: Negative for dizziness.   All other systems reviewed and are negative.       Objective:   /80 (BP Location: Left arm, Patient Position: Sitting, BP Cuff Size: Adult)   Pulse 80   Ht 1.753 m (5' 9\")   Wt 98 kg (216 lb)   SpO2 94%   BMI 31.90 kg/m²     Physical Exam   Constitutional: He is oriented to person, place, and time. He appears well-developed and well-nourished.   HENT:   Head: Normocephalic and atraumatic.   Eyes: Pupils are equal, round, and reactive to light. EOM are normal.   Neck: Normal range of motion. Neck supple. No JVD present.   Cardiovascular: Normal rate, regular rhythm and normal heart sounds.   Pulmonary/Chest: Effort normal and breath sounds normal. No respiratory distress. He has no wheezes. He has no rales.   left chest ICD-no erosion, drainage or Erythema   Abdominal: Soft. Bowel sounds are normal.   Musculoskeletal:         General: Edema (Trace lower extremity edema at ankles) present.   Neurological: He is alert and oriented to person, place, and time.   Skin: Skin is warm and dry. "   Psychiatric: He has a normal mood and affect. His behavior is normal.   Vitals reviewed.    Lab Results   Component Value Date/Time    CHOLSTRLTOT 107 08/03/2018 10:12 AM    LDL 69 08/03/2018 10:12 AM    HDL 29 (A) 08/03/2018 10:12 AM    TRIGLYCERIDE 47 08/03/2018 10:12 AM       Lab Results   Component Value Date/Time    SODIUM 139 12/17/2019 01:08 PM    POTASSIUM 4.6 12/17/2019 01:08 PM    CHLORIDE 101 12/17/2019 01:08 PM    CO2 30 12/17/2019 01:08 PM    GLUCOSE 109 (H) 12/17/2019 01:08 PM    BUN 21 12/17/2019 01:08 PM    CREATININE 1.07 12/17/2019 01:08 PM     Lab Results   Component Value Date/Time    ALKPHOSPHAT 126 (H) 10/13/2019 11:44 AM    ASTSGOT 33 10/13/2019 11:44 AM    ALTSGPT 23 10/13/2019 11:44 AM    TBILIRUBIN 1.8 (H) 10/13/2019 11:44 AM        Heart Cath 3/24/2014  FINDINGS:  Right heart catheterization wedge pressure was about 21.  Pulmonary   artery pressure is 51/40.  RV pressure is 50/4.  Right atrial pressure was   about 10.  The left ventricle was severely dilated with global hypokinesis and   ejection fraction of 13%.  There is mild-to-moderate mitral regurgitation   noted; however, it may be because the ventricle is under field.  The left   coronary ostium is normal without significant atherosclerosis.  It divides   into large circumflex branch, ramus branch, and then LAD that gives off large   diagonal branch.  There is no atherosclerosis in any of these vessels.  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  There is no   atherosclerosis in the right coronary.       CONCLUSION:  Severe nonischemic cardiomyopathy with ejection fraction about   13%, at least mild-to-moderate mitral regurgitation, and no gradient across   the aortic valve, and elevated right-sided pressures.  The patient will need   aggressive heart failure treatment.    Echocardiogram 3/23/2014  CONCLUSIONS  No prior echo  4 chamber dilation  Left ventricular ejection fraction is 15% to 20%.   Grade  III-IV diastolic dysfunction is present. c/w elevated LAp and   LVEDP.   Severe mitral regurgitation.        Transthoracic Echo Report 5/13/18  Left ventricle is severely dilated. Mild concentric left ventricular hypertrophy. Grade III diastolic dysfunction (restrictive pattern).   Severely reduced left ventricular systolic function. Left ventricular ejection fraction is visually estimated to be 20%. Diffuse hypokinesis with septal dyskinesis.  Severe mitral regurgitation.  Severe tricuspid regurgitation. Estimated right ventricular systolic pressure  is 50 mmHg.  Compared to the report of the study done - the TR is now severe.     Transthoracic Echo Report 10/12/2019  Severely reduced left ventricular systolic function.  Left ventricular ejection fraction is visually estimated to be 20%.  Global hypokinesis with regional variation.  Diastolic function is abnormal, but grade cannot be determined.  Reduced right ventricular systolic function.  Severe mitral regurgitation.  Severe tricuspid regurgitation.  Compared to the images of the prior study done 5/13/18 -  there has   been no significant change.       Assessment:     1. Stage C chronic systolic congestive heart failure (HCC)  Basic Metabolic Panel    sacubitril-valsartan (ENTRESTO)  MG Tab tablet   2. Heart failure, NYHA class 2 (HCC)  Basic Metabolic Panel    sacubitril-valsartan (ENTRESTO)  MG Tab tablet   3. Longstanding persistent atrial fibrillation  Basic Metabolic Panel    EKG    EKG   4. Non-ischemic cardiomyopathy (HCC)     5. Presence of biventricular automatic cardioverter/defibrillator (AICD)     6. Persistent atrial fibrillation     7. Essential hypertension     8. Mixed hyperlipidemia     9. SOB (shortness of breath)     10. High risk medication use         Medical Decision Making:  Today's Assessment / Status / Plan:   1. HFrEF, Stage C, Class 2-3, LVEF 20%: Based on physical examination findings, patient is euvolemic. No JVD, lungs  are clear to auscultation, no pitting edema in bilateral lower extremities, no ascites.  -Increase Entresto to  mg twice a day  -Continue metoprolol  mg daily  -Continue spironolactone 25 mg daily  -Continue digoxin 125 mcg daily  -Continue furosemide 20 mg daily  -Consider starting hydralazine and isosorbide nitrate at next visit  -Has a bi-V ICD- will schedule next device check  -Obtain a BMP in 2 weeks  -Discussed repeating echocardiogram once he is optimized on additional medical therapy  -Reinforced s/sx of worsening heart failure with patient and weight monitoring. Pt verbalizes understanding. Pt to call office or RTC if present.     2.  Paroxysmal A. Fib, s/p AV node ablation: EKG today shows A. fib/flutter  -Continue Eliquis 5 mg twice a day  -Discussed with Dr. Arriaga, will consider cardioversion once patient is optimized and compliant on medical therapy    3.  Hypertension: Stable  -Recommendations per above    4.  Hyperlipidemia: Last LDL 69 on 8/3/2018  -Not currently on statin, due to history of elevated LFTs  -We will likely repeat CMP and lipid panel in the future, or see if he has had testing done with his PCP    FU in clinic in 3 to 4 weeks with labs in 2 weeks. Sooner if needed.    Patient verbalizes understanding and agrees with the plan of care.     Collaborating MD: Rosales Arriaga MD

## 2020-01-03 ENCOUNTER — HOSPITAL ENCOUNTER (OUTPATIENT)
Dept: LAB | Facility: MEDICAL CENTER | Age: 50
End: 2020-01-03
Attending: NURSE PRACTITIONER
Payer: OTHER GOVERNMENT

## 2020-01-03 DIAGNOSIS — I48.11 LONGSTANDING PERSISTENT ATRIAL FIBRILLATION (HCC): ICD-10-CM

## 2020-01-03 DIAGNOSIS — I50.22 STAGE C CHRONIC SYSTOLIC CONGESTIVE HEART FAILURE (HCC): ICD-10-CM

## 2020-01-03 DIAGNOSIS — I50.9 HEART FAILURE, NYHA CLASS 2 (HCC): ICD-10-CM

## 2020-01-03 LAB
ANION GAP SERPL CALC-SCNC: 9 MMOL/L (ref 0–11.9)
BUN SERPL-MCNC: 25 MG/DL (ref 8–22)
CALCIUM SERPL-MCNC: 9.7 MG/DL (ref 8.5–10.5)
CHLORIDE SERPL-SCNC: 102 MMOL/L (ref 96–112)
CO2 SERPL-SCNC: 26 MMOL/L (ref 20–33)
CREAT SERPL-MCNC: 1.43 MG/DL (ref 0.5–1.4)
GLUCOSE SERPL-MCNC: 154 MG/DL (ref 65–99)
POTASSIUM SERPL-SCNC: 4.5 MMOL/L (ref 3.6–5.5)
SODIUM SERPL-SCNC: 137 MMOL/L (ref 135–145)

## 2020-01-03 PROCEDURE — 36415 COLL VENOUS BLD VENIPUNCTURE: CPT

## 2020-01-03 PROCEDURE — 80048 BASIC METABOLIC PNL TOTAL CA: CPT

## 2020-01-24 ENCOUNTER — OFFICE VISIT (OUTPATIENT)
Dept: CARDIOLOGY | Facility: MEDICAL CENTER | Age: 50
End: 2020-01-24
Payer: OTHER GOVERNMENT

## 2020-01-24 ENCOUNTER — NON-PROVIDER VISIT (OUTPATIENT)
Dept: CARDIOLOGY | Facility: MEDICAL CENTER | Age: 50
End: 2020-01-24
Payer: OTHER GOVERNMENT

## 2020-01-24 VITALS
SYSTOLIC BLOOD PRESSURE: 126 MMHG | BODY MASS INDEX: 34.51 KG/M2 | OXYGEN SATURATION: 93 % | HEIGHT: 69 IN | WEIGHT: 233 LBS | RESPIRATION RATE: 16 BRPM | HEART RATE: 86 BPM | DIASTOLIC BLOOD PRESSURE: 90 MMHG

## 2020-01-24 DIAGNOSIS — I50.22 STAGE C CHRONIC SYSTOLIC CONGESTIVE HEART FAILURE (HCC): ICD-10-CM

## 2020-01-24 DIAGNOSIS — E78.2 MIXED HYPERLIPIDEMIA: ICD-10-CM

## 2020-01-24 DIAGNOSIS — I42.8 NON-ISCHEMIC CARDIOMYOPATHY (HCC): ICD-10-CM

## 2020-01-24 DIAGNOSIS — I50.9 HEART FAILURE, NYHA CLASS 2 (HCC): ICD-10-CM

## 2020-01-24 DIAGNOSIS — I10 ESSENTIAL HYPERTENSION: ICD-10-CM

## 2020-01-24 DIAGNOSIS — Z95.810 PRESENCE OF BIVENTRICULAR AUTOMATIC CARDIOVERTER/DEFIBRILLATOR (AICD): ICD-10-CM

## 2020-01-24 DIAGNOSIS — Z95.810 AICD (AUTOMATIC CARDIOVERTER/DEFIBRILLATOR) PRESENT: ICD-10-CM

## 2020-01-24 DIAGNOSIS — Z79.899 HIGH RISK MEDICATION USE: ICD-10-CM

## 2020-01-24 DIAGNOSIS — I48.19 PERSISTENT ATRIAL FIBRILLATION (HCC): ICD-10-CM

## 2020-01-24 PROCEDURE — 93283 PRGRMG EVAL IMPLANTABLE DFB: CPT | Performed by: INTERNAL MEDICINE

## 2020-01-24 PROCEDURE — 99214 OFFICE O/P EST MOD 30 MIN: CPT | Performed by: NURSE PRACTITIONER

## 2020-01-24 RX ORDER — ISOSORBIDE DINITRATE 10 MG/1
10 TABLET ORAL 3 TIMES DAILY
Qty: 90 TAB | Refills: 11 | Status: SHIPPED | OUTPATIENT
Start: 2020-01-24 | End: 2021-03-29 | Stop reason: SDUPTHER

## 2020-01-24 RX ORDER — HYDRALAZINE HYDROCHLORIDE 25 MG/1
25 TABLET, FILM COATED ORAL 3 TIMES DAILY
Qty: 90 TAB | Refills: 11 | Status: SHIPPED | OUTPATIENT
Start: 2020-01-24 | End: 2021-03-29 | Stop reason: SDUPTHER

## 2020-01-24 ASSESSMENT — ENCOUNTER SYMPTOMS
COUGH: 1
MYALGIAS: 0
SHORTNESS OF BREATH: 1
DIZZINESS: 0
CLAUDICATION: 0
ORTHOPNEA: 0
ABDOMINAL PAIN: 0
PND: 0
FEVER: 0
PALPITATIONS: 0

## 2020-01-24 NOTE — PATIENT INSTRUCTIONS
Restart Toprol XL at 100 mg for a few day then if feeling ok, increase to 200 mg daily at night  After 1 week then start hydralazine 25 mg three times per day and Isosorbide 10 mg Three times per day.   Labs in 2 weeks

## 2020-01-24 NOTE — PROGRESS NOTES
Chief Complaint   Patient presents with   • Congestive Heart Failure       Subjective:   Lai Dailey is a 50 y.o. male who presents today for follow up on his Heart Failure.    Patient of the heart failure clinic.  Patient was last seen in clinic on 12/19/2019.  During his last visit, Entresto was increased to  mg BID.  Patient reports when he went to go  the increased dose Entresto, they had Toprol- mg there and patient reports he was taking it and he felt joint pain and he had a dry cough.  Patient discontinued the Toprol and felt better within a day.  Patient was not sure if his symptoms were related to increased dose of Entresto.  Patient has not been taking Toprol-XL for about 1 week.    Pt reported not problems with the dose increase of the Toprol in te past.     For his symptoms, he continues have mild dyspnea with exertion, but otherwise denies chest pain, palpitations, orthopnea, PND, edema or dizziness/lightheadedness.    His home weights are ranging between 215-220 pounds.    He is doing better on his hydration.    Patient reports he missed his sleep study.    Patient denies any shocks from his ICD.  He has a device check today    Additonally, patient has the following medical problems:     -Has ICD for dilated cardiomyopathy and nonsustained VT     -Diabetes: Taking metformin, insulin, he reports controlled     -Hypertension: Taking metoprolol XL, spironolactone     -Hyperlipidemia: Was taking atorvastatin, stopped at this time due to increased liver function tests       Past Medical History:   Diagnosis Date   • CHF (congestive heart failure) (HCC)    • Diabetes (HCC)    • Hyperlipidemia    • Hypertension      Past Surgical History:   Procedure Laterality Date   • AICD IMPLANT  2010     Family History   Problem Relation Age of Onset   • Colon Cancer Mother    • Hypertension Sister    • Stroke Brother    • Heart Disease Neg Hx      Social History     Socioeconomic History   • Marital  status: Single     Spouse name: Not on file   • Number of children: Not on file   • Years of education: Not on file   • Highest education level: Not on file   Occupational History   • Not on file   Social Needs   • Financial resource strain: Not on file   • Food insecurity:     Worry: Not on file     Inability: Not on file   • Transportation needs:     Medical: Not on file     Non-medical: Not on file   Tobacco Use   • Smoking status: Never Smoker   • Smokeless tobacco: Never Used   Substance and Sexual Activity   • Alcohol use: No     Comment: 2 times a week-beer, NO ALCOHOL AT THIS TIME   • Drug use: No     Comment: Marijuana use as youth   • Sexual activity: Not on file   Lifestyle   • Physical activity:     Days per week: Not on file     Minutes per session: Not on file   • Stress: Not on file   Relationships   • Social connections:     Talks on phone: Not on file     Gets together: Not on file     Attends [employer] service: Not on file     Active member of club or organization: Not on file     Attends meetings of clubs or organizations: Not on file     Relationship status: Not on file   • Intimate partner violence:     Fear of current or ex partner: Not on file     Emotionally abused: Not on file     Physically abused: Not on file     Forced sexual activity: Not on file   Other Topics Concern   • Not on file   Social History Narrative   • Not on file     No Known Allergies  Outpatient Encounter Medications as of 1/24/2020   Medication Sig Dispense Refill   • sacubitril-valsartan (ENTRESTO)  MG Tab tablet Take 1 Tab by mouth 2 Times a Day. 180 Tab 3   • furosemide (LASIX) 20 MG Tab Take 1 Tab by mouth every day. 90 Tab 3   • spironolactone (ALDACTONE) 25 MG Tab Take 1 Tab by mouth every day. 90 Tab 3   • digoxin (LANOXIN) 125 MCG Tab Take 1 Tab by mouth every morning. 90 Tab 3   • apixaban (ELIQUIS) 5mg Tab Take 1 Tab by mouth 2 Times a Day. 180 Tab 3   • magnesium oxide (MAG-OX) 400 (241.3 Mg) MG Tab  "tablet Take 0.5 Tabs by mouth 2 times a day. 30 Tab 0   • tamsulosin (FLOMAX) 0.4 MG capsule Take 0.4 mg by mouth every morning.     • insulin glargine (LANTUS) 100 UNIT/ML Solution Inject 10 Units as instructed every evening. 10 mL 0   • metformin (GLUCOPHAGE) 500 MG TABS Take 1 Tab by mouth 2 times a day, with meals. 60 Tab 3   • metoprolol (TOPROL-XL) 200 MG XL tablet Take 1 Tab by mouth every day. (Patient not taking: Reported on 1/24/2020) 90 Tab 3     No facility-administered encounter medications on file as of 1/24/2020.      Review of Systems   Constitutional: Positive for malaise/fatigue. Negative for fever.   Respiratory: Positive for cough (dry) and shortness of breath (mild).    Cardiovascular: Negative for chest pain, palpitations, orthopnea, claudication, leg swelling and PND.   Gastrointestinal: Negative for abdominal pain.   Musculoskeletal: Positive for joint pain. Negative for myalgias.   Neurological: Negative for dizziness.   All other systems reviewed and are negative.       Objective:   /90 (BP Location: Right arm, Patient Position: Sitting, BP Cuff Size: Adult)   Pulse 86   Resp 16   Ht 1.753 m (5' 9\")   Wt 105.7 kg (233 lb)   SpO2 93%   BMI 34.41 kg/m²     Physical Exam   Constitutional: He is oriented to person, place, and time. He appears well-developed and well-nourished.   HENT:   Head: Normocephalic and atraumatic.   Eyes: Pupils are equal, round, and reactive to light. EOM are normal.   Neck: Normal range of motion. Neck supple. No JVD present.   Cardiovascular: Normal rate, regular rhythm and normal heart sounds.   Pulmonary/Chest: Effort normal and breath sounds normal. No respiratory distress. He has no wheezes. He has no rales.   Abdominal: Soft. Bowel sounds are normal.   Musculoskeletal:         General: No edema.   Neurological: He is alert and oriented to person, place, and time.   Skin: Skin is warm and dry.   Psychiatric: He has a normal mood and affect. His " behavior is normal.   Vitals reviewed.    Lab Results   Component Value Date/Time    CHOLSTRLTOT 107 08/03/2018 10:12 AM    LDL 69 08/03/2018 10:12 AM    HDL 29 (A) 08/03/2018 10:12 AM    TRIGLYCERIDE 47 08/03/2018 10:12 AM       Lab Results   Component Value Date/Time    SODIUM 137 01/03/2020 09:29 AM    POTASSIUM 4.5 01/03/2020 09:29 AM    CHLORIDE 102 01/03/2020 09:29 AM    CO2 26 01/03/2020 09:29 AM    GLUCOSE 154 (H) 01/03/2020 09:29 AM    BUN 25 (H) 01/03/2020 09:29 AM    CREATININE 1.43 (H) 01/03/2020 09:29 AM     Lab Results   Component Value Date/Time    ALKPHOSPHAT 126 (H) 10/13/2019 11:44 AM    ASTSGOT 33 10/13/2019 11:44 AM    ALTSGPT 23 10/13/2019 11:44 AM    TBILIRUBIN 1.8 (H) 10/13/2019 11:44 AM        Heart Cath 3/24/2014  FINDINGS:  Right heart catheterization wedge pressure was about 21.  Pulmonary   artery pressure is 51/40.  RV pressure is 50/4.  Right atrial pressure was   about 10.  The left ventricle was severely dilated with global hypokinesis and   ejection fraction of 13%.  There is mild-to-moderate mitral regurgitation   noted; however, it may be because the ventricle is under field.  The left   coronary ostium is normal without significant atherosclerosis.  It divides   into large circumflex branch, ramus branch, and then LAD that gives off large   diagonal branch.  There is no atherosclerosis in any of these vessels.  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  There is no   atherosclerosis in the right coronary.       CONCLUSION:  Severe nonischemic cardiomyopathy with ejection fraction about   13%, at least mild-to-moderate mitral regurgitation, and no gradient across   the aortic valve, and elevated right-sided pressures.  The patient will need   aggressive heart failure treatment.    Echocardiogram 3/23/2014  CONCLUSIONS  No prior echo  4 chamber dilation  Left ventricular ejection fraction is 15% to 20%.   Grade III-IV diastolic dysfunction is present. c/w  elevated LAp and   LVEDP.   Severe mitral regurgitation.        Transthoracic Echo Report 5/13/18  Left ventricle is severely dilated. Mild concentric left ventricular hypertrophy. Grade III diastolic dysfunction (restrictive pattern).   Severely reduced left ventricular systolic function. Left ventricular ejection fraction is visually estimated to be 20%. Diffuse hypokinesis with septal dyskinesis.  Severe mitral regurgitation.  Severe tricuspid regurgitation. Estimated right ventricular systolic pressure  is 50 mmHg.  Compared to the report of the study done - the TR is now severe.     Transthoracic Echo Report 10/12/2019  Severely reduced left ventricular systolic function.  Left ventricular ejection fraction is visually estimated to be 20%.  Global hypokinesis with regional variation.  Diastolic function is abnormal, but grade cannot be determined.  Reduced right ventricular systolic function.  Severe mitral regurgitation.  Severe tricuspid regurgitation.  Compared to the images of the prior study done 5/13/18 -  there has   been no significant change.       Assessment:     1. Stage C chronic systolic congestive heart failure (HCC)  hydrALAZINE (APRESOLINE) 25 MG Tab    isosorbide dinitrate (ISORDIL) 10 MG Tab    Comp Metabolic Panel    Lipid Profile   2. Heart failure, NYHA class 2 (HCC)  hydrALAZINE (APRESOLINE) 25 MG Tab    isosorbide dinitrate (ISORDIL) 10 MG Tab    Comp Metabolic Panel    Lipid Profile   3. Non-ischemic cardiomyopathy (HCC)  hydrALAZINE (APRESOLINE) 25 MG Tab    isosorbide dinitrate (ISORDIL) 10 MG Tab    Comp Metabolic Panel    Lipid Profile   4. Mixed hyperlipidemia  Comp Metabolic Panel    Lipid Profile   5. Presence of biventricular automatic cardioverter/defibrillator (AICD)     6. Persistent atrial fibrillation     7. Essential hypertension     8. High risk medication use         Medical Decision Making:  Today's Assessment / Status / Plan:   1. HFrEF, Stage C, Class 2-3, LVEF 20%:  Based on physical examination findings, patient is euvolemic. No JVD, lungs are clear to auscultation, no pitting edema in bilateral lower extremities, no ascites.  -Restart Toprol XL at 100 mg for a few day then if feeling ok, increase to 200 mg daily at night  -After 1 week then start hydralazine 25 mg three times per day and Isosorbide 10 mg Three times per day.   -Continue Entresto  mg twice a day (will have staff follow-up on the status of this prescription)  -Continue spironolactone 25 mg daily  -Continue digoxin 125 mcg daily  -Continue furosemide 20 mg daily (will consider stopping this in the future)  -Has a bi-V ICD- has a device check today  -Obtain a CMP in 2 weeks  -Discussed repeating echocardiogram once he is optimized on additional medical therapy  -Reinforced s/sx of worsening heart failure with patient and weight monitoring. Pt verbalizes understanding. Pt to call office or RTC if present.     2.  Paroxysmal A. Fib, s/p AV node ablation:   -Continue Eliquis 5 mg twice a day  -Discussed with Dr. Arriaga, will consider cardioversion once patient is optimized and compliant on medical therapy    3.  Hypertension: Stable  -Recommendations per above    4.  Hyperlipidemia: Last LDL 69 on 8/3/2018  -Not currently on statin, due to history of elevated LFTs  -We will repeat CMP and lipid panel in 2 weeks    FU in clinic in 4 weeks with labs in 2 weeks. Sooner if needed.    Patient verbalizes understanding and agrees with the plan of care.     Collaborating MD: Shon Morgan MD

## 2020-01-24 NOTE — PROGRESS NOTES
Resending Entresto  mg tablet dose to [facility] in [place] because the [facility] pharmacy does not carry that dose.

## 2020-01-28 ENCOUNTER — TELEPHONE (OUTPATIENT)
Dept: CARDIOLOGY | Facility: MEDICAL CENTER | Age: 50
End: 2020-01-28

## 2020-01-28 NOTE — TELEPHONE ENCOUNTER
Fyi:  CRT-D check on 1--wn.  CAF  Lower rate: 80 ppm  Any changes to Lower rate at next visit?  JACKIE Lopez

## 2020-02-12 ENCOUNTER — TELEPHONE (OUTPATIENT)
Dept: CARDIOLOGY | Facility: MEDICAL CENTER | Age: 50
End: 2020-02-12

## 2020-02-12 NOTE — TELEPHONE ENCOUNTER
PAR approved:  Lai Urbina (Key: [id number])   Rx #: [id number]   Entresto 97-103MG tablets   Form   Electronic PA Form   Created   1 hour ago   Sent to Plan   32 minutes ago   Plan Response   32 minutes ago   Submit Clinical Questions   1 minute ago   Determination   Favorable   less than a minute ago   Message from Plan  CaseId:[id number];Status:Approved;Review Type:Prior Auth;Coverage Start Date:01/13/2020;Coverage End Date:12/31/2099;

## 2020-02-14 ENCOUNTER — TELEPHONE (OUTPATIENT)
Dept: CARDIOLOGY | Facility: MEDICAL CENTER | Age: 50
End: 2020-02-14

## 2020-02-14 NOTE — TELEPHONE ENCOUNTER
LM to remind patient to complete fasting labs before upcoming appt with REGGIE Balderrama on 02/21/2020.

## 2020-02-21 ENCOUNTER — APPOINTMENT (OUTPATIENT)
Dept: CARDIOLOGY | Facility: MEDICAL CENTER | Age: 50
End: 2020-02-21
Payer: OTHER GOVERNMENT

## 2020-03-03 ENCOUNTER — TELEPHONE (OUTPATIENT)
Dept: CARDIOLOGY | Facility: MEDICAL CENTER | Age: 50
End: 2020-03-03

## 2020-03-03 NOTE — TELEPHONE ENCOUNTER
S/w patient about fasting labs, he stated that he will be completing his labs before his appt with REGGIE Balderrama on 03/12/2020.

## 2020-03-08 ENCOUNTER — HOSPITAL ENCOUNTER (EMERGENCY)
Facility: MEDICAL CENTER | Age: 50
End: 2020-03-09
Attending: EMERGENCY MEDICINE
Payer: OTHER GOVERNMENT

## 2020-03-08 ENCOUNTER — APPOINTMENT (OUTPATIENT)
Dept: RADIOLOGY | Facility: MEDICAL CENTER | Age: 50
End: 2020-03-08
Attending: EMERGENCY MEDICINE
Payer: OTHER GOVERNMENT

## 2020-03-08 DIAGNOSIS — R09.02 HYPOXIA: ICD-10-CM

## 2020-03-08 DIAGNOSIS — R06.02 SHORTNESS OF BREATH: ICD-10-CM

## 2020-03-08 DIAGNOSIS — R06.2 WHEEZING: ICD-10-CM

## 2020-03-08 DIAGNOSIS — I50.21 ACUTE SYSTOLIC CONGESTIVE HEART FAILURE (HCC): ICD-10-CM

## 2020-03-08 DIAGNOSIS — Z95.810 HISTORY OF AUTOMATIC INTERNAL CARDIAC DEFIBRILLATOR (AICD): ICD-10-CM

## 2020-03-08 DIAGNOSIS — R05.9 COUGH: ICD-10-CM

## 2020-03-08 LAB
BASOPHILS # BLD AUTO: 0.7 % (ref 0–1.8)
BASOPHILS # BLD: 0.05 K/UL (ref 0–0.12)
EKG IMPRESSION: NORMAL
EOSINOPHIL # BLD AUTO: 0.1 K/UL (ref 0–0.51)
EOSINOPHIL NFR BLD: 1.4 % (ref 0–6.9)
ERYTHROCYTE [DISTWIDTH] IN BLOOD BY AUTOMATED COUNT: 47 FL (ref 35.9–50)
HCT VFR BLD AUTO: 47.1 % (ref 42–52)
HGB BLD-MCNC: 15.6 G/DL (ref 14–18)
IMM GRANULOCYTES # BLD AUTO: 0.02 K/UL (ref 0–0.11)
IMM GRANULOCYTES NFR BLD AUTO: 0.3 % (ref 0–0.9)
LYMPHOCYTES # BLD AUTO: 2.29 K/UL (ref 1–4.8)
LYMPHOCYTES NFR BLD: 31.8 % (ref 22–41)
MCH RBC QN AUTO: 30.4 PG (ref 27–33)
MCHC RBC AUTO-ENTMCNC: 33.1 G/DL (ref 33.7–35.3)
MCV RBC AUTO: 91.8 FL (ref 81.4–97.8)
MONOCYTES # BLD AUTO: 1.09 K/UL (ref 0–0.85)
MONOCYTES NFR BLD AUTO: 15.2 % (ref 0–13.4)
NEUTROPHILS # BLD AUTO: 3.64 K/UL (ref 1.82–7.42)
NEUTROPHILS NFR BLD: 50.6 % (ref 44–72)
NRBC # BLD AUTO: 0 K/UL
NRBC BLD-RTO: 0 /100 WBC
PLATELET # BLD AUTO: 167 K/UL (ref 164–446)
PMV BLD AUTO: 10.5 FL (ref 9–12.9)
RBC # BLD AUTO: 5.13 M/UL (ref 4.7–6.1)
WBC # BLD AUTO: 7.2 K/UL (ref 4.8–10.8)

## 2020-03-08 PROCEDURE — 71045 X-RAY EXAM CHEST 1 VIEW: CPT

## 2020-03-08 PROCEDURE — 80053 COMPREHEN METABOLIC PANEL: CPT

## 2020-03-08 PROCEDURE — 36415 COLL VENOUS BLD VENIPUNCTURE: CPT

## 2020-03-08 PROCEDURE — 83880 ASSAY OF NATRIURETIC PEPTIDE: CPT

## 2020-03-08 PROCEDURE — 85025 COMPLETE CBC W/AUTO DIFF WBC: CPT

## 2020-03-08 PROCEDURE — 94760 N-INVAS EAR/PLS OXIMETRY 1: CPT

## 2020-03-08 PROCEDURE — 93005 ELECTROCARDIOGRAM TRACING: CPT | Performed by: EMERGENCY MEDICINE

## 2020-03-08 PROCEDURE — 99285 EMERGENCY DEPT VISIT HI MDM: CPT

## 2020-03-08 ASSESSMENT — FIBROSIS 4 INDEX: FIB4 SCORE: 1.77

## 2020-03-09 VITALS
WEIGHT: 229.28 LBS | BODY MASS INDEX: 33.96 KG/M2 | HEIGHT: 69 IN | OXYGEN SATURATION: 94 % | DIASTOLIC BLOOD PRESSURE: 73 MMHG | RESPIRATION RATE: 22 BRPM | HEART RATE: 76 BPM | TEMPERATURE: 97.3 F | SYSTOLIC BLOOD PRESSURE: 123 MMHG

## 2020-03-09 LAB
ALBUMIN SERPL BCP-MCNC: 4.2 G/DL (ref 3.2–4.9)
ALBUMIN/GLOB SERPL: 1.1 G/DL
ALP SERPL-CCNC: 87 U/L (ref 30–99)
ALT SERPL-CCNC: 13 U/L (ref 2–50)
ANION GAP SERPL CALC-SCNC: 9 MMOL/L (ref 0–11.9)
AST SERPL-CCNC: 32 U/L (ref 12–45)
BILIRUB SERPL-MCNC: 1.1 MG/DL (ref 0.1–1.5)
BUN SERPL-MCNC: 23 MG/DL (ref 8–22)
CALCIUM SERPL-MCNC: 9.3 MG/DL (ref 8.5–10.5)
CHLORIDE SERPL-SCNC: 100 MMOL/L (ref 96–112)
CO2 SERPL-SCNC: 22 MMOL/L (ref 20–33)
CREAT SERPL-MCNC: 1.27 MG/DL (ref 0.5–1.4)
GLOBULIN SER CALC-MCNC: 4 G/DL (ref 1.9–3.5)
GLUCOSE SERPL-MCNC: 157 MG/DL (ref 65–99)
NT-PROBNP SERPL IA-MCNC: 2750 PG/ML (ref 0–125)
POTASSIUM SERPL-SCNC: 4.9 MMOL/L (ref 3.6–5.5)
PROT SERPL-MCNC: 8.2 G/DL (ref 6–8.2)
SODIUM SERPL-SCNC: 131 MMOL/L (ref 135–145)

## 2020-03-09 PROCEDURE — 96365 THER/PROPH/DIAG IV INF INIT: CPT

## 2020-03-09 PROCEDURE — 700111 HCHG RX REV CODE 636 W/ 250 OVERRIDE (IP): Performed by: EMERGENCY MEDICINE

## 2020-03-09 PROCEDURE — A9270 NON-COVERED ITEM OR SERVICE: HCPCS | Performed by: EMERGENCY MEDICINE

## 2020-03-09 PROCEDURE — 96375 TX/PRO/DX INJ NEW DRUG ADDON: CPT

## 2020-03-09 PROCEDURE — 94640 AIRWAY INHALATION TREATMENT: CPT

## 2020-03-09 PROCEDURE — 700101 HCHG RX REV CODE 250: Performed by: EMERGENCY MEDICINE

## 2020-03-09 PROCEDURE — 700102 HCHG RX REV CODE 250 W/ 637 OVERRIDE(OP): Performed by: EMERGENCY MEDICINE

## 2020-03-09 PROCEDURE — 700105 HCHG RX REV CODE 258: Performed by: EMERGENCY MEDICINE

## 2020-03-09 RX ORDER — IPRATROPIUM BROMIDE AND ALBUTEROL SULFATE 2.5; .5 MG/3ML; MG/3ML
3 SOLUTION RESPIRATORY (INHALATION) ONCE
Status: COMPLETED | OUTPATIENT
Start: 2020-03-09 | End: 2020-03-09

## 2020-03-09 RX ORDER — METHYLPREDNISOLONE SODIUM SUCCINATE 125 MG/2ML
125 INJECTION, POWDER, LYOPHILIZED, FOR SOLUTION INTRAMUSCULAR; INTRAVENOUS ONCE
Status: COMPLETED | OUTPATIENT
Start: 2020-03-09 | End: 2020-03-09

## 2020-03-09 RX ORDER — ACETAMINOPHEN 500 MG
1000 TABLET ORAL ONCE
Status: COMPLETED | OUTPATIENT
Start: 2020-03-09 | End: 2020-03-09

## 2020-03-09 RX ORDER — BENZONATATE 100 MG/1
200 CAPSULE ORAL 3 TIMES DAILY PRN
Qty: 90 CAP | Refills: 0 | Status: SHIPPED | OUTPATIENT
Start: 2020-03-09 | End: 2020-04-03

## 2020-03-09 RX ORDER — METHYLPREDNISOLONE 4 MG/1
TABLET ORAL
Qty: 1 PACKAGE | Refills: 0 | Status: SHIPPED | OUTPATIENT
Start: 2020-03-09 | End: 2020-04-03

## 2020-03-09 RX ORDER — FUROSEMIDE 10 MG/ML
40 INJECTION INTRAMUSCULAR; INTRAVENOUS ONCE
Status: COMPLETED | OUTPATIENT
Start: 2020-03-09 | End: 2020-03-09

## 2020-03-09 RX ORDER — ALBUTEROL SULFATE 90 UG/1
2 AEROSOL, METERED RESPIRATORY (INHALATION) EVERY 6 HOURS PRN
Qty: 8.5 G | Refills: 0 | Status: SHIPPED | OUTPATIENT
Start: 2020-03-09 | End: 2021-06-25

## 2020-03-09 RX ORDER — AMOXICILLIN AND CLAVULANATE POTASSIUM 875; 125 MG/1; MG/1
1 TABLET, FILM COATED ORAL 2 TIMES DAILY
Qty: 20 TAB | Refills: 0 | Status: SHIPPED | OUTPATIENT
Start: 2020-03-09 | End: 2020-04-03

## 2020-03-09 RX ADMIN — METHYLPREDNISOLONE SODIUM SUCCINATE 125 MG: 125 INJECTION, POWDER, FOR SOLUTION INTRAMUSCULAR; INTRAVENOUS at 00:17

## 2020-03-09 RX ADMIN — FUROSEMIDE 40 MG: 10 INJECTION, SOLUTION INTRAMUSCULAR; INTRAVENOUS at 00:22

## 2020-03-09 RX ADMIN — ACETAMINOPHEN 1000 MG: 500 TABLET ORAL at 01:11

## 2020-03-09 RX ADMIN — IPRATROPIUM BROMIDE AND ALBUTEROL SULFATE 3 ML: .5; 3 SOLUTION RESPIRATORY (INHALATION) at 00:57

## 2020-03-09 RX ADMIN — AMPICILLIN SODIUM AND SULBACTAM SODIUM 3 G: 2; 1 INJECTION, POWDER, FOR SOLUTION INTRAMUSCULAR; INTRAVENOUS at 00:22

## 2020-03-09 ASSESSMENT — COPD QUESTIONNAIRES
COPD SCREENING SCORE: 2
HAVE YOU SMOKED AT LEAST 100 CIGARETTES IN YOUR ENTIRE LIFE: NO/DON'T KNOW
DO YOU EVER COUGH UP ANY MUCUS OR PHLEGM?: NO/ONLY WITH OCCASIONAL COLDS OR INFECTIONS
DURING THE PAST 4 WEEKS HOW MUCH DID YOU FEEL SHORT OF BREATH: SOME OF THE TIME

## 2020-03-09 ASSESSMENT — LIFESTYLE VARIABLES: EVER_SMOKED: NEVER

## 2020-03-09 NOTE — ED PROVIDER NOTES
"ED Provider Note     Scribed for Bren Sylvester D.O. by Angie Garsia. 3/8/2020, 11:34 PM.     Primary care provider: Tor Rdz M.D.  Means of arrival: Walk-in         History obtained from: Patient  History limited by: None    CHIEF COMPLAINT  Chief Complaint   Patient presents with   • Cough     x2 days   • Chest Wall Pain     \"from coughing so much\"   • Shortness of Breath     x2 days       HPI  Lai Dailey is a 50 y.o. male with a history of diabetes, CHF, hypertension, and hyperlipidemia who presents to the Emergency Department complaining of cough, chest wall pain, and shortness of breath onset 2 days ago. Patient states his cough is mainly dry but he occasionally has production of yellow sputum. He notes he has chest wall pain secondary to excessive coughing. The patient denies any associated hemoptysis, nausea, vomiting, diarrhea, or abdominal pain. He denies any recent foreign travel. The patient does not smoke cigarettes and has no history of asthma. His oxygen saturation was 90% at triage.     REVIEW OF SYSTEMS  Pertinent positives include cough, shortness of breath, and chest pain. Pertinent negatives include no nausea, hemoptysis, vomiting, diarrhea, or abdominal pain.   See HPI for further details. All other systems are negative.    PAST MEDICAL HISTORY  Past Medical History:   Diagnosis Date   • CHF (congestive heart failure) (HCC)    • Diabetes (HCC)    • Hyperlipidemia    • Hypertension        FAMILY HISTORY  Family History   Problem Relation Age of Onset   • Colon Cancer Mother    • Hypertension Sister    • Stroke Brother    • Heart Disease Neg Hx        SOCIAL HISTORY  Social History     Tobacco Use   • Smoking status: Never Smoker   • Smokeless tobacco: Never Used   Substance Use Topics   • Alcohol use: No     Comment: 2 times a week-beer, NO ALCOHOL AT THIS TIME (last drink 3/1/2020)   • Drug use: No     Comment: Marijuana use as youth      Social History     Substance and Sexual " Activity   Drug Use No    Comment: Marijuana use as youth       SURGICAL HISTORY  Past Surgical History:   Procedure Laterality Date   • AICD IMPLANT  2010       CURRENT MEDICATIONS    Current Facility-Administered Medications:   •  ipratropium-albuterol (DUONEB) nebulizer solution, 3 mL, Nebulization, Once, Bren Sylvester D.O.  •  methylPREDNISolone sod succ (SOLU-MEDROL) 125 MG injection 125 mg, 125 mg, Intravenous, Once, Bren Sylvester D.O.  •  ampicillin/sulbactam (UNASYN) 3 g in  mL IVPB, 3 g, Intravenous, Once, Bren Sylvester D.O.    Current Outpatient Medications:   •  hydrALAZINE (APRESOLINE) 25 MG Tab, Take 1 Tab by mouth 3 times a day., Disp: 90 Tab, Rfl: 11  •  isosorbide dinitrate (ISORDIL) 10 MG Tab, Take 1 Tab by mouth 3 times a day., Disp: 90 Tab, Rfl: 11  •  sacubitril-valsartan (ENTRESTO)  MG Tab tablet, Take 1 Tab by mouth 2 Times a Day., Disp: 180 Tab, Rfl: 3  •  furosemide (LASIX) 20 MG Tab, Take 1 Tab by mouth every day., Disp: 90 Tab, Rfl: 3  •  metoprolol (TOPROL-XL) 200 MG XL tablet, Take 1 Tab by mouth every day. (Patient not taking: Reported on 1/24/2020), Disp: 90 Tab, Rfl: 3  •  spironolactone (ALDACTONE) 25 MG Tab, Take 1 Tab by mouth every day., Disp: 90 Tab, Rfl: 3  •  digoxin (LANOXIN) 125 MCG Tab, Take 1 Tab by mouth every morning., Disp: 90 Tab, Rfl: 3  •  apixaban (ELIQUIS) 5mg Tab, Take 1 Tab by mouth 2 Times a Day., Disp: 180 Tab, Rfl: 3  •  magnesium oxide (MAG-OX) 400 (241.3 Mg) MG Tab tablet, Take 0.5 Tabs by mouth 2 times a day., Disp: 30 Tab, Rfl: 0  •  tamsulosin (FLOMAX) 0.4 MG capsule, Take 0.4 mg by mouth every morning., Disp: , Rfl:   •  insulin glargine (LANTUS) 100 UNIT/ML Solution, Inject 10 Units as instructed every evening., Disp: 10 mL, Rfl: 0  •  metformin (GLUCOPHAGE) 500 MG TABS, Take 1 Tab by mouth 2 times a day, with meals., Disp: 60 Tab, Rfl: 3    ALLERGIES  No Known Allergies    PHYSICAL EXAM  VITAL SIGNS: /77   Pulse 80   Temp 36.3 °C  "(97.3 °F) (Temporal)   Resp (!) 33   Ht 1.753 m (5' 9\")   Wt 104 kg (229 lb 4.5 oz)   SpO2 100%   BMI 33.86 kg/m²     Constitutional: Patient is well developed, well nourished in moderate respiratory distress with audible wheezing and dry bronchial cough.  HENT: Normocephalic,  TM's visualized without erythema. Nose normal with no mucosal edema or drainage. Oropharynx moist without erythema.  Eyes: PERRL, EOMI, Conjunctiva without erythema.   Neck: Supple with no cervical adenopathy. Normal range of motion in flexion, extension and lateral rotation. No tenderness along the bony prominences or paraspinal muscles.  Lymphatic: No lymphadenopathy noted.   Cardiovascular: Normal heart rate and Regular rhythm. Grade 2 systolic ejection mumur   Thorax & Lungs: Moderate respiratory distress with dry harsh bronchial cough, Inspiratory and expiratory wheezing with coarse rhonchi in upper airways, No chest tenderness.  Abdomen: Obese, Bowel sounds normal in all four quadrants. Soft,nontender, no rebound , guarding  Skin: Warm, Dry, No rashes.   Back: No cervical, thoracic, or lumbosacral tenderness.   Extremities: No ankle edema, Peripheral pulses 4/4 No pretibial edema.  Normal range of motion with no tenderness.  Neurologic: Alert & oriented x 3, Normal motor function, Normal sensory function,  DTR's 4/4 bilaterally.  Psychiatric: Affect normal, Judgment normal, Mood normal.     DIAGNOSTICS/PROCEDURES    LABS  Results for orders placed or performed during the hospital encounter of 03/08/20   CBC WITH DIFFERENTIAL   Result Value Ref Range    WBC 7.2 4.8 - 10.8 K/uL    RBC 5.13 4.70 - 6.10 M/uL    Hemoglobin 15.6 14.0 - 18.0 g/dL    Hematocrit 47.1 42.0 - 52.0 %    MCV 91.8 81.4 - 97.8 fL    MCH 30.4 27.0 - 33.0 pg    MCHC 33.1 (L) 33.7 - 35.3 g/dL    RDW 47.0 35.9 - 50.0 fL    Platelet Count 167 164 - 446 K/uL    MPV 10.5 9.0 - 12.9 fL    Neutrophils-Polys 50.60 44.00 - 72.00 %    Lymphocytes 31.80 22.00 - 41.00 %    " Monocytes 15.20 (H) 0.00 - 13.40 %    Eosinophils 1.40 0.00 - 6.90 %    Basophils 0.70 0.00 - 1.80 %    Immature Granulocytes 0.30 0.00 - 0.90 %    Nucleated RBC 0.00 /100 WBC    Neutrophils (Absolute) 3.64 1.82 - 7.42 K/uL    Lymphs (Absolute) 2.29 1.00 - 4.80 K/uL    Monos (Absolute) 1.09 (H) 0.00 - 0.85 K/uL    Eos (Absolute) 0.10 0.00 - 0.51 K/uL    Baso (Absolute) 0.05 0.00 - 0.12 K/uL    Immature Granulocytes (abs) 0.02 0.00 - 0.11 K/uL    NRBC (Absolute) 0.00 K/uL   COMP METABOLIC PANEL   Result Value Ref Range    Sodium 131 (L) 135 - 145 mmol/L    Potassium 4.9 3.6 - 5.5 mmol/L    Chloride 100 96 - 112 mmol/L    Co2 22 20 - 33 mmol/L    Anion Gap 9.0 0.0 - 11.9    Glucose 157 (H) 65 - 99 mg/dL    Bun 23 (H) 8 - 22 mg/dL    Creatinine 1.27 0.50 - 1.40 mg/dL    Calcium 9.3 8.5 - 10.5 mg/dL    AST(SGOT) 32 12 - 45 U/L    ALT(SGPT) 13 2 - 50 U/L    Alkaline Phosphatase 87 30 - 99 U/L    Total Bilirubin 1.1 0.1 - 1.5 mg/dL    Albumin 4.2 3.2 - 4.9 g/dL    Total Protein 8.2 6.0 - 8.2 g/dL    Globulin 4.0 (H) 1.9 - 3.5 g/dL    A-G Ratio 1.1 g/dL   ESTIMATED GFR   Result Value Ref Range    GFR If African American >60 >60 mL/min/1.73 m 2    GFR If Non African American 60 >60 mL/min/1.73 m 2   EKG   Result Value Ref Range    Report       [facility].    Test Date:  2020  Pt Name:    CHESTER PICKARD                 Department: ER  MRN:        [id number]                      Room:       [facility]  Gender:     Male                         Technician: [id number]  :        1970                   Requested By:FRANK JEAN-BAPTISTE  Order #:    [id number]                    Reading MD:    Measurements  Intervals                                Axis  Rate:       80                           P:  SD:                                      QRS:        191  QRSD:       154                          T:          18  QT:         436  QTc:        503    Interpretive Statements  AFIB/FLUTTER AND VENTRICULAR-PACED  RHYTHM  NO FURTHER ANALYSIS ATTEMPTED DUE TO PACED RHYTHM  Compared to ECG 12/19/2019 15:41:48  Sinus rhythm no longer present       Labs reviewed by me    EKG  Interpreted by me, as shown above.    RADIOLOGY/PROCEDURES  DX-CHEST-PORTABLE (1 VIEW)   Final Result      Stable cardiac silhouette enlargement and slight minor fissure prominence which could indicate mild interstitial edema        Results and radiologist interpretation reviewed by me.     COURSE & MEDICAL DECISION MAKING  Pertinent Labs & Imaging studies reviewed. (See chart for details)    11:34 PM - Patient seen and evaluated at bedside. Discussed with patient I will give him a breathing treatment and start him on antibitoics. Will also order a chest x-ray and labs to further evaluate. Patient consents to plan of care. Ordered for DX-Chest, NT, Sputum with GS, Estimated GFR, CBC with differential, CMP, and EKG to evaluate. Patient will be treated with Duoneb, Lasix IV 40 mg, Solumedrol  mg, and Unasyn IV 3 g for his symptoms. Differential diagnoses include, but are not limited to, Pneumonia, CHF, and Viral respiratory illness.   Patient's laboratories were really unremarkable.  He has a normal white blood cell count with a stable H&H.  His glucose is 157, BUN and creatinine are 23 and 1.27.  His BNP is somewhat elevated and so I gave him an additional dose of Lasix 40 mg IV push.  I also treated him with Unasyn 3 g IV piggyback for potential pneumonia and a DuoNeb treatment with some Solu-Medrol.  His O2 sats are improved upon recheck.  The patient does not want to stay in the hospital.  My plan will be to send him home with prescriptions for Augmentin, he is to double his Lasix dose for the next 5 days, Medrol Dosepak will be given and he will be given an albuterol inhaler.  He is to follow-up with his primary care provider this week for recheck and return immediately should he have any change or worsening his condition.  He is stable and improved  upon discharge.      FINAL IMPRESSION  Mild CHF  Cough  Hypoxia  Acute bronchitis  History of pacemaker     IAngie (Scribe), am scribing for, and in the presence of, Bren Sylvester D.O..    Electronically signed by: Angie Garsia (Scribe), 3/8/2020    Bren ZAMORA D.O. personally performed the services described in this documentation, as scribed by Angie Garsia in my presence, and it is both accurate and complete.        The note accurately reflects work and decisions made by me.  Bren Sylvester D.O.  3/9/2020  1:42 AM

## 2020-03-09 NOTE — DISCHARGE INSTRUCTIONS
Low-sodium diet, fluid restriction  Double your Lasix dose for the next 5 days.  Take it in the morning so you are not up all night urinating.  Take the antibiotics and the steroids until completely gone with food  Use the inhaler as needed for wheezing every 6 hours.  Follow-up with your primary care provider this week for recheck and return if any elevated fever, worsening chest pain.  Ibuprofen as needed for chest wall pain.

## 2020-03-09 NOTE — ED TRIAGE NOTES
"Chief Complaint   Patient presents with   • Cough     x2 days   • Chest Wall Pain     \"from coughing so much\"   • Shortness of Breath     x2 days       Pt ambulatory to triage for above complaint. Pt states he saw his PCM 2-3 weeks ago for CHF checkup and was put on new medications but pt does not remember what they are.     Pt is alert/oriented and follows commands. Pt speaking in full sentences and responds appropriately to questions. No acute distress noted in triage but pt is coughing and does have labored breathing. Pt placed on O2 via NC out front due to low SpO2.    Charge RN notified of pt.  "

## 2020-03-11 ENCOUNTER — TELEPHONE (OUTPATIENT)
Dept: CARDIOLOGY | Facility: MEDICAL CENTER | Age: 50
End: 2020-03-11

## 2020-03-12 ENCOUNTER — HOSPITAL ENCOUNTER (OUTPATIENT)
Dept: LAB | Facility: MEDICAL CENTER | Age: 50
End: 2020-03-12
Attending: NURSE PRACTITIONER
Payer: OTHER GOVERNMENT

## 2020-03-12 DIAGNOSIS — I50.9 HEART FAILURE, NYHA CLASS 2 (HCC): ICD-10-CM

## 2020-03-12 DIAGNOSIS — I50.22 STAGE C CHRONIC SYSTOLIC CONGESTIVE HEART FAILURE (HCC): ICD-10-CM

## 2020-03-12 DIAGNOSIS — I42.8 NON-ISCHEMIC CARDIOMYOPATHY (HCC): ICD-10-CM

## 2020-03-12 DIAGNOSIS — E78.2 MIXED HYPERLIPIDEMIA: ICD-10-CM

## 2020-03-12 LAB
ALBUMIN SERPL BCP-MCNC: 4.1 G/DL (ref 3.2–4.9)
ALBUMIN/GLOB SERPL: 1 G/DL
ALP SERPL-CCNC: 81 U/L (ref 30–99)
ALT SERPL-CCNC: 37 U/L (ref 2–50)
ANION GAP SERPL CALC-SCNC: 9 MMOL/L (ref 7–16)
AST SERPL-CCNC: 53 U/L (ref 12–45)
BILIRUB SERPL-MCNC: 0.9 MG/DL (ref 0.1–1.5)
BUN SERPL-MCNC: 24 MG/DL (ref 8–22)
CALCIUM SERPL-MCNC: 9.9 MG/DL (ref 8.5–10.5)
CHLORIDE SERPL-SCNC: 98 MMOL/L (ref 96–112)
CHOLEST SERPL-MCNC: 176 MG/DL (ref 100–199)
CO2 SERPL-SCNC: 27 MMOL/L (ref 20–33)
CREAT SERPL-MCNC: 0.97 MG/DL (ref 0.5–1.4)
FASTING STATUS PATIENT QL REPORTED: NORMAL
GLOBULIN SER CALC-MCNC: 4.2 G/DL (ref 1.9–3.5)
GLUCOSE SERPL-MCNC: 171 MG/DL (ref 65–99)
HDLC SERPL-MCNC: 36 MG/DL
LDLC SERPL CALC-MCNC: 128 MG/DL
POTASSIUM SERPL-SCNC: 4 MMOL/L (ref 3.6–5.5)
PROT SERPL-MCNC: 8.3 G/DL (ref 6–8.2)
SODIUM SERPL-SCNC: 134 MMOL/L (ref 135–145)
TRIGL SERPL-MCNC: 62 MG/DL (ref 0–149)

## 2020-03-12 PROCEDURE — 80061 LIPID PANEL: CPT

## 2020-03-12 PROCEDURE — 36415 COLL VENOUS BLD VENIPUNCTURE: CPT

## 2020-03-12 PROCEDURE — 80053 COMPREHEN METABOLIC PANEL: CPT

## 2020-03-13 ENCOUNTER — OFFICE VISIT (OUTPATIENT)
Dept: CARDIOLOGY | Facility: MEDICAL CENTER | Age: 50
End: 2020-03-13
Payer: OTHER GOVERNMENT

## 2020-03-13 VITALS
BODY MASS INDEX: 33.92 KG/M2 | WEIGHT: 229 LBS | HEIGHT: 69 IN | DIASTOLIC BLOOD PRESSURE: 100 MMHG | RESPIRATION RATE: 18 BRPM | HEART RATE: 80 BPM | SYSTOLIC BLOOD PRESSURE: 142 MMHG | OXYGEN SATURATION: 95 %

## 2020-03-13 DIAGNOSIS — I42.8 NON-ISCHEMIC CARDIOMYOPATHY (HCC): ICD-10-CM

## 2020-03-13 DIAGNOSIS — I50.9 HEART FAILURE, NYHA CLASS 2 (HCC): ICD-10-CM

## 2020-03-13 DIAGNOSIS — I10 ESSENTIAL HYPERTENSION: ICD-10-CM

## 2020-03-13 DIAGNOSIS — Z95.810 PRESENCE OF BIVENTRICULAR AUTOMATIC CARDIOVERTER/DEFIBRILLATOR (AICD): ICD-10-CM

## 2020-03-13 DIAGNOSIS — E78.2 MIXED HYPERLIPIDEMIA: ICD-10-CM

## 2020-03-13 DIAGNOSIS — Z79.899 HIGH RISK MEDICATION USE: ICD-10-CM

## 2020-03-13 DIAGNOSIS — I48.19 PERSISTENT ATRIAL FIBRILLATION (HCC): ICD-10-CM

## 2020-03-13 DIAGNOSIS — I50.22 STAGE C CHRONIC SYSTOLIC CONGESTIVE HEART FAILURE (HCC): ICD-10-CM

## 2020-03-13 PROCEDURE — 99214 OFFICE O/P EST MOD 30 MIN: CPT | Performed by: NURSE PRACTITIONER

## 2020-03-13 ASSESSMENT — ENCOUNTER SYMPTOMS
COUGH: 0
DIZZINESS: 0
ORTHOPNEA: 0
PND: 0
PALPITATIONS: 0
CLAUDICATION: 0
ABDOMINAL PAIN: 0
MYALGIAS: 0
FEVER: 0
SHORTNESS OF BREATH: 1

## 2020-03-13 ASSESSMENT — FIBROSIS 4 INDEX: FIB4 SCORE: 2.71

## 2020-03-13 NOTE — PROGRESS NOTES
Chief Complaint   Patient presents with   • Congestive Heart Failure       Subjective:   Lai Dailey is a 50 y.o. male who presents today for follow up on his Heart Failure.    Patient of the heart failure clinic.  Patient was last seen in clinic on 1/24/2020.  During his last visit, Toprol-XL was restarted at 200 mg daily then he started hydralazine 25 mg 3 times a day and isosorbide 10 mg 3 times a day.  Patient reports no problems restarting the Toprol, he denies the joint pain and muscle aches he previously had. He was also sent for lab testing.    For his symptoms, patient reports his been feeling better.  He did have a ER visit last week after he was in the hospital with his daughter who had RSV for the week.  He was reporting chest pain, dizziness and palpitations, but those have always resolved.  He was prescribed antibiotics and he is feeling better.    Patient reports mild dyspnea with exertion and fatigue.  He states he was able to cut his grass last week.  He denies chest pain, palpitations, orthopnea, PND, edema or dizziness/lightheadedness.    His home weights are ranging between 218 to 220 pounds.    His home blood pressures have been stable around 120/80.    He did not take his morning meds today.    He reports missing his sleep study again, he plans on rescheduling.    Patient denies any shocks from his ICD.  He had a device check at his last visit.      Additonally, patient has the following medical problems:     -Has ICD for dilated cardiomyopathy and nonsustained VT     -Diabetes: Taking metformin, insulin, he reports controlled     -Hypertension: Taking metoprolol XL, spironolactone     -Hyperlipidemia: Was taking atorvastatin, stopped at this time due to increased liver function tests       Past Medical History:   Diagnosis Date   • CHF (congestive heart failure) (HCC)    • Diabetes (HCC)    • Hyperlipidemia    • Hypertension      Past Surgical History:   Procedure Laterality Date   • AICD  IMPLANT  2010     Family History   Problem Relation Age of Onset   • Colon Cancer Mother    • Hypertension Sister    • Stroke Brother    • Heart Disease Neg Hx      Social History     Socioeconomic History   • Marital status: Single     Spouse name: Not on file   • Number of children: Not on file   • Years of education: Not on file   • Highest education level: Not on file   Occupational History   • Not on file   Social Needs   • Financial resource strain: Not on file   • Food insecurity     Worry: Not on file     Inability: Not on file   • Transportation needs     Medical: Not on file     Non-medical: Not on file   Tobacco Use   • Smoking status: Never Smoker   • Smokeless tobacco: Never Used   Substance and Sexual Activity   • Alcohol use: No     Comment: 2 times a week-beer, NO ALCOHOL AT THIS TIME (last drink 3/1/2020)   • Drug use: No     Comment: Marijuana use as youth   • Sexual activity: Not on file   Lifestyle   • Physical activity     Days per week: Not on file     Minutes per session: Not on file   • Stress: Not on file   Relationships   • Social connections     Talks on phone: Not on file     Gets together: Not on file     Attends [employer] service: Not on file     Active member of club or organization: Not on file     Attends meetings of clubs or organizations: Not on file     Relationship status: Not on file   • Intimate partner violence     Fear of current or ex partner: Not on file     Emotionally abused: Not on file     Physically abused: Not on file     Forced sexual activity: Not on file   Other Topics Concern   • Not on file   Social History Narrative   • Not on file     No Known Allergies  Outpatient Encounter Medications as of 3/13/2020   Medication Sig Dispense Refill   • methylPREDNISolone (MEDROL DOSEPAK) 4 MG Tablet Therapy Pack As directed with food 1 Package 0   • amoxicillin-clavulanate (AUGMENTIN) 875-125 MG Tab Take 1 Tab by mouth 2 times a day. 20 Tab 0   • benzonatate (TESSALON) 100  MG Cap Take 2 Caps by mouth 3 times a day as needed for Cough. 90 Cap 0   • albuterol 108 (90 Base) MCG/ACT Aero Soln inhalation aerosol Inhale 2 Puffs by mouth every 6 hours as needed for Shortness of Breath. 8.5 g 0   • hydrALAZINE (APRESOLINE) 25 MG Tab Take 1 Tab by mouth 3 times a day. 90 Tab 11   • isosorbide dinitrate (ISORDIL) 10 MG Tab Take 1 Tab by mouth 3 times a day. 90 Tab 11   • sacubitril-valsartan (ENTRESTO)  MG Tab tablet Take 1 Tab by mouth 2 Times a Day. 180 Tab 3   • furosemide (LASIX) 20 MG Tab Take 1 Tab by mouth every day. 90 Tab 3   • spironolactone (ALDACTONE) 25 MG Tab Take 1 Tab by mouth every day. 90 Tab 3   • digoxin (LANOXIN) 125 MCG Tab Take 1 Tab by mouth every morning. 90 Tab 3   • apixaban (ELIQUIS) 5mg Tab Take 1 Tab by mouth 2 Times a Day. 180 Tab 3   • magnesium oxide (MAG-OX) 400 (241.3 Mg) MG Tab tablet Take 0.5 Tabs by mouth 2 times a day. 30 Tab 0   • tamsulosin (FLOMAX) 0.4 MG capsule Take 0.4 mg by mouth every morning.     • insulin glargine (LANTUS) 100 UNIT/ML Solution Inject 10 Units as instructed every evening. 10 mL 0   • metformin (GLUCOPHAGE) 500 MG TABS Take 1 Tab by mouth 2 times a day, with meals. 60 Tab 3   • metoprolol (TOPROL-XL) 200 MG XL tablet Take 1 Tab by mouth every day. (Patient not taking: Reported on 1/24/2020) 90 Tab 3     No facility-administered encounter medications on file as of 3/13/2020.      Review of Systems   Constitutional: Positive for malaise/fatigue. Negative for fever.   Respiratory: Positive for shortness of breath (mild). Negative for cough.    Cardiovascular: Negative for chest pain, palpitations, orthopnea, claudication, leg swelling and PND.   Gastrointestinal: Negative for abdominal pain.   Musculoskeletal: Negative for myalgias.   Neurological: Negative for dizziness.   All other systems reviewed and are negative.       Objective:   /100 (BP Location: Left arm, Patient Position: Sitting, BP Cuff Size: Adult)   Pulse  "80   Resp 18   Ht 1.753 m (5' 9\")   Wt 103.9 kg (229 lb)   SpO2 95%   BMI 33.82 kg/m²     Physical Exam   Constitutional: He is oriented to person, place, and time. He appears well-developed and well-nourished.   HENT:   Head: Normocephalic and atraumatic.   Eyes: Pupils are equal, round, and reactive to light. EOM are normal.   Neck: Normal range of motion. Neck supple. No JVD present.   Cardiovascular: Normal rate, regular rhythm and normal heart sounds.   Pulmonary/Chest: Effort normal and breath sounds normal. No respiratory distress. He has no wheezes. He has no rales.   Left chest BiV-ICD-no erosion, drainage or Erythema   Abdominal: Soft. Bowel sounds are normal.   Musculoskeletal:         General: No edema.   Neurological: He is alert and oriented to person, place, and time.   Skin: Skin is warm and dry.   Psychiatric: He has a normal mood and affect. His behavior is normal.   Vitals reviewed.    Lab Results   Component Value Date/Time    CHOLSTRLTOT 176 03/12/2020 10:24 AM     (H) 03/12/2020 10:24 AM    HDL 36 (A) 03/12/2020 10:24 AM    TRIGLYCERIDE 62 03/12/2020 10:24 AM       Lab Results   Component Value Date/Time    SODIUM 134 (L) 03/12/2020 10:24 AM    POTASSIUM 4.0 03/12/2020 10:24 AM    CHLORIDE 98 03/12/2020 10:24 AM    CO2 27 03/12/2020 10:24 AM    GLUCOSE 171 (H) 03/12/2020 10:24 AM    BUN 24 (H) 03/12/2020 10:24 AM    CREATININE 0.97 03/12/2020 10:24 AM     Lab Results   Component Value Date/Time    ALKPHOSPHAT 81 03/12/2020 10:24 AM    ASTSGOT 53 (H) 03/12/2020 10:24 AM    ALTSGPT 37 03/12/2020 10:24 AM    TBILIRUBIN 0.9 03/12/2020 10:24 AM        Heart Cath 3/24/2014  FINDINGS:  Right heart catheterization wedge pressure was about 21.  Pulmonary   artery pressure is 51/40.  RV pressure is 50/4.  Right atrial pressure was   about 10.  The left ventricle was severely dilated with global hypokinesis and   ejection fraction of 13%.  There is mild-to-moderate mitral regurgitation "   noted; however, it may be because the ventricle is under field.  The left   coronary ostium is normal without significant atherosclerosis.  It divides   into large circumflex branch, ramus branch, and then LAD that gives off large   diagonal branch.  There is no atherosclerosis in any of these vessels.  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  There is no   atherosclerosis in the right coronary.       CONCLUSION:  Severe nonischemic cardiomyopathy with ejection fraction about   13%, at least mild-to-moderate mitral regurgitation, and no gradient across   the aortic valve, and elevated right-sided pressures.  The patient will need   aggressive heart failure treatment.    Echocardiogram 3/23/2014  CONCLUSIONS  No prior echo  4 chamber dilation  Left ventricular ejection fraction is 15% to 20%.   Grade III-IV diastolic dysfunction is present. c/w elevated LAp and   LVEDP.   Severe mitral regurgitation.        Transthoracic Echo Report 5/13/18  Left ventricle is severely dilated. Mild concentric left ventricular hypertrophy. Grade III diastolic dysfunction (restrictive pattern).   Severely reduced left ventricular systolic function. Left ventricular ejection fraction is visually estimated to be 20%. Diffuse hypokinesis with septal dyskinesis.  Severe mitral regurgitation.  Severe tricuspid regurgitation. Estimated right ventricular systolic pressure  is 50 mmHg.  Compared to the report of the study done - the TR is now severe.     Transthoracic Echo Report 10/12/2019  Severely reduced left ventricular systolic function.  Left ventricular ejection fraction is visually estimated to be 20%.  Global hypokinesis with regional variation.  Diastolic function is abnormal, but grade cannot be determined.  Reduced right ventricular systolic function.  Severe mitral regurgitation.  Severe tricuspid regurgitation.  Compared to the images of the prior study done 5/13/18 -  there has been no significant  change.       Assessment:     1. Stage C chronic systolic congestive heart failure (HCC)  EC-ECHOCARDIOGRAM COMPLETE W/O CONT   2. Heart failure, NYHA class 2 (HCC)  EC-ECHOCARDIOGRAM COMPLETE W/O CONT   3. Non-ischemic cardiomyopathy (HCC)  EC-ECHOCARDIOGRAM COMPLETE W/O CONT   4. Presence of biventricular automatic cardioverter/defibrillator (AICD)     5. Persistent atrial fibrillation     6. Mixed hyperlipidemia     7. Essential hypertension     8. High risk medication use         Medical Decision Making:  Today's Assessment / Status / Plan:   1. HFrEF, Stage C, Class 1-2, LVEF 20%: Based on physical examination findings, patient is euvolemic. No JVD, lungs are clear to auscultation, no pitting edema in bilateral lower extremities, no ascites.  -Continue Toprol  mg daily  -Continue hydralazine 25 mg three times  -Continue Isosorbide 10 mg Three times per day.   -Continue Entresto  mg twice a day   -Continue spironolactone 25 mg daily  -Continue digoxin 125 mcg daily  -Continue furosemide 20 mg daily (will consider stopping this in the future)  -Has a bi-V ICD- has a device check today  -Repeat Echo  -Reinforced s/sx of worsening heart failure with patient and weight monitoring. Pt verbalizes understanding. Pt to call office or RTC if present.     2.  Paroxysmal A. Fib, s/p AV node ablation:   -Continue Eliquis 5 mg twice a day    3.  Hypertension: BP slightly elevated d/t pt not taking med yest this am.  -Recommendations per above    4.  Hyperlipidemia: Recent LDL was 128 on 3/12/2020  -Not currently on statin, due to history of elevated LFTs  -Encouraged lifestyle modifications  -will follow    FU in clinic in 1-2 months with echo. Sooner if needed.    Patient verbalizes understanding and agrees with the plan of care.     Collaborating MD: JOHNSON Sanon MD

## 2020-03-26 ENCOUNTER — HOSPITAL ENCOUNTER (OUTPATIENT)
Dept: CARDIOLOGY | Facility: MEDICAL CENTER | Age: 50
End: 2020-03-26
Attending: NURSE PRACTITIONER
Payer: OTHER GOVERNMENT

## 2020-03-26 DIAGNOSIS — I50.9 HEART FAILURE, NYHA CLASS 2 (HCC): ICD-10-CM

## 2020-03-26 DIAGNOSIS — I50.22 STAGE C CHRONIC SYSTOLIC CONGESTIVE HEART FAILURE (HCC): ICD-10-CM

## 2020-03-26 DIAGNOSIS — I42.8 NON-ISCHEMIC CARDIOMYOPATHY (HCC): ICD-10-CM

## 2020-03-26 LAB
LV EJECT FRACT  99904: 15
LV EJECT FRACT MOD 2C 99903: 18.56
LV EJECT FRACT MOD 4C 99902: 15.75
LV EJECT FRACT MOD BP 99901: 15.23

## 2020-03-26 PROCEDURE — 93306 TTE W/DOPPLER COMPLETE: CPT

## 2020-03-26 PROCEDURE — 93306 TTE W/DOPPLER COMPLETE: CPT | Mod: 26 | Performed by: INTERNAL MEDICINE

## 2020-04-03 ENCOUNTER — OFFICE VISIT (OUTPATIENT)
Dept: CARDIOLOGY | Facility: MEDICAL CENTER | Age: 50
End: 2020-04-03
Payer: OTHER GOVERNMENT

## 2020-04-03 VITALS
HEIGHT: 69 IN | OXYGEN SATURATION: 96 % | HEART RATE: 80 BPM | RESPIRATION RATE: 16 BRPM | WEIGHT: 226.6 LBS | DIASTOLIC BLOOD PRESSURE: 80 MMHG | BODY MASS INDEX: 33.56 KG/M2 | SYSTOLIC BLOOD PRESSURE: 110 MMHG

## 2020-04-03 DIAGNOSIS — Z79.899 HIGH RISK MEDICATION USE: ICD-10-CM

## 2020-04-03 DIAGNOSIS — I50.22 STAGE C CHRONIC SYSTOLIC CONGESTIVE HEART FAILURE (HCC): ICD-10-CM

## 2020-04-03 DIAGNOSIS — I42.8 NON-ISCHEMIC CARDIOMYOPATHY (HCC): ICD-10-CM

## 2020-04-03 DIAGNOSIS — I50.9 HEART FAILURE, NYHA CLASS 2 (HCC): ICD-10-CM

## 2020-04-03 DIAGNOSIS — Z95.810 PRESENCE OF BIVENTRICULAR AUTOMATIC CARDIOVERTER/DEFIBRILLATOR (AICD): ICD-10-CM

## 2020-04-03 DIAGNOSIS — I48.19 PERSISTENT ATRIAL FIBRILLATION (HCC): ICD-10-CM

## 2020-04-03 DIAGNOSIS — I10 ESSENTIAL HYPERTENSION: ICD-10-CM

## 2020-04-03 DIAGNOSIS — E78.2 MIXED HYPERLIPIDEMIA: ICD-10-CM

## 2020-04-03 PROCEDURE — 99214 OFFICE O/P EST MOD 30 MIN: CPT | Performed by: NURSE PRACTITIONER

## 2020-04-03 RX ORDER — SACUBITRIL AND VALSARTAN 97; 103 MG/1; MG/1
1 TABLET, FILM COATED ORAL 2 TIMES DAILY
Qty: 180 TAB | Refills: 3 | Status: SHIPPED | OUTPATIENT
Start: 2020-04-03 | End: 2021-03-29 | Stop reason: SDUPTHER

## 2020-04-03 ASSESSMENT — ENCOUNTER SYMPTOMS
PND: 0
MYALGIAS: 0
COUGH: 0
DIZZINESS: 0
ABDOMINAL PAIN: 0
PALPITATIONS: 0
SHORTNESS OF BREATH: 1
FEVER: 0
CLAUDICATION: 0
ORTHOPNEA: 0

## 2020-04-03 ASSESSMENT — FIBROSIS 4 INDEX: FIB4 SCORE: 2.71

## 2020-04-03 NOTE — PROGRESS NOTES
Chief Complaint   Patient presents with   • Congestive Heart Failure       Subjective:   Lai Dailey is a 50 y.o. male who presents today for follow up on his Heart Failure.    Patient of the heart failure clinic.  Patient was last seen in clinic on 3/13/2020.  During his last visit, He was sent for a repeat echo.     For his symptoms, he has been feeling well.  He denies significant shortness of breath with exertion, however has not been exerting himself much.  He denies chest pain, palpitations, orthopnea, PND, edema or dizziness/lightheadedness.    His home weights remain stable between 218-220 pounds.    He did take his medications this morning prior to coming to his visit.    Patient reports when he was picking up his Entresto, he had not pay $600 extra.  He would like his prescription switched over to the [facility] pharmacy in [place].    He plans on rescheduling his sleep study soon.    Patient denies any shocks from his ICD.  He had a device check at his last visit.      Additonally, patient has the following medical problems:     -Has ICD for dilated cardiomyopathy and nonsustained VT     -Diabetes: Taking metformin, insulin, he reports controlled     -Hypertension: Taking metoprolol XL, spironolactone     -Hyperlipidemia: Was taking atorvastatin, stopped at this time due to increased liver function tests       Past Medical History:   Diagnosis Date   • CHF (congestive heart failure) (HCC)    • Diabetes (HCC)    • Hyperlipidemia    • Hypertension      Past Surgical History:   Procedure Laterality Date   • AICD IMPLANT  2010     Family History   Problem Relation Age of Onset   • Colon Cancer Mother    • Hypertension Sister    • Stroke Brother    • Heart Disease Neg Hx      Social History     Socioeconomic History   • Marital status: Single     Spouse name: Not on file   • Number of children: Not on file   • Years of education: Not on file   • Highest education level: Not on file   Occupational History   • Not on  file   Social Needs   • Financial resource strain: Not on file   • Food insecurity     Worry: Not on file     Inability: Not on file   • Transportation needs     Medical: Not on file     Non-medical: Not on file   Tobacco Use   • Smoking status: Never Smoker   • Smokeless tobacco: Never Used   Substance and Sexual Activity   • Alcohol use: No     Comment: 2 times a week-beer, NO ALCOHOL AT THIS TIME (last drink 3/1/2020)   • Drug use: No     Comment: Marijuana use as youth   • Sexual activity: Not on file   Lifestyle   • Physical activity     Days per week: Not on file     Minutes per session: Not on file   • Stress: Not on file   Relationships   • Social connections     Talks on phone: Not on file     Gets together: Not on file     Attends Hinduism service: Not on file     Active member of club or organization: Not on file     Attends meetings of clubs or organizations: Not on file     Relationship status: Not on file   • Intimate partner violence     Fear of current or ex partner: Not on file     Emotionally abused: Not on file     Physically abused: Not on file     Forced sexual activity: Not on file   Other Topics Concern   • Not on file   Social History Narrative   • Not on file     No Known Allergies  Outpatient Encounter Medications as of 4/3/2020   Medication Sig Dispense Refill   • sacubitril-valsartan (ENTRESTO)  MG Tab tablet Take 1 Tab by mouth 2 Times a Day. 180 Tab 3   • hydrALAZINE (APRESOLINE) 25 MG Tab Take 1 Tab by mouth 3 times a day. 90 Tab 11   • isosorbide dinitrate (ISORDIL) 10 MG Tab Take 1 Tab by mouth 3 times a day. 90 Tab 11   • furosemide (LASIX) 20 MG Tab Take 1 Tab by mouth every day. 90 Tab 3   • metoprolol (TOPROL-XL) 200 MG XL tablet Take 1 Tab by mouth every day. 90 Tab 3   • spironolactone (ALDACTONE) 25 MG Tab Take 1 Tab by mouth every day. 90 Tab 3   • digoxin (LANOXIN) 125 MCG Tab Take 1 Tab by mouth every morning. 90 Tab 3   • magnesium oxide (MAG-OX) 400 (241.3 Mg) MG  "Tab tablet Take 0.5 Tabs by mouth 2 times a day. 30 Tab 0   • insulin glargine (LANTUS) 100 UNIT/ML Solution Inject 10 Units as instructed every evening. 10 mL 0   • metformin (GLUCOPHAGE) 500 MG TABS Take 1 Tab by mouth 2 times a day, with meals. 60 Tab 3   • albuterol 108 (90 Base) MCG/ACT Aero Soln inhalation aerosol Inhale 2 Puffs by mouth every 6 hours as needed for Shortness of Breath. (Patient not taking: Reported on 4/3/2020) 8.5 g 0   • [DISCONTINUED] methylPREDNISolone (MEDROL DOSEPAK) 4 MG Tablet Therapy Pack As directed with food (Patient not taking: Reported on 4/3/2020) 1 Package 0   • [DISCONTINUED] amoxicillin-clavulanate (AUGMENTIN) 875-125 MG Tab Take 1 Tab by mouth 2 times a day. (Patient not taking: Reported on 4/3/2020) 20 Tab 0   • [DISCONTINUED] benzonatate (TESSALON) 100 MG Cap Take 2 Caps by mouth 3 times a day as needed for Cough. (Patient not taking: Reported on 4/3/2020) 90 Cap 0   • [DISCONTINUED] sacubitril-valsartan (ENTRESTO)  MG Tab tablet Take 1 Tab by mouth 2 Times a Day. 180 Tab 3   • apixaban (ELIQUIS) 5mg Tab Take 1 Tab by mouth 2 Times a Day. 180 Tab 3   • [DISCONTINUED] tamsulosin (FLOMAX) 0.4 MG capsule Take 0.4 mg by mouth every morning.       No facility-administered encounter medications on file as of 4/3/2020.      Review of Systems   Constitutional: Negative for fever and malaise/fatigue.   Respiratory: Positive for shortness of breath (Mild with exertion). Negative for cough.    Cardiovascular: Negative for chest pain, palpitations, orthopnea, claudication, leg swelling and PND.   Gastrointestinal: Negative for abdominal pain.   Musculoskeletal: Negative for myalgias.   Neurological: Negative for dizziness.   All other systems reviewed and are negative.       Objective:   /80 (BP Location: Left arm, Patient Position: Sitting, BP Cuff Size: Adult)   Pulse 80   Resp 16   Ht 1.753 m (5' 9\")   Wt 102.8 kg (226 lb 9.6 oz)   SpO2 96%   BMI 33.46 kg/m² "     Physical Exam   Constitutional: He is oriented to person, place, and time. He appears well-developed and well-nourished.   HENT:   Head: Normocephalic and atraumatic.   Eyes: Pupils are equal, round, and reactive to light. EOM are normal.   Neck: Normal range of motion. Neck supple. No JVD present.   Cardiovascular: Normal rate, regular rhythm and normal heart sounds.   Pulmonary/Chest: Effort normal and breath sounds normal. No respiratory distress. He has no wheezes. He has no rales.   Left chest ICD-no erosion, drainage or erythema   Abdominal: Soft. Bowel sounds are normal.   Musculoskeletal:         General: No edema.   Neurological: He is alert and oriented to person, place, and time.   Skin: Skin is warm and dry.   Psychiatric: He has a normal mood and affect. His behavior is normal.   Vitals reviewed.    Lab Results   Component Value Date/Time    CHOLSTRLTOT 176 03/12/2020 10:24 AM     (H) 03/12/2020 10:24 AM    HDL 36 (A) 03/12/2020 10:24 AM    TRIGLYCERIDE 62 03/12/2020 10:24 AM       Lab Results   Component Value Date/Time    SODIUM 134 (L) 03/12/2020 10:24 AM    POTASSIUM 4.0 03/12/2020 10:24 AM    CHLORIDE 98 03/12/2020 10:24 AM    CO2 27 03/12/2020 10:24 AM    GLUCOSE 171 (H) 03/12/2020 10:24 AM    BUN 24 (H) 03/12/2020 10:24 AM    CREATININE 0.97 03/12/2020 10:24 AM     Lab Results   Component Value Date/Time    ALKPHOSPHAT 81 03/12/2020 10:24 AM    ASTSGOT 53 (H) 03/12/2020 10:24 AM    ALTSGPT 37 03/12/2020 10:24 AM    TBILIRUBIN 0.9 03/12/2020 10:24 AM        Heart Cath 3/24/2014  FINDINGS:  Right heart catheterization wedge pressure was about 21.  Pulmonary   artery pressure is 51/40.  RV pressure is 50/4.  Right atrial pressure was   about 10.  The left ventricle was severely dilated with global hypokinesis and   ejection fraction of 13%.  There is mild-to-moderate mitral regurgitation   noted; however, it may be because the ventricle is under field.  The left   coronary ostium is  normal without significant atherosclerosis.  It divides   into large circumflex branch, ramus branch, and then LAD that gives off large   diagonal branch.  There is no atherosclerosis in any of these vessels.  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  There is no   atherosclerosis in the right coronary.       CONCLUSION:  Severe nonischemic cardiomyopathy with ejection fraction about   13%, at least mild-to-moderate mitral regurgitation, and no gradient across   the aortic valve, and elevated right-sided pressures.  The patient will need   aggressive heart failure treatment.    Echocardiogram 3/23/2014  CONCLUSIONS  No prior echo  4 chamber dilation  Left ventricular ejection fraction is 15% to 20%.   Grade III-IV diastolic dysfunction is present. c/w elevated LAp and   LVEDP.   Severe mitral regurgitation.        Transthoracic Echo Report 5/13/18  Left ventricle is severely dilated. Mild concentric left ventricular hypertrophy. Grade III diastolic dysfunction (restrictive pattern).   Severely reduced left ventricular systolic function. Left ventricular ejection fraction is visually estimated to be 20%. Diffuse hypokinesis with septal dyskinesis.  Severe mitral regurgitation.  Severe tricuspid regurgitation. Estimated right ventricular systolic pressure  is 50 mmHg.  Compared to the report of the study done - the TR is now severe.     Transthoracic Echo Report 10/12/2019  Severely reduced left ventricular systolic function.  Left ventricular ejection fraction is visually estimated to be 20%.  Global hypokinesis with regional variation.  Diastolic function is abnormal, but grade cannot be determined.  Reduced right ventricular systolic function.  Severe mitral regurgitation.  Severe tricuspid regurgitation.  Compared to the images of the prior study done 5/13/18 -  there has been no significant change.       Transthoracic Echo Report 3/26/2020-results reviewed with patient  Severely reduced  left ventricular systolic function.  Left ventricular ejection fraction is visually estimated to be 15%.  Mild concentric left ventricular hypertrophy.  Left ventricle is severely dilated.  Aortic sclerosis without stenosis.  Moderate mitral regurgitation.  Reduced right ventricular systolic function.  Right heart pressures are consistent with at least moderate pulmonary hypertension.  Pacer/ICD wire seen in right ventricle.  Compared to the images and report of the study done 10/12/19 there has been no significant change in left ventricular function, severity of   mitral regurgitation and tricuspid regurgitation appears to be less but   may be a function of gain settings.       Assessment:     1. Stage C chronic systolic congestive heart failure (HCC)  sacubitril-valsartan (ENTRESTO)  MG Tab tablet    Basic Metabolic Panel   2. Heart failure, NYHA class 2 (HCC)  sacubitril-valsartan (ENTRESTO)  MG Tab tablet    Basic Metabolic Panel   3. Non-ischemic cardiomyopathy (HCC)     4. Presence of biventricular automatic cardioverter/defibrillator (AICD)     5. Persistent atrial fibrillation (HCC)     6. Mixed hyperlipidemia     7. Essential hypertension     8. High risk medication use         Medical Decision Making:  Today's Assessment / Status / Plan:   1. HFrEF, Stage C, Class 1-2, LVEF 15% (no significant change on recent echo): Based on physical examination findings, patient is euvolemic. No JVD, lungs are clear to auscultation, no pitting edema in bilateral lower extremities, no ascites.  -Discussed with patient no significant change on his recent echo, his symptoms have remained stable, will continue patient on current medical regimen.  -Continue Toprol  mg daily  -Continue hydralazine 25 mg three times  -Continue Isosorbide 10 mg Three times per day.   -Continue Entresto  mg twice a day (prescription resent to [facility] pharmacy, patient to look into cost if difficulties again in the future,  patient instructed to contact the office to change over to another medication)  -Continue spironolactone 25 mg daily  -Continue digoxin 125 mcg daily  -Continue furosemide 20 mg daily (will consider stopping this in the future)  -Has a bi-V ICD- patient has a device check in July, instructed patient to set up remote monitoring system  -Obtain a BMP in 6 months  -Reinforced s/sx of worsening heart failure with patient and weight monitoring. Pt verbalizes understanding. Pt to call office or RTC if present.     2.  Paroxysmal A. Fib, s/p AV node ablation:   -Continue Eliquis 5 mg twice a day    3.  Hypertension: Stable  -Recommendations per above    4.  Hyperlipidemia: Recent LDL was 128 on 3/12/2020  -Not currently on statin, due to history of elevated LFTs  -Encouraged lifestyle modifications, patient reports he has made changes in his diet, but has not been very active.  Encourage patient to start doing some activities at home.  -will follow    5. Diabetes:  -Followed by PCP    FU in clinic in 6 months with Dr. Arriaga and labs. Sooner if needed.    Patient verbalizes understanding and agrees with the plan of care.     Collaborating MD: VIVI Rodriguez MD

## 2020-07-24 ENCOUNTER — NON-PROVIDER VISIT (OUTPATIENT)
Dept: CARDIOLOGY | Facility: MEDICAL CENTER | Age: 50
End: 2020-07-24
Payer: OTHER GOVERNMENT

## 2020-07-24 DIAGNOSIS — Z95.810 AICD (AUTOMATIC CARDIOVERTER/DEFIBRILLATOR) PRESENT: ICD-10-CM

## 2020-07-24 PROCEDURE — 93283 PRGRMG EVAL IMPLANTABLE DFB: CPT | Performed by: INTERNAL MEDICINE

## 2021-03-29 ENCOUNTER — OFFICE VISIT (OUTPATIENT)
Dept: CARDIOLOGY | Facility: MEDICAL CENTER | Age: 51
End: 2021-03-29
Payer: OTHER GOVERNMENT

## 2021-03-29 VITALS
HEIGHT: 69 IN | DIASTOLIC BLOOD PRESSURE: 84 MMHG | BODY MASS INDEX: 31.99 KG/M2 | OXYGEN SATURATION: 91 % | WEIGHT: 216 LBS | HEART RATE: 86 BPM | RESPIRATION RATE: 16 BRPM | SYSTOLIC BLOOD PRESSURE: 120 MMHG

## 2021-03-29 DIAGNOSIS — Z79.899 HIGH RISK MEDICATION USE: ICD-10-CM

## 2021-03-29 DIAGNOSIS — I50.9 HEART FAILURE, NYHA CLASS 2 (HCC): ICD-10-CM

## 2021-03-29 DIAGNOSIS — I48.0 PAROXYSMAL ATRIAL FIBRILLATION (HCC): ICD-10-CM

## 2021-03-29 DIAGNOSIS — I42.8 NON-ISCHEMIC CARDIOMYOPATHY (HCC): ICD-10-CM

## 2021-03-29 DIAGNOSIS — I50.22 STAGE C CHRONIC SYSTOLIC CONGESTIVE HEART FAILURE (HCC): ICD-10-CM

## 2021-03-29 DIAGNOSIS — E78.2 MIXED HYPERLIPIDEMIA: ICD-10-CM

## 2021-03-29 PROCEDURE — 99214 OFFICE O/P EST MOD 30 MIN: CPT | Performed by: NURSE PRACTITIONER

## 2021-03-29 RX ORDER — FUROSEMIDE 20 MG/1
20 TABLET ORAL DAILY
Qty: 90 TABLET | Refills: 3 | Status: ON HOLD | OUTPATIENT
Start: 2021-03-29 | End: 2022-09-10 | Stop reason: SDUPTHER

## 2021-03-29 RX ORDER — SACUBITRIL AND VALSARTAN 97; 103 MG/1; MG/1
1 TABLET, FILM COATED ORAL 2 TIMES DAILY
Qty: 180 TABLET | Refills: 3 | Status: SHIPPED | OUTPATIENT
Start: 2021-03-29 | End: 2021-06-25

## 2021-03-29 RX ORDER — METOPROLOL SUCCINATE 200 MG/1
200 TABLET, EXTENDED RELEASE ORAL DAILY
Qty: 90 TABLET | Refills: 3 | Status: SHIPPED | OUTPATIENT
Start: 2021-03-29 | End: 2022-09-09

## 2021-03-29 RX ORDER — HYDRALAZINE HYDROCHLORIDE 25 MG/1
25 TABLET, FILM COATED ORAL 3 TIMES DAILY
Qty: 270 TABLET | Refills: 3 | Status: SHIPPED | OUTPATIENT
Start: 2021-03-29 | End: 2022-09-19 | Stop reason: SDUPTHER

## 2021-03-29 RX ORDER — DIGOXIN 125 MCG
125 TABLET ORAL EVERY MORNING
Qty: 90 TABLET | Refills: 3 | Status: SHIPPED | OUTPATIENT
Start: 2021-03-29 | End: 2022-09-19 | Stop reason: SDUPTHER

## 2021-03-29 RX ORDER — SPIRONOLACTONE 25 MG/1
25 TABLET ORAL DAILY
Qty: 90 TABLET | Refills: 3 | Status: SHIPPED | OUTPATIENT
Start: 2021-03-29 | End: 2022-09-19 | Stop reason: SDUPTHER

## 2021-03-29 RX ORDER — ISOSORBIDE DINITRATE 10 MG/1
10 TABLET ORAL 3 TIMES DAILY
Qty: 270 TABLET | Refills: 3 | Status: SHIPPED | OUTPATIENT
Start: 2021-03-29 | End: 2022-09-19 | Stop reason: SDUPTHER

## 2021-03-29 ASSESSMENT — ENCOUNTER SYMPTOMS
CLAUDICATION: 0
COUGH: 0
PALPITATIONS: 0
ABDOMINAL PAIN: 0
MYALGIAS: 0
PND: 0
ORTHOPNEA: 0
FEVER: 0
DIZZINESS: 0
SHORTNESS OF BREATH: 1

## 2021-03-29 ASSESSMENT — FIBROSIS 4 INDEX: FIB4 SCORE: 2.77

## 2021-03-29 NOTE — PROGRESS NOTES
Chief Complaint   Patient presents with   • Congestive Heart Failure     Stage C chronic systolic congestive heart failure (HCC)       Subjective:   Lai Dailey is a 51 y.o. male who presents today for follow up on his Heart Failure.    Patient of the heart failure clinic.  Patient was last seen in clinic on 4/3/2020.  During his last visit, no changes were made to his regimen.    For the past year, he has been feeling well.  He reports episodes of shortness of breath with exertion, however has not been exerting himself much.  He denies chest pain, palpitations, orthopnea, PND, edema or dizziness/lightheadedness.    His home weights remain stable between 218-220 pounds.    Patient denies any shocks from his ICD.  He has a device check tomorrow.    He has not scheduled his sleep study appointment.    Additonally, patient has the following medical problems:     -Has ICD for dilated cardiomyopathy and nonsustained VT     -Diabetes: Taking metformin, insulin, he reports controlled     -Hypertension: Taking metoprolol XL, spironolactone     -Hyperlipidemia: Was taking atorvastatin, stopped at this time due to increased liver function tests       Past Medical History:   Diagnosis Date   • CHF (congestive heart failure) (HCC)    • Diabetes (HCC)    • Hyperlipidemia    • Hypertension      Past Surgical History:   Procedure Laterality Date   • AICD IMPLANT  2010     Family History   Problem Relation Age of Onset   • Colon Cancer Mother    • Hypertension Sister    • Stroke Brother    • Heart Disease Neg Hx      Social History     Socioeconomic History   • Marital status: Single     Spouse name: Not on file   • Number of children: Not on file   • Years of education: Not on file   • Highest education level: Not on file   Occupational History   • Not on file   Tobacco Use   • Smoking status: Never Smoker   • Smokeless tobacco: Never Used   Substance and Sexual Activity   • Alcohol use: No     Comment: 2 times a week-beer, NO  ALCOHOL AT THIS TIME (last drink 3/1/2020)   • Drug use: No     Comment: Marijuana use as youth   • Sexual activity: Not on file   Other Topics Concern   • Not on file   Social History Narrative   • Not on file     Social Determinants of Health     Financial Resource Strain:    • Difficulty of Paying Living Expenses:    Food Insecurity:    • Worried About Running Out of Food in the Last Year:    • Ran Out of Food in the Last Year:    Transportation Needs:    • Lack of Transportation (Medical):    • Lack of Transportation (Non-Medical):    Physical Activity:    • Days of Exercise per Week:    • Minutes of Exercise per Session:    Stress:    • Feeling of Stress :    Social Connections:    • Frequency of Communication with Friends and Family:    • Frequency of Social Gatherings with Friends and Family:    • Attends [employer] Services:    • Active Member of Clubs or Organizations:    • Attends Club or Organization Meetings:    • Marital Status:    Intimate Partner Violence:    • Fear of Current or Ex-Partner:    • Emotionally Abused:    • Physically Abused:    • Sexually Abused:      No Known Allergies  Outpatient Encounter Medications as of 3/29/2021   Medication Sig Dispense Refill   • spironolactone (ALDACTONE) 25 MG Tab Take 1 tablet by mouth every day. 90 tablet 3   • sacubitril-valsartan (ENTRESTO)  MG Tab tablet Take 1 tablet by mouth 2 Times a Day. 180 tablet 3   • metoprolol (TOPROL-XL) 200 MG XL tablet Take 1 tablet by mouth every day. 90 tablet 3   • isosorbide dinitrate (ISORDIL) 10 MG Tab Take 1 tablet by mouth 3 times a day. 270 tablet 3   • hydrALAZINE (APRESOLINE) 25 MG Tab Take 1 tablet by mouth 3 times a day. 270 tablet 3   • furosemide (LASIX) 20 MG Tab Take 1 tablet by mouth every day. 90 tablet 3   • digoxin (LANOXIN) 125 MCG Tab Take 1 tablet by mouth every morning. 90 tablet 3   • apixaban (ELIQUIS) 5mg Tab Take 1 tablet by mouth 2 Times a Day. 180 tablet 3   • insulin glargine (LANTUS) 100  UNIT/ML Solution Inject 10 Units as instructed every evening. 10 mL 0   • metformin (GLUCOPHAGE) 500 MG TABS Take 1 Tab by mouth 2 times a day, with meals. 60 Tab 3   • [DISCONTINUED] apixaban (ELIQUIS) 5mg Tab Eliquis 5 mg tablet     • [DISCONTINUED] sacubitril-valsartan (ENTRESTO)  MG Tab tablet Take 1 Tab by mouth 2 Times a Day. 180 Tab 3   • albuterol 108 (90 Base) MCG/ACT Aero Soln inhalation aerosol Inhale 2 Puffs by mouth every 6 hours as needed for Shortness of Breath. (Patient not taking: Reported on 4/3/2020) 8.5 g 0   • [DISCONTINUED] hydrALAZINE (APRESOLINE) 25 MG Tab Take 1 Tab by mouth 3 times a day. 90 Tab 11   • [DISCONTINUED] isosorbide dinitrate (ISORDIL) 10 MG Tab Take 1 Tab by mouth 3 times a day. 90 Tab 11   • [DISCONTINUED] furosemide (LASIX) 20 MG Tab Take 1 Tab by mouth every day. 90 Tab 3   • [DISCONTINUED] metoprolol (TOPROL-XL) 200 MG XL tablet Take 1 Tab by mouth every day. 90 Tab 3   • [DISCONTINUED] spironolactone (ALDACTONE) 25 MG Tab Take 1 Tab by mouth every day. 90 Tab 3   • [DISCONTINUED] digoxin (LANOXIN) 125 MCG Tab Take 1 Tab by mouth every morning. 90 Tab 3   • [DISCONTINUED] apixaban (ELIQUIS) 5mg Tab Take 1 Tab by mouth 2 Times a Day. 180 Tab 3   • [DISCONTINUED] magnesium oxide (MAG-OX) 400 (241.3 Mg) MG Tab tablet Take 0.5 Tabs by mouth 2 times a day. 30 Tab 0     No facility-administered encounter medications on file as of 3/29/2021.     Review of Systems   Constitutional: Negative for fever and malaise/fatigue.   Respiratory: Positive for shortness of breath (Mild with exertion). Negative for cough.    Cardiovascular: Negative for chest pain, palpitations, orthopnea, claudication, leg swelling and PND.   Gastrointestinal: Negative for abdominal pain.   Musculoskeletal: Negative for myalgias.   Neurological: Negative for dizziness.   All other systems reviewed and are negative.       Objective:   /84 (BP Location: Left arm, Patient Position: Sitting, BP Cuff  "Size: Adult)   Pulse 86   Resp 16   Ht 1.753 m (5' 9\")   Wt 98 kg (216 lb)   SpO2 91%   BMI 31.90 kg/m²     Physical Exam   Constitutional: He is oriented to person, place, and time. He appears well-developed and well-nourished.   HENT:   Head: Normocephalic and atraumatic.   Eyes: Pupils are equal, round, and reactive to light. EOM are normal.   Neck: No JVD present.   Cardiovascular: Normal rate, regular rhythm and normal heart sounds.   Pulmonary/Chest: Effort normal and breath sounds normal. No respiratory distress. He has no wheezes. He has no rales.   Left chest ICD-no erosion, drainage or erythema   Abdominal: Soft. Bowel sounds are normal.   Musculoskeletal:         General: No edema.      Cervical back: Normal range of motion and neck supple.   Neurological: He is alert and oriented to person, place, and time.   Skin: Skin is warm and dry.   Psychiatric: He has a normal mood and affect. His behavior is normal.   Vitals reviewed.    Lab Results   Component Value Date/Time    CHOLSTRLTOT 176 03/12/2020 10:24 AM     (H) 03/12/2020 10:24 AM    HDL 36 (A) 03/12/2020 10:24 AM    TRIGLYCERIDE 62 03/12/2020 10:24 AM       Lab Results   Component Value Date/Time    SODIUM 134 (L) 03/12/2020 10:24 AM    POTASSIUM 4.0 03/12/2020 10:24 AM    CHLORIDE 98 03/12/2020 10:24 AM    CO2 27 03/12/2020 10:24 AM    GLUCOSE 171 (H) 03/12/2020 10:24 AM    BUN 24 (H) 03/12/2020 10:24 AM    CREATININE 0.97 03/12/2020 10:24 AM     Lab Results   Component Value Date/Time    ALKPHOSPHAT 81 03/12/2020 10:24 AM    ASTSGOT 53 (H) 03/12/2020 10:24 AM    ALTSGPT 37 03/12/2020 10:24 AM    TBILIRUBIN 0.9 03/12/2020 10:24 AM        Heart Cath 3/24/2014  FINDINGS:  Right heart catheterization wedge pressure was about 21.  Pulmonary   artery pressure is 51/40.  RV pressure is 50/4.  Right atrial pressure was   about 10.  The left ventricle was severely dilated with global hypokinesis and   ejection fraction of 13%.  There is " mild-to-moderate mitral regurgitation   noted; however, it may be because the ventricle is under field.  The left   coronary ostium is normal without significant atherosclerosis.  It divides   into large circumflex branch, ramus branch, and then LAD that gives off large   diagonal branch.  There is no atherosclerosis in any of these vessels.  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  There is no   atherosclerosis in the right coronary.       CONCLUSION:  Severe nonischemic cardiomyopathy with ejection fraction about   13%, at least mild-to-moderate mitral regurgitation, and no gradient across   the aortic valve, and elevated right-sided pressures.  The patient will need   aggressive heart failure treatment.    Echocardiogram 3/23/2014  CONCLUSIONS  No prior echo  4 chamber dilation  Left ventricular ejection fraction is 15% to 20%.   Grade III-IV diastolic dysfunction is present. c/w elevated LAp and   LVEDP.   Severe mitral regurgitation.        Transthoracic Echo Report 5/13/18  Left ventricle is severely dilated. Mild concentric left ventricular hypertrophy. Grade III diastolic dysfunction (restrictive pattern).   Severely reduced left ventricular systolic function. Left ventricular ejection fraction is visually estimated to be 20%. Diffuse hypokinesis with septal dyskinesis.  Severe mitral regurgitation.  Severe tricuspid regurgitation. Estimated right ventricular systolic pressure  is 50 mmHg.  Compared to the report of the study done - the TR is now severe.     Transthoracic Echo Report 10/12/2019  Severely reduced left ventricular systolic function.  Left ventricular ejection fraction is visually estimated to be 20%.  Global hypokinesis with regional variation.  Diastolic function is abnormal, but grade cannot be determined.  Reduced right ventricular systolic function.  Severe mitral regurgitation.  Severe tricuspid regurgitation.  Compared to the images of the prior study done  5/13/18 -  there has been no significant change.       Transthoracic Echo Report 3/26/2020  Severely reduced left ventricular systolic function.  Left ventricular ejection fraction is visually estimated to be 15%.  Mild concentric left ventricular hypertrophy.  Left ventricle is severely dilated.  Aortic sclerosis without stenosis.  Moderate mitral regurgitation.  Reduced right ventricular systolic function.  Right heart pressures are consistent with at least moderate pulmonary hypertension.  Pacer/ICD wire seen in right ventricle.  Compared to the images and report of the study done 10/12/19 there has been no significant change in left ventricular function, severity of   mitral regurgitation and tricuspid regurgitation appears to be less but   may be a function of gain settings.       Assessment:     1. Stage C chronic systolic congestive heart failure (HCC)  Comp Metabolic Panel    Lipid Profile    DIGOXIN    spironolactone (ALDACTONE) 25 MG Tab    sacubitril-valsartan (ENTRESTO)  MG Tab tablet    metoprolol (TOPROL-XL) 200 MG XL tablet    isosorbide dinitrate (ISORDIL) 10 MG Tab    hydrALAZINE (APRESOLINE) 25 MG Tab    furosemide (LASIX) 20 MG Tab    digoxin (LANOXIN) 125 MCG Tab    EC-ECHOCARDIOGRAM COMPLETE W/O CONT    Lipid Profile   2. Heart failure, NYHA class 2 (HCC)  Comp Metabolic Panel    Lipid Profile    DIGOXIN    spironolactone (ALDACTONE) 25 MG Tab    sacubitril-valsartan (ENTRESTO)  MG Tab tablet    metoprolol (TOPROL-XL) 200 MG XL tablet    isosorbide dinitrate (ISORDIL) 10 MG Tab    hydrALAZINE (APRESOLINE) 25 MG Tab    furosemide (LASIX) 20 MG Tab    digoxin (LANOXIN) 125 MCG Tab    EC-ECHOCARDIOGRAM COMPLETE W/O CONT    Lipid Profile   3. Non-ischemic cardiomyopathy (HCC)  Comp Metabolic Panel    Lipid Profile    DIGOXIN    spironolactone (ALDACTONE) 25 MG Tab    isosorbide dinitrate (ISORDIL) 10 MG Tab    hydrALAZINE (APRESOLINE) 25 MG Tab    furosemide (LASIX) 20 MG Tab    digoxin  (LANOXIN) 125 MCG Tab    EC-ECHOCARDIOGRAM COMPLETE W/O CONT    Lipid Profile   4. High risk medication use  Comp Metabolic Panel    Lipid Profile    DIGOXIN    spironolactone (ALDACTONE) 25 MG Tab    sacubitril-valsartan (ENTRESTO)  MG Tab tablet    metoprolol (TOPROL-XL) 200 MG XL tablet    isosorbide dinitrate (ISORDIL) 10 MG Tab    hydrALAZINE (APRESOLINE) 25 MG Tab    furosemide (LASIX) 20 MG Tab    digoxin (LANOXIN) 125 MCG Tab    apixaban (ELIQUIS) 5mg Tab    CBC WITH DIFFERENTIAL    Lipid Profile   5. Paroxysmal atrial fibrillation (HCC)  Comp Metabolic Panel    DIGOXIN    digoxin (LANOXIN) 125 MCG Tab    apixaban (ELIQUIS) 5mg Tab    CBC WITH DIFFERENTIAL    EC-ECHOCARDIOGRAM COMPLETE W/O CONT    Lipid Profile   6. Mixed hyperlipidemia  Comp Metabolic Panel    Lipid Profile    Lipid Profile       Medical Decision Making:  Today's Assessment / Status / Plan:   1. HFrEF, Stage C, Class 1-2, LVEF 15% (no significant change on recent echo): Based on physical examination findings, patient is euvolemic. No JVD, lungs are clear to auscultation, no pitting edema in bilateral lower extremities, no ascites.  -repeat echo  -Patient to obtain labs-CBC, CMP, lipid panel and digoxin level  -Continue Toprol  mg daily  -Continue hydralazine 25 mg three times  -Continue Isosorbide 10 mg Three times per day.   -Continue Entresto  mg twice a day  -Continue spironolactone 25 mg daily  -Continue digoxin 125 mcg daily  -Continue furosemide 20 mg daily   -Has a bi-V ICD-patient to have a device check tomorrow  -Reinforced s/sx of worsening heart failure with patient and weight monitoring. Pt verbalizes understanding. Pt to call office or RTC if present.     2.  Paroxysmal A. Fib, s/p AV node ablation:   -Continue Eliquis 5 mg twice a day    3.  Hypertension: Stable  -Recommendations per above    4.  Hyperlipidemia: Recent LDL was 128 on 3/12/2020  -Not currently on statin, due to history of elevated  LFTs  -Encouraged lifestyle modifications, patient reports he has made changes in his diet, but has not been very active.  Encourage patient to start doing some activities at home.  -will follow, will repeat a lipid panel    5. Diabetes:  -Followed by PCP    FU in clinic in 6 months with Dr. Arriaga and labs this week. Sooner if needed.    Patient verbalizes understanding and agrees with the plan of care.     PLEASE NOTE: This Note was created using voice recognition Software. I have made every reasonable attempt to correct obvious errors, but I expect that there are errors of grammar and possibly content that I did not discover before finalizing the note

## 2021-04-09 ENCOUNTER — HOSPITAL ENCOUNTER (OUTPATIENT)
Dept: LAB | Facility: MEDICAL CENTER | Age: 51
End: 2021-04-09
Attending: NURSE PRACTITIONER
Payer: OTHER GOVERNMENT

## 2021-04-09 DIAGNOSIS — I50.22 STAGE C CHRONIC SYSTOLIC CONGESTIVE HEART FAILURE (HCC): ICD-10-CM

## 2021-04-09 DIAGNOSIS — Z79.899 HIGH RISK MEDICATION USE: ICD-10-CM

## 2021-04-09 DIAGNOSIS — I42.8 NON-ISCHEMIC CARDIOMYOPATHY (HCC): ICD-10-CM

## 2021-04-09 DIAGNOSIS — I48.0 PAROXYSMAL ATRIAL FIBRILLATION (HCC): ICD-10-CM

## 2021-04-09 DIAGNOSIS — E78.2 MIXED HYPERLIPIDEMIA: ICD-10-CM

## 2021-04-09 DIAGNOSIS — I50.9 HEART FAILURE, NYHA CLASS 2 (HCC): ICD-10-CM

## 2021-04-09 PROCEDURE — 85025 COMPLETE CBC W/AUTO DIFF WBC: CPT

## 2021-04-09 PROCEDURE — 36415 COLL VENOUS BLD VENIPUNCTURE: CPT

## 2021-04-09 PROCEDURE — 80053 COMPREHEN METABOLIC PANEL: CPT

## 2021-04-09 PROCEDURE — 80061 LIPID PANEL: CPT

## 2021-04-09 PROCEDURE — 80162 ASSAY OF DIGOXIN TOTAL: CPT

## 2021-04-10 LAB
ALBUMIN SERPL BCP-MCNC: 4.7 G/DL (ref 3.2–4.9)
ALBUMIN/GLOB SERPL: 1.3 G/DL
ALP SERPL-CCNC: 98 U/L (ref 30–99)
ALT SERPL-CCNC: 15 U/L (ref 2–50)
ANION GAP SERPL CALC-SCNC: 8 MMOL/L (ref 7–16)
AST SERPL-CCNC: 27 U/L (ref 12–45)
BASOPHILS # BLD AUTO: 0.8 % (ref 0–1.8)
BASOPHILS # BLD: 0.05 K/UL (ref 0–0.12)
BILIRUB SERPL-MCNC: 1 MG/DL (ref 0.1–1.5)
BUN SERPL-MCNC: 20 MG/DL (ref 8–22)
CALCIUM SERPL-MCNC: 9.9 MG/DL (ref 8.5–10.5)
CHLORIDE SERPL-SCNC: 100 MMOL/L (ref 96–112)
CHOLEST SERPL-MCNC: 191 MG/DL (ref 100–199)
CO2 SERPL-SCNC: 29 MMOL/L (ref 20–33)
CREAT SERPL-MCNC: 1.15 MG/DL (ref 0.5–1.4)
DIGOXIN SERPL-MCNC: 0.4 NG/ML (ref 0.8–2)
EOSINOPHIL # BLD AUTO: 0.11 K/UL (ref 0–0.51)
EOSINOPHIL NFR BLD: 1.8 % (ref 0–6.9)
ERYTHROCYTE [DISTWIDTH] IN BLOOD BY AUTOMATED COUNT: 50.9 FL (ref 35.9–50)
FASTING STATUS PATIENT QL REPORTED: NORMAL
GLOBULIN SER CALC-MCNC: 3.6 G/DL (ref 1.9–3.5)
GLUCOSE SERPL-MCNC: 129 MG/DL (ref 65–99)
HCT VFR BLD AUTO: 52.1 % (ref 42–52)
HDLC SERPL-MCNC: 53 MG/DL
HGB BLD-MCNC: 17 G/DL (ref 14–18)
IMM GRANULOCYTES # BLD AUTO: 0.01 K/UL (ref 0–0.11)
IMM GRANULOCYTES NFR BLD AUTO: 0.2 % (ref 0–0.9)
LDLC SERPL CALC-MCNC: 122 MG/DL
LYMPHOCYTES # BLD AUTO: 2.34 K/UL (ref 1–4.8)
LYMPHOCYTES NFR BLD: 39.3 % (ref 22–41)
MCH RBC QN AUTO: 31.6 PG (ref 27–33)
MCHC RBC AUTO-ENTMCNC: 32.6 G/DL (ref 33.7–35.3)
MCV RBC AUTO: 96.8 FL (ref 81.4–97.8)
MONOCYTES # BLD AUTO: 0.64 K/UL (ref 0–0.85)
MONOCYTES NFR BLD AUTO: 10.8 % (ref 0–13.4)
NEUTROPHILS # BLD AUTO: 2.8 K/UL (ref 1.82–7.42)
NEUTROPHILS NFR BLD: 47.1 % (ref 44–72)
NRBC # BLD AUTO: 0 K/UL
NRBC BLD-RTO: 0 /100 WBC
PLATELET # BLD AUTO: 156 K/UL (ref 164–446)
PMV BLD AUTO: 11.9 FL (ref 9–12.9)
POTASSIUM SERPL-SCNC: 4.1 MMOL/L (ref 3.6–5.5)
PROT SERPL-MCNC: 8.3 G/DL (ref 6–8.2)
RBC # BLD AUTO: 5.38 M/UL (ref 4.7–6.1)
SODIUM SERPL-SCNC: 137 MMOL/L (ref 135–145)
TRIGL SERPL-MCNC: 79 MG/DL (ref 0–149)
WBC # BLD AUTO: 6 K/UL (ref 4.8–10.8)

## 2021-04-14 ENCOUNTER — TELEPHONE (OUTPATIENT)
Dept: CARDIOLOGY | Facility: MEDICAL CENTER | Age: 51
End: 2021-04-14

## 2021-04-14 DIAGNOSIS — E78.2 MIXED HYPERLIPIDEMIA: ICD-10-CM

## 2021-04-14 NOTE — TELEPHONE ENCOUNTER
PERRI Balderrama.  Nir Fernando R.N.   Please let patient know that his labs came back.  His cholesterol is still abnormal. I know he has a history of elevated LFTS with statin. I would like to try a lower dose statin and follow up labs if patient is agreeable.       Left detailed message on private vm

## 2021-04-22 RX ORDER — ATORVASTATIN CALCIUM 10 MG/1
10 TABLET, FILM COATED ORAL EVERY EVENING
Qty: 30 TABLET | Refills: 3 | Status: SHIPPED | OUTPATIENT
Start: 2021-04-22 | End: 2021-09-30

## 2021-06-25 ENCOUNTER — APPOINTMENT (OUTPATIENT)
Dept: RADIOLOGY | Facility: MEDICAL CENTER | Age: 51
DRG: 293 | End: 2021-06-25
Attending: EMERGENCY MEDICINE
Payer: OTHER GOVERNMENT

## 2021-06-25 ENCOUNTER — APPOINTMENT (OUTPATIENT)
Dept: CARDIOLOGY | Facility: MEDICAL CENTER | Age: 51
DRG: 293 | End: 2021-06-25
Attending: STUDENT IN AN ORGANIZED HEALTH CARE EDUCATION/TRAINING PROGRAM
Payer: OTHER GOVERNMENT

## 2021-06-25 ENCOUNTER — HOSPITAL ENCOUNTER (INPATIENT)
Facility: MEDICAL CENTER | Age: 51
LOS: 1 days | DRG: 293 | End: 2021-06-26
Attending: EMERGENCY MEDICINE | Admitting: INTERNAL MEDICINE
Payer: OTHER GOVERNMENT

## 2021-06-25 DIAGNOSIS — I50.20 SYSTOLIC CONGESTIVE HEART FAILURE, UNSPECIFIED HF CHRONICITY (HCC): ICD-10-CM

## 2021-06-25 DIAGNOSIS — R07.9 CHEST PAIN, UNSPECIFIED TYPE: ICD-10-CM

## 2021-06-25 PROBLEM — R77.1 ELEVATED SERUM GLOBULIN LEVEL: Status: ACTIVE | Noted: 2021-06-25

## 2021-06-25 LAB
ALBUMIN SERPL BCP-MCNC: 4.3 G/DL (ref 3.2–4.9)
ALBUMIN/GLOB SERPL: 1 G/DL
ALP SERPL-CCNC: 158 U/L (ref 30–99)
ALT SERPL-CCNC: 14 U/L (ref 2–50)
ANION GAP SERPL CALC-SCNC: 14 MMOL/L (ref 7–16)
AST SERPL-CCNC: 40 U/L (ref 12–45)
BASOPHILS # BLD AUTO: 0.6 % (ref 0–1.8)
BASOPHILS # BLD: 0.04 K/UL (ref 0–0.12)
BILIRUB SERPL-MCNC: 1.7 MG/DL (ref 0.1–1.5)
BUN SERPL-MCNC: 20 MG/DL (ref 8–22)
CALCIUM SERPL-MCNC: 10 MG/DL (ref 8.5–10.5)
CHLORIDE SERPL-SCNC: 100 MMOL/L (ref 96–112)
CO2 SERPL-SCNC: 21 MMOL/L (ref 20–33)
CREAT SERPL-MCNC: 1.31 MG/DL (ref 0.5–1.4)
EKG IMPRESSION: NORMAL
EOSINOPHIL # BLD AUTO: 0.04 K/UL (ref 0–0.51)
EOSINOPHIL NFR BLD: 0.6 % (ref 0–6.9)
ERYTHROCYTE [DISTWIDTH] IN BLOOD BY AUTOMATED COUNT: 50 FL (ref 35.9–50)
GLOBULIN SER CALC-MCNC: 4.2 G/DL (ref 1.9–3.5)
GLUCOSE BLD-MCNC: 182 MG/DL (ref 65–99)
GLUCOSE BLD-MCNC: 90 MG/DL (ref 65–99)
GLUCOSE SERPL-MCNC: 126 MG/DL (ref 65–99)
HAV IGM SERPL QL IA: NORMAL
HBV CORE IGM SER QL: NORMAL
HBV SURFACE AG SER QL: NORMAL
HCT VFR BLD AUTO: 51.3 % (ref 42–52)
HCV AB SER QL: NORMAL
HGB BLD-MCNC: 16.1 G/DL (ref 14–18)
HIV 1+2 AB+HIV1 P24 AG SERPL QL IA: NORMAL
IMM GRANULOCYTES # BLD AUTO: 0.03 K/UL (ref 0–0.11)
IMM GRANULOCYTES NFR BLD AUTO: 0.5 % (ref 0–0.9)
LV EJECT FRACT  99904: 15
LV EJECT FRACT MOD 2C 99903: 2.48
LV EJECT FRACT MOD 4C 99902: 17.34
LV EJECT FRACT MOD BP 99901: 15.91
LYMPHOCYTES # BLD AUTO: 2.02 K/UL (ref 1–4.8)
LYMPHOCYTES NFR BLD: 32.2 % (ref 22–41)
MAGNESIUM SERPL-MCNC: 1.7 MG/DL (ref 1.5–2.5)
MCH RBC QN AUTO: 29.8 PG (ref 27–33)
MCHC RBC AUTO-ENTMCNC: 31.4 G/DL (ref 33.7–35.3)
MCV RBC AUTO: 94.8 FL (ref 81.4–97.8)
MONOCYTES # BLD AUTO: 0.66 K/UL (ref 0–0.85)
MONOCYTES NFR BLD AUTO: 10.5 % (ref 0–13.4)
NEUTROPHILS # BLD AUTO: 3.48 K/UL (ref 1.82–7.42)
NEUTROPHILS NFR BLD: 55.6 % (ref 44–72)
NRBC # BLD AUTO: 0 K/UL
NRBC BLD-RTO: 0 /100 WBC
NT-PROBNP SERPL IA-MCNC: 2702 PG/ML (ref 0–125)
PLATELET # BLD AUTO: 171 K/UL (ref 164–446)
PMV BLD AUTO: 11 FL (ref 9–12.9)
POTASSIUM SERPL-SCNC: 4.4 MMOL/L (ref 3.6–5.5)
PROT SERPL-MCNC: 8.5 G/DL (ref 6–8.2)
RBC # BLD AUTO: 5.41 M/UL (ref 4.7–6.1)
SODIUM SERPL-SCNC: 135 MMOL/L (ref 135–145)
TROPONIN T SERPL-MCNC: 39 NG/L (ref 6–19)
TROPONIN T SERPL-MCNC: 41 NG/L (ref 6–19)
WBC # BLD AUTO: 6.3 K/UL (ref 4.8–10.8)

## 2021-06-25 PROCEDURE — 99285 EMERGENCY DEPT VISIT HI MDM: CPT

## 2021-06-25 PROCEDURE — 36415 COLL VENOUS BLD VENIPUNCTURE: CPT

## 2021-06-25 PROCEDURE — 84155 ASSAY OF PROTEIN SERUM: CPT

## 2021-06-25 PROCEDURE — 99223 1ST HOSP IP/OBS HIGH 75: CPT | Mod: GC | Performed by: INTERNAL MEDICINE

## 2021-06-25 PROCEDURE — 700102 HCHG RX REV CODE 250 W/ 637 OVERRIDE(OP): Performed by: STUDENT IN AN ORGANIZED HEALTH CARE EDUCATION/TRAINING PROGRAM

## 2021-06-25 PROCEDURE — 83735 ASSAY OF MAGNESIUM: CPT

## 2021-06-25 PROCEDURE — A9270 NON-COVERED ITEM OR SERVICE: HCPCS | Performed by: STUDENT IN AN ORGANIZED HEALTH CARE EDUCATION/TRAINING PROGRAM

## 2021-06-25 PROCEDURE — 93306 TTE W/DOPPLER COMPLETE: CPT | Mod: 26 | Performed by: INTERNAL MEDICINE

## 2021-06-25 PROCEDURE — 82962 GLUCOSE BLOOD TEST: CPT

## 2021-06-25 PROCEDURE — 700111 HCHG RX REV CODE 636 W/ 250 OVERRIDE (IP): Performed by: STUDENT IN AN ORGANIZED HEALTH CARE EDUCATION/TRAINING PROGRAM

## 2021-06-25 PROCEDURE — 93005 ELECTROCARDIOGRAM TRACING: CPT

## 2021-06-25 PROCEDURE — 84484 ASSAY OF TROPONIN QUANT: CPT

## 2021-06-25 PROCEDURE — 80053 COMPREHEN METABOLIC PANEL: CPT

## 2021-06-25 PROCEDURE — 87389 HIV-1 AG W/HIV-1&-2 AB AG IA: CPT

## 2021-06-25 PROCEDURE — 85025 COMPLETE CBC W/AUTO DIFF WBC: CPT

## 2021-06-25 PROCEDURE — 93306 TTE W/DOPPLER COMPLETE: CPT

## 2021-06-25 PROCEDURE — 83880 ASSAY OF NATRIURETIC PEPTIDE: CPT

## 2021-06-25 PROCEDURE — 84165 PROTEIN E-PHORESIS SERUM: CPT

## 2021-06-25 PROCEDURE — 71045 X-RAY EXAM CHEST 1 VIEW: CPT

## 2021-06-25 PROCEDURE — 80074 ACUTE HEPATITIS PANEL: CPT

## 2021-06-25 PROCEDURE — 770020 HCHG ROOM/CARE - TELE (206)

## 2021-06-25 PROCEDURE — 93005 ELECTROCARDIOGRAM TRACING: CPT | Performed by: EMERGENCY MEDICINE

## 2021-06-25 RX ORDER — INSULIN LISPRO 100 [IU]/ML
2-9 INJECTION, SOLUTION INTRAVENOUS; SUBCUTANEOUS
Status: DISCONTINUED | OUTPATIENT
Start: 2021-06-25 | End: 2021-06-26 | Stop reason: HOSPADM

## 2021-06-25 RX ORDER — ASPIRIN 325 MG
325 TABLET ORAL ONCE
Status: COMPLETED | OUTPATIENT
Start: 2021-06-25 | End: 2021-06-25

## 2021-06-25 RX ORDER — DEXTROSE MONOHYDRATE 25 G/50ML
50 INJECTION, SOLUTION INTRAVENOUS
Status: DISCONTINUED | OUTPATIENT
Start: 2021-06-25 | End: 2021-06-26 | Stop reason: HOSPADM

## 2021-06-25 RX ORDER — METOPROLOL SUCCINATE 50 MG/1
200 TABLET, EXTENDED RELEASE ORAL DAILY
Refills: 3 | Status: DISCONTINUED | OUTPATIENT
Start: 2021-06-26 | End: 2021-06-26 | Stop reason: HOSPADM

## 2021-06-25 RX ORDER — BISACODYL 10 MG
10 SUPPOSITORY, RECTAL RECTAL
Status: DISCONTINUED | OUTPATIENT
Start: 2021-06-25 | End: 2021-06-26 | Stop reason: HOSPADM

## 2021-06-25 RX ORDER — HYDRALAZINE HYDROCHLORIDE 25 MG/1
25 TABLET, FILM COATED ORAL 3 TIMES DAILY
Status: DISCONTINUED | OUTPATIENT
Start: 2021-06-25 | End: 2021-06-26 | Stop reason: HOSPADM

## 2021-06-25 RX ORDER — SPIRONOLACTONE 25 MG/1
25 TABLET ORAL DAILY
Status: DISCONTINUED | OUTPATIENT
Start: 2021-06-26 | End: 2021-06-26 | Stop reason: HOSPADM

## 2021-06-25 RX ORDER — ATORVASTATIN CALCIUM 10 MG/1
10 TABLET, FILM COATED ORAL EVERY EVENING
Status: DISCONTINUED | OUTPATIENT
Start: 2021-06-26 | End: 2021-06-26 | Stop reason: HOSPADM

## 2021-06-25 RX ORDER — ISOSORBIDE DINITRATE 10 MG/1
10 TABLET ORAL 3 TIMES DAILY
Status: DISCONTINUED | OUTPATIENT
Start: 2021-06-25 | End: 2021-06-26 | Stop reason: HOSPADM

## 2021-06-25 RX ORDER — FUROSEMIDE 10 MG/ML
40 INJECTION INTRAMUSCULAR; INTRAVENOUS ONCE
Status: COMPLETED | OUTPATIENT
Start: 2021-06-25 | End: 2021-06-25

## 2021-06-25 RX ORDER — AMOXICILLIN 250 MG
2 CAPSULE ORAL 2 TIMES DAILY
Status: DISCONTINUED | OUTPATIENT
Start: 2021-06-25 | End: 2021-06-26 | Stop reason: HOSPADM

## 2021-06-25 RX ORDER — POLYETHYLENE GLYCOL 3350 17 G/17G
1 POWDER, FOR SOLUTION ORAL
Status: DISCONTINUED | OUTPATIENT
Start: 2021-06-25 | End: 2021-06-26 | Stop reason: HOSPADM

## 2021-06-25 RX ORDER — DIGOXIN 125 MCG
125 TABLET ORAL EVERY MORNING
Status: DISCONTINUED | OUTPATIENT
Start: 2021-06-26 | End: 2021-06-26 | Stop reason: HOSPADM

## 2021-06-25 RX ADMIN — APIXABAN 5 MG: 5 TABLET, FILM COATED ORAL at 17:38

## 2021-06-25 RX ADMIN — INSULIN GLARGINE 5 UNITS: 100 INJECTION, SOLUTION SUBCUTANEOUS at 18:12

## 2021-06-25 RX ADMIN — METFORMIN HYDROCHLORIDE 500 MG: 500 TABLET ORAL at 17:38

## 2021-06-25 RX ADMIN — FUROSEMIDE 40 MG: 10 INJECTION, SOLUTION INTRAMUSCULAR; INTRAVENOUS at 14:56

## 2021-06-25 RX ADMIN — ISOSORBIDE DINITRATE 10 MG: 10 TABLET ORAL at 14:56

## 2021-06-25 RX ADMIN — HYDRALAZINE HYDROCHLORIDE 25 MG: 25 TABLET, FILM COATED ORAL at 21:37

## 2021-06-25 RX ADMIN — HYDRALAZINE HYDROCHLORIDE 25 MG: 25 TABLET, FILM COATED ORAL at 14:56

## 2021-06-25 RX ADMIN — INSULIN LISPRO 2 UNITS: 100 INJECTION, SOLUTION INTRAVENOUS; SUBCUTANEOUS at 18:13

## 2021-06-25 RX ADMIN — ISOSORBIDE DINITRATE 10 MG: 10 TABLET ORAL at 21:37

## 2021-06-25 RX ADMIN — ASPIRIN 325 MG ORAL TABLET 325 MG: 325 PILL ORAL at 17:38

## 2021-06-25 RX ADMIN — THERA TABS 1 TABLET: TAB at 14:56

## 2021-06-25 ASSESSMENT — COGNITIVE AND FUNCTIONAL STATUS - GENERAL
SUGGESTED CMS G CODE MODIFIER MOBILITY: CH
SUGGESTED CMS G CODE MODIFIER DAILY ACTIVITY: CH
DAILY ACTIVITIY SCORE: 24
MOBILITY SCORE: 24

## 2021-06-25 ASSESSMENT — CHA2DS2 SCORE
DIABETES: YES
AGE 65 TO 74: NO
HYPERTENSION: YES
CHF OR LEFT VENTRICULAR DYSFUNCTION: YES
CHA2DS2 VASC SCORE: 3
VASCULAR DISEASE: NO
SEX: MALE
AGE 75 OR GREATER: NO
PRIOR STROKE OR TIA OR THROMBOEMBOLISM: NO

## 2021-06-25 ASSESSMENT — LIFESTYLE VARIABLES
SUBSTANCE_ABUSE: 0
CONSUMPTION TOTAL: NEGATIVE
DOES PATIENT WANT TO STOP DRINKING: NO
AVERAGE NUMBER OF DAYS PER WEEK YOU HAVE A DRINK CONTAINING ALCOHOL: 0
EVER FELT BAD OR GUILTY ABOUT YOUR DRINKING: NO
HOW MANY TIMES IN THE PAST YEAR HAVE YOU HAD 5 OR MORE DRINKS IN A DAY: 0
HAVE PEOPLE ANNOYED YOU BY CRITICIZING YOUR DRINKING: NO
TOTAL SCORE: 0
EVER HAD A DRINK FIRST THING IN THE MORNING TO STEADY YOUR NERVES TO GET RID OF A HANGOVER: NO
TOTAL SCORE: 0
ON A TYPICAL DAY WHEN YOU DRINK ALCOHOL HOW MANY DRINKS DO YOU HAVE: 0
ALCOHOL_USE: NO
HAVE YOU EVER FELT YOU SHOULD CUT DOWN ON YOUR DRINKING: NO
TOTAL SCORE: 0

## 2021-06-25 ASSESSMENT — ENCOUNTER SYMPTOMS
DIZZINESS: 0
HEADACHES: 0
FEVER: 0
COUGH: 0
DIARRHEA: 0
CONSTIPATION: 0
CHILLS: 0
WEAKNESS: 0
BACK PAIN: 0
BLURRED VISION: 0
PALPITATIONS: 0
DOUBLE VISION: 0
VOMITING: 0
SHORTNESS OF BREATH: 1
WHEEZING: 0
NAUSEA: 0
DEPRESSION: 0
ABDOMINAL PAIN: 0

## 2021-06-25 ASSESSMENT — FIBROSIS 4 INDEX
FIB4 SCORE: 2.37
FIB4 SCORE: 3.31
FIB4 SCORE: 3.31

## 2021-06-25 ASSESSMENT — PAIN DESCRIPTION - PAIN TYPE: TYPE: ACUTE PAIN

## 2021-06-25 NOTE — ASSESSMENT & PLAN NOTE
Patient with type 2 diabetes on Metformin and 10 units of Lantus  -Last A1c in 2019 7.3    Plan:  -A1c, lipid panel  -5 units Lantus, okay for Metformin, sliding scale

## 2021-06-25 NOTE — PROGRESS NOTES
1216: ER RN called to notify pt ready, room still dirty and in the process of being cleaned.  Once room cleaned went to  pt; pt in the middle of echo, will  pt once echo is complete

## 2021-06-25 NOTE — ED TRIAGE NOTES
Lai Dailey.51 y.o.  Chief Complaint   Patient presents with   • Chest Pain     c/o R sided chest pain upon waking up this morning, worsening pain with exertion. + sob + cough (non productive). denies fever/chills/ha/dizziness/extremity swelling      Patient changed to hospital gown  Placed on cardiac monitor.   V/s stable. Afebrile  Pending MD hough.     ekg done, signed off by provider, placed in chart

## 2021-06-25 NOTE — ASSESSMENT & PLAN NOTE
Stage C chronic heart failure with reduced ejection fraction 15% status post AICD with history of V. tach  -Secondary to exposure likely  -Continue home meds

## 2021-06-25 NOTE — CARE PLAN
The patient is Stable - Low risk of patient condition declining or worsening         Progress made toward(s) clinical / shift goals:  Pt updated on plan of care, awaiting results of echo    Patient is not progressing towards the following goals:  N/A

## 2021-06-25 NOTE — ED PROVIDER NOTES
ED Provider Note    CHIEF COMPLAINT  Chief Complaint   Patient presents with   • Chest Pain     c/o R sided chest pain upon waking up this morning, worsening pain with exertion. + sob + cough (non productive). denies fever/chills/ha/dizziness/extremity swelling        HPI  Lai Dailey is a 51 y.o. male who presents for evaluation of chest pain, shortness of breath with exertion orthopnea.  The patient is a complex medical history including CHF diabetes hyperlipidemia and hypertension.  He is followed by cardiology.  He has been compliant with all of his medications.  Pain is described as squeezing.  Worse with exertion.  No fevers chills or productive cough  REVIEW OF SYSTEMS  See HPI for further details.  No high fevers chills night sweats weight loss all other systems are negative.     PAST MEDICAL HISTORY  Past Medical History:   Diagnosis Date   • CHF (congestive heart failure) (HCC)    • Diabetes (HCC)    • Hyperlipidemia    • Hypertension        FAMILY HISTORY  Family history of heart failure  SOCIAL HISTORY  Social History     Socioeconomic History   • Marital status: Single     Spouse name: Not on file   • Number of children: Not on file   • Years of education: Not on file   • Highest education level: Not on file   Occupational History   • Not on file   Tobacco Use   • Smoking status: Never Smoker   • Smokeless tobacco: Never Used   Vaping Use   • Vaping Use: Never used   Substance and Sexual Activity   • Alcohol use: No     Comment: 2 times a week-beer, NO ALCOHOL AT THIS TIME (last drink 3/1/2020)   • Drug use: No     Comment: Marijuana use as youth   • Sexual activity: Not on file   Other Topics Concern   • Not on file   Social History Narrative   • Not on file     Social Determinants of Health     Financial Resource Strain:    • Difficulty of Paying Living Expenses:    Food Insecurity:    • Worried About Running Out of Food in the Last Year:    • Ran Out of Food in the Last Year:    Transportation  Needs:    • Lack of Transportation (Medical):    • Lack of Transportation (Non-Medical):    Physical Activity:    • Days of Exercise per Week:    • Minutes of Exercise per Session:    Stress:    • Feeling of Stress :    Social Connections:    • Frequency of Communication with Friends and Family:    • Frequency of Social Gatherings with Friends and Family:    • Attends [employer] Services:    • Active Member of Clubs or Organizations:    • Attends Club or Organization Meetings:    • Marital Status:    Intimate Partner Violence:    • Fear of Current or Ex-Partner:    • Emotionally Abused:    • Physically Abused:    • Sexually Abused:      No alcohol or tobacco  SURGICAL HISTORY  Past Surgical History:   Procedure Laterality Date   • AICD IMPLANT  2010       CURRENT MEDICATIONS  Home Medications    **Home medications have not yet been reviewed for this encounter**     No current facility-administered medications for this encounter.    Current Outpatient Medications:   •  atorvastatin (LIPITOR) 10 MG Tab, Take 1 tablet by mouth every evening., Disp: 30 tablet, Rfl: 3  •  spironolactone (ALDACTONE) 25 MG Tab, Take 1 tablet by mouth every day., Disp: 90 tablet, Rfl: 3  •  sacubitril-valsartan (ENTRESTO)  MG Tab tablet, Take 1 tablet by mouth 2 Times a Day., Disp: 180 tablet, Rfl: 3  •  metoprolol (TOPROL-XL) 200 MG XL tablet, Take 1 tablet by mouth every day., Disp: 90 tablet, Rfl: 3  •  isosorbide dinitrate (ISORDIL) 10 MG Tab, Take 1 tablet by mouth 3 times a day., Disp: 270 tablet, Rfl: 3  •  hydrALAZINE (APRESOLINE) 25 MG Tab, Take 1 tablet by mouth 3 times a day., Disp: 270 tablet, Rfl: 3  •  furosemide (LASIX) 20 MG Tab, Take 1 tablet by mouth every day., Disp: 90 tablet, Rfl: 3  •  digoxin (LANOXIN) 125 MCG Tab, Take 1 tablet by mouth every morning., Disp: 90 tablet, Rfl: 3  •  apixaban (ELIQUIS) 5mg Tab, Take 1 tablet by mouth 2 Times a Day., Disp: 180 tablet, Rfl: 3  •  albuterol 108 (90 Base) MCG/ACT Aero  "Soln inhalation aerosol, Inhale 2 Puffs by mouth every 6 hours as needed for Shortness of Breath. (Patient not taking: Reported on 4/3/2020), Disp: 8.5 g, Rfl: 0  •  insulin glargine (LANTUS) 100 UNIT/ML Solution, Inject 10 Units as instructed every evening., Disp: 10 mL, Rfl: 0  •  metformin (GLUCOPHAGE) 500 MG TABS, Take 1 Tab by mouth 2 times a day, with meals., Disp: 60 Tab, Rfl: 3      ALLERGIES  No Known Allergies    PHYSICAL EXAM  VITAL SIGNS: /95   Pulse 80   Resp (!) 28   Ht 1.753 m (5' 9\")   Wt 96.6 kg (213 lb)   SpO2 94%   BMI 31.45 kg/m²       Constitutional: Patient appears chronically ill  HENT: Normocephalic, Atraumatic, Bilateral external ears normal, Oropharynx moist, No oral exudates, Nose normal.   Eyes: PERRLA, EOMI, Conjunctiva normal, No discharge.   Neck: Normal range of motion, No tenderness, Supple, No stridor.   Cardiovascular: Normal heart rate, Normal rhythm, No murmurs, No rubs, No gallops.   Thorax & Lungs: Normal breath sounds, No respiratory distress, No wheezing, No chest tenderness.  AICD pocket in the left anterior chest wall is clean dry and intact  Abdomen: Bowel sounds normal, Soft, No tenderness, No masses, No pulsatile masses.   Skin: Warm, Dry, No erythema, No rash.   Back: No tenderness, No CVA tenderness.   Extremities: Intact distal pulses, No edema, No tenderness, No cyanosis, No clubbing.   Musculoskeletal: Good range of motion in all major joints. No tenderness to palpation or major deformities noted.   Neurologic: Alert & oriented x 3, Normal motor function, Normal sensory function, No focal deficits noted.   Psychiatric: Anxious    DX-CHEST-PORTABLE (1 VIEW)   Final Result      1.  Persistently enlarged cardiac silhouette   2.  Central vascular congestion        Results for orders placed or performed during the hospital encounter of 06/25/21   proBrain Natriuretic Peptide, NT   Result Value Ref Range    NT-proBNP 2702 (H) 0 - 125 pg/mL   CBC WITH " DIFFERENTIAL   Result Value Ref Range    WBC 6.3 4.8 - 10.8 K/uL    RBC 5.41 4.70 - 6.10 M/uL    Hemoglobin 16.1 14.0 - 18.0 g/dL    Hematocrit 51.3 42.0 - 52.0 %    MCV 94.8 81.4 - 97.8 fL    MCH 29.8 27.0 - 33.0 pg    MCHC 31.4 (L) 33.7 - 35.3 g/dL    RDW 50.0 35.9 - 50.0 fL    Platelet Count 171 164 - 446 K/uL    MPV 11.0 9.0 - 12.9 fL    Neutrophils-Polys 55.60 44.00 - 72.00 %    Lymphocytes 32.20 22.00 - 41.00 %    Monocytes 10.50 0.00 - 13.40 %    Eosinophils 0.60 0.00 - 6.90 %    Basophils 0.60 0.00 - 1.80 %    Immature Granulocytes 0.50 0.00 - 0.90 %    Nucleated RBC 0.00 /100 WBC    Neutrophils (Absolute) 3.48 1.82 - 7.42 K/uL    Lymphs (Absolute) 2.02 1.00 - 4.80 K/uL    Monos (Absolute) 0.66 0.00 - 0.85 K/uL    Eos (Absolute) 0.04 0.00 - 0.51 K/uL    Baso (Absolute) 0.04 0.00 - 0.12 K/uL    Immature Granulocytes (abs) 0.03 0.00 - 0.11 K/uL    NRBC (Absolute) 0.00 K/uL   Comp Metabolic Panel   Result Value Ref Range    Sodium 135 135 - 145 mmol/L    Potassium 4.4 3.6 - 5.5 mmol/L    Chloride 100 96 - 112 mmol/L    Co2 21 20 - 33 mmol/L    Anion Gap 14.0 7.0 - 16.0    Glucose 126 (H) 65 - 99 mg/dL    Bun 20 8 - 22 mg/dL    Creatinine 1.31 0.50 - 1.40 mg/dL    Calcium 10.0 8.5 - 10.5 mg/dL    AST(SGOT) 40 12 - 45 U/L    ALT(SGPT) 14 2 - 50 U/L    Alkaline Phosphatase 158 (H) 30 - 99 U/L    Total Bilirubin 1.7 (H) 0.1 - 1.5 mg/dL    Albumin 4.3 3.2 - 4.9 g/dL    Total Protein 8.5 (H) 6.0 - 8.2 g/dL    Globulin 4.2 (H) 1.9 - 3.5 g/dL    A-G Ratio 1.0 g/dL   TROPONIN   Result Value Ref Range    Troponin T 41 (H) 6 - 19 ng/L   ESTIMATED GFR   Result Value Ref Range    GFR If African American >60 >60 mL/min/1.73 m 2    GFR If Non  57 (A) >60 mL/min/1.73 m 2   EKG   Result Value Ref Range    Report       [facility] Emergency Dept.    Test Date:  2021-06-25  Pt Name:    CHESTER PICKARD                 Department: ER  MRN:        [id number]                      Room:         27  Gender:     Male                         Technician: [id number]  :        1970                   Requested By:ER TRIAGE PROTOCOL  Order #:    [id number]                    Reading MD:    Measurements  Intervals                                Axis  Rate:       80                           P:  TN:                                      QRS:        200  QRSD:       150                          T:          29  QT:         468  QTc:        540    Interpretive Statements  AFIB/FLUTTER AND VENTRICULAR-PACED RHYTHM  NO FURTHER ANALYSIS ATTEMPTED DUE TO PACED RHYTHM  Compared to ECG 2020 23:29:02  No significant changes        Results for orders placed or performed during the hospital encounter of 21   proBrain Natriuretic Peptide, NT   Result Value Ref Range    NT-proBNP 2702 (H) 0 - 125 pg/mL   CBC WITH DIFFERENTIAL   Result Value Ref Range    WBC 6.3 4.8 - 10.8 K/uL    RBC 5.41 4.70 - 6.10 M/uL    Hemoglobin 16.1 14.0 - 18.0 g/dL    Hematocrit 51.3 42.0 - 52.0 %    MCV 94.8 81.4 - 97.8 fL    MCH 29.8 27.0 - 33.0 pg    MCHC 31.4 (L) 33.7 - 35.3 g/dL    RDW 50.0 35.9 - 50.0 fL    Platelet Count 171 164 - 446 K/uL    MPV 11.0 9.0 - 12.9 fL    Neutrophils-Polys 55.60 44.00 - 72.00 %    Lymphocytes 32.20 22.00 - 41.00 %    Monocytes 10.50 0.00 - 13.40 %    Eosinophils 0.60 0.00 - 6.90 %    Basophils 0.60 0.00 - 1.80 %    Immature Granulocytes 0.50 0.00 - 0.90 %    Nucleated RBC 0.00 /100 WBC    Neutrophils (Absolute) 3.48 1.82 - 7.42 K/uL    Lymphs (Absolute) 2.02 1.00 - 4.80 K/uL    Monos (Absolute) 0.66 0.00 - 0.85 K/uL    Eos (Absolute) 0.04 0.00 - 0.51 K/uL    Baso (Absolute) 0.04 0.00 - 0.12 K/uL    Immature Granulocytes (abs) 0.03 0.00 - 0.11 K/uL    NRBC (Absolute) 0.00 K/uL   Comp Metabolic Panel   Result Value Ref Range    Sodium 135 135 - 145 mmol/L    Potassium 4.4 3.6 - 5.5 mmol/L    Chloride 100 96 - 112 mmol/L    Co2 21 20 - 33 mmol/L    Anion Gap 14.0 7.0 - 16.0    Glucose 126 (H) 65 - 99 mg/dL     Bun 20 8 - 22 mg/dL    Creatinine 1.31 0.50 - 1.40 mg/dL    Calcium 10.0 8.5 - 10.5 mg/dL    AST(SGOT) 40 12 - 45 U/L    ALT(SGPT) 14 2 - 50 U/L    Alkaline Phosphatase 158 (H) 30 - 99 U/L    Total Bilirubin 1.7 (H) 0.1 - 1.5 mg/dL    Albumin 4.3 3.2 - 4.9 g/dL    Total Protein 8.5 (H) 6.0 - 8.2 g/dL    Globulin 4.2 (H) 1.9 - 3.5 g/dL    A-G Ratio 1.0 g/dL   TROPONIN   Result Value Ref Range    Troponin T 41 (H) 6 - 19 ng/L   ESTIMATED GFR   Result Value Ref Range    GFR If African American >60 >60 mL/min/1.73 m 2    GFR If Non  57 (A) >60 mL/min/1.73 m 2   EKG   Result Value Ref Range    Report       [facility] Emergency Dept.    Test Date:  2021  Pt Name:    CHESTER PICKARD                 Department: ER  MRN:        [id number]                      Room:       [facility]  Gender:     Male                         Technician: [id number]  :        1970                   Requested By:ER TRIAGE PROTOCOL  Order #:    [id number]                    Reading MD:    Measurements  Intervals                                Axis  Rate:       80                           P:  NE:                                      QRS:        200  QRSD:       150                          T:          29  QT:         468  QTc:        540    Interpretive Statements  AFIB/FLUTTER AND VENTRICULAR-PACED RHYTHM  NO FURTHER ANALYSIS ATTEMPTED DUE TO PACED RHYTHM  Compared to ECG 2020 23:29:02  No significant changes       DX-CHEST-PORTABLE (1 VIEW)   Final Result      1.  Persistently enlarged cardiac silhouette   2.  Central vascular congestion          COURSE & MEDICAL DECISION MAKING  Pertinent Labs & Imaging studies reviewed. (See chart for details)  Patient presents here with chest discomfort.  His EKG is paced his troponin is indeterminate.  BNP is elevated and he has evidence of acute CHF.  Given his comorbidities chest pain elevated cardiac markers he will be admitted to the hospital for diuresis and  further clarification of chest pain possible stress test and/or heart catheterization if indicated    FINAL IMPRESSION  1.  Chest pain     2. Acute CHF         Electronically signed by: Neto Pineda M.D., 6/25/2021 10:12 AM

## 2021-06-25 NOTE — H&P
History & Physical Note    Date of Admission: 6/25/2021  Admission Status: Inpatient  [facility] Team: JOSE  Attending: Dr. Reyes  Senior Resident: Humberto Mueller D.O.    Contact Number: [phone]    Chief Complaint: dyspnea on exertion     History of Present Illness (HPI):   Lai is a -American 51 y.o. male who presented 6/25/2021 with dyspnea on exertion.  Patient has a past medical history of heart failure with reduced ejection fraction 15% EF with nonischemic cardiomyopathy secondary to unclear etiology likely related to past /occupational history status post AICD for history of V. tach, A. fib/flutter status post ablation on anticoagulation, hypertension, and dyslipidemia.    Patient states that he has been dealing with a cough over the last few days, he has been working outside and yesterday was using wasp spray and was drinking water and tea to keep hydrated.  He notes that he had a right sided pressure like chest pain last night that went away on its own that appeared to be worse with exertion.  Patient went on a walk this morning and could barely walk 50 feet before he was profoundly short of breath which brought him into the ED, with small amount of return of right-sided chest pain.    In the ED, tachypneic, without new changes on EKG with A. fib/flutter and heart rate at 80 paced.  He was feeling better, however he was at rest.     Review of Systems:   Review of Systems   Constitutional: Negative for chills, fever and malaise/fatigue.   Eyes: Negative for blurred vision and double vision.   Respiratory: Positive for shortness of breath. Negative for cough and wheezing.    Cardiovascular: Positive for chest pain and leg swelling (chronic). Negative for palpitations.   Gastrointestinal: Negative for abdominal pain, constipation, diarrhea, nausea and vomiting.   Musculoskeletal: Negative for back pain and joint pain.   Skin: Negative for itching and rash.   Neurological: Negative for  dizziness, weakness and headaches.   Psychiatric/Behavioral: Negative for depression and substance abuse.       Past Medical History:   Past Medical History was reviewed with patient.   has a past medical history of CHF (congestive heart failure) (HCC), Diabetes (HCC), Hyperlipidemia, and Hypertension.    Past Surgical History:   Past Surgical History was reviewed with patient.   has a past surgical history that includes aicd implant (2010).    Medications:   Medications have been reviewed with patient.  Prior to Admission Medications   Prescriptions Last Dose Informant Patient Reported? Taking?   apixaban (ELIQUIS) 5mg Tab 6/25/2021 at am Patient No No   Sig: Take 1 tablet by mouth 2 Times a Day.   atorvastatin (LIPITOR) 10 MG Tab 6/25/2021 at am Patient No No   Sig: Take 1 tablet by mouth every evening.   digoxin (LANOXIN) 125 MCG Tab 6/25/2021 at am Patient No No   Sig: Take 1 tablet by mouth every morning.   furosemide (LASIX) 20 MG Tab 6/25/2021 at am Patient No No   Sig: Take 1 tablet by mouth every day.   hydrALAZINE (APRESOLINE) 25 MG Tab 6/25/2021 at am Patient No No   Sig: Take 1 tablet by mouth 3 times a day.   insulin glargine (LANTUS) 100 UNIT/ML Solution 6/24/2021 at pm Patient No No   Sig: Inject 10 Units as instructed every evening.   isosorbide dinitrate (ISORDIL) 10 MG Tab 6/25/2021 at am Patient No No   Sig: Take 1 tablet by mouth 3 times a day.   metformin (GLUCOPHAGE) 500 MG TABS 6/25/2021 at am Patient No No   Sig: Take 1 Tab by mouth 2 times a day, with meals.   metoprolol (TOPROL-XL) 200 MG XL tablet 6/25/2021 at am Patient No No   Sig: Take 1 tablet by mouth every day.   multivitamin (THERAGRAN) Tab 6/25/2021 at am Patient Yes Yes   Sig: Take 1 tablet by mouth every day.   spironolactone (ALDACTONE) 25 MG Tab 6/25/2021 at am Patient No No   Sig: Take 1 tablet by mouth every day.      Facility-Administered Medications: None        Allergies:   Allergies have been reviewed with patient.  No  Known Allergies    Family History:   family history includes Colon Cancer in his mother; Hypertension in his sister; Stroke in his brother.     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    Objective:  Physical Exam:  Temp:  [36.4 °C (97.5 °F)] 36.4 °C (97.5 °F)  Pulse:  [77-80] 77  Resp:  [18-41] 18  BP: (125-143)/(82-98) 143/96  SpO2:  [93 %-95 %] 95 %    Physical Exam  Constitutional:       Appearance: Normal appearance.   HENT:      Head: Normocephalic and atraumatic.      Mouth/Throat:      Mouth: Mucous membranes are dry.      Pharynx: No oropharyngeal exudate or posterior oropharyngeal erythema.      Comments: Mallampati II  Eyes:      Extraocular Movements: Extraocular movements intact.      Pupils: Pupils are equal, round, and reactive to light.   Neck:      Comments: 3 cm JVD  Cardiovascular:      Rate and Rhythm: Normal rate.      Pulses: Normal pulses.      Heart sounds: Murmur (at LSB holosystolic) heard.        Comments: paced  Pulmonary:      Effort: Pulmonary effort is normal. No respiratory distress.      Breath sounds: Normal breath sounds. No wheezing or rales.   Abdominal:      General: Abdomen is flat. Bowel sounds are normal. There is no distension.      Palpations: Abdomen is soft.      Tenderness: There is no abdominal tenderness.   Genitourinary:     Comments: Rectal examination deferred  Musculoskeletal:         General: Swelling (1+ bilaterally) present.      Cervical back: Normal range of motion and neck supple.   Skin:     General: Skin is warm.      Capillary Refill: Capillary refill takes less than 2 seconds.      Findings: No  erythema or rash.   Neurological:      General: No focal deficit present.      Mental Status: He is alert and oriented to person, place, and time.         Labs:   Recent Labs     06/25/21  0957   WBC 6.3   RBC 5.41   HEMOGLOBIN 16.1   HEMATOCRIT 51.3   MCV 94.8   MCH 29.8   RDW 50.0   PLATELETCT 171   MPV 11.0   NEUTSPOLYS 55.60   LYMPHOCYTES 32.20   MONOCYTES 10.50   EOSINOPHILS 0.60   BASOPHILS 0.60     Recent Labs     06/25/21  0957   SODIUM 135   POTASSIUM 4.4   CHLORIDE 100   CO2 21   GLUCOSE 126*   BUN 20      Recent Labs     06/25/21  0957   ALBUMIN 4.3   TBILIRUBIN 1.7*   ALKPHOSPHAT 158*   TOTPROTEIN 8.5*   ALTSGPT 14   ASTSGOT 40   CREATININE 1.31        EKG: Per my read, v paced HR 80 a fib/flutter    Imaging:   DX-CHEST-PORTABLE (1 VIEW)   Final Result      1.  Persistently enlarged cardiac silhouette   2.  Central vascular congestion      EC-ECHOCARDIOGRAM COMPLETE W/O CONT    (Results Pending)       Previous Data Review: reviewed    Assessment and Plan:  Chest pain on exertion  Assessment & Plan  ACS rule out, most likely related to work and stress on heart  -History of negative cath in 2014  -Troponin likely elevated in setting of heart failure    Plan:  -Trend troponin to plateau or downtrend  -No indication for stress testing at this time relatively low suspicion for ACS even prior documentation history positing lower pretest probability    Acute on chronic diastolic ACC/AHA stage C congestive heart failure (HCC)- (present on admission)  Assessment & Plan  Dyspnea on exertion, elevated BNP and slightly elevated weight above normal, not consistent with severe exacerbation  -Not hypoxic at rest, concern for hypoxic with an exertion  -Possibly caused by bronchitis, possibly by increased fluids recently, less likely ACS with a negative cath history in 2014 and likely chronic troponin elevation    Plan:  -IV Lasix 40 mg spot dose today goal net -1 to -2 L  -Strict I and O, daily standing  weight  -Continue heart failure medications  -Repeat echocardiogram    Elevated serum globulin level  Assessment & Plan  Chronic and unclear etiology, now with elevated total protein as well.  Possibly underlying etiology  -SPEP, follow outpatient    Bronchitis- (present on admission)  Assessment & Plan  Cough likely secondary to mild ongoing bronchitis  -Appears to be improving on its own, self-limited    Paroxysmal atrial fibrillation (HCC)- (present on admission)  Assessment & Plan  To new anticoagulation  -Low likelihood of PE given anticoagulation chronically    Essential hypertension- (present on admission)  Assessment & Plan  Systolic heart failure, keep blood pressure less than 130/80    AICD (automatic cardioverter/defibrillator) present- (present on admission)  Assessment & Plan  Secondary to history of V. tach in the past    Type 2 diabetes mellitus with renal complication (HCC)- (present on admission)  Assessment & Plan  Patient with type 2 diabetes on Metformin and 10 units of Lantus  -Last A1c in 2019 7.3    Plan:  -A1c, lipid panel  -5 units Lantus, okay for Metformin, sliding scale    Stage C chronic systolic congestive heart failure (HCC)- (present on admission)  Assessment & Plan  Stage C chronic heart failure with reduced ejection fraction 15% status post AICD with history of V. tach  -Secondary to exposure likely  -Continue home meds

## 2021-06-25 NOTE — PROGRESS NOTES
4 Eyes Skin Assessment Completed by REGULO Maier and REGULO Dey.    Head WDL  Ears WDL  Nose WDL  Mouth WDL  Neck WDL  Breast/Chest Scar AICD left side  Shoulder Blades WDL  Spine WDL  (R) Arm/Elbow/Hand WDL  (L) Arm/Elbow/Hand Scar scar on left forearm and hand  Abdomen WDL  Groin WDL  Scrotum/Coccyx/Buttocks WDL  (R) Leg WDL  (L) Leg WDL  (R) Heel/Foot/Toe WDL dry; flaky heels  (L) Heel/Foot/Toe WDL dry; f;aky heels          Devices In Places Tele Box      Interventions In Place Pillows    Possible Skin Injury No    Pictures Uploaded Into Epic N/A  Wound Consult Placed N/A  RN Wound Prevention Protocol Ordered No

## 2021-06-25 NOTE — ASSESSMENT & PLAN NOTE
Cough likely secondary to mild ongoing bronchitis  -Appears to be improving on its own, self-limited

## 2021-06-25 NOTE — ED NOTES
Med Rec completed: per pt at bedside     No ORAL antibiotics in last 30 days    Preferred Pharmacy: [facility] P: [phone]    Pt confirmed following allergies:  No Known Allergies     Pt's home medications:   Medication Sig   • multivitamin (THERAGRAN) Tab Take 1 tablet by mouth every day.   • atorvastatin (LIPITOR) 10 MG Tab Take 1 tablet by mouth every evening.   • spironolactone (ALDACTONE) 25 MG Tab Take 1 tablet by mouth every day.   • metoprolol (TOPROL-XL) 200 MG XL tablet Take 1 tablet by mouth every day.   • isosorbide dinitrate (ISORDIL) 10 MG Tab Take 1 tablet by mouth 3 times a day.   • hydrALAZINE (APRESOLINE) 25 MG Tab Take 1 tablet by mouth 3 times a day.   • furosemide (LASIX) 20 MG Tab Take 1 tablet by mouth every day.   • digoxin (LANOXIN) 125 MCG Tab Take 1 tablet by mouth every morning.   • apixaban (ELIQUIS) 5mg Tab Take 1 tablet by mouth 2 Times a Day.   • insulin glargine (LANTUS) 100 UNIT/ML Solution Inject 10 Units as instructed every evening.   • metformin (GLUCOPHAGE) 500 MG TABS Take 1 Tab by mouth 2 times a day, with meals.     Removed medications:   • [DISCONTINUED] sacubitril-valsartan (ENTRESTO)  MG Tab tablet Take 1 tablet by mouth 2 Times a Day.   • [DISCONTINUED] albuterol 108 (90 Base) MCG/ACT Aero Soln inhalation aerosol Inhale 2 Puffs by mouth every 6 hours as needed for Shortness of Breath. (Patient not taking: Reported on 4/3/2020)

## 2021-06-25 NOTE — ED NOTES
Called unit for report, per unit clerk, will obtain bedside report and will send down flex nurse to take patient and receive report

## 2021-06-25 NOTE — ASSESSMENT & PLAN NOTE
Chronic and unclear etiology, now with elevated total protein as well.  Possibly underlying etiology  -SPEP, follow outpatient

## 2021-06-25 NOTE — ASSESSMENT & PLAN NOTE
ACS rule out, most likely related to work and stress on heart  -History of negative cath in 2014  -Troponin likely elevated in setting of heart failure    Plan:  -Trend troponin to plateau or downtrend  -No indication for stress testing at this time relatively low suspicion for ACS even prior documentation history positing lower pretest probability

## 2021-06-25 NOTE — ASSESSMENT & PLAN NOTE
Dyspnea on exertion, elevated BNP and slightly elevated weight above normal, not consistent with severe exacerbation  -Not hypoxic at rest, concern for hypoxic with an exertion  -Possibly caused by bronchitis, possibly by increased fluids recently, less likely ACS with a negative cath history in 2014 and likely chronic troponin elevation    Plan:  -IV Lasix 40 mg spot dose today goal net -1 to -2 L  -Strict I and O, daily standing weight  -Continue heart failure medications  -Repeat echocardiogram

## 2021-06-26 VITALS
DIASTOLIC BLOOD PRESSURE: 84 MMHG | WEIGHT: 206.79 LBS | SYSTOLIC BLOOD PRESSURE: 116 MMHG | BODY MASS INDEX: 30.63 KG/M2 | TEMPERATURE: 96.6 F | HEART RATE: 79 BPM | OXYGEN SATURATION: 96 % | RESPIRATION RATE: 15 BRPM | HEIGHT: 69 IN

## 2021-06-26 LAB
ALBUMIN SERPL BCP-MCNC: 3.8 G/DL (ref 3.2–4.9)
ALBUMIN/GLOB SERPL: 1 G/DL
ALP SERPL-CCNC: 135 U/L (ref 30–99)
ALT SERPL-CCNC: 20 U/L (ref 2–50)
ANION GAP SERPL CALC-SCNC: 12 MMOL/L (ref 7–16)
AST SERPL-CCNC: 50 U/L (ref 12–45)
BASOPHILS # BLD AUTO: 0.8 % (ref 0–1.8)
BASOPHILS # BLD: 0.04 K/UL (ref 0–0.12)
BILIRUB SERPL-MCNC: 1.5 MG/DL (ref 0.1–1.5)
BUN SERPL-MCNC: 25 MG/DL (ref 8–22)
CALCIUM SERPL-MCNC: 9 MG/DL (ref 8.5–10.5)
CHLORIDE SERPL-SCNC: 97 MMOL/L (ref 96–112)
CHOLEST SERPL-MCNC: 113 MG/DL (ref 100–199)
CO2 SERPL-SCNC: 23 MMOL/L (ref 20–33)
CREAT SERPL-MCNC: 1.24 MG/DL (ref 0.5–1.4)
EOSINOPHIL # BLD AUTO: 0.09 K/UL (ref 0–0.51)
EOSINOPHIL NFR BLD: 1.8 % (ref 0–6.9)
ERYTHROCYTE [DISTWIDTH] IN BLOOD BY AUTOMATED COUNT: 48.7 FL (ref 35.9–50)
EST. AVERAGE GLUCOSE BLD GHB EST-MCNC: 166 MG/DL
GLOBULIN SER CALC-MCNC: 3.8 G/DL (ref 1.9–3.5)
GLUCOSE BLD-MCNC: 97 MG/DL (ref 65–99)
GLUCOSE SERPL-MCNC: 208 MG/DL (ref 65–99)
HBA1C MFR BLD: 7.4 % (ref 4–5.6)
HCT VFR BLD AUTO: 48.4 % (ref 42–52)
HDLC SERPL-MCNC: 35 MG/DL
HGB BLD-MCNC: 15.5 G/DL (ref 14–18)
IMM GRANULOCYTES # BLD AUTO: 0.02 K/UL (ref 0–0.11)
IMM GRANULOCYTES NFR BLD AUTO: 0.4 % (ref 0–0.9)
LDLC SERPL CALC-MCNC: 65 MG/DL
LYMPHOCYTES # BLD AUTO: 1.74 K/UL (ref 1–4.8)
LYMPHOCYTES NFR BLD: 35.5 % (ref 22–41)
MCH RBC QN AUTO: 30.1 PG (ref 27–33)
MCHC RBC AUTO-ENTMCNC: 32 G/DL (ref 33.7–35.3)
MCV RBC AUTO: 94 FL (ref 81.4–97.8)
MONOCYTES # BLD AUTO: 0.51 K/UL (ref 0–0.85)
MONOCYTES NFR BLD AUTO: 10.4 % (ref 0–13.4)
NEUTROPHILS # BLD AUTO: 2.5 K/UL (ref 1.82–7.42)
NEUTROPHILS NFR BLD: 51.1 % (ref 44–72)
NRBC # BLD AUTO: 0 K/UL
NRBC BLD-RTO: 0 /100 WBC
PLATELET # BLD AUTO: 166 K/UL (ref 164–446)
PMV BLD AUTO: 10.6 FL (ref 9–12.9)
POTASSIUM SERPL-SCNC: 4.4 MMOL/L (ref 3.6–5.5)
PROT SERPL-MCNC: 7.6 G/DL (ref 6–8.2)
RBC # BLD AUTO: 5.15 M/UL (ref 4.7–6.1)
SODIUM SERPL-SCNC: 132 MMOL/L (ref 135–145)
TRIGL SERPL-MCNC: 63 MG/DL (ref 0–149)
WBC # BLD AUTO: 4.9 K/UL (ref 4.8–10.8)

## 2021-06-26 PROCEDURE — 700111 HCHG RX REV CODE 636 W/ 250 OVERRIDE (IP): Performed by: STUDENT IN AN ORGANIZED HEALTH CARE EDUCATION/TRAINING PROGRAM

## 2021-06-26 PROCEDURE — 83036 HEMOGLOBIN GLYCOSYLATED A1C: CPT

## 2021-06-26 PROCEDURE — 700102 HCHG RX REV CODE 250 W/ 637 OVERRIDE(OP): Performed by: STUDENT IN AN ORGANIZED HEALTH CARE EDUCATION/TRAINING PROGRAM

## 2021-06-26 PROCEDURE — 99238 HOSP IP/OBS DSCHRG MGMT 30/<: CPT | Mod: GC | Performed by: HOSPITALIST

## 2021-06-26 PROCEDURE — A9270 NON-COVERED ITEM OR SERVICE: HCPCS | Performed by: STUDENT IN AN ORGANIZED HEALTH CARE EDUCATION/TRAINING PROGRAM

## 2021-06-26 PROCEDURE — 82962 GLUCOSE BLOOD TEST: CPT

## 2021-06-26 PROCEDURE — 85025 COMPLETE CBC W/AUTO DIFF WBC: CPT

## 2021-06-26 PROCEDURE — 80061 LIPID PANEL: CPT

## 2021-06-26 PROCEDURE — 36415 COLL VENOUS BLD VENIPUNCTURE: CPT

## 2021-06-26 PROCEDURE — 80053 COMPREHEN METABOLIC PANEL: CPT

## 2021-06-26 RX ORDER — FUROSEMIDE 10 MG/ML
40 INJECTION INTRAMUSCULAR; INTRAVENOUS
Status: DISCONTINUED | OUTPATIENT
Start: 2021-06-26 | End: 2021-06-26 | Stop reason: HOSPADM

## 2021-06-26 RX ORDER — LISINOPRIL 5 MG/1
5 TABLET ORAL DAILY
Qty: 30 TABLET | Refills: 2 | Status: SHIPPED | OUTPATIENT
Start: 2021-06-26 | End: 2021-06-29

## 2021-06-26 RX ADMIN — APIXABAN 5 MG: 5 TABLET, FILM COATED ORAL at 05:15

## 2021-06-26 RX ADMIN — THERA TABS 1 TABLET: TAB at 05:15

## 2021-06-26 RX ADMIN — DOCUSATE SODIUM 50 MG AND SENNOSIDES 8.6 MG 2 TABLET: 8.6; 5 TABLET, FILM COATED ORAL at 05:15

## 2021-06-26 RX ADMIN — METOPROLOL SUCCINATE 200 MG: 50 TABLET, EXTENDED RELEASE ORAL at 05:14

## 2021-06-26 RX ADMIN — FUROSEMIDE 40 MG: 10 INJECTION, SOLUTION INTRAMUSCULAR; INTRAVENOUS at 07:57

## 2021-06-26 RX ADMIN — METFORMIN HYDROCHLORIDE 500 MG: 500 TABLET ORAL at 07:57

## 2021-06-26 RX ADMIN — SPIRONOLACTONE 25 MG: 25 TABLET ORAL at 05:15

## 2021-06-26 RX ADMIN — DIGOXIN 125 MCG: 125 TABLET ORAL at 05:15

## 2021-06-26 ASSESSMENT — FIBROSIS 4 INDEX: FIB4 SCORE: 3.57

## 2021-06-26 NOTE — DISCHARGE INSTRUCTIONS
Discharge Instructions    Discharged to home by car with relative. Discharged via wheelchair, hospital escort: Yes.  Special equipment needed: Not Applicable    Be sure to schedule a follow-up appointment with your primary care doctor or any specialists as instructed.     Discharge Plan:   Diet Plan: Discussed  Activity Level: Discussed  Confirmed Follow up Appointment: Patient to Call and Schedule Appointment  Confirmed Symptoms Management: Discussed  Medication Reconciliation Updated: Yes    I understand that a diet low in cholesterol, fat, and sodium is recommended for good health. Unless I have been given specific instructions below for another diet, I accept this instruction as my diet prescription.   Other diet: Heart healthy    Special Instructions:   HF Patient Discharge Instructions  · Monitor your weight daily, and maintain a weight chart, to track your weight changes.   · Activity as tolerated, unless your Doctor has ordered otherwise. Other activity order: as tolerated.  · Follow a low fat, low cholesterol, low salt diet unless instructed otherwise by your Doctor. Read the labels on the back of food products and track your intake of fat, cholesterol and salt.   · Fluid Restriction Yes. If a Fluid Restriction has been ordered by your Doctor, measure fluids with a measuring cup to ensure that you are not exceeding the restriction.   · No smoking.  · Oxygen No. If your Doctor has ordered that you wear Oxygen at home, it is important to wear it as ordered.  · Did you receive an explanation from staff on the importance of taking each of your medications and why it is necessary to keep taking them unless your doctor says to stop? Yes  · Were all of your questions answered about how to manage your heart failure and what to do if you have increased signs and symptoms after you go home? Yes  · Do you feel like your heart failure care team involved you in the care treatment plan and allowed you to make decisions  regarding your care while in the hospital and addressed any discharge needs you might have? Yes    See the educational handout provided at discharge for more information on monitoring your daily weight, activity and diet. This also explains more about Heart Failure, symptoms of a flare-up and some of the tests that you have undergone.     Warning Signs of a Flare-Up include:  · Swelling in the ankles or lower legs.  · Shortness of breath, while at rest, or while doing normal activities.   · Shortness of breath at night when in bed, or coughing in bed.   · Requiring more pillows to sleep at night, or needing to sit up at night to sleep.  · Feeling weak, dizzy or fatigued.     When to call your Doctor:  · Call [facility] Everywhere seven days a week from 8:00 a.m. to 8:00 p.m. for medical questions [phone].  · Call your Primary Care Physician or Cardiologist if:   1. You experience any pain radiating to your jaw or neck.  2. You have any difficulty breathing.  3. You experience weight gain of 3 lbs in a day or 5 lbs in a week.   4. You feel any palpitations or irregular heartbeats.  5. You become dizzy or lose consciousness.   If you have had an angiogram or had a pacemaker or AICD placed, and experience:  1. Bleeding, drainage or swelling at the surgical / puncture site.  2. Fever greater than 100.0 F  3. Shock from internal defibrillator.  4. Cool and / or numb extremities.      · Is patient discharged on Warfarin / Coumadin?   No       Heart Failure, Self Care  Heart failure is a serious condition. This sheet explains things you need to do to take care of yourself at home. To help you stay as healthy as possible, you may be asked to change your diet, take certain medicines, and make other changes in your life. Your doctor may also give you more specific instructions. If you have problems or questions, call your doctor.  What are the risks?  Having heart failure makes it more likely for you to have some  problems. These problems can get worse if you do not take good care of yourself. Problems may include:  · Blood clotting problems. This may cause a stroke.  · Damage to the kidneys, liver, or lungs.  · Abnormal heart rhythms.  Supplies needed:  · Scale for weighing yourself.  · Blood pressure monitor.  · Notebook.  · Medicines.  How to care for yourself when you have heart failure  Medicines  Take over-the-counter and prescription medicines only as told by your doctor. Take your medicines every day.  · Do not stop taking your medicine unless your doctor tells you to do so.  · Do not skip any medicines.  · Get your prescriptions refilled before you run out of medicine. This is important.  Eating and drinking    · Eat heart-healthy foods. Talk with a diet specialist (dietitian) to create an eating plan.  · Choose foods that:  ? Have no trans fat.  ? Are low in saturated fat and cholesterol.  · Choose healthy foods, such as:  ? Fresh or frozen fruits and vegetables.  ? Fish.  ? Low-fat (lean) meats.  ? Legumes, such as beans, peas, and lentils.  ? Fat-free or low-fat dairy products.  ? Whole-grain foods.  ? High-fiber foods.  · Limit salt (sodium) if told by your doctor. Ask your diet specialist to tell you which seasonings are healthy for your heart.  · Cook in healthy ways instead of frying. Healthy ways of cooking include roasting, grilling, broiling, baking, poaching, steaming, and stir-frying.  · Limit how much fluid you drink, if told by your doctor.  Alcohol use  · Do not drink alcohol if:  ? Your doctor tells you not to drink.  ? Your heart was damaged by alcohol, or you have very bad heart failure.  ? You are pregnant, may be pregnant, or are planning to become pregnant.  · If you drink alcohol:  ? Limit how much you use to:  § 0-1 drink a day for women.  § 0-2 drinks a day for men.  ? Be aware of how much alcohol is in your drink. In the [place] one drink equals one 12 oz bottle of beer (355 mL), one 5 oz glass  of wine (148 mL), or one 1½ oz glass of hard liquor (44 mL).  Lifestyle    · Do not use any products that contain nicotine or tobacco, such as cigarettes, e-cigarettes, and chewing tobacco. If you need help quitting, ask your doctor.  ? Do not use nicotine gum or patches before talking to your doctor.  · Do not use illegal drugs.  · Lose weight if told by your doctor.  · Do physical activity if told by your doctor. Talk to your doctor before you begin an exercise if:  ? You are an older adult.  ? You have very bad heart failure.  · Learn to manage stress. If you need help, ask your doctor.  · Get rehab (rehabilitation) to help you stay independent and to help with your quality of life.  · Plan time to rest when you get tired.  Check weight and blood pressure    · Weigh yourself every day. This will help you to know if fluid is building up in your body.  ? Weigh yourself every morning after you pee (urinate) and before you eat breakfast.  ? Wear the same amount of clothing each time.  ? Write down your daily weight. Give your record to your doctor.  · Check and write down your blood pressure as told by your doctor.  · Check your pulse as told by your doctor.  Dealing with very hot and very cold weather  · If it is very hot:  ? Avoid activities that take a lot of energy.  ? Use air conditioning or fans, or find a cooler place.  ? Avoid caffeine and alcohol.  ? Wear clothing that is loose-fitting, lightweight, and light-colored.  · If it is very cold:  ? Avoid activities that take a lot of energy.  ? Layer your clothes.  ? Wear mittens or gloves, a hat, and a scarf when you go outside.  ? Avoid alcohol.  Follow these instructions at home:  · Stay up to date with shots (vaccines). Get pneumococcal and flu (influenza) shots.  · Keep all follow-up visits as told by your doctor. This is important.  Contact a doctor if:  · You gain weight quickly.  · You have increasing shortness of breath.  · You cannot do your normal  activities.  · You get tired easily.  · You cough a lot.  · You don't feel like eating or feel like you may vomit (nauseous).  · You become puffy (swell) in your hands, feet, ankles, or belly (abdomen).  · You cannot sleep well because it is hard to breathe.  · You feel like your heart is beating fast (palpitations).  · You get dizzy when you stand up.  Get help right away if:  · You have trouble breathing.  · You or someone else notices a change in your behavior, such as having trouble staying awake.  · You have chest pain or discomfort.  · You pass out (faint).  These symptoms may be an emergency. Do not wait to see if the symptoms will go away. Get medical help right away. Call your local emergency services (911 in the [place] Do not drive yourself to the hospital.  Summary  · Heart failure is a serious condition. To care for yourself, you may have to change your diet, take medicines, and make other lifestyle changes.  · Take your medicines every day. Do not stop taking them unless your doctor tells you to do so.  · Eat heart-healthy foods, such as fresh or frozen fruits and vegetables, fish, lean meats, legumes, fat-free or low-fat dairy products, and whole-grain or high-fiber foods.  · Ask your doctor if you can drink alcohol. You may have to stop alcohol use if you have very bad heart failure.  · Contact your doctor if you gain weight quickly or feel that your heart is beating too fast. Get help right away if you pass out, or have chest pain or trouble breathing.  This information is not intended to replace advice given to you by your health care provider. Make sure you discuss any questions you have with your health care provider.  Document Released: 04/01/2020 Document Revised: 03/31/2020 Document Reviewed: 04/01/2020  Elsevier Patient Education © 2020 [employer]      Fluid Restriction  With some health conditions, you must restrict your fluid intake. This means that you need to limit the amount of fluid  that you drink each day (fluid restriction). When you have a fluid restriction, you must carefully measure and keep track of the amount of fluid that you drink. Your health care provider will identify the specific amount of fluid you are allowed each day (fluid allowance). This amount may depend on several things, such as:  · How well your kidneys function.  · How much fluid you are keeping (retaining) in your body tissues.  · Your blood pressure.  · Your heart function.  · Your blood sodium level.  What is my plan?  Your health care provider recommends that you limit your fluid intake to ___2L____ per day.  What counts toward my fluid intake?  Your fluid intake includes all liquids that you drink, as well as any foods that become liquid at room temperature.  The following are examples of some fluids that you will have to restrict:  · Tea, coffee, soda, lemonade, milk, water, juice, sports drinks, and nutritional supplement beverages.  · Alcoholic beverages.  · Cream.  · Gravy.  · Ice cubes.  · Soup and broth.  The following are examples of foods that become liquid at room temperature. These foods will also count toward your fluid intake.  · Ice cream and ice milk.  · Frozen yogurt and sherbet.  · Frozen ice pops.  · Flavored gelatin.  How do I keep track of my fluid intake?  Each morning, fill a jug with the amount of water that is equal to your daily fluid allowance. You can use this water as a guideline for fluid allowance. Each time you take in any form of fluid (including ice cubes and foods that become liquid at room temperature), pour an equal amount of water out of the container. This helps you to see how much fluid you are taking in. It also helps you to see how much more fluid you can take in during the rest of the day.  The following conversions may also be helpful in measuring your fluid intake:  · 1 cup equals 8 oz (240 mL).  · ¾ cup equals 6 oz (180 mL).  · ? cup equals 5? oz (160 mL).  · ½ cup equals  4 oz (120 mL).  · ? cup equals 2? oz (80 mL).  · ¼ cup equals 2 oz (60 mL).  · 2 Tbsp equals 1 oz (30 mL).  What are tips for following this plan?  General instructions  · Make sure that you stay within your recommended fluid allowance each day. Always measure and keep track of your fluids (including ice cubes and foods that become liquid at room temperature).  · Use small cups and glasses and learn to sip fluids slowly.  · Try frozen fruits between meals, such as grapes or strawberries. These can satisfy thirst without adding to your fluid intake.  · Swallow your pills along with meals or soft foods such as applesauce or mashed potatoes, instead of with liquids. Doing this helps you to save your fluid allowance for something that you enjoy.  Weigh yourself each day         Weigh yourself every day. Keeping track of your daily weight can help you and your health care provider to notice as soon as possible if you are retaining too much fluid in your body.  · Follow this sequence every mornin. Urinate.  2. Weigh yourself.  3. Eat breakfast.  · Wear the same amount of clothing each time you weigh yourself.  · Write down your daily weight. Give this weight record to your health care provider. If your weight is going up, you may be retaining too much fluid. Every 1 lb (0.45 kg) of body weight that you gain is a sign that your body is retaining 2 cups (480 mL) of fluid.    Manage your thirst  · Add lemon juice or a slice of fresh lemon to water or ice. Doing this helps to satisfy your thirst.  · Freeze fruit juice or water in an ice cube tray. Use this as part of your fluid allowance. These cubes are useful for quenching your thirst. Before you freeze the juice or water, measure how much liquid you use to fill a cube section of the ice tray. Subtract this amount from your day's allowance each time you consume a frozen cube.  · Avoid salty (high-sodium) foods. These foods make you thirsty and make it more difficult to  stay within your daily fluid allowance.  · Keep the temperature in your home at a cooler level.  · Keep the air in your home as humid as possible. Dry air increases thirst.  · Avoid being out in the hot sun, which can cause you to sweat and become thirsty.  · To help avoid dry mouth, brush your teeth often or rinse out your mouth with mouthwash. Lemon wedges, hard sour candies, chewing gum, or breath spray may also help to moisten your mouth.  What are some signs that I may be taking in too much fluid?  You may be taking in too much fluid if:  · Your weight increases. Contact your health care provider if you gain weight rapidly.  · Your face, hands, legs, feet, and abdomen start to swell.  · You have trouble breathing.  Summary  · With some health conditions, you must limit (restrict) your fluid intake. This means that you need to limit the amount of fluid you drink each day (fluid restriction). Your health care provider will identify the specific amount of fluid that you are allowed each day.  · When you have a fluid restriction, you must carefully measure and keep track of the amount of fluid that you drink.  · Your fluid intake includes all liquids that you drink, as well as any foods that become liquid at room temperature (such as ice cream and gelatin).  · You may be taking in too much fluid if your weight increases, your body starts to swell, or you have trouble breathing.  This information is not intended to replace advice given to you by your health care provider. Make sure you discuss any questions you have with your health care provider.  Document Released: 10/14/2008 Document Revised: 04/09/2020 Document Reviewed: 08/22/2018  Elsevier Patient Education © 2020 [employer]      2 Gram Low Sodium Diet  A 2 gram sodium diet restricts the amount of sodium in the diet to no more than 2 g or 2000 mg daily. Limiting the amount of sodium is often used to help lower blood pressure. It is important if you have  "heart, liver, or kidney problems. Many foods contain sodium for flavor and sometimes as a preservative. When the amount of sodium in a diet needs to be low, it is important to know what to look for when choosing foods and drinks. The following includes some information and guidelines to help make it easier for you to adapt to a low sodium diet.  QUICK TIPS  · Do not add salt to food.  · Avoid convenience items and fast food.  · Choose unsalted snack foods.  · Buy lower sodium products, often labeled as \"lower sodium\" or \"no salt added.\"  · Check food labels to learn how much sodium is in 1 serving.  · When eating at a restaurant, ask that your food be prepared with less salt or none, if possible.  READING FOOD LABELS FOR SODIUM INFORMATION  The nutrition facts label is a good place to find how much sodium is in foods. Look for products with no more than 500 to 600 mg of sodium per meal and no more than 150 mg per serving.  Remember that 2 g = 2000 mg.  The food label may also list foods as:  · Sodium-free: Less than 5 mg in a serving.  · Very low sodium: 35 mg or less in a serving.  · Low-sodium: 140 mg or less in a serving.  · Light in sodium: 50% less sodium in a serving. For example, if a food that usually has 300 mg of sodium is changed to become light in sodium, it will have 150 mg of sodium.  · Reduced sodium: 25% less sodium in a serving. For example, if a food that usually has 400 mg of sodium is changed to reduced sodium, it will have 300 mg of sodium.  CHOOSING FOODS  Grains  · Avoid: Salted crackers and snack items. Some cereals, including instant hot cereals. Bread stuffing and biscuit mixes. Seasoned rice or pasta mixes.  · Choose: Unsalted snack items. Low-sodium cereals, oats, puffed wheat and rice, shredded wheat. English muffins and bread. Pasta.  Meats  · Avoid: Salted, canned, smoked, spiced, pickled meats, including fish and poultry. Conte, ham, sausage, cold cuts, hot dogs, anchovies.  · Choose: " Low-sodium canned tuna and salmon. Fresh or frozen meat, poultry, and fish.  Dairy  · Avoid: Processed cheese and spreads. Cottage cheese. Buttermilk and condensed milk. Regular cheese.  · Choose: Milk. Low-sodium cottage cheese. Yogurt. Sour cream. Low-sodium cheese.  Fruits and Vegetables  · Avoid: Regular canned vegetables. Regular canned tomato sauce and paste. Frozen vegetables in sauces. Olives. Pickles. Relishes. Sauerkraut.  · Choose: Low-sodium canned vegetables. Low-sodium tomato sauce and paste. Frozen or fresh vegetables. Fresh and frozen fruit.  Condiments  · Avoid: Canned and packaged gravies. Worcestershire sauce. Tartar sauce. Barbecue sauce. Soy sauce. Steak sauce. Ketchup. Onion, garlic, and table salt. Meat flavorings and tenderizers.  · Choose: Fresh and dried herbs and spices. Low-sodium varieties of mustard and ketchup. Lemon juice. Tabasco sauce. Horseradish.  SAMPLE 2 GRAM SODIUM MEAL PLAN  Breakfast / Sodium (mg)  · 1 cup low-fat milk / 143 mg  · 2 slices whole-wheat toast / 270 mg  · 1 tbs heart-healthy margarine / 153 mg  · 1 hard-boiled egg / 139 mg  · 1 small orange / 0 mg  Lunch / Sodium (mg)  · 1 cup raw carrots / 76 mg  · ½ cup hummus / 298 mg  · 1 cup low-fat milk / 143 mg  · ½ cup red grapes / 2 mg  · 1 whole-wheat alan bread / 356 mg  Dinner / Sodium (mg)  · 1 cup whole-wheat pasta / 2 mg  · 1 cup low-sodium tomato sauce / 73 mg  · 3 oz lean ground beef / 57 mg  · 1 small side salad (1 cup raw spinach leaves, ½ cup cucumber, ¼ cup yellow bell pepper) with 1 tsp olive oil and 1 tsp red wine vinegar / 25 mg  Snack / Sodium (mg)  · 1 container low-fat vanilla yogurt / 107 mg  · 3 eneida cracker squares / 127 mg  Nutrient Analysis  · Calories: 2033  · Protein: 77 g  · Carbohydrate: 282 g  · Fat: 72 g  · Sodium: 1971 mg  Document Released: 12/18/2006 Document Revised: 03/11/2013 Document Reviewed: 03/21/2011  [employer]® Patient Information ©2014 [email or web address]      Depression / Suicide  Risk    As you are discharged from this [facility] facility, it is important to learn how to keep safe from harming yourself.    Recognize the warning signs:  · Abrupt changes in personality, positive or negative- including increase in energy   · Giving away possessions  · Change in eating patterns- significant weight changes-  positive or negative  · Change in sleeping patterns- unable to sleep or sleeping all the time   · Unwillingness or inability to communicate  · Depression  · Unusual sadness, discouragement and loneliness  · Talk of wanting to die  · Neglect of personal appearance   · Rebelliousness- reckless behavior  · Withdrawal from people/activities they love  · Confusion- inability to concentrate     If you or a loved one observes any of these behaviors or has concerns about self-harm, here's what you can do:  · Talk about it- your feelings and reasons for harming yourself  · Remove any means that you might use to hurt yourself (examples: pills, rope, extension cords, firearm)  · Get professional help from the community (Mental Health, Substance Abuse, psychological counseling)  · Do not be alone:Call your Safe Contact- someone whom you trust who will be there for you.  · Call your local CRISIS HOTLINE 876-4640 or 352-851-4816  · Call your local Children's Mobile Crisis Response Team [place] (574) 322-6884 or www.[employer]  · Call the toll free National Suicide Prevention Hotlines   · National Suicide Prevention Lifeline 861-592-YWWC (5009)  · National Hope Line Network 800-SUICIDE (777-0250)

## 2021-06-26 NOTE — CARE PLAN
The patient is Stable - Low risk of patient condition declining or worsening         Progress made toward(s) clinical / shift goals:  Patient remains free from falls, injury, and pain.     Patient is not progressing towards the following goals:

## 2021-06-26 NOTE — DISCHARGE SUMMARY
Discharge Summary    Date of Admission: 6/25/2021  Date of Discharge: 06/26/21  Discharging Attending: Kelly Adler M.D.   Discharging Senior Resident: Dr. Juan Leslie MD  Discharging Intern: Dr. Jonathan Thompson MD    CHIEF COMPLAINT ON ADMISSION  Chief Complaint   Patient presents with   • Chest Pain     c/o R sided chest pain upon waking up this morning, worsening pain with exertion. + sob + cough (non productive). denies fever/chills/ha/dizziness/extremity swelling        Reason for Admission  Heart failure exacerbation    Admission Date  6/25/2021    CODE STATUS  Full Code    HPI & HOSPITAL COURSE    Lai Dailey is a 52yo M with hx of CHF (15%, 3lead paced, apixaban), atrial fibrillation (digoxin, toprolol, paced), DM2, HTN; admitted 6/25 for acute heart failure exacerbation of 2 days duration, presenting with shortness of breath, orthopnea; responsive to lasix with subsequent improvement of symptoms.    Presented 6/25 after 2 days of noticeable shortness of breath on exertion with typical chest pain features. His shortness of breath started after working in his yard, and progressed over the next few days, to the point where he was having chest pressure worsened exertions, better with rest. At baseline is able to walk 0.5 to 1 mile, but prior to arrival was estimating being able to walk 25ft. Was given IV Lasix with symptomatic improvement. On morning of discharge, was able to ambulate >400ft without oxygen or assistance without symptoms.    Heart failure, 15% EF  Long standing history of HF, no ischemic events/cardiomyopathy. Reports good adherence with medications and daily weights, however, has not been on entresto due to cost. Improved symptomatically with IV lasix, and was near baseline the next morning and prior to discharge.  -apixaban 5mg PO BID  -atorvastatin 10mg PO qD  -Lasix 20mg PO qD  -Hydralazine 25mg PO TID  -Isordil 10mg PO TID   -Lisinopril 5mg PO qD  -Spironolactone 25mg PO  qD  -Heart failure clinic follow up in 1 week  -PCP follow up for lisinopril dose changes as needed    Atrial fibrillation  Ventricular paced rhythm with atrial fibrillation background pattern. No acute changes, does not report symptoms of syncope/presyncope, and no thromboembolic events. Most likely related to heart failure.  -apixaban 5mg PO qD  -Toprolol XL 200mg PO qD  -Digoxin 125mcg PO qD  -Cardiology follow up of 3 lead pacer    DM2:  Stable diabetes type 2. A1c during admission of 7.4, recommend metformin increase as tolerated.  -metformin 500mg PO BID; consider increased to 1000mg PO BID  -lantus 10u qHS    HTN:  Stable and normotensive hypertension during admission, within goals.  -hydralazine 25mg PO TID  -isordil 10mg PO TID  -spironolactone 25mg PO qD  -lasix 20mg PO qD  -lisinopril 5mg PO qD  -PCP follow up of lisinopril titration      Therefore, he is discharged in fair and stable condition to home with close outpatient follow-up.    The patient recovered much more quickly than anticipated on admission.    PHYSICAL EXAM ON DISCHARGE  Temp:  [35.9 °C (96.6 °F)-36.8 °C (98.2 °F)] 35.9 °C (96.6 °F)  Pulse:  [77-82] 79  Resp:  [15-41] 15  BP: (108-144)/(70-98) 116/84  SpO2:  [91 %-97 %] 96 %    Physical Exam    GEN: well appearing, no acute distress  CV: RRR, no m/g/r  Pulm: CTAB, no rales, wheeze, crackles  Abd: soft, nontender, nondistended  Extremities: shoulder/elbow flexion/extension 5/5 bilaterally, knee flexion/extension 5/5 bilaterally, trace peripheral edema  Neuro: CN II-XII intact, no focal deficits, Aox4  Psych: Denies any SI/HI morning of discharge    Discharge Date  06/26/21      FOLLOW UP ITEMS POST DISCHARGE  Heart failure clinic follow up for medication titration, prevention planning  Cardiology for atrial fibrillation/heart failure as indicated  PCP for hospitalisation, new medication of lisinopril, STOP of entresto; heart failure.    DISCHARGE DIAGNOSES  Active Problems:    Stage C  chronic systolic congestive heart failure (HCC) POA: Yes    Type 2 diabetes mellitus with renal complication (HCC) POA: Yes      Overview: Patient has a history of diabetes type 2  On treatment with metformin.      Denies any sequalae of neuropathy ,nephropathy,or retinopathy.      Patient had a blood glucose of    AICD (automatic cardioverter/defibrillator) present POA: Yes    Essential hypertension POA: Yes    Paroxysmal atrial fibrillation (HCC) POA: Yes    Bronchitis POA: Yes    Acute on chronic diastolic ACC/AHA stage C congestive heart failure (HCC) POA: Yes    Elevated serum globulin level POA: Unknown    Chest pain on exertion POA: Unknown  Resolved Problems:    * No resolved hospital problems. *      FOLLOW UP  Future Appointments   Date Time Provider Department Center   9/30/2021  9:00 AM TIFFANIE RayCB None     No follow-up provider specified.    MEDICATIONS ON DISCHARGE     Medication List      START taking these medications      Instructions   lisinopril 5 MG Tabs  Commonly known as: PRINIVIL   Take 1 tablet by mouth every day.  Dose: 5 mg        CONTINUE taking these medications      Instructions   apixaban 5mg Tabs  Commonly known as: ELIQUIS   Take 1 tablet by mouth 2 Times a Day.  Dose: 5 mg     atorvastatin 10 MG Tabs  Commonly known as: LIPITOR   Take 1 tablet by mouth every evening.  Dose: 10 mg     digoxin 125 MCG Tabs  Commonly known as: LANOXIN   Take 1 tablet by mouth every morning.  Dose: 125 mcg     furosemide 20 MG Tabs  Commonly known as: LASIX   Take 1 tablet by mouth every day.  Dose: 20 mg     hydrALAZINE 25 MG Tabs  Commonly known as: APRESOLINE   Take 1 tablet by mouth 3 times a day.  Dose: 25 mg     insulin glargine 100 UNIT/ML Soln  Commonly known as: Lantus   Inject 10 Units as instructed every evening.  Dose: 10 Units     isosorbide dinitrate 10 MG Tabs  Commonly known as: ISORDIL   Take 1 tablet by mouth 3 times a day.  Dose: 10 mg     metFORMIN 500 MG  Tabs  Commonly known as: GLUCOPHAGE   Take 1 Tab by mouth 2 times a day, with meals.  Dose: 500 mg     metoprolol 200 MG XL tablet  Commonly known as: TOPROL-XL   Take 1 tablet by mouth every day.  Dose: 200 mg     multivitamin Tabs   Take 1 tablet by mouth every day.  Dose: 1 tablet     spironolactone 25 MG Tabs  Commonly known as: ALDACTONE   Take 1 tablet by mouth every day.  Dose: 25 mg            Allergies  No Known Allergies    DIET  Orders Placed This Encounter   Procedures   • Diet Order Diet: Cardiac; Second Modifier: (optional): Consistent CHO (Diabetic); Fluid modifications: (optional): 2000 ml Fluid Restriction     Standing Status:   Standing     Number of Occurrences:   1     Order Specific Question:   Diet:     Answer:   Cardiac [6]     Order Specific Question:   Second Modifier: (optional)     Answer:   Consistent CHO (Diabetic) [4]     Order Specific Question:   Fluid modifications: (optional)     Answer:   2000 ml Fluid Restriction [11]       ACTIVITY  As tolerated.  Weight bearing as tolerated    CONSULTATIONS      PROCEDURES  ECHO 6/25/21:  Left ventricular ejection fraction is visually estimated to be 15%.  Grade III diastolic dysfunction (restrictive pattern).  Mildly dilated right ventricle.  Moderate mitral regurgitation.  Moderate tricuspid regurgitation.

## 2021-06-29 ENCOUNTER — OFFICE VISIT (OUTPATIENT)
Dept: CARDIOLOGY | Facility: MEDICAL CENTER | Age: 51
End: 2021-06-29
Payer: OTHER GOVERNMENT

## 2021-06-29 VITALS
DIASTOLIC BLOOD PRESSURE: 70 MMHG | HEART RATE: 80 BPM | HEIGHT: 69 IN | SYSTOLIC BLOOD PRESSURE: 120 MMHG | OXYGEN SATURATION: 98 % | WEIGHT: 213.6 LBS | BODY MASS INDEX: 31.64 KG/M2

## 2021-06-29 DIAGNOSIS — I50.20 ACC/AHA STAGE C SYSTOLIC HEART FAILURE (HCC): ICD-10-CM

## 2021-06-29 DIAGNOSIS — I48.0 PAROXYSMAL ATRIAL FIBRILLATION (HCC): ICD-10-CM

## 2021-06-29 DIAGNOSIS — Z95.810 AICD (AUTOMATIC CARDIOVERTER/DEFIBRILLATOR) PRESENT: ICD-10-CM

## 2021-06-29 DIAGNOSIS — Z95.810 PRESENCE OF BIVENTRICULAR AUTOMATIC CARDIOVERTER/DEFIBRILLATOR (AICD): ICD-10-CM

## 2021-06-29 DIAGNOSIS — I50.22 STAGE C CHRONIC SYSTOLIC CONGESTIVE HEART FAILURE (HCC): ICD-10-CM

## 2021-06-29 DIAGNOSIS — I42.8 NON-ISCHEMIC CARDIOMYOPATHY (HCC): ICD-10-CM

## 2021-06-29 DIAGNOSIS — Z79.899 HIGH RISK MEDICATION USE: ICD-10-CM

## 2021-06-29 DIAGNOSIS — I34.0 MODERATE MITRAL REGURGITATION: ICD-10-CM

## 2021-06-29 DIAGNOSIS — E11.22 TYPE 2 DIABETES MELLITUS WITH CHRONIC KIDNEY DISEASE, WITHOUT LONG-TERM CURRENT USE OF INSULIN, UNSPECIFIED CKD STAGE (HCC): ICD-10-CM

## 2021-06-29 DIAGNOSIS — I10 ESSENTIAL HYPERTENSION, BENIGN: ICD-10-CM

## 2021-06-29 DIAGNOSIS — E78.2 MIXED HYPERLIPIDEMIA: ICD-10-CM

## 2021-06-29 DIAGNOSIS — I51.89 LEFT VENTRICULAR SYSTOLIC DYSFUNCTION, NYHA CLASS 2: ICD-10-CM

## 2021-06-29 LAB
ALBUMIN SERPL ELPH-MCNC: 4.07 G/DL (ref 3.75–5.01)
ALPHA1 GLOB SERPL ELPH-MCNC: 0.34 G/DL (ref 0.19–0.46)
ALPHA2 GLOB SERPL ELPH-MCNC: 0.66 G/DL (ref 0.48–1.05)
B-GLOBULIN SERPL ELPH-MCNC: 0.96 G/DL (ref 0.48–1.1)
EER PROT ELECT SER Q1092: ABNORMAL
GAMMA GLOB SERPL ELPH-MCNC: 1.78 G/DL (ref 0.62–1.51)
INTERPRETATION SERPL IFE-IMP: ABNORMAL
PROT SERPL-MCNC: 7.8 G/DL (ref 6.3–8.2)

## 2021-06-29 PROCEDURE — 99214 OFFICE O/P EST MOD 30 MIN: CPT | Performed by: NURSE PRACTITIONER

## 2021-06-29 RX ORDER — LISINOPRIL 10 MG/1
10 TABLET ORAL DAILY
Qty: 100 TABLET | Refills: 2 | Status: ON HOLD | OUTPATIENT
Start: 2021-06-29 | End: 2022-09-10

## 2021-06-29 RX ORDER — EMPAGLIFLOZIN 10 MG/1
10 TABLET, FILM COATED ORAL DAILY
Qty: 30 TABLET | Refills: 2 | Status: SHIPPED | OUTPATIENT
Start: 2021-06-29 | End: 2021-09-30 | Stop reason: SDUPTHER

## 2021-06-29 ASSESSMENT — FIBROSIS 4 INDEX: FIB4 SCORE: 3.57

## 2021-06-29 NOTE — PATIENT INSTRUCTIONS
Increase lisinopril to 10 mg daily   Take extra Furosemide 20 mg (in afternoon) as needed for weight gain greater than 3 pounds in 1 day or 5 pounds in 1 week (aproximately at 208 lbs- at home).    Start Jardiance 10 mg daily - Let us know if too expensive    Blood work in 2 weeks    Talk to Dr. Mcneil ([facility] clinic) about byron-clip

## 2021-06-29 NOTE — PROGRESS NOTES
Chief Complaint   Patient presents with   • Congestive Heart Failure     F/V DX:Stage C chronic systolic congestive heart failure (HCC)       Subjective:   Lai Dailey is a 51 y.o. male who presents today for heart failure follow-up.  Patient was last seen by Gianna Flores on 3/29/2021.  Since patient was last seen, he was seen in the hospital on 6/25/2021 for fluid volume overload.  Patient responded well to IV diuresis and was quickly discharged.  Patient is also followed by Dr. Arriaga    Patient has past medical history of s/p ICD, DM 2, hypertension, hyperlipidemia, A. Fib.    Today, patient feels well, denies chest pain, shortness of breath, palpitations, dizziness/lightheadedness, orthopnea, PND or Edema.  Patient reports he is able to ambulate approximately quarter mile.  Patient noted only about 40 feet prior to admission.  Patient reports his home weight is 200-205 pounds.  Patient reports his blood pressure is 138/92 at home patient reports the Entresto is too expensive thus he was restarted on his lisinopril we will increase his lisinopril today to promote GDMT.  We will also provide written instructions for extra as needed Lasix for weight gain.  Discussed Jardiance; patient agreeable; patient to reach out to the office if medication is too expensive.    Based on physical examination findings, patient is euvolemic. No JVD, lungs are clear to auscultation, no pitting edema in bilateral lower extremities, no ascites. Dry weight is 200 lbs.    Follow-up echo in June shows moderate MR.  Will refer to structural heart clinic for monitoring; patient agreeable.    Past Medical History:   Diagnosis Date   • CHF (congestive heart failure) (HCC)    • Diabetes (HCC)    • Hyperlipidemia    • Hypertension      Past Surgical History:   Procedure Laterality Date   • AICD IMPLANT  2010     Family History   Problem Relation Age of Onset   • Colon Cancer Mother    • Hypertension Sister    • Stroke Brother    • Heart Disease  Neg Hx      Social History     Socioeconomic History   • Marital status: Single     Spouse name: Not on file   • Number of children: Not on file   • Years of education: Not on file   • Highest education level: Not on file   Occupational History   • Not on file   Tobacco Use   • Smoking status: Never Smoker   • Smokeless tobacco: Never Used   Vaping Use   • Vaping Use: Never used   Substance and Sexual Activity   • Alcohol use: No     Comment: 2 times a week-beer, NO ALCOHOL AT THIS TIME (last drink 3/1/2020)   • Drug use: No     Comment: Marijuana use as youth   • Sexual activity: Not on file   Other Topics Concern   • Not on file   Social History Narrative   • Not on file     Social Determinants of Health     Financial Resource Strain:    • Difficulty of Paying Living Expenses:    Food Insecurity:    • Worried About Running Out of Food in the Last Year:    • Ran Out of Food in the Last Year:    Transportation Needs:    • Lack of Transportation (Medical):    • Lack of Transportation (Non-Medical):    Physical Activity:    • Days of Exercise per Week:    • Minutes of Exercise per Session:    Stress:    • Feeling of Stress :    Social Connections:    • Frequency of Communication with Friends and Family:    • Frequency of Social Gatherings with Friends and Family:    • Attends [employer] Services:    • Active Member of Clubs or Organizations:    • Attends Club or Organization Meetings:    • Marital Status:    Intimate Partner Violence:    • Fear of Current or Ex-Partner:    • Emotionally Abused:    • Physically Abused:    • Sexually Abused:      No Known Allergies  Outpatient Encounter Medications as of 6/29/2021   Medication Sig Dispense Refill   • Empagliflozin (JARDIANCE) 10 MG Tab Take 10 mg by mouth every day. 30 tablet 2   • lisinopril (PRINIVIL) 10 MG Tab Take 1 tablet by mouth every day. 100 tablet 2   • multivitamin (THERAGRAN) Tab Take 1 tablet by mouth every day.     • atorvastatin (LIPITOR) 10 MG Tab Take 1  "tablet by mouth every evening. 30 tablet 3   • spironolactone (ALDACTONE) 25 MG Tab Take 1 tablet by mouth every day. 90 tablet 3   • metoprolol (TOPROL-XL) 200 MG XL tablet Take 1 tablet by mouth every day. 90 tablet 3   • isosorbide dinitrate (ISORDIL) 10 MG Tab Take 1 tablet by mouth 3 times a day. 270 tablet 3   • hydrALAZINE (APRESOLINE) 25 MG Tab Take 1 tablet by mouth 3 times a day. 270 tablet 3   • furosemide (LASIX) 20 MG Tab Take 1 tablet by mouth every day. 90 tablet 3   • digoxin (LANOXIN) 125 MCG Tab Take 1 tablet by mouth every morning. 90 tablet 3   • apixaban (ELIQUIS) 5mg Tab Take 1 tablet by mouth 2 Times a Day. 180 tablet 3   • insulin glargine (LANTUS) 100 UNIT/ML Solution Inject 10 Units as instructed every evening. 10 mL 0   • metformin (GLUCOPHAGE) 500 MG TABS Take 1 Tab by mouth 2 times a day, with meals. 60 Tab 3   • [DISCONTINUED] lisinopril (PRINIVIL) 5 MG Tab Take 1 tablet by mouth every day. 30 tablet 2     No facility-administered encounter medications on file as of 6/29/2021.     Review of Systems   Constitutional: Negative for fever, malaise/fatigue and weight loss.   Eyes: Negative for blurred vision.   Respiratory: Negative for cough and shortness of breath.    Cardiovascular: Negative for chest pain, palpitations, orthopnea, claudication, leg swelling and PND.   Gastrointestinal: Negative for abdominal pain, blood in stool, nausea and vomiting.   Genitourinary: Negative for dysuria, frequency and hematuria.   Musculoskeletal: Negative for falls and myalgias.   Neurological: Negative for dizziness, tingling and loss of consciousness.   Endo/Heme/Allergies: Does not bruise/bleed easily.        Objective:   /70 (BP Location: Left arm, Patient Position: Sitting, BP Cuff Size: Adult)   Pulse 80   Ht 1.753 m (5' 9\")   Wt 96.9 kg (213 lb 9.6 oz)   SpO2 98%   BMI 31.54 kg/m²     Physical Exam   Constitutional: He is oriented to person, place, and time. He appears well-developed. "   HENT:   Head: Normocephalic and atraumatic.   Eyes: Pupils are equal, round, and reactive to light.   Neck: No JVD present.   Cardiovascular: Normal rate, regular rhythm and normal heart sounds. Exam reveals no gallop and no friction rub.   No murmur heard.  Pulmonary/Chest: Effort normal and breath sounds normal. No respiratory distress.   Abdominal: Soft. Bowel sounds are normal. He exhibits no distension.   Musculoskeletal:      Right lower leg: No edema.      Left lower leg: No edema.   Neurological: He is alert and oriented to person, place, and time.   Skin: Skin is warm and dry. No erythema.   Psychiatric: His behavior is normal. Mood normal.   Vitals reviewed.    Lab Results   Component Value Date/Time    CHOLSTRLTOT 113 06/26/2021 03:32 AM    LDL 65 06/26/2021 03:32 AM    HDL 35 (A) 06/26/2021 03:32 AM    TRIGLYCERIDE 63 06/26/2021 03:32 AM       Lab Results   Component Value Date/Time    SODIUM 132 (L) 06/26/2021 03:32 AM    POTASSIUM 4.4 06/26/2021 03:32 AM    CHLORIDE 97 06/26/2021 03:32 AM    CO2 23 06/26/2021 03:32 AM    GLUCOSE 208 (H) 06/26/2021 03:32 AM    BUN 25 (H) 06/26/2021 03:32 AM    CREATININE 1.24 06/26/2021 03:32 AM     Lab Results   Component Value Date/Time    ALKPHOSPHAT 135 (H) 06/26/2021 03:32 AM    ASTSGOT 50 (H) 06/26/2021 03:32 AM    ALTSGPT 20 06/26/2021 03:32 AM    TBILIRUBIN 1.5 06/26/2021 03:32 AM      Ref. Range 4/9/2021 10:34   Digoxin Latest Ref Range: 0.8 - 2.0 ng/mL 0.4 (L)      Ref. Range 3/8/2020 23:32 6/25/2021 09:57   NT-proBNP Latest Ref Range: 0 - 125 pg/mL 2750 (H) 2702 (H)     Heart Cath 3/24/2014  FINDINGS:  Right heart catheterization wedge pressure was about 21.  Pulmonary   artery pressure is 51/40.  RV pressure is 50/4.  Right atrial pressure was   about 10.  The left ventricle was severely dilated with global hypokinesis and   ejection fraction of 13%.  There is mild-to-moderate mitral regurgitation   noted; however, it may be because the ventricle is under  field.  The left   coronary ostium is normal without significant atherosclerosis.  It divides   into large circumflex branch, ramus branch, and then LAD that gives off large   diagonal branch.  There is no atherosclerosis in any of these vessels.  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  There is no   atherosclerosis in the right coronary.       CONCLUSION:  Severe nonischemic cardiomyopathy with ejection fraction about   13%, at least mild-to-moderate mitral regurgitation, and no gradient across   the aortic valve, and elevated right-sided pressures.  The patient will need   aggressive heart failure treatment.     Echocardiogram 3/23/2014  CONCLUSIONS  No prior echo  4 chamber dilation  Left ventricular ejection fraction is 15% to 20%.   Grade III-IV diastolic dysfunction is present. c/w elevated LAp and   LVEDP.   Severe mitral regurgitation.        Transthoracic Echo Report 5/13/18  Left ventricle is severely dilated. Mild concentric left ventricular hypertrophy. Grade III diastolic dysfunction (restrictive pattern).   Severely reduced left ventricular systolic function. Left ventricular ejection fraction is visually estimated to be 20%. Diffuse hypokinesis with septal dyskinesis.  Severe mitral regurgitation.  Severe tricuspid regurgitation. Estimated right ventricular systolic pressure  is 50 mmHg.  Compared to the report of the study done - the TR is now severe.      Transthoracic Echo Report 10/12/2019  Severely reduced left ventricular systolic function.  Left ventricular ejection fraction is visually estimated to be 20%.  Global hypokinesis with regional variation.  Diastolic function is abnormal, but grade cannot be determined.  Reduced right ventricular systolic function.  Severe mitral regurgitation.  Severe tricuspid regurgitation.  Compared to the images of the prior study done 5/13/18 -  there has been no significant change.       Transthoracic Echo Report  3/26/2020  Severely reduced left ventricular systolic function.  Left ventricular ejection fraction is visually estimated to be 15%.  Mild concentric left ventricular hypertrophy.  Left ventricle is severely dilated.  Aortic sclerosis without stenosis.  Moderate mitral regurgitation.  Reduced right ventricular systolic function.  Right heart pressures are consistent with at least moderate pulmonary hypertension.  Pacer/ICD wire seen in right ventricle.  Compared to the images and report of the study done 10/12/19 there has been no significant change in left ventricular function, severity of   mitral regurgitation and tricuspid regurgitation appears to be less but   may be a function of gain settings.     Echo (6/25/2021): Left ventricular ejection fraction is visually estimated to be 15%.  Grade III diastolic dysfunction (restrictive pattern).  Mildly dilated right ventricle.  Moderate mitral regurgitation.  Moderate tricuspid regurgitation.     Assessment:     1. ACC/AHA stage C systolic heart failure (HCC)  Basic Metabolic Panel    REFERRAL TO CARDIOLOGY   2. Essential hypertension, benign  Basic Metabolic Panel    REFERRAL TO CARDIOLOGY   3. High risk medication use  Basic Metabolic Panel    REFERRAL TO CARDIOLOGY   4. Paroxysmal atrial fibrillation (HCC)  Basic Metabolic Panel    REFERRAL TO CARDIOLOGY   5. AICD (automatic cardioverter/defibrillator) present  Basic Metabolic Panel    REFERRAL TO CARDIOLOGY   6. Left ventricular systolic dysfunction, NYHA class 2  Basic Metabolic Panel    REFERRAL TO CARDIOLOGY   7. Moderate mitral regurgitation     8. Non-ischemic cardiomyopathy (HCC)     9. Stage C chronic systolic congestive heart failure (HCC)     10. Presence of biventricular automatic cardioverter/defibrillator (AICD)     11. Mixed hyperlipidemia     12. Type 2 diabetes mellitus with chronic kidney disease, without long-term current use of insulin, unspecified CKD stage (HCC)         Medical Decision  Making:  Today's Assessment / Status / Plan:   HFrEF, Stage C, Class ii, LVEF 15%  -Heart failure due to non-ischemic cardiomyopathy  -Increase Lisinopril 10 mg daily  -Continue Toprol 200 mg daily  -Continue Spironolactone 25 mg daily  -Initiate Jardiance 10 mg daily; patient to let clinic know if medications are unaffordable  -Digoxin 125 mcg daily  -Hydralazine 25 mg 3 times daily  -Isosorbide dinitrate 10 mg p.o. 3 times daily  -Continue Lasix 20 mg daily; take additional 20 mg daily as needed for weight gain greater than 3 pounds in 1 day or 5 pounds in 1 week.  -Labs: BMP in 2 weeks  -Moderate MR: Will refer to structural heart clinic for Mitraclip evaluation; discussed with patient patient agreeable to referral.  -Reinforced s/sx of worsening heart failure with patient and weight monitoring. Pt verbalizes understanding. Pt to call office if present.    -Heart Failure Education: pt to be contacted by HF nurse for further education,     Proximal A. fib, s/p AV node ablation, s/p BiV ICD  -Eliquis 5 mg twice daily  -Last device check 7/24/2020.  Functioning normally  -Denies shocks  -We will schedule updated device check    Hypertension  -Today in office blood pressure is slightly elevated.    -Encouraged to continue home BP monitoring/log.  -Medication recommendations per above.    Hyperlipidemia  -Last LDL 65 (6/26/2021)  -Atorvastatin 20 mg daily    T2DM  -Per PCP  -Metformin 500 mg twice daily  -Lantus 10 units every evening  -SGLT2i for heart failure; education provided regarding possible DM medication adjustments for hypoglycemia.    High Risk Medication Use  -This includes lisinopril, eliquis, spironolactone, digoxin, hydralazine, isosorbide, furosemide, Jardiance  -Will continue to closely monitor for side effects of patient's high risk medication(s) including liver, renal function and electrolytes  -Close monitoring discussed with patient.  Lab work ordered.    FU in clinic in 4 weeks. Sooner if  needed.    Patient verbalizes understanding and agrees with the plan of care.     Collaborating MD: Dr. Josue MD    PLEASE NOTE: This Note was created using voice recognition Software. I have made every reasonable attempt to correct obvious errors, but I expect that there are errors of grammar and possibly content that I did not discover before finalizing the note

## 2021-07-01 ENCOUNTER — TELEPHONE (OUTPATIENT)
Dept: CARDIOLOGY | Facility: MEDICAL CENTER | Age: 51
End: 2021-07-01

## 2021-07-01 NOTE — TELEPHONE ENCOUNTER
Multiple attempts to contact patient regarding REGGIE Torrez referral to [facility] regarding MR. No answer and phone message states voice mail box is full and unable to accept additional voice messages at this time. Will continue attempts to contact patient regarding such appointment.

## 2021-07-05 ASSESSMENT — ENCOUNTER SYMPTOMS
FEVER: 0
CLAUDICATION: 0
ORTHOPNEA: 0
LOSS OF CONSCIOUSNESS: 0
BLURRED VISION: 0
PND: 0
FALLS: 0
PALPITATIONS: 0
VOMITING: 0
TINGLING: 0
BLOOD IN STOOL: 0
ABDOMINAL PAIN: 0
NAUSEA: 0
BRUISES/BLEEDS EASILY: 0
MYALGIAS: 0
WEIGHT LOSS: 0
COUGH: 0
DIZZINESS: 0
SHORTNESS OF BREATH: 0

## 2021-07-06 ENCOUNTER — TELEPHONE (OUTPATIENT)
Dept: CARDIOLOGY | Facility: MEDICAL CENTER | Age: 51
End: 2021-07-06

## 2021-07-06 NOTE — TELEPHONE ENCOUNTER
Left voice message regarding [facility] referral placed by REGGIE Torrez. Provided direct contact information and encouraged return call to discuss such in detail and arrange for consult. Awaiting return call.

## 2021-07-06 NOTE — TELEPHONE ENCOUNTER
Spoke with patient regarding [facility] referral placed by REGGIE Torrez for moderate MR.     Reviewed next available [facility] consult. Patient states that he will be baby sitting and will prefer consult after 7/16/21. Confirmed date, time, location for 7/20/21 consult with Dr. Mcneil. Patient repeats back all information accurately and agrees with plan. Assured patient also that details available via [employer], additionally he will have call to remind him of such appointment.    Patient states understanding, states no further questions at this time and very thankful for call.     SC-KADENI regarding timing of consult per patient request.

## 2021-07-06 NOTE — TELEPHONE ENCOUNTER
Pt returning call. Attempted to call Radha RAJPUT but was unable to reach. Please call pt back at [phone].       Thank you

## 2021-07-20 ENCOUNTER — OFFICE VISIT (OUTPATIENT)
Dept: CARDIOLOGY | Facility: MEDICAL CENTER | Age: 51
End: 2021-07-20
Payer: OTHER GOVERNMENT

## 2021-07-20 VITALS
OXYGEN SATURATION: 92 % | HEART RATE: 82 BPM | BODY MASS INDEX: 31.7 KG/M2 | RESPIRATION RATE: 14 BRPM | WEIGHT: 214 LBS | HEIGHT: 69 IN | SYSTOLIC BLOOD PRESSURE: 126 MMHG | DIASTOLIC BLOOD PRESSURE: 84 MMHG

## 2021-07-20 DIAGNOSIS — I34.0 SEVERE MITRAL REGURGITATION: ICD-10-CM

## 2021-07-20 PROCEDURE — 99214 OFFICE O/P EST MOD 30 MIN: CPT | Performed by: INTERNAL MEDICINE

## 2021-07-20 ASSESSMENT — FIBROSIS 4 INDEX: FIB4 SCORE: 3.57

## 2021-07-20 NOTE — PROGRESS NOTES
Cardiology Follow-up Consultation Note    Date of note:    7/20/2021    Primary Care Provider: Tor Rdz M.D.    Name:             Lai Dailey     YOB: 1970  MRN:               [id number]    CC: Mitral regurgitation    Patient ID/HPI:   51-year-old male patient with severe nonischemic cardiomyopathy, atrial fibrillation status post AV pato ablation, CRT-D in place, diabetes mellitus, moderate to severe mitral regurgitation, tricuspid regurgitation, hypertension here for structural heart consultation regarding mitral regurgitation.  He is doing reasonably well, denies chest pains, significant shortness of breath at this time.      ROS  All other systems reviewed and negative.    Past Medical History:   Diagnosis Date   • CHF (congestive heart failure) (HCC)    • Diabetes (HCC)    • Hyperlipidemia    • Hypertension          Past Surgical History:   Procedure Laterality Date   • AICD IMPLANT  2010         Current Outpatient Medications   Medication Sig Dispense Refill   • Empagliflozin (JARDIANCE) 10 MG Tab Take 10 mg by mouth every day. 30 tablet 2   • lisinopril (PRINIVIL) 10 MG Tab Take 1 tablet by mouth every day. 100 tablet 2   • multivitamin (THERAGRAN) Tab Take 1 tablet by mouth every day.     • atorvastatin (LIPITOR) 10 MG Tab Take 1 tablet by mouth every evening. 30 tablet 3   • spironolactone (ALDACTONE) 25 MG Tab Take 1 tablet by mouth every day. 90 tablet 3   • metoprolol (TOPROL-XL) 200 MG XL tablet Take 1 tablet by mouth every day. 90 tablet 3   • isosorbide dinitrate (ISORDIL) 10 MG Tab Take 1 tablet by mouth 3 times a day. 270 tablet 3   • hydrALAZINE (APRESOLINE) 25 MG Tab Take 1 tablet by mouth 3 times a day. 270 tablet 3   • furosemide (LASIX) 20 MG Tab Take 1 tablet by mouth every day. 90 tablet 3   • digoxin (LANOXIN) 125 MCG Tab Take 1 tablet by mouth every morning. 90 tablet 3   • apixaban (ELIQUIS) 5mg Tab Take 1 tablet by mouth 2 Times a Day. 180 tablet 3   •  insulin glargine (LANTUS) 100 UNIT/ML Solution Inject 10 Units as instructed every evening. 10 mL 0   • metformin (GLUCOPHAGE) 500 MG TABS Take 1 Tab by mouth 2 times a day, with meals. 60 Tab 3     No current facility-administered medications for this visit.         No Known Allergies      Family History   Problem Relation Age of Onset   • Colon Cancer Mother    • Hypertension Sister    • Stroke Brother    • Heart Disease Neg Hx          Social History     Socioeconomic History   • Marital status: Single     Spouse name: Not on file   • Number of children: Not on file   • Years of education: Not on file   • Highest education level: Not on file   Occupational History   • Not on file   Tobacco Use   • Smoking status: Never Smoker   • Smokeless tobacco: Never Used   Vaping Use   • Vaping Use: Never used   Substance and Sexual Activity   • Alcohol use: No     Comment: 2 times a week-beer, NO ALCOHOL AT THIS TIME (last drink 3/1/2020)   • Drug use: No     Comment: Marijuana use as youth   • Sexual activity: Not on file   Other Topics Concern   • Not on file   Social History Narrative   • Not on file     Social Determinants of Health     Financial Resource Strain:    • Difficulty of Paying Living Expenses:    Food Insecurity:    • Worried About Running Out of Food in the Last Year:    • Ran Out of Food in the Last Year:    Transportation Needs:    • Lack of Transportation (Medical):    • Lack of Transportation (Non-Medical):    Physical Activity:    • Days of Exercise per Week:    • Minutes of Exercise per Session:    Stress:    • Feeling of Stress :    Social Connections:    • Frequency of Communication with Friends and Family:    • Frequency of Social Gatherings with Friends and Family:    • Attends [employer] Services:    • Active Member of Clubs or Organizations:    • Attends Club or Organization Meetings:    • Marital Status:    Intimate Partner Violence:    • Fear of Current or Ex-Partner:    • Emotionally Abused:   "  • Physically Abused:    • Sexually Abused:          Physical Exam:  Ambulatory Vitals  /84 (BP Location: Left arm, Patient Position: Sitting, BP Cuff Size: Adult)   Pulse 82   Resp 14   Ht 1.753 m (5' 9\")   Wt 97.1 kg (214 lb)   SpO2 92%    Oxygen Therapy:  Pulse Oximetry: 92 %  BP Readings from Last 4 Encounters:   07/20/21 126/84   06/29/21 120/70   06/26/21 116/84   03/29/21 120/84       Weight/BMI: Body mass index is 31.6 kg/m².  Wt Readings from Last 4 Encounters:   07/20/21 97.1 kg (214 lb)   06/29/21 96.9 kg (213 lb 9.6 oz)   06/26/21 93.8 kg (206 lb 12.7 oz)   03/29/21 98 kg (216 lb)       General: Well appearing and in no apparent distress  Head: atrumatic  Eyes: No conjunctival pallor   ENT: normal external appearance of nose and ears  Neck: JVD absent, carotid bruits absent  Lungs: respiratory sounds  normal, additional breath sounds absent  Heart: Regular rhythm,   No palpable thrills on palpation, 2 x 6 systolic murmur cardiac apex, no rubs,   Lower extremity edema absent.   Pedal pulses normal  Abdomen: soft, non tender, non distended.  Extremities/MSK: no clubbing, no cyanosis  Neurological: normal orientation, Gait normal   Psychiatric: Appropriate affect, intact judgement and insight  Skin: Warm extremities        Lab Data Review:  Lab Results   Component Value Date/Time    CHOLSTRLTOT 113 06/26/2021 03:32 AM    LDL 65 06/26/2021 03:32 AM    HDL 35 (A) 06/26/2021 03:32 AM    TRIGLYCERIDE 63 06/26/2021 03:32 AM       Lab Results   Component Value Date/Time    SODIUM 132 (L) 06/26/2021 03:32 AM    POTASSIUM 4.4 06/26/2021 03:32 AM    CHLORIDE 97 06/26/2021 03:32 AM    CO2 23 06/26/2021 03:32 AM    GLUCOSE 208 (H) 06/26/2021 03:32 AM    BUN 25 (H) 06/26/2021 03:32 AM    CREATININE 1.24 06/26/2021 03:32 AM     Lab Results   Component Value Date/Time    ALKPHOSPHAT 135 (H) 06/26/2021 03:32 AM    ASTSGOT 50 (H) 06/26/2021 03:32 AM    ALTSGPT 20 06/26/2021 03:32 AM    TBILIRUBIN 1.5 06/26/2021 " 03:32 AM      Lab Results   Component Value Date/Time    WBC 4.9 06/26/2021 03:32 AM     Reviewed and personally interpreted echocardiograms, prior discharge notes    Impression and Plan:  51-year-old male patient with paroxysmal atrial fibrillation status post AV pato ablation, severe cardiomyopathy, CRT-D in place, chronic systolic heart failure NYHA class III stage C, diabetes, hypertension with moderate mitral regurgitation.  Unfortunately he is not a candidate for MitraClip due to severely reduced ejection fraction.  He is doing reasonably well, recommend continued medical therapy, if he deteriorates in future, recommend evaluation for LVAD/transplant      Nik RÍOS  Interventional cardiologist  [facility], [address]  [address] [address]  Phone: [phone]  Fax: [phone]

## 2021-07-26 ENCOUNTER — HOSPITAL ENCOUNTER (OUTPATIENT)
Dept: LAB | Facility: MEDICAL CENTER | Age: 51
End: 2021-07-26
Attending: NURSE PRACTITIONER
Payer: OTHER GOVERNMENT

## 2021-07-26 DIAGNOSIS — E78.2 MIXED HYPERLIPIDEMIA: ICD-10-CM

## 2021-07-26 DIAGNOSIS — Z79.899 HIGH RISK MEDICATION USE: ICD-10-CM

## 2021-07-26 DIAGNOSIS — I50.20 ACC/AHA STAGE C SYSTOLIC HEART FAILURE (HCC): ICD-10-CM

## 2021-07-26 DIAGNOSIS — I48.0 PAROXYSMAL ATRIAL FIBRILLATION (HCC): ICD-10-CM

## 2021-07-26 DIAGNOSIS — Z95.810 AICD (AUTOMATIC CARDIOVERTER/DEFIBRILLATOR) PRESENT: ICD-10-CM

## 2021-07-26 DIAGNOSIS — I10 ESSENTIAL HYPERTENSION, BENIGN: ICD-10-CM

## 2021-07-26 DIAGNOSIS — I51.89 LEFT VENTRICULAR SYSTOLIC DYSFUNCTION, NYHA CLASS 2: ICD-10-CM

## 2021-07-26 PROCEDURE — 80048 BASIC METABOLIC PNL TOTAL CA: CPT

## 2021-07-26 PROCEDURE — 36415 COLL VENOUS BLD VENIPUNCTURE: CPT

## 2021-07-27 LAB
ANION GAP SERPL CALC-SCNC: 12 MMOL/L (ref 7–16)
BUN SERPL-MCNC: 21 MG/DL (ref 8–22)
CALCIUM SERPL-MCNC: 9.4 MG/DL (ref 8.5–10.5)
CHLORIDE SERPL-SCNC: 102 MMOL/L (ref 96–112)
CO2 SERPL-SCNC: 22 MMOL/L (ref 20–33)
CREAT SERPL-MCNC: 0.91 MG/DL (ref 0.5–1.4)
GLUCOSE SERPL-MCNC: 108 MG/DL (ref 65–99)
POTASSIUM SERPL-SCNC: 4 MMOL/L (ref 3.6–5.5)
SODIUM SERPL-SCNC: 136 MMOL/L (ref 135–145)

## 2021-07-28 NOTE — PROGRESS NOTES
No chief complaint on file.      Subjective:   Lai Dailey is a 51 y.o. male who presents today for heart failure follow-up.  Patient was last seen on 7/20/2021.  Since patient was last seen, he was was evaluated by [facility]; he is not a mitraclip candidate. Patient is also followed by Dr. Arriaga    Patient has past medical history of s/p ICD, DM 2, hypertension, hyperlipidemia, A. Fib.    Today, patient feels well, denies chest pain, shortness of breath, palpitations, dizziness/lightheadedness, orthopnea, PND or Edema.  Patient reports he has resumed walking, denies SIERRA.  Patient reports he is filled his Jardiance prescription but has not started taking it.  He sees his primary care provider next week; instructions provided to discuss Jardiance with PCP for diabetic medication changes if needed.  Patient reports his home weight today is 208 pounds.  This is slightly elevated compared to previous visit.  Patient associates it with going out to eat with his son.    We discussed the importance of completing advanced care directives; patient verbalizes understanding.  Patient reports he has not initiated.  Packet provided to patient today.  Patient appreciative, we will continue with this discussion as desired and follow-ups.    Today, based on physical examination findings, patient is euvolemic. No JVD, lungs are clear to auscultation, no pitting edema in bilateral lower extremities, no ascites. Dry weight is 205 lbs.    Past Medical History:   Diagnosis Date   • CHF (congestive heart failure) (HCC)    • Diabetes (HCC)    • Hyperlipidemia    • Hypertension      Past Surgical History:   Procedure Laterality Date   • AICD IMPLANT  2010     Family History   Problem Relation Age of Onset   • Colon Cancer Mother    • Hypertension Sister    • Stroke Brother    • Heart Disease Neg Hx      Social History     Socioeconomic History   • Marital status: Single     Spouse name: Not on file   • Number of children: Not on file   • Years of  education: Not on file   • Highest education level: Not on file   Occupational History   • Not on file   Tobacco Use   • Smoking status: Never Smoker   • Smokeless tobacco: Never Used   Vaping Use   • Vaping Use: Never used   Substance and Sexual Activity   • Alcohol use: No     Comment: 2 times a week-beer, NO ALCOHOL AT THIS TIME (last drink 3/1/2020)   • Drug use: No     Comment: Marijuana use as youth   • Sexual activity: Not on file   Other Topics Concern   • Not on file   Social History Narrative   • Not on file     Social Determinants of Health     Financial Resource Strain:    • Difficulty of Paying Living Expenses:    Food Insecurity:    • Worried About Running Out of Food in the Last Year:    • Ran Out of Food in the Last Year:    Transportation Needs:    • Lack of Transportation (Medical):    • Lack of Transportation (Non-Medical):    Physical Activity:    • Days of Exercise per Week:    • Minutes of Exercise per Session:    Stress:    • Feeling of Stress :    Social Connections:    • Frequency of Communication with Friends and Family:    • Frequency of Social Gatherings with Friends and Family:    • Attends [employer] Services:    • Active Member of Clubs or Organizations:    • Attends Club or Organization Meetings:    • Marital Status:    Intimate Partner Violence:    • Fear of Current or Ex-Partner:    • Emotionally Abused:    • Physically Abused:    • Sexually Abused:      No Known Allergies  Outpatient Encounter Medications as of 7/29/2021   Medication Sig Dispense Refill   • Empagliflozin (JARDIANCE) 10 MG Tab Take 10 mg by mouth every day. 30 tablet 2   • lisinopril (PRINIVIL) 10 MG Tab Take 1 tablet by mouth every day. 100 tablet 2   • multivitamin (THERAGRAN) Tab Take 1 tablet by mouth every day.     • atorvastatin (LIPITOR) 10 MG Tab Take 1 tablet by mouth every evening. 30 tablet 3   • spironolactone (ALDACTONE) 25 MG Tab Take 1 tablet by mouth every day. 90 tablet 3   • metoprolol (TOPROL-XL)  200 MG XL tablet Take 1 tablet by mouth every day. 90 tablet 3   • isosorbide dinitrate (ISORDIL) 10 MG Tab Take 1 tablet by mouth 3 times a day. 270 tablet 3   • hydrALAZINE (APRESOLINE) 25 MG Tab Take 1 tablet by mouth 3 times a day. 270 tablet 3   • furosemide (LASIX) 20 MG Tab Take 1 tablet by mouth every day. 90 tablet 3   • digoxin (LANOXIN) 125 MCG Tab Take 1 tablet by mouth every morning. 90 tablet 3   • apixaban (ELIQUIS) 5mg Tab Take 1 tablet by mouth 2 Times a Day. 180 tablet 3   • insulin glargine (LANTUS) 100 UNIT/ML Solution Inject 10 Units as instructed every evening. 10 mL 0   • metformin (GLUCOPHAGE) 500 MG TABS Take 1 Tab by mouth 2 times a day, with meals. 60 Tab 3     No facility-administered encounter medications on file as of 7/29/2021.     Review of Systems   Constitutional: Negative for fever, malaise/fatigue and weight loss.   Eyes: Negative for blurred vision.   Respiratory: Negative for cough and shortness of breath.    Cardiovascular: Negative for chest pain, palpitations, orthopnea, claudication, leg swelling and PND.   Gastrointestinal: Negative for abdominal pain, blood in stool, nausea and vomiting.   Genitourinary: Negative for dysuria, frequency and hematuria.   Musculoskeletal: Negative for falls and myalgias.   Neurological: Negative for dizziness, tingling and loss of consciousness.   Endo/Heme/Allergies: Does not bruise/bleed easily.        Objective:   There were no vitals taken for this visit.    Physical Exam   Constitutional: He is oriented to person, place, and time. He appears well-developed. No distress.   HENT:   Head: Normocephalic and atraumatic.   Eyes: Pupils are equal, round, and reactive to light.   Neck: No JVD present.   Cardiovascular: Normal rate, regular rhythm, normal heart sounds and normal pulses. Exam reveals no gallop and no friction rub.   No murmur heard.  Pulmonary/Chest: Effort normal and breath sounds normal. No respiratory distress.   Abdominal:  Soft. Bowel sounds are normal. He exhibits no distension.   Musculoskeletal:      Right lower leg: No edema.      Left lower leg: No edema.   Neurological: He is alert and oriented to person, place, and time.   Skin: Skin is warm and dry. No erythema.   Psychiatric: His behavior is normal. Mood normal.   Vitals reviewed.    Lab Results   Component Value Date/Time    CHOLSTRLTOT 113 06/26/2021 03:32 AM    LDL 65 06/26/2021 03:32 AM    HDL 35 (A) 06/26/2021 03:32 AM    TRIGLYCERIDE 63 06/26/2021 03:32 AM       Lab Results   Component Value Date/Time    SODIUM 136 07/26/2021 01:22 PM    POTASSIUM 4.0 07/26/2021 01:22 PM    CHLORIDE 102 07/26/2021 01:22 PM    CO2 22 07/26/2021 01:22 PM    GLUCOSE 108 (H) 07/26/2021 01:22 PM    BUN 21 07/26/2021 01:22 PM    CREATININE 0.91 07/26/2021 01:22 PM     Lab Results   Component Value Date/Time    ALKPHOSPHAT 135 (H) 06/26/2021 03:32 AM    ASTSGOT 50 (H) 06/26/2021 03:32 AM    ALTSGPT 20 06/26/2021 03:32 AM    TBILIRUBIN 1.5 06/26/2021 03:32 AM      Ref. Range 4/9/2021 10:34   Digoxin Latest Ref Range: 0.8 - 2.0 ng/mL 0.4 (L)      Ref. Range 3/8/2020 23:32 6/25/2021 09:57   NT-proBNP Latest Ref Range: 0 - 125 pg/mL 2750 (H) 2702 (H)     Heart Cath 3/24/2014  FINDINGS:  Right heart catheterization wedge pressure was about 21.  Pulmonary   artery pressure is 51/40.  RV pressure is 50/4.  Right atrial pressure was   about 10.  The left ventricle was severely dilated with global hypokinesis and   ejection fraction of 13%.  There is mild-to-moderate mitral regurgitation   noted; however, it may be because the ventricle is under field.  The left   coronary ostium is normal without significant atherosclerosis.  It divides   into large circumflex branch, ramus branch, and then LAD that gives off large   diagonal branch.  There is no atherosclerosis in any of these vessels.  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  There is no   atherosclerosis in the right  coronary.       CONCLUSION:  Severe nonischemic cardiomyopathy with ejection fraction about   13%, at least mild-to-moderate mitral regurgitation, and no gradient across   the aortic valve, and elevated right-sided pressures.  The patient will need   aggressive heart failure treatment.     Echocardiogram 3/23/2014  CONCLUSIONS  No prior echo  4 chamber dilation  Left ventricular ejection fraction is 15% to 20%.   Grade III-IV diastolic dysfunction is present. c/w elevated LAp and   LVEDP.   Severe mitral regurgitation.        Transthoracic Echo Report 5/13/18  Left ventricle is severely dilated. Mild concentric left ventricular hypertrophy. Grade III diastolic dysfunction (restrictive pattern).   Severely reduced left ventricular systolic function. Left ventricular ejection fraction is visually estimated to be 20%. Diffuse hypokinesis with septal dyskinesis.  Severe mitral regurgitation.  Severe tricuspid regurgitation. Estimated right ventricular systolic pressure  is 50 mmHg.  Compared to the report of the study done - the TR is now severe.      Transthoracic Echo Report 10/12/2019  Severely reduced left ventricular systolic function.  Left ventricular ejection fraction is visually estimated to be 20%.  Global hypokinesis with regional variation.  Diastolic function is abnormal, but grade cannot be determined.  Reduced right ventricular systolic function.  Severe mitral regurgitation.  Severe tricuspid regurgitation.  Compared to the images of the prior study done 5/13/18 -  there has been no significant change.       Transthoracic Echo Report 3/26/2020  Severely reduced left ventricular systolic function.  Left ventricular ejection fraction is visually estimated to be 15%.  Mild concentric left ventricular hypertrophy.  Left ventricle is severely dilated.  Aortic sclerosis without stenosis.  Moderate mitral regurgitation.  Reduced right ventricular systolic function.  Right heart pressures are consistent with at  least moderate pulmonary hypertension.  Pacer/ICD wire seen in right ventricle.  Compared to the images and report of the study done 10/12/19 there has been no significant change in left ventricular function, severity of   mitral regurgitation and tricuspid regurgitation appears to be less but   may be a function of gain settings.     Echo (6/25/2021): Left ventricular ejection fraction is visually estimated to be 15%.  Grade III diastolic dysfunction (restrictive pattern).  Mildly dilated right ventricle.  Moderate mitral regurgitation.  Moderate tricuspid regurgitation.     Assessment:     1. ACC/AHA stage C systolic heart failure (HCC)     2. Essential hypertension, benign     3. High risk medication use     4. Paroxysmal atrial fibrillation (HCC)     5. AICD (automatic cardioverter/defibrillator) present     6. Moderate mitral regurgitation     7. Non-ischemic cardiomyopathy (HCC)     8. Stage C chronic systolic congestive heart failure (HCC)     9. Presence of biventricular automatic cardioverter/defibrillator (AICD)     10. Mixed hyperlipidemia     11. Type 2 diabetes mellitus with chronic kidney disease, without long-term current use of insulin, unspecified CKD stage (HCC)     12. Persistent atrial fibrillation (HCC)         Medical Decision Making:  Today's Assessment / Status / Plan:   HFrEF, Stage C, Class I-II, LVEF 15%  -Heart failure due to non-ischemic cardiomyopathy  -Continue Lisinopril 10 mg daily  -Continue Toprol 200 mg daily  -Continue Spironolactone 25 mg daily  -Initiate Jardiance 10 mg daily; patient to let clinic know if medications are unaffordable  -Digoxin 125 mcg daily  -Hydralazine 25 mg 3 times daily  -Isosorbide dinitrate 10 mg p.o. 3 times daily  -Continue Lasix 20 mg daily; take additional 20 mg daily as needed for weight gain greater than 3 pounds in 1 day or 5 pounds in 1 week.  -Labs: reviewed lab results from 2 weeks ago with patient.  -Reinforced s/sx of worsening heart failure  with patient and weight monitoring. Pt verbalizes understanding. Pt to call office if present.    -Advanced directive packet provided to patient today.  Continue discussion as needed.    Proximal A. fib, s/p AV node ablation, s/p BiV ICD  -Eliquis 5 mg twice daily  -Last device check 7/24/2020.  Functioning normally  -Denies shocks  -Next device check in October    Hypertension  -Today in office blood pressure is slightly elevated.    -Encouraged to continue home BP monitoring/log.  -Medication recommendations per above.    Hyperlipidemia  -Last LDL 65 (6/26/2021)  -Atorvastatin 20 mg daily    T2DM  -Per PCP  -Metformin 500 mg twice daily  -Lantus 10 units every evening  -SGLT2i for heart failure; education provided regarding possible DM medication adjustments for hypoglycemia.    High Risk Medication Use  -This includes lisinopril, eliquis, spironolactone, digoxin, hydralazine, isosorbide, furosemide, Jardiance  -Will continue to closely monitor for side effects of patient's high risk medication(s) including liver, renal function and electrolytes  -Close monitoring discussed with patient.  Lab work reviewed    FU in clinic in 3 months. Sooner if needed.    Patient verbalizes understanding and agrees with the plan of care.     Collaborating MD: Dr. Todd MD    PLEASE NOTE: This Note was created using voice recognition Software. I have made every reasonable attempt to correct obvious errors, but I expect that there are errors of grammar and possibly content that I did not discover before finalizing the note

## 2021-07-29 ENCOUNTER — OFFICE VISIT (OUTPATIENT)
Dept: CARDIOLOGY | Facility: MEDICAL CENTER | Age: 51
End: 2021-07-29
Payer: OTHER GOVERNMENT

## 2021-07-29 VITALS
HEIGHT: 69 IN | OXYGEN SATURATION: 95 % | BODY MASS INDEX: 31.25 KG/M2 | WEIGHT: 211 LBS | DIASTOLIC BLOOD PRESSURE: 74 MMHG | RESPIRATION RATE: 14 BRPM | HEART RATE: 80 BPM | SYSTOLIC BLOOD PRESSURE: 118 MMHG

## 2021-07-29 DIAGNOSIS — Z95.810 PRESENCE OF BIVENTRICULAR AUTOMATIC CARDIOVERTER/DEFIBRILLATOR (AICD): ICD-10-CM

## 2021-07-29 DIAGNOSIS — I50.20 ACC/AHA STAGE C SYSTOLIC HEART FAILURE (HCC): ICD-10-CM

## 2021-07-29 DIAGNOSIS — Z79.899 HIGH RISK MEDICATION USE: ICD-10-CM

## 2021-07-29 DIAGNOSIS — I42.8 NON-ISCHEMIC CARDIOMYOPATHY (HCC): ICD-10-CM

## 2021-07-29 DIAGNOSIS — E11.22 TYPE 2 DIABETES MELLITUS WITH CHRONIC KIDNEY DISEASE, WITHOUT LONG-TERM CURRENT USE OF INSULIN, UNSPECIFIED CKD STAGE (HCC): ICD-10-CM

## 2021-07-29 DIAGNOSIS — E78.2 MIXED HYPERLIPIDEMIA: ICD-10-CM

## 2021-07-29 DIAGNOSIS — I10 ESSENTIAL HYPERTENSION, BENIGN: ICD-10-CM

## 2021-07-29 DIAGNOSIS — I48.19 PERSISTENT ATRIAL FIBRILLATION (HCC): ICD-10-CM

## 2021-07-29 DIAGNOSIS — I48.0 PAROXYSMAL ATRIAL FIBRILLATION (HCC): ICD-10-CM

## 2021-07-29 DIAGNOSIS — Z95.810 AICD (AUTOMATIC CARDIOVERTER/DEFIBRILLATOR) PRESENT: ICD-10-CM

## 2021-07-29 DIAGNOSIS — I50.22 STAGE C CHRONIC SYSTOLIC CONGESTIVE HEART FAILURE (HCC): ICD-10-CM

## 2021-07-29 DIAGNOSIS — I34.0 MODERATE MITRAL REGURGITATION: ICD-10-CM

## 2021-07-29 PROCEDURE — 99214 OFFICE O/P EST MOD 30 MIN: CPT | Performed by: NURSE PRACTITIONER

## 2021-07-29 RX ORDER — LISINOPRIL 5 MG/1
TABLET ORAL
COMMUNITY
Start: 2021-07-23 | End: 2021-07-29

## 2021-07-29 ASSESSMENT — ENCOUNTER SYMPTOMS
FALLS: 0
NAUSEA: 0
LOSS OF CONSCIOUSNESS: 0
VOMITING: 0
PND: 0
COUGH: 0
BRUISES/BLEEDS EASILY: 0
FEVER: 0
BLOOD IN STOOL: 0
ORTHOPNEA: 0
SHORTNESS OF BREATH: 0
BLURRED VISION: 0
ABDOMINAL PAIN: 0
PALPITATIONS: 0
DIZZINESS: 0
MYALGIAS: 0
TINGLING: 0
CLAUDICATION: 0
WEIGHT LOSS: 0

## 2021-07-29 ASSESSMENT — FIBROSIS 4 INDEX: FIB4 SCORE: 3.57

## 2021-07-29 NOTE — PATIENT INSTRUCTIONS
Jardiance (SGLT2i) for heart failure. Not obtained, yet. Discuss eith your primary care provider regarding possible DM medication adjustments for hypoglycemia.

## 2021-09-29 ENCOUNTER — HOSPITAL ENCOUNTER (OUTPATIENT)
Dept: LAB | Facility: MEDICAL CENTER | Age: 51
End: 2021-09-29
Attending: NURSE PRACTITIONER
Payer: OTHER GOVERNMENT

## 2021-09-29 DIAGNOSIS — E78.2 MIXED HYPERLIPIDEMIA: ICD-10-CM

## 2021-09-29 LAB
ALBUMIN SERPL BCP-MCNC: 4.8 G/DL (ref 3.2–4.9)
ALBUMIN/GLOB SERPL: 1.4 G/DL
ALP SERPL-CCNC: 104 U/L (ref 30–99)
ALT SERPL-CCNC: 11 U/L (ref 2–50)
ANION GAP SERPL CALC-SCNC: 12 MMOL/L (ref 7–16)
AST SERPL-CCNC: 24 U/L (ref 12–45)
BILIRUB SERPL-MCNC: 1.1 MG/DL (ref 0.1–1.5)
BUN SERPL-MCNC: 13 MG/DL (ref 8–22)
CALCIUM SERPL-MCNC: 9.9 MG/DL (ref 8.5–10.5)
CHLORIDE SERPL-SCNC: 99 MMOL/L (ref 96–112)
CHOLEST SERPL-MCNC: 208 MG/DL (ref 100–199)
CO2 SERPL-SCNC: 26 MMOL/L (ref 20–33)
CREAT SERPL-MCNC: 1.12 MG/DL (ref 0.5–1.4)
FASTING STATUS PATIENT QL REPORTED: NORMAL
GLOBULIN SER CALC-MCNC: 3.4 G/DL (ref 1.9–3.5)
GLUCOSE SERPL-MCNC: 110 MG/DL (ref 65–99)
HDLC SERPL-MCNC: 63 MG/DL
LDLC SERPL CALC-MCNC: 131 MG/DL
POTASSIUM SERPL-SCNC: 4.4 MMOL/L (ref 3.6–5.5)
PROT SERPL-MCNC: 8.2 G/DL (ref 6–8.2)
SODIUM SERPL-SCNC: 137 MMOL/L (ref 135–145)
TRIGL SERPL-MCNC: 70 MG/DL (ref 0–149)

## 2021-09-29 PROCEDURE — 36415 COLL VENOUS BLD VENIPUNCTURE: CPT

## 2021-09-29 PROCEDURE — 80061 LIPID PANEL: CPT

## 2021-09-29 PROCEDURE — 80053 COMPREHEN METABOLIC PANEL: CPT

## 2021-09-30 ENCOUNTER — OFFICE VISIT (OUTPATIENT)
Dept: CARDIOLOGY | Facility: MEDICAL CENTER | Age: 51
End: 2021-09-30
Payer: OTHER GOVERNMENT

## 2021-09-30 VITALS
OXYGEN SATURATION: 95 % | SYSTOLIC BLOOD PRESSURE: 110 MMHG | BODY MASS INDEX: 32.23 KG/M2 | RESPIRATION RATE: 16 BRPM | DIASTOLIC BLOOD PRESSURE: 60 MMHG | WEIGHT: 217.6 LBS | HEIGHT: 69 IN | HEART RATE: 80 BPM

## 2021-09-30 DIAGNOSIS — Z95.810 AICD (AUTOMATIC CARDIOVERTER/DEFIBRILLATOR) PRESENT: ICD-10-CM

## 2021-09-30 DIAGNOSIS — E11.22 TYPE 2 DIABETES MELLITUS WITH CHRONIC KIDNEY DISEASE, WITHOUT LONG-TERM CURRENT USE OF INSULIN, UNSPECIFIED CKD STAGE (HCC): ICD-10-CM

## 2021-09-30 DIAGNOSIS — R06.09 DYSPNEA ON EXERTION: ICD-10-CM

## 2021-09-30 DIAGNOSIS — I42.8 NON-ISCHEMIC CARDIOMYOPATHY (HCC): ICD-10-CM

## 2021-09-30 DIAGNOSIS — E78.2 MIXED HYPERLIPIDEMIA: ICD-10-CM

## 2021-09-30 DIAGNOSIS — I48.11 LONGSTANDING PERSISTENT ATRIAL FIBRILLATION (HCC): ICD-10-CM

## 2021-09-30 DIAGNOSIS — I51.89 LEFT VENTRICULAR SYSTOLIC DYSFUNCTION, NYHA CLASS 2: ICD-10-CM

## 2021-09-30 DIAGNOSIS — Z79.899 HIGH RISK MEDICATION USE: ICD-10-CM

## 2021-09-30 DIAGNOSIS — I50.20 ACC/AHA STAGE C SYSTOLIC HEART FAILURE (HCC): ICD-10-CM

## 2021-09-30 DIAGNOSIS — Z95.810 PRESENCE OF BIVENTRICULAR AUTOMATIC CARDIOVERTER/DEFIBRILLATOR (AICD): ICD-10-CM

## 2021-09-30 LAB — EKG IMPRESSION: NORMAL

## 2021-09-30 PROCEDURE — 99214 OFFICE O/P EST MOD 30 MIN: CPT | Mod: 25 | Performed by: INTERNAL MEDICINE

## 2021-09-30 PROCEDURE — 93000 ELECTROCARDIOGRAM COMPLETE: CPT | Performed by: INTERNAL MEDICINE

## 2021-09-30 RX ORDER — EMPAGLIFLOZIN 10 MG/1
10 TABLET, FILM COATED ORAL DAILY
Qty: 30 TABLET | Refills: 3 | Status: SHIPPED | OUTPATIENT
Start: 2021-09-30 | End: 2022-09-19 | Stop reason: SDUPTHER

## 2021-09-30 RX ORDER — SACUBITRIL AND VALSARTAN 24; 26 MG/1; MG/1
1 TABLET, FILM COATED ORAL 2 TIMES DAILY
Qty: 60 TABLET | Refills: 3 | Status: SHIPPED | OUTPATIENT
Start: 2021-09-30 | End: 2021-09-30 | Stop reason: SDUPTHER

## 2021-09-30 RX ORDER — INSULIN GLARGINE 100 [IU]/ML
INJECTION, SOLUTION SUBCUTANEOUS
Status: ON HOLD | COMMUNITY
Start: 2021-08-04 | End: 2021-11-04

## 2021-09-30 RX ORDER — SACUBITRIL AND VALSARTAN 24; 26 MG/1; MG/1
1 TABLET, FILM COATED ORAL 2 TIMES DAILY
Qty: 60 TABLET | Refills: 3 | Status: SHIPPED | OUTPATIENT
Start: 2021-09-30 | End: 2022-09-19 | Stop reason: SDUPTHER

## 2021-09-30 RX ORDER — ATORVASTATIN CALCIUM 40 MG/1
40 TABLET, FILM COATED ORAL DAILY
Qty: 90 TABLET | Refills: 3 | Status: SHIPPED | OUTPATIENT
Start: 2021-09-30 | End: 2022-09-19 | Stop reason: SDUPTHER

## 2021-09-30 RX ORDER — EMPAGLIFLOZIN 10 MG/1
10 TABLET, FILM COATED ORAL DAILY
Qty: 30 TABLET | Refills: 3 | Status: SHIPPED | OUTPATIENT
Start: 2021-09-30 | End: 2021-09-30 | Stop reason: SDUPTHER

## 2021-09-30 ASSESSMENT — MINNESOTA LIVING WITH HEART FAILURE QUESTIONNAIRE (MLHF)
HAVING TO SIT OR LIE DOWN DURING THE DAY: 3
DIFFICULTY TO CONCENTRATE OR REMEMBERING THINGS: 1
DIFFICULTY WITH SEXUAL ACTIVITIES: 4
DIFFICULTY WITH RECREATIONAL PASTIMES, SPORTS, HOBBIES: 4
DIFFICULTY GOING AWAY FROM HOME: 3
COSTING YOU MONEY FOR MEDICAL CARE: 0
MAKING YOU WORRY: 5
MAKING YOU SHORT OF BREATH: 3
EATING LESS FOODS YOU LIKE: 5
SWELLING IN ANKLES OR LEGS: 3
FEELING LIKE A BURDEN TO FAMILY AND FRIENDS: 4
DIFFICULTY WORKING TO EARN A LIVING: 4
TOTAL_SCORE: 60
DIFFICULTY SLEEPING WELL AT NIGHT: 4
MAKING YOU FEEL DEPRESSED: 1
MAKING YOU STAY IN A HOSPITAL: 3
DIFFICULTY SOCIALIZING WITH FAMILY OR FRIENDS: 1
WALKING ABOUT OR CLIMBING STAIRS DIFFICULT: 3
TIRED, FATIGUED OR LOW ON ENERGY: 3
WORKING AROUND THE HOUSE OR YARD DIFFICULT: 3
GIVING YOU SIDE EFFECTS FROM TREATMENTS: 3
LOSS OF SELF CONTROL IN YOUR LIFE: 0

## 2021-09-30 ASSESSMENT — ENCOUNTER SYMPTOMS
PALPITATIONS: 0
DIAPHORESIS: 0
BRUISES/BLEEDS EASILY: 0
COUGH: 0
BLURRED VISION: 0
DOUBLE VISION: 0
ABDOMINAL PAIN: 0
MYALGIAS: 0
DEPRESSION: 0
FALLS: 0
HEADACHES: 0
MEMORY LOSS: 0
FEVER: 0
SHORTNESS OF BREATH: 1
SENSORY CHANGE: 0
DIZZINESS: 0

## 2021-09-30 ASSESSMENT — FIBROSIS 4 INDEX: FIB4 SCORE: 2.31

## 2021-09-30 NOTE — PROGRESS NOTES
Chief Complaint   Patient presents with   • Congestive Heart Failure   • Atrial Fibrillation       Subjective:   Lai Dailey is a 51 y.o. male who presents today cardiac care management in a heart failure program due to prior history of nonischemic cardiomyopathy, mitral valve regurgitation and persistent atrial fibrillation.  Patient does have a left ventricular systolic function of 20%. S/P CRT-D.    He did have a negative coronary angiogram in 2014 per report.    Patient is feeling better these days. Does get winded upon walking up inclines or for distance. No symptoms at rest or with daily living activities.    I personally interpreted his device interrogation which showed persistent atrial fibrillation, 98% ventricular pacing.     Has been compliant with HF program and medications.    Past Medical History:   Diagnosis Date   • CHF (congestive heart failure) (HCC)    • Diabetes (HCC)    • Hyperlipidemia    • Hypertension      Past Surgical History:   Procedure Laterality Date   • AICD IMPLANT  2010     Family History   Problem Relation Age of Onset   • Colon Cancer Mother    • Hypertension Sister    • Stroke Brother    • Heart Disease Neg Hx      Social History     Socioeconomic History   • Marital status: Single     Spouse name: Not on file   • Number of children: Not on file   • Years of education: Not on file   • Highest education level: Not on file   Occupational History   • Not on file   Tobacco Use   • Smoking status: Never Smoker   • Smokeless tobacco: Never Used   Vaping Use   • Vaping Use: Never used   Substance and Sexual Activity   • Alcohol use: No     Comment: 2 times a week-beer, NO ALCOHOL AT THIS TIME (last drink 3/1/2020)   • Drug use: No     Comment: Marijuana use as youth   • Sexual activity: Not on file   Other Topics Concern   • Not on file   Social History Narrative   • Not on file     Social Determinants of Health     Financial Resource Strain:    • Difficulty of Paying Living Expenses:     Food Insecurity:    • Worried About Running Out of Food in the Last Year:    • Ran Out of Food in the Last Year:    Transportation Needs:    • Lack of Transportation (Medical):    • Lack of Transportation (Non-Medical):    Physical Activity:    • Days of Exercise per Week:    • Minutes of Exercise per Session:    Stress:    • Feeling of Stress :    Social Connections:    • Frequency of Communication with Friends and Family:    • Frequency of Social Gatherings with Friends and Family:    • Attends Yazdan[employer] Services:    • Active Member of Clubs or Organizations:    • Attends Club or Organization Meetings:    • Marital Status:    Intimate Partner Violence:    • Fear of Current or Ex-Partner:    • Emotionally Abused:    • Physically Abused:    • Sexually Abused:      No Known Allergies  Outpatient Encounter Medications as of 9/30/2021   Medication Sig Dispense Refill   • LANTUS SOLOSTAR 100 UNIT/ML Solution Pen-injector injection      • atorvastatin (LIPITOR) 40 MG Tab Take 1 Tablet by mouth every day. 90 Tablet 3   • sacubitril-valsartan (ENTRESTO) 24-26 MG Tab tablet Take 1 Tablet by mouth 2 times a day. 60 Tablet 3   • Empagliflozin (JARDIANCE) 10 MG Tab Take 10 mg by mouth every day. 30 Tablet 3   • lisinopril (PRINIVIL) 10 MG Tab Take 1 tablet by mouth every day. 100 tablet 2   • multivitamin (THERAGRAN) Tab Take 1 tablet by mouth every day.     • spironolactone (ALDACTONE) 25 MG Tab Take 1 tablet by mouth every day. 90 tablet 3   • metoprolol (TOPROL-XL) 200 MG XL tablet Take 1 tablet by mouth every day. 90 tablet 3   • isosorbide dinitrate (ISORDIL) 10 MG Tab Take 1 tablet by mouth 3 times a day. 270 tablet 3   • hydrALAZINE (APRESOLINE) 25 MG Tab Take 1 tablet by mouth 3 times a day. 270 tablet 3   • furosemide (LASIX) 20 MG Tab Take 1 tablet by mouth every day. 90 tablet 3   • digoxin (LANOXIN) 125 MCG Tab Take 1 tablet by mouth every morning. 90 tablet 3   • apixaban (ELIQUIS) 5mg Tab Take 1 tablet by mouth  "2 Times a Day. 180 tablet 3   • insulin glargine (LANTUS) 100 UNIT/ML Solution Inject 10 Units as instructed every evening. 10 mL 0   • metformin (GLUCOPHAGE) 500 MG TABS Take 1 Tab by mouth 2 times a day, with meals. 60 Tab 3   • [DISCONTINUED] Empagliflozin (JARDIANCE) 10 MG Tab Take 10 mg by mouth every day. 30 Tablet 3   • [DISCONTINUED] sacubitril-valsartan (ENTRESTO) 24-26 MG Tab tablet Take 1 Tablet by mouth 2 times a day. 60 Tablet 3   • [DISCONTINUED] Empagliflozin (JARDIANCE) 10 MG Tab Take 10 mg by mouth every day. (Patient not taking: Reported on 9/30/2021) 30 tablet 2   • [DISCONTINUED] atorvastatin (LIPITOR) 10 MG Tab Take 1 tablet by mouth every evening. 30 tablet 3     No facility-administered encounter medications on file as of 9/30/2021.     Review of Systems   Constitutional: Negative for diaphoresis and fever.   HENT: Negative for nosebleeds.    Eyes: Negative for blurred vision and double vision.   Respiratory: Positive for shortness of breath. Negative for cough.    Cardiovascular: Negative for chest pain and palpitations.   Gastrointestinal: Negative for abdominal pain.   Genitourinary: Negative for dysuria and frequency.   Musculoskeletal: Negative for falls and myalgias.   Skin: Negative for rash.   Neurological: Negative for dizziness, sensory change and headaches.   Endo/Heme/Allergies: Does not bruise/bleed easily.   Psychiatric/Behavioral: Negative for depression and memory loss.        Objective:   /60 (BP Location: Left arm, Patient Position: Sitting, BP Cuff Size: Adult)   Pulse 80   Resp 16   Ht 1.753 m (5' 9\")   Wt 98.7 kg (217 lb 9.6 oz)   SpO2 95%   BMI 32.13 kg/m²     Physical Exam  Vitals and nursing note reviewed.   Constitutional:       General: He is not in acute distress.     Appearance: He is not diaphoretic.   HENT:      Head: Normocephalic and atraumatic.      Right Ear: External ear normal.      Left Ear: External ear normal.   Eyes:      General:         " Right eye: No discharge.         Left eye: No discharge.   Neck:      Thyroid: No thyromegaly.      Vascular: No JVD.   Cardiovascular:      Rate and Rhythm: Normal rate and regular rhythm.      Heart sounds: Normal heart sounds. No murmur heard.   No friction rub. No gallop.    Pulmonary:      Effort: No respiratory distress.      Breath sounds: Normal breath sounds.   Abdominal:      General: Bowel sounds are normal. There is no distension.      Tenderness: There is no abdominal tenderness.   Musculoskeletal:         General: No tenderness.   Skin:     General: Skin is warm and dry.   Neurological:      Mental Status: He is alert and oriented to person, place, and time.      Cranial Nerves: No cranial nerve deficit.   Psychiatric:         Behavior: Behavior normal.         Assessment:     1. ACC/AHA stage C systolic heart failure (HCC)  Basic Metabolic Panel   2. Left ventricular systolic dysfunction, NYHA class 2  Basic Metabolic Panel   3. Longstanding persistent atrial fibrillation (HCC)  EKG    CL-CARDIOVERSION   4. AICD (automatic cardioverter/defibrillator) present     5. Non-ischemic cardiomyopathy (HCC)     6. Mixed hyperlipidemia     7. Presence of biventricular automatic cardioverter/defibrillator (AICD)     8. Type 2 diabetes mellitus with chronic kidney disease, without long-term current use of insulin, unspecified CKD stage (HCC)     9. Dyspnea on exertion     10. High risk medication use  Basic Metabolic Panel       Medical Decision Making:  Today's Assessment / Status / Plan:   Non-Ischemic Cardiomyopathy:  Chronically illed condition which requires ongoing close monitoring and treatment to improve survival rate along with decreasing risk of clinical decompensation and hospitalization.    Today, based on physical examination findings, patient is euvolemic. No JVD, lungs are clear to auscultation, no pitting edema in bilateral lower extremities, no ascites.     Dry weight is 219 lbs.    Will re-start  Entresto 24/26 mg twice a day. Continue Hydralazine and Isordil at current dose.    Continue Toprol 200 mg po daily.     Diuretic with Furosemide 20 mg 1x daily.     Aldactone antagonist with Spironolactone 25 mg daily.    Patient is feeling very well at this time. Defer advanced HF therapies such as heart transplant at this time.    Persistent Atrial Fibrillation:  Anticoagulation with Eliquis 5 mg 2x daily.  Consider stopping Digoxin in future.  Will attempt cardioversion for persistent atrial fibrillation.    Hypertension:  Optimize control using cardiomyopathy medical regimen as well.    Will continue to closely monitor for side effects of patient's high risk medication(s) including liver, renal function and electrolytes.    I will see patient back in our Heart Failure Clinic with lab tests and studies results in 4 weeks.    I thank you for referring patient to our Heart Failure Clinic today.

## 2021-10-04 ENCOUNTER — HOSPITAL ENCOUNTER (OUTPATIENT)
Facility: MEDICAL CENTER | Age: 51
End: 2021-10-04
Attending: INTERNAL MEDICINE | Admitting: INTERNAL MEDICINE
Payer: OTHER GOVERNMENT

## 2021-10-04 ENCOUNTER — TELEPHONE (OUTPATIENT)
Dept: CARDIOLOGY | Facility: MEDICAL CENTER | Age: 51
End: 2021-10-04

## 2021-10-04 NOTE — TELEPHONE ENCOUNTER
----- Message from Jorje Fish, Med Ass't sent at 9/30/2021 11:09 AM PDT -----  Heidi Aguila Dr. To ordered a Cardioversion for this pt and wants it done next Friday with him. Thank you!

## 2021-10-04 NOTE — TELEPHONE ENCOUNTER
Due to no more openings on 10-8-21 and Dr. Arriaga on vacation, patient has been scheduled on 10-21-21 for a Cardioversion w/anesthesia with Dr. Arriaga. Patient was told to hold insulin,jardiance,spironolactone,metformin and lasix AM day of procedure. Patient to check in at 8:00 for a 10:00 procedure. H&P was done on 9-30-21 by Dr. Arriaga. Pre admit to call messi Cardoso with [facility] notified on 10-4-21 by phone.

## 2021-10-12 ENCOUNTER — HOSPITAL ENCOUNTER (OUTPATIENT)
Dept: LAB | Facility: MEDICAL CENTER | Age: 51
End: 2021-10-12
Attending: NURSE PRACTITIONER
Payer: OTHER GOVERNMENT

## 2021-10-12 DIAGNOSIS — I50.20 ACC/AHA STAGE C SYSTOLIC HEART FAILURE (HCC): ICD-10-CM

## 2021-10-12 DIAGNOSIS — I51.89 LEFT VENTRICULAR SYSTOLIC DYSFUNCTION, NYHA CLASS 2: ICD-10-CM

## 2021-10-12 DIAGNOSIS — Z79.899 HIGH RISK MEDICATION USE: ICD-10-CM

## 2021-10-12 LAB
ANION GAP SERPL CALC-SCNC: 15 MMOL/L (ref 7–16)
BUN SERPL-MCNC: 26 MG/DL (ref 8–22)
CALCIUM SERPL-MCNC: 10.3 MG/DL (ref 8.5–10.5)
CHLORIDE SERPL-SCNC: 95 MMOL/L (ref 96–112)
CO2 SERPL-SCNC: 25 MMOL/L (ref 20–33)
CREAT SERPL-MCNC: 1.32 MG/DL (ref 0.5–1.4)
GLUCOSE SERPL-MCNC: 100 MG/DL (ref 65–99)
POTASSIUM SERPL-SCNC: 4.4 MMOL/L (ref 3.6–5.5)
SODIUM SERPL-SCNC: 135 MMOL/L (ref 135–145)

## 2021-10-12 PROCEDURE — 80048 BASIC METABOLIC PNL TOTAL CA: CPT

## 2021-10-12 PROCEDURE — 36415 COLL VENOUS BLD VENIPUNCTURE: CPT

## 2021-10-13 ENCOUNTER — TELEPHONE (OUTPATIENT)
Dept: CARDIOLOGY | Facility: MEDICAL CENTER | Age: 51
End: 2021-10-13

## 2021-10-13 ENCOUNTER — NON-PROVIDER VISIT (OUTPATIENT)
Dept: CARDIOLOGY | Facility: MEDICAL CENTER | Age: 51
End: 2021-10-13
Payer: OTHER GOVERNMENT

## 2021-10-13 DIAGNOSIS — I51.89 LEFT VENTRICULAR SYSTOLIC DYSFUNCTION, NYHA CLASS 2: ICD-10-CM

## 2021-10-13 DIAGNOSIS — Z95.810 PRESENCE OF BIVENTRICULAR AUTOMATIC CARDIOVERTER/DEFIBRILLATOR (AICD): ICD-10-CM

## 2021-10-13 DIAGNOSIS — I50.22 STAGE C CHRONIC SYSTOLIC CONGESTIVE HEART FAILURE (HCC): ICD-10-CM

## 2021-10-13 PROCEDURE — 93283 PRGRMG EVAL IMPLANTABLE DFB: CPT | Performed by: INTERNAL MEDICINE

## 2021-10-13 NOTE — TELEPHONE ENCOUNTER
FYI-- patient seen in clinic today, device functioning appropriately.  AT/% of the time-- patient is scheduled for CV 10/21, however is s/p av node ablation 6/2018 see Dr. Diaz's op note.

## 2021-10-18 ENCOUNTER — PRE-ADMISSION TESTING (OUTPATIENT)
Dept: ADMISSIONS | Facility: MEDICAL CENTER | Age: 51
End: 2021-10-18
Attending: INTERNAL MEDICINE
Payer: OTHER GOVERNMENT

## 2021-10-18 VITALS — BODY MASS INDEX: 30.89 KG/M2 | HEIGHT: 70 IN | WEIGHT: 215.76 LBS

## 2021-10-18 DIAGNOSIS — Z01.812 PRE-OPERATIVE LABORATORY EXAMINATION: ICD-10-CM

## 2021-10-18 LAB
ANION GAP SERPL CALC-SCNC: 11 MMOL/L (ref 7–16)
BUN SERPL-MCNC: 17 MG/DL (ref 8–22)
CALCIUM SERPL-MCNC: 10.1 MG/DL (ref 8.5–10.5)
CHLORIDE SERPL-SCNC: 98 MMOL/L (ref 96–112)
CO2 SERPL-SCNC: 26 MMOL/L (ref 20–33)
CREAT SERPL-MCNC: 1.43 MG/DL (ref 0.5–1.4)
ERYTHROCYTE [DISTWIDTH] IN BLOOD BY AUTOMATED COUNT: 50 FL (ref 35.9–50)
GLUCOSE SERPL-MCNC: 107 MG/DL (ref 65–99)
HCT VFR BLD AUTO: 50 % (ref 42–52)
HGB BLD-MCNC: 16.5 G/DL (ref 14–18)
INR PPP: 1.38 (ref 0.87–1.13)
MCH RBC QN AUTO: 30.4 PG (ref 27–33)
MCHC RBC AUTO-ENTMCNC: 33 G/DL (ref 33.7–35.3)
MCV RBC AUTO: 92.3 FL (ref 81.4–97.8)
PLATELET # BLD AUTO: 153 K/UL (ref 164–446)
PMV BLD AUTO: 11.5 FL (ref 9–12.9)
POTASSIUM SERPL-SCNC: 4.8 MMOL/L (ref 3.6–5.5)
PROTHROMBIN TIME: 16.5 SEC (ref 12–14.6)
RBC # BLD AUTO: 5.42 M/UL (ref 4.7–6.1)
SARS-COV-2 RNA RESP QL NAA+PROBE: NOTDETECTED
SODIUM SERPL-SCNC: 135 MMOL/L (ref 135–145)
SPECIMEN SOURCE: NORMAL
WBC # BLD AUTO: 6.6 K/UL (ref 4.8–10.8)

## 2021-10-18 PROCEDURE — U0005 INFEC AGEN DETEC AMPLI PROBE: HCPCS

## 2021-10-18 PROCEDURE — 36415 COLL VENOUS BLD VENIPUNCTURE: CPT

## 2021-10-18 PROCEDURE — 80048 BASIC METABOLIC PNL TOTAL CA: CPT

## 2021-10-18 PROCEDURE — 85610 PROTHROMBIN TIME: CPT

## 2021-10-18 PROCEDURE — C9803 HOPD COVID-19 SPEC COLLECT: HCPCS

## 2021-10-18 PROCEDURE — 85027 COMPLETE CBC AUTOMATED: CPT

## 2021-10-18 PROCEDURE — U0003 INFECTIOUS AGENT DETECTION BY NUCLEIC ACID (DNA OR RNA); SEVERE ACUTE RESPIRATORY SYNDROME CORONAVIRUS 2 (SARS-COV-2) (CORONAVIRUS DISEASE [COVID-19]), AMPLIFIED PROBE TECHNIQUE, MAKING USE OF HIGH THROUGHPUT TECHNOLOGIES AS DESCRIBED BY CMS-2020-01-R: HCPCS

## 2021-10-18 ASSESSMENT — FIBROSIS 4 INDEX: FIB4 SCORE: 2.31

## 2021-10-20 NOTE — TELEPHONE ENCOUNTER
Due to insurance not auth thru patients [facility], patient has been rescheduled to 10-26-21 for Cardioversion w/anesthesia with Dr. Arriaga. Patient to check in at 12:00 for a 2:00 procedure.Walker with [facility] notified of the new date and time by phone. Pre admit to call patient.

## 2021-10-21 ENCOUNTER — APPOINTMENT (OUTPATIENT)
Dept: CARDIOLOGY | Facility: MEDICAL CENTER | Age: 51
End: 2021-10-21
Attending: INTERNAL MEDICINE
Payer: OTHER GOVERNMENT

## 2021-10-26 ENCOUNTER — APPOINTMENT (OUTPATIENT)
Dept: CARDIOLOGY | Facility: MEDICAL CENTER | Age: 51
End: 2021-10-26
Attending: INTERNAL MEDICINE
Payer: OTHER GOVERNMENT

## 2021-10-26 RX ORDER — SODIUM CHLORIDE, SODIUM LACTATE, POTASSIUM CHLORIDE, CALCIUM CHLORIDE 600; 310; 30; 20 MG/100ML; MG/100ML; MG/100ML; MG/100ML
INJECTION, SOLUTION INTRAVENOUS CONTINUOUS
Status: CANCELLED | OUTPATIENT
Start: 2021-10-26 | End: 2021-10-26

## 2021-10-27 ENCOUNTER — TELEPHONE (OUTPATIENT)
Dept: CARDIOLOGY | Facility: MEDICAL CENTER | Age: 51
End: 2021-10-27

## 2021-10-27 DIAGNOSIS — I48.11 LONGSTANDING PERSISTENT ATRIAL FIBRILLATION (HCC): ICD-10-CM

## 2021-10-27 NOTE — TELEPHONE ENCOUNTER
Ayla,    Please call this patient - I recvd a voice message from this patient after the computers went down yesterday about the cardioversion that was scheduled for yesterday.    Thank You,  Malissa

## 2021-10-27 NOTE — TELEPHONE ENCOUNTER
Patient has been rescheduled to 11-4-21 for CV w/anesthesia with Dr. Arriaga. Patient was told to hold insulin,jardiance,spironolactone,metformin and lasix AM day of procedure. Patient to check in at 9:00 for an 11:00 procedure. Updated H&P to be done on admit by NP.Pre admit to call patient.

## 2021-10-27 NOTE — TELEPHONE ENCOUNTER
EPIC order needed  Received: Today  CANDIDA Culver need a new Epic order for patients Cardioversion.   _______________________________________________________________________    New cardioversion order placed.

## 2021-11-03 ENCOUNTER — HOSPITAL ENCOUNTER (OUTPATIENT)
Dept: LAB | Facility: MEDICAL CENTER | Age: 51
End: 2021-11-03
Attending: INTERNAL MEDICINE
Payer: OTHER GOVERNMENT

## 2021-11-03 LAB
ALBUMIN SERPL BCP-MCNC: 4.8 G/DL (ref 3.2–4.9)
ALBUMIN/GLOB SERPL: 1.6 G/DL
ALP SERPL-CCNC: 105 U/L (ref 30–99)
ALT SERPL-CCNC: 13 U/L (ref 2–50)
ANION GAP SERPL CALC-SCNC: 9 MMOL/L (ref 7–16)
APPEARANCE UR: CLEAR
AST SERPL-CCNC: 20 U/L (ref 12–45)
BACTERIA #/AREA URNS HPF: NEGATIVE /HPF
BASOPHILS # BLD AUTO: 0.5 % (ref 0–1.8)
BASOPHILS # BLD: 0.03 K/UL (ref 0–0.12)
BILIRUB SERPL-MCNC: 0.9 MG/DL (ref 0.1–1.5)
BILIRUB UR QL STRIP.AUTO: NEGATIVE
BUN SERPL-MCNC: 20 MG/DL (ref 8–22)
CALCIUM SERPL-MCNC: 9.9 MG/DL (ref 8.5–10.5)
CHLORIDE SERPL-SCNC: 99 MMOL/L (ref 96–112)
CHOLEST SERPL-MCNC: 180 MG/DL (ref 100–199)
CO2 SERPL-SCNC: 26 MMOL/L (ref 20–33)
COLOR UR: YELLOW
CREAT SERPL-MCNC: 1.26 MG/DL (ref 0.5–1.4)
CREAT UR-MCNC: 204.84 MG/DL
EOSINOPHIL # BLD AUTO: 0.13 K/UL (ref 0–0.51)
EOSINOPHIL NFR BLD: 2.2 % (ref 0–6.9)
EPI CELLS #/AREA URNS HPF: NEGATIVE /HPF
ERYTHROCYTE [DISTWIDTH] IN BLOOD BY AUTOMATED COUNT: 48 FL (ref 35.9–50)
EST. AVERAGE GLUCOSE BLD GHB EST-MCNC: 140 MG/DL
FASTING STATUS PATIENT QL REPORTED: NORMAL
GLOBULIN SER CALC-MCNC: 3 G/DL (ref 1.9–3.5)
GLUCOSE SERPL-MCNC: 130 MG/DL (ref 65–99)
GLUCOSE UR STRIP.AUTO-MCNC: >=1000 MG/DL
HBA1C MFR BLD: 6.5 % (ref 4–5.6)
HCT VFR BLD AUTO: 45.8 % (ref 42–52)
HDLC SERPL-MCNC: 57 MG/DL
HGB BLD-MCNC: 15.1 G/DL (ref 14–18)
HYALINE CASTS #/AREA URNS LPF: ABNORMAL /LPF
IMM GRANULOCYTES # BLD AUTO: 0.01 K/UL (ref 0–0.11)
IMM GRANULOCYTES NFR BLD AUTO: 0.2 % (ref 0–0.9)
KETONES UR STRIP.AUTO-MCNC: ABNORMAL MG/DL
LDLC SERPL CALC-MCNC: 112 MG/DL
LEUKOCYTE ESTERASE UR QL STRIP.AUTO: NEGATIVE
LYMPHOCYTES # BLD AUTO: 2.31 K/UL (ref 1–4.8)
LYMPHOCYTES NFR BLD: 38.8 % (ref 22–41)
MCH RBC QN AUTO: 30.9 PG (ref 27–33)
MCHC RBC AUTO-ENTMCNC: 33 G/DL (ref 33.7–35.3)
MCV RBC AUTO: 93.7 FL (ref 81.4–97.8)
MICRO URNS: ABNORMAL
MICROALBUMIN UR-MCNC: 10.9 MG/DL
MICROALBUMIN/CREAT UR: 53 MG/G (ref 0–30)
MONOCYTES # BLD AUTO: 0.58 K/UL (ref 0–0.85)
MONOCYTES NFR BLD AUTO: 9.7 % (ref 0–13.4)
NEUTROPHILS # BLD AUTO: 2.89 K/UL (ref 1.82–7.42)
NEUTROPHILS NFR BLD: 48.6 % (ref 44–72)
NITRITE UR QL STRIP.AUTO: NEGATIVE
NRBC # BLD AUTO: 0 K/UL
NRBC BLD-RTO: 0 /100 WBC
PH UR STRIP.AUTO: 5.5 [PH] (ref 5–8)
PLATELET # BLD AUTO: 162 K/UL (ref 164–446)
PMV BLD AUTO: 11.5 FL (ref 9–12.9)
POTASSIUM SERPL-SCNC: 4.4 MMOL/L (ref 3.6–5.5)
PROT SERPL-MCNC: 7.8 G/DL (ref 6–8.2)
PROT UR QL STRIP: 30 MG/DL
PSA SERPL-MCNC: 0.41 NG/ML (ref 0–4)
RBC # BLD AUTO: 4.89 M/UL (ref 4.7–6.1)
RBC # URNS HPF: ABNORMAL /HPF
RBC UR QL AUTO: NEGATIVE
SODIUM SERPL-SCNC: 134 MMOL/L (ref 135–145)
SP GR UR STRIP.AUTO: 1.03
TRIGL SERPL-MCNC: 55 MG/DL (ref 0–149)
UROBILINOGEN UR STRIP.AUTO-MCNC: 1 MG/DL
WBC # BLD AUTO: 6 K/UL (ref 4.8–10.8)
WBC #/AREA URNS HPF: ABNORMAL /HPF

## 2021-11-03 PROCEDURE — 84153 ASSAY OF PSA TOTAL: CPT

## 2021-11-03 PROCEDURE — 82570 ASSAY OF URINE CREATININE: CPT

## 2021-11-03 PROCEDURE — 83036 HEMOGLOBIN GLYCOSYLATED A1C: CPT

## 2021-11-03 PROCEDURE — 80061 LIPID PANEL: CPT

## 2021-11-03 PROCEDURE — 81001 URINALYSIS AUTO W/SCOPE: CPT

## 2021-11-03 PROCEDURE — 85025 COMPLETE CBC W/AUTO DIFF WBC: CPT

## 2021-11-03 PROCEDURE — 36415 COLL VENOUS BLD VENIPUNCTURE: CPT

## 2021-11-03 PROCEDURE — 82043 UR ALBUMIN QUANTITATIVE: CPT

## 2021-11-03 PROCEDURE — 80053 COMPREHEN METABOLIC PANEL: CPT

## 2021-11-04 ENCOUNTER — HOSPITAL ENCOUNTER (OUTPATIENT)
Facility: MEDICAL CENTER | Age: 51
End: 2021-11-04
Attending: INTERNAL MEDICINE | Admitting: INTERNAL MEDICINE
Payer: OTHER GOVERNMENT

## 2021-11-04 ENCOUNTER — APPOINTMENT (OUTPATIENT)
Dept: CARDIOLOGY | Facility: MEDICAL CENTER | Age: 51
End: 2021-11-04
Attending: INTERNAL MEDICINE
Payer: OTHER GOVERNMENT

## 2021-11-04 VITALS
BODY MASS INDEX: 30.52 KG/M2 | RESPIRATION RATE: 18 BRPM | OXYGEN SATURATION: 96 % | SYSTOLIC BLOOD PRESSURE: 128 MMHG | HEIGHT: 70 IN | TEMPERATURE: 97.2 F | HEART RATE: 82 BPM | WEIGHT: 213.19 LBS | DIASTOLIC BLOOD PRESSURE: 81 MMHG

## 2021-11-04 DIAGNOSIS — I48.11 LONGSTANDING PERSISTENT ATRIAL FIBRILLATION (HCC): ICD-10-CM

## 2021-11-04 LAB
EKG IMPRESSION: NORMAL
EXTERNAL QUALITY CONTROL: NORMAL
GLUCOSE BLD-MCNC: 62 MG/DL (ref 65–99)
GLUCOSE BLD-MCNC: 85 MG/DL (ref 65–99)
INR PPP: 1.23 (ref 0.87–1.13)
PROTHROMBIN TIME: 15.2 SEC (ref 12–14.6)
SARS-COV+SARS-COV-2 AG RESP QL IA.RAPID: NORMAL

## 2021-11-04 PROCEDURE — 160002 HCHG RECOVERY MINUTES (STAT)

## 2021-11-04 PROCEDURE — 92960 CARDIOVERSION ELECTRIC EXT: CPT

## 2021-11-04 PROCEDURE — 87426 SARSCOV CORONAVIRUS AG IA: CPT | Performed by: INTERNAL MEDICINE

## 2021-11-04 PROCEDURE — 92960 CARDIOVERSION ELECTRIC EXT: CPT | Performed by: INTERNAL MEDICINE

## 2021-11-04 PROCEDURE — 93005 ELECTROCARDIOGRAM TRACING: CPT | Performed by: INTERNAL MEDICINE

## 2021-11-04 PROCEDURE — 82962 GLUCOSE BLOOD TEST: CPT

## 2021-11-04 PROCEDURE — 85610 PROTHROMBIN TIME: CPT

## 2021-11-04 PROCEDURE — 93010 ELECTROCARDIOGRAM REPORT: CPT | Performed by: INTERNAL MEDICINE

## 2021-11-04 RX ORDER — SODIUM CHLORIDE, SODIUM LACTATE, POTASSIUM CHLORIDE, CALCIUM CHLORIDE 600; 310; 30; 20 MG/100ML; MG/100ML; MG/100ML; MG/100ML
INJECTION, SOLUTION INTRAVENOUS CONTINUOUS
Status: DISCONTINUED | OUTPATIENT
Start: 2021-11-04 | End: 2021-11-04 | Stop reason: HOSPADM

## 2021-11-04 ASSESSMENT — PAIN DESCRIPTION - PAIN TYPE
TYPE: ACUTE PAIN
TYPE: ACUTE PAIN

## 2021-11-04 ASSESSMENT — FIBROSIS 4 INDEX: FIB4 SCORE: 1.81

## 2021-11-04 NOTE — PROCEDURES
Electrical Cardioversion    Date/Time: 11/4/2021 12:16 PM  Performed by: Cinthya Arriaga M.D.  Authorized by: Cinthya Arriaga M.D.     Consent:     Consent obtained:  Written and verbal    Consent given by:  Patient    Risks discussed:  Death, cutaneous burn, induced arrhythmia and pain    Alternatives discussed:  No treatment, rate-control medication, alternative treatment and anti-coagulation medication  Sedation:     Patient sedated: Yes      Sedation type:  Per anesthesia    Vital signs: Vital signs monitored during sedation    Pre-procedure details:     Cardioversion basis:  Elective    Rhythm:  Atrial fibrillation    Electrode placement:  Anterior-posterior    Anticoagulation status:  Apixaban  Attempt one:     Cardioversion mode:  Synchronous    Shock (Joules):  200    Shock outcome:  Conversion to normal sinus rhythm  Post-procedure details:     Patient status:  Awake    Patient tolerance of procedure:  Tolerated well, no immediate complications  Comments:      Out of atrial fibrillation confirmed with device interrogation.    Cinthya Arriaga M.D.

## 2021-11-04 NOTE — PROGRESS NOTES
Pt arrived to phase II, pain controlled, VSS, pt denies nausea, milad po intake. Post cardioversion. Call light in reach. Will continue to monitor.

## 2021-11-04 NOTE — DISCHARGE INSTRUCTIONS
ACTIVITY: Rest and take it easy for the first 24 hours.  A responsible adult is recommended to remain with you during that time.  It is normal to feel sleepy.  We encourage you to not do anything that requires balance, judgment or coordination.    MILD FLU-LIKE SYMPTOMS ARE NORMAL. YOU MAY EXPERIENCE GENERALIZED MUSCLE ACHES, THROAT IRRITATION, HEADACHE AND/OR SOME NAUSEA.    FOR 24 HOURS DO NOT:  Drive, operate machinery or run household appliances.  Drink beer or alcoholic beverages.   Make important decisions or sign legal documents.    SPECIAL INSTRUCTIONS:       Electrical Cardioversion, Care After  This sheet gives you information about how to care for yourself after your procedure. Your health care provider may also give you more specific instructions. If you have problems or questions, contact your health care provider.  What can I expect after the procedure?  After the procedure, it is common to have:  · Some redness on the skin where the shocks were given.  Follow these instructions at home:    · Do not drive for 24 hours if you were given a medicine to help you relax (sedative).  · Take over-the-counter and prescription medicines only as told by your health care provider.  · Ask your health care provider how to check your pulse. Check it often.  · Rest for 48 hours after the procedure or as told by your health care provider.  · Avoid or limit your caffeine use as told by your health care provider.  Contact a health care provider if:  · You feel like your heart is beating too quickly or your pulse is not regular.  · You have a serious muscle cramp that does not go away.  Get help right away if:  · You have discomfort in your chest.  · You are dizzy or you feel faint.  · You have trouble breathing or you are short of breath.  · Your speech is slurred.  · You have trouble moving an arm or leg on one side of your body.  · Your fingers or toes turn cold or blue.  This information is not intended to replace  advice given to you by your health care provider. Make sure you discuss any questions you have with your health care provider.  Document Released: 10/08/2014 Document Revised: 11/30/2018 Document Reviewed: 06/23/2017  [email or web address] Patient Education © 2020 [email or web address]      DIET: To avoid nausea, slowly advance diet as tolerated, avoiding spicy or greasy foods for the first day.  Add more substantial food to your diet according to your physician's instructions.  Babies can be fed formula or breast milk as soon as they are hungry.  INCREASE FLUIDS AND FIBER TO AVOID CONSTIPATION.    SURGICAL DRESSING/BATHING: NA    FOLLOW-UP APPOINTMENT:  A follow-up appointment should be arranged with your doctor in 1-2 weeks; call to schedule.    You should CALL YOUR PHYSICIAN if you develop:  Fever greater than 101 degrees F.  Pain not relieved by medication, or persistent nausea or vomiting.  Excessive bleeding (blood soaking through dressing) or unexpected drainage from the wound.  Extreme redness or swelling around the incision site, drainage of pus or foul smelling drainage.  Inability to urinate or empty your bladder within 8 hours.  Problems with breathing or chest pain.    You should call 911 if you develop problems with breathing or chest pain.  If you are unable to contact your doctor or surgical center, you should go to the nearest emergency room or urgent care center.  Physician's telephone #: Dr. Arriaga: [phone]    If any questions arise, call your doctor.  If your doctor is not available, please feel free to call the Surgical Center at [phone]. The Contact Center is open Monday through Friday 7AM to 5PM and may speak to a nurse at [phone], or toll free at [phone].     A registered nurse may call you a few days after your surgery to see how you are doing after your procedure.    MEDICATIONS: Resume taking daily medication.  Take prescribed pain medication with food.  If no medication is prescribed, you  may take non-aspirin pain medication if needed.  PAIN MEDICATION CAN BE VERY CONSTIPATING.  Take a stool softener or laxative such as senokot, pericolace, or milk of magnesia if needed.    Prescription given for NA.  Last pain medication given at NA.    If your physician has prescribed pain medication that includes Acetaminophen (Tylenol), do not take additional Acetaminophen (Tylenol) while taking the prescribed medication.    Depression / Suicide Risk    As you are discharged from this [facility] facility, it is important to learn how to keep safe from harming yourself.    Recognize the warning signs:  · Abrupt changes in personality, positive or negative- including increase in energy   · Giving away possessions  · Change in eating patterns- significant weight changes-  positive or negative  · Change in sleeping patterns- unable to sleep or sleeping all the time   · Unwillingness or inability to communicate  · Depression  · Unusual sadness, discouragement and loneliness  · Talk of wanting to die  · Neglect of personal appearance   · Rebelliousness- reckless behavior  · Withdrawal from people/activities they love  · Confusion- inability to concentrate     If you or a loved one observes any of these behaviors or has concerns about self-harm, here's what you can do:  · Talk about it- your feelings and reasons for harming yourself  · Remove any means that you might use to hurt yourself (examples: pills, rope, extension cords, firearm)  · Get professional help from the community (Mental Health, Substance Abuse, psychological counseling)  · Do not be alone:Call your Safe Contact- someone whom you trust who will be there for you.  · Call your local CRISIS HOTLINE 740-5281 or 029-921-1382  · Call your local Children's Mobile Crisis Response Team [place] (686) 876-6202 or www.[employer]  · Call the toll free National Suicide Prevention Hotlines   · National Suicide Prevention Lifeline 599-404-EYOA  (2952)  · [facility] 800-SUICIDE (827-7907)

## 2021-11-04 NOTE — H&P
Lai Dailey is a 51 y.o. male who presents today cardiac care management in a heart failure program due to prior history of nonischemic cardiomyopathy, mitral valve regurgitation and persistent atrial fibrillation.  Patient does have a left ventricular systolic function of 20%. S/P CRT-D.     He did have a negative coronary angiogram in 2014 per report.     He is here for elective cardioversion.    Past Medical History        Past Medical History:   Diagnosis Date   • CHF (congestive heart failure) (HCC)     • Diabetes (HCC)     • Hyperlipidemia     • Hypertension           Past Surgical History         Past Surgical History:   Procedure Laterality Date   • AICD IMPLANT   2010         Family History         Family History   Problem Relation Age of Onset   • Colon Cancer Mother     • Hypertension Sister     • Stroke Brother     • Heart Disease Neg Hx           Social History               Socioeconomic History   • Marital status: Single       Spouse name: Not on file   • Number of children: Not on file   • Years of education: Not on file   • Highest education level: Not on file   Occupational History   • Not on file   Tobacco Use   • Smoking status: Never Smoker   • Smokeless tobacco: Never Used   Vaping Use   • Vaping Use: Never used   Substance and Sexual Activity   • Alcohol use: No       Comment: 2 times a week-beer, NO ALCOHOL AT THIS TIME (last drink 3/1/2020)   • Drug use: No       Comment: Marijuana use as youth   • Sexual activity: Not on file   Other Topics Concern   • Not on file   Social History Narrative   • Not on file      Social Determinants of Health          Financial Resource Strain:    • Difficulty of Paying Living Expenses:    Food Insecurity:    • Worried About Running Out of Food in the Last Year:    • Ran Out of Food in the Last Year:    Transportation Needs:    • Lack of Transportation (Medical):    • Lack of Transportation (Non-Medical):    Physical Activity:    • Days of Exercise per Week:     • Minutes of Exercise per Session:    Stress:    • Feeling of Stress :    Social Connections:    • Frequency of Communication with Friends and Family:    • Frequency of Social Gatherings with Friends and Family:    • Attends [employer] Services:    • Active Member of Clubs or Organizations:    • Attends Club or Organization Meetings:    • Marital Status:    Intimate Partner Violence:    • Fear of Current or Ex-Partner:    • Emotionally Abused:    • Physically Abused:    • Sexually Abused:          No Known Allergies  Encounter Medications          Outpatient Encounter Medications as of 9/30/2021   Medication Sig Dispense Refill   • LANTUS SOLOSTAR 100 UNIT/ML Solution Pen-injector injection         • atorvastatin (LIPITOR) 40 MG Tab Take 1 Tablet by mouth every day. 90 Tablet 3   • sacubitril-valsartan (ENTRESTO) 24-26 MG Tab tablet Take 1 Tablet by mouth 2 times a day. 60 Tablet 3   • Empagliflozin (JARDIANCE) 10 MG Tab Take 10 mg by mouth every day. 30 Tablet 3   • lisinopril (PRINIVIL) 10 MG Tab Take 1 tablet by mouth every day. 100 tablet 2   • multivitamin (THERAGRAN) Tab Take 1 tablet by mouth every day.       • spironolactone (ALDACTONE) 25 MG Tab Take 1 tablet by mouth every day. 90 tablet 3   • metoprolol (TOPROL-XL) 200 MG XL tablet Take 1 tablet by mouth every day. 90 tablet 3   • isosorbide dinitrate (ISORDIL) 10 MG Tab Take 1 tablet by mouth 3 times a day. 270 tablet 3   • hydrALAZINE (APRESOLINE) 25 MG Tab Take 1 tablet by mouth 3 times a day. 270 tablet 3   • furosemide (LASIX) 20 MG Tab Take 1 tablet by mouth every day. 90 tablet 3   • digoxin (LANOXIN) 125 MCG Tab Take 1 tablet by mouth every morning. 90 tablet 3   • apixaban (ELIQUIS) 5mg Tab Take 1 tablet by mouth 2 Times a Day. 180 tablet 3   • insulin glargine (LANTUS) 100 UNIT/ML Solution Inject 10 Units as instructed every evening. 10 mL 0   • metformin (GLUCOPHAGE) 500 MG TABS Take 1 Tab by mouth 2 times a day, with meals. 60 Tab 3   •  "[DISCONTINUED] Empagliflozin (JARDIANCE) 10 MG Tab Take 10 mg by mouth every day. 30 Tablet 3   • [DISCONTINUED] sacubitril-valsartan (ENTRESTO) 24-26 MG Tab tablet Take 1 Tablet by mouth 2 times a day. 60 Tablet 3   • [DISCONTINUED] Empagliflozin (JARDIANCE) 10 MG Tab Take 10 mg by mouth every day. (Patient not taking: Reported on 9/30/2021) 30 tablet 2   • [DISCONTINUED] atorvastatin (LIPITOR) 10 MG Tab Take 1 tablet by mouth every evening. 30 tablet 3      No facility-administered encounter medications on file as of 9/30/2021.         Review of Systems   Constitutional: Negative for diaphoresis and fever.   HENT: Negative for nosebleeds.    Eyes: Negative for blurred vision and double vision.   Respiratory: Positive for shortness of breath. Negative for cough.    Cardiovascular: Negative for chest pain and palpitations.   Gastrointestinal: Negative for abdominal pain.   Genitourinary: Negative for dysuria and frequency.   Musculoskeletal: Negative for falls and myalgias.   Skin: Negative for rash.   Neurological: Negative for dizziness, sensory change and headaches.   Endo/Heme/Allergies: Does not bruise/bleed easily.   Psychiatric/Behavioral: Negative for depression and memory loss.          Objective:   /60 (BP Location: Left arm, Patient Position: Sitting, BP Cuff Size: Adult)   Pulse 80   Resp 16   Ht 1.753 m (5' 9\")   Wt 98.7 kg (217 lb 9.6 oz)   SpO2 95%   BMI 32.13 kg/m²      Physical Exam  Vitals and nursing note reviewed.   Constitutional:       General: He is not in acute distress.     Appearance: He is not diaphoretic.   HENT:      Head: Normocephalic and atraumatic.      Right Ear: External ear normal.      Left Ear: External ear normal.   Eyes:      General:         Right eye: No discharge.         Left eye: No discharge.   Neck:      Thyroid: No thyromegaly.      Vascular: No JVD.   Cardiovascular:      Rate and Rhythm: Normal rate and regular rhythm.      Heart sounds: Normal heart " sounds. No murmur heard.   No friction rub. No gallop.    Pulmonary:      Effort: No respiratory distress.      Breath sounds: Normal breath sounds.   Abdominal:      General: Bowel sounds are normal. There is no distension.      Tenderness: There is no abdominal tenderness.   Musculoskeletal:         General: No tenderness.   Skin:     General: Skin is warm and dry.   Neurological:      Mental Status: He is alert and oriented to person, place, and time.      Cranial Nerves: No cranial nerve deficit.   Psychiatric:         Behavior: Behavior normal.            Assessment:      1. ACC/AHA stage C systolic heart failure (HCC)  Basic Metabolic Panel   2. Left ventricular systolic dysfunction, NYHA class 2  Basic Metabolic Panel   3. Longstanding persistent atrial fibrillation (HCC)  EKG     CL-CARDIOVERSION   4. AICD (automatic cardioverter/defibrillator) present      5. Non-ischemic cardiomyopathy (HCC)      6. Mixed hyperlipidemia      7. Presence of biventricular automatic cardioverter/defibrillator (AICD)      8. Type 2 diabetes mellitus with chronic kidney disease, without long-term current use of insulin, unspecified CKD stage (HCC)      9. Dyspnea on exertion      10. High risk medication use  Basic Metabolic Panel         Medical Decision Making:  Today's Assessment / Status / Plan:   Non-Ischemic Cardiomyopathy:  Chronically illed condition which requires ongoing close monitoring and treatment to improve survival rate along with decreasing risk of clinical decompensation and hospitalization.     Today, based on physical examination findings, patient is euvolemic. No JVD, lungs are clear to auscultation, no pitting edema in bilateral lower extremities, no ascites.     Dry weight is 219 lbs.     Will continue Entresto 24/26 mg twice a day. Continue Hydralazine and Isordil at current dose.     Continue Toprol 200 mg po daily.     Diuretic with Furosemide 20 mg 1x daily.     Aldactone antagonist with  Spironolactone 25 mg daily.     Patient is feeling very well at this time. Defer advanced HF therapies such as heart transplant at this time.     Persistent Atrial Fibrillation:  Anticoagulation with Eliquis 5 mg 2x daily.  Consider stopping Digoxin in future.  Will attempt cardioversion for persistent atrial fibrillation. Risks and benefits were explained to patient and he has agreed to proceed.     Hypertension:  Optimize control using cardiomyopathy medical regimen as well.

## 2021-11-04 NOTE — OR NURSING
Report to Cindy RAJPUT    Cardioversion. Was AF ow pacing 100% rate of 80. Denies pain. Drank 100 cc orange juice. Denies nausea.    AXOX4.. VSS 92-96% room air.     Texted Dr To regarding ADT order. No reply yet.

## 2021-11-04 NOTE — PROGRESS NOTES
Pt VSS, pain controlled, tolerating po intake. Pt given discharge education/instructions, all questions answered. IV discontinued, site wnl. No surgical incisions. Pt discharged home in stable condition with all belongings.

## 2022-09-08 ENCOUNTER — APPOINTMENT (OUTPATIENT)
Dept: RADIOLOGY | Facility: MEDICAL CENTER | Age: 52
End: 2022-09-08
Attending: EMERGENCY MEDICINE
Payer: OTHER GOVERNMENT

## 2022-09-08 ENCOUNTER — HOSPITAL ENCOUNTER (EMERGENCY)
Facility: MEDICAL CENTER | Age: 52
End: 2022-09-09
Attending: EMERGENCY MEDICINE
Payer: OTHER GOVERNMENT

## 2022-09-08 DIAGNOSIS — I50.9 ACUTE ON CHRONIC CONGESTIVE HEART FAILURE, UNSPECIFIED HEART FAILURE TYPE (HCC): ICD-10-CM

## 2022-09-08 LAB
ALBUMIN SERPL BCP-MCNC: 3.7 G/DL (ref 3.2–4.9)
ALBUMIN/GLOB SERPL: 1.1 G/DL
ALP SERPL-CCNC: 153 U/L (ref 30–99)
ALT SERPL-CCNC: 26 U/L (ref 2–50)
ANION GAP SERPL CALC-SCNC: 12 MMOL/L (ref 7–16)
AST SERPL-CCNC: 39 U/L (ref 12–45)
BASOPHILS # BLD AUTO: 1 % (ref 0–1.8)
BASOPHILS # BLD: 0.05 K/UL (ref 0–0.12)
BILIRUB SERPL-MCNC: 3.1 MG/DL (ref 0.1–1.5)
BUN SERPL-MCNC: 21 MG/DL (ref 8–22)
CALCIUM SERPL-MCNC: 9.1 MG/DL (ref 8.5–10.5)
CHLORIDE SERPL-SCNC: 98 MMOL/L (ref 96–112)
CO2 SERPL-SCNC: 21 MMOL/L (ref 20–33)
CREAT SERPL-MCNC: 0.99 MG/DL (ref 0.5–1.4)
D DIMER PPP IA.FEU-MCNC: 1.78 UG/ML (FEU) (ref 0–0.5)
EKG IMPRESSION: NORMAL
EOSINOPHIL # BLD AUTO: 0.09 K/UL (ref 0–0.51)
EOSINOPHIL NFR BLD: 1.8 % (ref 0–6.9)
ERYTHROCYTE [DISTWIDTH] IN BLOOD BY AUTOMATED COUNT: 61 FL (ref 35.9–50)
GFR SERPLBLD CREATININE-BSD FMLA CKD-EPI: 90 ML/MIN/1.73 M 2
GLOBULIN SER CALC-MCNC: 3.5 G/DL (ref 1.9–3.5)
GLUCOSE SERPL-MCNC: 112 MG/DL (ref 65–99)
HCT VFR BLD AUTO: 45.3 % (ref 42–52)
HGB BLD-MCNC: 14.7 G/DL (ref 14–18)
IMM GRANULOCYTES # BLD AUTO: 0.01 K/UL (ref 0–0.11)
IMM GRANULOCYTES NFR BLD AUTO: 0.2 % (ref 0–0.9)
LYMPHOCYTES # BLD AUTO: 1.83 K/UL (ref 1–4.8)
LYMPHOCYTES NFR BLD: 36 % (ref 22–41)
MCH RBC QN AUTO: 30 PG (ref 27–33)
MCHC RBC AUTO-ENTMCNC: 32.5 G/DL (ref 33.7–35.3)
MCV RBC AUTO: 92.4 FL (ref 81.4–97.8)
MONOCYTES # BLD AUTO: 0.7 K/UL (ref 0–0.85)
MONOCYTES NFR BLD AUTO: 13.8 % (ref 0–13.4)
NEUTROPHILS # BLD AUTO: 2.4 K/UL (ref 1.82–7.42)
NEUTROPHILS NFR BLD: 47.2 % (ref 44–72)
NRBC # BLD AUTO: 0 K/UL
NRBC BLD-RTO: 0 /100 WBC
NT-PROBNP SERPL IA-MCNC: 1764 PG/ML (ref 0–125)
PLATELET # BLD AUTO: 182 K/UL (ref 164–446)
PMV BLD AUTO: 10.4 FL (ref 9–12.9)
POTASSIUM SERPL-SCNC: 4.4 MMOL/L (ref 3.6–5.5)
PROT SERPL-MCNC: 7.2 G/DL (ref 6–8.2)
RBC # BLD AUTO: 4.9 M/UL (ref 4.7–6.1)
SODIUM SERPL-SCNC: 131 MMOL/L (ref 135–145)
TROPONIN T SERPL-MCNC: 36 NG/L (ref 6–19)
WBC # BLD AUTO: 5.1 K/UL (ref 4.8–10.8)

## 2022-09-08 PROCEDURE — 71275 CT ANGIOGRAPHY CHEST: CPT

## 2022-09-08 PROCEDURE — 83880 ASSAY OF NATRIURETIC PEPTIDE: CPT

## 2022-09-08 PROCEDURE — 93005 ELECTROCARDIOGRAM TRACING: CPT

## 2022-09-08 PROCEDURE — 36415 COLL VENOUS BLD VENIPUNCTURE: CPT

## 2022-09-08 PROCEDURE — 700111 HCHG RX REV CODE 636 W/ 250 OVERRIDE (IP): Performed by: EMERGENCY MEDICINE

## 2022-09-08 PROCEDURE — 99285 EMERGENCY DEPT VISIT HI MDM: CPT

## 2022-09-08 PROCEDURE — 96374 THER/PROPH/DIAG INJ IV PUSH: CPT | Mod: XU

## 2022-09-08 PROCEDURE — 80053 COMPREHEN METABOLIC PANEL: CPT

## 2022-09-08 PROCEDURE — 84484 ASSAY OF TROPONIN QUANT: CPT

## 2022-09-08 PROCEDURE — 93005 ELECTROCARDIOGRAM TRACING: CPT | Performed by: EMERGENCY MEDICINE

## 2022-09-08 PROCEDURE — 85025 COMPLETE CBC W/AUTO DIFF WBC: CPT

## 2022-09-08 PROCEDURE — 71045 X-RAY EXAM CHEST 1 VIEW: CPT

## 2022-09-08 PROCEDURE — 85379 FIBRIN DEGRADATION QUANT: CPT

## 2022-09-08 RX ORDER — FUROSEMIDE 10 MG/ML
40 INJECTION INTRAMUSCULAR; INTRAVENOUS ONCE
Status: COMPLETED | OUTPATIENT
Start: 2022-09-08 | End: 2022-09-08

## 2022-09-08 RX ADMIN — FUROSEMIDE 40 MG: 10 INJECTION, SOLUTION INTRAMUSCULAR; INTRAVENOUS at 22:43

## 2022-09-08 ASSESSMENT — FIBROSIS 4 INDEX: FIB4 SCORE: 1.849000654084097073

## 2022-09-09 ENCOUNTER — APPOINTMENT (OUTPATIENT)
Dept: RADIOLOGY | Facility: MEDICAL CENTER | Age: 52
End: 2022-09-09
Attending: STUDENT IN AN ORGANIZED HEALTH CARE EDUCATION/TRAINING PROGRAM
Payer: OTHER GOVERNMENT

## 2022-09-09 ENCOUNTER — HOSPITAL ENCOUNTER (INPATIENT)
Facility: MEDICAL CENTER | Age: 52
LOS: 1 days | DRG: 291 | End: 2022-09-10
Attending: HOSPITALIST | Admitting: STUDENT IN AN ORGANIZED HEALTH CARE EDUCATION/TRAINING PROGRAM
Payer: OTHER GOVERNMENT

## 2022-09-09 ENCOUNTER — APPOINTMENT (OUTPATIENT)
Dept: CARDIOLOGY | Facility: MEDICAL CENTER | Age: 52
End: 2022-09-09
Attending: STUDENT IN AN ORGANIZED HEALTH CARE EDUCATION/TRAINING PROGRAM
Payer: OTHER GOVERNMENT

## 2022-09-09 VITALS
HEIGHT: 69 IN | WEIGHT: 222.44 LBS | TEMPERATURE: 98 F | DIASTOLIC BLOOD PRESSURE: 80 MMHG | HEART RATE: 80 BPM | RESPIRATION RATE: 20 BRPM | BODY MASS INDEX: 32.95 KG/M2 | OXYGEN SATURATION: 96 % | SYSTOLIC BLOOD PRESSURE: 142 MMHG

## 2022-09-09 DIAGNOSIS — I42.8 NON-ISCHEMIC CARDIOMYOPATHY (HCC): ICD-10-CM

## 2022-09-09 DIAGNOSIS — Z79.899 HIGH RISK MEDICATION USE: ICD-10-CM

## 2022-09-09 DIAGNOSIS — I50.22 STAGE C CHRONIC SYSTOLIC CONGESTIVE HEART FAILURE (HCC): ICD-10-CM

## 2022-09-09 DIAGNOSIS — I50.9 HEART FAILURE, NYHA CLASS 2 (HCC): ICD-10-CM

## 2022-09-09 PROBLEM — I50.43 ACUTE ON CHRONIC COMBINED SYSTOLIC AND DIASTOLIC CHF (CONGESTIVE HEART FAILURE) (HCC): Status: ACTIVE | Noted: 2019-10-12

## 2022-09-09 LAB
AMPHET UR QL SCN: NEGATIVE
BARBITURATES UR QL SCN: NEGATIVE
BENZODIAZ UR QL SCN: NEGATIVE
BZE UR QL SCN: NEGATIVE
CANNABINOIDS UR QL SCN: NEGATIVE
DIGOXIN SERPL-MCNC: 0.4 NG/ML (ref 0.8–2)
EST. AVERAGE GLUCOSE BLD GHB EST-MCNC: 203 MG/DL
GLUCOSE BLD STRIP.AUTO-MCNC: 117 MG/DL (ref 65–99)
GLUCOSE BLD STRIP.AUTO-MCNC: 141 MG/DL (ref 65–99)
GLUCOSE BLD STRIP.AUTO-MCNC: 194 MG/DL (ref 65–99)
HBA1C MFR BLD: 8.7 % (ref 4–5.6)
LV EJECT FRACT  99904: 20
LV EJECT FRACT MOD 2C 99903: 20.7
MAGNESIUM SERPL-MCNC: 1.4 MG/DL (ref 1.5–2.5)
METHADONE UR QL SCN: NEGATIVE
OPIATES UR QL SCN: NEGATIVE
OXYCODONE UR QL SCN: NEGATIVE
PCP UR QL SCN: NEGATIVE
PHOSPHATE SERPL-MCNC: 2.6 MG/DL (ref 2.5–4.5)
PROPOXYPH UR QL SCN: NEGATIVE
TROPONIN T SERPL-MCNC: 37 NG/L (ref 6–19)
TROPONIN T SERPL-MCNC: 38 NG/L (ref 6–19)
TROPONIN T SERPL-MCNC: 41 NG/L (ref 6–19)

## 2022-09-09 PROCEDURE — 84484 ASSAY OF TROPONIN QUANT: CPT | Mod: 91

## 2022-09-09 PROCEDURE — 94760 N-INVAS EAR/PLS OXIMETRY 1: CPT

## 2022-09-09 PROCEDURE — 700111 HCHG RX REV CODE 636 W/ 250 OVERRIDE (IP): Performed by: STUDENT IN AN ORGANIZED HEALTH CARE EDUCATION/TRAINING PROGRAM

## 2022-09-09 PROCEDURE — 93971 EXTREMITY STUDY: CPT | Mod: LT

## 2022-09-09 PROCEDURE — 96376 TX/PRO/DX INJ SAME DRUG ADON: CPT | Mod: XU

## 2022-09-09 PROCEDURE — 80162 ASSAY OF DIGOXIN TOTAL: CPT

## 2022-09-09 PROCEDURE — 84100 ASSAY OF PHOSPHORUS: CPT

## 2022-09-09 PROCEDURE — 770020 HCHG ROOM/CARE - TELE (206)

## 2022-09-09 PROCEDURE — A9270 NON-COVERED ITEM OR SERVICE: HCPCS | Performed by: STUDENT IN AN ORGANIZED HEALTH CARE EDUCATION/TRAINING PROGRAM

## 2022-09-09 PROCEDURE — 83735 ASSAY OF MAGNESIUM: CPT

## 2022-09-09 PROCEDURE — 700102 HCHG RX REV CODE 250 W/ 637 OVERRIDE(OP): Performed by: STUDENT IN AN ORGANIZED HEALTH CARE EDUCATION/TRAINING PROGRAM

## 2022-09-09 PROCEDURE — 93306 TTE W/DOPPLER COMPLETE: CPT

## 2022-09-09 PROCEDURE — 99284 EMERGENCY DEPT VISIT MOD MDM: CPT | Performed by: STUDENT IN AN ORGANIZED HEALTH CARE EDUCATION/TRAINING PROGRAM

## 2022-09-09 PROCEDURE — 93306 TTE W/DOPPLER COMPLETE: CPT | Mod: 26 | Performed by: INTERNAL MEDICINE

## 2022-09-09 PROCEDURE — 80307 DRUG TEST PRSMV CHEM ANLYZR: CPT

## 2022-09-09 PROCEDURE — 82962 GLUCOSE BLOOD TEST: CPT | Mod: 91

## 2022-09-09 PROCEDURE — 700117 HCHG RX CONTRAST REV CODE 255: Performed by: EMERGENCY MEDICINE

## 2022-09-09 PROCEDURE — 36415 COLL VENOUS BLD VENIPUNCTURE: CPT

## 2022-09-09 PROCEDURE — 83036 HEMOGLOBIN GLYCOSYLATED A1C: CPT

## 2022-09-09 PROCEDURE — 93971 EXTREMITY STUDY: CPT | Mod: 26,LT | Performed by: INTERNAL MEDICINE

## 2022-09-09 RX ORDER — ACETAMINOPHEN 325 MG/1
650 TABLET ORAL EVERY 6 HOURS PRN
Status: CANCELLED | OUTPATIENT
Start: 2022-09-09

## 2022-09-09 RX ORDER — SPIRONOLACTONE 25 MG/1
25 TABLET ORAL DAILY
Status: DISCONTINUED | OUTPATIENT
Start: 2022-09-10 | End: 2022-09-10 | Stop reason: HOSPADM

## 2022-09-09 RX ORDER — ISOSORBIDE DINITRATE 10 MG/1
10 TABLET ORAL EVERY 8 HOURS
Status: DISCONTINUED | OUTPATIENT
Start: 2022-09-09 | End: 2022-09-09 | Stop reason: HOSPADM

## 2022-09-09 RX ORDER — HYDROCODONE BITARTRATE AND ACETAMINOPHEN 5; 325 MG/1; MG/1
1-2 TABLET ORAL EVERY 6 HOURS PRN
Status: DISCONTINUED | OUTPATIENT
Start: 2022-09-09 | End: 2022-09-09 | Stop reason: HOSPADM

## 2022-09-09 RX ORDER — DAPAGLIFLOZIN 10 MG/1
10 TABLET, FILM COATED ORAL DAILY
Status: CANCELLED | OUTPATIENT
Start: 2022-09-09

## 2022-09-09 RX ORDER — ATORVASTATIN CALCIUM 40 MG/1
40 TABLET, FILM COATED ORAL DAILY
Status: CANCELLED | OUTPATIENT
Start: 2022-09-09

## 2022-09-09 RX ORDER — AMOXICILLIN 250 MG
2 CAPSULE ORAL 2 TIMES DAILY
Status: DISCONTINUED | OUTPATIENT
Start: 2022-09-09 | End: 2022-09-10 | Stop reason: HOSPADM

## 2022-09-09 RX ORDER — FUROSEMIDE 10 MG/ML
40 INJECTION INTRAMUSCULAR; INTRAVENOUS
Status: CANCELLED | OUTPATIENT
Start: 2022-09-09

## 2022-09-09 RX ORDER — POLYETHYLENE GLYCOL 3350 17 G/17G
1 POWDER, FOR SOLUTION ORAL
Status: DISCONTINUED | OUTPATIENT
Start: 2022-09-09 | End: 2022-09-10 | Stop reason: HOSPADM

## 2022-09-09 RX ORDER — DIGOXIN 125 MCG
125 TABLET ORAL EVERY MORNING
Status: DISCONTINUED | OUTPATIENT
Start: 2022-09-10 | End: 2022-09-10 | Stop reason: HOSPADM

## 2022-09-09 RX ORDER — BISACODYL 10 MG
10 SUPPOSITORY, RECTAL RECTAL
Status: CANCELLED | OUTPATIENT
Start: 2022-09-09

## 2022-09-09 RX ORDER — HYDRALAZINE HYDROCHLORIDE 25 MG/1
25 TABLET, FILM COATED ORAL EVERY 8 HOURS
Status: DISCONTINUED | OUTPATIENT
Start: 2022-09-09 | End: 2022-09-09 | Stop reason: HOSPADM

## 2022-09-09 RX ORDER — DAPAGLIFLOZIN 10 MG/1
10 TABLET, FILM COATED ORAL DAILY
Status: DISCONTINUED | OUTPATIENT
Start: 2022-09-10 | End: 2022-09-10 | Stop reason: HOSPADM

## 2022-09-09 RX ORDER — FUROSEMIDE 10 MG/ML
40 INJECTION INTRAMUSCULAR; INTRAVENOUS
Status: DISCONTINUED | OUTPATIENT
Start: 2022-09-09 | End: 2022-09-09 | Stop reason: HOSPADM

## 2022-09-09 RX ORDER — POLYETHYLENE GLYCOL 3350 17 G/17G
1 POWDER, FOR SOLUTION ORAL
Status: DISCONTINUED | OUTPATIENT
Start: 2022-09-09 | End: 2022-09-09 | Stop reason: HOSPADM

## 2022-09-09 RX ORDER — MAGNESIUM SULFATE HEPTAHYDRATE 40 MG/ML
2 INJECTION, SOLUTION INTRAVENOUS ONCE
Status: COMPLETED | OUTPATIENT
Start: 2022-09-09 | End: 2022-09-09

## 2022-09-09 RX ORDER — SPIRONOLACTONE 25 MG/1
25 TABLET ORAL DAILY
Status: DISCONTINUED | OUTPATIENT
Start: 2022-09-09 | End: 2022-09-09 | Stop reason: HOSPADM

## 2022-09-09 RX ORDER — DAPAGLIFLOZIN 10 MG/1
10 TABLET, FILM COATED ORAL DAILY
Status: DISCONTINUED | OUTPATIENT
Start: 2022-09-09 | End: 2022-09-09 | Stop reason: HOSPADM

## 2022-09-09 RX ORDER — ISOSORBIDE DINITRATE 10 MG/1
10 TABLET ORAL EVERY 8 HOURS
Status: CANCELLED | OUTPATIENT
Start: 2022-09-09

## 2022-09-09 RX ORDER — HYDRALAZINE HYDROCHLORIDE 25 MG/1
25 TABLET, FILM COATED ORAL EVERY 8 HOURS
Status: CANCELLED | OUTPATIENT
Start: 2022-09-09

## 2022-09-09 RX ORDER — ISOSORBIDE DINITRATE 10 MG/1
10 TABLET ORAL EVERY 8 HOURS
Status: DISCONTINUED | OUTPATIENT
Start: 2022-09-09 | End: 2022-09-10 | Stop reason: HOSPADM

## 2022-09-09 RX ORDER — ACETAMINOPHEN 325 MG/1
650 TABLET ORAL EVERY 6 HOURS PRN
Status: DISCONTINUED | OUTPATIENT
Start: 2022-09-09 | End: 2022-09-10 | Stop reason: HOSPADM

## 2022-09-09 RX ORDER — ACETAMINOPHEN 325 MG/1
650 TABLET ORAL EVERY 6 HOURS PRN
Status: DISCONTINUED | OUTPATIENT
Start: 2022-09-09 | End: 2022-09-09 | Stop reason: HOSPADM

## 2022-09-09 RX ORDER — SPIRONOLACTONE 25 MG/1
25 TABLET ORAL DAILY
Status: CANCELLED | OUTPATIENT
Start: 2022-09-09

## 2022-09-09 RX ORDER — HYDROCODONE BITARTRATE AND ACETAMINOPHEN 5; 325 MG/1; MG/1
1-2 TABLET ORAL EVERY 6 HOURS PRN
Status: CANCELLED | OUTPATIENT
Start: 2022-09-09

## 2022-09-09 RX ORDER — ATORVASTATIN CALCIUM 40 MG/1
40 TABLET, FILM COATED ORAL DAILY
Status: DISCONTINUED | OUTPATIENT
Start: 2022-09-09 | End: 2022-09-09 | Stop reason: HOSPADM

## 2022-09-09 RX ORDER — POLYETHYLENE GLYCOL 3350 17 G/17G
1 POWDER, FOR SOLUTION ORAL
Status: CANCELLED | OUTPATIENT
Start: 2022-09-09

## 2022-09-09 RX ORDER — AMOXICILLIN 250 MG
2 CAPSULE ORAL 2 TIMES DAILY
Status: CANCELLED | OUTPATIENT
Start: 2022-09-09

## 2022-09-09 RX ORDER — AMOXICILLIN 250 MG
2 CAPSULE ORAL 2 TIMES DAILY
Status: DISCONTINUED | OUTPATIENT
Start: 2022-09-09 | End: 2022-09-09 | Stop reason: HOSPADM

## 2022-09-09 RX ORDER — FUROSEMIDE 10 MG/ML
40 INJECTION INTRAMUSCULAR; INTRAVENOUS
Status: DISCONTINUED | OUTPATIENT
Start: 2022-09-09 | End: 2022-09-10 | Stop reason: HOSPADM

## 2022-09-09 RX ORDER — ATORVASTATIN CALCIUM 40 MG/1
40 TABLET, FILM COATED ORAL DAILY
Status: DISCONTINUED | OUTPATIENT
Start: 2022-09-10 | End: 2022-09-10 | Stop reason: HOSPADM

## 2022-09-09 RX ORDER — HYDRALAZINE HYDROCHLORIDE 25 MG/1
25 TABLET, FILM COATED ORAL EVERY 8 HOURS
Status: DISCONTINUED | OUTPATIENT
Start: 2022-09-09 | End: 2022-09-10 | Stop reason: HOSPADM

## 2022-09-09 RX ORDER — BISACODYL 10 MG
10 SUPPOSITORY, RECTAL RECTAL
Status: DISCONTINUED | OUTPATIENT
Start: 2022-09-09 | End: 2022-09-10 | Stop reason: HOSPADM

## 2022-09-09 RX ORDER — BISACODYL 10 MG
10 SUPPOSITORY, RECTAL RECTAL
Status: DISCONTINUED | OUTPATIENT
Start: 2022-09-09 | End: 2022-09-09 | Stop reason: HOSPADM

## 2022-09-09 RX ORDER — HYDROCODONE BITARTRATE AND ACETAMINOPHEN 5; 325 MG/1; MG/1
1-2 TABLET ORAL EVERY 6 HOURS PRN
Status: DISCONTINUED | OUTPATIENT
Start: 2022-09-09 | End: 2022-09-10 | Stop reason: HOSPADM

## 2022-09-09 RX ORDER — DIGOXIN 250 MCG
125 TABLET ORAL EVERY MORNING
Status: DISCONTINUED | OUTPATIENT
Start: 2022-09-09 | End: 2022-09-09 | Stop reason: HOSPADM

## 2022-09-09 RX ORDER — DIGOXIN 250 MCG
125 TABLET ORAL EVERY MORNING
Status: CANCELLED | OUTPATIENT
Start: 2022-09-09

## 2022-09-09 RX ADMIN — FUROSEMIDE 40 MG: 10 INJECTION, SOLUTION INTRAMUSCULAR; INTRAVENOUS at 06:19

## 2022-09-09 RX ADMIN — SPIRONOLACTONE 25 MG: 25 TABLET ORAL at 06:18

## 2022-09-09 RX ADMIN — INSULIN GLARGINE-YFGN 10 UNITS: 100 INJECTION, SOLUTION SUBCUTANEOUS at 17:18

## 2022-09-09 RX ADMIN — ATORVASTATIN CALCIUM 40 MG: 40 TABLET, FILM COATED ORAL at 06:19

## 2022-09-09 RX ADMIN — MAGNESIUM SULFATE HEPTAHYDRATE 2 G: 40 INJECTION, SOLUTION INTRAVENOUS at 11:53

## 2022-09-09 RX ADMIN — INSULIN HUMAN 3 UNITS: 100 INJECTION, SOLUTION PARENTERAL at 16:46

## 2022-09-09 RX ADMIN — ISOSORBIDE DINITRATE 10 MG: 10 TABLET ORAL at 06:19

## 2022-09-09 RX ADMIN — SACUBITRIL AND VALSARTAN 1 TABLET: 24; 26 TABLET, FILM COATED ORAL at 17:18

## 2022-09-09 RX ADMIN — HYDRALAZINE HYDROCHLORIDE 25 MG: 25 TABLET, FILM COATED ORAL at 14:44

## 2022-09-09 RX ADMIN — ISOSORBIDE DINITRATE 10 MG: 10 TABLET ORAL at 14:43

## 2022-09-09 RX ADMIN — ISOSORBIDE DINITRATE 10 MG: 10 TABLET ORAL at 22:52

## 2022-09-09 RX ADMIN — APIXABAN 5 MG: 5 TABLET, FILM COATED ORAL at 06:19

## 2022-09-09 RX ADMIN — DAPAGLIFLOZIN 10 MG: 10 TABLET, FILM COATED ORAL at 06:20

## 2022-09-09 RX ADMIN — APIXABAN 5 MG: 5 TABLET, FILM COATED ORAL at 17:18

## 2022-09-09 RX ADMIN — IOHEXOL 65 ML: 350 INJECTION, SOLUTION INTRAVENOUS at 00:05

## 2022-09-09 RX ADMIN — DIGOXIN 125 MCG: 0.25 TABLET ORAL at 06:19

## 2022-09-09 RX ADMIN — ACETAMINOPHEN 650 MG: 325 TABLET, FILM COATED ORAL at 17:19

## 2022-09-09 RX ADMIN — HYDRALAZINE HYDROCHLORIDE 25 MG: 25 TABLET, FILM COATED ORAL at 06:19

## 2022-09-09 RX ADMIN — HYDRALAZINE HYDROCHLORIDE 25 MG: 25 TABLET, FILM COATED ORAL at 22:52

## 2022-09-09 RX ADMIN — FUROSEMIDE 40 MG: 10 INJECTION, SOLUTION INTRAMUSCULAR; INTRAVENOUS at 16:45

## 2022-09-09 ASSESSMENT — CHA2DS2 SCORE
HYPERTENSION: YES
AGE 75 OR GREATER: NO
CHA2DS2 VASC SCORE: 3
DIABETES: YES
VASCULAR DISEASE: NO
HYPERTENSION: YES
VASCULAR DISEASE: NO
SEX: MALE
PRIOR STROKE OR TIA OR THROMBOEMBOLISM: NO
AGE 65 TO 74: NO
CHF OR LEFT VENTRICULAR DYSFUNCTION: YES
PRIOR STROKE OR TIA OR THROMBOEMBOLISM: NO
DIABETES: YES
AGE 75 OR GREATER: NO
CHA2DS2 VASC SCORE: 2
CHF OR LEFT VENTRICULAR DYSFUNCTION: NO
AGE 65 TO 74: NO
SEX: MALE

## 2022-09-09 ASSESSMENT — ENCOUNTER SYMPTOMS
SPEECH CHANGE: 0
BACK PAIN: 0
NERVOUS/ANXIOUS: 0
DOUBLE VISION: 0
CHILLS: 0
NAUSEA: 1
SHORTNESS OF BREATH: 1
NAUSEA: 0
SENSORY CHANGE: 0
SPUTUM PRODUCTION: 0
MYALGIAS: 0
DIARRHEA: 0
ORTHOPNEA: 1
VOMITING: 0
BLURRED VISION: 0
VOMITING: 0
SHORTNESS OF BREATH: 1
PND: 1
FOCAL WEAKNESS: 0
COUGH: 0
ABDOMINAL PAIN: 0
NECK PAIN: 0
FEVER: 0

## 2022-09-09 ASSESSMENT — LIFESTYLE VARIABLES
EVER FELT BAD OR GUILTY ABOUT YOUR DRINKING: NO
HAVE PEOPLE ANNOYED YOU BY CRITICIZING YOUR DRINKING: NO
TOTAL SCORE: 0
ALCOHOL_USE: NO
HOW MANY TIMES IN THE PAST YEAR HAVE YOU HAD 5 OR MORE DRINKS IN A DAY: 0
TOTAL SCORE: 0
EVER HAD A DRINK FIRST THING IN THE MORNING TO STEADY YOUR NERVES TO GET RID OF A HANGOVER: NO
TOTAL SCORE: 0
AVERAGE NUMBER OF DAYS PER WEEK YOU HAVE A DRINK CONTAINING ALCOHOL: 0
CONSUMPTION TOTAL: NEGATIVE
HAVE YOU EVER FELT YOU SHOULD CUT DOWN ON YOUR DRINKING: NO
ON A TYPICAL DAY WHEN YOU DRINK ALCOHOL HOW MANY DRINKS DO YOU HAVE: 0
SUBSTANCE_ABUSE: 0

## 2022-09-09 ASSESSMENT — PAIN DESCRIPTION - PAIN TYPE
TYPE: ACUTE PAIN

## 2022-09-09 ASSESSMENT — FIBROSIS 4 INDEX: FIB4 SCORE: 2.27

## 2022-09-09 NOTE — ED PROVIDER NOTES
ED Provider Note    CHIEF COMPLAINT  Chief Complaint   Patient presents with    Leg Swelling     Pt states he has been having leg swelling since Tuesday, pt states he drove to [place] on Tuesday and when he came back he felt like everything was swelling, pt has +3 pitting edema BLE, pt states tue he weight 197lb and today he is up to 222lb, pt has a history of CHF, pt states he doubled his dose of lasix but swelling still getting worse.         HPI  Lai Dailey is a 52 y.o. male who presents to the emergency room and with bilateral lower extremity swelling and increasing shortness of breath, orthopnea and PND. Past medical history as document below but significant for known CHF. Followed [facility] Dr. Arriaga. He is also diabetic but states that his blood sugars have been relatively well.     He explained that he had gone to [place] to bring his son home. The temperatures were quite excessive and his travels led him to being in a seated position for longer periods of time than typical. Over the last two days he has doubled his Lasix dose from 20 to 40 mg once a day. Despite increasing his dose he has not had significant symptomatic improvement. He has only worsened and [facility] Of note his weight has increased from 197 to 222 pounds in the last few days.    REVIEW OF SYSTEMS  See HPI for further details. All other systems are negative.     PAST MEDICAL HISTORY   has a past medical history of CHF (congestive heart failure) (HCC), Diabetes (HCC), Hyperlipidemia, Hypertension, Pacemaker, and Snoring (10/18/2021).    SOCIAL HISTORY  Social History     Tobacco Use    Smoking status: Never    Smokeless tobacco: Never   Vaping Use    Vaping Use: Never used   Substance and Sexual Activity    Alcohol use: Yes     Comment: occ    Drug use: No     Comment: Marijuana use as youth    Sexual activity: Not on file       SURGICAL  "HISTORY   has a past surgical history that includes aicd implant (2010).    CURRENT MEDICATIONS  Home Medications       Reviewed by Sobia Cates (Pharmacy Tech) on 09/09/22 at 0219  Med List Status: Complete     Medication Last Dose Status   apixaban (ELIQUIS) 5mg Tab 9/8/2022 Active   atorvastatin (LIPITOR) 40 MG Tab 9/8/2022 Active   digoxin (LANOXIN) 125 MCG Tab 9/8/2022 Active   Empagliflozin (JARDIANCE) 10 MG Tab 9/8/2022 Active   furosemide (LASIX) 20 MG Tab 9/8/2022 Active   hydrALAZINE (APRESOLINE) 25 MG Tab 9/8/2022 Active   insulin glargine (LANTUS) 100 UNIT/ML Solution PRN Active   isosorbide dinitrate (ISORDIL) 10 MG Tab 9/8/2022 Active   lisinopril (PRINIVIL) 10 MG Tab 9/8/2022 Active   metformin (GLUCOPHAGE) 500 MG TABS 9/8/2022 Active   multivitamin (THERAGRAN) Tab 9/8/2022 Active   sacubitril-valsartan (ENTRESTO) 24-26 MG Tab tablet 9/8/2022 Active   spironolactone (ALDACTONE) 25 MG Tab 9/8/2022 Active                    ALLERGIES  No Known Allergies    PHYSICAL EXAM  VITAL SIGNS: /88   Pulse 80   Temp 37 °C (98.6 °F) (Temporal)   Resp 19   Ht 1.753 m (5' 9\")   Wt 101 kg (222 lb 7.1 oz)   SpO2 92%   BMI 32.85 kg/m²  @DEMOND[[id number]::@   Pulse ox interpretation: I interpret this pulse ox as normal.  Constitutional: Alert in no apparent distress.  HENT: No signs of trauma, Bilateral external ears normal, Nose normal.   Eyes: Pupils are equal and reactive  Neck: Normal range of motion, No tenderness, Supple  Cardiovascular: Regular rate and rhythm, no murmurs.   Thorax & Lungs: Normal breath sounds, No respiratory distress, No wheezing, No chest tenderness.   Abdomen: Bowel sounds normal, Soft  Skin: Warm, Dry, No erythema, No rash.   Extremities: Intact distal pulses. 3+ pitting edema. No leg tenderness.   Musculoskeletal: Good range of motion in all major joints. No tenderness to palpation or major deformities noted.   Neurologic: Alert , Normal motor function, Normal sensory " function, No focal deficits noted.   Psychiatric: Affect normal, Judgment normal, Mood normal.       DIAGNOSTIC STUDIES / PROCEDURES      LABS  Results for orders placed or performed during the hospital encounter of 09/08/22   CBC with Differential   Result Value Ref Range    WBC 5.1 4.8 - 10.8 K/uL    RBC 4.90 4.70 - 6.10 M/uL    Hemoglobin 14.7 14.0 - 18.0 g/dL    Hematocrit 45.3 42.0 - 52.0 %    MCV 92.4 81.4 - 97.8 fL    MCH 30.0 27.0 - 33.0 pg    MCHC 32.5 (L) 33.7 - 35.3 g/dL    RDW 61.0 (H) 35.9 - 50.0 fL    Platelet Count 182 164 - 446 K/uL    MPV 10.4 9.0 - 12.9 fL    Neutrophils-Polys 47.20 44.00 - 72.00 %    Lymphocytes 36.00 22.00 - 41.00 %    Monocytes 13.80 (H) 0.00 - 13.40 %    Eosinophils 1.80 0.00 - 6.90 %    Basophils 1.00 0.00 - 1.80 %    Immature Granulocytes 0.20 0.00 - 0.90 %    Nucleated RBC 0.00 /100 WBC    Neutrophils (Absolute) 2.40 1.82 - 7.42 K/uL    Lymphs (Absolute) 1.83 1.00 - 4.80 K/uL    Monos (Absolute) 0.70 0.00 - 0.85 K/uL    Eos (Absolute) 0.09 0.00 - 0.51 K/uL    Baso (Absolute) 0.05 0.00 - 0.12 K/uL    Immature Granulocytes (abs) 0.01 0.00 - 0.11 K/uL    NRBC (Absolute) 0.00 K/uL   Complete Metabolic Panel (CMP)   Result Value Ref Range    Sodium 131 (L) 135 - 145 mmol/L    Potassium 4.4 3.6 - 5.5 mmol/L    Chloride 98 96 - 112 mmol/L    Co2 21 20 - 33 mmol/L    Anion Gap 12.0 7.0 - 16.0    Glucose 112 (H) 65 - 99 mg/dL    Bun 21 8 - 22 mg/dL    Creatinine 0.99 0.50 - 1.40 mg/dL    Calcium 9.1 8.5 - 10.5 mg/dL    AST(SGOT) 39 12 - 45 U/L    ALT(SGPT) 26 2 - 50 U/L    Alkaline Phosphatase 153 (H) 30 - 99 U/L    Total Bilirubin 3.1 (H) 0.1 - 1.5 mg/dL    Albumin 3.7 3.2 - 4.9 g/dL    Total Protein 7.2 6.0 - 8.2 g/dL    Globulin 3.5 1.9 - 3.5 g/dL    A-G Ratio 1.1 g/dL   Troponin   Result Value Ref Range    Troponin T 36 (H) 6 - 19 ng/L   ESTIMATED GFR   Result Value Ref Range    GFR (CKD-EPI) 90 >60 mL/min/1.73 m 2   D-DIMER   Result Value Ref Range    D-Dimer Screen 1.78 (H)  0.00 - 0.50 ug/mL (FEU)   proBrain Natriuretic Peptide, NT   Result Value Ref Range    NT-proBNP 1764 (H) 0 - 125 pg/mL   EKG   Result Value Ref Range    Report       [facility] Emergency Dept.    Test Date:  2022  Pt Name:    CHESTER PICKARD                 Department: ER  MRN:        [id number]                      Room:  Gender:     Male                         Technician: [id number]  :        1970                   Requested By:ER TRIAGE PROTOCOL  Order #:    [id number]                    Reading MD:    Measurements  Intervals                                Axis  Rate:       80                           P:  NJ:                                      QRS:        214  QRSD:       158                          T:          40  QT:         471  QTc:        544    Interpretive Statements  Afib/flutter and ventricular-paced rhythm  No further analysis attempted due to paced rhythm  Compared to ECG 2021 09:49:11  No significant changes           RADIOLOGY  CT-CTA CHEST PULMONARY ARTERY W/ RECONS   Final Result      1.  Decreased opacification of a right lower lobe segmental branch is likely artifactual. No definite evidence of acute pulmonary embolism.   2.  Marked cardiomegaly. Evidence of right heart dysfunction.   3.  Scattered, linear atelectasis.         DX-CHEST-PORTABLE (1 VIEW)   Final Result      1.  Cardiomegaly. Central vascular congestion.      US-EXTREMITY VENOUS LOWER UNILAT LEFT    (Results Pending)   EC-ECHOCARDIOGRAM COMPLETE W/O CONT    (Results Pending)           COURSE & MEDICAL DECISION MAKING  Pertinent Labs & Imaging studies reviewed. (See chart for details)    52-year-old male presented to the emergency department with complaint of increasing shortness of breath and bilateral lower extremity swelling. Known history of CHF. Patient has been started on diuresis here in the ER for further treatment of his likely CHF exacerbation. Given his recent travel I did consider  possible thromboembolic phenomenon as well. D dimer was positive leading to CT imaging which is fortunately negative for acute PE. At this point I discussed the case with hospitals for ongoing inpatient CHF exacerbation care to which they are agreeable.        FINAL IMPRESSION  1. Acute on chronic congestive heart failure, unspecified heart failure type (HCC)            Electronically signed by: Neto Watson M.D., 9/8/2022 10:32 PM

## 2022-09-09 NOTE — ASSESSMENT & PLAN NOTE
Mild, 3.1  Has had intermittent mild elevations going back to 2018 on chart review with previously normal RUQ US.  No pain, normal LFT's.  ? Dubin-Anthony.rotor syndrome vs possibly hepatic congestion  Will trend, if elevation worsens consider further workup.

## 2022-09-09 NOTE — ED TRIAGE NOTES
"Chief Complaint   Patient presents with    Leg Swelling     Pt states he has been having leg swelling since Tuesday, pt states he drove to [place] on Tuesday and when he came back he felt like everything was swelling, pt has +3 pitting edema BLE, pt states tue he weight 197lb and today he is up to 222lb, pt has a history of CHF, pt states he doubled his dose of lasix but swelling still getting worse.       /88   Pulse 80   Temp 37 °C (98.6 °F) (Temporal)   Resp 12   Ht 1.753 m (5' 9\")   Wt 101 kg (222 lb 7.1 oz)   SpO2 98%   BMI 32.85 kg/m²     "

## 2022-09-09 NOTE — PROGRESS NOTES
4 Eyes Skin Assessment Completed by Tianna YUEN RN and Corinne, RN.    Head WDL  Ears WDL  Nose WDL  Mouth WDL  Neck WDL  Breast/Chest WDL  Shoulder Blades WDL  Spine WDL  (R) Arm/Elbow/Hand WDL  (L) Arm/Elbow/Hand WDL  Abdomen WDL  Groin WDL  Scrotum/Coccyx/Buttocks WDL  (R) Leg Edema  (L) Leg Edema  (R) Heel/Foot/Toe WDL  (L) Heel/Foot/Toe WDL          Devices In Places Tele Box      Interventions In Place Pillows and Low Air Loss Mattress    Possible Skin Injury No    Pictures Uploaded Into Epic N/A  Wound Consult Placed N/A  RN Wound Prevention Protocol Ordered No

## 2022-09-09 NOTE — ASSESSMENT & PLAN NOTE
-Continue home regimen spironolactone, Isordil, hydralazine.  -Follow-up with outpatient cardiologist Dr. Arriaga in AM, previous records indicate he was on metoprolol, lisinopril, Entresto, patient reports he is only continuing lisinopril and Entresto.  -Will discontinue lisinopril and start Entresto 36 hours after his last stated lisinopril dose, will need cleared discharge instructions to ensure that he is not continuing to take both of these as outpatient.

## 2022-09-09 NOTE — ED NOTES
"  SUBJECTIVE:   Greg Mckenna is a 38 year old male who presents to clinic today for the following health issues: states for the past week that he has had LUQ pain and R flank pain; it is at a low grade constant level and then is worse w stabs that can last for 20 sec and then resolve again; they can be there a few times a day. He is not aware on anything that makes it better or worse. Eating nor BMs  Affect this. No nausea, diarrhea.Urine is unchanged and normal color. He does cough a fair amount and has for quite some time wo known cause.not aware of any allergies- he does not smoke. Pain may be a little worse on L w deep inspiration. Afeb. Exercise tolerance is OK for him. Appetite OK. Drinks about 4 beers/ day.               Problem list and histories reviewed & adjusted, as indicated.  Additional history: as documented        Reviewed and updated as needed this visit by clinical staff  Tobacco  Allergies  Meds  Med Hx  Surg Hx  Fam Hx  Soc Hx      Reviewed and updated as needed this visit by Provider         OBJECTIVE:     /90 (BP Location: Right arm, Patient Position: Sitting, Cuff Size: Adult Large)  Pulse 70  Temp 97.6  F (36.4  C) (Temporal)  Ht 5' 9.5\" (1.765 m)  Wt 240 lb (108.9 kg)  BMI 34.93 kg/m2  Body mass index is 34.93 kg/(m^2).  GENERAL: healthy, alert and no distress  NECK: no adenopathy, no asymmetry, masses, or scars and thyroid normal to palpation  RESP: lungs clear to auscultation - subtle wheezing L> R base  CV: regular rate and rhythm, normal S1 S2, no S3 or S4, no murmur, click or rub, no peripheral edema and peripheral pulses strong  ABDOMEN: soft, generally tender and full feeling / has rectus diathesis, no mass appreciated buthe is obese and guarding some so it would be tough, no hepatosplenomegaly, no masses and bowel sounds normal      ASSESSMENT/PLAN:       1. Abdominal pain, generalized  Pneumonia, diverticular disease, renal lithiasis or other intra abdominal " Pt ambulates to the lobby with a steady gait. Become sob upon return but spo2 remains stable   pathology on differential  - XR Chest 2 Views; Future- no acute abnormalities noted  - *UA reflex to Microscopic- no blood  - CBC with platelets- not elevated  - Comprehensive metabolic panel-pending- CT abd pending  Will give albuterol for wheezing  See Patient Instructions  CT shows mild spleenic enlargemant as well as liver suggestive of fatty infiltrate related to ETOH- I called him w results and suggest Monotest    CLEO DEVRIES MD  [facility]

## 2022-09-09 NOTE — ASSESSMENT & PLAN NOTE
Continue Jardiance, long-acting insulin.  Insulin sliding scale.  Diabetic diet, POC glucose checks

## 2022-09-09 NOTE — ASSESSMENT & PLAN NOTE
-Maintaining O2 saturations on room air.  -Combined systolic and diastolic heart failure last TTE 06/2021 EF 15% grade 3 diastolic dysfunction moderate mitral regurg moderate tricuspid regurg, global hypokinesis, RVSP 45 mmHg.  -Repeat TTE  -No chest pain, patient reports medication compliance, will need to get more info about dietary compliance.  -Check UDS  -Maintain afterload reduction  -Diuretics for volume overload, responding to Lasix 40 mg IV in the ED, will continue with 40 mg IV twice daily.  -Continue home spironolactone, Isordil, hydralazine, digoxin.  -Patient reports that he takes both lisinopril and Entresto, will discontinue lisinopril and hold Entresto to start 36 hours after his last stated lisinopril dose.  Will need clear discharge instructions that he does not continue to take lisinopril and Entresto together.  -Follow-up with outpatient cardiologist Dr. Arriaga in AM, previous records indicate he was on metoprolol, lisinopril, Entresto, patient reports he is only continuing lisinopril and Entresto.  -Continue Eliquis  -Repeat TTE  -Baseline mental status, warm extremities, good urine output.

## 2022-09-09 NOTE — ED NOTES
Med rec completed per patient  Allergies reviewed  No PO Antibiotics in the last 30 days     Patient states he only takes his insulin when his blood sugars are too high, as the Jardiance controls it better.

## 2022-09-09 NOTE — H&P
Hospital Medicine History & Physical Note    Date of Service  9/9/2022    Primary Care Physician  Tor Rdz M.D.    Consultants  None    Code Status  Full Code    Chief Complaint  Chief Complaint   Patient presents with   • Leg Swelling     Pt states he has been having leg swelling since Tuesday, pt states he drove to [place] on Tuesday and when he came back he felt like everything was swelling, pt has +3 pitting edema BLE, pt states tue he weight 197lb and today he is up to 222lb, pt has a history of CHF, pt states he doubled his dose of lasix but swelling still getting worse.         History of Presenting Illness  Lai Dailey is a 52 y.o. male nonischemic cardiomyopathy and combined systolic and diastolic heart failure prior EF 15% and grade 3 diastolic dysfunction RVSP 40 mmHg, paroxysmal A. fib on Eliquis, AICD in place, hypertension, hyperlipidemia, diabetes mellitus type 2, who presented 9/8/2022 with dyspnea and peripheral edema, found to be in heart failure exacerbation.    Mr. Dailey notes over the last week or so he has been having progressive increase in fatigue, dyspnea on exertion.  About 5 days prior to presentation he started increasing his Lasix dose from 20 mg p.o. to 40 mg p.o. and attempts to stave off heart failure exacerbation.  4 days prior to presentation he drove his son to [place] and at that time noted that he started having peripheral edema.  Tuesday he started having dyspnea, nausea, GERD, weakness and fatigue, orthopnea, PND.  Also notes he has gained 25 pounds in the past few days.  He denies any fevers or chills, headache or vision changes, chest pain, cough, vomiting, abdominal pain, dysuria or diarrhea.  Increasing his p.o. Lasix dose did not help, symptoms aggravated with exertion.  He presented due to progression/severity of all of the symptoms above becoming severe.    In the ED he is afebrile with normal pulse and respiratory rate normal room air oxygen  saturation systolic blood pressure ranges 128-143.  Initial work-up with unremarkable CBC, sodium 131 glucose 112 normal renal function AST and ALT T bili 3.1 troponin T 36 proBNP 1764 D-dimer screen 1.78, EKG A. fib/flutter and ventricular paced rhythm, chest x-ray with cardiomegaly and vascular congestion, CTA chest shows decreased opacification of right lower lobe segmental branch likely artifactual no definite evidence of acute PE shows marked cardiomegaly and evidence of right heart dysfunction and scattered linear atelectasis.  Patient was given IV Lasix and subsequently for the hospitalist for admission for acute heart failure exacerbation.    I discussed the plan of care with patient, family, bedside RN, and pharmacy.    Review of Systems  Review of Systems   Constitutional:  Positive for malaise/fatigue. Negative for chills and fever.   HENT:  Negative for congestion and nosebleeds.    Eyes:  Negative for blurred vision and double vision.   Respiratory:  Positive for shortness of breath. Negative for cough and sputum production.    Cardiovascular:  Positive for orthopnea, leg swelling and PND. Negative for chest pain.   Gastrointestinal:  Positive for nausea. Negative for abdominal pain, diarrhea and vomiting.   Genitourinary:  Negative for dysuria and urgency.   Musculoskeletal:  Negative for back pain, myalgias and neck pain.   Neurological:  Negative for sensory change, speech change and focal weakness.   Psychiatric/Behavioral:  Negative for substance abuse. The patient is not nervous/anxious.      Past Medical History   has a past medical history of CHF (congestive heart failure) ([facility]), Diabetes (HCC), Hyperlipidemia, Hypertension, Pacemaker, and Snoring (10/18/2021).    Surgical History   has a past surgical history that includes aicd implant (2010).     Family History  family history includes Colon Cancer in his mother; Hypertension in his sister; Stroke in his brother.       Social History   reports  that he has never smoked. He has never used smokeless tobacco. He reports current alcohol use. He reports that he does not use drugs.    Allergies  No Known Allergies    Medications  Prior to Admission Medications   Prescriptions Last Dose Informant Patient Reported? Taking?   Empagliflozin (JARDIANCE) 10 MG Tab 9/8/2022 at 0600 Patient No No   Sig: Take 10 mg by mouth every day.   apixaban (ELIQUIS) 5mg Tab 9/8/2022 at 1900 Patient No No   Sig: Take 1 tablet by mouth 2 Times a Day.   atorvastatin (LIPITOR) 40 MG Tab 9/8/2022 at 0600 Patient No No   Sig: Take 1 Tablet by mouth every day.   digoxin (LANOXIN) 125 MCG Tab 9/8/2022 at 0600 Patient No No   Sig: Take 1 tablet by mouth every morning.   furosemide (LASIX) 20 MG Tab 9/8/2022 at 1900 Patient No No   Sig: Take 1 tablet by mouth every day.   hydrALAZINE (APRESOLINE) 25 MG Tab 9/8/2022 at 1900 Patient No No   Sig: Take 1 tablet by mouth 3 times a day.   insulin glargine (LANTUS) 100 UNIT/ML Solution PRN at PRN Patient No No   Sig: Inject 10 Units as instructed every evening.   isosorbide dinitrate (ISORDIL) 10 MG Tab 9/8/2022 at 1900 Patient No No   Sig: Take 1 tablet by mouth 3 times a day.   lisinopril (PRINIVIL) 10 MG Tab 9/8/2022 at 0600 Patient No No   Sig: Take 1 tablet by mouth every day.   metformin (GLUCOPHAGE) 500 MG TABS 9/8/2022 at 1900 Patient No No   Sig: Take 1 Tab by mouth 2 times a day, with meals.   multivitamin (THERAGRAN) Tab 9/8/2022 at 0600 Patient Yes No   Sig: Take 1 tablet by mouth every day.   sacubitril-valsartan (ENTRESTO) 24-26 MG Tab tablet 9/8/2022 at 1900 Patient No No   Sig: Take 1 Tablet by mouth 2 times a day.   spironolactone (ALDACTONE) 25 MG Tab 9/8/2022 at 0600 Patient No No   Sig: Take 1 tablet by mouth every day.      Facility-Administered Medications: None       Physical Exam  Temp:  [37 °C (98.6 °F)] 37 °C (98.6 °F)  Pulse:  [80-83] 80  Resp:  [12-19] 19  BP: (128-143)/() 138/88  SpO2:  [92 %-98 %] 92 %  Blood  Pressure: 138/88   Temperature: 37 °C (98.6 °F)   Pulse: 80   Respiration: 19   Pulse Oximetry: 92 %       Physical Exam  Vitals and nursing note reviewed. Exam conducted with a chaperone present.   Constitutional:       General: He is not in acute distress.     Appearance: He is not toxic-appearing.      Comments: 52-year-old male appears stated age alert and conversant able to speak full sentences no acute distress nontoxic-appearing pleasant mood making jokes, wife at bedside   HENT:      Head: Normocephalic and atraumatic.      Nose: Nose normal. No rhinorrhea.      Mouth/Throat:      Mouth: Mucous membranes are moist.      Pharynx: Oropharynx is clear.   Eyes:      General: No scleral icterus.     Extraocular Movements: Extraocular movements intact.      Conjunctiva/sclera: Conjunctivae normal.      Pupils: Pupils are equal, round, and reactive to light.   Cardiovascular:      Rate and Rhythm: Normal rate and regular rhythm.      Pulses: Normal pulses.   Pulmonary:      Effort: Pulmonary effort is normal. No respiratory distress.      Breath sounds: Rales present. No wheezing or rhonchi.   Abdominal:      Palpations: Abdomen is soft.      Tenderness: There is no abdominal tenderness. There is no guarding or rebound.   Musculoskeletal:         General: Normal range of motion.      Cervical back: Normal range of motion and neck supple. No rigidity or tenderness.      Right lower leg: Edema present.      Left lower leg: Edema present.   Skin:     General: Skin is warm and dry.      Capillary Refill: Capillary refill takes less than 2 seconds.      Findings: No erythema or rash.   Neurological:      General: No focal deficit present.      Mental Status: He is alert and oriented to person, place, and time. Mental status is at baseline.      Cranial Nerves: No cranial nerve deficit.      Sensory: No sensory deficit.      Motor: No weakness.      Coordination: Coordination normal.   Psychiatric:         Mood and  Affect: Mood normal.         Behavior: Behavior normal.         Thought Content: Thought content normal.         Judgment: Judgment normal.       Laboratory:  Recent Labs     09/08/22 2101   WBC 5.1   RBC 4.90   HEMOGLOBIN 14.7   HEMATOCRIT 45.3   MCV 92.4   MCH 30.0   MCHC 32.5*   RDW 61.0*   PLATELETCT 182   MPV 10.4     Recent Labs     09/08/22 2101   SODIUM 131*   POTASSIUM 4.4   CHLORIDE 98   CO2 21   GLUCOSE 112*   BUN 21   CREATININE 0.99   CALCIUM 9.1     Recent Labs     09/08/22 2101   ALTSGPT 26   ASTSGOT 39   ALKPHOSPHAT 153*   TBILIRUBIN 3.1*   GLUCOSE 112*         Recent Labs     09/08/22 2101   NTPROBNP 1764*         Recent Labs     09/08/22 2101   TROPONINT 36*       Imaging:  CT-CTA CHEST PULMONARY ARTERY W/ RECONS   Final Result      1.  Decreased opacification of a right lower lobe segmental branch is likely artifactual. No definite evidence of acute pulmonary embolism.   2.  Marked cardiomegaly. Evidence of right heart dysfunction.   3.  Scattered, linear atelectasis.         DX-CHEST-PORTABLE (1 VIEW)   Final Result      1.  Cardiomegaly. Central vascular congestion.      US-EXTREMITY VENOUS LOWER UNILAT LEFT    (Results Pending)   EC-ECHOCARDIOGRAM COMPLETE W/O CONT    (Results Pending)       X-Ray:  I have personally reviewed the images and compared with prior images. and My impression is: chest x-ray with cardiomegaly and vascular congestion  EKG:  I have personally reviewed the images and compared with prior images. and My impression is: EKG A. fib/flutter and ventricular paced rhythm    Assessment/Plan:  Justification for Admission Status  I anticipate this patient will require at least two midnights for appropriate medical management, necessitating inpatient admission because acute heart failure exacerbation    Acute on chronic diastolic ACC/AHA stage C congestive heart failure (HCC)- (present on admission)  Assessment & Plan  -Maintaining O2 saturations on room air.  -Combined  systolic and diastolic heart failure last TTE 06/2021 EF 15% grade 3 diastolic dysfunction moderate mitral regurg moderate tricuspid regurg, global hypokinesis, RVSP 45 mmHg.  -Repeat TTE  -No chest pain, patient reports medication compliance, will need to get more info about dietary compliance.  -Check UDS  -Maintain afterload reduction  -Diuretics for volume overload, responding to Lasix 40 mg IV in the ED, will continue with 40 mg IV twice daily.  -Continue home spironolactone, Isordil, hydralazine, digoxin.  -Patient reports that he takes both lisinopril and Entresto, will discontinue lisinopril and hold Entresto to start 36 hours after his last stated lisinopril dose.  Will need clear discharge instructions that he does not continue to take lisinopril and Entresto together.  -Follow-up with outpatient cardiologist Dr. Arriaga in AM, previous records indicate he was on metoprolol, lisinopril, Entresto, patient reports he is only continuing lisinopril and Entresto.  -Continue Eliquis  -Repeat TTE  -Baseline mental status, warm extremities, good urine output.    Total bilirubin, elevated- (present on admission)  Assessment & Plan  Mild, 3.1  Has had intermittent mild elevations going back to 2018 on chart review with previously normal RUQ US.  No pain, normal LFT's.  ? Dubin-Anthony.rotor syndrome vs possibly hepatic congestion  Will trend, if elevation worsens consider further workup.    Paroxysmal atrial fibrillation (HCC)- (present on admission)  Assessment & Plan  Rate controlled.  Continue digoxin, Eliquis    Essential hypertension- (present on admission)  Assessment & Plan  -Continue home regimen spironolactone, Isordil, hydralazine.  -Follow-up with outpatient cardiologist Dr. Arriaga in AM, previous records indicate he was on metoprolol, lisinopril, Entresto, patient reports he is only continuing lisinopril and Entresto.  -Will discontinue lisinopril and start Entresto 36 hours after his last stated lisinopril dose,  will need cleared discharge instructions to ensure that he is not continuing to take both of these as outpatient.    Cardiomyopathy (HCC)- (present on admission)  Assessment & Plan  Nonischemic, myopathy, unknown etiology per patient.  With atrial fibrillation and history of V. tach in the past status post AICD placement.  See heart failure exacerbation problem for more details.    AICD (automatic cardioverter/defibrillator) present- (present on admission)  Assessment & Plan  Secondary to history of V. tach    Type 2 diabetes mellitus with renal complication (HCC)- (present on admission)  Assessment & Plan  Continue Jardiance, long-acting insulin.  Insulin sliding scale.  Diabetic diet, POC glucose checks        VTE prophylaxis: SCDs/TEDs and therapeutic anticoagulation with Eliquis

## 2022-09-09 NOTE — H&P
Hospital Medicine History & Physical Note    Date of Service  9/9/2022    Primary Care Physician  Tor Rdz M.D.    Consultants  na    Code Status  Full Code    Chief Complaint  Sob and leg swelling      History of Presenting Illness  Lai Dailey is a 52 y.o. male with past medical history of nonischemic cardiomyopathy, combined systolic and diastolic heart failure prior EF 15%, mitral valve regurgitation and atrial fibrillation on Eliquis, AICD in place, HTN, HLD, DM2, who presented 9/9/2022 with worsening leg swelling and sob.  Patient is a direct admit from [facility].   Patient reports in the past few days, he has been having progressively worsening sob and BLE swelling. He has gained 19lbs in the past few days. He tried to increased his home dose lasix, however without significant improvement.  Denies fever, chills, chest pain, nausea, vomiting or abdominal pain..   In the ED,  Initial work-up with unremarkable CBC, sodium 131, total bilirubin 3.1, ALT/AST normal, magnesium 1.4, proBNP 1764. CTA notes decreased opacification of a right lower lobe segmental branch is likely artifactual. No definite evidence of acute pulmonary embolism. Marked cardiomyopathy.     I discussed the plan of care with patient and bedside RN.    Review of Systems  Review of Systems   Respiratory:  Positive for shortness of breath.    Cardiovascular:  Positive for leg swelling.   Gastrointestinal:  Negative for nausea and vomiting.   All other systems reviewed and are negative.    Past Medical History   has a past medical history of CHF (congestive heart failure) (HCC), Diabetes (HCC), Hyperlipidemia, Hypertension, Pacemaker, and Snoring (10/18/2021).    Surgical History   has a past surgical history that includes aicd implant (2010).     Family History  family history includes Colon Cancer in his mother; Hypertension in his sister; Stroke in his brother.   Family history reviewed with patient. There is no  family history that is pertinent to the chief complaint.     Social History   reports that he has never smoked. He has never used smokeless tobacco. He reports current alcohol use. He reports that he does not use drugs.    Allergies  No Known Allergies    Medications  Prior to Admission Medications   Prescriptions Last Dose Informant Patient Reported? Taking?   Empagliflozin (JARDIANCE) 10 MG Tab  Patient No No   Sig: Take 10 mg by mouth every day.   apixaban (ELIQUIS) 5mg Tab  Patient No No   Sig: Take 1 tablet by mouth 2 Times a Day.   atorvastatin (LIPITOR) 40 MG Tab  Patient No No   Sig: Take 1 Tablet by mouth every day.   digoxin (LANOXIN) 125 MCG Tab  Patient No No   Sig: Take 1 tablet by mouth every morning.   furosemide (LASIX) 20 MG Tab  Patient No No   Sig: Take 1 tablet by mouth every day.   hydrALAZINE (APRESOLINE) 25 MG Tab  Patient No No   Sig: Take 1 tablet by mouth 3 times a day.   insulin glargine (LANTUS) 100 UNIT/ML Solution  Patient No No   Sig: Inject 10 Units as instructed every evening.   isosorbide dinitrate (ISORDIL) 10 MG Tab  Patient No No   Sig: Take 1 tablet by mouth 3 times a day.   lisinopril (PRINIVIL) 10 MG Tab  Patient No No   Sig: Take 1 tablet by mouth every day.   metformin (GLUCOPHAGE) 500 MG TABS  Patient No No   Sig: Take 1 Tab by mouth 2 times a day, with meals.   multivitamin (THERAGRAN) Tab  Patient Yes No   Sig: Take 1 tablet by mouth every day.   sacubitril-valsartan (ENTRESTO) 24-26 MG Tab tablet  Patient No No   Sig: Take 1 Tablet by mouth 2 times a day.   spironolactone (ALDACTONE) 25 MG Tab  Patient No No   Sig: Take 1 tablet by mouth every day.      Facility-Administered Medications: None       Physical Exam  Temp:  [36.2 °C (97.2 °F)-37 °C (98.6 °F)] 36.2 °C (97.2 °F)  Pulse:  [80-83] 80  Resp:  [12-20] 19  BP: (128-145)/() 141/85  SpO2:  [92 %-98 %] 97 %  Blood Pressure: (!) 141/85   Temperature: 36.2 °C (97.2 °F)   Pulse: 80   Respiration: 19   Pulse  Oximetry: 97 %       Physical Exam  Vitals and nursing note reviewed.   Constitutional:       General: He is not in acute distress.     Appearance: Normal appearance.   HENT:      Head: Normocephalic and atraumatic.      Mouth/Throat:      Pharynx: Oropharynx is clear.   Eyes:      Pupils: Pupils are equal, round, and reactive to light.   Neck:      Vascular: No carotid bruit.   Cardiovascular:      Rate and Rhythm: Normal rate and regular rhythm.      Heart sounds: Murmur heard.   Pulmonary:      Effort: Pulmonary effort is normal. No respiratory distress.      Breath sounds: Rales present. No wheezing.   Abdominal:      General: Abdomen is flat. Bowel sounds are normal. There is no distension.      Palpations: Abdomen is soft. There is no mass.      Tenderness: There is no abdominal tenderness.   Musculoskeletal:         General: Swelling present. Normal range of motion.      Cervical back: Neck supple.      Right lower leg: Edema present.      Left lower leg: Edema present.   Skin:     General: Skin is warm and dry.   Neurological:      General: No focal deficit present.      Mental Status: He is alert and oriented to person, place, and time.   Psychiatric:         Mood and Affect: Mood normal.         Behavior: Behavior normal.       Laboratory:  Recent Labs     09/08/22 2101   WBC 5.1   RBC 4.90   HEMOGLOBIN 14.7   HEMATOCRIT 45.3   MCV 92.4   MCH 30.0   MCHC 32.5*   RDW 61.0*   PLATELETCT 182   MPV 10.4     Recent Labs     09/08/22  2101   SODIUM 131*   POTASSIUM 4.4   CHLORIDE 98   CO2 21   GLUCOSE 112*   BUN 21   CREATININE 0.99   CALCIUM 9.1     Recent Labs     09/08/22 2101   ALTSGPT 26   ASTSGOT 39   ALKPHOSPHAT 153*   TBILIRUBIN 3.1*   GLUCOSE 112*         Recent Labs     09/08/22 2101   NTPROBNP 1764*         Recent Labs     09/08/22  2101 09/09/22  0250 09/09/22  0625   TROPONINT 36* 37* 38*       Imaging:  EC-ECHOCARDIOGRAM COMPLETE W/O CONT    (Results Pending)   US-EXTREMITY VENOUS LOWER UNILAT  LEFT    (Results Pending)       X-Ray:  I have personally reviewed the images and compared with prior images.  EKG:  I have personally reviewed the images and compared with prior images.    Assessment/Plan:  Justification for Admission Status  I anticipate this patient will require at least two midnights for appropriate medical management, necessitating inpatient admission because acute CHF exacerbation  requiring iv diuresis, O2 weaning and monitoring I&O    Patient will need a Telemetry bed on CARDIOLOGY service .  The need is secondary to CHF exacerbation.    * Acute on chronic combined systolic and diastolic CHF (congestive heart failure) (HCC)  Assessment & Plan  Echo 6/2021: Left ventricular ejection fraction is visually estimated to be 15%. Grade III diastolic dysfunction. Moderate MR/TR  ProBNP 1764  CXR and CTA c/w pulmonary edema. No evidence of acute PE  UDS neg    Cont iv diuresis  I&O and daily weight  Repeat Echo  Cont GDMT. Per patient, he is on Entresto and lisinopril. Not on BB at home. Per cardiology Dr. Arriaga note in 11/2021, patient is supposed on Toprol 200 mg po daily. I will discontinue lisinopril and cont Entresto. Will resume Toprol 200mg once euvolemic, probably tomorrow 9/10.   Outpatient cardiology follow up.     Nonischemic cardiomyopathy (HCC)  Assessment & Plan  Nonischemic, following Dr. Bart Avila above plans    Severe mitral regurgitation- (present on admission)  Assessment & Plan  Following Dr. Arriaga cardiology     Hypomagnesemia  Assessment & Plan  Replace as indicated    Total bilirubin, elevated- (present on admission)  Assessment & Plan  Normal LFT. Denies abdominal pain. Abdomen exam benign. Hepatitis panel negative 2021. Suspecting hepatic congestion from CHF  Will repeat lab am and cont monitoring    Paroxysmal atrial fibrillation (HCC)- (present on admission)  Assessment & Plan  Rate controlled.   Cont Eliquis and digoxin    Essential hypertension- (present on admission)  Assessment  & Plan  Cont Entresto, Aldactone and prn HTN meds    AICD (automatic cardioverter/defibrillator) present- (present on admission)  Assessment & Plan  In place    Type 2 diabetes mellitus with renal complication (HCC)- (present on admission)  Assessment & Plan  Check A1c  Cont Lantus, SSI and Farxiga with hypoglycemic protocol       VTE prophylaxis: therapeutic anticoagulation with Eliquis

## 2022-09-09 NOTE — DISCHARGE PLANNING
Received text from Dr. Orozco.to transfer pt to [facility].  [place] form, Facesheets, MAR, & Approved service in packet to go with pt.

## 2022-09-09 NOTE — CARE PLAN
The patient is Stable - Low risk of patient condition declining or worsening    Shift Goals  Clinical Goals: jake Wagoner  Patient Goals: Get fluid off  Family Goals: JUDY    Progress made toward(s) clinical / shift goals:  Patient is alert and oriented. He is tolerating food and fluids. Tolerates lasix and has adequate output. Patient reports BLE edema is improving. He is no longer requiring oxygen and says he is able to breath much better.     Patient is progressing towards the following goals:      Problem: Knowledge Deficit - Standard  Goal: Patient and family/care givers will demonstrate understanding of plan of care, disease process/condition, diagnostic tests and medications  Outcome: Progressing     Problem: Communication  Goal: The ability to communicate needs accurately and effectively will improve  Outcome: Progressing     Problem: Discharge Barriers/Planning  Goal: Patient's continuum of care needs are met  Outcome: Progressing     Problem: Hemodynamics  Goal: Patient's hemodynamics, fluid balance and neurologic status will be stable or improve  Outcome: Progressing     Problem: Respiratory  Goal: Patient will achieve/maintain optimum respiratory ventilation and gas exchange  Outcome: Progressing     Problem: Fluid Volume  Goal: Fluid volume balance will be maintained  Outcome: Progressing     Problem: Nutrition  Goal: Patient's nutritional and fluid intake will be adequate or improve  Outcome: Progressing     Problem: Self Care  Goal: Patient will have the ability to perform ADLs independently or with assistance (bathe, groom, dress, toilet and feed)  Outcome: Progressing

## 2022-09-09 NOTE — ASSESSMENT & PLAN NOTE
Nonischemic, myopathy, unknown etiology per patient.  With atrial fibrillation and history of V. tach in the past status post AICD placement.  See heart failure exacerbation problem for more details.

## 2022-09-09 NOTE — ASSESSMENT & PLAN NOTE
Normal LFT. Denies abdominal pain. Abdomen exam benign. Hepatitis panel negative 2021. Suspecting hepatic congestion from CHF  Will repeat lab am and cont monitoring

## 2022-09-09 NOTE — ASSESSMENT & PLAN NOTE
Echo 6/2021: Left ventricular ejection fraction is visually estimated to be 15%. Grade III diastolic dysfunction. Moderate MR/TR  ProBNP 1764  CXR and CTA c/w pulmonary edema. No evidence of acute PE  UDS neg    Cont iv diuresis  I&O and daily weight  Repeat Echo  Cont GDMT. Per patient, he is on Entresto and lisinopril. Not on BB at home. Per cardiology Dr. Arriaga note in 11/2021, patient is supposed on Toprol 200 mg po daily. I will discontinue lisinopril and cont Entresto. Will resume Toprol 200mg once euvolemic, probably tomorrow 9/10.   Outpatient cardiology follow up.

## 2022-09-10 VITALS
OXYGEN SATURATION: 96 % | DIASTOLIC BLOOD PRESSURE: 80 MMHG | HEIGHT: 69 IN | BODY MASS INDEX: 30.99 KG/M2 | HEART RATE: 80 BPM | RESPIRATION RATE: 14 BRPM | WEIGHT: 209.22 LBS | SYSTOLIC BLOOD PRESSURE: 124 MMHG | TEMPERATURE: 97.8 F

## 2022-09-10 LAB
ALBUMIN SERPL BCP-MCNC: 3.5 G/DL (ref 3.2–4.9)
ALBUMIN/GLOB SERPL: 1 G/DL
ALP SERPL-CCNC: 149 U/L (ref 30–99)
ALT SERPL-CCNC: 21 U/L (ref 2–50)
ANION GAP SERPL CALC-SCNC: 11 MMOL/L (ref 7–16)
AST SERPL-CCNC: 35 U/L (ref 12–45)
BASOPHILS # BLD AUTO: 0.6 % (ref 0–1.8)
BASOPHILS # BLD: 0.03 K/UL (ref 0–0.12)
BILIRUB SERPL-MCNC: 1.7 MG/DL (ref 0.1–1.5)
BUN SERPL-MCNC: 23 MG/DL (ref 8–22)
CALCIUM SERPL-MCNC: 9 MG/DL (ref 8.4–10.2)
CHLORIDE SERPL-SCNC: 96 MMOL/L (ref 96–112)
CO2 SERPL-SCNC: 23 MMOL/L (ref 20–33)
CREAT SERPL-MCNC: 1.07 MG/DL (ref 0.5–1.4)
EOSINOPHIL # BLD AUTO: 0.14 K/UL (ref 0–0.51)
EOSINOPHIL NFR BLD: 3 % (ref 0–6.9)
ERYTHROCYTE [DISTWIDTH] IN BLOOD BY AUTOMATED COUNT: 57.8 FL (ref 35.9–50)
GFR SERPLBLD CREATININE-BSD FMLA CKD-EPI: 82 ML/MIN/1.73 M 2
GLOBULIN SER CALC-MCNC: 3.5 G/DL (ref 1.9–3.5)
GLUCOSE BLD STRIP.AUTO-MCNC: 119 MG/DL (ref 65–99)
GLUCOSE SERPL-MCNC: 125 MG/DL (ref 65–99)
HCT VFR BLD AUTO: 47.9 % (ref 42–52)
HGB BLD-MCNC: 15.5 G/DL (ref 14–18)
IMM GRANULOCYTES # BLD AUTO: 0.01 K/UL (ref 0–0.11)
IMM GRANULOCYTES NFR BLD AUTO: 0.2 % (ref 0–0.9)
LYMPHOCYTES # BLD AUTO: 1.53 K/UL (ref 1–4.8)
LYMPHOCYTES NFR BLD: 32.4 % (ref 22–41)
MCH RBC QN AUTO: 29.5 PG (ref 27–33)
MCHC RBC AUTO-ENTMCNC: 32.4 G/DL (ref 33.7–35.3)
MCV RBC AUTO: 91.1 FL (ref 81.4–97.8)
MONOCYTES # BLD AUTO: 0.72 K/UL (ref 0–0.85)
MONOCYTES NFR BLD AUTO: 15.3 % (ref 0–13.4)
NEUTROPHILS # BLD AUTO: 2.29 K/UL (ref 1.82–7.42)
NEUTROPHILS NFR BLD: 48.5 % (ref 44–72)
NRBC # BLD AUTO: 0 K/UL
NRBC BLD-RTO: 0 /100 WBC
NT-PROBNP SERPL IA-MCNC: 1053 PG/ML (ref 0–125)
PLATELET # BLD AUTO: 195 K/UL (ref 164–446)
PMV BLD AUTO: 10.6 FL (ref 9–12.9)
POTASSIUM SERPL-SCNC: 3.7 MMOL/L (ref 3.6–5.5)
PROT SERPL-MCNC: 7 G/DL (ref 6–8.2)
RBC # BLD AUTO: 5.26 M/UL (ref 4.7–6.1)
SODIUM SERPL-SCNC: 130 MMOL/L (ref 135–145)
WBC # BLD AUTO: 4.7 K/UL (ref 4.8–10.8)

## 2022-09-10 PROCEDURE — 700102 HCHG RX REV CODE 250 W/ 637 OVERRIDE(OP): Performed by: STUDENT IN AN ORGANIZED HEALTH CARE EDUCATION/TRAINING PROGRAM

## 2022-09-10 PROCEDURE — 700111 HCHG RX REV CODE 636 W/ 250 OVERRIDE (IP): Performed by: STUDENT IN AN ORGANIZED HEALTH CARE EDUCATION/TRAINING PROGRAM

## 2022-09-10 PROCEDURE — 99239 HOSP IP/OBS DSCHRG MGMT >30: CPT | Performed by: STUDENT IN AN ORGANIZED HEALTH CARE EDUCATION/TRAINING PROGRAM

## 2022-09-10 PROCEDURE — 94760 N-INVAS EAR/PLS OXIMETRY 1: CPT

## 2022-09-10 PROCEDURE — 36415 COLL VENOUS BLD VENIPUNCTURE: CPT

## 2022-09-10 PROCEDURE — 82962 GLUCOSE BLOOD TEST: CPT

## 2022-09-10 PROCEDURE — 80053 COMPREHEN METABOLIC PANEL: CPT

## 2022-09-10 PROCEDURE — A9270 NON-COVERED ITEM OR SERVICE: HCPCS | Performed by: STUDENT IN AN ORGANIZED HEALTH CARE EDUCATION/TRAINING PROGRAM

## 2022-09-10 PROCEDURE — 83880 ASSAY OF NATRIURETIC PEPTIDE: CPT

## 2022-09-10 PROCEDURE — 85025 COMPLETE CBC W/AUTO DIFF WBC: CPT

## 2022-09-10 RX ORDER — FUROSEMIDE 20 MG/1
20 TABLET ORAL DAILY
Qty: 90 TABLET | Refills: 3 | Status: SHIPPED | OUTPATIENT
Start: 2022-09-17 | End: 2022-09-19 | Stop reason: SDUPTHER

## 2022-09-10 RX ORDER — FUROSEMIDE 40 MG/1
40 TABLET ORAL 2 TIMES DAILY
Qty: 14 TABLET | Refills: 0 | Status: SHIPPED | OUTPATIENT
Start: 2022-09-10 | End: 2022-09-17

## 2022-09-10 RX ADMIN — ATORVASTATIN CALCIUM 40 MG: 40 TABLET, FILM COATED ORAL at 05:41

## 2022-09-10 RX ADMIN — DIGOXIN 125 MCG: 125 TABLET ORAL at 05:42

## 2022-09-10 RX ADMIN — SPIRONOLACTONE 25 MG: 25 TABLET ORAL at 05:41

## 2022-09-10 RX ADMIN — ISOSORBIDE DINITRATE 10 MG: 10 TABLET ORAL at 05:41

## 2022-09-10 RX ADMIN — FUROSEMIDE 40 MG: 10 INJECTION, SOLUTION INTRAMUSCULAR; INTRAVENOUS at 05:41

## 2022-09-10 RX ADMIN — HYDRALAZINE HYDROCHLORIDE 25 MG: 25 TABLET, FILM COATED ORAL at 05:42

## 2022-09-10 RX ADMIN — DAPAGLIFLOZIN 10 MG: 10 TABLET, FILM COATED ORAL at 05:41

## 2022-09-10 RX ADMIN — SACUBITRIL AND VALSARTAN 1 TABLET: 24; 26 TABLET, FILM COATED ORAL at 05:41

## 2022-09-10 RX ADMIN — APIXABAN 5 MG: 5 TABLET, FILM COATED ORAL at 05:42

## 2022-09-10 ASSESSMENT — PAIN DESCRIPTION - PAIN TYPE: TYPE: ACUTE PAIN

## 2022-09-10 ASSESSMENT — FIBROSIS 4 INDEX: FIB4 SCORE: 2.12

## 2022-09-10 NOTE — DISCHARGE INSTRUCTIONS
Discharge Instructions    Discharged to home by car with relative. Discharged via wheelchair, hospital escort: Yes.  Special equipment needed: Not Applicable    Be sure to schedule a follow-up appointment with your primary care doctor or any specialists as instructed.     Discharge Plan:   Diet Plan: Discussed  Activity Level: Discussed  Confirmed Follow up Appointment: Appointment Scheduled  Confirmed Symptoms Management: Discussed  Medication Reconciliation Updated: Yes    I understand that a diet low in cholesterol, fat, and sodium is recommended for good health. Unless I have been given specific instructions below for another diet, I accept this instruction as my diet prescription.   Other diet: Cardiac      Special Instructions:     HF Patient Discharge Instructions  Monitor your weight daily, and maintain a weight chart, to track your weight changes.   Activity as tolerated, unless your Doctor has ordered otherwise. Other activity order: As tolerated.  Follow a low fat, low cholesterol, low salt diet unless instructed otherwise by your Doctor. Read the labels on the back of food products and track your intake of fat, cholesterol and salt.   Fluid Restriction Yes. If a Fluid Restriction has been ordered by your Doctor, measure fluids with a measuring cup to ensure that you are not exceeding the restriction.   No smoking.  Oxygen No. If your Doctor has ordered that you wear Oxygen at home, it is important to wear it as ordered.  Did you receive an explanation from staff on the importance of taking each of your medications and why it is necessary to stay on the medications the physician/care provider has ordered? Yes  Do you have any questions concerning how to manage your heart failure and what to do should you have any increased signs and symptoms after you go home? No  Do you feel like your heart failure care team involved you in the care treatment plan and allowed you to make decisions regarding your care  while in the hospital and addressed any discharge needs you might have? Yes    See the educational handout provided at discharge for more information on monitoring your daily weight, activity and diet. This also explains more about Heart Failure, symptoms of a flare-up and some of the tests that you have undergone.     Warning Signs of a Flare-Up include:  Swelling in the ankles or lower legs.  Shortness of breath, while at rest, or while doing normal activities.   Shortness of breath at night when in bed, or coughing in bed.   Requiring more pillows to sleep at night, or needing to sit up at night to sleep.  Feeling weak, dizzy or fatigued.     When to call your Doctor:  Call [facility] about questions regarding the discharge instructions you were given [phone].  (Discharge Unit [phone])  Call your Primary Care Physician or Cardiologist if:   You experience any pain radiating to your jaw or neck.  You have any difficulty breathing.  You experience weight gain of 2 lbs in a day or 5 lbs in a week.   You feel any palpitations or irregular heartbeats.  You become dizzy or lose consciousness.   If you have had an angiogram or had a pacemaker or AICD placed, and experience:  Bleeding, drainage or swelling at the surgical / puncture site.  Fever greater than 100.0 F  Shock from internal defibrillator.  Cool and / or numb extremities.    -Is this patient being discharged with medication to prevent blood clots?  No    Is patient discharged on Warfarin / Coumadin?   No     Furosemide tablets  What is this medicine?  FUROSEMIDE (fyoor [place] se mide) is a diuretic. It helps you make more urine and to lose salt and excess water from your body. This medicine is used to treat high blood pressure, and edema or swelling from heart, kidney, or liver disease.  This medicine may be used for other purposes; ask your health care provider or pharmacist if you have questions.  COMMON BRAND NAME(S):  Active-Medicated Specimen Kit, Delone, Diuscreen, Lasix, RX Specimen Collection Kit, Specimen Collection Kit, URINX Medicated Specimen Collection  What should I tell my health care provider before I take this medicine?  They need to know if you have any of these conditions:  abnormal blood electrolytes  diarrhea or vomiting  gout  heart disease  kidney disease, small amounts of urine, or difficulty passing urine  liver disease  thyroid disease  an unusual or allergic reaction to furosemide, sulfa drugs, other medicines, foods, dyes, or preservatives  pregnant or trying to get pregnant  breast-feeding  How should I use this medicine?  Take this medicine by mouth with a glass of water. Follow the directions on the prescription label. You may take this medicine with or without food. If it upsets your stomach, take it with food or milk. Do not take your medicine more often than directed. Remember that you will need to pass more urine after taking this medicine. Do not take your medicine at a time of day that will cause you problems. Do not take at bedtime.  Talk to your pediatrician regarding the use of this medicine in children. While this drug may be prescribed for selected conditions, precautions do apply.  Overdosage: If you think you have taken too much of this medicine contact a poison control center or emergency room at once.  NOTE: This medicine is only for you. Do not share this medicine with others.  What if I miss a dose?  If you miss a dose, take it as soon as you can. If it is almost time for your next dose, take only that dose. Do not take double or extra doses.  What may interact with this medicine?  aspirin and aspirin-like medicines  certain antibiotics  chloral hydrate  cisplatin  cyclosporine  digoxin  diuretics  laxatives  lithium  medicines for blood pressure  medicines that relax muscles for surgery  methotrexate  NSAIDs, medicines for pain and inflammation like ibuprofen, naproxen, or  indomethacin  phenytoin  steroid medicines like prednisone or cortisone  sucralfate  thyroid hormones  This list may not describe all possible interactions. Give your health care provider a list of all the medicines, herbs, non-prescription drugs, or dietary supplements you use. Also tell them if you smoke, drink alcohol, or use illegal drugs. Some items may interact with your medicine.  What should I watch for while using this medicine?  Visit your doctor or health care provider for regular checks on your progress. Check your blood pressure regularly. Ask your doctor or health care provider what your blood pressure should be, and when you should contact him or her. If you are a diabetic, check your blood sugar as directed.  This medicine may cause serious skin reactions. They can happen weeks to months after starting the medicine. Contact your health care provider right away if you notice fevers or flu-like symptoms with a rash. The rash may be red or purple and then turn into blisters or peeling of the skin. Or, you might notice a red rash with swelling of the face, lips or lymph nodes in your neck or under your arms.  You may need to be on a special diet while taking this medicine. Check with your doctor. Also, ask how many glasses of fluid you need to drink a day. You must not get dehydrated.  You may get drowsy or dizzy. Do not drive, use machinery, or do anything that needs mental alertness until you know how this drug affects you. Do not stand or sit up quickly, especially if you are an older patient. This reduces the risk of dizzy or fainting spells. Alcohol can make you more drowsy and dizzy. Avoid alcoholic drinks.  This medicine can make you more sensitive to the sun. Keep out of the sun. If you cannot avoid being in the sun, wear protective clothing and use sunscreen. Do not use sun lamps or tanning beds/booths.  What side effects may I notice from receiving this medicine?  Side effects that you should  report to your doctor or health care professional as soon as possible:  blood in urine or stools  dry mouth  fever or chills  hearing loss or ringing in the ears  irregular heartbeat  muscle pain or weakness, cramps  rash, fever, and swollen lymph nodes  redness, blistering, peeling or loosening of the skin, including inside the mouth  skin rash  stomach upset, pain, or nausea  tingling or numbness in the hands or feet  unusually weak or tired  vomiting or diarrhea  yellowing of the eyes or skin  Side effects that usually do not require medical attention (report to your doctor or health care professional if they continue or are bothersome):  headache  loss of appetite  unusual bleeding or bruising  This list may not describe all possible side effects. Call your doctor for medical advice about side effects. You may report side effects to FDA at 0-783-FDA-0174.  Where should I keep my medicine?  Keep out of the reach of children.  Store at room temperature between 15 and 30 degrees C (59 and 86 degrees F). Protect from light. Throw away any unused medicine after the expiration date.  NOTE: This sheet is a summary. It may not cover all possible information. If you have questions about this medicine, talk to your doctor, pharmacist, or health care provider.  © 2020 [employer] Standard (2020-03-20 14:04:13)      Heart Failure Eating Plan  Heart failure, also called congestive heart failure, occurs when your heart does not pump blood well enough to meet your body's needs for oxygen-rich blood. Heart failure is a long-term (chronic) condition. Living with heart failure can be challenging. However, following your health care provider's instructions about a healthy lifestyle and working with a diet and nutrition specialist (dietitian) to choose the right foods may help to improve your symptoms.  What are tips for following this plan?  Reading food labels  Check food labels for the amount of sodium per serving. Choose  "foods that have less than 140 mg (milligrams) of sodium in each serving.  Check food labels for the number of calories per serving. This is important if you need to limit your daily calorie intake to lose weight.  Check food labels for the serving size. If you eat more than one serving, you will be eating more sodium and calories than what is listed on the label.  Look for foods that are labeled as \"sodium-free,\" \"very low sodium,\" or \"low sodium.\"  Foods labeled as \"reduced sodium\" or \"lightly salted\" may still have more sodium than what is recommended for you.  Cooking  Avoid adding salt when cooking. Ask your health care provider or dietitian before using salt substitutes.  Season food with salt-free seasonings, spices, or herbs. Check the label of seasoning mixes to make sure they do not contain salt.  Cook with heart-healthy oils, such as olive, canola, soybean, or sunflower oil.  Do not christie foods. Cook foods using low-fat methods, such as baking, boiling, grilling, and broiling.  Limit unhealthy fats when cooking by:  Removing the skin from poultry, such as chicken.  Removing all visible fats from meats.  Skimming the fat off from stews, soups, and gravies before serving them.  Meal planning    Limit your intake of:  Processed, canned, or pre-packaged foods.  Foods that are high in trans fat, such as fried foods.  Sweets, desserts, sugary drinks, and other foods with added sugar.  Full-fat dairy products, such as whole milk.  Eat a balanced diet that includes:  4-5 servings of fruit each day and 4-5 servings of vegetables each day. At each meal, try to fill half of your plate with fruits and vegetables.  Up to 6-8 servings of whole grains each day.  Up to 2 servings of lean meat, poultry, or fish each day. One serving of meat is equal to 3 oz. This is about the same size as a deck of cards.  2 servings of low-fat dairy each day.  Heart-healthy fats. Healthy fats called omega-3 fatty acids are found in foods " such as flaxseed and cold-water fish like sardines, salmon, and mackerel.  Aim to eat 25-35 g (grams) of fiber a day. Foods that are high in fiber include apples, broccoli, carrots, beans, peas, and whole grains.  Do not add salt or condiments that contain salt (such as soy sauce) to foods before eating.  When eating at a restaurant, ask that your food be prepared with less salt or no salt, if possible.  Try to eat 2 or more vegetarian meals each week.  Eat more home-cooked food and eat less restaurant, buffet, and fast food.  General information  Do not eat more than 2,300 mg of salt (sodium) a day. The amount of sodium that is recommended for you may be lower, depending on your condition.  Maintain a healthy body weight as directed. Ask your health care provider what a healthy weight is for you.  Check your weight every day.  Work with your health care provider and dietitian to make a plan that is right for you to lose weight or maintain your current weight.  Limit how much fluid you drink. Ask your health care provider or dietitian how much fluid you can have each day.  Limit or avoid alcohol as told by your health care provider or dietitian.  Recommended foods  The items listed may not be a complete list. Talk with your dietitian about what dietary choices are best for you.  Fruits  All fresh, frozen, and canned fruits. Dried fruits, such as raisins, prunes, and cranberries.  Vegetables  All fresh vegetables. Vegetables that are frozen without sauce or added salt. Low-sodium or sodium-free canned vegetables.  Grains  Bread with less than 80 mg of sodium per slice. Whole-wheat pasta, quinoa, and brown rice. Oats and oatmeal. Barley. Millet. Grits and cream of wheat. Whole-grain and whole-wheat cold cereal.  Meats and other protein foods  Lean cuts of meat. Skinless chicken and turkey. Fish with high omega-3 fatty acids, such as salmon, sardines, and other cold-water fishes. Eggs. Dried beans, peas, and edamame.  Unsalted nuts and nut butters.  Dairy  Low-fat or nonfat (skim) milk and dried milk. Rice milk, soy milk, and almond milk. Low-fat or nonfat yogurt. Small amounts of reduced-sodium block cheese. Low-sodium cottage cheese.  Fats and oils  Olive, canola, soybean, flaxseed, or sunflower oil. Avocado.  Sweets and desserts  Apple sauce. Granola bars. Sugar-free pudding and gelatin. Frozen fruit bars.  Seasoning and other foods  Fresh and dried herbs. Lemon or lime juice. Vinegar. Low-sodium ketchup. Salt-free marinades, salad dressings, sauces, and seasonings.  The items listed above may not be a complete list of foods and beverages you can eat. Contact a dietitian for more information.  Foods to avoid  The items listed may not be a complete list. Talk with your dietitian about what dietary choices are best for you.  Fruits  Fruits that are dried with sodium-containing preservatives.  Vegetables  Canned vegetables. Frozen vegetables with sauce or seasonings. Creamed vegetables. French fries. Onion rings. Pickled vegetables and sauerkraut.  Grains  Bread with more than 80 mg of sodium per slice. Hot or cold cereal with more than 140 mg sodium per serving. Salted pretzels and crackers. Pre-packaged breadcrumbs. Bagels, croissants, and biscuits.  Meats and other protein foods  Ribs and chicken wings. [place], ham, pepperoni, bologna, salami, and packaged luncheon meats. Hot dogs, bratwurst, and sausage. Canned meat. Smoked meat and fish. Salted nuts and seeds.  Dairy  Whole milk, half-and-half, and cream. Buttermilk. Processed cheese, cheese spreads, and cheese curds. Regular cottage cheese. Feta cheese. Shredded cheese. String cheese.  Fats and oils  Butter, lard, shortening, ghee, and [place]ag[place] fat. Canned and packaged gravies.  Seasoning and other foods  Onion salt, garlic salt, table salt, and sea salt. Marinades. Regular salad dressings. Relishes, pickles, and olives. Meat flavorings and tenderizers, and bouillon cubes.  Horseradish, ketchup, and mustard. [facility] sauce. Teriyaki sauce, soy sauce (including reduced sodium). Hot sauce and Tabasco sauce. Steak sauce, fish sauce, oyster sauce, and cocktail sauce. Taco seasonings. Barbecue sauce. Tartar sauce.  The items listed above may not be a complete list of foods and beverages you should avoid. Contact a dietitian for more information.  Summary  A heart failure eating plan includes changes that limit your intake of sodium and unhealthy fat, and it may help you lose weight or maintain a healthy weight. Your health care provider may also recommend limiting how much fluid you drink.  Most people with heart failure should eat no more than 2,300 mg of salt (sodium) a day. The amount of sodium that is recommended for you may be lower, depending on your condition.  Contact your health care provider or dietitian before making any major changes to your diet.  This information is not intended to replace advice given to you by your health care provider. Make sure you discuss any questions you have with your health care provider.  Document Released: 05/04/2018 Document Revised: 02/13/2020 Document Reviewed: 05/04/2018  Elsevier Patient Education © 2020 [employer]

## 2022-09-10 NOTE — CARE PLAN
The patient is Stable - Low risk of patient condition declining or worsening    Shift Goals  Clinical Goals: I&Os, diuretics  Patient Goals: rest  Family Goals: N/A    Progress made toward(s) clinical / shift goals:    Problem: Knowledge Deficit - Standard  Goal: Patient and family/care givers will demonstrate understanding of plan of care, disease process/condition, diagnostic tests and medications  Outcome: Progressing     Problem: Care Map:  Admission Optimal Outcome for the Heart Failure Patient  Goal: Admission:  Optimal Care of the heart failure patient  Outcome: Progressing     Problem: Care Map:  Day 1 Optimal Outcome for the Heart Failure Patient  Goal: Day 1:  Optimal Care of the heart failure patient  Outcome: Progressing     Problem: Care Map:  Day 2 Optimal Outcome for the Heart Failure Patient  Goal: Day 2:  Optimal Care of the heart failure patient  Outcome: Progressing     Problem: Care Map:  Day of Discharge Optimal Outcome for the Heart Failure Patient  Goal: Day of Discharge:  Optimal Care of the heart failure patient  Outcome: Progressing     Problem: Psychosocial  Goal: Patient's level of anxiety will decrease  Outcome: Progressing  Goal: Patient's ability to verbalize feelings about condition will improve  Outcome: Progressing  Goal: Patient's ability to re-evaluate and adapt role responsibilities will improve  Outcome: Progressing  Goal: Patient and family will demonstrate ability to cope with life altering diagnosis and/or procedure  Outcome: Progressing  Goal: Spiritual and cultural needs incorporated into hospitalization  Outcome: Progressing     Problem: Communication  Goal: The ability to communicate needs accurately and effectively will improve  Outcome: Progressing     Problem: Discharge Barriers/Planning  Goal: Patient's continuum of care needs are met  Outcome: Progressing       Patient is not progressing towards the following goals:

## 2022-09-10 NOTE — PROGRESS NOTES
Telemetry Shift Summary    Rhythm   HR Range 70-80  Ectopy fPVC, oTRIG, oCOUP  Measurements -/0.16/-        Normal Values  Rhythm SR  HR Range    Measurements 0.12-0.20 / 0.06-0.10  / 0.30-0.52

## 2022-09-10 NOTE — PROGRESS NOTES
Telemetry Shift Summary    Rhythm: AV paced  HR Range: 80  Ectopy: r PVC, r couplet   Measurements: -/0.16/-

## 2022-09-10 NOTE — PROGRESS NOTES
Received verbal that patient will discharge today; tele removed, IV removed, patient getting dressed

## 2022-09-10 NOTE — PROGRESS NOTES
Telemetry Shift Summary    Rhythm , AFIB  HR Range 79-80  Ectopy oPVC, rBIG, rTRIG, rCOUP  Measurements -/0.18/-        Normal Values  Rhythm SR  HR Range    Measurements 0.12-0.20 / 0.06-0.10  / 0.30-0.52

## 2022-09-10 NOTE — DISCHARGE SUMMARY
Discharge Summary    CHIEF COMPLAINT ON ADMISSION  No chief complaint on file.      Reason for Admission  CHF exacerbation     Admission Date  9/9/2022    CODE STATUS  Full Code    HPI & HOSPITAL COURSE  Lai Dailey is a 52 y.o. male with past medical history of nonischemic cardiomyopathy, combined systolic and diastolic heart failure prior EF 15%, mitral valve regurgitation and atrial fibrillation on Eliquis, AICD in place, HTN, HLD, DM2, who presented 9/9/2022 with worsening leg swelling and sob.  Patient is a direct admit from [facility].   Patient reports in the past few days, he has been having progressively worsening sob and BLE swelling. He has gained 19lbs in the past few days. He tried to increased his home dose lasix, however without significant improvement.  Denies fever, chills, chest pain, nausea, vomiting or abdominal pain..   In the ED,  Initial work-up with unremarkable CBC, sodium 131, total bilirubin 3.1, ALT/AST normal, magnesium 1.4, proBNP 1764. CTA notes decreased opacification of a right lower lobe segmental branch is likely artifactual. No definite evidence of acute pulmonary embolism. Marked cardiomyopathy.     Patient successfully diuresed he is back to his baseline oxygen level and no longer feels short of breath.  He does have some residual lower extremity edema and will take Lasix 40 mg IV twice daily for 1 week.  He will continue his home medications.  Patient reports he is probably not on lisinopril.  He is definitely on Entresto, I explained to him that in Entresto and lisinopril cannot be combined and he reports that he understands and will not take any lisinopril.  Prescription sent to patient's pharmacy.      Therefore, he is discharged in good and stable condition to home with close outpatient follow-up.    The patient recovered much more quickly than anticipated on admission.    Discharge Date  9/10/22    FOLLOW UP ITEMS POST DISCHARGE  Acute on chronic  systolic heart failure-take medications as prescribed.    DISCHARGE DIAGNOSES  Principal Problem:    Acute on chronic combined systolic and diastolic CHF (congestive heart failure) (HCC) POA: Unknown  Active Problems:    Type 2 diabetes mellitus with renal complication (HCC) POA: Yes      Overview: Patient has a history of diabetes type 2  On treatment with metformin.      Denies any sequalae of neuropathy ,nephropathy,or retinopathy.      Patient had a blood glucose of    AICD (automatic cardioverter/defibrillator) present POA: Yes    Nonischemic cardiomyopathy (HCC) POA: Unknown      Overview: Non-ischemic, unknown etiology per patient.     Essential hypertension POA: Yes    Paroxysmal atrial fibrillation (HCC) POA: Yes    Total bilirubin, elevated POA: Yes    Hypomagnesemia POA: Unknown    Severe mitral regurgitation POA: Yes  Resolved Problems:    * No resolved hospital problems. *      FOLLOW UP  Future Appointments   Date Time Provider Department Center   9/19/2022  4:15 PM SANYA Beverly [facility] None     SANYA Beverly  [address] [facility] [address]  [facility] [address]  [phone]    Go on 9/19/2022        MEDICATIONS ON DISCHARGE     Medication List        CHANGE how you take these medications        Instructions   * furosemide 40 MG Tabs  What changed: You were already taking a medication with the same name, and this prescription was added. Make sure you understand how and when to take each.  Commonly known as: LASIX   Take 1 Tablet by mouth 2 times a day for 7 days.  Dose: 40 mg     * furosemide 20 MG Tabs  Start taking on: September 17, 2022  What changed: These instructions start on September 17, 2022. If you are unsure what to do until then, ask your doctor or other care provider.  Commonly known as: LASIX   Take 1 Tablet by mouth every day.  Dose: 20 mg           * This list has 2 medication(s) that are the same as other medications prescribed for you. Read the directions carefully,  and ask your doctor or other care provider to review them with you.                CONTINUE taking these medications        Instructions   apixaban 5mg Tabs  Commonly known as: ELIQUIS   Take 1 tablet by mouth 2 Times a Day.  Dose: 5 mg     atorvastatin 40 MG Tabs  Commonly known as: LIPITOR   Take 1 Tablet by mouth every day.  Dose: 40 mg     digoxin 125 MCG Tabs  Commonly known as: LANOXIN   Take 1 tablet by mouth every morning.  Dose: 125 mcg     Entresto 24-26 MG Tabs  Generic drug: sacubitril-valsartan   Take 1 Tablet by mouth 2 times a day.  Dose: 1 Tablet     hydrALAZINE 25 MG Tabs  Commonly known as: APRESOLINE   Take 1 tablet by mouth 3 times a day.  Dose: 25 mg     insulin glargine 100 UNIT/ML Soln  Commonly known as: Lantus   Inject 10 Units as instructed every evening.  Dose: 10 Units     isosorbide dinitrate 10 MG Tabs  Commonly known as: ISORDIL   Take 1 tablet by mouth 3 times a day.  Dose: 10 mg     Jardiance 10 MG Tabs  Generic drug: Empagliflozin   Take 10 mg by mouth every day.  Dose: 10 mg     metFORMIN 500 MG Tabs  Commonly known as: GLUCOPHAGE   Take 1 Tab by mouth 2 times a day, with meals.  Dose: 500 mg     multivitamin Tabs   Take 1 tablet by mouth every day.  Dose: 1 Tablet     spironolactone 25 MG Tabs  Commonly known as: ALDACTONE   Take 1 tablet by mouth every day.  Dose: 25 mg            STOP taking these medications      lisinopril 10 MG Tabs  Commonly known as: PRINIVIL              Allergies  No Known Allergies    DIET  Orders Placed This Encounter   Procedures    Diet Order Diet: 2 Gram Sodium; Second Modifier: (optional): Consistent CHO (Diabetic); Fluid modifications: (optional): 1800 ml Fluid Restriction     Standing Status:   Standing     Number of Occurrences:   1     Order Specific Question:   Diet:     Answer:   2 Gram Sodium [7]     Order Specific Question:   Second Modifier: (optional)     Answer:   Consistent CHO (Diabetic) [4]     Order Specific Question:   Fluid  modifications: (optional)     Answer:   1800 ml Fluid Restriction [10]    Discontinue Diet Tray     Standing Status:   Standing     Number of Occurrences:   1       ACTIVITY  As tolerated.  Weight bearing as tolerated    CONSULTATIONS  none    PROCEDURES  none    LABORATORY  Lab Results   Component Value Date    SODIUM 130 (L) 09/10/2022    POTASSIUM 3.7 09/10/2022    CHLORIDE 96 09/10/2022    CO2 23 09/10/2022    GLUCOSE 125 (H) 09/10/2022    BUN 23 (H) 09/10/2022    CREATININE 1.07 09/10/2022        Lab Results   Component Value Date    WBC 4.7 (L) 09/10/2022    HEMOGLOBIN 15.5 09/10/2022    HEMATOCRIT 47.9 09/10/2022    PLATELETCT 195 09/10/2022        Total time of the discharge process exceeds 36 minutes.

## 2022-09-19 ENCOUNTER — OFFICE VISIT (OUTPATIENT)
Dept: CARDIOLOGY | Facility: MEDICAL CENTER | Age: 52
End: 2022-09-19
Payer: OTHER GOVERNMENT

## 2022-09-19 VITALS
RESPIRATION RATE: 16 BRPM | BODY MASS INDEX: 30.35 KG/M2 | HEART RATE: 97 BPM | WEIGHT: 212 LBS | DIASTOLIC BLOOD PRESSURE: 90 MMHG | HEIGHT: 70 IN | OXYGEN SATURATION: 98 % | SYSTOLIC BLOOD PRESSURE: 130 MMHG

## 2022-09-19 DIAGNOSIS — I50.22 CHRONIC HFREF (HEART FAILURE WITH REDUCED EJECTION FRACTION) (HCC): ICD-10-CM

## 2022-09-19 DIAGNOSIS — I48.0 PAROXYSMAL ATRIAL FIBRILLATION (HCC): ICD-10-CM

## 2022-09-19 DIAGNOSIS — I50.43 ACUTE ON CHRONIC COMBINED SYSTOLIC AND DIASTOLIC CHF (CONGESTIVE HEART FAILURE) (HCC): ICD-10-CM

## 2022-09-19 DIAGNOSIS — I34.0 SEVERE MITRAL REGURGITATION: ICD-10-CM

## 2022-09-19 DIAGNOSIS — I50.22 STAGE C CHRONIC SYSTOLIC CONGESTIVE HEART FAILURE (HCC): ICD-10-CM

## 2022-09-19 DIAGNOSIS — I42.8 NON-ISCHEMIC CARDIOMYOPATHY (HCC): ICD-10-CM

## 2022-09-19 DIAGNOSIS — I50.9 HEART FAILURE, NYHA CLASS 2 (HCC): ICD-10-CM

## 2022-09-19 DIAGNOSIS — Z79.899 HIGH RISK MEDICATION USE: ICD-10-CM

## 2022-09-19 DIAGNOSIS — R06.09 OTHER FORMS OF DYSPNEA: ICD-10-CM

## 2022-09-19 PROCEDURE — 99214 OFFICE O/P EST MOD 30 MIN: CPT | Mod: 25 | Performed by: NURSE PRACTITIONER

## 2022-09-19 PROCEDURE — 94618 PULMONARY STRESS TESTING: CPT | Performed by: NURSE PRACTITIONER

## 2022-09-19 RX ORDER — FUROSEMIDE 20 MG/1
20 TABLET ORAL DAILY
Qty: 90 TABLET | Refills: 3 | Status: SHIPPED | OUTPATIENT
Start: 2022-09-19 | End: 2023-06-12 | Stop reason: SDUPTHER

## 2022-09-19 RX ORDER — SACUBITRIL AND VALSARTAN 24; 26 MG/1; MG/1
1 TABLET, FILM COATED ORAL 2 TIMES DAILY
Qty: 60 TABLET | Refills: 3 | Status: SHIPPED | OUTPATIENT
Start: 2022-09-19 | End: 2022-09-21 | Stop reason: SDUPTHER

## 2022-09-19 RX ORDER — DIGOXIN 125 MCG
125 TABLET ORAL EVERY MORNING
Qty: 90 TABLET | Refills: 3 | Status: SHIPPED | OUTPATIENT
Start: 2022-09-19 | End: 2022-11-08

## 2022-09-19 RX ORDER — SPIRONOLACTONE 25 MG/1
25 TABLET ORAL DAILY
Qty: 90 TABLET | Refills: 3 | Status: SHIPPED | OUTPATIENT
Start: 2022-09-19 | End: 2023-06-12 | Stop reason: SDUPTHER

## 2022-09-19 RX ORDER — ATORVASTATIN CALCIUM 40 MG/1
40 TABLET, FILM COATED ORAL DAILY
Qty: 90 TABLET | Refills: 3 | Status: SHIPPED | OUTPATIENT
Start: 2022-09-19 | End: 2023-06-12 | Stop reason: SDUPTHER

## 2022-09-19 RX ORDER — EMPAGLIFLOZIN 10 MG/1
10 TABLET, FILM COATED ORAL DAILY
Qty: 30 TABLET | Refills: 3 | Status: SHIPPED | OUTPATIENT
Start: 2022-09-19 | End: 2023-06-12 | Stop reason: SDUPTHER

## 2022-09-19 RX ORDER — HYDRALAZINE HYDROCHLORIDE 25 MG/1
25 TABLET, FILM COATED ORAL 3 TIMES DAILY
Qty: 270 TABLET | Refills: 3 | Status: ON HOLD | OUTPATIENT
Start: 2022-09-19 | End: 2024-01-18

## 2022-09-19 RX ORDER — ISOSORBIDE DINITRATE 10 MG/1
10 TABLET ORAL 3 TIMES DAILY
Qty: 270 TABLET | Refills: 3 | Status: SHIPPED | OUTPATIENT
Start: 2022-09-19 | End: 2023-06-12 | Stop reason: SDUPTHER

## 2022-09-19 ASSESSMENT — ENCOUNTER SYMPTOMS
BLURRED VISION: 0
COUGH: 0
WEIGHT LOSS: 0
CLAUDICATION: 0
VOMITING: 0
FEVER: 0
NAUSEA: 0
BLOOD IN STOOL: 0
FALLS: 0
DIZZINESS: 0
LOSS OF CONSCIOUSNESS: 0
SHORTNESS OF BREATH: 0
PALPITATIONS: 0
PND: 0
MYALGIAS: 0
TINGLING: 0
ABDOMINAL PAIN: 0
BRUISES/BLEEDS EASILY: 0
ORTHOPNEA: 0

## 2022-09-19 ASSESSMENT — MINNESOTA LIVING WITH HEART FAILURE QUESTIONNAIRE (MLHF)
DIFFICULTY TO CONCENTRATE OR REMEMBERING THINGS: 1
DIFFICULTY SOCIALIZING WITH FAMILY OR FRIENDS: 4
DIFFICULTY SLEEPING WELL AT NIGHT: 4
MAKING YOU WORRY: 1
LOSS OF SELF CONTROL IN YOUR LIFE: 1
FEELING LIKE A BURDEN TO FAMILY AND FRIENDS: 3
COSTING YOU MONEY FOR MEDICAL CARE: 5
GIVING YOU SIDE EFFECTS FROM TREATMENTS: 3
WALKING ABOUT OR CLIMBING STAIRS DIFFICULT: 3
MAKING YOU SHORT OF BREATH: 3
DIFFICULTY WORKING TO EARN A LIVING: 5
WORKING AROUND THE HOUSE OR YARD DIFFICULT: 4
MAKING YOU FEEL DEPRESSED: 1
DIFFICULTY WITH RECREATIONAL PASTIMES, SPORTS, HOBBIES: 5
SWELLING IN ANKLES OR LEGS: 5
TOTAL_SCORE: 72
DIFFICULTY GOING AWAY FROM HOME: 3
MAKING YOU STAY IN A HOSPITAL: 5
DIFFICULTY WITH SEXUAL ACTIVITIES: 4
TIRED, FATIGUED OR LOW ON ENERGY: 4
EATING LESS FOODS YOU LIKE: 5
HAVING TO SIT OR LIE DOWN DURING THE DAY: 3

## 2022-09-19 ASSESSMENT — 6 MINUTE WALK TEST (6MWT): TOTAL DISTANCE WALKED (METERS): 411.48

## 2022-09-19 ASSESSMENT — FIBROSIS 4 INDEX: FIB4 SCORE: 2.12

## 2022-09-19 NOTE — PROGRESS NOTES
Chief Complaint   Patient presents with    Congestive Heart Failure     F/V DX: Congestive Heart Failure NEW        Subjective:   Lai Dailey is a 51 y.o. male who presents today for heart failure follow-up after recent hospitalization on 9/9/2022 for fluid volume overload.  Patient was last seen by Dr. Arriaga on 9/30/2021.  Patient underwent cardioversion with Dr. Arriaga on 11/4/2021.  Last year, he was was evaluated by [facility]; he is not a mitraclip candidate.     Patient has past medical history of s/p ICD, DM 2, hypertension, hyperlipidemia, A. Fib.    Today, patient reports hospitalization due to patient not taking Lasix during traveling.  Patient reports he gained 7 pounds and was unable to resume dry weight despite increasing diuretic therapy.  Since discharge, patient feels well, denies chest pain, shortness of breath, palpitations, dizziness/lightheadedness, orthopnea, PND, or edema.  Patient is euvolemic on exam.  Patient continues to walk and denies SIERRA.  Patient denies financial barriers to obtaining medications.  Patient's dry weight is 212 pounds.    We discussed importance of optimizing GDMT for heart failure.  Follow-up echo during hospitalization showed EF remains at 15% with progressing valvular cardiomyopathy and pulmonary hypertension.  We will have patient follow-up in 1 month to further uptitrate his Entresto therapy.  We will also consider referring patient to advanced heart failure center for further recommendations.    Initial 6 minute walk test, patient was able to complete 412 m during  6 minute walk test.  O2 saturation at baseline was 98% and at the end of the test, the O2 saturation was 91%. He reported level 1 of dyspnea on [place] scale.  Patient was able to walk for 6minutes.    MLWHF score 72      Past Medical History:   Diagnosis Date    CHF (congestive heart failure) (HCC)     Diabetes (HCC)     Hyperlipidemia     Hypertension     Pacemaker     Pacemaker/AICD    Snoring 10/18/2021    No sleep  study.     Past Surgical History:   Procedure Laterality Date    AICD IMPLANT  2010     Family History   Problem Relation Age of Onset    Colon Cancer Mother     Hypertension Sister     Stroke Brother     Heart Disease Neg Hx      Social History     Socioeconomic History    Marital status: Single     Spouse name: Not on file    Number of children: Not on file    Years of education: Not on file    Highest education level: Not on file   Occupational History    Not on file   Tobacco Use    Smoking status: Never    Smokeless tobacco: Never   Vaping Use    Vaping Use: Never used   Substance and Sexual Activity    Alcohol use: Yes     Comment: occ    Drug use: No     Comment: Marijuana use as youth    Sexual activity: Not on file   Other Topics Concern    Not on file   Social History Narrative    Not on file     Social Determinants of Health     Financial Resource Strain: Not on file   Food Insecurity: Not on file   Transportation Needs: Not on file   Physical Activity: Not on file   Stress: Not on file   Social Connections: Not on file   Intimate Partner Violence: Not on file   Housing Stability: Not on file     No Known Allergies  Outpatient Encounter Medications as of 9/19/2022   Medication Sig Dispense Refill    apixaban (ELIQUIS) 5mg Tab Take 1 Tablet by mouth 2 times a day. 180 Tablet 3    atorvastatin (LIPITOR) 40 MG Tab Take 1 Tablet by mouth every day. 90 Tablet 3    digoxin (LANOXIN) 125 MCG Tab Take 1 Tablet by mouth every morning. 90 Tablet 3    Empagliflozin (JARDIANCE) 10 MG Tab Take 1 Tablet by mouth every day. 30 Tablet 3    furosemide (LASIX) 20 MG Tab Take 1 Tablet by mouth every day. 90 Tablet 3    hydrALAZINE (APRESOLINE) 25 MG Tab Take 1 Tablet by mouth 3 times a day. 270 Tablet 3    isosorbide dinitrate (ISORDIL) 10 MG Tab Take 1 Tablet by mouth 3 times a day. 270 Tablet 3    sacubitril-valsartan (ENTRESTO) 24-26 MG Tab Take 1 Tablet by mouth 2 times a day. 60 Tablet 3    spironolactone (ALDACTONE)  25 MG Tab Take 1 Tablet by mouth every day. 90 Tablet 3    multivitamin (THERAGRAN) Tab Take 1 tablet by mouth every day.      insulin glargine (LANTUS) 100 UNIT/ML Solution Inject 10 Units as instructed every evening. 10 mL 0    metformin (GLUCOPHAGE) 500 MG TABS Take 1 Tab by mouth 2 times a day, with meals. 60 Tab 3    [DISCONTINUED] furosemide (LASIX) 20 MG Tab Take 1 Tablet by mouth every day. 90 Tablet 3    [DISCONTINUED] atorvastatin (LIPITOR) 40 MG Tab Take 1 Tablet by mouth every day. 90 Tablet 3    [DISCONTINUED] sacubitril-valsartan (ENTRESTO) 24-26 MG Tab tablet Take 1 Tablet by mouth 2 times a day. 60 Tablet 3    [DISCONTINUED] Empagliflozin (JARDIANCE) 10 MG Tab Take 10 mg by mouth every day. 30 Tablet 3    [DISCONTINUED] spironolactone (ALDACTONE) 25 MG Tab Take 1 tablet by mouth every day. 90 tablet 3    [DISCONTINUED] isosorbide dinitrate (ISORDIL) 10 MG Tab Take 1 tablet by mouth 3 times a day. 270 tablet 3    [DISCONTINUED] hydrALAZINE (APRESOLINE) 25 MG Tab Take 1 tablet by mouth 3 times a day. 270 tablet 3    [DISCONTINUED] digoxin (LANOXIN) 125 MCG Tab Take 1 tablet by mouth every morning. 90 tablet 3    [DISCONTINUED] apixaban (ELIQUIS) 5mg Tab Take 1 tablet by mouth 2 Times a Day. 180 tablet 3     No facility-administered encounter medications on file as of 9/19/2022.     Review of Systems   Constitutional:  Negative for fever, malaise/fatigue and weight loss.   Eyes:  Negative for blurred vision.   Respiratory:  Negative for cough and shortness of breath.    Cardiovascular:  Negative for chest pain, palpitations, orthopnea, claudication, leg swelling and PND.   Gastrointestinal:  Negative for abdominal pain, blood in stool, nausea and vomiting.   Genitourinary:  Negative for dysuria, frequency and hematuria.   Musculoskeletal:  Negative for falls and myalgias.   Neurological:  Negative for dizziness, tingling and loss of consciousness.   Endo/Heme/Allergies:  Does not bruise/bleed easily.  "     Objective:   BP (!) 130/90 (BP Location: Left arm, Patient Position: Sitting, BP Cuff Size: Adult)   Pulse 97   Resp 16   Ht 1.765 m (5' 9.5\")   Wt 96.2 kg (212 lb)   SpO2 98%   BMI 30.86 kg/m²     Physical Exam  Vitals reviewed.   Constitutional:       General: He is not in acute distress.     Appearance: He is well-developed and normal weight.   HENT:      Head: Normocephalic and atraumatic.   Eyes:      Pupils: Pupils are equal, round, and reactive to light.   Neck:      Vascular: No JVD.   Cardiovascular:      Rate and Rhythm: Normal rate and regular rhythm.      Pulses: Normal pulses.      Heart sounds: Normal heart sounds. No murmur heard.    No friction rub. No gallop.   Pulmonary:      Effort: Pulmonary effort is normal. No respiratory distress.      Breath sounds: Normal breath sounds.   Abdominal:      General: Bowel sounds are normal. There is no distension.      Palpations: Abdomen is soft.   Musculoskeletal:      Right lower leg: No edema.      Left lower leg: No edema.   Skin:     General: Skin is warm and dry.      Findings: No erythema.   Neurological:      General: No focal deficit present.      Mental Status: He is alert and oriented to person, place, and time. Mental status is at baseline.   Psychiatric:         Mood and Affect: Mood normal.         Behavior: Behavior normal.     Lab Results   Component Value Date/Time    CHOLSTRLTOT 180 11/03/2021 10:04 AM     (H) 11/03/2021 10:04 AM    HDL 57 11/03/2021 10:04 AM    TRIGLYCERIDE 55 11/03/2021 10:04 AM       Lab Results   Component Value Date/Time    SODIUM 130 (L) 09/10/2022 02:34 AM    POTASSIUM 3.7 09/10/2022 02:34 AM    CHLORIDE 96 09/10/2022 02:34 AM    CO2 23 09/10/2022 02:34 AM    GLUCOSE 125 (H) 09/10/2022 02:34 AM    BUN 23 (H) 09/10/2022 02:34 AM    CREATININE 1.07 09/10/2022 02:34 AM     Lab Results   Component Value Date/Time    ALKPHOSPHAT 149 (H) 09/10/2022 02:34 AM    ASTSGOT 35 09/10/2022 02:34 AM    ALTSGPT 21 " 09/10/2022 02:34 AM    TBILIRUBIN 1.7 (H) 09/10/2022 02:34 AM     Heart Cath 3/24/2014  FINDINGS:  Right heart catheterization wedge pressure was about 21.  Pulmonary   artery pressure is 51/40.  RV pressure is 50/4.  Right atrial pressure was   about 10.  The left ventricle was severely dilated with global hypokinesis and   ejection fraction of 13%.  There is mild-to-moderate mitral regurgitation   noted; however, it may be because the ventricle is under field.  The left   coronary ostium is normal without significant atherosclerosis.  It divides   into large circumflex branch, ramus branch, and then LAD that gives off large   diagonal branch.  There is no atherosclerosis in any of these vessels.  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  There is no   atherosclerosis in the right coronary.       CONCLUSION:  Severe nonischemic cardiomyopathy with ejection fraction about   13%, at least mild-to-moderate mitral regurgitation, and no gradient across   the aortic valve, and elevated right-sided pressures.  The patient will need   aggressive heart failure treatment.     Echocardiogram 3/23/2014  CONCLUSIONS  No prior echo  4 chamber dilation  Left ventricular ejection fraction is 15% to 20%.   Grade III-IV diastolic dysfunction is present. c/w elevated LAp and   LVEDP.   Severe mitral regurgitation.        Transthoracic Echo Report 5/13/18  Left ventricle is severely dilated. Mild concentric left ventricular hypertrophy. Grade III diastolic dysfunction (restrictive pattern).   Severely reduced left ventricular systolic function. Left ventricular ejection fraction is visually estimated to be 20%. Diffuse hypokinesis with septal dyskinesis.  Severe mitral regurgitation.  Severe tricuspid regurgitation. Estimated right ventricular systolic pressure  is 50 mmHg.  Compared to the report of the study done - the TR is now severe.      Transthoracic Echo Report 10/12/2019  Severely reduced left  ventricular systolic function.  Left ventricular ejection fraction is visually estimated to be 20%.  Global hypokinesis with regional variation.  Diastolic function is abnormal, but grade cannot be determined.  Reduced right ventricular systolic function.  Severe mitral regurgitation.  Severe tricuspid regurgitation.  Compared to the images of the prior study done 5/13/18 -  there has been no significant change.       Transthoracic Echo Report 3/26/2020  Severely reduced left ventricular systolic function.  Left ventricular ejection fraction is visually estimated to be 15%.  Mild concentric left ventricular hypertrophy.  Left ventricle is severely dilated.  Aortic sclerosis without stenosis.  Moderate mitral regurgitation.  Reduced right ventricular systolic function.  Right heart pressures are consistent with at least moderate pulmonary hypertension.  Pacer/ICD wire seen in right ventricle.  Compared to the images and report of the study done 10/12/19 there has been no significant change in left ventricular function, severity of   mitral regurgitation and tricuspid regurgitation appears to be less but   may be a function of gain settings.     Echo (6/25/2021): Left ventricular ejection fraction is visually estimated to be 15%.  Grade III diastolic dysfunction (restrictive pattern).  Mildly dilated right ventricle.  Moderate mitral regurgitation.  Moderate tricuspid regurgitation.     TTE (9/9/2022): Compared to the prior study on 06/25/2021, mitral and tricuspid   regurgitation appears worse.  The ejection fraction is estimated to be 15%  Severe moderate mitral regurgitation.  Moderate to severe tricuspid regurgitation.  Estimated right ventricular systolic pressure is 55 mmHg.  Assessment:     1. High risk medication use  apixaban (ELIQUIS) 5mg Tab    digoxin (LANOXIN) 125 MCG Tab    furosemide (LASIX) 20 MG Tab    hydrALAZINE (APRESOLINE) 25 MG Tab    isosorbide dinitrate (ISORDIL) 10 MG Tab    spironolactone  (ALDACTONE) 25 MG Tab    Basic Metabolic Panel      2. Paroxysmal atrial fibrillation ([facility])  apixaban (ELIQUIS) 5mg Tab    digoxin (LANOXIN) 125 MCG Tab    Basic Metabolic Panel      3. Stage C chronic systolic congestive heart failure ([facility])  digoxin (LANOXIN) 125 MCG Tab    furosemide (LASIX) 20 MG Tab    hydrALAZINE (APRESOLINE) 25 MG Tab    isosorbide dinitrate (ISORDIL) 10 MG Tab    spironolactone (ALDACTONE) 25 MG Tab    Basic Metabolic Panel      4. Heart failure, NYHA class 2 ([facility])  digoxin (LANOXIN) 125 MCG Tab    furosemide (LASIX) 20 MG Tab    hydrALAZINE (APRESOLINE) 25 MG Tab    isosorbide dinitrate (ISORDIL) 10 MG Tab    spironolactone (ALDACTONE) 25 MG Tab    Basic Metabolic Panel      5. Non-ischemic cardiomyopathy ([facility])  digoxin (LANOXIN) 125 MCG Tab    furosemide (LASIX) 20 MG Tab    hydrALAZINE (APRESOLINE) 25 MG Tab    isosorbide dinitrate (ISORDIL) 10 MG Tab    spironolactone (ALDACTONE) 25 MG Tab    Basic Metabolic Panel      6. Severe mitral regurgitation  Basic Metabolic Panel      7. Chronic HFrEF (heart failure with reduced ejection fraction) ([facility])  Basic Metabolic Panel      8. Other forms of dyspnea        9. Acute on chronic combined systolic and diastolic CHF (congestive heart failure) ([facility])            Medical Decision Making:  Today's Assessment / Status / Plan:   HFrEF, Stage C, Class III, LVEF 15%  -Heart failure due to non-ischemic cardiomyopathy  -Continue Entresto 24-26 mg twice daily.  Uptitrate at next appointment as tolerated  -Continue Toprol 200 mg daily  -Continue Spironolactone 25 mg daily  -Continue Jardiance 10 mg daily; patient to let clinic know if medications are unaffordable  -Continue digoxin 125 mcg daily  -Continue hydralazine 25 mg 3 times daily  -Continue isosorbide dinitrate 10 mg p.o. 3 times daily  -S/P CRT-D; V paced greater than 90% on device interrogations.  -Continue Lasix 20 mg daily; take additional 20 mg daily as needed for weight gain greater than 3  pounds in 1 day or 5 pounds in 1 week.  -Labs: reviewed lab results from 2 weeks ago with patient.  -Reinforced s/sx of worsening heart failure with patient and weight monitoring. Pt verbalizes understanding. Pt to call office if present.    -Advanced directive packet provided previously.  Goals of care discussed.  Patient willing to consider advanced heart failure referral at next appointment.    Proximal A. fib, s/p AV node ablation, s/p BiV ICD  -Eliquis 5 mg twice daily  -Last device check 10/13/2021.  Functioning normally  -Denies shocks    Hypertension  -Today in office blood pressure is well controlled  -Encouraged to continue home BP monitoring/log.  -Medication recommendations per above.    Hyperlipidemia  -Last LDL 65 (6/26/2021)  -Atorvastatin 20 mg daily    T2DM  -Per PCP  -Metformin 500 mg twice daily  -Lantus 10 units every evening  -SGLT2i for heart failure; education provided regarding possible DM medication adjustments for hypoglycemia.    High Risk Medication Use  -This includes lisinopril, eliquis, spironolactone, digoxin, hydralazine, isosorbide, furosemide, Jardiance  -Will continue to closely monitor for side effects of patient's high risk medication(s) including renal function and electrolytes  -Close monitoring discussed with patient.  Lab work reviewed    Medications refilled.  FU in clinic in 1 month. Sooner if needed.    Patient verbalizes understanding and agrees with the plan of care.     I personally spent a total of 30 minutes which includes face-to-face time and non-face-to-face time spent on preparing to see the patient, reviewing prior notes and tests, obtaining history from the patient, performing a medically appropriate exam, counseling and educating the patient, ordering medications/tests/procedures/referrals as clinically indicated, and documenting information in the electronic medical record.    PLEASE NOTE: This Note was created using voice recognition Software. I have made  every reasonable attempt to correct obvious errors, but I expect that there are errors of grammar and possibly content that I did not discover before finalizing the note

## 2022-09-21 ENCOUNTER — TELEPHONE (OUTPATIENT)
Dept: CARDIOLOGY | Facility: MEDICAL CENTER | Age: 52
End: 2022-09-21
Payer: OTHER GOVERNMENT

## 2022-09-21 RX ORDER — SACUBITRIL AND VALSARTAN 24; 26 MG/1; MG/1
1 TABLET, FILM COATED ORAL 2 TIMES DAILY
Qty: 180 TABLET | Refills: 1 | Status: SHIPPED | OUTPATIENT
Start: 2022-09-21 | End: 2022-11-08

## 2022-09-21 NOTE — TELEPHONE ENCOUNTER
AL        Caller: Lai Dailey    Medication Name and Dosage: sacubitril-valsartan (ENTRESTO) 24-26 MG Tab    Did patient contact pharmacy (If no, please call pharmacy first): yes  Medication amount left: 0  Preferred Pharmacy: [facility] in [place]  Other questions (Topic): n/a  Callback Number (Will only call for issues): [phone] (home)       Thank you    -Corby LYNCH

## 2022-10-06 ENCOUNTER — TELEPHONE (OUTPATIENT)
Dept: CARDIOLOGY | Facility: MEDICAL CENTER | Age: 52
End: 2022-10-06
Payer: OTHER GOVERNMENT

## 2022-11-07 ENCOUNTER — HOSPITAL ENCOUNTER (OUTPATIENT)
Dept: LAB | Facility: MEDICAL CENTER | Age: 52
End: 2022-11-07
Attending: NURSE PRACTITIONER
Payer: OTHER GOVERNMENT

## 2022-11-07 DIAGNOSIS — I50.22 STAGE C CHRONIC SYSTOLIC CONGESTIVE HEART FAILURE (HCC): ICD-10-CM

## 2022-11-07 DIAGNOSIS — I48.0 PAROXYSMAL ATRIAL FIBRILLATION (HCC): ICD-10-CM

## 2022-11-07 DIAGNOSIS — Z79.899 HIGH RISK MEDICATION USE: ICD-10-CM

## 2022-11-07 DIAGNOSIS — I50.9 HEART FAILURE, NYHA CLASS 2 (HCC): ICD-10-CM

## 2022-11-07 DIAGNOSIS — I50.22 CHRONIC HFREF (HEART FAILURE WITH REDUCED EJECTION FRACTION) (HCC): ICD-10-CM

## 2022-11-07 DIAGNOSIS — I42.8 NON-ISCHEMIC CARDIOMYOPATHY (HCC): ICD-10-CM

## 2022-11-07 DIAGNOSIS — I34.0 SEVERE MITRAL REGURGITATION: ICD-10-CM

## 2022-11-07 LAB
ANION GAP SERPL CALC-SCNC: 10 MMOL/L (ref 7–16)
BUN SERPL-MCNC: 25 MG/DL (ref 8–22)
CALCIUM SERPL-MCNC: 9.5 MG/DL (ref 8.5–10.5)
CHLORIDE SERPL-SCNC: 100 MMOL/L (ref 96–112)
CO2 SERPL-SCNC: 27 MMOL/L (ref 20–33)
CREAT SERPL-MCNC: 1.17 MG/DL (ref 0.5–1.4)
FASTING STATUS PATIENT QL REPORTED: NORMAL
GFR SERPLBLD CREATININE-BSD FMLA CKD-EPI: 74 ML/MIN/1.73 M 2
GLUCOSE SERPL-MCNC: 148 MG/DL (ref 65–99)
POTASSIUM SERPL-SCNC: 4.4 MMOL/L (ref 3.6–5.5)
SODIUM SERPL-SCNC: 137 MMOL/L (ref 135–145)

## 2022-11-07 PROCEDURE — 36415 COLL VENOUS BLD VENIPUNCTURE: CPT

## 2022-11-07 PROCEDURE — 80048 BASIC METABOLIC PNL TOTAL CA: CPT

## 2022-11-08 ENCOUNTER — OFFICE VISIT (OUTPATIENT)
Dept: CARDIOLOGY | Facility: MEDICAL CENTER | Age: 52
End: 2022-11-08
Payer: OTHER GOVERNMENT

## 2022-11-08 VITALS
RESPIRATION RATE: 16 BRPM | OXYGEN SATURATION: 96 % | HEART RATE: 80 BPM | DIASTOLIC BLOOD PRESSURE: 88 MMHG | HEIGHT: 70 IN | BODY MASS INDEX: 31.81 KG/M2 | SYSTOLIC BLOOD PRESSURE: 122 MMHG | WEIGHT: 222.2 LBS

## 2022-11-08 DIAGNOSIS — I34.0 SEVERE MITRAL REGURGITATION: ICD-10-CM

## 2022-11-08 DIAGNOSIS — I50.9 HEART FAILURE, NYHA CLASS 2 (HCC): ICD-10-CM

## 2022-11-08 DIAGNOSIS — I50.22 STAGE C CHRONIC SYSTOLIC CONGESTIVE HEART FAILURE (HCC): ICD-10-CM

## 2022-11-08 DIAGNOSIS — R06.09 DYSPNEA ON EXERTION: ICD-10-CM

## 2022-11-08 DIAGNOSIS — I48.0 PAROXYSMAL ATRIAL FIBRILLATION (HCC): ICD-10-CM

## 2022-11-08 DIAGNOSIS — I48.11 LONGSTANDING PERSISTENT ATRIAL FIBRILLATION (HCC): ICD-10-CM

## 2022-11-08 DIAGNOSIS — I50.22 CHRONIC HFREF (HEART FAILURE WITH REDUCED EJECTION FRACTION) (HCC): ICD-10-CM

## 2022-11-08 DIAGNOSIS — I42.8 NON-ISCHEMIC CARDIOMYOPATHY (HCC): ICD-10-CM

## 2022-11-08 LAB — EKG IMPRESSION: NORMAL

## 2022-11-08 PROCEDURE — 99214 OFFICE O/P EST MOD 30 MIN: CPT | Mod: 25 | Performed by: INTERNAL MEDICINE

## 2022-11-08 PROCEDURE — 93000 ELECTROCARDIOGRAM COMPLETE: CPT | Performed by: INTERNAL MEDICINE

## 2022-11-08 RX ORDER — METOPROLOL SUCCINATE 100 MG/1
100 TABLET, EXTENDED RELEASE ORAL DAILY
Qty: 90 TABLET | Refills: 3 | Status: SHIPPED | OUTPATIENT
Start: 2022-11-08 | End: 2023-06-12

## 2022-11-08 RX ORDER — SACUBITRIL AND VALSARTAN 49; 51 MG/1; MG/1
1 TABLET, FILM COATED ORAL 2 TIMES DAILY
Qty: 60 TABLET | Refills: 11 | Status: SHIPPED | OUTPATIENT
Start: 2022-11-08 | End: 2022-12-16

## 2022-11-08 ASSESSMENT — ENCOUNTER SYMPTOMS
SENSORY CHANGE: 0
HEADACHES: 0
DIZZINESS: 0
BLURRED VISION: 0
SHORTNESS OF BREATH: 1
COUGH: 0
MEMORY LOSS: 0
ABDOMINAL PAIN: 0
FEVER: 0
BRUISES/BLEEDS EASILY: 0
DIAPHORESIS: 0
DEPRESSION: 0
FALLS: 0
MYALGIAS: 0
DOUBLE VISION: 0
PALPITATIONS: 0

## 2022-11-08 ASSESSMENT — FIBROSIS 4 INDEX: FIB4 SCORE: 2.12

## 2022-11-08 NOTE — PROGRESS NOTES
Chief Complaint   Patient presents with    Atrial Fibrillation     F/V Dx: Paroxysmal atrial fibrillation (HCC)      Congestive Heart Failure     F/V Dx: Acute on chronic combined systolic and diastolic CHF (congestive heart failure) (HCC)    Hypertension       Subjective:   Lai Dailey is a 52 y.o. male who presents today cardiac care management in a heart failure program due to prior history of nonischemic cardiomyopathy, mitral valve regurgitation and persistent atrial fibrillation.  Patient does have a left ventricular systolic function of 20%. S/P CRT-D.    He did have a negative coronary angiogram in 2014 per report.    Patient is feeling better these days. Does get winded upon walking up inclines or for distance. No symptoms at rest or with daily living activities.    I personally interpreted his device interrogation which showed persistent atrial fibrillation, 98% ventricular pacing.     Has been compliant with HF program and medications. No candidate for Mitral Clip.    I have independently interpreted and reviewed blood tests results with patient in clinic which shows normal GFR if 74.      Past Medical History:   Diagnosis Date    CHF (congestive heart failure) (HCC)     Diabetes (HCC)     Hyperlipidemia     Hypertension     Pacemaker     Pacemaker/AICD    Snoring 10/18/2021    No sleep study.     Past Surgical History:   Procedure Laterality Date    AICD IMPLANT  2010     Family History   Problem Relation Age of Onset    Colon Cancer Mother     Hypertension Sister     Stroke Brother     Heart Disease Neg Hx      Social History     Socioeconomic History    Marital status: Single     Spouse name: Not on file    Number of children: Not on file    Years of education: Not on file    Highest education level: Not on file   Occupational History    Not on file   Tobacco Use    Smoking status: Never    Smokeless tobacco: Never   Vaping Use    Vaping Use: Never used   Substance and Sexual Activity    Alcohol use:  Yes     Comment: occ    Drug use: No     Comment: Marijuana use as youth    Sexual activity: Not on file   Other Topics Concern    Not on file   Social History Narrative    Not on file     Social Determinants of Health     Financial Resource Strain: Not on file   Food Insecurity: Not on file   Transportation Needs: Not on file   Physical Activity: Not on file   Stress: Not on file   Social Connections: Not on file   Intimate Partner Violence: Not on file   Housing Stability: Not on file     No Known Allergies  Outpatient Encounter Medications as of 11/8/2022   Medication Sig Dispense Refill    sacubitril-valsartan (ENTRESTO) 49-51 MG Tab Take 1 Tablet by mouth 2 times a day. 60 Tablet 11    metoprolol SR (TOPROL XL) 100 MG TABLET SR 24 HR Take 1 Tablet by mouth every day. 90 Tablet 3    apixaban (ELIQUIS) 5mg Tab Take 1 Tablet by mouth 2 times a day. 180 Tablet 3    atorvastatin (LIPITOR) 40 MG Tab Take 1 Tablet by mouth every day. 90 Tablet 3    Empagliflozin (JARDIANCE) 10 MG Tab Take 1 Tablet by mouth every day. 30 Tablet 3    furosemide (LASIX) 20 MG Tab Take 1 Tablet by mouth every day. 90 Tablet 3    hydrALAZINE (APRESOLINE) 25 MG Tab Take 1 Tablet by mouth 3 times a day. 270 Tablet 3    isosorbide dinitrate (ISORDIL) 10 MG Tab Take 1 Tablet by mouth 3 times a day. 270 Tablet 3    spironolactone (ALDACTONE) 25 MG Tab Take 1 Tablet by mouth every day. 90 Tablet 3    multivitamin (THERAGRAN) Tab Take 1 Tablet by mouth every day.      insulin glargine (LANTUS) 100 UNIT/ML Solution Inject 10 Units as instructed every evening. 10 mL 0    metformin (GLUCOPHAGE) 500 MG TABS Take 1 Tab by mouth 2 times a day, with meals. 60 Tab 3    [DISCONTINUED] sacubitril-valsartan (ENTRESTO) 24-26 MG Tab Take 1 Tablet by mouth 2 times a day. 180 Tablet 1    [DISCONTINUED] digoxin (LANOXIN) 125 MCG Tab Take 1 Tablet by mouth every morning. 90 Tablet 3     No facility-administered encounter medications on file as of 11/8/2022.  "    Review of Systems   Constitutional:  Negative for diaphoresis and fever.   HENT:  Negative for nosebleeds.    Eyes:  Negative for blurred vision and double vision.   Respiratory:  Positive for shortness of breath. Negative for cough.    Cardiovascular:  Negative for chest pain and palpitations.   Gastrointestinal:  Negative for abdominal pain.   Genitourinary:  Negative for dysuria and frequency.   Musculoskeletal:  Negative for falls and myalgias.   Skin:  Negative for rash.   Neurological:  Negative for dizziness, sensory change and headaches.   Endo/Heme/Allergies:  Does not bruise/bleed easily.   Psychiatric/Behavioral:  Negative for depression and memory loss.       Objective:   /88 (BP Location: Left arm, Patient Position: Sitting, BP Cuff Size: Adult)   Pulse 80   Resp 16   Ht 1.765 m (5' 9.5\")   Wt 101 kg (222 lb 3.2 oz)   SpO2 96%   BMI 32.34 kg/m²     Physical Exam  Vitals and nursing note reviewed.   Constitutional:       General: He is not in acute distress.     Appearance: He is not diaphoretic.   HENT:      Head: Normocephalic and atraumatic.      Right Ear: External ear normal.      Left Ear: External ear normal.   Eyes:      General:         Right eye: No discharge.         Left eye: No discharge.   Neck:      Thyroid: No thyromegaly.      Vascular: No JVD.   Cardiovascular:      Rate and Rhythm: Normal rate and regular rhythm.      Heart sounds: Normal heart sounds. No murmur heard.    No friction rub. No gallop.   Pulmonary:      Effort: No respiratory distress.      Breath sounds: Normal breath sounds.   Abdominal:      General: Bowel sounds are normal. There is no distension.      Tenderness: There is no abdominal tenderness.   Musculoskeletal:         General: No tenderness.   Skin:     General: Skin is warm and dry.   Neurological:      Mental Status: He is alert and oriented to person, place, and time.      Cranial Nerves: No cranial nerve deficit.   Psychiatric:         " Behavior: Behavior normal.       Assessment:     1. Stage C chronic systolic congestive heart failure (HCC)  Basic Metabolic Panel    proBrain Natriuretic Peptide, NT    EC-ECHOCARDIOGRAM LTD W/O CONT      2. Heart failure, NYHA class 2 (HCC)  Basic Metabolic Panel    proBrain Natriuretic Peptide, NT    EC-ECHOCARDIOGRAM LTD W/O CONT      3. Paroxysmal atrial fibrillation (HCC)  EKG      4. Chronic HFrEF (heart failure with reduced ejection fraction) ([facility])        5. Non-ischemic cardiomyopathy (HCC)  Basic Metabolic Panel    proBrain Natriuretic Peptide, NT    EC-ECHOCARDIOGRAM LTD W/O CONT      6. Longstanding persistent atrial fibrillation (HCC)        7. Severe mitral regurgitation        8. Dyspnea on exertion            Medical Decision Making:  Today's Assessment / Status / Plan:   Non-Ischemic Cardiomyopathy:  Chronically illed condition which requires ongoing close monitoring and treatment to improve survival rate along with decreasing risk of clinical decompensation and hospitalization.    Today, based on physical examination findings, patient is euvolemic. No JVD, lungs are clear to auscultation, no pitting edema in bilateral lower extremities, no ascites.     Dry weight is 219 lbs.    Will increase Entresto to 49/51 mg twice a day. Continue Hydralazine and Isordil at current dose.    Restart Toprol at 100 mg po daily.     Diuretic with Furosemide 20 mg 1x daily.     Aldactone antagonist with Spironolactone 25 mg daily.    Patient is feeling very well at this time. Defer advanced HF therapies such as heart transplant at this time.    Based on recent data on SGLT2 and heart failure with reduced ejection fraction, patient will be benefited from Jardiance 10 mg p.o. once a day for further reduction in mortality and hospitalization with absolute risk reduction of 5.2%.  Therefore, I will continue patient on Jardiance 10 mg p.o. once a day.  Risks and benefits were explained to patient and patient has agreed to  proceed.    Persistent Atrial Fibrillation:  Anticoagulation with Eliquis 5 mg 2x daily.  Will stop Digoxin to avoid toxicities.    Hypertension:  Optimize control using cardiomyopathy medical regimen as well.    Will continue to closely monitor for side effects of patient's high risk medication(s) including liver, renal function and electrolytes.    I will see patient back in our Heart Failure Clinic with lab tests and studies results in 4 weeks.    I thank you for referring patient to our Heart Failure Clinic today.

## 2022-12-16 ENCOUNTER — OFFICE VISIT (OUTPATIENT)
Dept: CARDIOLOGY | Facility: MEDICAL CENTER | Age: 52
End: 2022-12-16
Payer: OTHER GOVERNMENT

## 2022-12-16 VITALS
OXYGEN SATURATION: 97 % | WEIGHT: 221 LBS | DIASTOLIC BLOOD PRESSURE: 86 MMHG | HEART RATE: 88 BPM | HEIGHT: 69 IN | BODY MASS INDEX: 32.73 KG/M2 | RESPIRATION RATE: 16 BRPM | SYSTOLIC BLOOD PRESSURE: 122 MMHG

## 2022-12-16 DIAGNOSIS — R06.09 DYSPNEA ON EXERTION: ICD-10-CM

## 2022-12-16 DIAGNOSIS — I42.8 NON-ISCHEMIC CARDIOMYOPATHY (HCC): ICD-10-CM

## 2022-12-16 DIAGNOSIS — Z79.899 HIGH RISK MEDICATION USE: ICD-10-CM

## 2022-12-16 DIAGNOSIS — I50.22 CHRONIC HFREF (HEART FAILURE WITH REDUCED EJECTION FRACTION) (HCC): ICD-10-CM

## 2022-12-16 DIAGNOSIS — I48.11 LONGSTANDING PERSISTENT ATRIAL FIBRILLATION (HCC): ICD-10-CM

## 2022-12-16 DIAGNOSIS — I34.0 SEVERE MITRAL REGURGITATION: ICD-10-CM

## 2022-12-16 DIAGNOSIS — I48.0 PAROXYSMAL ATRIAL FIBRILLATION (HCC): ICD-10-CM

## 2022-12-16 DIAGNOSIS — I50.9 HEART FAILURE, NYHA CLASS 2 (HCC): ICD-10-CM

## 2022-12-16 DIAGNOSIS — I50.22 STAGE C CHRONIC SYSTOLIC CONGESTIVE HEART FAILURE (HCC): ICD-10-CM

## 2022-12-16 LAB — EKG IMPRESSION: NORMAL

## 2022-12-16 PROCEDURE — 93000 ELECTROCARDIOGRAM COMPLETE: CPT | Performed by: INTERNAL MEDICINE

## 2022-12-16 PROCEDURE — 99214 OFFICE O/P EST MOD 30 MIN: CPT | Performed by: INTERNAL MEDICINE

## 2022-12-16 RX ORDER — SACUBITRIL AND VALSARTAN 97; 103 MG/1; MG/1
1 TABLET, FILM COATED ORAL 2 TIMES DAILY
Qty: 180 TABLET | Refills: 3 | Status: SHIPPED | OUTPATIENT
Start: 2022-12-16 | End: 2022-12-16 | Stop reason: SDUPTHER

## 2022-12-16 RX ORDER — SACUBITRIL AND VALSARTAN 97; 103 MG/1; MG/1
1 TABLET, FILM COATED ORAL 2 TIMES DAILY
Qty: 180 TABLET | Refills: 3 | Status: SHIPPED | OUTPATIENT
Start: 2022-12-16 | End: 2023-06-12 | Stop reason: SDUPTHER

## 2022-12-16 ASSESSMENT — ENCOUNTER SYMPTOMS
SHORTNESS OF BREATH: 1
HEADACHES: 0
MYALGIAS: 0
DIZZINESS: 0
ABDOMINAL PAIN: 0
DEPRESSION: 0
COUGH: 0
DOUBLE VISION: 0
SENSORY CHANGE: 0
MEMORY LOSS: 0
BRUISES/BLEEDS EASILY: 0
FEVER: 0
FALLS: 0
DIAPHORESIS: 0
PALPITATIONS: 0
BLURRED VISION: 0

## 2022-12-16 ASSESSMENT — FIBROSIS 4 INDEX: FIB4 SCORE: 2.12

## 2022-12-16 NOTE — PROGRESS NOTES
Chief Complaint   Patient presents with    Hypertension    CHF (Systolic)     F/V Dx: ACC/AHA stage C systolic heart failure (HCC)    Atrial Fibrillation     F/V Dx: Paroxysmal atrial fibrillation (HCC)         Subjective:   Lai Dailey is a 52 y.o. male who presents today cardiac care management in a heart failure program due to prior history of nonischemic cardiomyopathy, mitral valve regurgitation and persistent atrial fibrillation.  Patient does have a left ventricular systolic function of 20%. S/P CRT-D.    He did have a negative coronary angiogram in 2014 per report.    Patient is feeling better these days. Does get winded upon walking up inclines or for distance. No symptoms at rest or with daily living activities.    I personally interpreted his device interrogation which showed persistent atrial fibrillation, 98% ventricular pacing.     Has been compliant with HF program and medications. No candidate for Mitral Clip.    I have independently interpreted and reviewed blood tests results with patient in clinic which shows normal GFR if 74.      Past Medical History:   Diagnosis Date    CHF (congestive heart failure) (HCC)     Diabetes (HCC)     Hyperlipidemia     Hypertension     Pacemaker     Pacemaker/AICD    Snoring 10/18/2021    No sleep study.     Past Surgical History:   Procedure Laterality Date    AICD IMPLANT  2010     Family History   Problem Relation Age of Onset    Colon Cancer Mother     Hypertension Sister     Stroke Brother     Heart Disease Neg Hx      Social History     Socioeconomic History    Marital status: Single     Spouse name: Not on file    Number of children: Not on file    Years of education: Not on file    Highest education level: Not on file   Occupational History    Not on file   Tobacco Use    Smoking status: Never    Smokeless tobacco: Never   Vaping Use    Vaping Use: Never used   Substance and Sexual Activity    Alcohol use: Yes     Comment: occ    Drug use: No     Comment:  Marijuana use as youth    Sexual activity: Not on file   Other Topics Concern    Not on file   Social History Narrative    Not on file     Social Determinants of Health     Financial Resource Strain: Not on file   Food Insecurity: Not on file   Transportation Needs: Not on file   Physical Activity: Not on file   Stress: Not on file   Social Connections: Not on file   Intimate Partner Violence: Not on file   Housing Stability: Not on file     No Known Allergies  Outpatient Encounter Medications as of 12/16/2022   Medication Sig Dispense Refill    sacubitril-valsartan (ENTRESTO) 49-51 MG Tab Take 1 Tablet by mouth 2 times a day. 60 Tablet 11    metoprolol SR (TOPROL XL) 100 MG TABLET SR 24 HR Take 1 Tablet by mouth every day. 90 Tablet 3    apixaban (ELIQUIS) 5mg Tab Take 1 Tablet by mouth 2 times a day. 180 Tablet 3    atorvastatin (LIPITOR) 40 MG Tab Take 1 Tablet by mouth every day. 90 Tablet 3    Empagliflozin (JARDIANCE) 10 MG Tab Take 1 Tablet by mouth every day. 30 Tablet 3    furosemide (LASIX) 20 MG Tab Take 1 Tablet by mouth every day. 90 Tablet 3    hydrALAZINE (APRESOLINE) 25 MG Tab Take 1 Tablet by mouth 3 times a day. 270 Tablet 3    isosorbide dinitrate (ISORDIL) 10 MG Tab Take 1 Tablet by mouth 3 times a day. 270 Tablet 3    spironolactone (ALDACTONE) 25 MG Tab Take 1 Tablet by mouth every day. 90 Tablet 3    multivitamin (THERAGRAN) Tab Take 1 Tablet by mouth every day.      insulin glargine (LANTUS) 100 UNIT/ML Solution Inject 10 Units as instructed every evening. 10 mL 0    metformin (GLUCOPHAGE) 500 MG TABS Take 1 Tab by mouth 2 times a day, with meals. 60 Tab 3     No facility-administered encounter medications on file as of 12/16/2022.     Review of Systems   Constitutional:  Negative for diaphoresis and fever.   HENT:  Negative for nosebleeds.    Eyes:  Negative for blurred vision and double vision.   Respiratory:  Positive for shortness of breath. Negative for cough.    Cardiovascular:   "Negative for chest pain and palpitations.   Gastrointestinal:  Negative for abdominal pain.   Genitourinary:  Negative for dysuria and frequency.   Musculoskeletal:  Negative for falls and myalgias.   Skin:  Negative for rash.   Neurological:  Negative for dizziness, sensory change and headaches.   Endo/Heme/Allergies:  Does not bruise/bleed easily.   Psychiatric/Behavioral:  Negative for depression and memory loss.       Objective:   /86 (BP Location: Left arm, Patient Position: Sitting, BP Cuff Size: Adult)   Pulse 88   Resp 16   Ht 1.753 m (5' 9\")   Wt 100 kg (221 lb)   SpO2 97%   BMI 32.64 kg/m²     Physical Exam  Vitals and nursing note reviewed.   Constitutional:       General: He is not in acute distress.     Appearance: He is not diaphoretic.   HENT:      Head: Normocephalic and atraumatic.      Right Ear: External ear normal.      Left Ear: External ear normal.   Eyes:      General:         Right eye: No discharge.         Left eye: No discharge.   Neck:      Thyroid: No thyromegaly.      Vascular: No JVD.   Cardiovascular:      Rate and Rhythm: Normal rate and regular rhythm.      Heart sounds: Normal heart sounds. No murmur heard.    No friction rub. No gallop.   Pulmonary:      Effort: No respiratory distress.      Breath sounds: Normal breath sounds.   Abdominal:      General: Bowel sounds are normal. There is no distension.      Tenderness: There is no abdominal tenderness.   Musculoskeletal:         General: No tenderness.   Skin:     General: Skin is warm and dry.   Neurological:      Mental Status: He is alert and oriented to person, place, and time.      Cranial Nerves: No cranial nerve deficit.   Psychiatric:         Behavior: Behavior normal.       Assessment:     1. Paroxysmal atrial fibrillation (HCC)  EKG      2. Stage C chronic systolic congestive heart failure (HCC)        3. Heart failure, NYHA class 2 ([facility])        4. Chronic HFrEF (heart failure with reduced ejection fraction) " (HCC)        5. Longstanding persistent atrial fibrillation (HCC)        6. Non-ischemic cardiomyopathy (HCC)        7. Severe mitral regurgitation        8. Dyspnea on exertion        9. High risk medication use            Medical Decision Making:  Today's Assessment / Status / Plan:   Non-Ischemic Cardiomyopathy:  Chronically illed condition which requires ongoing close monitoring and treatment to improve survival rate along with decreasing risk of clinical decompensation and hospitalization.    Today, based on physical examination findings, patient is euvolemic. No JVD, lungs are clear to auscultation, no pitting edema in bilateral lower extremities, no ascites.     Dry weight is 219 lbs.    Will increase Entresto to 97/103 mg twice a day. Continue Hydralazine and Isordil at current dose.    Restart Toprol at 100 mg po daily.     Diuretic with Furosemide 20 mg 1x daily.     Aldactone antagonist with Spironolactone 25 mg daily.    Patient is feeling very well at this time. Defer advanced HF therapies such as heart transplant at this time.    Based on recent data on SGLT2 and heart failure with reduced ejection fraction, patient will be benefited from Jardiance 10 mg p.o. once a day for further reduction in mortality and hospitalization with absolute risk reduction of 5.2%.  Therefore, I will continue patient on Jardiance 10 mg p.o. once a day.  Risks and benefits were explained to patient and patient has agreed to proceed.    Persistent Atrial Fibrillation:  Anticoagulation with Eliquis 5 mg 2x daily.    Hypertension:  Optimize control using cardiomyopathy medical regimen as well.    Will continue to closely monitor for side effects of patient's high risk medication(s) including liver, renal function and electrolytes.    I will see patient back in our Heart Failure Clinic with lab tests and studies results in 4 weeks.    I thank you for referring patient to our Heart Failure Clinic today.

## 2023-05-08 ENCOUNTER — APPOINTMENT (OUTPATIENT)
Dept: CARDIOLOGY | Facility: MEDICAL CENTER | Age: 53
End: 2023-05-08
Attending: INTERNAL MEDICINE
Payer: MEDICARE

## 2023-06-07 ENCOUNTER — TELEPHONE (OUTPATIENT)
Dept: CARDIOLOGY | Facility: MEDICAL CENTER | Age: 53
End: 2023-06-07
Payer: MEDICARE

## 2023-06-07 NOTE — TELEPHONE ENCOUNTER
Attempted to called patient in regards to his labs that were order on his last visit with To no answer and could not leave message for reminder of labs voicemail full.

## 2023-06-12 ENCOUNTER — OFFICE VISIT (OUTPATIENT)
Dept: CARDIOLOGY | Facility: MEDICAL CENTER | Age: 53
End: 2023-06-12
Attending: INTERNAL MEDICINE
Payer: MEDICARE

## 2023-06-12 VITALS
HEART RATE: 80 BPM | DIASTOLIC BLOOD PRESSURE: 76 MMHG | RESPIRATION RATE: 16 BRPM | HEIGHT: 69 IN | WEIGHT: 228 LBS | SYSTOLIC BLOOD PRESSURE: 120 MMHG | OXYGEN SATURATION: 95 % | BODY MASS INDEX: 33.77 KG/M2

## 2023-06-12 DIAGNOSIS — Z79.899 HIGH RISK MEDICATION USE: ICD-10-CM

## 2023-06-12 DIAGNOSIS — R06.09 DYSPNEA ON EXERTION: ICD-10-CM

## 2023-06-12 DIAGNOSIS — I50.22 CHRONIC HFREF (HEART FAILURE WITH REDUCED EJECTION FRACTION) (HCC): ICD-10-CM

## 2023-06-12 DIAGNOSIS — I42.8 NON-ISCHEMIC CARDIOMYOPATHY (HCC): ICD-10-CM

## 2023-06-12 DIAGNOSIS — I50.9 HEART FAILURE, NYHA CLASS 2 (HCC): ICD-10-CM

## 2023-06-12 DIAGNOSIS — I48.0 PAROXYSMAL ATRIAL FIBRILLATION (HCC): ICD-10-CM

## 2023-06-12 DIAGNOSIS — I48.11 LONGSTANDING PERSISTENT ATRIAL FIBRILLATION (HCC): ICD-10-CM

## 2023-06-12 DIAGNOSIS — I50.22 STAGE C CHRONIC SYSTOLIC CONGESTIVE HEART FAILURE (HCC): ICD-10-CM

## 2023-06-12 DIAGNOSIS — E78.2 MIXED HYPERLIPIDEMIA: ICD-10-CM

## 2023-06-12 LAB — EKG IMPRESSION: NORMAL

## 2023-06-12 PROCEDURE — 93005 ELECTROCARDIOGRAM TRACING: CPT | Performed by: INTERNAL MEDICINE

## 2023-06-12 PROCEDURE — 3078F DIAST BP <80 MM HG: CPT | Performed by: INTERNAL MEDICINE

## 2023-06-12 PROCEDURE — 3074F SYST BP LT 130 MM HG: CPT | Performed by: INTERNAL MEDICINE

## 2023-06-12 PROCEDURE — 99214 OFFICE O/P EST MOD 30 MIN: CPT | Performed by: INTERNAL MEDICINE

## 2023-06-12 PROCEDURE — 93010 ELECTROCARDIOGRAM REPORT: CPT | Performed by: INTERNAL MEDICINE

## 2023-06-12 PROCEDURE — 99214 OFFICE O/P EST MOD 30 MIN: CPT | Mod: 25 | Performed by: INTERNAL MEDICINE

## 2023-06-12 RX ORDER — FUROSEMIDE 20 MG/1
20 TABLET ORAL DAILY
Qty: 90 TABLET | Refills: 3 | Status: ON HOLD | OUTPATIENT
Start: 2023-06-12 | End: 2024-01-18

## 2023-06-12 RX ORDER — ATORVASTATIN CALCIUM 40 MG/1
40 TABLET, FILM COATED ORAL DAILY
Qty: 90 TABLET | Refills: 3 | Status: ON HOLD | OUTPATIENT
Start: 2023-06-12 | End: 2024-01-18

## 2023-06-12 RX ORDER — SACUBITRIL AND VALSARTAN 97; 103 MG/1; MG/1
1 TABLET, FILM COATED ORAL 2 TIMES DAILY
Qty: 180 TABLET | Refills: 3 | Status: ON HOLD | OUTPATIENT
Start: 2023-06-12 | End: 2024-01-18

## 2023-06-12 RX ORDER — EMPAGLIFLOZIN 10 MG/1
10 TABLET, FILM COATED ORAL DAILY
Qty: 30 TABLET | Refills: 3 | Status: ON HOLD | OUTPATIENT
Start: 2023-06-12 | End: 2024-01-18

## 2023-06-12 RX ORDER — BISOPROLOL FUMARATE 10 MG/1
10 TABLET, FILM COATED ORAL DAILY
Qty: 90 TABLET | Refills: 4 | Status: SHIPPED | OUTPATIENT
Start: 2023-06-12 | End: 2023-06-29 | Stop reason: SDUPTHER

## 2023-06-12 RX ORDER — SPIRONOLACTONE 25 MG/1
25 TABLET ORAL DAILY
Qty: 90 TABLET | Refills: 3 | Status: ON HOLD | OUTPATIENT
Start: 2023-06-12 | End: 2023-12-30

## 2023-06-12 RX ORDER — ISOSORBIDE DINITRATE 10 MG/1
10 TABLET ORAL 3 TIMES DAILY
Qty: 270 TABLET | Refills: 3 | Status: ON HOLD | OUTPATIENT
Start: 2023-06-12 | End: 2024-01-18

## 2023-06-12 ASSESSMENT — MINNESOTA LIVING WITH HEART FAILURE QUESTIONNAIRE (MLHF)
DIFFICULTY SOCIALIZING WITH FAMILY OR FRIENDS: 2
TIRED, FATIGUED OR LOW ON ENERGY: 2
LOSS OF SELF CONTROL IN YOUR LIFE: 0
GIVING YOU SIDE EFFECTS FROM TREATMENTS: 3
MAKING YOU WORRY: 5
WALKING ABOUT OR CLIMBING STAIRS DIFFICULT: 3
DIFFICULTY TO CONCENTRATE OR REMEMBERING THINGS: 3
DIFFICULTY SLEEPING WELL AT NIGHT: 2
DIFFICULTY WITH SEXUAL ACTIVITIES: 5
HAVING TO SIT OR LIE DOWN DURING THE DAY: 3
FEELING LIKE A BURDEN TO FAMILY AND FRIENDS: 1
DIFFICULTY WORKING TO EARN A LIVING: 5
MAKING YOU FEEL DEPRESSED: 1
COSTING YOU MONEY FOR MEDICAL CARE: 4
TOTAL_SCORE: 54
MAKING YOU SHORT OF BREATH: 2
DIFFICULTY WITH RECREATIONAL PASTIMES, SPORTS, HOBBIES: 3
DIFFICULTY GOING AWAY FROM HOME: 0
SWELLING IN ANKLES OR LEGS: 2
EATING LESS FOODS YOU LIKE: 3
WORKING AROUND THE HOUSE OR YARD DIFFICULT: 3
MAKING YOU STAY IN A HOSPITAL: 2

## 2023-06-12 ASSESSMENT — ENCOUNTER SYMPTOMS
DEPRESSION: 0
PALPITATIONS: 0
MYALGIAS: 0
BRUISES/BLEEDS EASILY: 0
SENSORY CHANGE: 0
ABDOMINAL PAIN: 0
FEVER: 0
DIAPHORESIS: 0
SHORTNESS OF BREATH: 1
FALLS: 0
DIZZINESS: 0
BLURRED VISION: 0
COUGH: 0
MEMORY LOSS: 0
HEADACHES: 0
DOUBLE VISION: 0

## 2023-06-12 ASSESSMENT — FIBROSIS 4 INDEX: FIB4 SCORE: 2.15

## 2023-06-12 NOTE — PROGRESS NOTES
Chief Complaint   Patient presents with    Atrial Fibrillation     F/V Dx: Paroxysmal atrial fibrillation (HCC)    CHF (Chronic)     F/V Dx: Stage C chronic systolic congestive heart failure (HCC)       Subjective:   Lai Dailey is a 53 y.o. male who presents today cardiac care management in a heart failure program due to prior history of nonischemic cardiomyopathy, mitral valve regurgitation and persistent atrial fibrillation.  Patient does have a left ventricular systolic function of 20%. S/P CRT-D.    He did have a negative coronary angiogram in 2014 per report.    Patient is feeling better these days. Does get winded upon walking up inclines or for distance. No symptoms at rest or with daily living activities.    I personally interpreted his device interrogation which showed persistent atrial fibrillation, 98% ventricular pacing.     Has been compliant with HF program and medications. No candidate for Mitral Clip.    I have independently interpreted and reviewed blood tests results with patient in clinic which shows normal GFR if 74.      Past Medical History:   Diagnosis Date    CHF (congestive heart failure) (HCC)     Diabetes (HCC)     Hyperlipidemia     Hypertension     Pacemaker     Pacemaker/AICD    Snoring 10/18/2021    No sleep study.     Past Surgical History:   Procedure Laterality Date    AICD IMPLANT  2010     Family History   Problem Relation Age of Onset    Colon Cancer Mother     Hypertension Sister     Stroke Brother     Heart Disease Neg Hx      Social History     Socioeconomic History    Marital status: Single     Spouse name: Not on file    Number of children: Not on file    Years of education: Not on file    Highest education level: Not on file   Occupational History    Not on file   Tobacco Use    Smoking status: Never    Smokeless tobacco: Never   Vaping Use    Vaping Use: Never used   Substance and Sexual Activity    Alcohol use: Yes     Comment: occ    Drug use: No     Comment: Marijuana  use as youth    Sexual activity: Not on file   Other Topics Concern    Not on file   Social History Narrative    Not on file     Social Determinants of Health     Financial Resource Strain: Not on file   Food Insecurity: Not on file   Transportation Needs: Not on file   Physical Activity: Not on file   Stress: Not on file   Social Connections: Not on file   Intimate Partner Violence: Not on file   Housing Stability: Not on file     No Known Allergies  Outpatient Encounter Medications as of 6/12/2023   Medication Sig Dispense Refill    bisoprolol (ZEBETA) 10 MG tablet Take 1 Tablet by mouth every day. 90 Tablet 4    spironolactone (ALDACTONE) 25 MG Tab Take 1 Tablet by mouth every day. 90 Tablet 3    isosorbide dinitrate (ISORDIL) 10 MG Tab Take 1 Tablet by mouth 3 times a day. 270 Tablet 3    furosemide (LASIX) 20 MG Tab Take 1 Tablet by mouth every day. 90 Tablet 3    apixaban (ELIQUIS) 5mg Tab Take 1 Tablet by mouth 2 times a day. 180 Tablet 3    sacubitril-valsartan (ENTRESTO)  MG Tab Take 1 Tablet by mouth 2 times a day. 180 Tablet 3    Empagliflozin (JARDIANCE) 10 MG Tab tablet Take 1 Tablet by mouth every day. 30 Tablet 3    atorvastatin (LIPITOR) 40 MG Tab Take 1 Tablet by mouth every day. 90 Tablet 3    hydrALAZINE (APRESOLINE) 25 MG Tab Take 1 Tablet by mouth 3 times a day. 270 Tablet 3    multivitamin (THERAGRAN) Tab Take 1 Tablet by mouth every day.      insulin glargine (LANTUS) 100 UNIT/ML Solution Inject 10 Units as instructed every evening. 10 mL 0    metformin (GLUCOPHAGE) 500 MG TABS Take 1 Tab by mouth 2 times a day, with meals. 60 Tab 3    [DISCONTINUED] sacubitril-valsartan (ENTRESTO)  MG Tab Take 1 Tablet by mouth 2 times a day. 180 Tablet 3    [DISCONTINUED] metoprolol SR (TOPROL XL) 100 MG TABLET SR 24 HR Take 1 Tablet by mouth every day. (Patient not taking: Reported on 6/12/2023) 90 Tablet 3    [DISCONTINUED] apixaban (ELIQUIS) 5mg Tab Take 1 Tablet by mouth 2 times a day. 180  "Tablet 3    [DISCONTINUED] atorvastatin (LIPITOR) 40 MG Tab Take 1 Tablet by mouth every day. 90 Tablet 3    [DISCONTINUED] Empagliflozin (JARDIANCE) 10 MG Tab Take 1 Tablet by mouth every day. 30 Tablet 3    [DISCONTINUED] furosemide (LASIX) 20 MG Tab Take 1 Tablet by mouth every day. 90 Tablet 3    [DISCONTINUED] isosorbide dinitrate (ISORDIL) 10 MG Tab Take 1 Tablet by mouth 3 times a day. 270 Tablet 3    [DISCONTINUED] spironolactone (ALDACTONE) 25 MG Tab Take 1 Tablet by mouth every day. 90 Tablet 3     No facility-administered encounter medications on file as of 6/12/2023.     Review of Systems   Constitutional:  Negative for diaphoresis and fever.   HENT:  Negative for nosebleeds.    Eyes:  Negative for blurred vision and double vision.   Respiratory:  Positive for shortness of breath. Negative for cough.    Cardiovascular:  Negative for chest pain and palpitations.   Gastrointestinal:  Negative for abdominal pain.   Genitourinary:  Negative for dysuria and frequency.   Musculoskeletal:  Negative for falls and myalgias.   Skin:  Negative for rash.   Neurological:  Negative for dizziness, sensory change and headaches.   Endo/Heme/Allergies:  Does not bruise/bleed easily.   Psychiatric/Behavioral:  Negative for depression and memory loss.         Objective:   /76 (BP Location: Left arm, Patient Position: Sitting, BP Cuff Size: Adult)   Pulse 80   Resp 16   Ht 1.753 m (5' 9\")   Wt 103 kg (228 lb)   SpO2 95%   BMI 33.67 kg/m²     Physical Exam  Vitals and nursing note reviewed.   Constitutional:       General: He is not in acute distress.     Appearance: He is not diaphoretic.   HENT:      Head: Normocephalic and atraumatic.      Right Ear: External ear normal.      Left Ear: External ear normal.   Eyes:      General:         Right eye: No discharge.         Left eye: No discharge.   Neck:      Thyroid: No thyromegaly.      Vascular: No JVD.   Cardiovascular:      Rate and Rhythm: Normal rate and " regular rhythm.      Heart sounds: Normal heart sounds. No murmur heard.     No friction rub. No gallop.   Pulmonary:      Effort: No respiratory distress.      Breath sounds: Normal breath sounds.   Abdominal:      General: Bowel sounds are normal. There is no distension.      Tenderness: There is no abdominal tenderness.   Musculoskeletal:         General: No tenderness.   Skin:     General: Skin is warm and dry.   Neurological:      Mental Status: He is alert and oriented to person, place, and time.      Cranial Nerves: No cranial nerve deficit.   Psychiatric:         Behavior: Behavior normal.         Assessment:     1. Stage C chronic systolic congestive heart failure (HCC)  bisoprolol (ZEBETA) 10 MG tablet    spironolactone (ALDACTONE) 25 MG Tab    isosorbide dinitrate (ISORDIL) 10 MG Tab    furosemide (LASIX) 20 MG Tab    sacubitril-valsartan (ENTRESTO)  MG Tab    Empagliflozin (JARDIANCE) 10 MG Tab tablet    proBrain Natriuretic Peptide, NT      2. Heart failure, NYHA class 2 (HCC)  bisoprolol (ZEBETA) 10 MG tablet    spironolactone (ALDACTONE) 25 MG Tab    isosorbide dinitrate (ISORDIL) 10 MG Tab    furosemide (LASIX) 20 MG Tab    sacubitril-valsartan (ENTRESTO)  MG Tab    Empagliflozin (JARDIANCE) 10 MG Tab tablet    proBrain Natriuretic Peptide, NT      3. Paroxysmal atrial fibrillation (HCC)  EKG    apixaban (ELIQUIS) 5mg Tab      4. Chronic HFrEF (heart failure with reduced ejection fraction) ([facility])        5. Non-ischemic cardiomyopathy (HCC)  spironolactone (ALDACTONE) 25 MG Tab    isosorbide dinitrate (ISORDIL) 10 MG Tab    furosemide (LASIX) 20 MG Tab      6. Longstanding persistent atrial fibrillation (HCC)        7. High risk medication use  spironolactone (ALDACTONE) 25 MG Tab    isosorbide dinitrate (ISORDIL) 10 MG Tab    furosemide (LASIX) 20 MG Tab    apixaban (ELIQUIS) 5mg Tab    Basic Metabolic Panel      8. Mixed hyperlipidemia  atorvastatin (LIPITOR) 40 MG Tab    LIPID PANEL       9. Dyspnea on exertion  proBrain Natriuretic Peptide, NT          Medical Decision Making:  Today's Assessment / Status / Plan:   Non-Ischemic Cardiomyopathy:  Chronically illed condition which requires ongoing close monitoring and treatment to improve survival rate along with decreasing risk of clinical decompensation and hospitalization.    Today, based on physical examination findings, patient is euvolemic. No JVD, lungs are clear to auscultation, no pitting edema in bilateral lower extremities, no ascites.     Dry weight is 219 lbs.    Will continue Entresto 97/103 mg twice a day. Continue Hydralazine and Isordil at current dose.    Did not tolerate Toprol XL. Trial isoprolol 10 mg daily.     Diuretic with Furosemide 20 mg 1x daily.     Aldactone antagonist with Spironolactone 25 mg daily.    Patient is feeling very well at this time. Defer advanced HF therapies such as heart transplant at this time.    Based on recent data on SGLT2 and heart failure with reduced ejection fraction, patient will be benefited from Jardiance 10 mg p.o. once a day for further reduction in mortality and hospitalization with absolute risk reduction of 5.2%.  Therefore, I will continue patient on Jardiance 10 mg p.o. once a day.  Risks and benefits were explained to patient and patient has agreed to proceed.    Will interrogate the ICD next month.    Persistent Atrial Fibrillation:  Anticoagulation with Eliquis 5 mg 2x daily.    Hypertension:  Optimize control using cardiomyopathy medical regimen as well.    Will continue to closely monitor for side effects of patient's high risk medication(s) including liver, renal function and electrolytes.    I will see patient back in our Heart Failure Clinic with lab tests and studies results in 4 weeks.    I thank you for referring patient to our Heart Failure Clinic today.    Cinthya Arriaga M.D.

## 2023-06-13 ENCOUNTER — TELEPHONE (OUTPATIENT)
Dept: CARDIOLOGY | Facility: MEDICAL CENTER | Age: 53
End: 2023-06-13
Payer: MEDICARE

## 2023-06-13 NOTE — TELEPHONE ENCOUNTER
Emani/Entresto...released to pharmacy on file    Government funded insurance([facility])    Pharmacy on File  [facility]   [address], [place] [address]  Phone: [phone]  Fax: [phone]

## 2023-06-29 DIAGNOSIS — I50.22 STAGE C CHRONIC SYSTOLIC CONGESTIVE HEART FAILURE (HCC): ICD-10-CM

## 2023-06-29 DIAGNOSIS — I50.9 HEART FAILURE, NYHA CLASS 2 (HCC): ICD-10-CM

## 2023-06-29 RX ORDER — BISOPROLOL FUMARATE 10 MG/1
10 TABLET, FILM COATED ORAL DAILY
Qty: 90 TABLET | Refills: 4 | Status: SHIPPED | OUTPATIENT
Start: 2023-06-29 | End: 2023-11-24

## 2023-10-11 ENCOUNTER — HOSPITAL ENCOUNTER (OUTPATIENT)
Dept: LAB | Facility: MEDICAL CENTER | Age: 53
End: 2023-10-11
Attending: INTERNAL MEDICINE
Payer: MEDICARE

## 2023-10-11 LAB
ALBUMIN SERPL BCP-MCNC: 4.8 G/DL (ref 3.2–4.9)
ALBUMIN/GLOB SERPL: 1.3 G/DL
ALP SERPL-CCNC: 122 U/L (ref 30–99)
ALT SERPL-CCNC: 21 U/L (ref 2–50)
ANION GAP SERPL CALC-SCNC: 13 MMOL/L (ref 7–16)
APPEARANCE UR: CLEAR
AST SERPL-CCNC: 28 U/L (ref 12–45)
BASOPHILS # BLD AUTO: 0.7 % (ref 0–1.8)
BASOPHILS # BLD: 0.04 K/UL (ref 0–0.12)
BILIRUB SERPL-MCNC: 0.9 MG/DL (ref 0.1–1.5)
BILIRUB UR QL STRIP.AUTO: NEGATIVE
BUN SERPL-MCNC: 28 MG/DL (ref 8–22)
CALCIUM ALBUM COR SERPL-MCNC: 9.9 MG/DL (ref 8.5–10.5)
CALCIUM SERPL-MCNC: 10.5 MG/DL (ref 8.5–10.5)
CHLORIDE SERPL-SCNC: 94 MMOL/L (ref 96–112)
CO2 SERPL-SCNC: 27 MMOL/L (ref 20–33)
COLOR UR: YELLOW
CREAT SERPL-MCNC: 1.44 MG/DL (ref 0.5–1.4)
EOSINOPHIL # BLD AUTO: 0.13 K/UL (ref 0–0.51)
EOSINOPHIL NFR BLD: 2.2 % (ref 0–6.9)
ERYTHROCYTE [DISTWIDTH] IN BLOOD BY AUTOMATED COUNT: 42.1 FL (ref 35.9–50)
EST. AVERAGE GLUCOSE BLD GHB EST-MCNC: 295 MG/DL
FASTING STATUS PATIENT QL REPORTED: NORMAL
GFR SERPLBLD CREATININE-BSD FMLA CKD-EPI: 57 ML/MIN/1.73 M 2
GLOBULIN SER CALC-MCNC: 3.8 G/DL (ref 1.9–3.5)
GLUCOSE SERPL-MCNC: 331 MG/DL (ref 65–99)
GLUCOSE UR STRIP.AUTO-MCNC: >=1000 MG/DL
HBA1C MFR BLD: 11.9 % (ref 4–5.6)
HCT VFR BLD AUTO: 55.3 % (ref 42–52)
HGB BLD-MCNC: 18.6 G/DL (ref 14–18)
IMM GRANULOCYTES # BLD AUTO: 0.01 K/UL (ref 0–0.11)
IMM GRANULOCYTES NFR BLD AUTO: 0.2 % (ref 0–0.9)
KETONES UR STRIP.AUTO-MCNC: NEGATIVE MG/DL
LEUKOCYTE ESTERASE UR QL STRIP.AUTO: NEGATIVE
LYMPHOCYTES # BLD AUTO: 2.44 K/UL (ref 1–4.8)
LYMPHOCYTES NFR BLD: 41.9 % (ref 22–41)
MCH RBC QN AUTO: 31.5 PG (ref 27–33)
MCHC RBC AUTO-ENTMCNC: 33.6 G/DL (ref 32.3–36.5)
MCV RBC AUTO: 93.6 FL (ref 81.4–97.8)
MICRO URNS: ABNORMAL
MONOCYTES # BLD AUTO: 0.74 K/UL (ref 0–0.85)
MONOCYTES NFR BLD AUTO: 12.7 % (ref 0–13.4)
NEUTROPHILS # BLD AUTO: 2.47 K/UL (ref 1.82–7.42)
NEUTROPHILS NFR BLD: 42.3 % (ref 44–72)
NITRITE UR QL STRIP.AUTO: NEGATIVE
NRBC # BLD AUTO: 0 K/UL
NRBC BLD-RTO: 0 /100 WBC (ref 0–0.2)
PH UR STRIP.AUTO: 5.5 [PH] (ref 5–8)
PLATELET # BLD AUTO: 167 K/UL (ref 164–446)
PMV BLD AUTO: 13 FL (ref 9–12.9)
POTASSIUM SERPL-SCNC: 5.5 MMOL/L (ref 3.6–5.5)
PROT SERPL-MCNC: 8.6 G/DL (ref 6–8.2)
PROT UR QL STRIP: NEGATIVE MG/DL
PSA SERPL-MCNC: 0.45 NG/ML (ref 0–4)
RBC # BLD AUTO: 5.91 M/UL (ref 4.7–6.1)
RBC UR QL AUTO: NEGATIVE
SODIUM SERPL-SCNC: 134 MMOL/L (ref 135–145)
SP GR UR STRIP.AUTO: 1.03
UROBILINOGEN UR STRIP.AUTO-MCNC: 0.2 MG/DL
WBC # BLD AUTO: 5.8 K/UL (ref 4.8–10.8)

## 2023-10-11 PROCEDURE — 80053 COMPREHEN METABOLIC PANEL: CPT

## 2023-10-11 PROCEDURE — 84153 ASSAY OF PSA TOTAL: CPT | Mod: GA

## 2023-10-11 PROCEDURE — 36415 COLL VENOUS BLD VENIPUNCTURE: CPT

## 2023-10-11 PROCEDURE — 82043 UR ALBUMIN QUANTITATIVE: CPT

## 2023-10-11 PROCEDURE — 81003 URINALYSIS AUTO W/O SCOPE: CPT

## 2023-10-11 PROCEDURE — 83036 HEMOGLOBIN GLYCOSYLATED A1C: CPT | Mod: GA

## 2023-10-11 PROCEDURE — 82570 ASSAY OF URINE CREATININE: CPT

## 2023-10-11 PROCEDURE — 85025 COMPLETE CBC W/AUTO DIFF WBC: CPT

## 2023-10-12 LAB
CREAT UR-MCNC: 70.71 MG/DL
MICROALBUMIN UR-MCNC: 3.6 MG/DL
MICROALBUMIN/CREAT UR: 51 MG/G (ref 0–30)

## 2023-10-25 ENCOUNTER — HOSPITAL ENCOUNTER (OUTPATIENT)
Dept: LAB | Facility: MEDICAL CENTER | Age: 53
End: 2023-10-25
Attending: INTERNAL MEDICINE
Payer: MEDICARE

## 2023-10-25 DIAGNOSIS — R06.09 DYSPNEA ON EXERTION: ICD-10-CM

## 2023-10-25 DIAGNOSIS — I50.22 STAGE C CHRONIC SYSTOLIC CONGESTIVE HEART FAILURE (HCC): ICD-10-CM

## 2023-10-25 DIAGNOSIS — Z79.899 HIGH RISK MEDICATION USE: ICD-10-CM

## 2023-10-25 DIAGNOSIS — I50.9 HEART FAILURE, NYHA CLASS 2 (HCC): ICD-10-CM

## 2023-10-25 LAB
ANION GAP SERPL CALC-SCNC: 11 MMOL/L (ref 7–16)
BUN SERPL-MCNC: 21 MG/DL (ref 8–22)
CALCIUM SERPL-MCNC: 9.8 MG/DL (ref 8.5–10.5)
CHLORIDE SERPL-SCNC: 97 MMOL/L (ref 96–112)
CHOLEST SERPL-MCNC: 186 MG/DL (ref 100–199)
CO2 SERPL-SCNC: 28 MMOL/L (ref 20–33)
CREAT SERPL-MCNC: 1.49 MG/DL (ref 0.5–1.4)
FASTING STATUS PATIENT QL REPORTED: NORMAL
GFR SERPLBLD CREATININE-BSD FMLA CKD-EPI: 55 ML/MIN/1.73 M 2
GLUCOSE SERPL-MCNC: 171 MG/DL (ref 65–99)
HDLC SERPL-MCNC: 60 MG/DL
LDLC SERPL CALC-MCNC: 107 MG/DL
NT-PROBNP SERPL IA-MCNC: 1540 PG/ML (ref 0–125)
POTASSIUM SERPL-SCNC: 4.6 MMOL/L (ref 3.6–5.5)
SODIUM SERPL-SCNC: 136 MMOL/L (ref 135–145)
TRIGL SERPL-MCNC: 97 MG/DL (ref 0–149)

## 2023-10-25 PROCEDURE — 80048 BASIC METABOLIC PNL TOTAL CA: CPT

## 2023-10-25 PROCEDURE — 80061 LIPID PANEL: CPT

## 2023-10-25 PROCEDURE — 83880 ASSAY OF NATRIURETIC PEPTIDE: CPT

## 2023-10-25 PROCEDURE — 36415 COLL VENOUS BLD VENIPUNCTURE: CPT

## 2023-11-24 ENCOUNTER — HOSPITAL ENCOUNTER (INPATIENT)
Facility: MEDICAL CENTER | Age: 53
LOS: 1 days | DRG: 948 | End: 2023-11-25
Attending: EMERGENCY MEDICINE | Admitting: INTERNAL MEDICINE
Payer: MEDICARE

## 2023-11-24 ENCOUNTER — APPOINTMENT (OUTPATIENT)
Dept: RADIOLOGY | Facility: MEDICAL CENTER | Age: 53
DRG: 948 | End: 2023-11-24
Attending: EMERGENCY MEDICINE
Payer: MEDICARE

## 2023-11-24 DIAGNOSIS — T40.2X5A CONSTIPATION DUE TO OPIOID THERAPY: ICD-10-CM

## 2023-11-24 DIAGNOSIS — K59.03 CONSTIPATION DUE TO OPIOID THERAPY: ICD-10-CM

## 2023-11-24 DIAGNOSIS — E83.42 HYPOMAGNESEMIA: ICD-10-CM

## 2023-11-24 DIAGNOSIS — R10.13 EPIGASTRIC PAIN: ICD-10-CM

## 2023-11-24 DIAGNOSIS — R10.30 LOWER ABDOMINAL PAIN: ICD-10-CM

## 2023-11-24 DIAGNOSIS — K86.89 PANCREATIC MASS: ICD-10-CM

## 2023-11-24 LAB
ALBUMIN SERPL BCP-MCNC: 4.3 G/DL (ref 3.2–4.9)
ALBUMIN/GLOB SERPL: 1.2 G/DL
ALP SERPL-CCNC: 104 U/L (ref 30–99)
ALT SERPL-CCNC: 9 U/L (ref 2–50)
ANION GAP SERPL CALC-SCNC: 13 MMOL/L (ref 7–16)
APPEARANCE UR: CLEAR
AST SERPL-CCNC: 21 U/L (ref 12–45)
BACTERIA #/AREA URNS HPF: NEGATIVE /HPF
BASOPHILS # BLD AUTO: 0.6 % (ref 0–1.8)
BASOPHILS # BLD: 0.04 K/UL (ref 0–0.12)
BILIRUB SERPL-MCNC: 0.8 MG/DL (ref 0.1–1.5)
BILIRUB UR QL STRIP.AUTO: NEGATIVE
BUN SERPL-MCNC: 18 MG/DL (ref 8–22)
CALCIUM ALBUM COR SERPL-MCNC: 10.3 MG/DL (ref 8.5–10.5)
CALCIUM SERPL-MCNC: 10.5 MG/DL (ref 8.5–10.5)
CHLORIDE SERPL-SCNC: 102 MMOL/L (ref 96–112)
CO2 SERPL-SCNC: 25 MMOL/L (ref 20–33)
COLOR UR: YELLOW
CREAT SERPL-MCNC: 1 MG/DL (ref 0.5–1.4)
EKG IMPRESSION: NORMAL
EOSINOPHIL # BLD AUTO: 0.14 K/UL (ref 0–0.51)
EOSINOPHIL NFR BLD: 2.2 % (ref 0–6.9)
EPI CELLS #/AREA URNS HPF: NEGATIVE /HPF
ERYTHROCYTE [DISTWIDTH] IN BLOOD BY AUTOMATED COUNT: 44.1 FL (ref 35.9–50)
GFR SERPLBLD CREATININE-BSD FMLA CKD-EPI: 88 ML/MIN/1.73 M 2
GLOBULIN SER CALC-MCNC: 3.7 G/DL (ref 1.9–3.5)
GLUCOSE SERPL-MCNC: 153 MG/DL (ref 65–99)
GLUCOSE UR STRIP.AUTO-MCNC: NEGATIVE MG/DL
HCT VFR BLD AUTO: 51.2 % (ref 42–52)
HGB BLD-MCNC: 16.5 G/DL (ref 14–18)
HYALINE CASTS #/AREA URNS LPF: ABNORMAL /LPF
IMM GRANULOCYTES # BLD AUTO: 0.01 K/UL (ref 0–0.11)
IMM GRANULOCYTES NFR BLD AUTO: 0.2 % (ref 0–0.9)
KETONES UR STRIP.AUTO-MCNC: 15 MG/DL
LEUKOCYTE ESTERASE UR QL STRIP.AUTO: NEGATIVE
LIPASE SERPL-CCNC: 12 U/L (ref 11–82)
LYMPHOCYTES # BLD AUTO: 2.66 K/UL (ref 1–4.8)
LYMPHOCYTES NFR BLD: 41.6 % (ref 22–41)
MCH RBC QN AUTO: 31 PG (ref 27–33)
MCHC RBC AUTO-ENTMCNC: 32.2 G/DL (ref 32.3–36.5)
MCV RBC AUTO: 96.2 FL (ref 81.4–97.8)
MICRO URNS: ABNORMAL
MONOCYTES # BLD AUTO: 0.86 K/UL (ref 0–0.85)
MONOCYTES NFR BLD AUTO: 13.5 % (ref 0–13.4)
NEUTROPHILS # BLD AUTO: 2.68 K/UL (ref 1.82–7.42)
NEUTROPHILS NFR BLD: 41.9 % (ref 44–72)
NITRITE UR QL STRIP.AUTO: NEGATIVE
NRBC # BLD AUTO: 0 K/UL
NRBC BLD-RTO: 0 /100 WBC (ref 0–0.2)
PH UR STRIP.AUTO: 5 [PH] (ref 5–8)
PLATELET # BLD AUTO: 193 K/UL (ref 164–446)
PMV BLD AUTO: 11.1 FL (ref 9–12.9)
POTASSIUM SERPL-SCNC: 5.4 MMOL/L (ref 3.6–5.5)
PROT SERPL-MCNC: 8 G/DL (ref 6–8.2)
PROT UR QL STRIP: 30 MG/DL
RBC # BLD AUTO: 5.32 M/UL (ref 4.7–6.1)
RBC # URNS HPF: ABNORMAL /HPF
RBC UR QL AUTO: NEGATIVE
SODIUM SERPL-SCNC: 140 MMOL/L (ref 135–145)
SP GR UR STRIP.AUTO: >=1.045
UROBILINOGEN UR STRIP.AUTO-MCNC: 0.2 MG/DL
WBC # BLD AUTO: 6.4 K/UL (ref 4.8–10.8)
WBC #/AREA URNS HPF: ABNORMAL /HPF

## 2023-11-24 PROCEDURE — A9270 NON-COVERED ITEM OR SERVICE: HCPCS | Performed by: EMERGENCY MEDICINE

## 2023-11-24 PROCEDURE — 83690 ASSAY OF LIPASE: CPT

## 2023-11-24 PROCEDURE — 82962 GLUCOSE BLOOD TEST: CPT

## 2023-11-24 PROCEDURE — 85025 COMPLETE CBC W/AUTO DIFF WBC: CPT

## 2023-11-24 PROCEDURE — 770004 HCHG ROOM/CARE - ONCOLOGY PRIVATE *

## 2023-11-24 PROCEDURE — 700111 HCHG RX REV CODE 636 W/ 250 OVERRIDE (IP): Mod: JZ | Performed by: EMERGENCY MEDICINE

## 2023-11-24 PROCEDURE — 700102 HCHG RX REV CODE 250 W/ 637 OVERRIDE(OP): Performed by: EMERGENCY MEDICINE

## 2023-11-24 PROCEDURE — 700111 HCHG RX REV CODE 636 W/ 250 OVERRIDE (IP): Performed by: INTERNAL MEDICINE

## 2023-11-24 PROCEDURE — 36415 COLL VENOUS BLD VENIPUNCTURE: CPT

## 2023-11-24 PROCEDURE — 81001 URINALYSIS AUTO W/SCOPE: CPT

## 2023-11-24 PROCEDURE — 700102 HCHG RX REV CODE 250 W/ 637 OVERRIDE(OP): Performed by: INTERNAL MEDICINE

## 2023-11-24 PROCEDURE — 93005 ELECTROCARDIOGRAM TRACING: CPT | Performed by: INTERNAL MEDICINE

## 2023-11-24 PROCEDURE — 99223 1ST HOSP IP/OBS HIGH 75: CPT | Mod: AI | Performed by: INTERNAL MEDICINE

## 2023-11-24 PROCEDURE — 93010 ELECTROCARDIOGRAM REPORT: CPT | Performed by: INTERNAL MEDICINE

## 2023-11-24 PROCEDURE — 99285 EMERGENCY DEPT VISIT HI MDM: CPT

## 2023-11-24 PROCEDURE — 96375 TX/PRO/DX INJ NEW DRUG ADDON: CPT

## 2023-11-24 PROCEDURE — 96374 THER/PROPH/DIAG INJ IV PUSH: CPT

## 2023-11-24 PROCEDURE — 80053 COMPREHEN METABOLIC PANEL: CPT

## 2023-11-24 PROCEDURE — A9270 NON-COVERED ITEM OR SERVICE: HCPCS | Performed by: INTERNAL MEDICINE

## 2023-11-24 PROCEDURE — 74177 CT ABD & PELVIS W/CONTRAST: CPT

## 2023-11-24 PROCEDURE — 700117 HCHG RX CONTRAST REV CODE 255: Performed by: EMERGENCY MEDICINE

## 2023-11-24 RX ORDER — ONDANSETRON 2 MG/ML
4 INJECTION INTRAMUSCULAR; INTRAVENOUS ONCE
Status: COMPLETED | OUTPATIENT
Start: 2023-11-24 | End: 2023-11-24

## 2023-11-24 RX ORDER — ACETAMINOPHEN 500 MG
1000 TABLET ORAL ONCE
Status: COMPLETED | OUTPATIENT
Start: 2023-11-24 | End: 2023-11-24

## 2023-11-24 RX ORDER — ACETAMINOPHEN 325 MG/1
650 TABLET ORAL EVERY 6 HOURS PRN
Status: DISCONTINUED | OUTPATIENT
Start: 2023-11-24 | End: 2023-11-25 | Stop reason: HOSPADM

## 2023-11-24 RX ORDER — BISACODYL 10 MG
10 SUPPOSITORY, RECTAL RECTAL
Status: DISCONTINUED | OUTPATIENT
Start: 2023-11-24 | End: 2023-11-25 | Stop reason: HOSPADM

## 2023-11-24 RX ORDER — FUROSEMIDE 20 MG/1
20 TABLET ORAL DAILY
Status: DISCONTINUED | OUTPATIENT
Start: 2023-11-25 | End: 2023-11-25 | Stop reason: HOSPADM

## 2023-11-24 RX ORDER — ISOSORBIDE DINITRATE 10 MG/1
10 TABLET ORAL 3 TIMES DAILY
Status: DISCONTINUED | OUTPATIENT
Start: 2023-11-24 | End: 2023-11-25 | Stop reason: HOSPADM

## 2023-11-24 RX ORDER — ACETAMINOPHEN 325 MG/1
650 TABLET ORAL EVERY 4 HOURS PRN
COMMUNITY
End: 2023-11-25 | Stop reason: SDUPTHER

## 2023-11-24 RX ORDER — HYDROMORPHONE HYDROCHLORIDE 1 MG/ML
0.25 INJECTION, SOLUTION INTRAMUSCULAR; INTRAVENOUS; SUBCUTANEOUS
Status: DISCONTINUED | OUTPATIENT
Start: 2023-11-24 | End: 2023-11-25 | Stop reason: HOSPADM

## 2023-11-24 RX ORDER — AMOXICILLIN 250 MG
2 CAPSULE ORAL 2 TIMES DAILY
Status: DISCONTINUED | OUTPATIENT
Start: 2023-11-24 | End: 2023-11-25 | Stop reason: HOSPADM

## 2023-11-24 RX ORDER — ATORVASTATIN CALCIUM 40 MG/1
40 TABLET, FILM COATED ORAL DAILY
Status: DISCONTINUED | OUTPATIENT
Start: 2023-11-25 | End: 2023-11-25 | Stop reason: HOSPADM

## 2023-11-24 RX ORDER — MORPHINE SULFATE 4 MG/ML
4 INJECTION INTRAVENOUS ONCE
Status: COMPLETED | OUTPATIENT
Start: 2023-11-24 | End: 2023-11-24

## 2023-11-24 RX ORDER — OXYCODONE HYDROCHLORIDE 5 MG/1
2.5 TABLET ORAL
Status: DISCONTINUED | OUTPATIENT
Start: 2023-11-24 | End: 2023-11-25 | Stop reason: HOSPADM

## 2023-11-24 RX ORDER — POLYETHYLENE GLYCOL 3350 17 G/17G
1 POWDER, FOR SOLUTION ORAL
Status: DISCONTINUED | OUTPATIENT
Start: 2023-11-24 | End: 2023-11-25 | Stop reason: HOSPADM

## 2023-11-24 RX ORDER — OXYCODONE HYDROCHLORIDE 5 MG/1
5 TABLET ORAL
Status: DISCONTINUED | OUTPATIENT
Start: 2023-11-24 | End: 2023-11-25 | Stop reason: HOSPADM

## 2023-11-24 RX ORDER — HYDRALAZINE HYDROCHLORIDE 50 MG/1
25 TABLET, FILM COATED ORAL 3 TIMES DAILY
Status: DISCONTINUED | OUTPATIENT
Start: 2023-11-24 | End: 2023-11-25 | Stop reason: HOSPADM

## 2023-11-24 RX ORDER — SPIRONOLACTONE 25 MG/1
25 TABLET ORAL DAILY
Status: DISCONTINUED | OUTPATIENT
Start: 2023-11-25 | End: 2023-11-25 | Stop reason: HOSPADM

## 2023-11-24 RX ORDER — HYDRALAZINE HYDROCHLORIDE 20 MG/ML
10 INJECTION INTRAMUSCULAR; INTRAVENOUS EVERY 4 HOURS PRN
Status: DISCONTINUED | OUTPATIENT
Start: 2023-11-24 | End: 2023-11-25 | Stop reason: HOSPADM

## 2023-11-24 RX ADMIN — OXYCODONE 5 MG: 5 TABLET ORAL at 20:18

## 2023-11-24 RX ADMIN — MORPHINE SULFATE 4 MG: 4 INJECTION, SOLUTION INTRAMUSCULAR; INTRAVENOUS at 16:19

## 2023-11-24 RX ADMIN — IOHEXOL 100 ML: 350 INJECTION, SOLUTION INTRAVENOUS at 14:00

## 2023-11-24 RX ADMIN — HYDRALAZINE HYDROCHLORIDE 25 MG: 50 TABLET, FILM COATED ORAL at 18:02

## 2023-11-24 RX ADMIN — SACUBITRIL AND VALSARTAN 1 TABLET: 97; 103 TABLET, FILM COATED ORAL at 18:03

## 2023-11-24 RX ADMIN — ACETAMINOPHEN 1000 MG: 500 TABLET, FILM COATED ORAL at 12:43

## 2023-11-24 RX ADMIN — ISOSORBIDE DINITRATE 10 MG: 10 TABLET ORAL at 18:03

## 2023-11-24 RX ADMIN — APIXABAN 5 MG: 5 TABLET, FILM COATED ORAL at 18:02

## 2023-11-24 RX ADMIN — ONDANSETRON 4 MG: 2 INJECTION INTRAMUSCULAR; INTRAVENOUS at 16:17

## 2023-11-24 RX ADMIN — HYDROMORPHONE HYDROCHLORIDE 0.25 MG: 1 INJECTION, SOLUTION INTRAMUSCULAR; INTRAVENOUS; SUBCUTANEOUS at 22:58

## 2023-11-24 ASSESSMENT — CHA2DS2 SCORE
CHA2DS2 VASC SCORE: 3
PRIOR STROKE OR TIA OR THROMBOEMBOLISM: NO
VASCULAR DISEASE: NO
HYPERTENSION: YES
AGE 65 TO 74: NO
CHF OR LEFT VENTRICULAR DYSFUNCTION: YES
SEX: MALE
AGE 75 OR GREATER: NO
DIABETES: YES

## 2023-11-24 ASSESSMENT — COGNITIVE AND FUNCTIONAL STATUS - GENERAL
SUGGESTED CMS G CODE MODIFIER MOBILITY: CH
MOBILITY SCORE: 24
SUGGESTED CMS G CODE MODIFIER DAILY ACTIVITY: CH
DAILY ACTIVITIY SCORE: 24

## 2023-11-24 ASSESSMENT — ENCOUNTER SYMPTOMS
DOUBLE VISION: 0
DIZZINESS: 0
NECK PAIN: 0
TREMORS: 0
TINGLING: 0
EYE PAIN: 0
SPEECH CHANGE: 0
DIARRHEA: 0
ORTHOPNEA: 0
HEADACHES: 0
CHILLS: 0
SPUTUM PRODUCTION: 0
PHOTOPHOBIA: 0
ABDOMINAL PAIN: 1
MYALGIAS: 0
CONSTIPATION: 0
FEVER: 0
PALPITATIONS: 0
SENSORY CHANGE: 0
BACK PAIN: 0
BLURRED VISION: 0
NAUSEA: 1
HALLUCINATIONS: 0
COUGH: 0
VOMITING: 0
WEIGHT LOSS: 0
FOCAL WEAKNESS: 0
SHORTNESS OF BREATH: 0

## 2023-11-24 ASSESSMENT — FIBROSIS 4 INDEX: FIB4 SCORE: 2.01

## 2023-11-24 ASSESSMENT — PAIN DESCRIPTION - PAIN TYPE
TYPE: ACUTE PAIN

## 2023-11-24 ASSESSMENT — PAIN SCALES - WONG BAKER: WONGBAKER_NUMERICALRESPONSE: DOESN'T HURT AT ALL

## 2023-11-24 ASSESSMENT — LIFESTYLE VARIABLES: SUBSTANCE_ABUSE: 0

## 2023-11-24 NOTE — ED NOTES
The pt is alert and oriented. The pt ambulated with a steady gait to room. The pt changed and hooked up to the monitor. Chart up for ERP

## 2023-11-24 NOTE — ED TRIAGE NOTES
"Lai Dailey  53 y.o.    Chief Complaint   Patient presents with    Abdominal Pain     X 1 month. Pt believes he has a hernia.        BP (!) 131/92   Pulse 81   Temp 36.5 °C (97.7 °F) (Temporal)   Resp 16   Ht 1.753 m (5' 9\")   Wt 93 kg (205 lb 0.4 oz)   SpO2 95%   BMI 30.28 kg/m²     Protocol placed, pt educated to alert staff with any changes or concerns   "

## 2023-11-24 NOTE — ED PROVIDER NOTES
"ED Provider Note    CHIEF COMPLAINT  Chief Complaint   Patient presents with    Abdominal Pain     X 1 month. Pt believes he has a hernia.        EXTERNAL RECORDS REVIEWED  Outpatient Notes cardiology note from June 2023, seen for atrial fibrillation, on chronic anticoagulation    HPI/ROS  LIMITATION TO HISTORY   Select: : None  OUTSIDE HISTORIAN(S):  None    Lai Dailey is a 53 y.o. male who presents with lower abdominal pain, worse with having bowel movement or urinating.  No dysuria however the suprapubic pain worsens during urination.  States area is tender to palpation.  Patient had umbilical hernia as a child otherwise no abdominal surgery.  [facility], states blood work and abdominal ultrasound were negative \"they told me I did not have a hernia\".  No associated flank pain.  No fever or vomiting.  He denies chest pain or difficulty breathing.  Patient denies testicular pain.  He states that the pain is \"behind\" his testicles.    PAST MEDICAL HISTORY   has a past medical history of CHF (congestive heart failure) ([facility]), Diabetes (HCC), Hyperlipidemia, Hypertension, Pacemaker, and Snoring (10/18/2021).    SURGICAL HISTORY   has a past surgical history that includes aicd implant (2010).    FAMILY HISTORY  Family History   Problem Relation Age of Onset    Colon Cancer Mother     Hypertension Sister     Stroke Brother     Heart Disease Neg Hx        SOCIAL HISTORY  Social History     Tobacco Use    Smoking status: Never    Smokeless tobacco: Never   Vaping Use    Vaping Use: Never used   Substance and Sexual Activity    Alcohol use: Yes     Comment: occ    Drug use: No     Comment: Marijuana use as youth    Sexual activity: Not on file       CURRENT MEDICATIONS  Home Medications       Reviewed by Gini Sharma R.N. (Registered Nurse) on 11/24/23 at 1058  Med List Status: Partial     Medication Last Dose Status   apixaban (ELIQUIS) 5mg Tab  Active " "  atorvastatin (LIPITOR) 40 MG Tab  Active   bisoprolol (ZEBETA) 10 MG tablet  Active   Empagliflozin (JARDIANCE) 10 MG Tab tablet  Active   furosemide (LASIX) 20 MG Tab  Active   hydrALAZINE (APRESOLINE) 25 MG Tab  Active   insulin glargine (LANTUS) 100 UNIT/ML Solution  Active   isosorbide dinitrate (ISORDIL) 10 MG Tab  Active   metformin (GLUCOPHAGE) 500 MG TABS  Active   multivitamin (THERAGRAN) Tab  Active   sacubitril-valsartan (ENTRESTO)  MG Tab  Active   spironolactone (ALDACTONE) 25 MG Tab  Active                    ALLERGIES  No Known Allergies    PHYSICAL EXAM  VITAL SIGNS: BP (!) 131/92   Pulse 81   Temp 36.5 °C (97.7 °F) (Temporal)   Resp 16   Ht 1.753 m (5' 9\")   Wt 93 kg (205 lb 0.4 oz)   SpO2 95%   BMI 30.28 kg/m²    Constitutional: Well-nourished  Respiratory: Clear lung sounds  Cardiac: Regular rate and rhythm  GI: Suprapubic tenderness without guarding.  Upper abdomen is soft and nontender.  No distention  Musculoskeletal: No flank tenderness  Neurologic: Sensation and strength are normal    DIAGNOSTIC STUDIES / PROCEDURES      LABS  Results for orders placed or performed during the hospital encounter of 11/24/23   CBC WITH DIFFERENTIAL   Result Value Ref Range    WBC 6.4 4.8 - 10.8 K/uL    RBC 5.32 4.70 - 6.10 M/uL    Hemoglobin 16.5 14.0 - 18.0 g/dL    Hematocrit 51.2 42.0 - 52.0 %    MCV 96.2 81.4 - 97.8 fL    MCH 31.0 27.0 - 33.0 pg    MCHC 32.2 (L) 32.3 - 36.5 g/dL    RDW 44.1 35.9 - 50.0 fL    Platelet Count 193 164 - 446 K/uL    MPV 11.1 9.0 - 12.9 fL    Neutrophils-Polys 41.90 (L) 44.00 - 72.00 %    Lymphocytes 41.60 (H) 22.00 - 41.00 %    Monocytes 13.50 (H) 0.00 - 13.40 %    Eosinophils 2.20 0.00 - 6.90 %    Basophils 0.60 0.00 - 1.80 %    Immature Granulocytes 0.20 0.00 - 0.90 %    Nucleated RBC 0.00 0.00 - 0.20 /100 WBC    Neutrophils (Absolute) 2.68 1.82 - 7.42 K/uL    Lymphs (Absolute) 2.66 1.00 - 4.80 K/uL    Monos (Absolute) 0.86 (H) 0.00 - 0.85 K/uL    Eos (Absolute) " 0.14 0.00 - 0.51 K/uL    Baso (Absolute) 0.04 0.00 - 0.12 K/uL    Immature Granulocytes (abs) 0.01 0.00 - 0.11 K/uL    NRBC (Absolute) 0.00 K/uL   COMP METABOLIC PANEL   Result Value Ref Range    Sodium 140 135 - 145 mmol/L    Potassium 5.4 3.6 - 5.5 mmol/L    Chloride 102 96 - 112 mmol/L    Co2 25 20 - 33 mmol/L    Anion Gap 13.0 7.0 - 16.0    Glucose 153 (H) 65 - 99 mg/dL    Bun 18 8 - 22 mg/dL    Creatinine 1.00 0.50 - 1.40 mg/dL    Calcium 10.5 8.5 - 10.5 mg/dL    Correct Calcium 10.3 8.5 - 10.5 mg/dL    AST(SGOT) 21 12 - 45 U/L    ALT(SGPT) 9 2 - 50 U/L    Alkaline Phosphatase 104 (H) 30 - 99 U/L    Total Bilirubin 0.8 0.1 - 1.5 mg/dL    Albumin 4.3 3.2 - 4.9 g/dL    Total Protein 8.0 6.0 - 8.2 g/dL    Globulin 3.7 (H) 1.9 - 3.5 g/dL    A-G Ratio 1.2 g/dL   LIPASE   Result Value Ref Range    Lipase 12 11 - 82 U/L   URINALYSIS    Specimen: Urine   Result Value Ref Range    Color Yellow     Character Clear     Specific Gravity >=1.045 (A) <1.035    Ph 5.0 5.0 - 8.0    Glucose Negative Negative mg/dL    Ketones 15 (A) Negative mg/dL    Protein 30 (A) Negative mg/dL    Bilirubin Negative Negative    Urobilinogen, Urine 0.2 Negative    Nitrite Negative Negative    Leukocyte Esterase Negative Negative    Occult Blood Negative Negative    Micro Urine Req Microscopic    ESTIMATED GFR   Result Value Ref Range    GFR (CKD-EPI) 88 >60 mL/min/1.73 m 2   URINE MICROSCOPIC (W/UA)   Result Value Ref Range    WBC 0-2 (A) /hpf    RBC 0-2 (A) /hpf    Bacteria Negative None /hpf    Epithelial Cells Negative /hpf    Hyaline Cast 3-5 (A) /lpf         RADIOLOGY  I have independently interpreted the diagnostic imaging associated with this visit and am waiting the final reading from the radiologist.   My preliminary interpretation is as follows: CT scan of the abdomen pelvis negative for bowel obstruction or free air  Radiologist interpretation:   CT-ABDOMEN-PELVIS WITH   Final Result      1.  [place] mass within the pancreatic body  measuring 4.3 x 2.7 cm in size. This encases the splenic artery and splenic vein with possible splenic venous occlusion. There is also extension into the area of the superior mesenteric artery. This is very    suspicious for pancreatic cancer. Alternatively focal pancreatitis could have this appearance as well.      2.  Cirrhotic appearance of the liver.      3.  Small amount of ascites within the abdomen and pelvis.      4.  Sigmoid diverticulosis.      5.  Marked cardiac enlargement.            COURSE & MEDICAL DECISION MAKING    ED Observation Status? No; Patient does not meet criteria for ED Observation.     INITIAL ASSESSMENT, COURSE AND PLAN  Care Narrative: Patient presents with lower abdominal pain, worse with urination.  He denies penile discharge or dysuria.  No testicular pain.  Differential diagnosis includes prostatitis, UTI, kidney stone, other.  CT scan for the pelvis was negative, unfortunately shows pancreatic mass approximately 4 x 3 cm and free fluid in the abdomen.  Patient advised of findings, concern for pancreatic cancer.  Patient is hospitalized for ongoing evaluation and treatment of abdominal pain and new pancreatic mass.  CT scan shows cardiac enlargement however patient has known CHF from previous workup.        ADDITIONAL PROBLEM LIST  Lower abdominal pain: Unknown etiology, possible prostatitis or irritation from small amount of free fluid or ascites.  No UTI.    Pancreatic mass: Concerning for pancreatic cancer, patient hospitalized for ongoing evaluation and treatment.    DISPOSITION AND DISCUSSIONS  I have discussed management of the patient with the following physicians and DIXON's: Admitting hospitalist service      FINAL DIAGNOSIS  1. Lower abdominal pain    2. Pancreatic mass           Electronically signed by: Rosales Cook M.D., 11/24/2023 12:39 PM

## 2023-11-24 NOTE — ED NOTES
The pt stood at bedside to use urinal with a steady gait. Vitals stable. The pt reports a pain reduction.

## 2023-11-24 NOTE — ED NOTES
The pt is alert and oriented sitting in bed. The pt reports pain, RN medicated per mar. Vitals stable on the monitor. Tech performed badder scan

## 2023-11-24 NOTE — ED NOTES
The pt is alert and oriented sitting in bed. The pt using phone, no indicators of pain. Vitals stable on the monitor.

## 2023-11-25 VITALS
TEMPERATURE: 97.1 F | HEART RATE: 78 BPM | HEIGHT: 69 IN | WEIGHT: 205.03 LBS | BODY MASS INDEX: 30.37 KG/M2 | RESPIRATION RATE: 18 BRPM | DIASTOLIC BLOOD PRESSURE: 75 MMHG | SYSTOLIC BLOOD PRESSURE: 106 MMHG | OXYGEN SATURATION: 96 %

## 2023-11-25 PROBLEM — G89.3 CANCER RELATED PAIN: Status: ACTIVE | Noted: 2023-11-25

## 2023-11-25 PROBLEM — K74.60 CIRRHOSIS OF LIVER WITH ASCITES (HCC): Status: ACTIVE | Noted: 2023-11-25

## 2023-11-25 PROBLEM — K59.03 CONSTIPATION DUE TO OPIOID THERAPY: Status: ACTIVE | Noted: 2023-11-25

## 2023-11-25 PROBLEM — R18.8 CIRRHOSIS OF LIVER WITH ASCITES (HCC): Status: ACTIVE | Noted: 2023-11-25

## 2023-11-25 PROBLEM — R10.9 INTRACTABLE ABDOMINAL PAIN: Status: RESOLVED | Noted: 2023-11-25 | Resolved: 2023-11-25

## 2023-11-25 PROBLEM — C25.1 MALIGNANT NEOPLASM OF BODY OF PANCREAS (HCC): Status: ACTIVE | Noted: 2023-11-25

## 2023-11-25 PROBLEM — K57.30 SIGMOID DIVERTICULOSIS: Status: ACTIVE | Noted: 2023-11-25

## 2023-11-25 PROBLEM — T40.2X5A CONSTIPATION DUE TO OPIOID THERAPY: Status: ACTIVE | Noted: 2023-11-25

## 2023-11-25 PROBLEM — K57.30 SIGMOID DIVERTICULOSIS: Status: RESOLVED | Noted: 2023-11-25 | Resolved: 2023-11-25

## 2023-11-25 PROBLEM — C25.9 PANCREATIC CANCER (HCC): Status: ACTIVE | Noted: 2023-11-25

## 2023-11-25 PROBLEM — R10.9 INTRACTABLE ABDOMINAL PAIN: Status: ACTIVE | Noted: 2023-11-25

## 2023-11-25 LAB
ALBUMIN SERPL BCP-MCNC: 3.8 G/DL (ref 3.2–4.9)
ALBUMIN/GLOB SERPL: 1.2 G/DL
ALP SERPL-CCNC: 90 U/L (ref 30–99)
ALT SERPL-CCNC: 9 U/L (ref 2–50)
ANION GAP SERPL CALC-SCNC: 14 MMOL/L (ref 7–16)
AST SERPL-CCNC: 16 U/L (ref 12–45)
BASOPHILS # BLD AUTO: 0.4 % (ref 0–1.8)
BASOPHILS # BLD: 0.03 K/UL (ref 0–0.12)
BILIRUB SERPL-MCNC: 0.8 MG/DL (ref 0.1–1.5)
BUN SERPL-MCNC: 17 MG/DL (ref 8–22)
CALCIUM ALBUM COR SERPL-MCNC: 9.4 MG/DL (ref 8.5–10.5)
CALCIUM SERPL-MCNC: 9.2 MG/DL (ref 8.5–10.5)
CANCER AG19-9 SERPL-ACNC: 3758 U/ML (ref 0–35)
CHLORIDE SERPL-SCNC: 99 MMOL/L (ref 96–112)
CO2 SERPL-SCNC: 23 MMOL/L (ref 20–33)
CREAT SERPL-MCNC: 0.89 MG/DL (ref 0.5–1.4)
EOSINOPHIL # BLD AUTO: 0.08 K/UL (ref 0–0.51)
EOSINOPHIL NFR BLD: 1.2 % (ref 0–6.9)
ERYTHROCYTE [DISTWIDTH] IN BLOOD BY AUTOMATED COUNT: 44.5 FL (ref 35.9–50)
GFR SERPLBLD CREATININE-BSD FMLA CKD-EPI: 101 ML/MIN/1.73 M 2
GLOBULIN SER CALC-MCNC: 3.2 G/DL (ref 1.9–3.5)
GLUCOSE BLD STRIP.AUTO-MCNC: 115 MG/DL (ref 65–99)
GLUCOSE BLD STRIP.AUTO-MCNC: 131 MG/DL (ref 65–99)
GLUCOSE BLD STRIP.AUTO-MCNC: 196 MG/DL (ref 65–99)
GLUCOSE SERPL-MCNC: 160 MG/DL (ref 65–99)
HCT VFR BLD AUTO: 45.2 % (ref 42–52)
HGB BLD-MCNC: 14.7 G/DL (ref 14–18)
IMM GRANULOCYTES # BLD AUTO: 0.02 K/UL (ref 0–0.11)
IMM GRANULOCYTES NFR BLD AUTO: 0.3 % (ref 0–0.9)
LYMPHOCYTES # BLD AUTO: 1.47 K/UL (ref 1–4.8)
LYMPHOCYTES NFR BLD: 21.8 % (ref 22–41)
MAGNESIUM SERPL-MCNC: 1.6 MG/DL (ref 1.5–2.5)
MCH RBC QN AUTO: 31.3 PG (ref 27–33)
MCHC RBC AUTO-ENTMCNC: 32.5 G/DL (ref 32.3–36.5)
MCV RBC AUTO: 96.4 FL (ref 81.4–97.8)
MONOCYTES # BLD AUTO: 0.63 K/UL (ref 0–0.85)
MONOCYTES NFR BLD AUTO: 9.3 % (ref 0–13.4)
NEUTROPHILS # BLD AUTO: 4.52 K/UL (ref 1.82–7.42)
NEUTROPHILS NFR BLD: 67 % (ref 44–72)
NRBC # BLD AUTO: 0 K/UL
NRBC BLD-RTO: 0 /100 WBC (ref 0–0.2)
PLATELET # BLD AUTO: 183 K/UL (ref 164–446)
PMV BLD AUTO: 11.7 FL (ref 9–12.9)
POTASSIUM SERPL-SCNC: 4.2 MMOL/L (ref 3.6–5.5)
PROT SERPL-MCNC: 7 G/DL (ref 6–8.2)
RBC # BLD AUTO: 4.69 M/UL (ref 4.7–6.1)
SODIUM SERPL-SCNC: 136 MMOL/L (ref 135–145)
WBC # BLD AUTO: 6.8 K/UL (ref 4.8–10.8)

## 2023-11-25 PROCEDURE — 83735 ASSAY OF MAGNESIUM: CPT

## 2023-11-25 PROCEDURE — 700111 HCHG RX REV CODE 636 W/ 250 OVERRIDE (IP): Performed by: STUDENT IN AN ORGANIZED HEALTH CARE EDUCATION/TRAINING PROGRAM

## 2023-11-25 PROCEDURE — A9270 NON-COVERED ITEM OR SERVICE: HCPCS | Performed by: INTERNAL MEDICINE

## 2023-11-25 PROCEDURE — 82962 GLUCOSE BLOOD TEST: CPT | Mod: 91

## 2023-11-25 PROCEDURE — 99239 HOSP IP/OBS DSCHRG MGMT >30: CPT | Performed by: STUDENT IN AN ORGANIZED HEALTH CARE EDUCATION/TRAINING PROGRAM

## 2023-11-25 PROCEDURE — A9270 NON-COVERED ITEM OR SERVICE: HCPCS | Performed by: STUDENT IN AN ORGANIZED HEALTH CARE EDUCATION/TRAINING PROGRAM

## 2023-11-25 PROCEDURE — 85025 COMPLETE CBC W/AUTO DIFF WBC: CPT

## 2023-11-25 PROCEDURE — 99221 1ST HOSP IP/OBS SF/LOW 40: CPT | Performed by: SURGERY

## 2023-11-25 PROCEDURE — 36415 COLL VENOUS BLD VENIPUNCTURE: CPT

## 2023-11-25 PROCEDURE — 700102 HCHG RX REV CODE 250 W/ 637 OVERRIDE(OP): Performed by: STUDENT IN AN ORGANIZED HEALTH CARE EDUCATION/TRAINING PROGRAM

## 2023-11-25 PROCEDURE — 700102 HCHG RX REV CODE 250 W/ 637 OVERRIDE(OP): Performed by: INTERNAL MEDICINE

## 2023-11-25 PROCEDURE — 80053 COMPREHEN METABOLIC PANEL: CPT

## 2023-11-25 PROCEDURE — 86301 IMMUNOASSAY TUMOR CA 19-9: CPT

## 2023-11-25 RX ORDER — ACETAMINOPHEN 325 MG/1
650 TABLET ORAL EVERY 6 HOURS PRN
COMMUNITY
Start: 2023-11-25 | End: 2023-12-24

## 2023-11-25 RX ORDER — DULOXETIN HYDROCHLORIDE 30 MG/1
CAPSULE, DELAYED RELEASE ORAL
Qty: 30 CAPSULE | Refills: 1 | Status: SHIPPED | OUTPATIENT
Start: 2023-11-25 | End: 2023-11-25 | Stop reason: SDUPTHER

## 2023-11-25 RX ORDER — LANOLIN ALCOHOL/MO/W.PET/CERES
400 CREAM (GRAM) TOPICAL DAILY
Qty: 30 TABLET | Refills: 1 | Status: SHIPPED | OUTPATIENT
Start: 2023-11-26 | End: 2023-11-25 | Stop reason: SDUPTHER

## 2023-11-25 RX ORDER — OXYCODONE HYDROCHLORIDE 5 MG/1
5 TABLET ORAL EVERY 4 HOURS PRN
Qty: 30 TABLET | Refills: 0 | Status: SHIPPED | OUTPATIENT
Start: 2023-11-25 | End: 2023-11-25 | Stop reason: SDUPTHER

## 2023-11-25 RX ORDER — LANOLIN ALCOHOL/MO/W.PET/CERES
400 CREAM (GRAM) TOPICAL DAILY
Status: DISCONTINUED | OUTPATIENT
Start: 2023-11-25 | End: 2023-11-25 | Stop reason: HOSPADM

## 2023-11-25 RX ORDER — DULOXETIN HYDROCHLORIDE 30 MG/1
CAPSULE, DELAYED RELEASE ORAL
Qty: 30 CAPSULE | Refills: 1 | Status: ON HOLD | OUTPATIENT
Start: 2023-11-25 | End: 2023-12-30

## 2023-11-25 RX ORDER — LANOLIN ALCOHOL/MO/W.PET/CERES
400 CREAM (GRAM) TOPICAL DAILY
Qty: 30 TABLET | Refills: 1 | Status: SHIPPED | OUTPATIENT
Start: 2023-11-26

## 2023-11-25 RX ORDER — POLYETHYLENE GLYCOL 3350 17 G/17G
17 POWDER, FOR SOLUTION ORAL DAILY
Qty: 30 EACH | Refills: 1 | Status: SHIPPED | OUTPATIENT
Start: 2023-11-25

## 2023-11-25 RX ORDER — AMOXICILLIN 250 MG
2 CAPSULE ORAL 2 TIMES DAILY
Qty: 60 TABLET | Refills: 1 | Status: SHIPPED | OUTPATIENT
Start: 2023-11-25 | End: 2023-11-25 | Stop reason: SDUPTHER

## 2023-11-25 RX ORDER — POLYETHYLENE GLYCOL 3350 17 G/17G
17 POWDER, FOR SOLUTION ORAL DAILY
Qty: 30 EACH | Refills: 1 | Status: SHIPPED | OUTPATIENT
Start: 2023-11-25 | End: 2023-11-25 | Stop reason: SDUPTHER

## 2023-11-25 RX ORDER — DULOXETIN HYDROCHLORIDE 30 MG/1
30 CAPSULE, DELAYED RELEASE ORAL DAILY
Status: DISCONTINUED | OUTPATIENT
Start: 2023-11-25 | End: 2023-11-25 | Stop reason: HOSPADM

## 2023-11-25 RX ORDER — OXYCODONE HYDROCHLORIDE 5 MG/1
5 TABLET ORAL EVERY 4 HOURS PRN
Qty: 30 TABLET | Refills: 0 | Status: SHIPPED | OUTPATIENT
Start: 2023-11-25 | End: 2023-11-30

## 2023-11-25 RX ORDER — MAGNESIUM SULFATE HEPTAHYDRATE 40 MG/ML
4 INJECTION, SOLUTION INTRAVENOUS ONCE
Status: COMPLETED | OUTPATIENT
Start: 2023-11-25 | End: 2023-11-25

## 2023-11-25 RX ORDER — AMOXICILLIN 250 MG
2 CAPSULE ORAL 2 TIMES DAILY
Qty: 60 TABLET | Refills: 1 | Status: SHIPPED | OUTPATIENT
Start: 2023-11-25 | End: 2023-12-24

## 2023-11-25 RX ADMIN — OXYCODONE 5 MG: 5 TABLET ORAL at 08:29

## 2023-11-25 RX ADMIN — FUROSEMIDE 20 MG: 20 TABLET ORAL at 06:30

## 2023-11-25 RX ADMIN — DOCUSATE SODIUM 50 MG AND SENNOSIDES 8.6 MG 2 TABLET: 8.6; 5 TABLET, FILM COATED ORAL at 06:29

## 2023-11-25 RX ADMIN — ISOSORBIDE DINITRATE 10 MG: 10 TABLET ORAL at 08:30

## 2023-11-25 RX ADMIN — OXYCODONE 5 MG: 5 TABLET ORAL at 12:23

## 2023-11-25 RX ADMIN — ACETAMINOPHEN 650 MG: 325 TABLET, FILM COATED ORAL at 11:05

## 2023-11-25 RX ADMIN — HYDRALAZINE HYDROCHLORIDE 25 MG: 50 TABLET, FILM COATED ORAL at 01:37

## 2023-11-25 RX ADMIN — Medication 400 MG: at 08:29

## 2023-11-25 RX ADMIN — INSULIN HUMAN 1 UNITS: 100 INJECTION, SOLUTION PARENTERAL at 11:35

## 2023-11-25 RX ADMIN — MAGNESIUM SULFATE HEPTAHYDRATE 4 G: 40 INJECTION, SOLUTION INTRAVENOUS at 08:36

## 2023-11-25 RX ADMIN — DULOXETINE HYDROCHLORIDE 30 MG: 30 CAPSULE, DELAYED RELEASE ORAL at 09:44

## 2023-11-25 RX ADMIN — ISOSORBIDE DINITRATE 10 MG: 10 TABLET ORAL at 01:35

## 2023-11-25 RX ADMIN — APIXABAN 5 MG: 5 TABLET, FILM COATED ORAL at 06:30

## 2023-11-25 RX ADMIN — SPIRONOLACTONE 25 MG: 25 TABLET ORAL at 06:30

## 2023-11-25 RX ADMIN — ATORVASTATIN CALCIUM 40 MG: 40 TABLET, FILM COATED ORAL at 06:30

## 2023-11-25 RX ADMIN — HYDRALAZINE HYDROCHLORIDE 25 MG: 50 TABLET, FILM COATED ORAL at 08:28

## 2023-11-25 RX ADMIN — SACUBITRIL AND VALSARTAN 1 TABLET: 97; 103 TABLET, FILM COATED ORAL at 06:29

## 2023-11-25 ASSESSMENT — PAIN DESCRIPTION - PAIN TYPE
TYPE: ACUTE PAIN

## 2023-11-25 ASSESSMENT — ENCOUNTER SYMPTOMS
NERVOUS/ANXIOUS: 0
DOUBLE VISION: 0
FEVER: 0
NECK PAIN: 0
CHILLS: 0
HEMOPTYSIS: 0
PALPITATIONS: 0
BRUISES/BLEEDS EASILY: 0
DEPRESSION: 0
ABDOMINAL PAIN: 1
TREMORS: 0
COUGH: 0
ORTHOPNEA: 0
MYALGIAS: 0
DIZZINESS: 0
CLAUDICATION: 0
WEIGHT LOSS: 1
SENSORY CHANGE: 0
NAUSEA: 0
BACK PAIN: 0
BLURRED VISION: 0
HALLUCINATIONS: 0
HEADACHES: 0
HEARTBURN: 0
FOCAL WEAKNESS: 0
SPEECH CHANGE: 0
TINGLING: 0
EYE DISCHARGE: 0
VOMITING: 0
CONSTIPATION: 0
SPUTUM PRODUCTION: 0
PHOTOPHOBIA: 0
DIARRHEA: 0
EYE PAIN: 0

## 2023-11-25 ASSESSMENT — LIFESTYLE VARIABLES: SUBSTANCE_ABUSE: 0

## 2023-11-25 NOTE — ASSESSMENT & PLAN NOTE
Patient found to have pancreatic body mass measuring 4.3 x 2.7 cm in size.  Patient also found to have possible splenic venous occlusion.  I requested consultation with surgical oncology Dr. Appiah.  Patient is already on Eliquis.  I have ordered CA 19-9  I discussed plan of care with patient and his wife at the bedside and answered all of their questions.

## 2023-11-25 NOTE — ASSESSMENT & PLAN NOTE
Patient found to EF of 20%.  He follows with [facility].  Reviewed last cardiology note from June 12, 2023  I am continuing his outpatient heart failure medication.  He does not show signs of acute heart failure exacerbation.

## 2023-11-25 NOTE — ED NOTES
The pt is alert and oriented sitting in bed. The pt reports pain, RN to medicate per mar. Vitals stable on the monitor

## 2023-11-25 NOTE — ED NOTES
Transport took the pt to floor via gurney. Vital stable on the monitor. The pt is alert and oriented, calm and cooperative, talks with clear coherent speech, ambulates with a steady gait, and moves extremities to dress self. No indicators of pain at this time.

## 2023-11-25 NOTE — ASSESSMENT & PLAN NOTE
Last HbA1c was 11.9.  I started him on insulin sliding scale with hypoglycemia protocol   He takes 10 units of insulin glargine at home.

## 2023-11-25 NOTE — H&P
Hospital Medicine History & Physical Note    Date of Service  11/24/2023    Primary Care Physician  Tor Rdz M.D.    Consultants  Surgical oncology    Specialist Names: Dr. Appiah    Code Status  Full Code    Chief Complaint  Chief Complaint   Patient presents with    Abdominal Pain     X 1 month. Pt believes he has a hernia.        History of Presenting Illness  Lai Dailey is a 53 y.o. male with past medical history of congestive heart failure, hypertension, hyperlipidemia, A-fib on anticoagulation with Eliquis who presented 11/24/2023 with complaint of abdominal pain for 1 month.  Patient reported that he has been having abdominal pain for 1 month and initially thought that he has been having hernia and he went to the ER and he had ultrasound and he was told that he does not have hernia and he was recommended to follow-up with his doctor.  Today he came to the hospital due to severe symptoms of abdominal pain that was located on his lower abdomen and radiating towards his center of his abdomen.  His pain is associated with nausea.  He does not have vomiting.  He denies any scrotal swelling or pain during urination.  He reported that he has difficulty initiating urination.  He has been taking his medications as prescribed.  He follows with [facility].  There is no specific aggravating or elevating factors.  Patient's wife at the bedside.    ER course: Patient remained CT scan and he found to have pancreatic mass.  I requested consultation with surgical oncology Dr. Appiah.  I also ordered EKG.    I discussed the plan of care with patient and family.    Review of Systems  Review of Systems   Constitutional:  Negative for chills, fever and weight loss.   HENT:  Negative for hearing loss and tinnitus.    Eyes:  Negative for blurred vision, double vision, photophobia and pain.   Respiratory:  Negative for cough, sputum production and shortness of breath.    Cardiovascular:  Negative for  chest pain, palpitations, orthopnea and leg swelling.   Gastrointestinal:  Positive for abdominal pain and nausea. Negative for constipation, diarrhea and vomiting.   Genitourinary:  Negative for dysuria, frequency and urgency.   Musculoskeletal:  Negative for back pain, joint pain, myalgias and neck pain.   Skin:  Negative for rash.   Neurological:  Negative for dizziness, tingling, tremors, sensory change, speech change, focal weakness and headaches.   Psychiatric/Behavioral:  Negative for hallucinations and substance abuse.    All other systems reviewed and are negative.      Past Medical History   has a past medical history of CHF (congestive heart failure) ([facility]), Diabetes (HCC), Hyperlipidemia, Hypertension, Pacemaker, and Snoring (10/18/2021).    Surgical History   has a past surgical history that includes aicd implant (2010).     Family History  family history includes Colon Cancer in his mother; Hypertension in his sister; Stroke in his brother.   Family history reviewed with patient. There is no family history that is pertinent to the chief complaint.     Social History   reports that he has never smoked. He has never used smokeless tobacco. He reports current alcohol use. He reports that he does not use drugs.    Allergies  No Known Allergies    Medications  Prior to Admission Medications   Prescriptions Last Dose Informant Patient Reported? Taking?   Empagliflozin (JARDIANCE) 10 MG Tab tablet 11/24/2023 at 0800 Patient No No   Sig: Take 1 Tablet by mouth every day.   acetaminophen (TYLENOL) 325 MG Tab 11/24/2023 at 0800 Patient Yes Yes   Sig: Take 650 mg by mouth every four hours as needed for Mild Pain. 2 tablets = 650 mg.   apixaban (ELIQUIS) 5mg Tab 11/24/2023 at 0800 Patient No No   Sig: Take 1 Tablet by mouth 2 times a day.   atorvastatin (LIPITOR) 40 MG Tab 11/24/2023 at 0800 Patient No No   Sig: Take 1 Tablet by mouth every day.   furosemide (LASIX) 20 MG Tab 11/24/2023 at 0800 Patient No No   Sig:  Take 1 Tablet by mouth every day.   hydrALAZINE (APRESOLINE) 25 MG Tab 11/24/2023 at 0800 Patient No No   Sig: Take 1 Tablet by mouth 3 times a day.   insulin glargine (LANTUS) 100 UNIT/ML Solution 11/22/2023 at PM Patient No No   Sig: Inject 10 Units as instructed every evening.   isosorbide dinitrate (ISORDIL) 10 MG Tab 11/24/2023 at 0800 Patient No No   Sig: Take 1 Tablet by mouth 3 times a day.   multivitamin (THERAGRAN) Tab 11/24/2023 at 0800 Patient Yes No   Sig: Take 1 Tablet by mouth every day.   sacubitril-valsartan (ENTRESTO)  MG Tab 11/24/2023 at 0800 Patient No No   Sig: Take 1 Tablet by mouth 2 times a day.   spironolactone (ALDACTONE) 25 MG Tab 11/24/2023 at 0800 Patient No No   Sig: Take 1 Tablet by mouth every day.      Facility-Administered Medications: None       Physical Exam  Temp:  [36.5 °C (97.7 °F)] 36.5 °C (97.7 °F)  Pulse:  [58-81] 78  Resp:  [14-16] 14  BP: (122-131)/(78-92) 122/92  SpO2:  [95 %-98 %] 95 %  Blood Pressure: (!) 122/92   Temperature: 36.5 °C (97.7 °F)   Pulse: 78   Respiration: 14   Pulse Oximetry: 95 %       Physical Exam  Vitals reviewed.   Constitutional:       General: He is not in acute distress.     Appearance: He is ill-appearing.   HENT:      Head: Normocephalic and atraumatic. No contusion.      Nose: Nose normal.      Mouth/Throat:      Pharynx: No oropharyngeal exudate.   Eyes:      Pupils: Pupils are equal, round, and reactive to light.   Cardiovascular:      Rate and Rhythm: Normal rate and regular rhythm.   Pulmonary:      Effort: Pulmonary effort is normal.      Breath sounds: No wheezing or rhonchi.   Abdominal:      General: Bowel sounds are normal.      Palpations: Abdomen is soft.      Tenderness: There is abdominal tenderness.      Comments: No signs of acute abdomen on physical exam.   Musculoskeletal:         General: Normal range of motion.      Cervical back: No rigidity. No muscular tenderness.   Skin:     General: Skin is warm and dry.       "Coloration: Skin is not jaundiced.   Neurological:      General: No focal deficit present.      Mental Status: He is alert.      Cranial Nerves: No cranial nerve deficit.   Psychiatric:         Mood and Affect: Mood normal.         Laboratory:  Recent Labs     11/24/23  1110   WBC 6.4   RBC 5.32   HEMOGLOBIN 16.5   HEMATOCRIT 51.2   MCV 96.2   MCH 31.0   MCHC 32.2*   RDW 44.1   PLATELETCT 193   MPV 11.1     Recent Labs     11/24/23  1110   SODIUM 140   POTASSIUM 5.4   CHLORIDE 102   CO2 25   GLUCOSE 153*   BUN 18   CREATININE 1.00   CALCIUM 10.5     Recent Labs     11/24/23  1110   ALTSGPT 9   ASTSGOT 21   ALKPHOSPHAT 104*   TBILIRUBIN 0.8   LIPASE 12   GLUCOSE 153*         No results for input(s): \"NTPROBNP\" in the last 72 hours.      No results for input(s): \"TROPONINT\" in the last 72 hours.    Imaging:  CT-ABDOMEN-PELVIS WITH   Final Result      1.  [place] mass within the pancreatic body measuring 4.3 x 2.7 cm in size. This encases the splenic artery and splenic vein with possible splenic venous occlusion. There is also extension into the area of the superior mesenteric artery. This is very    suspicious for pancreatic cancer. Alternatively focal pancreatitis could have this appearance as well.      2.  Cirrhotic appearance of the liver.      3.  Small amount of ascites within the abdomen and pelvis.      4.  Sigmoid diverticulosis.      5.  Marked cardiac enlargement.              Assessment/Plan:  Justification for Admission Status  I anticipate this patient will require at least two midnights for appropriate medical management, necessitating inpatient admission because patient found to have new onset of pancreatic mass and he found to have severe pain and is unable to tolerate p.o. intake.  He will require 2 midnight for further work-up for his pancreatic mass along with he will need pain control.    Patient will need a Med/Surg bed on ONCOLOGY service .  The need is secondary to new pancreatic mass.    * " Pancreatic mass- (present on admission)  Assessment & Plan  Patient found to have pancreatic body mass measuring 4.3 x 2.7 cm in size.  Patient also found to have possible splenic venous occlusion.  I requested consultation with surgical oncology Dr. Appiah.  Patient is already on Eliquis.  I have ordered CA 19-9  I discussed plan of care with patient and his wife at the bedside and answered all of their questions.    Essential hypertension- (present on admission)  Assessment & Plan  Continue outpatient medications.    Type 2 diabetes mellitus with renal complication (HCC)- (present on admission)  Assessment & Plan  Last HbA1c was 11.9.  I started him on insulin sliding scale with hypoglycemia protocol   He takes 10 units of insulin glargine at home.      Heart failure with reduced ejection fraction (HCC)- (present on admission)  Assessment & Plan  Patient found to EF of 20%.  He follows with [facility].  Reviewed last cardiology note from June 12, 2023  I am continuing his outpatient heart failure medication.  He does not show signs of acute heart failure exacerbation.      I discussed about this admission with ER physician Dr. Cook    I discussed findings of CT scan with surgical oncologist Dr. Appiah    I reviewed last cardiology note from June 12, 2023 from  To    VTE prophylaxis: therapeutic anticoagulation with Eliquis

## 2023-11-25 NOTE — DISCHARGE SUMMARY
Discharge Summary    CHIEF COMPLAINT ON ADMISSION  Chief Complaint   Patient presents with    Abdominal Pain     X 1 month. Pt believes he has a hernia.        Reason for Admission  Pancreatic mass concerning for malignancy    Admission Date  11/24/2023    CODE STATUS  Full Code    HPI & HOSPITAL COURSE  Lai Dailey is a 53 y.o. [employer]  male, who presented on 11/24/2023 with complaints of worsening abdominal pain for 1 month.  This is a pleasant gentleman with a history of chronic systolic heart failure EF 20% hypertension, hyperlipidemia, and atrial fibrillation on apixaban.  When the patient developed abdominal pain 1 month ago, he thought he had a hernia.  He went to the emergency room and had an ultrasound and was told that he did not have a hernia and recommended following up with his primary care provider.  He returned to the hospital today with severe abdominal pain in his lower abdomen radiating towards the center and was associated with nausea but no vomiting.    In the emergency room, patient had a CT scan of the abdomen and pelvis, which revealed a pancreatic mass measuring 4.3 x 2.7 cm encasing the splenic artery and splenic vein with possible splenic venous occlusion.  There was extension into the area of the superior mesenteric artery.  All suspicious for pancreatic cancer.  Cirrhotic appearance of the liver noted with small amount of ascites within the abdomen and pelvis.  Sigmoid diverticulosis with marked cardiac enlargement.    Patient was admitted to the oncologic floor for further care and management.  He is CA 19-9 was elevated at 3258 concerning for metastases.  Dr. Edwar Appiah of the surgical oncology was consulted and recommended outpatient endoscopic ultrasound guided biopsy and PET scan.  It was questionable whether he would be a surgical candidate due to his poor cardiac function but plan for continued outpatient workup.    Patient required IV Dilaudid and IV morphine  for pain control on admission.  He was able to transition to oxycodone for adequate pain control.  He stated that he was taking multiple doses of Tylenol.  Upon review of patient's imaging studies during the completion of this discharge summary, he discovered that the patient had signs of cirrhosis.  Multiple attempts to contact the patient via telephone to limit Tylenol intake due to his cirrhosis were unsuccessful.    Therefore, he is discharged in good and stable condition to home with close outpatient follow-up.    The patient recovered much more quickly than anticipated on admission.      Discharge Date  11/25/2023    FOLLOW UP ITEMS POST DISCHARGE  -Follow-up with primary care provider in 3 to 5 days.  -Follow-up with surgical oncology, Dr. Edwar Appiah as soon as possible.  Referral was placed prior to discharge.    DISCHARGE DIAGNOSES  Principal Problem:    Malignant neoplasm of body of pancreas (HCC) (POA: Yes)  Active Problems:    Cancer related pain (POA: Yes)    Heart failure with reduced ejection fraction (HCC) (POA: Yes)    Type 2 diabetes mellitus with renal complication (HCC) (POA: Yes)      Overview: Patient has a history of diabetes type 2  On treatment with metformin.      Denies any sequalae of neuropathy ,nephropathy,or retinopathy.      Patient had a blood glucose of    Essential hypertension (POA: Yes)    Pancreatic mass (POA: Yes)    Constipation due to opioid therapy (POA: Yes)    Sigmoid diverticulosis (POA: Yes)    Cirrhosis of liver with ascites (HCC) (POA: Yes)  Resolved Problems:    Intractable abdominal pain (POA: Yes)      FOLLOW UP  No future appointments.  Tor Rdz M.D.  [address] [facility] [id number] [phone]    Follow up in 3 day(s)      Edwar Appiah M.D.  [address]  [facility] [address]  [facility] [id number] [phone]    Follow up        MEDICATIONS ON DISCHARGE     Medication List        Start taking these medications        Instructions    DULoxetine 30 MG Cpep  Start taking on: November 25, 2023  Commonly known as: Cymbalta   Take 1 Capsule by mouth every day for 6 days, THEN 1 Capsule every day for 24 days.     magnesium oxide 400 (240 Mg) MG Tabs   Take 1 Tablet by mouth every day.  Dose: 400 mg     oxyCODONE immediate-release 5 MG Tabs  Commonly known as: Roxicodone   Take 1 Tablet by mouth every four hours as needed for Severe Pain for up to 5 days.  Dose: 5 mg     polyethylene glycol/lytes Pack  Commonly known as: Miralax   Take 1 Packet by mouth every day. To prevent constipation while taking oxycodone.  Dose: 17 g     senna-docusate 8.6-50 MG Tabs  Commonly known as: Pericolace Or Senokot S   Take 2 Tablets by mouth 2 times a day.  Dose: 2 Tablet            Change how you take these medications        Instructions   acetaminophen 325 MG Tabs  What changed:   when to take this  additional instructions  Commonly known as: Tylenol   Take 2 Tablets by mouth every 6 hours as needed for Mild Pain. 2 tablets = 650 mg. Do not exceed 2 grams per day.  Dose: 650 mg            Continue taking these medications        Instructions   apixaban 5mg Tabs  Commonly known as: Eliquis   Take 1 Tablet by mouth 2 times a day.  Dose: 5 mg     atorvastatin 40 MG Tabs  Commonly known as: Lipitor   Take 1 Tablet by mouth every day.  Dose: 40 mg     Entresto  MG Tabs  Generic drug: sacubitril-valsartan   Take 1 Tablet by mouth 2 times a day.  Dose: 1 Tablet     furosemide 20 MG Tabs  Commonly known as: Lasix   Take 1 Tablet by mouth every day.  Dose: 20 mg     hydrALAZINE 25 MG Tabs  Commonly known as: Apresoline   Take 1 Tablet by mouth 3 times a day.  Dose: 25 mg     insulin glargine 100 UNIT/ML Soln  Commonly known as: Lantus   Inject 10 Units as instructed every evening.  Dose: 10 Units     isosorbide dinitrate 10 MG Tabs  Commonly known as: Isordil   Take 1 Tablet by mouth 3 times a day.  Dose: 10 mg     Jardiance 10 MG Tabs tablet  Generic drug:  Empagliflozin   Take 1 Tablet by mouth every day.  Dose: 10 mg     multivitamin Tabs   Take 1 Tablet by mouth every day.  Dose: 1 Tablet     spironolactone 25 MG Tabs  Commonly known as: Aldactone   Take 1 Tablet by mouth every day.  Dose: 25 mg              Allergies  No Known Allergies    DIET  Orders Placed This Encounter   Procedures    Diet Order Diet: Cardiac; Second Modifier: (optional): Consistent CHO (Diabetic)     Standing Status:   Standing     Number of Occurrences:   1     Order Specific Question:   Diet:     Answer:   Cardiac [6]     Order Specific Question:   Second Modifier: (optional)     Answer:   Consistent CHO (Diabetic) [4]       ACTIVITY  As tolerated.  Weight bearing as tolerated    CONSULTATIONS  Surgical oncology    PROCEDURES  None    LABORATORY  Lab Results   Component Value Date    SODIUM 136 11/25/2023    POTASSIUM 4.2 11/25/2023    CHLORIDE 99 11/25/2023    CO2 23 11/25/2023    GLUCOSE 160 (H) 11/25/2023    BUN 17 11/25/2023    CREATININE 0.89 11/25/2023        Lab Results   Component Value Date    WBC 6.8 11/25/2023    HEMOGLOBIN 14.7 11/25/2023    HEMATOCRIT 45.2 11/25/2023    PLATELETCT 183 11/25/2023        Total time of the discharge process 36 minutes.

## 2023-11-25 NOTE — CARE PLAN
The patient is Stable - Low risk of patient condition declining or worsening    Shift Goals  Clinical Goals: oncology to see pt. pain controol  Patient Goals: sleep  Family Goals: moises    Progress made toward(s) clinical / shift goals:  patient verbalizes understanding of plan of care. Reports pain to abdomen 7/10, pain reduction to 2/10 patient comfort goal.       Problem: Pain - Standard  Goal: Alleviation of pain or a reduction in pain to the patient’s comfort goal  Outcome: Progressing     Patient is not progressing towards the following goals:

## 2023-11-25 NOTE — ED NOTES
Med rec completed per patient with significant other at bedside.  Allergies reviewed with patient. NKDA.  Patient's preferred pharmacy: [facility] in [place].    Outpatient antibiotics within the last 30 days: NONE.    ANTICOAGULATION: Patient is on ELIQUIS. Last dose 11/24/2023 around 08:00.    Vasu Holguin PhT

## 2023-11-25 NOTE — CARE PLAN
Problem: Pain - Standard  Goal: Alleviation of pain or a reduction in pain to the patient’s comfort goal  Outcome: Met     Problem: Knowledge Deficit - Standard  Goal: Patient and family/care givers will demonstrate understanding of plan of care, disease process/condition, diagnostic tests and medications  Outcome: Met   The patient is Stable - Low risk of patient condition declining or worsening    Shift Goals  Clinical Goals: pain control  Patient Goals: sleep  Family Goals: moises    Progress made toward(s) clinical / shift goals:      Patient is not progressing towards the following goals:

## 2023-11-25 NOTE — CARE PLAN
Problem: Pain - Standard  Goal: Alleviation of pain or a reduction in pain to the patient’s comfort goal  Outcome: Met   Pt rates pain 9/10; Roxicodone 5 mg given for pain with positive results.     Problem: Knowledge Deficit - Standard  Goal: Patient and family/care givers will demonstrate understanding of plan of care, disease process/condition, diagnostic tests and medications  Outcome: Met  Pt educated about Cymbalta.    The patient is Stable - Low risk of patient condition declining or worsening    Shift Goals  Clinical Goals: pain control  Patient Goals: sleep  Family Goals: moises    Progress made toward(s) clinical / shift goals:      Patient is not progressing towards the following goals:

## 2023-11-25 NOTE — CONSULTS
Date of Service  November 25, 2023     Reason for Consult: Mass in body of pancreas    Requested by: Lai Buck MD    Location: [id number]    Chief Complaint  Abdominal Pain (X 1 month. Pt believes he has a hernia. )        HPI: Patient is a 53 year old male with history of CHF (most recent EF 20%) who presented with about 1 month of worsening abdominal pain and poor appetite.  On admission his labs generally looked ok but CT showed mass in body of pancreas.  His CA 19-9 was 3000 with a normal bilirubin.  Currently this AM his pain is a little better with pain control and hydration.  Discussed imaging findings and concern for malignancy.    Past Medical History  Past Medical History:   Diagnosis Date    CHF (congestive heart failure) (HCC)     Diabetes (HCC)     Hyperlipidemia     Hypertension     Pacemaker     Pacemaker/AICD    Snoring 10/18/2021    No sleep study.       Past Surgical History  Past Surgical History:   Procedure Laterality Date    AICD IMPLANT  2010       Current Medications:   Home Medications    Medication Sig Taking? Last Dose Authorizing Provider   DULoxetine (CYMBALTA) 30 MG Cap DR Particles Take 1 Capsule by mouth every day for 6 days, THEN 1 Capsule every day for 24 days. Yes  Lai Buck M.D.   oxyCODONE immediate-release (ROXICODONE) 5 MG Tab Take 1 Tablet by mouth every four hours as needed for Severe Pain for up to 5 days. Yes  Lai Buck M.D.   magnesium oxide 400 (240 Mg) MG Tab Take 1 Tablet by mouth every day. Yes  Lai Buck M.D.   senna-docusate (PERICOLACE OR SENOKOT S) 8.6-50 MG Tab Take 2 Tablets by mouth 2 times a day. Yes  Lai Buck M.D.   polyethylene glycol/lytes (MIRALAX) Pack Take 1 Packet by mouth every day. To prevent constipation while taking oxycodone. Yes  Lai Buck M.D.   acetaminophen (TYLENOL) 325 MG Tab Take 650 mg by mouth every four hours as needed for Mild Pain. 2 tablets = 650 mg. Yes 11/24/2023 at 0800 [facility]   spironolactone  (ALDACTONE) 25 MG Tab Take 1 Tablet by mouth every day.  11/24/2023 at 0800 Cinthya Arriaga M.D.   isosorbide dinitrate (ISORDIL) 10 MG Tab Take 1 Tablet by mouth 3 times a day.  11/24/2023 at 08 Cinthya Arriaga M.D.   furosemide (LASIX) 20 MG Tab Take 1 Tablet by mouth every day.  11/24/2023 at 0800 Cinthya Arriaga M.D.   apixaban (ELIQUIS) 5mg Tab Take 1 Tablet by mouth 2 times a day.  11/24/2023 at 0800 [facility] TIFFANIE Arriaga   sacubitril-valsartan (ENTRESTO)  MG Tab Take 1 Tablet by mouth 2 times a day.  11/24/2023 at 08 [facility]   Empagliflozin (JARDIANCE) 10 MG Tab tablet Take 1 Tablet by mouth every day.  11/24/2023 at 08 Cinthya Arriaga M.D.   atorvastatin (LIPITOR) 40 MG Tab Take 1 Tablet by mouth every day.  11/24/2023 at 0800 Cinthya Arriaga M.D.   hydrALAZINE (APRESOLINE) 25 MG Tab Take 1 Tablet by mouth 3 times a day.  11/24/2023 at 0800 SANYA Beverly   multivitamin (THERAGRAN) Tab Take 1 Tablet by mouth every day.  11/24/2023 at 0800 [facility]   insulin glargine (LANTUS) 100 UNIT/ML Solution Inject 10 Units as instructed every evening.  11/22/2023 at PM Senthil Villavicencio M.D.       Allergies:   No Known Allergies    Problem List  Principal Problem:    Pancreatic mass (POA: Yes)  Active Problems:    Heart failure with reduced ejection fraction (HCC) (POA: Yes)    Type 2 diabetes mellitus with renal complication (HCC) (POA: Yes)      Overview: Patient has a history of diabetes type 2  On treatment with metformin.      Denies any sequalae of neuropathy ,nephropathy,or retinopathy.      Patient had a blood glucose of    Essential hypertension (POA: Yes)    Constipation due to opioid therapy (POA: Yes)  Resolved Problems:    * No resolved hospital problems. *       Subjective  Review of Systems   Constitutional:  Positive for malaise/fatigue and weight loss. Negative for chills and fever.   HENT:  Negative for congestion, ear discharge, ear  pain, hearing loss and nosebleeds.    Eyes:  Negative for blurred vision, double vision, photophobia, pain and discharge.   Respiratory:  Negative for cough, hemoptysis and sputum production.    Cardiovascular:  Negative for chest pain, palpitations, orthopnea and claudication.   Gastrointestinal:  Positive for abdominal pain. Negative for constipation, diarrhea, heartburn, nausea and vomiting.   Genitourinary:  Negative for dysuria, frequency, hematuria and urgency.   Musculoskeletal:  Negative for back pain, joint pain, myalgias and neck pain.   Skin:  Negative for itching and rash.   Neurological:  Negative for dizziness, tingling, tremors, sensory change, speech change, focal weakness and headaches.   Endo/Heme/Allergies:  Negative for environmental allergies. Does not bruise/bleed easily.   Psychiatric/Behavioral:  Negative for depression, hallucinations, substance abuse and suicidal ideas. The patient is not nervous/anxious.          Objective  Temp:  [36.5 °C (97.7 °F)-37.1 °C (98.7 °F)] 36.7 °C (98.1 °F)  Pulse:  [58-83] 79  Resp:  [14-18] 16  BP: (105-138)/(60-94) 107/60  SpO2:  [92 %-98 %] 94 %      Physical Exam  Constitutional:       General: He is not in acute distress.     Appearance: Normal appearance. He is not ill-appearing.   HENT:      Head: Normocephalic.   Cardiovascular:      Rate and Rhythm: Normal rate and regular rhythm.   Pulmonary:      Effort: Pulmonary effort is normal. No respiratory distress.   Abdominal:      Comments: Tender in upper abdomen, no rebound or guarding   Musculoskeletal:         General: No swelling, tenderness or deformity.      Cervical back: Neck supple.   Skin:     Coloration: Skin is not jaundiced or pale.      Findings: No bruising or erythema.   Neurological:      Mental Status: He is alert.          Fluids    Intake/Output Summary (Last 24 hours) at 11/25/2023 1029  Last data filed at 11/25/2023 0917  Gross per 24 hour   Intake 240 ml   Output --   Net 240 ml          Labs  Lab Results   Component Value Date/Time    WBC 6.8 11/25/2023 12:10 AM    RBC 4.69 (L) 11/25/2023 12:10 AM    HEMOGLOBIN 14.7 11/25/2023 12:10 AM    HEMATOCRIT 45.2 11/25/2023 12:10 AM    MCV 96.4 11/25/2023 12:10 AM    MCH 31.3 11/25/2023 12:10 AM    MCHC 32.5 11/25/2023 12:10 AM    MPV 11.7 11/25/2023 12:10 AM    NEUTSPOLYS 67.00 11/25/2023 12:10 AM    LYMPHOCYTES 21.80 (L) 11/25/2023 12:10 AM    MONOCYTES 9.30 11/25/2023 12:10 AM    EOSINOPHILS 1.20 11/25/2023 12:10 AM    BASOPHILS 0.40 11/25/2023 12:10 AM    HYPOCHROMIA 1+ 03/26/2014 02:15 AM        Lab Results   Component Value Date/Time    SODIUM 136 11/25/2023 12:10 AM    POTASSIUM 4.2 11/25/2023 12:10 AM    CHLORIDE 99 11/25/2023 12:10 AM    CO2 23 11/25/2023 12:10 AM    GLUCOSE 160 (H) 11/25/2023 12:10 AM    BUN 17 11/25/2023 12:10 AM    CREATININE 0.89 11/25/2023 12:10 AM        Lab Results   Component Value Date/Time    PROTHROMBTM 15.2 (H) 11/04/2021 09:58 AM    INR 1.23 (H) 11/04/2021 09:58 AM        Recent Labs     11/24/23  1110 11/25/23  0010   ASTSGOT 21 16   ALTSGPT 9 9   TBILIRUBIN 0.8 0.8   GLOBULIN 3.7* 3.2       Imaging    CT-ABDOMEN-PELVIS WITH  Narrative: 11/24/2023 1:18 PM    HISTORY/REASON FOR EXAM:  Diffuse abdominal pain..    TECHNIQUE/EXAM DESCRIPTION:   CT scan of the abdomen and pelvis with contrast.    Contrast-enhanced helical scanning was obtained from the diaphragmatic domes through the pubic symphysis following the bolus administration of nonionic contrast without complication.    100 mL of Omnipaque 350 nonionic contrast was administered without complication.    Low dose optimization technique was utilized for this CT exam including automated exposure control and adjustment of the mA and/or kV according to patient size.    COMPARISON: No prior studies available.    FINDINGS:  Lower Chest: The heart is enlarged diffusely. There are AICD wires present..    Liver: There is fatty change of the liver with a lobulated  surface. There is a small amount of perihepatic ascites.    Spleen: There are splenic calcifications. There is small amount of perisplenic ascites.    Pancreas: There is ill-defined mass involving the pancreatic body which measures 4.3 x 2.7 cm in size. There is encasement of the splenic artery and vein with possible splenic vein occlusion. The mass also abuts the superior mesenteric artery.    Gallbladder: No calcified stones.    Biliary: Nondilated.    Adrenal glands: Normal.    Kidneys: There are bilateral simple appearing renal cyst. No follow-up recommended..    Bowel: There is sigmoid diverticulosis. No evidence of diverticulitis. No bowel obstruction or inflammatory change..    Lymph nodes: No adenopathy.    Vasculature: Vascular calcification..    Musculoskeletal: No acute or destructive process.    Pelvis: There is a small amount of ascites dependently within the pelvis..  Impression: 1.  Deming mass within the pancreatic body measuring 4.3 x 2.7 cm in size. This encases the splenic artery and splenic vein with possible splenic venous occlusion. There is also extension into the area of the superior mesenteric artery. This is very   suspicious for pancreatic cancer. Alternatively focal pancreatitis could have this appearance as well.    2.  Cirrhotic appearance of the liver.    3.  Small amount of ascites within the abdomen and pelvis.    4.  Sigmoid diverticulosis.    5.  Marked cardiac enlargement.        Pathology  None    Principal Problem:    Pancreatic mass (POA: Yes)  Active Problems:    Heart failure with reduced ejection fraction (HCC) (POA: Yes)    Type 2 diabetes mellitus with renal complication (HCC) (POA: Yes)      Overview: Patient has a history of diabetes type 2  On treatment with metformin.      Denies any sequalae of neuropathy ,nephropathy,or retinopathy.      Patient had a blood glucose of    Essential hypertension (POA: Yes)    Constipation due to opioid therapy (POA: Yes)  Resolved  Problems:    * No resolved hospital problems. *       Assessment and Plan  Patient is a 53 year old male with CHF (EF 20%) and pancreatic body mass concerning for malignancy.    Reviewed imaging and conern for malignancy with patient.  CA 19-9 significantly elevated rasing concern for possible mets.  He will need an EUS and PET scan as an outpatient.  Given his poor cardiac function I dont know that he will be a surgical candidate but will complete workup prior to making decision in regards to treatment options.      The assessment and plan discussed with patient and hospitalist.    Edwar Appiah M.D.

## 2023-11-25 NOTE — PROGRESS NOTES
Received report from day shift RN. Assumed pt care. On initial rounds and assessment pt [id number],, ambulatory in room, steady gait noted; reports pain to upper mid abdomen 7/10, analgesic administered as pr orders, declines further concerns, bed is locked and in low position, will continue to monitor. .

## 2023-11-26 NOTE — PROGRESS NOTES
Pt D home with wife. Prescription sent to pt pharmacy. Pt knows to make doctor appointments Monday.

## 2023-11-27 ENCOUNTER — TELEPHONE (OUTPATIENT)
Dept: SURGICAL ONCOLOGY | Facility: MEDICAL CENTER | Age: 53
End: 2023-11-27
Payer: MEDICARE

## 2023-11-27 DIAGNOSIS — C25.1 MALIGNANT NEOPLASM OF BODY OF PANCREAS (HCC): ICD-10-CM

## 2023-11-27 DIAGNOSIS — R97.8 ELEVATED CA 19-9 LEVEL: ICD-10-CM

## 2023-11-27 NOTE — TELEPHONE ENCOUNTER
Pt has pet/ct scan scheduled this Friday. Unable to leave ms with pt and spoke with wife and said he is in the hospital. Reviewed records and no notes he is currently at [facility]. Mary Lou will find out if he has a GI apptmt and let us know. He needs to return to office after his EUS procedure and PET/CT scan.

## 2023-11-29 NOTE — PROGRESS NOTES
Subjective:   12/5/2023  8:22 AM  Primary care physician: Tor Rdz M.D.  Referring Provider: Hospital consult / Lai Buck M.D.     Chief Complaint:   Chief Complaint   Patient presents with    New Patient     PANC BODY MASS  POOR CARDIAC  PET: 12/1  EUS: TBD        Diagnosis:   1. Malignant neoplasm of body of pancreas (HCC)  Referral to Oncology Psychosocial Screening for Distress    Referral to Genetics    Referral to Oncology Palliative Care    oxyCODONE immediate-release (ROXICODONE) 5 MG Tab    EC-ECHOCARDIOGRAM COMPLETE W/O CONT    EC-ECHOCARDIOGRAM COMPLETE W/O CONT      2. Cancer related pain  oxyCODONE immediate-release (ROXICODONE) 5 MG Tab      3. Cirrhosis of liver with ascites, unspecified hepatic cirrhosis type (HCC)        4. Type 2 diabetes mellitus with chronic kidney disease, with long-term current use of insulin, unspecified CKD stage (HCC)        5. Total bilirubin, elevated        6. Heart failure with reduced ejection fraction (HCC)  EC-ECHOCARDIOGRAM COMPLETE W/O CONT    EC-ECHOCARDIOGRAM COMPLETE W/O CONT        History of presenting illness:    Status 11/25/23  Lai Dailey is a pleasant 53 year old male with history of CHF (most recent EF 20%) who presented with about 1 month of worsening abdominal pain and poor appetite. On admission his labs generally looked ok but CT showed mass in body of pancreas. His CA 19-9 was 3000 with a normal bilirubin.  Currently this AM his pain is a little better with pain control and hydration.  Discussed imaging findings and concern for malignancy. CA 19-9 significantly elevated rasing concern for possible mets.  He will need an EUS and PET scan as an outpatient.  Given his poor cardiac function I dont know that he will be a surgical candidate but will complete workup prior to making decision in regards to treatment options.     Update 12/5/23  CT PET on 12/1/23 noted pancreatic tail hypermetabolic mass measuring approximately 4.3 x 2.7 cm  consistent with known malignancy with no evidence for pato or distant metastases. Based on the CT scan there is involvement of the splenic vein.     He is here today for follow-up visit after inpatient hospitalization.  Still having quite a bit of pain that seems to be somewhat controlled with around-the-clock Tylenol and intermittent use of oxycodone but it does affect his ability to eat regularly.  He is not having any constipation but straining with bowel movements is difficult.  He otherwise denies any jaundice or ongoing weight loss at this time.  His last visit to the cardiologist was about 6 months ago and he does not have a recent echo.  He is scheduled to meet with the gastroenterology team today to discuss EUS.  I did discuss his in with him today in clinic..    Past Medical History:   Diagnosis Date    CHF (congestive heart failure) (HCC)     Diabetes (HCC)     Hyperlipidemia     Hypertension     Pacemaker     Pacemaker/AICD    Snoring 10/18/2021    No sleep study.     Past Surgical History:   Procedure Laterality Date    AICD IMPLANT       No Known Allergies  Outpatient Encounter Medications as of 2023   Medication Sig Dispense Refill    oxyCODONE immediate-release (ROXICODONE) 5 MG Tab Take 1 Tablet by mouth every four hours as needed for Severe Pain for up to 30 days. 30 Tablet 0    polyethylene glycol/lytes (MIRALAX) Pack Take 1 Packet by mouth every day. To prevent constipation while taking oxycodone. 30 Each 1    DULoxetine (CYMBALTA) 30 MG Cap DR Particles Take 1 Capsule by mouth every day for 6 days, THEN 1 Capsule every day for 24 days. 30 Capsule 1    magnesium oxide 400 (240 Mg) MG Tab Take 1 Tablet by mouth every day. 30 Tablet 1    [] oxyCODONE immediate-release (ROXICODONE) 5 MG Tab Take 1 Tablet by mouth every four hours as needed for Severe Pain for up to 5 days. 30 Tablet 0    senna-docusate (PERICOLACE OR SENOKOT S) 8.6-50 MG Tab Take 2 Tablets by mouth 2 times a day. 60  Tablet 1    acetaminophen (TYLENOL) 325 MG Tab Take 2 Tablets by mouth every 6 hours as needed for Mild Pain. 2 tablets = 650 mg. Do not exceed 2 grams per day.      spironolactone (ALDACTONE) 25 MG Tab Take 1 Tablet by mouth every day. 90 Tablet 3    isosorbide dinitrate (ISORDIL) 10 MG Tab Take 1 Tablet by mouth 3 times a day. 270 Tablet 3    furosemide (LASIX) 20 MG Tab Take 1 Tablet by mouth every day. 90 Tablet 3    apixaban (ELIQUIS) 5mg Tab Take 1 Tablet by mouth 2 times a day. 180 Tablet 3    sacubitril-valsartan (ENTRESTO)  MG Tab Take 1 Tablet by mouth 2 times a day. 180 Tablet 3    Empagliflozin (JARDIANCE) 10 MG Tab tablet Take 1 Tablet by mouth every day. 30 Tablet 3    atorvastatin (LIPITOR) 40 MG Tab Take 1 Tablet by mouth every day. 90 Tablet 3    hydrALAZINE (APRESOLINE) 25 MG Tab Take 1 Tablet by mouth 3 times a day. 270 Tablet 3    multivitamin (THERAGRAN) Tab Take 1 Tablet by mouth every day.      insulin glargine (LANTUS) 100 UNIT/ML Solution Inject 10 Units as instructed every evening. 10 mL 0     No facility-administered encounter medications on file as of 12/5/2023.     Social History     Socioeconomic History    Marital status: Single     Spouse name: Not on file    Number of children: Not on file    Years of education: Not on file    Highest education level: Not on file   Occupational History    Not on file   Tobacco Use    Smoking status: Never    Smokeless tobacco: Never   Vaping Use    Vaping Use: Never used   Substance and Sexual Activity    Alcohol use: Yes     Comment: occ    Drug use: No     Comment: Marijuana use as youth    Sexual activity: Not on file   Other Topics Concern    Not on file   Social History Narrative    Not on file     Social Determinants of Health     Financial Resource Strain: Not on file   Food Insecurity: Not on file   Transportation Needs: Not on file   Physical Activity: Not on file   Stress: Not on file   Social Connections: Not on file   Intimate  "Partner Violence: Not on file   Housing Stability: Not on file      Social History     Tobacco Use   Smoking Status Never   Smokeless Tobacco Never     Social History     Substance and Sexual Activity   Alcohol Use Yes    Comment: occ     Social History     Substance and Sexual Activity   Drug Use No    Comment: Marijuana use as youth      Family History   Problem Relation Age of Onset    Colon Cancer Mother     Hypertension Sister     Stroke Brother     Heart Disease Neg Hx      Review of Systems   Constitutional:  Positive for malaise/fatigue. Negative for chills, fever and weight loss.   HENT:  Negative for congestion, ear discharge, ear pain, hearing loss and nosebleeds.    Eyes:  Negative for blurred vision, double vision, photophobia, pain and discharge.   Respiratory:  Negative for cough, hemoptysis and sputum production.    Cardiovascular:  Negative for chest pain, palpitations, orthopnea and claudication.   Gastrointestinal:  Positive for abdominal pain. Negative for constipation, diarrhea, heartburn, nausea and vomiting.   Genitourinary:  Negative for dysuria, frequency, hematuria and urgency.   Musculoskeletal:  Negative for back pain, joint pain, myalgias and neck pain.   Skin:  Negative for itching and rash.   Neurological:  Negative for dizziness, tingling, tremors, sensory change, speech change, focal weakness and headaches.   Endo/Heme/Allergies:  Negative for environmental allergies. Does not bruise/bleed easily.   Psychiatric/Behavioral:  Negative for depression, hallucinations, substance abuse and suicidal ideas. The patient is not nervous/anxious.         Objective:   /64 (BP Location: Right arm, Patient Position: Sitting)   Pulse 82   Temp 36.7 °C (98.1 °F) (Temporal)   Ht 1.753 m (5' 9\")   Wt 87.1 kg (192 lb)   SpO2 99%   BMI 28.35 kg/m²     Physical Exam  Constitutional:       General: He is not in acute distress.     Appearance: He is not ill-appearing.   HENT:      Head: " Normocephalic.   Eyes:      General: No scleral icterus.     Pupils: Pupils are equal, round, and reactive to light.   Cardiovascular:      Rate and Rhythm: Normal rate and regular rhythm.   Pulmonary:      Effort: Pulmonary effort is normal. No respiratory distress.   Abdominal:      General: Abdomen is flat. There is no distension.      Palpations: Abdomen is soft.      Tenderness: There is abdominal tenderness.      Comments: Tender in the epigastric region.   Skin:     Coloration: Skin is not jaundiced.   Neurological:      General: No focal deficit present.      Mental Status: He is alert and oriented to person, place, and time.         Labs   Latest Reference Range & Units 11/25/23 00:10   WBC 4.8 - 10.8 K/uL 6.8   RBC 4.70 - 6.10 M/uL 4.69 (L)   Hemoglobin 14.0 - 18.0 g/dL 14.7   Hematocrit 42.0 - 52.0 % 45.2   MCV 81.4 - 97.8 fL 96.4   MCH 27.0 - 33.0 pg 31.3   MCHC 32.3 - 36.5 g/dL 32.5   RDW 35.9 - 50.0 fL 44.5   Platelet Count 164 - 446 K/uL 183   MPV 9.0 - 12.9 fL 11.7   (L): Data is abnormally low     Latest Reference Range & Units 11/25/23 00:10   Sodium 135 - 145 mmol/L 136   Potassium 3.6 - 5.5 mmol/L 4.2   Chloride 96 - 112 mmol/L 99   Co2 20 - 33 mmol/L 23   Anion Gap 7.0 - 16.0  14.0   Glucose 65 - 99 mg/dL 160 (H)   Bun 8 - 22 mg/dL 17   Creatinine 0.50 - 1.40 mg/dL 0.89   GFR (CKD-EPI) >60 mL/min/1.73 m 2 101   Calcium 8.5 - 10.5 mg/dL 9.2   Correct Calcium 8.5 - 10.5 mg/dL 9.4   AST(SGOT) 12 - 45 U/L 16   ALT(SGPT) 2 - 50 U/L 9   Alkaline Phosphatase 30 - 99 U/L 90   Total Bilirubin 0.1 - 1.5 mg/dL 0.8   Albumin 3.2 - 4.9 g/dL 3.8   Total Protein 6.0 - 8.2 g/dL 7.0   Globulin 1.9 - 3.5 g/dL 3.2   A-G Ratio g/dL 1.2   Magnesium 1.5 - 2.5 mg/dL 1.6   (H): Data is abnormally high      Latest Reference Range & Units 11/25/23 00:10   Ca 19-9 0.0 - 35.0 U/mL 3758.0 (H)   (H): Data is abnormally high      Imaging  CT PET (12/1/23)  IMPRESSION:  1.  Pancreatic tail hypermetabolic mass measuring  approximately 4.3 x 2.7 cm consistent with known malignancy  2.  No evidence for pato or distant metastases  3.  Based on the CT scan there is involvement of the splenic vein. Anatomic staging should be based on the cross-sectional imaging and not the PET scan.    CT ABDOMEN (11/24/23)  IMPRESSION:  1.  Malcolm mass within the pancreatic body measuring 4.3 x 2.7 cm in size. This encases the splenic artery and splenic vein with possible splenic venous occlusion. There is also extension into the area of the superior mesenteric artery. This is very   suspicious for pancreatic cancer. Alternatively focal pancreatitis could have this appearance as well.  2.  Cirrhotic appearance of the liver.  3.  Small amount of ascites within the abdomen and pelvis.  4.  Sigmoid diverticulosis.  5.  Marked cardiac enlargement.    Pathology  N/A    Procedures  None  Diagnosis:     1. Malignant neoplasm of body of pancreas (HCC)  Referral to Oncology Psychosocial Screening for Distress    Referral to Genetics    Referral to Oncology Palliative Care    oxyCODONE immediate-release (ROXICODONE) 5 MG Tab    EC-ECHOCARDIOGRAM COMPLETE W/O CONT    EC-ECHOCARDIOGRAM COMPLETE W/O CONT      2. Cancer related pain  oxyCODONE immediate-release (ROXICODONE) 5 MG Tab      3. Cirrhosis of liver with ascites, unspecified hepatic cirrhosis type (HCC)        4. Type 2 diabetes mellitus with chronic kidney disease, with long-term current use of insulin, unspecified CKD stage (HCC)        5. Total bilirubin, elevated        6. Heart failure with reduced ejection fraction (HCC)  EC-ECHOCARDIOGRAM COMPLETE W/O CONT    EC-ECHOCARDIOGRAM COMPLETE W/O CONT          Medical Decision Making:  Today's Assessment / Status / Plan:     53-year-old male with chronic heart failure with a last ejection fraction between 15 and 20% who originally presented to the hospital with acute onset abdominal pain and was noted to have a mass in the body of the pancreas with a CA  19-9 over 3000.  This is highly concerning for malignancy.  His PET scan does not show any evidence of distant metastatic disease.  He is pending an EUS and is meeting with GI today.    Had an extensive discussion with the patient in regards to my concern that this represents a pancreatic adenocarcinoma.  I discussed the multiple treatment options including surgery, chemotherapy and radiation.  I think based on his cardiac function he would be a very poor surgical candidate and likely would not qualify for surgical resection.  We will get an echo to see where his heart function is at.  In addition to that he needs an EUS with a biopsy which [facility] that.  We will also request a celiac block to be done at the same time.  Additionally I referred him to medical oncology as well as palliative care and genetics.  I have refilled his oxycodone prescription which hopefully will get him through until his celiac block and then he will start feeling a lot better.  Him and his partner asked many good questions all of which were answered and they are in agreement to proceed with the plan as described.    I, Edwar Appiah MD have entered, reviewed and confirmed the above diagnosis related to this patient on this date of service, December 5, 2023 9:29 AM      Edwar Appiah MD  Surgical Oncology

## 2023-12-01 ENCOUNTER — HOSPITAL ENCOUNTER (OUTPATIENT)
Dept: RADIOLOGY | Facility: MEDICAL CENTER | Age: 53
End: 2023-12-01
Attending: SPECIALIST
Payer: MEDICARE

## 2023-12-01 DIAGNOSIS — R97.8 ELEVATED CA 19-9 LEVEL: ICD-10-CM

## 2023-12-01 DIAGNOSIS — C25.1 MALIGNANT NEOPLASM OF BODY OF PANCREAS (HCC): ICD-10-CM

## 2023-12-01 PROCEDURE — A9552 F18 FDG: HCPCS

## 2023-12-05 ENCOUNTER — OFFICE VISIT (OUTPATIENT)
Dept: SURGICAL ONCOLOGY | Facility: MEDICAL CENTER | Age: 53
End: 2023-12-05
Payer: MEDICARE

## 2023-12-05 VITALS
TEMPERATURE: 98.1 F | OXYGEN SATURATION: 99 % | BODY MASS INDEX: 28.44 KG/M2 | SYSTOLIC BLOOD PRESSURE: 128 MMHG | WEIGHT: 192 LBS | HEIGHT: 69 IN | DIASTOLIC BLOOD PRESSURE: 64 MMHG | HEART RATE: 82 BPM

## 2023-12-05 DIAGNOSIS — I50.20 HEART FAILURE WITH REDUCED EJECTION FRACTION (HCC): ICD-10-CM

## 2023-12-05 DIAGNOSIS — R17 TOTAL BILIRUBIN, ELEVATED: ICD-10-CM

## 2023-12-05 DIAGNOSIS — C25.1 MALIGNANT NEOPLASM OF BODY OF PANCREAS (HCC): ICD-10-CM

## 2023-12-05 DIAGNOSIS — K74.60 CIRRHOSIS OF LIVER WITH ASCITES, UNSPECIFIED HEPATIC CIRRHOSIS TYPE (HCC): ICD-10-CM

## 2023-12-05 DIAGNOSIS — Z79.4 TYPE 2 DIABETES MELLITUS WITH CHRONIC KIDNEY DISEASE, WITH LONG-TERM CURRENT USE OF INSULIN, UNSPECIFIED CKD STAGE (HCC): ICD-10-CM

## 2023-12-05 DIAGNOSIS — E11.22 TYPE 2 DIABETES MELLITUS WITH CHRONIC KIDNEY DISEASE, WITH LONG-TERM CURRENT USE OF INSULIN, UNSPECIFIED CKD STAGE (HCC): ICD-10-CM

## 2023-12-05 DIAGNOSIS — R18.8 CIRRHOSIS OF LIVER WITH ASCITES, UNSPECIFIED HEPATIC CIRRHOSIS TYPE (HCC): ICD-10-CM

## 2023-12-05 DIAGNOSIS — G89.3 CANCER RELATED PAIN: ICD-10-CM

## 2023-12-05 PROCEDURE — 3074F SYST BP LT 130 MM HG: CPT | Performed by: SURGERY

## 2023-12-05 PROCEDURE — 99215 OFFICE O/P EST HI 40 MIN: CPT | Performed by: SURGERY

## 2023-12-05 PROCEDURE — 3078F DIAST BP <80 MM HG: CPT | Performed by: SURGERY

## 2023-12-05 RX ORDER — OXYCODONE HYDROCHLORIDE 5 MG/1
5 TABLET ORAL EVERY 4 HOURS PRN
Qty: 30 TABLET | Refills: 0 | Status: ON HOLD | OUTPATIENT
Start: 2023-12-05 | End: 2023-12-30

## 2023-12-05 ASSESSMENT — ENCOUNTER SYMPTOMS
BRUISES/BLEEDS EASILY: 0
PALPITATIONS: 0
HEADACHES: 0
SENSORY CHANGE: 0
DIZZINESS: 0
CONSTIPATION: 0
PHOTOPHOBIA: 0
NERVOUS/ANXIOUS: 0
BACK PAIN: 0
FEVER: 0
NAUSEA: 0
HEMOPTYSIS: 0
CHILLS: 0
EYE DISCHARGE: 0
SPEECH CHANGE: 0
TREMORS: 0
ABDOMINAL PAIN: 1
HEARTBURN: 0
BLURRED VISION: 0
DEPRESSION: 0
DIARRHEA: 0
FOCAL WEAKNESS: 0
COUGH: 0
NECK PAIN: 0
EYE PAIN: 0
TINGLING: 0
CLAUDICATION: 0
SPUTUM PRODUCTION: 0
DOUBLE VISION: 0
WEIGHT LOSS: 0
ORTHOPNEA: 0
HALLUCINATIONS: 0
VOMITING: 0
MYALGIAS: 0

## 2023-12-05 ASSESSMENT — FIBROSIS 4 INDEX: FIB4 SCORE: 1.6

## 2023-12-05 ASSESSMENT — LIFESTYLE VARIABLES: SUBSTANCE_ABUSE: 0

## 2023-12-06 ENCOUNTER — TELEPHONE (OUTPATIENT)
Dept: CARDIOLOGY | Facility: MEDICAL CENTER | Age: 53
End: 2023-12-06
Payer: MEDICARE

## 2023-12-06 NOTE — LETTER
PROCEDURE/SURGERY CLEARANCE FORM      Encounter Date: 12/6/2023    Patient: Lai Dailey  YOB: 1970    CARDIOLOGIST:  Cinthya Arriaga M.D    REFERRING DOCTOR:  No ref. provider found    The above patient is cleared to have the following procedure/surgery: EGD w/ EUS w possible biopsies  PROCEDURE/SURGERY CLEARANCE FORM    Date: 12/11/2023   Patient Name: Lai Dailey    Dear Surgeon or Proceduralist,      Thank you for your request for cardiac stratification of our mutual patient Lai Dailey 1970. We have reviewed their [facility] records; and to the best of our understanding this patient has not had stenting, ablation, cardiothoracic surgery or hospitalization for cardiovascular reasons in the past 6 months.  Lai Dailey has been seen within the past 18 months and is considered to have non-modifiable cardiac risk for this low-risk procedure/surgery. They may proceed from a cardiovascular standpoint and may hold their antiplatelet/anticoagulation as briefly as possible. Please have patient resume this medication when hemodynamically stable to do so.     Aspirin or Prasugrel   - hold 7 days prior to procedure/surgery, resume when hemodynamically stable      Clopidrogrel or Ticagrelor  - hold 7 days for all neurological procedures, hold 5 days prior to all other procedure/surgery,  resume when hemodynamically stable     Warfarin - hold 7 days for all neurological procedures, hold 5 days prior to all other procedure/surgery and coordinate with [facility] Anticoagulation Clinic ([phone]) INR testing and dose management.      Pradaxa/Xarelto/Eliquis/Savesya - hold 1 day prior to procedure for low bleeding risk procedure, 2 days for high bleeding risk procedure, or consider holding 3 days or longer for patients with reduced kidney function (CrCl <30mL/min) or spinal/cranial surgeries/procedures.      If they have a mechanical heart valve, please coordinate with [facility]  Anticoagulation Service ([phone]) the proper management of their anticoagulant in the periprocedural or perioperative period.      Some patients have higher risk for cardiovascular complications or holding medication. If our patient has had prior complications of holding antiplatelet or anticoagulants in the past and we have seen them after these events, we have addressed these concerns with the patient. They are at an unknown degree of increased risk for recurrent complication.  You may hold anticoagulation/antiplatelets for the procedure or surgery if the benefits of the procedure or surgery outweigh this nonmodifiable risk.      If Lai Dailey 1970 has new symptoms of heart failure decompensation, unstable arrythmia, or angina please reach out and we will assess the patient.      If you have other patient-specific concerns, please feel free to reach out to the patient's cardiologist directly at [phone].     Thank you,       [facility]     Electronically Signed        MD Florinda Arriaga M.D

## 2023-12-06 NOTE — TELEPHONE ENCOUNTER
TT    Caller:  Ester    Office Name, phone number, fax number:   [facility]  Fax -  [phone]    Procedure Name: Endoscopy    Procedure Scheduled Date: 12/19/23    Callback Number: [phone]    Thank you,   Marline BUCKNER

## 2023-12-08 ENCOUNTER — PATIENT OUTREACH (OUTPATIENT)
Dept: ONCOLOGY | Facility: MEDICAL CENTER | Age: 53
End: 2023-12-08
Payer: MEDICARE

## 2023-12-08 NOTE — PROGRESS NOTES
"Initial contact to patient.  Patient states his GI doctor has his EUS scheduled for 12/19/23 at [place].  Patient states he has good support at home and [facility] \"I just need to contact them for services\".  ONN role explained to patient and contact information to be sent through Mobi per patient request.  Sent a volt text to Dr. Appiah that referral for medical oncology is still needed.  Dr. Appiah responded that he will place medical oncology referral, for patient to see them after his EUS has been performed.  ONN will continue to support and follow.  "

## 2023-12-11 NOTE — TELEPHONE ENCOUNTER
Last OV: 6/12/2023  Proposed Surgery: EGD w/ EUS w possible biopsies  Surgery Date: TBD but ASAP  Requesting Office Name: [facility]  Fax Number: [phone]  Preference of Location (default is surgery center unless specified by Cardiologist or [facility])  Prior Clearance Addressed: No      Anticoags/Antiplatelets: Apixaban   Outstanding Cardiac Imaging : No  Stent, Cardiac Devices, or Catheterization: Yes  Date : 6/11/2018   Ablation, TAVR/Valve (including open heart), Cardioversion: Yes  Date: 11/4/2021  >3 months post procedure for dental work request OR >6 months post procedure, send clearance letter  Recent Cardiac Hospitalization: No            When: Greater than 6 months since hospitalization.   History (cardiac history):   Past Medical History:   Diagnosis Date    CHF (congestive heart failure) (HCC)     Diabetes (HCC)     Hyperlipidemia     Hypertension     Pacemaker     Pacemaker/AICD    Snoring 10/18/2021    No sleep study.             Surgical Clearance Letter Sent: YES   **Scan clearance request letter into [facility].**

## 2023-12-15 DIAGNOSIS — C25.1 MALIGNANT NEOPLASM OF BODY OF PANCREAS (HCC): ICD-10-CM

## 2023-12-24 ENCOUNTER — HOSPITAL ENCOUNTER (INPATIENT)
Facility: MEDICAL CENTER | Age: 53
LOS: 6 days | DRG: 435 | End: 2023-12-30
Attending: EMERGENCY MEDICINE | Admitting: STUDENT IN AN ORGANIZED HEALTH CARE EDUCATION/TRAINING PROGRAM
Payer: MEDICARE

## 2023-12-24 DIAGNOSIS — C25.9 MALIGNANT NEOPLASM OF PANCREAS, UNSPECIFIED LOCATION OF MALIGNANCY (HCC): ICD-10-CM

## 2023-12-24 DIAGNOSIS — R10.9 INTRACTABLE ABDOMINAL PAIN: ICD-10-CM

## 2023-12-24 PROBLEM — R73.9 HYPERGLYCEMIA: Status: ACTIVE | Noted: 2023-12-24

## 2023-12-24 LAB
ALBUMIN SERPL BCP-MCNC: 4.8 G/DL (ref 3.2–4.9)
ALBUMIN/GLOB SERPL: 1.2 G/DL
ALP SERPL-CCNC: 123 U/L (ref 30–99)
ALT SERPL-CCNC: 23 U/L (ref 2–50)
ANION GAP SERPL CALC-SCNC: 15 MMOL/L (ref 7–16)
AST SERPL-CCNC: 27 U/L (ref 12–45)
BASOPHILS # BLD AUTO: 0.2 % (ref 0–1.8)
BASOPHILS # BLD: 0.02 K/UL (ref 0–0.12)
BILIRUB SERPL-MCNC: 0.7 MG/DL (ref 0.1–1.5)
BUN SERPL-MCNC: 36 MG/DL (ref 8–22)
CALCIUM ALBUM COR SERPL-MCNC: 9.8 MG/DL (ref 8.5–10.5)
CALCIUM SERPL-MCNC: 10.4 MG/DL (ref 8.5–10.5)
CHLORIDE SERPL-SCNC: 94 MMOL/L (ref 96–112)
CO2 SERPL-SCNC: 26 MMOL/L (ref 20–33)
CREAT SERPL-MCNC: 1.19 MG/DL (ref 0.5–1.4)
EOSINOPHIL # BLD AUTO: 0 K/UL (ref 0–0.51)
EOSINOPHIL NFR BLD: 0 % (ref 0–6.9)
ERYTHROCYTE [DISTWIDTH] IN BLOOD BY AUTOMATED COUNT: 42.5 FL (ref 35.9–50)
GFR SERPLBLD CREATININE-BSD FMLA CKD-EPI: 72 ML/MIN/1.73 M 2
GLOBULIN SER CALC-MCNC: 3.9 G/DL (ref 1.9–3.5)
GLUCOSE BLD STRIP.AUTO-MCNC: 190 MG/DL (ref 65–99)
GLUCOSE BLD STRIP.AUTO-MCNC: 205 MG/DL (ref 65–99)
GLUCOSE SERPL-MCNC: 248 MG/DL (ref 65–99)
HCT VFR BLD AUTO: 55 % (ref 42–52)
HGB BLD-MCNC: 18.1 G/DL (ref 14–18)
IMM GRANULOCYTES # BLD AUTO: 0.03 K/UL (ref 0–0.11)
IMM GRANULOCYTES NFR BLD AUTO: 0.3 % (ref 0–0.9)
LIPASE SERPL-CCNC: 10 U/L (ref 11–82)
LYMPHOCYTES # BLD AUTO: 1.76 K/UL (ref 1–4.8)
LYMPHOCYTES NFR BLD: 20.1 % (ref 22–41)
MCH RBC QN AUTO: 30.3 PG (ref 27–33)
MCHC RBC AUTO-ENTMCNC: 32.9 G/DL (ref 32.3–36.5)
MCV RBC AUTO: 92 FL (ref 81.4–97.8)
MONOCYTES # BLD AUTO: 0.7 K/UL (ref 0–0.85)
MONOCYTES NFR BLD AUTO: 8 % (ref 0–13.4)
NEUTROPHILS # BLD AUTO: 6.23 K/UL (ref 1.82–7.42)
NEUTROPHILS NFR BLD: 71.4 % (ref 44–72)
NRBC # BLD AUTO: 0 K/UL
NRBC BLD-RTO: 0 /100 WBC (ref 0–0.2)
PLATELET # BLD AUTO: 237 K/UL (ref 164–446)
PMV BLD AUTO: 11.4 FL (ref 9–12.9)
POTASSIUM SERPL-SCNC: 4.7 MMOL/L (ref 3.6–5.5)
PROT SERPL-MCNC: 8.7 G/DL (ref 6–8.2)
RBC # BLD AUTO: 5.98 M/UL (ref 4.7–6.1)
SODIUM SERPL-SCNC: 135 MMOL/L (ref 135–145)
WBC # BLD AUTO: 8.7 K/UL (ref 4.8–10.8)

## 2023-12-24 PROCEDURE — 770004 HCHG ROOM/CARE - ONCOLOGY PRIVATE *

## 2023-12-24 PROCEDURE — A9270 NON-COVERED ITEM OR SERVICE: HCPCS | Performed by: STUDENT IN AN ORGANIZED HEALTH CARE EDUCATION/TRAINING PROGRAM

## 2023-12-24 PROCEDURE — 99285 EMERGENCY DEPT VISIT HI MDM: CPT

## 2023-12-24 PROCEDURE — 700111 HCHG RX REV CODE 636 W/ 250 OVERRIDE (IP): Performed by: STUDENT IN AN ORGANIZED HEALTH CARE EDUCATION/TRAINING PROGRAM

## 2023-12-24 PROCEDURE — 85025 COMPLETE CBC W/AUTO DIFF WBC: CPT

## 2023-12-24 PROCEDURE — 82962 GLUCOSE BLOOD TEST: CPT | Mod: 91

## 2023-12-24 PROCEDURE — 700105 HCHG RX REV CODE 258: Performed by: EMERGENCY MEDICINE

## 2023-12-24 PROCEDURE — 700102 HCHG RX REV CODE 250 W/ 637 OVERRIDE(OP): Performed by: STUDENT IN AN ORGANIZED HEALTH CARE EDUCATION/TRAINING PROGRAM

## 2023-12-24 PROCEDURE — 99223 1ST HOSP IP/OBS HIGH 75: CPT | Mod: AI | Performed by: STUDENT IN AN ORGANIZED HEALTH CARE EDUCATION/TRAINING PROGRAM

## 2023-12-24 PROCEDURE — 700105 HCHG RX REV CODE 258: Performed by: STUDENT IN AN ORGANIZED HEALTH CARE EDUCATION/TRAINING PROGRAM

## 2023-12-24 PROCEDURE — 700111 HCHG RX REV CODE 636 W/ 250 OVERRIDE (IP): Performed by: EMERGENCY MEDICINE

## 2023-12-24 PROCEDURE — 80053 COMPREHEN METABOLIC PANEL: CPT

## 2023-12-24 PROCEDURE — 96375 TX/PRO/DX INJ NEW DRUG ADDON: CPT

## 2023-12-24 PROCEDURE — 83690 ASSAY OF LIPASE: CPT

## 2023-12-24 PROCEDURE — 36415 COLL VENOUS BLD VENIPUNCTURE: CPT

## 2023-12-24 PROCEDURE — 96374 THER/PROPH/DIAG INJ IV PUSH: CPT

## 2023-12-24 RX ORDER — ACETAMINOPHEN 500 MG
1000 TABLET ORAL
COMMUNITY

## 2023-12-24 RX ORDER — ISOSORBIDE DINITRATE 10 MG/1
10 TABLET ORAL EVERY 8 HOURS
Status: DISCONTINUED | OUTPATIENT
Start: 2023-12-24 | End: 2023-12-30 | Stop reason: HOSPADM

## 2023-12-24 RX ORDER — INSULIN LISPRO 100 [IU]/ML
1-6 INJECTION, SOLUTION INTRAVENOUS; SUBCUTANEOUS
Status: DISCONTINUED | OUTPATIENT
Start: 2023-12-24 | End: 2023-12-30 | Stop reason: HOSPADM

## 2023-12-24 RX ORDER — SODIUM CHLORIDE 9 MG/ML
INJECTION, SOLUTION INTRAVENOUS CONTINUOUS
Status: ACTIVE | OUTPATIENT
Start: 2023-12-24 | End: 2023-12-25

## 2023-12-24 RX ORDER — ONDANSETRON 2 MG/ML
4 INJECTION INTRAMUSCULAR; INTRAVENOUS ONCE
Status: COMPLETED | OUTPATIENT
Start: 2023-12-24 | End: 2023-12-24

## 2023-12-24 RX ORDER — INSULIN LISPRO 100 [IU]/ML
0.2 INJECTION, SOLUTION INTRAVENOUS; SUBCUTANEOUS
Status: DISCONTINUED | OUTPATIENT
Start: 2023-12-24 | End: 2023-12-24

## 2023-12-24 RX ORDER — ACETAMINOPHEN 325 MG/1
650 TABLET ORAL EVERY 6 HOURS PRN
Status: DISCONTINUED | OUTPATIENT
Start: 2023-12-24 | End: 2023-12-30 | Stop reason: HOSPADM

## 2023-12-24 RX ORDER — HYDROMORPHONE HYDROCHLORIDE 1 MG/ML
1 INJECTION, SOLUTION INTRAMUSCULAR; INTRAVENOUS; SUBCUTANEOUS EVERY 4 HOURS PRN
Status: DISCONTINUED | OUTPATIENT
Start: 2023-12-24 | End: 2023-12-25

## 2023-12-24 RX ORDER — OXYCODONE HYDROCHLORIDE 10 MG/1
10 TABLET ORAL EVERY 4 HOURS PRN
Status: ON HOLD | COMMUNITY
Start: 2023-12-20 | End: 2023-12-30

## 2023-12-24 RX ORDER — SODIUM CHLORIDE 9 MG/ML
INJECTION, SOLUTION INTRAVENOUS CONTINUOUS
Status: DISCONTINUED | OUTPATIENT
Start: 2023-12-24 | End: 2023-12-24

## 2023-12-24 RX ORDER — HYDRALAZINE HYDROCHLORIDE 25 MG/1
25 TABLET, FILM COATED ORAL EVERY 8 HOURS
Status: DISCONTINUED | OUTPATIENT
Start: 2023-12-24 | End: 2023-12-30 | Stop reason: HOSPADM

## 2023-12-24 RX ORDER — ATORVASTATIN CALCIUM 40 MG/1
40 TABLET, FILM COATED ORAL DAILY
Status: DISCONTINUED | OUTPATIENT
Start: 2023-12-25 | End: 2023-12-30 | Stop reason: HOSPADM

## 2023-12-24 RX ORDER — HYDROMORPHONE HYDROCHLORIDE 1 MG/ML
1 INJECTION, SOLUTION INTRAMUSCULAR; INTRAVENOUS; SUBCUTANEOUS ONCE
Status: COMPLETED | OUTPATIENT
Start: 2023-12-24 | End: 2023-12-24

## 2023-12-24 RX ORDER — DULOXETIN HYDROCHLORIDE 30 MG/1
30 CAPSULE, DELAYED RELEASE ORAL DAILY
Status: DISCONTINUED | OUTPATIENT
Start: 2023-12-25 | End: 2023-12-30 | Stop reason: HOSPADM

## 2023-12-24 RX ORDER — SODIUM CHLORIDE 9 MG/ML
1000 INJECTION, SOLUTION INTRAVENOUS ONCE
Status: COMPLETED | OUTPATIENT
Start: 2023-12-24 | End: 2023-12-24

## 2023-12-24 RX ORDER — OXYCODONE HYDROCHLORIDE 5 MG/1
5 TABLET ORAL EVERY 4 HOURS PRN
Status: DISCONTINUED | OUTPATIENT
Start: 2023-12-24 | End: 2023-12-25

## 2023-12-24 RX ADMIN — APIXABAN 5 MG: 5 TABLET, FILM COATED ORAL at 17:13

## 2023-12-24 RX ADMIN — ISOSORBIDE DINITRATE 10 MG: 10 TABLET ORAL at 15:41

## 2023-12-24 RX ADMIN — HYDROMORPHONE HYDROCHLORIDE 1 MG: 1 INJECTION, SOLUTION INTRAMUSCULAR; INTRAVENOUS; SUBCUTANEOUS at 12:07

## 2023-12-24 RX ADMIN — SODIUM CHLORIDE: 9 INJECTION, SOLUTION INTRAVENOUS at 14:28

## 2023-12-24 RX ADMIN — HYDROMORPHONE HYDROCHLORIDE 1 MG: 1 INJECTION, SOLUTION INTRAMUSCULAR; INTRAVENOUS; SUBCUTANEOUS at 21:02

## 2023-12-24 RX ADMIN — SODIUM CHLORIDE 1000 ML: 9 INJECTION, SOLUTION INTRAVENOUS at 12:07

## 2023-12-24 RX ADMIN — HYDRALAZINE HYDROCHLORIDE 25 MG: 25 TABLET ORAL at 21:43

## 2023-12-24 RX ADMIN — OXYCODONE HYDROCHLORIDE 5 MG: 5 TABLET ORAL at 14:27

## 2023-12-24 RX ADMIN — INSULIN LISPRO 2 UNITS: 100 INJECTION, SOLUTION INTRAVENOUS; SUBCUTANEOUS at 17:08

## 2023-12-24 RX ADMIN — HYDROMORPHONE HYDROCHLORIDE 1 MG: 1 INJECTION, SOLUTION INTRAMUSCULAR; INTRAVENOUS; SUBCUTANEOUS at 17:01

## 2023-12-24 RX ADMIN — ONDANSETRON 4 MG: 2 INJECTION INTRAMUSCULAR; INTRAVENOUS at 12:06

## 2023-12-24 RX ADMIN — SACUBITRIL AND VALSARTAN 1 TABLET: 97; 103 TABLET, FILM COATED ORAL at 17:13

## 2023-12-24 RX ADMIN — ISOSORBIDE DINITRATE 10 MG: 10 TABLET ORAL at 21:43

## 2023-12-24 RX ADMIN — HYDRALAZINE HYDROCHLORIDE 25 MG: 25 TABLET ORAL at 15:41

## 2023-12-24 RX ADMIN — INSULIN LISPRO 1 UNITS: 100 INJECTION, SOLUTION INTRAVENOUS; SUBCUTANEOUS at 21:08

## 2023-12-24 ASSESSMENT — ENCOUNTER SYMPTOMS
NEUROLOGICAL NEGATIVE: 1
RESPIRATORY NEGATIVE: 1
ABDOMINAL PAIN: 1
EYES NEGATIVE: 1
CARDIOVASCULAR NEGATIVE: 1
VOMITING: 1
NAUSEA: 1
PSYCHIATRIC NEGATIVE: 1
MUSCULOSKELETAL NEGATIVE: 1

## 2023-12-24 ASSESSMENT — FIBROSIS 4 INDEX
FIB4 SCORE: 1.59
FIB4 SCORE: 1.31

## 2023-12-24 ASSESSMENT — COGNITIVE AND FUNCTIONAL STATUS - GENERAL
MOBILITY SCORE: 24
DRESSING REGULAR LOWER BODY CLOTHING: A LITTLE
HELP NEEDED FOR BATHING: A LITTLE
DAILY ACTIVITIY SCORE: 22
SUGGESTED CMS G CODE MODIFIER MOBILITY: CH
SUGGESTED CMS G CODE MODIFIER DAILY ACTIVITY: CJ

## 2023-12-24 ASSESSMENT — LIFESTYLE VARIABLES
TOTAL SCORE: 0
ON A TYPICAL DAY WHEN YOU DRINK ALCOHOL HOW MANY DRINKS DO YOU HAVE: 0
TOTAL SCORE: 0
AVERAGE NUMBER OF DAYS PER WEEK YOU HAVE A DRINK CONTAINING ALCOHOL: 0
EVER FELT BAD OR GUILTY ABOUT YOUR DRINKING: NO
HAVE PEOPLE ANNOYED YOU BY CRITICIZING YOUR DRINKING: NO
DOES PATIENT WANT TO STOP DRINKING: NO
ALCOHOL_USE: NO
HOW MANY TIMES IN THE PAST YEAR HAVE YOU HAD 5 OR MORE DRINKS IN A DAY: 0
HAVE YOU EVER FELT YOU SHOULD CUT DOWN ON YOUR DRINKING: NO
TOTAL SCORE: 0
CONSUMPTION TOTAL: NEGATIVE
EVER HAD A DRINK FIRST THING IN THE MORNING TO STEADY YOUR NERVES TO GET RID OF A HANGOVER: NO

## 2023-12-24 ASSESSMENT — PAIN DESCRIPTION - PAIN TYPE
TYPE: ACUTE PAIN

## 2023-12-24 ASSESSMENT — CHA2DS2 SCORE
SEX: MALE
HYPERTENSION: YES
CHF OR LEFT VENTRICULAR DYSFUNCTION: YES
PRIOR STROKE OR TIA OR THROMBOEMBOLISM: NO
DIABETES: YES
CHA2DS2 VASC SCORE: 3
AGE 75 OR GREATER: NO
AGE 65 TO 74: NO

## 2023-12-24 NOTE — ED NOTES
Med Rec complete per patient   Allergies reviewed  Antibiotics in the past 30 days:no  Anticoagulant in past 14 days:yes  Anticoagulant:Eliquis 5 mg Last dose:12/24/23  Pharmacy patient utilizes:[facility] in [place]    Notified [facility] for  check on Narcotics

## 2023-12-24 NOTE — ASSESSMENT & PLAN NOTE
Noted to have worsening abdominal pain, decreased appetite with history of suspected pancreatic cancer  Start morphine ER  As needed oral oxycodone  -Patient to follow-up with outpatient oncology on 1/3   -per surgical oncology, not a surgical candidate at this time  Fluids

## 2023-12-24 NOTE — PROGRESS NOTES
Pt unable to tolerate anything PO with intractable abdominal pain. Pt spoke with Dr. Donald sent to ER.

## 2023-12-24 NOTE — ED PROVIDER NOTES
"ED Provider Note    CHIEF COMPLAINT  Chief Complaint   Patient presents with    LLQ Pain     Reports 9/10 \"sharp\" LUQ pain that radiates down to pt's groin.  Hx: pancreatic cancer, pancreatic biopsy on 12/19       EXTERNAL RECORDS REVIEWED  CT abdomen pelvis with IV contrast from [facility] 12/20/2023:     Impression:     Poorly defined hypoattenuating infiltrative mass in the pancreatic body extending into the tail detailed above consistent with history of pancreatic malignancy.     Mild contour nodularity liver which could indicate cirrhosis in the appropriate clinical setting. Small abdominal pelvic ascites.    Hepatic and splenic granulomas.    Marked cardiomegaly         HPI/ROS  LIMITATION TO HISTORY   Select: : None  OUTSIDE HISTORIAN(S):  Significant other at the bedside    Lai Dailey is a 53 y.o. male who presents for evaluation of intractable abdominal pain.  Recent diagnosis of pancreatic cancer, has been having progressively severe left sided upper abdominal pain that radiates down towards his groin.  5 days ago he underwent biopsy of this mass.  The following day he was at [facility] with this exact pain.  He has been prescribed oxycodone and states the pain continues.  He has been nauseated, he is unable to eat or drink anything.  No fever.  No back pain.  No chest pain or shortness of breath.  He contacted his surgical oncology team today and they recommended he come to the emergency department for evaluation    PAST MEDICAL HISTORY   has a past medical history of CHF (congestive heart failure) (HCC), Diabetes (HCC), Hyperlipidemia, Hypertension, Pacemaker, and Snoring (10/18/2021).    SURGICAL HISTORY   has a past surgical history that includes aicd implant (2010).    FAMILY HISTORY  Family History   Problem Relation Age of Onset    Colon Cancer Mother     Hypertension Sister     Stroke Brother     Heart Disease Neg Hx        SOCIAL HISTORY  Social History     Tobacco Use    Smoking " "status: Never    Smokeless tobacco: Never   Vaping Use    Vaping Use: Never used   Substance and Sexual Activity    Alcohol use: Yes     Comment: occ    Drug use: No     Comment: Marijuana use as youth    Sexual activity: Not on file       CURRENT MEDICATIONS  Home Medications       Reviewed by Estela Hutchinson R.N. (Registered Nurse) on 12/24/23 at 1047  Med List Status: Not Addressed     Medication Last Dose Status   acetaminophen (TYLENOL) 325 MG Tab  Active   apixaban (ELIQUIS) 5mg Tab  Active   atorvastatin (LIPITOR) 40 MG Tab  Active   DULoxetine (CYMBALTA) 30 MG Cap DR Particles  Active   Empagliflozin (JARDIANCE) 10 MG Tab tablet  Active   furosemide (LASIX) 20 MG Tab  Active   hydrALAZINE (APRESOLINE) 25 MG Tab  Active   insulin glargine (LANTUS) 100 UNIT/ML Solution  Active   isosorbide dinitrate (ISORDIL) 10 MG Tab  Active   magnesium oxide 400 (240 Mg) MG Tab  Active   multivitamin (THERAGRAN) Tab  Active   oxyCODONE immediate-release (ROXICODONE) 5 MG Tab  Active   polyethylene glycol/lytes (MIRALAX) Pack  Active   sacubitril-valsartan (ENTRESTO)  MG Tab  Active   senna-docusate (PERICOLACE OR SENOKOT S) 8.6-50 MG Tab  Active   spironolactone (ALDACTONE) 25 MG Tab  Active                    ALLERGIES  No Known Allergies    PHYSICAL EXAM  VITAL SIGNS: BP (!) 140/93   Pulse 67   Temp 36 °C (96.8 °F) (Temporal)   Resp 18   Ht 1.765 m (5' 9.5\")   Wt 86.3 kg (190 lb 4.1 oz)   SpO2 91%   BMI 27.69 kg/m²    Constitutional: In distress with pain   HENT: Normocephalic, no obvious evidence of acute trauma.  Eyes: No scleral icterus. Normal conjunctiva   Thorax & Lungs: Normal nonlabored respirations. I appreciate no wheezing, rhonchi or rales. There is normal air movement.  Upon cardiac ascultation I appreciate a regular heart rhythm and a normal rate.   Abdomen: The abdomen seems minimally distended.  Upon palpation has tenderness across the upper abdomen.  No rebound, no guarding  Skin: The exposed " portions of skin reveal no obvious rash or other abnormalities.  Extremities/Musculoskeletal: No obvious sign of acute trauma. No asymmetric calf tenderness or edema.   Neurologic: Alert & oriented. No focal deficits observed.      DIAGNOSTIC STUDIES / PROCEDURES    LABS  Results for orders placed or performed during the hospital encounter of 12/24/23   CBC WITH DIFFERENTIAL   Result Value Ref Range    WBC 8.7 4.8 - 10.8 K/uL    RBC 5.98 4.70 - 6.10 M/uL    Hemoglobin 18.1 (H) 14.0 - 18.0 g/dL    Hematocrit 55.0 (H) 42.0 - 52.0 %    MCV 92.0 81.4 - 97.8 fL    MCH 30.3 27.0 - 33.0 pg    MCHC 32.9 32.3 - 36.5 g/dL    RDW 42.5 35.9 - 50.0 fL    Platelet Count 237 164 - 446 K/uL    MPV 11.4 9.0 - 12.9 fL    Neutrophils-Polys 71.40 44.00 - 72.00 %    Lymphocytes 20.10 (L) 22.00 - 41.00 %    Monocytes 8.00 0.00 - 13.40 %    Eosinophils 0.00 0.00 - 6.90 %    Basophils 0.20 0.00 - 1.80 %    Immature Granulocytes 0.30 0.00 - 0.90 %    Nucleated RBC 0.00 0.00 - 0.20 /100 WBC    Neutrophils (Absolute) 6.23 1.82 - 7.42 K/uL    Lymphs (Absolute) 1.76 1.00 - 4.80 K/uL    Monos (Absolute) 0.70 0.00 - 0.85 K/uL    Eos (Absolute) 0.00 0.00 - 0.51 K/uL    Baso (Absolute) 0.02 0.00 - 0.12 K/uL    Immature Granulocytes (abs) 0.03 0.00 - 0.11 K/uL    NRBC (Absolute) 0.00 K/uL   COMP METABOLIC PANEL   Result Value Ref Range    Sodium 135 135 - 145 mmol/L    Potassium 4.7 3.6 - 5.5 mmol/L    Chloride 94 (L) 96 - 112 mmol/L    Co2 26 20 - 33 mmol/L    Anion Gap 15.0 7.0 - 16.0    Glucose 248 (H) 65 - 99 mg/dL    Bun 36 (H) 8 - 22 mg/dL    Creatinine 1.19 0.50 - 1.40 mg/dL    Calcium 10.4 8.5 - 10.5 mg/dL    Correct Calcium 9.8 8.5 - 10.5 mg/dL    AST(SGOT) 27 12 - 45 U/L    ALT(SGPT) 23 2 - 50 U/L    Alkaline Phosphatase 123 (H) 30 - 99 U/L    Total Bilirubin 0.7 0.1 - 1.5 mg/dL    Albumin 4.8 3.2 - 4.9 g/dL    Total Protein 8.7 (H) 6.0 - 8.2 g/dL    Globulin 3.9 (H) 1.9 - 3.5 g/dL    A-G Ratio 1.2 g/dL   LIPASE   Result Value Ref Range     Lipase 10 (L) 11 - 82 U/L   ESTIMATED GFR   Result Value Ref Range    GFR (CKD-EPI) 72 >60 mL/min/1.73 m 2        COURSE & MEDICAL DECISION MAKING    ED Observation Status? No; Patient does not meet criteria for ED Observation.     INITIAL ASSESSMENT, COURSE AND PLAN  Care Narrative: This is a 53-year-old male with pancreatic cancer comes in with worsening chronic abdominal pain.  Had a biopsy 5 days ago but then had a CT scan the following day.  Pain continues to worsen.  Decreased oral intake with nausea.  Today's blood work looks okay, he does have an elevation in BUN which could indicate dehydration but normal GFR.  Lipase is low actually, no significant anemia or leukocytosis.  I discussed the case with his oncologist Dr. Donald, he recommends admission to the hospital under the medical service with a palliative care consultation.  He notes that he is not a surgical candidate.  At this point the patient has received analgesia, antiemetics and IV fluids and is admitted to the hospital in guarded condition    DISPOSITION AND DISCUSSIONS  I have discussed management of the patient with the following physicians and DIXON's: Dr. Donald, oncology.  Dr. De Los Santos, Hospitalist    FINAL DIAGNOSIS  1. Intractable abdominal pain    2. Malignant neoplasm of pancreas, unspecified location of malignancy (HCC)           Electronically signed by: Marco Graves M.D., 12/24/2023 12:01 PM

## 2023-12-24 NOTE — H&P
"Hospital Medicine History & Physical Note    Date of Service  12/24/2023    Primary Care Physician  Tor Rdz M.D.    Consultants  Surgical oncology  Hematology oncology    Code Status  Full Code    Chief Complaint  Chief Complaint   Patient presents with    LLQ Pain     Reports 9/10 \"sharp\" LUQ pain that radiates down to pt's groin.  Hx: pancreatic cancer, pancreatic biopsy on 12/19       History of Presenting Illness  Lai Dailey is a 53 y.o. male who presented 12/24/2023 with abdominal pain.    Has history of heart failure with reduced ejection fraction 20%, diabetes, hyperlipidemia.  Patient recently has been having worsening epigastric pain for 1 month.  He was seen at [facility], and CAT scan abdomen showing a pancreatic mass.  CA 19-9 was found to be elevated at 3258.  He was recommended to have outpatient follow-up for endoscopic ultrasound-guided biopsy and PET scan.  PET scan performed on February 1 showing pancreatic tail with a hypermetabolic mass measuring 4.3 x 2.7 cm.    Recently seen at [facility] for pain management.  On December 19, patient had endoscopic ultrasound and had a fine-needle biopsy of the pancreatic mass.  CT abdomen pelvis showing poorly defined hypoattenuating infiltrative mass in the pancreatic body extending to the tail.  Mild concern nodularity liver which could indicate cirrhosis in the appropriate clinical setting, small abdominal pelvic ascites.    Patient reports that since his discharge from [facility], he continues to have worsening abdominal pain.  He has decreased appetite and has not been consuming food/liquid.  He tried to take oxycodone, however he states that it does not relieve the pain.  He has not seen hematology oncology yet, is scheduled for an appointment with hematology oncology on January 3.  As symptoms not improved, decision was made to come to ER for evaluation.    In ER, patient found to have normal vital signs.  Pertinent labs " include erythrocytosis, prerenal azotemia, hyperglycemia.    I discussed the plan of care with patient.    Review of Systems  Review of Systems   Constitutional:  Positive for malaise/fatigue.   HENT: Negative.     Eyes: Negative.    Respiratory: Negative.     Cardiovascular: Negative.    Gastrointestinal:  Positive for abdominal pain, nausea and vomiting.   Genitourinary: Negative.    Musculoskeletal: Negative.    Skin: Negative.    Neurological: Negative.    Endo/Heme/Allergies: Negative.    Psychiatric/Behavioral: Negative.         Past Medical History   has a past medical history of CHF (congestive heart failure) ([facility]), Diabetes ([facility]), Hyperlipidemia, Hypertension, Pacemaker, and Snoring (10/18/2021).    Surgical History   has a past surgical history that includes aicd implant (2010).     Family History  family history includes Colon Cancer in his mother; Hypertension in his sister; Stroke in his brother.   Family history reviewed with patient. There is no family history that is pertinent to the chief complaint.     Social History   reports that he has never smoked. He has never used smokeless tobacco. He reports current alcohol use. He reports that he does not use drugs.    Allergies  No Known Allergies    Medications  Prior to Admission Medications   Prescriptions Last Dose Informant Patient Reported? Taking?   DULoxetine (CYMBALTA) 30 MG Cap DR Particles   No No   Sig: Take 1 Capsule by mouth every day for 6 days, THEN 1 Capsule every day for 24 days.   Empagliflozin (JARDIANCE) 10 MG Tab tablet  Patient No No   Sig: Take 1 Tablet by mouth every day.   acetaminophen (TYLENOL) 325 MG Tab   No No   Sig: Take 2 Tablets by mouth every 6 hours as needed for Mild Pain. 2 tablets = 650 mg. Do not exceed 2 grams per day.   apixaban (ELIQUIS) 5mg Tab  Patient No No   Sig: Take 1 Tablet by mouth 2 times a day.   atorvastatin (LIPITOR) 40 MG Tab  Patient No No   Sig: Take 1 Tablet by mouth every day.   furosemide  (LASIX) 20 MG Tab  Patient No No   Sig: Take 1 Tablet by mouth every day.   hydrALAZINE (APRESOLINE) 25 MG Tab  Patient No No   Sig: Take 1 Tablet by mouth 3 times a day.   insulin glargine (LANTUS) 100 UNIT/ML Solution  Patient No No   Sig: Inject 10 Units as instructed every evening.   isosorbide dinitrate (ISORDIL) 10 MG Tab  Patient No No   Sig: Take 1 Tablet by mouth 3 times a day.   magnesium oxide 400 (240 Mg) MG Tab   No No   Sig: Take 1 Tablet by mouth every day.   multivitamin (THERAGRAN) Tab  Patient Yes No   Sig: Take 1 Tablet by mouth every day.   oxyCODONE immediate-release (ROXICODONE) 5 MG Tab   No No   Sig: Take 1 Tablet by mouth every four hours as needed for Severe Pain for up to 30 days.   polyethylene glycol/lytes (MIRALAX) Pack   No No   Sig: Take 1 Packet by mouth every day. To prevent constipation while taking oxycodone.   sacubitril-valsartan (ENTRESTO)  MG Tab  Patient No No   Sig: Take 1 Tablet by mouth 2 times a day.   senna-docusate (PERICOLACE OR SENOKOT S) 8.6-50 MG Tab   No No   Sig: Take 2 Tablets by mouth 2 times a day.   spironolactone (ALDACTONE) 25 MG Tab  Patient No No   Sig: Take 1 Tablet by mouth every day.      Facility-Administered Medications: None       Physical Exam  Temp:  [36 °C (96.8 °F)] 36 °C (96.8 °F)  Pulse:  [67] 67  Resp:  [18] 18  BP: (140)/(93) 140/93  SpO2:  [91 %] 91 %  Blood Pressure: (!) 140/93   Temperature: 36 °C (96.8 °F)   Pulse: 67   Respiration: 18   Pulse Oximetry: 91 %       Physical Exam  Constitutional:       Appearance: Normal appearance. He is normal weight.   HENT:      Head: Normocephalic.      Nose: Nose normal.      Mouth/Throat:      Mouth: Mucous membranes are moist.   Eyes:      Pupils: Pupils are equal, round, and reactive to light.   Cardiovascular:      Rate and Rhythm: Normal rate and regular rhythm.      Pulses: Normal pulses.   Pulmonary:      Effort: Pulmonary effort is normal.      Breath sounds: Normal breath sounds.  "  Abdominal:      General: Abdomen is flat.      Palpations: Abdomen is soft.      Comments: Endorses pain upon palpation of general abdominal area  No rigidity/peritoneal signs noted   Musculoskeletal:         General: Normal range of motion.      Cervical back: Neck supple.   Skin:     General: Skin is warm.   Neurological:      General: No focal deficit present.      Mental Status: He is alert and oriented to person, place, and time. Mental status is at baseline.   Psychiatric:         Mood and Affect: Mood normal.         Behavior: Behavior normal.         Thought Content: Thought content normal.         Judgment: Judgment normal.         Laboratory:  Recent Labs     12/24/23  1049   WBC 8.7   RBC 5.98   HEMOGLOBIN 18.1*   HEMATOCRIT 55.0*   MCV 92.0   MCH 30.3   MCHC 32.9   RDW 42.5   PLATELETCT 237   MPV 11.4     Recent Labs     12/24/23  1049   SODIUM 135   POTASSIUM 4.7   CHLORIDE 94*   CO2 26   GLUCOSE 248*   BUN 36*   CREATININE 1.19   CALCIUM 10.4     Recent Labs     12/24/23  1049   ALTSGPT 23   ASTSGOT 27   ALKPHOSPHAT 123*   TBILIRUBIN 0.7   LIPASE 10*   GLUCOSE 248*         No results for input(s): \"NTPROBNP\" in the last 72 hours.      No results for input(s): \"TROPONINT\" in the last 72 hours.    Imaging:  No orders to display       no X-Ray or EKG requiring interpretation    Assessment/Plan:  Justification for Admission Status  I anticipate this patient will require at least two midnights for appropriate medical management, necessitating inpatient admission because patient has cancer related pain    Patient will need a Med/Surg bed on EMERGENCY service .  The need is secondary to cancer related pain.    * Cancer related pain- (present on admission)  Assessment & Plan  Noted to have worsening abdominal pain, decreased appetite with history of suspected pancreatic cancer  Pain medication, Dilaudid as needed, monitor for respiratory depression  Fluids  Hematology oncology consulted for possible " "discussion of prognosis, palliative chemotherapy/radiation.  Patient is interested in pursuing \" what ever that can be done\" for now.  Hematology oncology will see patient in a.m.    Hyperglycemia  Assessment & Plan  Sliding scale    Paroxysmal atrial fibrillation (HCC)- (present on admission)  Assessment & Plan  Continue Eliquis    Heart failure with reduced ejection fraction (HCC)- (present on admission)  Assessment & Plan  Known history of reduced ejection fraction 20% status post AICD  Continue home medications        VTE prophylaxis: therapeutic anticoagulation with Eliquis  "

## 2023-12-24 NOTE — PROGRESS NOTES
4 Eyes Skin Assessment Completed by Cynthia CHURCH, REGULO and Lnidy VALDEZ RN.    Head WDL  Ears WDL  Nose WDL  Mouth WDL  Neck WDL  Breast/Chest WDL  Shoulder Blades WDL  Spine WDL  (R) Arm/Elbow/Hand WDL  (L) Arm/Elbow/Hand WDL  Abdomen Scar  Groin WDL  Scrotum/Coccyx/Buttocks WDL  (R) Leg WDL  (L) Leg WDL  (R) Heel/Foot/Toe WDL  (L) Heel/Foot/Toe WDL          Devices In Places Nasal Cannula      Interventions In Place Gray Ear Foams, NC W/Ear Foams, Pillows, and Pressure Redistribution Mattress    Possible Skin Injury No    Pictures Uploaded Into Epic N/A  Wound Consult Placed N/A  RN Wound Prevention Protocol Ordered No

## 2023-12-24 NOTE — ED TRIAGE NOTES
"Lai Dailey  53 y.o. male  Chief Complaint   Patient presents with    LLQ Pain     Reports 9/10 \"sharp\" LUQ pain that radiates down to pt's groin.  Hx: pancreatic cancer, pancreatic biopsy on 12/19       Pt amb to triage with steady gait for above complaint.   Pt is alert and oriented, speaking in full sentences, follows commands and responds appropriately to questions. Not in any apparent distress. Respirations are even and unlabored.  Pt placed in lobby. Pt educated on triage process. Pt encouraged to alert staff for any changes.    "

## 2023-12-25 PROBLEM — E87.5 HYPERKALEMIA: Status: ACTIVE | Noted: 2023-12-25

## 2023-12-25 LAB
ANION GAP SERPL CALC-SCNC: 10 MMOL/L (ref 7–16)
ANION GAP SERPL CALC-SCNC: 13 MMOL/L (ref 7–16)
APPEARANCE UR: CLEAR
BASOPHILS # BLD AUTO: 0.4 % (ref 0–1.8)
BASOPHILS # BLD: 0.03 K/UL (ref 0–0.12)
BILIRUB UR QL STRIP.AUTO: NEGATIVE
BUN SERPL-MCNC: 38 MG/DL (ref 8–22)
BUN SERPL-MCNC: 42 MG/DL (ref 8–22)
CALCIUM SERPL-MCNC: 10 MG/DL (ref 8.5–10.5)
CALCIUM SERPL-MCNC: 9.4 MG/DL (ref 8.5–10.5)
CHLORIDE SERPL-SCNC: 97 MMOL/L (ref 96–112)
CHLORIDE SERPL-SCNC: 98 MMOL/L (ref 96–112)
CO2 SERPL-SCNC: 26 MMOL/L (ref 20–33)
CO2 SERPL-SCNC: 27 MMOL/L (ref 20–33)
COLOR UR: YELLOW
CREAT SERPL-MCNC: 1.47 MG/DL (ref 0.5–1.4)
CREAT SERPL-MCNC: 1.48 MG/DL (ref 0.5–1.4)
EOSINOPHIL # BLD AUTO: 0 K/UL (ref 0–0.51)
EOSINOPHIL NFR BLD: 0 % (ref 0–6.9)
ERYTHROCYTE [DISTWIDTH] IN BLOOD BY AUTOMATED COUNT: 43.5 FL (ref 35.9–50)
GFR SERPLBLD CREATININE-BSD FMLA CKD-EPI: 55 ML/MIN/1.73 M 2
GFR SERPLBLD CREATININE-BSD FMLA CKD-EPI: 56 ML/MIN/1.73 M 2
GLUCOSE BLD STRIP.AUTO-MCNC: 166 MG/DL (ref 65–99)
GLUCOSE BLD STRIP.AUTO-MCNC: 186 MG/DL (ref 65–99)
GLUCOSE BLD STRIP.AUTO-MCNC: 187 MG/DL (ref 65–99)
GLUCOSE BLD STRIP.AUTO-MCNC: 203 MG/DL (ref 65–99)
GLUCOSE BLD STRIP.AUTO-MCNC: 224 MG/DL (ref 65–99)
GLUCOSE BLD STRIP.AUTO-MCNC: 279 MG/DL (ref 65–99)
GLUCOSE SERPL-MCNC: 190 MG/DL (ref 65–99)
GLUCOSE SERPL-MCNC: 193 MG/DL (ref 65–99)
GLUCOSE UR STRIP.AUTO-MCNC: >=1000 MG/DL
HCT VFR BLD AUTO: 57.2 % (ref 42–52)
HGB BLD-MCNC: 18.5 G/DL (ref 14–18)
IMM GRANULOCYTES # BLD AUTO: 0.03 K/UL (ref 0–0.11)
IMM GRANULOCYTES NFR BLD AUTO: 0.4 % (ref 0–0.9)
KETONES UR STRIP.AUTO-MCNC: NEGATIVE MG/DL
LEUKOCYTE ESTERASE UR QL STRIP.AUTO: NEGATIVE
LYMPHOCYTES # BLD AUTO: 1.44 K/UL (ref 1–4.8)
LYMPHOCYTES NFR BLD: 16.9 % (ref 22–41)
MCH RBC QN AUTO: 30.5 PG (ref 27–33)
MCHC RBC AUTO-ENTMCNC: 32.3 G/DL (ref 32.3–36.5)
MCV RBC AUTO: 94.2 FL (ref 81.4–97.8)
MICRO URNS: ABNORMAL
MONOCYTES # BLD AUTO: 0.61 K/UL (ref 0–0.85)
MONOCYTES NFR BLD AUTO: 7.2 % (ref 0–13.4)
NEUTROPHILS # BLD AUTO: 6.42 K/UL (ref 1.82–7.42)
NEUTROPHILS NFR BLD: 75.1 % (ref 44–72)
NITRITE UR QL STRIP.AUTO: NEGATIVE
NRBC # BLD AUTO: 0 K/UL
NRBC BLD-RTO: 0 /100 WBC (ref 0–0.2)
PH UR STRIP.AUTO: 5 [PH] (ref 5–8)
PLATELET # BLD AUTO: 255 K/UL (ref 164–446)
PMV BLD AUTO: 11 FL (ref 9–12.9)
POTASSIUM SERPL-SCNC: 5.2 MMOL/L (ref 3.6–5.5)
POTASSIUM SERPL-SCNC: 5.8 MMOL/L (ref 3.6–5.5)
PROT UR QL STRIP: NEGATIVE MG/DL
RBC # BLD AUTO: 6.07 M/UL (ref 4.7–6.1)
RBC UR QL AUTO: NEGATIVE
SODIUM SERPL-SCNC: 135 MMOL/L (ref 135–145)
SODIUM SERPL-SCNC: 136 MMOL/L (ref 135–145)
SP GR UR STRIP.AUTO: 1.03
UROBILINOGEN UR STRIP.AUTO-MCNC: 0.2 MG/DL
WBC # BLD AUTO: 8.5 K/UL (ref 4.8–10.8)

## 2023-12-25 PROCEDURE — 36415 COLL VENOUS BLD VENIPUNCTURE: CPT

## 2023-12-25 PROCEDURE — 80048 BASIC METABOLIC PNL TOTAL CA: CPT

## 2023-12-25 PROCEDURE — 700102 HCHG RX REV CODE 250 W/ 637 OVERRIDE(OP): Performed by: INTERNAL MEDICINE

## 2023-12-25 PROCEDURE — 700111 HCHG RX REV CODE 636 W/ 250 OVERRIDE (IP): Performed by: STUDENT IN AN ORGANIZED HEALTH CARE EDUCATION/TRAINING PROGRAM

## 2023-12-25 PROCEDURE — 700102 HCHG RX REV CODE 250 W/ 637 OVERRIDE(OP): Performed by: STUDENT IN AN ORGANIZED HEALTH CARE EDUCATION/TRAINING PROGRAM

## 2023-12-25 PROCEDURE — 82962 GLUCOSE BLOOD TEST: CPT

## 2023-12-25 PROCEDURE — 99221 1ST HOSP IP/OBS SF/LOW 40: CPT | Performed by: SURGERY

## 2023-12-25 PROCEDURE — 700105 HCHG RX REV CODE 258: Performed by: INTERNAL MEDICINE

## 2023-12-25 PROCEDURE — 85025 COMPLETE CBC W/AUTO DIFF WBC: CPT

## 2023-12-25 PROCEDURE — A9270 NON-COVERED ITEM OR SERVICE: HCPCS | Performed by: INTERNAL MEDICINE

## 2023-12-25 PROCEDURE — 700111 HCHG RX REV CODE 636 W/ 250 OVERRIDE (IP): Performed by: INTERNAL MEDICINE

## 2023-12-25 PROCEDURE — 770004 HCHG ROOM/CARE - ONCOLOGY PRIVATE *

## 2023-12-25 PROCEDURE — A9270 NON-COVERED ITEM OR SERVICE: HCPCS | Performed by: STUDENT IN AN ORGANIZED HEALTH CARE EDUCATION/TRAINING PROGRAM

## 2023-12-25 PROCEDURE — 700101 HCHG RX REV CODE 250: Performed by: INTERNAL MEDICINE

## 2023-12-25 PROCEDURE — 99233 SBSQ HOSP IP/OBS HIGH 50: CPT | Performed by: INTERNAL MEDICINE

## 2023-12-25 PROCEDURE — 81003 URINALYSIS AUTO W/O SCOPE: CPT

## 2023-12-25 RX ORDER — OXYCODONE HYDROCHLORIDE 5 MG/1
5 TABLET ORAL EVERY 4 HOURS PRN
Status: DISCONTINUED | OUTPATIENT
Start: 2023-12-25 | End: 2023-12-25

## 2023-12-25 RX ORDER — HYDROMORPHONE HYDROCHLORIDE 1 MG/ML
1 INJECTION, SOLUTION INTRAMUSCULAR; INTRAVENOUS; SUBCUTANEOUS EVERY 4 HOURS PRN
Status: DISCONTINUED | OUTPATIENT
Start: 2023-12-25 | End: 2023-12-26

## 2023-12-25 RX ORDER — OXYCODONE HYDROCHLORIDE 5 MG/1
5 TABLET ORAL
Status: DISCONTINUED | OUTPATIENT
Start: 2023-12-25 | End: 2023-12-30 | Stop reason: HOSPADM

## 2023-12-25 RX ORDER — OXYCODONE HYDROCHLORIDE 10 MG/1
10 TABLET ORAL
Status: DISCONTINUED | OUTPATIENT
Start: 2023-12-25 | End: 2023-12-30 | Stop reason: HOSPADM

## 2023-12-25 RX ORDER — SODIUM CHLORIDE 9 MG/ML
INJECTION, SOLUTION INTRAVENOUS CONTINUOUS
Status: DISCONTINUED | OUTPATIENT
Start: 2023-12-25 | End: 2023-12-27

## 2023-12-25 RX ORDER — DEXTROSE MONOHYDRATE 25 G/50ML
25 INJECTION, SOLUTION INTRAVENOUS ONCE
Status: COMPLETED | OUTPATIENT
Start: 2023-12-25 | End: 2023-12-25

## 2023-12-25 RX ORDER — MORPHINE SULFATE 15 MG/1
15 TABLET, FILM COATED, EXTENDED RELEASE ORAL EVERY 12 HOURS
Status: DISCONTINUED | OUTPATIENT
Start: 2023-12-25 | End: 2023-12-26

## 2023-12-25 RX ADMIN — HYDROMORPHONE HYDROCHLORIDE 1 MG: 1 INJECTION, SOLUTION INTRAMUSCULAR; INTRAVENOUS; SUBCUTANEOUS at 01:56

## 2023-12-25 RX ADMIN — INSULIN HUMAN 4 UNITS: 100 INJECTION, SOLUTION PARENTERAL at 10:15

## 2023-12-25 RX ADMIN — ISOSORBIDE DINITRATE 10 MG: 10 TABLET ORAL at 13:33

## 2023-12-25 RX ADMIN — ATORVASTATIN CALCIUM 40 MG: 40 TABLET, FILM COATED ORAL at 06:21

## 2023-12-25 RX ADMIN — ISOSORBIDE DINITRATE 10 MG: 10 TABLET ORAL at 21:36

## 2023-12-25 RX ADMIN — ISOSORBIDE DINITRATE 10 MG: 10 TABLET ORAL at 06:22

## 2023-12-25 RX ADMIN — SODIUM CHLORIDE: 9 INJECTION, SOLUTION INTRAVENOUS at 17:37

## 2023-12-25 RX ADMIN — MORPHINE SULFATE 15 MG: 15 TABLET, FILM COATED, EXTENDED RELEASE ORAL at 17:37

## 2023-12-25 RX ADMIN — INSULIN LISPRO 1 UNITS: 100 INJECTION, SOLUTION INTRAVENOUS; SUBCUTANEOUS at 09:20

## 2023-12-25 RX ADMIN — APIXABAN 5 MG: 5 TABLET, FILM COATED ORAL at 17:38

## 2023-12-25 RX ADMIN — OXYCODONE HYDROCHLORIDE 10 MG: 10 TABLET ORAL at 11:34

## 2023-12-25 RX ADMIN — APIXABAN 5 MG: 5 TABLET, FILM COATED ORAL at 06:21

## 2023-12-25 RX ADMIN — HYDRALAZINE HYDROCHLORIDE 25 MG: 25 TABLET ORAL at 21:36

## 2023-12-25 RX ADMIN — DEXTROSE MONOHYDRATE 25 G: 25 INJECTION, SOLUTION INTRAVENOUS at 10:11

## 2023-12-25 RX ADMIN — MORPHINE SULFATE 15 MG: 15 TABLET, FILM COATED, EXTENDED RELEASE ORAL at 09:13

## 2023-12-25 RX ADMIN — HYDROMORPHONE HYDROCHLORIDE 1 MG: 1 INJECTION, SOLUTION INTRAMUSCULAR; INTRAVENOUS; SUBCUTANEOUS at 13:27

## 2023-12-25 RX ADMIN — INSULIN LISPRO 1 UNITS: 100 INJECTION, SOLUTION INTRAVENOUS; SUBCUTANEOUS at 11:35

## 2023-12-25 RX ADMIN — HYDRALAZINE HYDROCHLORIDE 25 MG: 25 TABLET ORAL at 06:22

## 2023-12-25 RX ADMIN — INSULIN LISPRO 2 UNITS: 100 INJECTION, SOLUTION INTRAVENOUS; SUBCUTANEOUS at 21:40

## 2023-12-25 RX ADMIN — OXYCODONE HYDROCHLORIDE 10 MG: 10 TABLET ORAL at 21:35

## 2023-12-25 RX ADMIN — SACUBITRIL AND VALSARTAN 1 TABLET: 97; 103 TABLET, FILM COATED ORAL at 06:21

## 2023-12-25 RX ADMIN — INSULIN LISPRO 2 UNITS: 100 INJECTION, SOLUTION INTRAVENOUS; SUBCUTANEOUS at 17:42

## 2023-12-25 RX ADMIN — SODIUM CHLORIDE: 9 INJECTION, SOLUTION INTRAVENOUS at 09:14

## 2023-12-25 RX ADMIN — SACUBITRIL AND VALSARTAN 1 TABLET: 97; 103 TABLET, FILM COATED ORAL at 17:38

## 2023-12-25 RX ADMIN — HYDRALAZINE HYDROCHLORIDE 25 MG: 25 TABLET ORAL at 13:34

## 2023-12-25 RX ADMIN — DULOXETINE HYDROCHLORIDE 30 MG: 30 CAPSULE, DELAYED RELEASE ORAL at 06:21

## 2023-12-25 RX ADMIN — HYDROMORPHONE HYDROCHLORIDE 1 MG: 1 INJECTION, SOLUTION INTRAMUSCULAR; INTRAVENOUS; SUBCUTANEOUS at 06:21

## 2023-12-25 ASSESSMENT — PAIN DESCRIPTION - PAIN TYPE
TYPE: ACUTE PAIN

## 2023-12-25 ASSESSMENT — ENCOUNTER SYMPTOMS
ABDOMINAL PAIN: 1
VOMITING: 0
CHILLS: 0
NAUSEA: 1
MYALGIAS: 0
DEPRESSION: 0
DIZZINESS: 0
FEVER: 0
HEADACHES: 0
SHORTNESS OF BREATH: 0

## 2023-12-25 NOTE — FACE TO FACE
We were called yesterday given patient did have some questions regarding newly diagnosed pancreatic cancer.  He was admitted for pain control.    When I asked the patient he said he does not have major questions during this hospital stay. Stated he was admitted here mainly for pain control, consult will not be performed,  he does have an upcoming appointment with [facility] in 1/3/2024.

## 2023-12-25 NOTE — CARE PLAN
The patient is Stable - Low risk of patient condition declining or worsening         Progress made toward(s) clinical / shift goals:    Problem: Knowledge Deficit - Standard  Goal: Patient and family/care givers will demonstrate understanding of plan of care, disease process/condition, diagnostic tests and medications  Outcome: Progressing  Note: Discussed POC with patient and primary team. All questions answered       Patient is not progressing towards the following goals:      Problem: Pain - Standard  Goal: Alleviation of pain or a reduction in pain to the patient’s comfort goal  Outcome: Not Progressing  Flowsheets (Taken 12/24/2023 1700)  Pain Rating Scale (NPRS): 10  Note: Patient has significant pain in abdomen. PRN medication administered as ordered with minimal relief. Discussed GI rest and PO intake related to pain. Patient reports intake makes pain worse.

## 2023-12-25 NOTE — PROGRESS NOTES
Hospital Medicine Daily Progress Note    Date of Service  12/25/2023    Chief Complaint  Lai Dailey is a 53 y.o. male admitted 12/24/2023 with abdominal pain.     Hospital Course  53 y.o. male who presented 12/24/2023 with abdominal pain.     Has history of heart failure with reduced ejection fraction 20%, diabetes, hyperlipidemia.  Patient recently has been having worsening epigastric pain for 1 month.  He was seen at [facility], and CAT scan abdomen showing a pancreatic mass.  CA 19-9 was found to be elevated at 3258.  He was recommended to have outpatient follow-up for endoscopic ultrasound-guided biopsy and PET scan.  PET scan performed on February 1 showing pancreatic tail with a hypermetabolic mass measuring 4.3 x 2.7 cm.Recently seen at [facility] for pain management.  On December 19, patient had endoscopic ultrasound and had a fine-needle biopsy of the pancreatic mass.  CT abdomen pelvis showing poorly defined hypoattenuating infiltrative mass in the pancreatic body extending to the tail.  Mild concern nodularity liver which could indicate cirrhosis in the appropriate clinical setting, small abdominal pelvic ascites.     Patient reports that since his discharge from [facility], he continues to have worsening abdominal pain.  He has decreased appetite and has not been consuming food/liquid.  He tried to take oxycodone, however he states that it does not relieve the pain.  He has not seen hematology oncology yet, is scheduled for an appointment with hematology oncology on January 3.  As symptoms not improved, decision was made to come to ER for evaluation.    Interval Problem Update  Vital stable on 2 L nasal cannula satting 98%.  Hemoglobin elevated 18.5  Hyperkalemia 5.8, start IV fluids and give one-time dose insulin with D50.  Creatinine 1.47, GFR 56.  Pain still uncontrolled start morphine ER 15 mg, adjusted pain scale  At time of my evaluation patient reports that his pain has  significantly improved.  He does state that he has been having pain with eating or drinking.  He appears dehydrated.    Discussed with surgical oncology, patient is not a candidate for surgery, recommended chemotherapy plus or minus radiation.  Discussed with medical oncology patient actually has an appointment with [facility] oncology on 1/30/2024 he has no acute questions about his cancer, he should just follow-up with his outpatient oncologist.    I have discussed this patient's plan of care and discharge plan at IDT rounds today with Case Management, Nursing, Nursing leadership, and other members of the IDT team.    Consultants/Specialty  oncology  Surgical oncology    Code Status  Full Code    Disposition  The patient is not medically cleared for discharge to home or a post-acute facility.  Anticipate discharge to: home with close outpatient follow-up    I have placed the appropriate orders for post-discharge needs.    Review of Systems  Review of Systems   Constitutional:  Positive for malaise/fatigue. Negative for chills and fever.   Respiratory:  Negative for shortness of breath.    Cardiovascular:  Negative for chest pain.   Gastrointestinal:  Positive for abdominal pain and nausea. Negative for vomiting.   Genitourinary:  Negative for dysuria.   Musculoskeletal:  Negative for myalgias.   Skin:  Negative for rash.   Neurological:  Negative for dizziness and headaches.   Psychiatric/Behavioral:  Negative for depression.    All other systems reviewed and are negative.       Physical Exam  Temp:  [36.1 °C (97 °F)-36.7 °C (98 °F)] 36.7 °C (98 °F)  Pulse:  [79-80] 80  Resp:  [15-19] 15  BP: (119-148)/(86-97) 125/88  SpO2:  [95 %-100 %] 99 %    Physical Exam  Vitals and nursing note reviewed.   Constitutional:       General: He is not in acute distress.     Appearance: Normal appearance. He is ill-appearing.   HENT:      Head: Normocephalic and atraumatic.      Mouth/Throat:      Mouth: Mucous membranes are dry.    Eyes:      Conjunctiva/sclera: Conjunctivae normal.   Cardiovascular:      Rate and Rhythm: Normal rate and regular rhythm.      Pulses: Normal pulses.   Pulmonary:      Effort: Pulmonary effort is normal. No respiratory distress.      Breath sounds: Normal breath sounds. No wheezing.   Abdominal:      General: Abdomen is flat. There is no distension.      Palpations: Abdomen is soft.      Tenderness: There is abdominal tenderness.   Musculoskeletal:         General: Normal range of motion.      Cervical back: Normal range of motion and neck supple.      Right lower leg: No edema.      Left lower leg: No edema.   Skin:     General: Skin is warm and dry.      Findings: No rash.   Neurological:      General: No focal deficit present.      Mental Status: He is alert and oriented to person, place, and time.      Cranial Nerves: No cranial nerve deficit.   Psychiatric:         Mood and Affect: Mood normal.         Behavior: Behavior normal.         Fluids  No intake or output data in the 24 hours ending 12/25/23 1342    Laboratory  Recent Labs     12/24/23  1049 12/25/23  0134   WBC 8.7 8.5   RBC 5.98 6.07   HEMOGLOBIN 18.1* 18.5*   HEMATOCRIT 55.0* 57.2*   MCV 92.0 94.2   MCH 30.3 30.5   MCHC 32.9 32.3   RDW 42.5 43.5   PLATELETCT 237 255   MPV 11.4 11.0     Recent Labs     12/24/23  1049 12/25/23  0134   SODIUM 135 136   POTASSIUM 4.7 5.8*   CHLORIDE 94* 97   CO2 26 26   GLUCOSE 248* 190*   BUN 36* 38*   CREATININE 1.19 1.47*   CALCIUM 10.4 10.0                   Imaging  No orders to display        Assessment/Plan  * Cancer related pain- (present on admission)  Assessment & Plan  Noted to have worsening abdominal pain, decreased appetite with history of suspected pancreatic cancer  Start morphine ER  As needed oral oxycodone  -Patient to follow-up with outpatient oncology on 1/3   -per surgical oncology, not a surgical candidate at this time  Fluids      Hyperkalemia  Assessment & Plan  Mild, likely from  dehydration  Start IV fluids  Repeat BMP this afternoon    Hyperglycemia  Assessment & Plan  Sliding scale    Paroxysmal atrial fibrillation (HCC)- (present on admission)  Assessment & Plan  Continue Eliquis    Heart failure with reduced ejection fraction (HCC)- (present on admission)  Assessment & Plan  Known history of reduced ejection fraction 20% status post AICD  Continue home medications         VTE prophylaxis: Eliquis     I have performed a physical exam and reviewed and updated ROS and Plan today (12/25/2023). In review of yesterday's note (12/24/2023), there are no changes except as documented above.

## 2023-12-25 NOTE — CARE PLAN
The patient is Stable - Low risk of patient condition declining or worsening    Shift Goals  Clinical Goals: Pain control  Patient Goals: Rest    Progress made toward(s) clinical / shift goals:        Problem: Knowledge Deficit - Standard  Goal: Patient and family/care givers will demonstrate understanding of plan of care, disease process/condition, diagnostic tests and medications  Description: Target End Date:  1-3 days or as soon as patient condition allows    Document in Patient Education    1.  Patient and family/caregiver oriented to unit, equipment, visitation policy and means for communicating concern  2.  Complete/review Learning Assessment  3.  Assess knowledge level of disease process/condition, treatment plan, diagnostic tests and medications  4.  Explain disease process/condition, treatment plan, diagnostic tests and medications  Outcome: Progressing  Note: Patient  understands the need for pain management. Patient will continue to rate pain using a scale of 1-10.

## 2023-12-25 NOTE — CONSULTS
"Subjective:      Primary care physician: Tor Rdz M.D.    Medical Oncologist: Luis Alberto Appiah      Chief Complaint:   Chief Complaint   Patient presents with    LLQ Pain     Reports 9/10 \"sharp\" LUQ pain that radiates down to pt's groin.  Hx: pancreatic cancer, pancreatic biopsy on 12/19     Diagnosis:   1. Intractable abdominal pain        2. Malignant neoplasm of pancreas, unspecified location of malignancy (HCC)            History of presenting illness:  Lai Dailey is a pleasant 53 y.o. male who presented with a biopsy-proven pancreatic tail mass with a baseline CA 19-9 of 3000.  This is in the face of congestive heart failure with a ejection fraction of 20%.    The patient has been having pain at home and has not been eating well.  He came into the emergency room yesterday.  He has not yet seen oncology.    Past Medical History:   Diagnosis Date    CHF (congestive heart failure) (HCC)     Diabetes (HCC)     Hyperlipidemia     Hypertension     Pacemaker     Pacemaker/AICD    Snoring 10/18/2021    No sleep study.     Past Surgical History:   Procedure Laterality Date    AICD IMPLANT  2010     No Known Allergies  [unfilled]  Social History     Socioeconomic History    Marital status: Single     Spouse name: Not on file    Number of children: Not on file    Years of education: Not on file    Highest education level: Not on file   Occupational History    Not on file   Tobacco Use    Smoking status: Never    Smokeless tobacco: Never   Vaping Use    Vaping Use: Never used   Substance and Sexual Activity    Alcohol use: Yes     Comment: occ    Drug use: No     Comment: Marijuana use as youth    Sexual activity: Not on file   Other Topics Concern    Not on file   Social History Narrative    Not on file     Social Determinants of Health     Financial Resource Strain: Not on file   Food Insecurity: Not on file   Transportation Needs: Not on file   Physical Activity: Not on file   Stress: Not on file   Social " "Connections: Not on file   Intimate Partner Violence: Not on file   Housing Stability: Not on file      Social History     Tobacco Use   Smoking Status Never   Smokeless Tobacco Never     Social History     Substance and Sexual Activity   Alcohol Use Yes    Comment: occ     Social History     Substance and Sexual Activity   Drug Use No    Comment: Marijuana use as youth      Family History   Problem Relation Age of Onset    Colon Cancer Mother     Hypertension Sister     Stroke Brother     Heart Disease Neg Hx               Objective:   /86   Pulse 80   Temp 36.7 °C (98 °F) (Temporal)   Resp 16   Ht 1.765 m (5' 9.49\")   Wt 77.9 kg (171 lb 11.8 oz)   SpO2 98%   BMI 25.01 kg/m²     Physical Exam  Constitutional:       Appearance: He is normal weight.   HENT:      Head: Normocephalic and atraumatic.      Mouth/Throat:      Mouth: Mucous membranes are moist.   Eyes:      General: No scleral icterus.     Extraocular Movements: Extraocular movements intact.      Pupils: Pupils are equal, round, and reactive to light.   Abdominal:      Palpations: Abdomen is soft.      Tenderness: There is abdominal tenderness.   Neurological:      Mental Status: He is alert.             Imaging    CT - NNN (MM/DD/YYYY)     Poorly defined hypoattenuating infiltrative mass in the pancreatic body extending into the tail detailed above consistent with history of pancreatic malignancy.      Mild contour nodularity liver which could indicate cirrhosis in the appropriate clinical setting.  Small abdominal pelvic ascites.     Hepatic and splenic granulomas.       Diagnosis:     1. Intractable abdominal pain        2. Malignant neoplasm of pancreas, unspecified location of malignancy (HCC)            Medical Decision Making:  Today's Assessment / Status / Plan:     The patient has pancreas cancer involving the body and tail of his pancreas.  This is in the face of congestive heart failure with an ejection fraction of 20%.  Despite a " negative PET scan his CA 19-9 also raises the possibility of peritoneal metastatic disease.    Given the patient's medical comorbidities I do not think he is a candidate for surgery.  Would recommend definitive chemotherapy plus or minus radiation.    The patient will need better pain control both with narcotics and a celiac plexus block may be of use also.    We will follow.  Greater than 45 minutes were spent with the patient.  This included review of outside records and imaging, counseling of the patient and coordination of care.

## 2023-12-26 LAB
ANION GAP SERPL CALC-SCNC: 9 MMOL/L (ref 7–16)
BUN SERPL-MCNC: 47 MG/DL (ref 8–22)
CALCIUM SERPL-MCNC: 8.9 MG/DL (ref 8.5–10.5)
CHLORIDE SERPL-SCNC: 100 MMOL/L (ref 96–112)
CO2 SERPL-SCNC: 24 MMOL/L (ref 20–33)
CREAT SERPL-MCNC: 1.62 MG/DL (ref 0.5–1.4)
GFR SERPLBLD CREATININE-BSD FMLA CKD-EPI: 50 ML/MIN/1.73 M 2
GLUCOSE BLD STRIP.AUTO-MCNC: 198 MG/DL (ref 65–99)
GLUCOSE BLD STRIP.AUTO-MCNC: 222 MG/DL (ref 65–99)
GLUCOSE BLD STRIP.AUTO-MCNC: 239 MG/DL (ref 65–99)
GLUCOSE SERPL-MCNC: 169 MG/DL (ref 65–99)
MAGNESIUM SERPL-MCNC: 2.3 MG/DL (ref 1.5–2.5)
PHOSPHATE SERPL-MCNC: 4.4 MG/DL (ref 2.5–4.5)
POTASSIUM SERPL-SCNC: 5.5 MMOL/L (ref 3.6–5.5)
SODIUM SERPL-SCNC: 133 MMOL/L (ref 135–145)

## 2023-12-26 PROCEDURE — A9270 NON-COVERED ITEM OR SERVICE: HCPCS | Performed by: INTERNAL MEDICINE

## 2023-12-26 PROCEDURE — 99221 1ST HOSP IP/OBS SF/LOW 40: CPT | Mod: 25 | Performed by: NURSE PRACTITIONER

## 2023-12-26 PROCEDURE — 99233 SBSQ HOSP IP/OBS HIGH 50: CPT | Performed by: STUDENT IN AN ORGANIZED HEALTH CARE EDUCATION/TRAINING PROGRAM

## 2023-12-26 PROCEDURE — 99497 ADVNCD CARE PLAN 30 MIN: CPT | Performed by: NURSE PRACTITIONER

## 2023-12-26 PROCEDURE — 36415 COLL VENOUS BLD VENIPUNCTURE: CPT

## 2023-12-26 PROCEDURE — 99232 SBSQ HOSP IP/OBS MODERATE 35: CPT | Performed by: SURGERY

## 2023-12-26 PROCEDURE — 700102 HCHG RX REV CODE 250 W/ 637 OVERRIDE(OP): Performed by: INTERNAL MEDICINE

## 2023-12-26 PROCEDURE — 83735 ASSAY OF MAGNESIUM: CPT

## 2023-12-26 PROCEDURE — A9270 NON-COVERED ITEM OR SERVICE: HCPCS | Performed by: STUDENT IN AN ORGANIZED HEALTH CARE EDUCATION/TRAINING PROGRAM

## 2023-12-26 PROCEDURE — 700102 HCHG RX REV CODE 250 W/ 637 OVERRIDE(OP): Performed by: STUDENT IN AN ORGANIZED HEALTH CARE EDUCATION/TRAINING PROGRAM

## 2023-12-26 PROCEDURE — 80048 BASIC METABOLIC PNL TOTAL CA: CPT

## 2023-12-26 PROCEDURE — 700105 HCHG RX REV CODE 258: Performed by: INTERNAL MEDICINE

## 2023-12-26 PROCEDURE — 84100 ASSAY OF PHOSPHORUS: CPT

## 2023-12-26 PROCEDURE — 700102 HCHG RX REV CODE 250 W/ 637 OVERRIDE(OP): Performed by: NURSE PRACTITIONER

## 2023-12-26 PROCEDURE — A9270 NON-COVERED ITEM OR SERVICE: HCPCS | Performed by: NURSE PRACTITIONER

## 2023-12-26 PROCEDURE — 82962 GLUCOSE BLOOD TEST: CPT

## 2023-12-26 PROCEDURE — 770004 HCHG ROOM/CARE - ONCOLOGY PRIVATE *

## 2023-12-26 RX ORDER — BISACODYL 10 MG
10 SUPPOSITORY, RECTAL RECTAL
Status: DISCONTINUED | OUTPATIENT
Start: 2023-12-26 | End: 2023-12-30 | Stop reason: HOSPADM

## 2023-12-26 RX ORDER — HYDROMORPHONE HYDROCHLORIDE 1 MG/ML
1 INJECTION, SOLUTION INTRAMUSCULAR; INTRAVENOUS; SUBCUTANEOUS
Status: DISCONTINUED | OUTPATIENT
Start: 2023-12-26 | End: 2023-12-30 | Stop reason: HOSPADM

## 2023-12-26 RX ORDER — MORPHINE SULFATE 30 MG/1
30 TABLET, FILM COATED, EXTENDED RELEASE ORAL EVERY 12 HOURS
Status: DISCONTINUED | OUTPATIENT
Start: 2023-12-26 | End: 2023-12-30 | Stop reason: HOSPADM

## 2023-12-26 RX ORDER — POLYETHYLENE GLYCOL 3350 17 G/17G
1 POWDER, FOR SOLUTION ORAL DAILY
Status: DISCONTINUED | OUTPATIENT
Start: 2023-12-26 | End: 2023-12-30 | Stop reason: HOSPADM

## 2023-12-26 RX ORDER — AMOXICILLIN 250 MG
2 CAPSULE ORAL 2 TIMES DAILY PRN
Status: DISCONTINUED | OUTPATIENT
Start: 2023-12-26 | End: 2023-12-30 | Stop reason: HOSPADM

## 2023-12-26 RX ADMIN — ATORVASTATIN CALCIUM 40 MG: 40 TABLET, FILM COATED ORAL at 04:08

## 2023-12-26 RX ADMIN — MORPHINE SULFATE 30 MG: 30 TABLET, FILM COATED, EXTENDED RELEASE ORAL at 17:14

## 2023-12-26 RX ADMIN — ISOSORBIDE DINITRATE 10 MG: 10 TABLET ORAL at 21:15

## 2023-12-26 RX ADMIN — SODIUM CHLORIDE: 9 INJECTION, SOLUTION INTRAVENOUS at 22:40

## 2023-12-26 RX ADMIN — OXYCODONE HYDROCHLORIDE 10 MG: 10 TABLET ORAL at 16:34

## 2023-12-26 RX ADMIN — INSULIN LISPRO 2 UNITS: 100 INJECTION, SOLUTION INTRAVENOUS; SUBCUTANEOUS at 21:21

## 2023-12-26 RX ADMIN — INSULIN LISPRO 1 UNITS: 100 INJECTION, SOLUTION INTRAVENOUS; SUBCUTANEOUS at 17:23

## 2023-12-26 RX ADMIN — SACUBITRIL AND VALSARTAN 1 TABLET: 97; 103 TABLET, FILM COATED ORAL at 17:14

## 2023-12-26 RX ADMIN — INSULIN LISPRO 2 UNITS: 100 INJECTION, SOLUTION INTRAVENOUS; SUBCUTANEOUS at 12:22

## 2023-12-26 RX ADMIN — HYDRALAZINE HYDROCHLORIDE 25 MG: 25 TABLET ORAL at 04:09

## 2023-12-26 RX ADMIN — SACUBITRIL AND VALSARTAN 1 TABLET: 97; 103 TABLET, FILM COATED ORAL at 04:09

## 2023-12-26 RX ADMIN — MORPHINE SULFATE 15 MG: 15 TABLET, FILM COATED, EXTENDED RELEASE ORAL at 04:08

## 2023-12-26 RX ADMIN — OXYCODONE HYDROCHLORIDE 10 MG: 10 TABLET ORAL at 09:19

## 2023-12-26 RX ADMIN — APIXABAN 5 MG: 5 TABLET, FILM COATED ORAL at 17:13

## 2023-12-26 RX ADMIN — APIXABAN 5 MG: 5 TABLET, FILM COATED ORAL at 04:08

## 2023-12-26 RX ADMIN — ISOSORBIDE DINITRATE 10 MG: 10 TABLET ORAL at 04:09

## 2023-12-26 RX ADMIN — DULOXETINE HYDROCHLORIDE 30 MG: 30 CAPSULE, DELAYED RELEASE ORAL at 04:09

## 2023-12-26 RX ADMIN — OXYCODONE HYDROCHLORIDE 10 MG: 10 TABLET ORAL at 12:25

## 2023-12-26 RX ADMIN — HYDRALAZINE HYDROCHLORIDE 25 MG: 25 TABLET ORAL at 21:16

## 2023-12-26 RX ADMIN — OXYCODONE HYDROCHLORIDE 10 MG: 10 TABLET ORAL at 21:15

## 2023-12-26 ASSESSMENT — ENCOUNTER SYMPTOMS
NAUSEA: 1
INSOMNIA: 1
NAUSEA: 0
FEVER: 0
SHORTNESS OF BREATH: 0
CHILLS: 0
CONSTIPATION: 1
ORTHOPNEA: 0
VOMITING: 0
ABDOMINAL PAIN: 1
WEIGHT LOSS: 1

## 2023-12-26 NOTE — CONSULTS
SUMMARY please see to continue to follow for pain management, patient transition to DNR/DNI to be referred to outpatient palliative care via [facility]; has oncology follow-up with Dr. Sahu on January 3.      MRN: [id number]  Date of palliative consult: 12/25  Reason for consult: Symptom management/Advanced Care Planning  Referring provider: Alessio  Location of consult: [place] [address]  Additional consulting services: Surgical oncology, hematology/oncology, hospital medicine.    HPI:   Lai Dailey is a 53 y.o. male with a recent diagnosis of pancreatic cancer per biopsy on December 19 and HFrEF, EF approximately 15 to 20%, who presented on December 24 with sharp left upper quadrant pain radiating down his groin.  Patiently reports worsening epigastric pain for a month and was seen at [facility] previously with CT scan revealing pancreatic mass, CA 19-9 elevated at 3258.  Patient additionally seen at [facility] for pain management.  On December 19 patient had endoscopic ultrasound and fine-needle biopsy of pancreatic mass.  CT abdomen and pelvis revealing poorly defined hypoattenuating infiltrative mass in the pancreatic body extending to the tail.  Mild concern nodularity liver which could indicate cirrhosis in the appropriate clinical setting and small abdominal pelvic ascites.  Patient reports since his discharge from [facility] he continues to have worsening abdominal pain.  He also reports decreased appetite and inability to consume fluids.  He states oxycodone is insufficient for pain.  He has follow-up with hematology oncology at [facility] on January 3.    Significant/pertinent past medical history: Diabetes, hyperlipidemia, hypertension, pacemaker, snoring.  Never smoker, current alcohol use, no drug use.  Patient had celiac plexus block per chart review at [facility] on 12/19 in conjunction with endoscopic biopsy confirming cancer diagnosis.    Pain  "History:  Onset: 2 months ago  Location: Left upper quadrant/epigastric  Duration: Constant  Characteristics: Sharp  Aggravating factors: Food  Alleviating factors: None besides medication was taking Motrin and Tylenol to no avail  Radiation: Throughout entire stomach down into groin area  Treatments: He tried heat and ice; heat was minimally helpful  Severity: Worse than a 10 at height of pain; 2/10 at best and this is tolerable.    Additional symptoms: Denies dyspnea, nausea, vomiting.  Increased fatigue, insomnia secondary to pain, poor appetite poor p.o. intake.     Interval History: Evaluated by surgical oncology on : \"Despite negative PET scan his CA 19-9 also raises possibility of peritoneal metastatic disease\", patient is not a candidate for surgery; recommendation for definitive chemotherapy plus or minus radiation pain control with celiac plexus block if appropriate.    Medication Allergy/Sensitivities:  No Known Allergies    ROS:    Review of Systems   Constitutional:  Positive for malaise/fatigue and weight loss. Negative for chills.        Endorses 13 pound weight loss since mid November   Respiratory:  Negative for shortness of breath.    Cardiovascular:  Negative for chest pain and orthopnea.   Gastrointestinal:  Positive for abdominal pain and constipation. Negative for nausea and vomiting.   Psychiatric/Behavioral:  The patient has insomnia.        PE:   Recent vital signs  BMI: Body mass index is 25.01 kg/m².    Temp (24hrs), Av.4 °C (97.5 °F), Min:36.2 °C (97.1 °F), Max:36.7 °C (98 °F)  Temperature: 36.4 °C (97.5 °F)  Pulse  Av  Min: 58  Max: 83   Blood Pressure: 114/77       Physical Exam  Vitals and nursing note reviewed.   Constitutional:       General: He is not in acute distress.     Interventions: Nasal cannula in place.      Comments: 2 L   Cardiovascular:      Rate and Rhythm: Normal rate.      Heart sounds: Murmur heard.   Pulmonary:      Effort: Pulmonary effort is normal. "      Breath sounds: Normal breath sounds.   Abdominal:      General: Bowel sounds are decreased. There is distension.      Tenderness: There is abdominal tenderness in the right upper quadrant, epigastric area and left upper quadrant.      Comments: Semifirm to palpation.   Musculoskeletal:      Right lower leg: No edema.      Left lower leg: No edema.   Skin:     General: Skin is warm.      Capillary Refill: Capillary refill takes less than 2 seconds.   Neurological:      General: No focal deficit present.      Mental Status: He is alert and oriented to person, place, and time.   Psychiatric:         Attention and Perception: Attention normal.         Mood and Affect: Mood normal.         Speech: Speech normal.         Behavior: Behavior normal.         Thought Content: Thought content normal.         Cognition and Memory: Cognition normal.         Judgment: Judgment normal.       Recent Labs     12/25/23  0134 12/25/23  1317 12/26/23  0040   SODIUM 136 135 133*   POTASSIUM 5.8* 5.2 5.5   CHLORIDE 97 98 100   CO2 26 27 24   GLUCOSE 190* 193* 169*   BUN 38* 42* 47*   CREATININE 1.47* 1.48* 1.62*   CALCIUM 10.0 9.4 8.9     Recent Labs     12/24/23  1049 12/25/23  0134   WBC 8.7 8.5   RBC 5.98 6.07   HEMOGLOBIN 18.1* 18.5*   HEMATOCRIT 55.0* 57.2*   MCV 92.0 94.2   MCH 30.3 30.5   MCHC 32.9 32.3   RDW 42.5 43.5   PLATELETCT 237 255   MPV 11.4 11.0       ASSESSMENT/PLAN WITH SHARED DECISION MAKING:   Review  Pertinent imaging reviewed.    PHYSICAL ASPECTS OF CARE  Palliative Performance Scale: 60%    # Intractable cancer related pain  -Current approximate OME is as follows:  30 mg Morphine ER  45 mg Hydromorphone IV  6 mg Oxycodone    -Morphine ER started yesterday 15mg every 12 po; has had 3 doses--increased to 30mg po every 12 hours  -increase prn interval of IV hydromorphone to every 3 hours as needed; no changes to oxycodone regimen.   -patient states he has had previous CP block at [facility]-confirmed via chart check  "as completed on .  # Pancreatic cancer unknown stage  # Hyperkalemia  # HFrEF  # Paroxysmal atrial fibrillation on anticoagulation  # Protein calorie malnutrition  # Bowel protocol  -has been on Miralax daily at home, LBM  or -bowel protocol ordered    SOCIAL ASPECTS OF CARE  Lai resides with his female partner of many years in [place].  He has been previously  and  and has 6 children the oldest of whom is 34 and youngest 7.  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  He was diagnosed with heart failure in  and received his Medicare benefit shortly after this.  He is 100% disabled via [employer] administration.  Since his custodial from the  he has not worked but does perform all of his daily activities independently as well as advanced daily activities.  He is from a family of 2 other siblings 1 of whom is , he has a sister who is alive and resides in [place], both of his parents are .    SPIRITUAL ASPECTS OF CARE   He denies spiritual needs.    GOALS OF CARE/SERIOUS ILLNESS CONVERSATION  Met with patient at bedside.  Introduced myself and role of palliative care, he is agreeable to discussion regarding symptom management as well as advance care planning and goals of care.  Assessed patient's understanding of current condition.  He has good understanding of new diagnosis of pancreatic cancer as well as recent procedures and pain medication he has been prescribed.  He admits that he was resistant to use pain medication being in alcohol and drug abuse counselor in the [employer].  However, he reports pain is severely inhibiting his ability to take care of his daughter and live his life.  He hopes to live as long as he can and spend time with those that he cares about including his children and his significant other.  He does worry about leaving his 7-year-old daughter but is excepting that \"we all " "die\".  His main concern is pain as he has been suffering from abdominal pain, anorexia, and insomnia secondary to pain for over a month now.  He states that his pain is better well-managed over the past day and is amenable to symptom management via inpatient palliative care.  We also discussed referral to outpatient palliative via [facility] and he is agreeable to this as well.  He is noted to have severe facial grimace with any movement.  Changes as above.    Patient has very realistic overview of current diagnosis and prognosis and is aware that pancreatic cancer is \"a difficult disease to treat, is painful, and will likely shorten his life\".  As such, we discussed CODE STATUS.  Overview of current CODE STATUS full resuscitation including CPR, medications, defibrillation in addition to implanted AICD.  We discussed poor outcome associated with HFrEF and now cancer diagnosis.  We discussed eventuality of his children needing to make the decision to remove him from life support if he was successfully resuscitated.  In light of all this, patient elected to change CODE STATUS to DNR/DNI.    Code Status: Transitioned to DNR/DNI    ACP Documents: Will complete POLST    20 minutes spent discussing advance care planning, this time excludes any other billed services.    I spent a total of 50 minutes reviewing medical records, direct face-to-face time with the patient and/or family, documentation and coordination of care. This is separate from the time spent on advance care planning, which is documented above.    Elizabeth Best, MSN, APRN, ACNPC-AG.  Palliative Care Nurse Practitioner  [phone]      "

## 2023-12-26 NOTE — DIETARY
"Nutrition services: Day 2 of admit.  Lai Dailey is a 53 y.o. male with admitting DX of cancer related pain.    Consult received for wt loss (2-13 lbs in 3 months), poor PO per admit screen. Met with pt at bedside. Pt was resting in bed, he appeared adequately nourished. Pt reports a low appetite at home, stating most recently was only consuming chicken broth, crackers and one Ensure per day.  Pt shared he has been experiencing a low appetite with post prandial pain for the past month. Lunch tray at bedside was observed to be <50% consumed. Discussed adding oral nutrition supplements to meal trays with patient. He was agreeable to any supplements in chocolate or vanilla; RD will add supplement order and adjust menu accordingly per current diet order (Ensure Max protein or Ensure Compact) to bolster nutrition and intake while maintaining glycemic control. Additionally, Ensure Compact is low volume (hx of CHF with low sodium). Encouraged pt to try to consume meal, then supplements. Pt verbalized understanding. Pt shared that he weighed 213 lbs in November, which represents severe wt loss.    Assessment:  Height: 176.5 cm (5' 9.49\")  Weight: 86.3.9 kg (190 lb 4.1 oz)  Body mass index is 27.69 kg/m²., BMI classification: overweight  Diet/Intake: Consistent CHO (Diabetic); no PO recorded in chart to assess    Evaluation:   Admitted with LLQ pain.   Hx of heart failure, diabetes, hyperlipidemia, hypertension, pacemaker.  Oncology following pt. Pt with non operative pancreatic cancer.  Wt hx per chart review: 205 lbs 11/24/23, 228 lbs 6/12/23. Wt loss of 7% in 1 month (16% in six months) is severe.  Sodium 133, Glucose 169, BUN 47  SSI per MAR.    Malnutrition Risk: Pt meets criteria for severe, chronic malnutrition related to diminished PO intake 2' epigastric pain, and hypermetabolic disease state of pancreatic cancer, AEB severe wt loss of 7% in one month and <75% of estimated energy intake >1 " month.    Recommendations/Plan:  Ensure Max Protein/Ensure Compact TID.  Encourage intake of meals and supplements.  Document intake of all meals and supplement as % taken in ADLs to provide interdisciplinary communication across all shifts.   Monitor weight.  Nutrition rep will continue to see patient for ongoing meal and snack preferences.     RD will follow.

## 2023-12-26 NOTE — DISCHARGE PLANNING
Care Transition Team Assessment    Patient is a 53 year-old male admitted for Cancer Related pain. Please see pt's H&P for prior medical history. Patient was previously admitted on 11/24/23 to 11/25/23 for abdominal pain. LMSW met with pt at bedside to complete assessment. Pt A&Ox4 and able to verify the information on the face sheet. Pt lives with his spouse in a single story home; Dinorah Ayala (p) [phone]; NOK and emergency contact. Patient reports, prior to admission patient is independent with ADL's and IADL’s.     Pt does not use any DME at baseline. Pt reported his friends and family are good support for him. Pt reports he is a retired [employer] and receives monthly disability. Pt's PCP is Dr. Schulte at [facility] in [place], [place]. Patient's preferred pharmacy is at the [facility] in [place]. Patient denies a history of SNF/IPR nor HHC use in the past. Pt denies any SA or MH concerns. Pt confirmed medical coverage via Medicare and [employer]. Pt admitted for pain management, Palliative team consulted and following. Pt scheduled with Dr. Sahu on 1/3/24. Provided pt with [facility] application, will send application with documentation once received from pt.     Information Source  Orientation Level: Oriented X4  Information Given By: Patient  Who is responsible for making decisions for patient? : Patient    Readmission Evaluation  Is this a readmission?: Yes - unplanned readmission    Elopement Risk  Legal Hold: No  Ambulatory or Self Mobile in Wheelchair: Yes  Disoriented: No  Psychiatric Symptoms: None  History of Wandering: No  Elopement this Admit: No  Vocalizing Wanting to Leave: No  Displays Behaviors, Body Language Wanting to Leave: No-Not at Risk for Elopement  Elopement Risk: Not at Risk for Elopement    Interdisciplinary Discharge Planning  Lives with - Patient's Self Care Capacity: Spouse  Patient or legal guardian wants to designate a caregiver: No  Support Systems: Family Member(s), Spouse /  Significant Other  Housing / Facility: [address] [facility]  Durable Medical Equipment: Not Applicable    Discharge Preparedness  What is your plan after discharge?: Home with help  What are your discharge supports?: Spouse, Child  Prior Functional Level: Ambulatory, Independent with Activities of Daily Living, Independent with Medication Management  Difficulity with ADLs: None  Difficulity with IADLs: None    Functional Assesment  Prior Functional Level: Ambulatory, Independent with Activities of Daily Living, Independent with Medication Management    Finances  Financial Barriers to Discharge: No  Prescription Coverage: Yes    Vision / Hearing Impairment  Vision Impairment : No  Hearing Impairment : No         Advance Directive  Advance Directive?: None    Domestic Abuse  Have you ever been the victim of abuse or violence?: No  Physical Abuse or Sexual Abuse: No  Verbal Abuse or Emotional Abuse: No  Possible Abuse/Neglect Reported to:: Not Applicable    Psychological Assessment  History of Substance Abuse: None  History of Psychiatric Problems: No  Non-compliant with Treatment: No  Newly Diagnosed Illness: Yes    Discharge Risks or Barriers  Discharge risks or barriers?: Complex medical needs  Patient risk factors: Complex medical needs    Anticipated Discharge Information  Discharge Disposition: Discharged to home/self care (01)

## 2023-12-26 NOTE — CARE PLAN
The patient is Stable - Low risk of patient condition declining or worsening    Shift Goals  Clinical Goals: PRS 5/10 or less. Patient will tolerate PO intake clear liquids  Patient Goals: Pain control  Family Goals: Not present    Progress made toward(s) clinical / shift goals:      Problem: Pain - Standard  Goal: Alleviation of pain or a reduction in pain to the patient’s comfort goal  Outcome: Progressing  Note: New pain management medications administered. Patient reports good response. Calls appropriately for intervention. PRS 0/10 late this shift.      Problem: Knowledge Deficit - Standard  Goal: Patient and family/care givers will demonstrate understanding of plan of care, disease process/condition, diagnostic tests and medications  Outcome: Progressing  Note: Discussed POC with patient and primary team. All questions answered       Patient is not progressing towards the following goals:      Problem: Nutrition  Goal: Patient's nutritional and fluid intake will be adequate or improve  Outcome: Not Progressing  Note: Patient tolerating clear liquids poorly; reports increase in pain with PO intake

## 2023-12-26 NOTE — THERAPY
Physical Therapy Contact Note    Patient Name: Lai Dailey  Age:  53 y.o., Sex:  male  Medical Record #: [id number]  Today's Date: 12/26/2023    PT order received. Spoke with pt and RN. Pt is up self and reports no concerns about mobility. PT order will be cancelled.    Maricarmen Howard PT, DPT

## 2023-12-26 NOTE — CARE PLAN
The patient is Stable - Low risk of patient condition declining or worsening    Shift Goals  Clinical Goals: PRS 5/10 or less. Patient will tolerate PO intake clear liquids  Patient Goals: Pain control  Family Goals: Not present    Progress made toward(s) clinical / shift goals:      Problem: Pain - Standard  Goal: Alleviation of pain or a reduction in pain to the patient’s comfort goal  Outcome: Progressing  Note: New pain management medications administered. Patient reports good response. Calls appropriately for intervention. PRS 0/10 late this shift.      Problem: Knowledge Deficit - Standard  Goal: Patient and family/care givers will demonstrate understanding of plan of care, disease process/condition, diagnostic tests and medications  Outcome: Progressing  Note: Discussed POC with patient and primary team. All questions answered       Patient is not progressing towards the following goals:      Problem: Nutrition  Goal: Patient's nutritional and fluid intake will be adequate or improve  Outcome: Not Progressing  Note: Patient tolerating clear liquids poorly; reports increase in pain with PO intake   The patient is Stable - Low risk of patient condition declining or worsening    Shift Goals  Clinical Goals: Pain control  Patient Goals: Rest  Family Goals: Not present    Progress made toward(s) clinical / shift goals:      Patient is not progressing towards the following goals:

## 2023-12-26 NOTE — PROGRESS NOTES
Hospital Medicine Daily Progress Note    Date of Service  12/26/2023    Chief Complaint  Lai Dailey is a 53 y.o. male admitted 12/24/2023 with abdominal pain    Hospital Course  53 male with past medical history of pancreatic cancer, diabetes, hyperlipidemia,HFrEF 20% presented with abdominal pain.  Surgery oncology evaluated .  Patient has nonoperative pancreatic cancer.  Surgery recommended outpatient celiac block.  Patient to follow-up with oncology Dr. Sahu for discussion regarding chemotherapy    Interval Problem Update    12/26/2023    Vitals remained stable    Continued to complain of abdominal pain.  Surgery oncology evaluated.  Palliative evaluated, CODE STATUS changed to DNR/DNI.  Requiring high dose of narcotics for pain management  Currently on 30 mg morphine extended release, on oxycodone as needed on IV hydromorphone 1 mg as needed as needed.    Continue monitor respiratory status while on IV narcotics        I have discussed this patient's plan of care and discharge plan at IDT rounds today with Case Management, Nursing, Nursing leadership, and other members of the IDT team.    Consultants/Specialty  oncology    Code Status  DNAR/DNI    Disposition  The patient is not medically cleared for discharge to home or a post-acute facility.  Anticipate discharge to: home with close outpatient follow-up    I have placed the appropriate orders for post-discharge needs.    Review of Systems  Review of Systems   Constitutional:  Positive for malaise/fatigue.   Gastrointestinal:  Positive for abdominal pain.        Physical Exam  Temp:  [36.2 °C (97.1 °F)-36.6 °C (97.9 °F)] 36.6 °C (97.9 °F)  Pulse:  [79-82] 80  Resp:  [16-19] 19  BP: (110-126)/(70-85) 112/70  SpO2:  [95 %-99 %] 95 %    Physical Exam  Constitutional:       Appearance: He is ill-appearing.   Cardiovascular:      Rate and Rhythm: Normal rate.      Pulses: Normal pulses.      Heart sounds: Normal heart sounds.   Pulmonary:      Effort:  Pulmonary effort is normal.      Breath sounds: Normal breath sounds.   Abdominal:      Tenderness: There is abdominal tenderness.   Musculoskeletal:      Right lower leg: No edema.      Left lower leg: No edema.   Neurological:      General: No focal deficit present.      Mental Status: He is alert and oriented to person, place, and time.         Fluids    Intake/Output Summary (Last 24 hours) at 12/26/2023 1413  Last data filed at 12/26/2023 0529  Gross per 24 hour   Intake --   Output 550 ml   Net -550 ml       Laboratory  Recent Labs     12/24/23  1049 12/25/23  0134   WBC 8.7 8.5   RBC 5.98 6.07   HEMOGLOBIN 18.1* 18.5*   HEMATOCRIT 55.0* 57.2*   MCV 92.0 94.2   MCH 30.3 30.5   MCHC 32.9 32.3   RDW 42.5 43.5   PLATELETCT 237 255   MPV 11.4 11.0     Recent Labs     12/25/23  0134 12/25/23  1317 12/26/23  0040   SODIUM 136 135 133*   POTASSIUM 5.8* 5.2 5.5   CHLORIDE 97 98 100   CO2 26 27 24   GLUCOSE 190* 193* 169*   BUN 38* 42* 47*   CREATININE 1.47* 1.48* 1.62*   CALCIUM 10.0 9.4 8.9                   Imaging  No orders to display        Assessment/Plan  * Cancer related pain- (present on admission)  Assessment & Plan  Noted to have worsening abdominal pain, decreased appetite with history of suspected pancreatic cancer  Start morphine ER  As needed oral oxycodone  -Patient to follow-up with outpatient oncology on 1/3   -per surgical oncology, not a surgical candidate at this time  Fluids      Hyperkalemia  Assessment & Plan  Mild, likely from dehydration  Start IV fluids  Repeat BMP this afternoon    Hyperglycemia  Assessment & Plan  Sliding scale    Paroxysmal atrial fibrillation (HCC)- (present on admission)  Assessment & Plan  Continue Eliquis    Heart failure with reduced ejection fraction (HCC)- (present on admission)  Assessment & Plan  Known history of reduced ejection fraction 20% status post AICD  Continue home medications         VTE prophylaxis: eliquis    I have performed a physical exam and  reviewed and updated ROS and Plan today (12/26/2023). In review of yesterday's note (12/25/2023), there are no changes except as documented above.       Greater than 51 minutes spent preparing to see patient (e.g. review of tests) obtaining and/or reviewing separately obtained history. Performing a medically appropriate examination and/ evaluation.  Counseling and educating the patient/family/caregiver.  Ordering medications, tests, or procedures.  Referring and communicating with other health care professionals.  Documenting clinical information in EPIC.  Independently interpreting results and communicating results to patient/family/caregiver.  Care coordination.

## 2023-12-26 NOTE — CARE PLAN
The patient is Stable - Low risk of patient condition declining or worsening    Shift Goals  Clinical Goals: Pain control  Patient Goals: Rest  Family Goals: Not present    Progress made toward(s) clinical / shift goals:        Problem: Knowledge Deficit - Standard  Goal: Patient and family/care givers will demonstrate understanding of plan of care, disease process/condition, diagnostic tests and medications  Description: Target End Date:  1-3 days or as soon as patient condition allows    Document in Patient Education    1.  Patient and family/caregiver oriented to unit, equipment, visitation policy and means for communicating concern  2.  Complete/review Learning Assessment  3.  Assess knowledge level of disease process/condition, treatment plan, diagnostic tests and medications  4.  Explain disease process/condition, treatment plan, diagnostic tests and medications  Outcome: Progressing  Note: Patient understands the need for pain control. Patient will continue to rate pain using a scale of 1-10.

## 2023-12-26 NOTE — PROGRESS NOTES
Date of Service  December 26, 2023     Chief Complaint:   Intractable abdominal pain secondary to panc ca    Surgery/procedure completed  None    Hospital Course  HD # 2     Interval Problem Update  Vitals stable  Creat 1.62 from 1.48  Plan to see Dr. Sahu on 1/3  Referral placed for outpt palliative sent on 12/5  Referral for inpt palliative on 12/25  Pt still with pain and discomfort at bedside thought no usage of IV dilaudid noted  C/o bloating and pain with oral intake    Problem List  Principal Problem:    Cancer related pain (POA: Yes)  Active Problems:    Heart failure with reduced ejection fraction (HCC) (POA: Yes)    Paroxysmal atrial fibrillation (HCC) (POA: Yes)    Hyperglycemia (POA: Unknown)    Hyperkalemia (POA: Unknown)  Resolved Problems:    * No resolved hospital problems. *       Subjective  Review of Systems   Constitutional:  Negative for chills and fever.   Respiratory:  Negative for shortness of breath.    Cardiovascular:  Negative for chest pain.   Gastrointestinal:  Positive for abdominal pain and nausea.   Skin:  Negative for itching.   All other systems reviewed and are negative.        Objective  Temp:  [36.2 °C (97.1 °F)-36.7 °C (98 °F)] 36.4 °C (97.5 °F)  Pulse:  [79-82] 82  Resp:  [15-18] 16  BP: (110-126)/(77-88) 114/77  SpO2:  [96 %-99 %] 98 %      Physical Exam  Vitals and nursing note reviewed.   Constitutional:       Appearance: Normal appearance.   HENT:      Head: Normocephalic and atraumatic.      Right Ear: External ear normal.      Left Ear: External ear normal.      Nose: Nose normal.   Eyes:      General: No scleral icterus.  Cardiovascular:      Rate and Rhythm: Normal rate and regular rhythm.   Pulmonary:      Breath sounds: Normal breath sounds.   Abdominal:      General: There is distension.      Palpations: There is no mass.      Tenderness: There is no abdominal tenderness.   Musculoskeletal:         General: No swelling or tenderness.      Cervical back: Normal  range of motion.   Skin:     Coloration: Skin is not jaundiced.   Neurological:      Mental Status: He is alert and oriented to person, place, and time.   Psychiatric:         Mood and Affect: Mood normal.         Behavior: Behavior normal.          Fluids    Intake/Output Summary (Last 24 hours) at 12/26/2023 0740  Last data filed at 12/26/2023 0529  Gross per 24 hour   Intake --   Output 550 ml   Net -550 ml         Labs  Lab Results   Component Value Date/Time    SODIUM 133 (L) 12/26/2023 12:40 AM    POTASSIUM 5.5 12/26/2023 12:40 AM    CHLORIDE 100 12/26/2023 12:40 AM    CO2 24 12/26/2023 12:40 AM    GLUCOSE 169 (H) 12/26/2023 12:40 AM    BUN 47 (H) 12/26/2023 12:40 AM    CREATININE 1.62 (H) 12/26/2023 12:40 AM    BUNCREATRAT 15.3 12/20/2023 12:00 PM         Lab Results   Component Value Date/Time    PROTHROMBTM 15.2 (H) 11/04/2021 09:58 AM    INR 1.23 (H) 11/04/2021 09:58 AM         Lab Results   Component Value Date/Time    WBC 8.5 12/25/2023 01:34 AM    RBC 6.07 12/25/2023 01:34 AM    HEMOGLOBIN 18.5 (H) 12/25/2023 01:34 AM    HEMATOCRIT 57.2 (H) 12/25/2023 01:34 AM    MCV 94.2 12/25/2023 01:34 AM    MCH 30.5 12/25/2023 01:34 AM    MCHC 32.3 12/25/2023 01:34 AM    MPV 11.0 12/25/2023 01:34 AM    NEUTSPOLYS 75.10 (H) 12/25/2023 01:34 AM    LYMPHOCYTES 16.90 (L) 12/25/2023 01:34 AM    MONOCYTES 7.20 12/25/2023 01:34 AM    EOSINOPHILS 0.00 12/25/2023 01:34 AM    BASOPHILS 0.40 12/25/2023 01:34 AM    HYPOCHROMIA 1+ 03/26/2014 02:15 AM         Recent Labs     12/20/23  1200 12/24/23  1049   ASTSGOT 22 27   ALTSGPT 13 23   TBILIRUBIN 0.7 0.7   GLOBULIN  --  3.9*          Patient Active Problem List   Diagnosis    Heart failure with reduced ejection fraction (HCC)    Type 2 diabetes mellitus with renal complication (HCC)    Implantable cardioverter-defibrillator (ICD) in situ    Nonischemic cardiomyopathy (HCC)    Essential hypertension    Paroxysmal atrial fibrillation (HCC)    Bronchitis    Prolonged Q-T interval  on ECG    Total bilirubin, elevated    Troponin I above reference range    Leukocytosis    Hypomagnesemia    Left ventricular systolic dysfunction, NYHA class 2    Acute on chronic combined systolic and diastolic CHF (congestive heart failure) (HCC)    Alkaline phosphatase elevation    Severe mitral regurgitation    Elevated serum globulin level    Chest pain on exertion    Pancreatic mass    Constipation due to opioid therapy    Sigmoid diverticulosis    Cirrhosis of liver with ascites (HCC)    Cancer related pain    Malignant neoplasm of body of pancreas (HCC)    Hyperglycemia    Hyperkalemia        Assessment/Plan  Non operative panc ca with pain- Await palliative consult. Pt will need follow up with palliative outpatient as well and possible celiac block. Pt scheduled to see Dr. Sahu next week to discuss chemotherapy. OK to dc from HPB standpoint when symptoms are able to be controlled. Thank you Dr. Miranda and palliative team.    Discussed plans with Dr. Donald

## 2023-12-27 ENCOUNTER — APPOINTMENT (OUTPATIENT)
Dept: RADIOLOGY | Facility: MEDICAL CENTER | Age: 53
DRG: 435 | End: 2023-12-27
Attending: STUDENT IN AN ORGANIZED HEALTH CARE EDUCATION/TRAINING PROGRAM
Payer: MEDICARE

## 2023-12-27 LAB
ANION GAP SERPL CALC-SCNC: 8 MMOL/L (ref 7–16)
BUN SERPL-MCNC: 47 MG/DL (ref 8–22)
CALCIUM SERPL-MCNC: 9.1 MG/DL (ref 8.5–10.5)
CHLORIDE SERPL-SCNC: 101 MMOL/L (ref 96–112)
CO2 SERPL-SCNC: 26 MMOL/L (ref 20–33)
CREAT SERPL-MCNC: 1.74 MG/DL (ref 0.5–1.4)
CREAT UR-MCNC: 140.33 MG/DL
EKG IMPRESSION: NORMAL
GFR SERPLBLD CREATININE-BSD FMLA CKD-EPI: 45 ML/MIN/1.73 M 2
GLUCOSE BLD STRIP.AUTO-MCNC: 188 MG/DL (ref 65–99)
GLUCOSE BLD STRIP.AUTO-MCNC: 209 MG/DL (ref 65–99)
GLUCOSE BLD STRIP.AUTO-MCNC: 210 MG/DL (ref 65–99)
GLUCOSE BLD STRIP.AUTO-MCNC: 237 MG/DL (ref 65–99)
GLUCOSE SERPL-MCNC: 183 MG/DL (ref 65–99)
POTASSIUM SERPL-SCNC: 5.2 MMOL/L (ref 3.6–5.5)
POTASSIUM SERPL-SCNC: 6 MMOL/L (ref 3.6–5.5)
POTASSIUM SERPL-SCNC: 6.1 MMOL/L (ref 3.6–5.5)
PROT UR-MCNC: 13 MG/DL (ref 0–15)
SODIUM SERPL-SCNC: 135 MMOL/L (ref 135–145)

## 2023-12-27 PROCEDURE — 99233 SBSQ HOSP IP/OBS HIGH 50: CPT | Performed by: STUDENT IN AN ORGANIZED HEALTH CARE EDUCATION/TRAINING PROGRAM

## 2023-12-27 PROCEDURE — 700111 HCHG RX REV CODE 636 W/ 250 OVERRIDE (IP): Mod: JZ | Performed by: STUDENT IN AN ORGANIZED HEALTH CARE EDUCATION/TRAINING PROGRAM

## 2023-12-27 PROCEDURE — 700101 HCHG RX REV CODE 250: Performed by: STUDENT IN AN ORGANIZED HEALTH CARE EDUCATION/TRAINING PROGRAM

## 2023-12-27 PROCEDURE — 700102 HCHG RX REV CODE 250 W/ 637 OVERRIDE(OP): Performed by: STUDENT IN AN ORGANIZED HEALTH CARE EDUCATION/TRAINING PROGRAM

## 2023-12-27 PROCEDURE — 700111 HCHG RX REV CODE 636 W/ 250 OVERRIDE (IP): Performed by: NURSE PRACTITIONER

## 2023-12-27 PROCEDURE — 700102 HCHG RX REV CODE 250 W/ 637 OVERRIDE(OP): Performed by: INTERNAL MEDICINE

## 2023-12-27 PROCEDURE — 80048 BASIC METABOLIC PNL TOTAL CA: CPT

## 2023-12-27 PROCEDURE — C9399 UNCLASSIFIED DRUGS OR BIOLOG: HCPCS | Performed by: STUDENT IN AN ORGANIZED HEALTH CARE EDUCATION/TRAINING PROGRAM

## 2023-12-27 PROCEDURE — A9270 NON-COVERED ITEM OR SERVICE: HCPCS | Performed by: INTERNAL MEDICINE

## 2023-12-27 PROCEDURE — 36415 COLL VENOUS BLD VENIPUNCTURE: CPT

## 2023-12-27 PROCEDURE — 82570 ASSAY OF URINE CREATININE: CPT

## 2023-12-27 PROCEDURE — 770004 HCHG ROOM/CARE - ONCOLOGY PRIVATE *

## 2023-12-27 PROCEDURE — A9270 NON-COVERED ITEM OR SERVICE: HCPCS | Performed by: STUDENT IN AN ORGANIZED HEALTH CARE EDUCATION/TRAINING PROGRAM

## 2023-12-27 PROCEDURE — 82962 GLUCOSE BLOOD TEST: CPT | Mod: 91

## 2023-12-27 PROCEDURE — 84156 ASSAY OF PROTEIN URINE: CPT

## 2023-12-27 PROCEDURE — 99222 1ST HOSP IP/OBS MODERATE 55: CPT | Performed by: INTERNAL MEDICINE

## 2023-12-27 PROCEDURE — 99232 SBSQ HOSP IP/OBS MODERATE 35: CPT | Performed by: NURSE PRACTITIONER

## 2023-12-27 PROCEDURE — A9270 NON-COVERED ITEM OR SERVICE: HCPCS | Performed by: NURSE PRACTITIONER

## 2023-12-27 PROCEDURE — 93005 ELECTROCARDIOGRAM TRACING: CPT | Performed by: STUDENT IN AN ORGANIZED HEALTH CARE EDUCATION/TRAINING PROGRAM

## 2023-12-27 PROCEDURE — 76775 US EXAM ABDO BACK WALL LIM: CPT

## 2023-12-27 PROCEDURE — 84132 ASSAY OF SERUM POTASSIUM: CPT

## 2023-12-27 PROCEDURE — 700102 HCHG RX REV CODE 250 W/ 637 OVERRIDE(OP): Performed by: NURSE PRACTITIONER

## 2023-12-27 PROCEDURE — 93010 ELECTROCARDIOGRAM REPORT: CPT | Performed by: STUDENT IN AN ORGANIZED HEALTH CARE EDUCATION/TRAINING PROGRAM

## 2023-12-27 RX ORDER — CALCIUM GLUCONATE 20 MG/ML
2 INJECTION, SOLUTION INTRAVENOUS ONCE
Status: COMPLETED | OUTPATIENT
Start: 2023-12-27 | End: 2023-12-27

## 2023-12-27 RX ORDER — FUROSEMIDE 10 MG/ML
40 INJECTION INTRAMUSCULAR; INTRAVENOUS
Status: DISCONTINUED | OUTPATIENT
Start: 2023-12-27 | End: 2023-12-28

## 2023-12-27 RX ORDER — DEXTROSE MONOHYDRATE 25 G/50ML
25 INJECTION, SOLUTION INTRAVENOUS ONCE
Status: COMPLETED | OUTPATIENT
Start: 2023-12-27 | End: 2023-12-27

## 2023-12-27 RX ADMIN — SODIUM ZIRCONIUM CYCLOSILICATE 10 G: 5 POWDER, FOR SUSPENSION ORAL at 12:28

## 2023-12-27 RX ADMIN — INSULIN LISPRO 2 UNITS: 100 INJECTION, SOLUTION INTRAVENOUS; SUBCUTANEOUS at 17:00

## 2023-12-27 RX ADMIN — ATORVASTATIN CALCIUM 40 MG: 40 TABLET, FILM COATED ORAL at 05:43

## 2023-12-27 RX ADMIN — ISOSORBIDE DINITRATE 10 MG: 10 TABLET ORAL at 05:43

## 2023-12-27 RX ADMIN — DULOXETINE HYDROCHLORIDE 30 MG: 30 CAPSULE, DELAYED RELEASE ORAL at 05:44

## 2023-12-27 RX ADMIN — SACUBITRIL AND VALSARTAN 1 TABLET: 97; 103 TABLET, FILM COATED ORAL at 05:43

## 2023-12-27 RX ADMIN — CALCIUM GLUCONATE 2 G: 20 INJECTION, SOLUTION INTRAVENOUS at 08:50

## 2023-12-27 RX ADMIN — DEXTROSE MONOHYDRATE 25 G: 25 INJECTION, SOLUTION INTRAVENOUS at 08:53

## 2023-12-27 RX ADMIN — POLYETHYLENE GLYCOL 3350 1 PACKET: 17 POWDER, FOR SOLUTION ORAL at 05:43

## 2023-12-27 RX ADMIN — FUROSEMIDE 40 MG: 10 INJECTION INTRAMUSCULAR; INTRAVENOUS at 16:00

## 2023-12-27 RX ADMIN — APIXABAN 5 MG: 5 TABLET, FILM COATED ORAL at 05:43

## 2023-12-27 RX ADMIN — INSULIN LISPRO 2 UNITS: 100 INJECTION, SOLUTION INTRAVENOUS; SUBCUTANEOUS at 21:11

## 2023-12-27 RX ADMIN — APIXABAN 5 MG: 5 TABLET, FILM COATED ORAL at 21:06

## 2023-12-27 RX ADMIN — OXYCODONE HYDROCHLORIDE 10 MG: 10 TABLET ORAL at 12:47

## 2023-12-27 RX ADMIN — INSULIN HUMAN 4 UNITS: 100 INJECTION, SOLUTION PARENTERAL at 08:59

## 2023-12-27 RX ADMIN — MORPHINE SULFATE 30 MG: 30 TABLET, FILM COATED, EXTENDED RELEASE ORAL at 17:39

## 2023-12-27 RX ADMIN — HYDRALAZINE HYDROCHLORIDE 25 MG: 25 TABLET ORAL at 05:43

## 2023-12-27 RX ADMIN — INSULIN LISPRO 1 UNITS: 100 INJECTION, SOLUTION INTRAVENOUS; SUBCUTANEOUS at 08:23

## 2023-12-27 RX ADMIN — OXYCODONE HYDROCHLORIDE 10 MG: 10 TABLET ORAL at 21:06

## 2023-12-27 RX ADMIN — HYDROMORPHONE HYDROCHLORIDE 1 MG: 1 INJECTION, SOLUTION INTRAMUSCULAR; INTRAVENOUS; SUBCUTANEOUS at 16:01

## 2023-12-27 RX ADMIN — INSULIN LISPRO 2 UNITS: 100 INJECTION, SOLUTION INTRAVENOUS; SUBCUTANEOUS at 12:40

## 2023-12-27 RX ADMIN — OXYCODONE HYDROCHLORIDE 10 MG: 10 TABLET ORAL at 01:56

## 2023-12-27 RX ADMIN — MORPHINE SULFATE 30 MG: 30 TABLET, FILM COATED, EXTENDED RELEASE ORAL at 05:43

## 2023-12-27 ASSESSMENT — ENCOUNTER SYMPTOMS
FEVER: 0
ABDOMINAL PAIN: 1
NAUSEA: 0
VOMITING: 0
SHORTNESS OF BREATH: 0

## 2023-12-27 ASSESSMENT — PAIN DESCRIPTION - PAIN TYPE: TYPE: ACUTE PAIN

## 2023-12-27 NOTE — PROGRESS NOTES
Hospital Medicine Daily Progress Note    Date of Service  12/27/2023    Chief Complaint  Lai Dailey is a 53 y.o. male admitted 12/24/2023 with abdominal pain    Hospital Course  53 male with past medical history of pancreatic cancer, diabetes, hyperlipidemia,HFrEF 20% presented with abdominal pain.  Surgery oncology evaluated .  Patient has nonoperative pancreatic cancer.  Surgery recommended outpatient celiac block.  Patient to follow-up with oncology Dr. Sahu for discussion regarding chemotherapy    Interval Problem Update    12/27/2023  Vitals remained stable  Remain asymptomatic  Labs noted with severe hyperkalemia potassium 6.  EKG reviewed, noted with A-fib    Treated with calcium gluconate, insulin with D5, started on Lokelma.  Continue IV fluid  Discontinue Entresto.  Pain well-controlled with narcotics, monitor for toxicity  Case discussed with nephrology Dr. Benton   Repeat BMP in a.m. to monitor electrolytes    I have discussed this patient's plan of care and discharge plan at IDT rounds today with Case Management, Nursing, Nursing leadership, and other members of the IDT team.    Consultants/Specialty  oncology    Code Status  DNAR/DNI    Disposition  The patient is not medically cleared for discharge to home or a post-acute facility.      I have placed the appropriate orders for post-discharge needs.    Review of Systems  Review of Systems   Constitutional:  Positive for malaise/fatigue.   Gastrointestinal:  Positive for abdominal pain.        Physical Exam  Temp:  [36 °C (96.8 °F)-36.9 °C (98.4 °F)] 36.9 °C (98.4 °F)  Pulse:  [51-80] 51  Resp:  [16-18] 16  BP: (109-134)/(74-84) 109/75  SpO2:  [91 %-96 %] 93 %    Physical Exam  Constitutional:       Appearance: He is ill-appearing.   Cardiovascular:      Rate and Rhythm: Normal rate.      Pulses: Normal pulses.      Heart sounds: Normal heart sounds.   Pulmonary:      Effort: Pulmonary effort is normal.      Breath sounds: Normal breath  sounds.   Abdominal:      Tenderness: There is abdominal tenderness.   Musculoskeletal:      Right lower leg: No edema.      Left lower leg: No edema.   Neurological:      General: No focal deficit present.      Mental Status: He is alert and oriented to person, place, and time.         Fluids  No intake or output data in the 24 hours ending 12/27/23 1527      Laboratory  Recent Labs     12/25/23  0134   WBC 8.5   RBC 6.07   HEMOGLOBIN 18.5*   HEMATOCRIT 57.2*   MCV 94.2   MCH 30.5   MCHC 32.3   RDW 43.5   PLATELETCT 255   MPV 11.0       Recent Labs     12/25/23  1317 12/26/23  0040 12/27/23  0149 12/27/23  0756 12/27/23  1137   SODIUM 135 133* 135  --   --    POTASSIUM 5.2 5.5 6.1* 6.0* 5.2   CHLORIDE 98 100 101  --   --    CO2 27 24 26  --   --    GLUCOSE 193* 169* 183*  --   --    BUN 42* 47* 47*  --   --    CREATININE 1.48* 1.62* 1.74*  --   --    CALCIUM 9.4 8.9 9.1  --   --                      Imaging  US-RENAL    (Results Pending)        Assessment/Plan  * Cancer related pain- (present on admission)  Assessment & Plan  Noted to have worsening abdominal pain, decreased appetite with history of suspected pancreatic cancer  Start morphine ER  As needed oral oxycodone  -Patient to follow-up with outpatient oncology on 1/3   -per surgical oncology, not a surgical candidate at this time  Fluids      Hyperkalemia  Assessment & Plan    Treated with calcium gluconate, insulin with D5, started on Lokelma.  Continue IV fluid  Discontinue Entresto.  Reviewed labs noted improved potassium level  Nephrology consulted  Repeat labs in a.m.    Hyperglycemia  Assessment & Plan  Sliding scale    Paroxysmal atrial fibrillation (HCC)- (present on admission)  Assessment & Plan  Continue Eliquis    Heart failure with reduced ejection fraction (HCC)- (present on admission)  Assessment & Plan  Known history of reduced ejection fraction 20% status post AICD  Continue home medications         VTE prophylaxis: eliquis    I have  performed a physical exam and reviewed and updated ROS and Plan today (12/27/2023). In review of yesterday's note (12/26/2023), there are no changes except as documented above.          Greater than 51 minutes spent preparing to see patient (e.g. review of tests) obtaining and/or reviewing separately obtained history. Performing a medically appropriate examination and/ evaluation.  Counseling and educating the patient/family/caregiver.  Ordering medications, tests, or procedures.  Referring and communicating with other health care professionals.  Documenting clinical information in EPIC.  Independently interpreting results and communicating results to patient/family/caregiver.  Care coordination.

## 2023-12-27 NOTE — PROGRESS NOTES
"Inpatient Palliative Care     Location: [place] [address]     HPI:   Lai Dailey is a 53 y.o. male with a recent diagnosis of pancreatic cancer per biopsy on December 19 and HFrEF, EF approximately 15 to 20%, who presented on December 24 with sharp left upper quadrant pain radiating down his groin.  Patiently reports worsening epigastric pain for a month and was seen at [facility] previously with CT scan revealing pancreatic mass, CA 19-9 elevated at 3258.  Patient additionally seen at [facility] for pain management.  On December 19 patient had endoscopic ultrasound and fine-needle biopsy of pancreatic mass.  CT abdomen and pelvis revealing poorly defined hypoattenuating infiltrative mass in the pancreatic body extending to the tail.  Mild concern nodularity liver which could indicate cirrhosis in the appropriate clinical setting and small abdominal pelvic ascites.  Patient reports since his discharge from [facility] he continues to have worsening abdominal pain.  He also reports decreased appetite and inability to consume fluids.  He states oxycodone is insufficient for pain.  He has follow-up with hematology oncology at [facility] on January 3.     Interval:  24-hour events reviewed, utilizing oxycodone oral only no IV Dilaudid use overnight.  Vital signs remained stable, remains on 2 L nasal cannula.  Tolerating morphine extended release 30 mg p.o. every 12 hours.  BM last evening per chart review.    Summary:  Met with patient at bedside.  He reports pain is \"much better\".  He does state he feels a little \"happy\", but is okay with this as his pain is much better controlled at this time.  POLST completed and signed by patient.     Active listening, reflection, reminiscing, validation & normalization, and empathic support utilized throughout this encounter.  All questions answered and contact information provided, encouraged to call with any questions or concerns.      Plan:     1) continue morphine " extended release 30 mg p.o. every 12 hours; as needed medications as per hospitalist team.  2) POLST to be scanned into chart, original to go home with patient  3) PC to continue to follow.     Thank you for allowing me the opportunity to participate in the care of  Lai Dailey.     I spent a total of 52 minutes reviewing medical records, direct face-to-face time with the patient and/or family, coordination of care, and documentation. This is separate from the time spent on advance care planning, which is documented above.     Elizabeth Best, MSN, APRN, ACNPC-AG.  Palliative Care Nurse Practitioner  [phone]

## 2023-12-27 NOTE — DOCUMENTATION QUERY
[facility]                                                                       Query Response Note      PATIENT:               CHESTER PICKARD  ACCT #:                  [id number]  MRN:                     [id number]  :                      1970  ADMIT DATE:       2023 11:09 AM  DISCH DATE:          RESPONDING  PROVIDER #:        [id number]           QUERY TEXT:    Based on the clinical indicators outlined above, please clarify if the following has been treated/evaluated during this hospitalization:    Thank you.    The patient's Clinical Indicators include:   note: Severe, chronic malnutrition related to diminished PO intake and hypermetabolic disease state of pancreatic cancer, AEB severe wt loss of 7% in one month and <75% of estimated energy  intake >1 month.    Risk factors: Cancer, decreased PO intake    Treatment: Supplemental nutrition, RD consult    Thank you,  Halie Barrientos NP, CCDS   Clinical   Connect via [employer]  Options provided:   -- Malnutrition, severe protein calorie   -- Other malnutrition, Please specify   -- Other explanation, Please specify      Query created by: Halie Barrientos on 2023 10:28 AM    RESPONSE TEXT:    Malnutrition, severe protein calorie          Electronically signed by:  BREA GAN MD 2023 12:21 PM

## 2023-12-27 NOTE — CONSULTS
"Nephrology Consultation    Date of Service  12/27/2023    Referring Physician  Star Miranda M.D.    Consulting Physician  Trenton Benton M.D.    Reason for Consultation  SWETA    History of Presenting Illness  53 y.o. male with a history of hypertension, chronic systolic heart failure, recently diagnosed pancreatic cancer in November 2023 who presented 12/24/2023 with abdominal pain.    Patient originally presented to our hospital 11/24/2023 with abdominal pain.  He worried he had a hernia.  CT imaging did not show hernia, but showed pancreatic mass.  Patient was discharged 11/25/2023 with outpatient follow-up.  He had EGD and biopsy with celiac plexus block on 12/19/2023, pathology showing adenocarcinoma of the pancreas.  Patient came back to this hospital on 12/24/2023 with ongoing abdominal pain.  Abdominal pain has improved with adjustment of opioid pain medication.  Patient says he had been taking Motrin \"like TicTac's\" for 2 months from October through 12/19/2023.  The patient otherwise denies any known history of kidney disease.  He denies lower urinary tract symptoms.  He says he usually takes Lasix 20 mg daily, but has not been taking it since he was admitted.  Denies chest pain, shortness of breath, headache, nausea, vomiting, but does admit to poor appetite because when he eats he experiences abdominal pain.      Review of Systems  Review of Systems   Constitutional:  Negative for fever.   Respiratory:  Negative for shortness of breath.    Cardiovascular:  Negative for chest pain.   Gastrointestinal:  Positive for abdominal pain. Negative for nausea and vomiting.   All other systems reviewed and are negative.      Past Medical History  Past Medical History:   Diagnosis Date    CHF (congestive heart failure) (HCC)     Diabetes (HCC)     Hyperlipidemia     Hypertension     Pacemaker     Pacemaker/AICD    Snoring 10/18/2021    No sleep study.       Surgical History  Past Surgical History:   Procedure Laterality " Date    AICD IMPLANT  2010       Family History  Family History   Problem Relation Age of Onset    Colon Cancer Mother     Hypertension Sister     Stroke Brother     Heart Disease Neg Hx        Social History  Social History     Socioeconomic History    Marital status: Single     Spouse name: Not on file    Number of children: Not on file    Years of education: Not on file    Highest education level: Not on file   Occupational History    Not on file   Tobacco Use    Smoking status: Never    Smokeless tobacco: Never   Vaping Use    Vaping Use: Never used   Substance and Sexual Activity    Alcohol use: Yes     Comment: occ    Drug use: No     Comment: Marijuana use as youth    Sexual activity: Not on file   Other Topics Concern    Not on file   Social History Narrative    Not on file     Social Determinants of Health     Financial Resource Strain: Not on file   Food Insecurity: Not on file   Transportation Needs: Not on file   Physical Activity: Not on file   Stress: Not on file   Social Connections: Not on file   Intimate Partner Violence: Not on file   Housing Stability: Not on file       Medications  Current Facility-Administered Medications   Medication Dose Route Frequency Provider Last Rate Last Admin    sodium zirconium cyclosilicate (Lokelma) packet 10 g  10 g Oral DAILY AT NOON Star Miranda M.D.   10 g at 12/27/23 1228    morphine ER (Ms Contin) tablet 30 mg  30 mg Oral Q12HRS Elizabeth Best, A.P.R.N.   30 mg at 12/27/23 0543    senna-docusate (Pericolace Or Senokot S) 8.6-50 MG per tablet 2 Tablet  2 Tablet Oral BID PRN Elizabeth Best, A.P.R.N.        And    polyethylene glycol/lytes (Miralax) Packet 1 Packet  1 Packet Oral DAILY Elizabeth Best, A.P.R.N.   1 Packet at 12/27/23 0543    And    magnesium hydroxide (Milk Of Magnesia) suspension 30 mL  30 mL Oral QDAY PRN Elizabeth Best, A.P.R.N.        And    bisacodyl (Dulcolax) suppository 10 mg  10 mg Rectal QDAY PRN Elizabeth Best,  A.P.RLOC        HYDROmorphone (Dilaudid) injection 1 mg  1 mg Intravenous Q3HRS PRN SANYA Montoya        oxyCODONE immediate release (Roxicodone) tablet 10 mg  10 mg Oral Q3HRS PRN CORINA HarringtonOChelsie   10 mg at 12/27/23 1247    oxyCODONE immediate-release (Roxicodone) tablet 5 mg  5 mg Oral Q3HRS PRN Ana Mccallum D.O.        NS infusion   Intravenous Continuous Ana Mccallum D.O. 100 mL/hr at 12/26/23 2240 New Bag at 12/26/23 2240    acetaminophen (Tylenol) tablet 650 mg  650 mg Oral Q6HRS PRN Lalo De Los Santos M.D.        insulin lispro (HumaLOG,AdmeLOG) injection  1-6 Units Subcutaneous 4X/DAY ACHS Lalo De Los Santos M.D.   2 Units at 12/27/23 1240    And    dextrose 10 % BOLUS 25 g  25 g Intravenous Q15 MIN PRN Lalo De Los Santos M.D.        apixaban (Eliquis) tablet 5 mg  5 mg Oral BID Lalo De Los Santos M.D.   5 mg at 12/27/23 0543    atorvastatin (Lipitor) tablet 40 mg  40 mg Oral DAILY Lalo De Los Santos M.D.   40 mg at 12/27/23 0543    DULoxetine (Cymbalta) capsule 30 mg  30 mg Oral DAILY Lalo De Los Santos M.D.   30 mg at 12/27/23 0544    hydrALAZINE (Apresoline) tablet 25 mg  25 mg Oral Q8HRS Lalo De Los Santos M.D.   25 mg at 12/27/23 0543    isosorbide dinitrate (Isordil) tablet 10 mg  10 mg Oral Q8HRS Lalo De Los Santos M.D.   10 mg at 12/27/23 0543    sacubitril-valsartan (Entresto)  MG 1 Tablet  1 Tablet Oral BID Lalo De Los Santos M.D.   1 Tablet at 12/27/23 0543       Allergies  No Known Allergies    Physical Exam  Temp:  [36 °C (96.8 °F)-36.9 °C (98.4 °F)] 36.9 °C (98.4 °F)  Pulse:  [51-80] 51  Resp:  [16-18] 16  BP: (109-134)/(74-84) 109/75  SpO2:  [91 %-96 %] 93 %    Physical Exam  Constitutional:       General: He is not in acute distress.     Appearance: He is well-developed. He is not diaphoretic.   HENT:      Head: Normocephalic and atraumatic.      Mouth/Throat:      Mouth: Mucous membranes are moist.      Pharynx: Oropharynx is clear. No oropharyngeal  exudate.   Eyes:      General: No scleral icterus.     Extraocular Movements: Extraocular movements intact.   Neck:      Trachea: No tracheal deviation.   Cardiovascular:      Rate and Rhythm: Normal rate.      Heart sounds: Normal heart sounds. No murmur heard.  Pulmonary:      Effort: Pulmonary effort is normal.      Breath sounds: Normal breath sounds. No stridor. No rales.   Abdominal:      General: There is distension.      Palpations: Abdomen is soft.      Tenderness: There is no abdominal tenderness.   Musculoskeletal:         General: Normal range of motion.      Right lower leg: Edema (1+) present.      Left lower leg: Edema (1+) present.   Skin:     General: Skin is warm and dry.   Neurological:      General: No focal deficit present.      Mental Status: He is alert and oriented to person, place, and time.   Psychiatric:         Mood and Affect: Mood normal.         Behavior: Behavior normal.         Fluids      Laboratory  Labs reviewed, pertinent labs below.  Recent Labs     12/25/23  0134   WBC 8.5   RBC 6.07   HEMOGLOBIN 18.5*   HEMATOCRIT 57.2*   MCV 94.2   MCH 30.5   MCHC 32.3   RDW 43.5   PLATELETCT 255   MPV 11.0     Recent Labs     12/25/23  1317 12/26/23  0040 12/27/23  0149 12/27/23  0756 12/27/23  1137   SODIUM 135 133* 135  --   --    POTASSIUM 5.2 5.5 6.1* 6.0* 5.2   CHLORIDE 98 100 101  --   --    CO2 27 24 26  --   --    GLUCOSE 193* 169* 183*  --   --    BUN 42* 47* 47*  --   --    CREATININE 1.48* 1.62* 1.74*  --   --    CALCIUM 9.4 8.9 9.1  --   --                 URINALYSIS:  Lab Results   Component Value Date/Time    COLORURINE Yellow 12/25/2023 0201    CLARITY Clear 12/25/2023 0201    SPECGRAVITY 1.028 12/25/2023 0201    PHURINE 5.0 12/25/2023 0201    KETONES Negative 12/25/2023 0201    PROTEINURIN Negative 12/25/2023 0201    BILIRUBINUR Negative 12/25/2023 0201    UROBILU 0.2 12/25/2023 0201    NITRITE Negative 12/25/2023 0201    LEUKESTERAS Negative 12/25/2023 0201    OCCULTBLOOD  Negative 12/25/2023 0201     [facility]  Lab Results   Component Value Date/Time    TOTPROTUR 13.0 12/27/2023 0614      Lab Results   Component Value Date/Time    CREATININEU 140.33 12/27/2023 0614       Imaging interpreted by radiologist. Imaging reports reviewed with pertinent findings below  US-RENAL    (Results Pending)         Assessment/Plan  53 y.o. male with a history of hypertension, chronic systolic heart failure, recently diagnosed pancreatic cancer in November 2023 who presented 12/24/2023 with abdominal pain.    1.  SWETA.  Undetermined if patient has CKD.  SWETA likely from heavy NSAID use.  Urinalysis without hematuria or proteinuria.  Patient says he would never want dialysis if kidney failure does worsen.  Avoid NSAIDs and other nephrotoxins.  Check renal function panel daily.  I do not believe patient is volume depleted, discontinue IV fluids.    2.  Hyperkalemia, mild.  I recommend treating this with Lasix.  While inpatient, I recommend Lasix 40 mg IV twice daily.  Upon discharge, Lasix dose should be increased from 20 to 40 mg p.o. twice daily.  I do not recommend Lokelma.  I do recommend decreasing the dose of his Entresto down to 24-26 mg p.o. twice daily given his hyperkalemia.  If hyperkalemia remains resolved, Entresto dose could be increased back up to usual dose in the future.    3.  Chronic systolic heart failure, patient mildly volume overloaded.  Recommend Lasix as above.  Recommended just Entresto as above.  Continue hydralazine and Isordil as patient is -American.    4.  Pancreatic adenocarcinoma, newly diagnosed on biopsy in December 2023.  Patient has been told he is not a surgical candidate.  Patient has follow-up with medical oncology in January 2024.    5.  Hypertension, well-controlled.  Recommend adjust Lasix, IV fluids, and Entresto as above.    Discussed with Dr. Star Miranda  Nephrology will sign off.  Please call back with further questions or concerns.      Trenton Benton,  MD  Nephrology  [facility]

## 2023-12-27 NOTE — DISCHARGE PLANNING
Case Management Discharge Planning    Admission Date: 12/24/2023  GMLOS: 3.4  ALOS: 3    6-Clicks ADL Score: 22  6-Clicks Mobility Score: 24      Anticipated Discharge Dispo: Discharge Disposition: Discharged to home/self care (01)    DME Needed: No    Action(s) Taken: Discussed in IDT rounds, pt requiring IV pain medication and potassium remains elevated, consulted Nephrology. Pt completed [facility] application, will submit to [facility] today.     Escalations Completed: None    Medically Clear: No    Next Steps: Pending potassium and pain management.     Barriers to Discharge: Medical clearance    Is the patient up for discharge tomorrow: No

## 2023-12-27 NOTE — CARE PLAN
"The patient is Watcher - Medium risk of patient condition declining or worsening    Shift Goals  Clinical Goals: pain control on current medication regimen  Patient Goals: rest, pain control  Family Goals: N/A    Progress made toward(s) clinical / shift goals:    Problem: Pain - Standard  Goal: Alleviation of pain or a reduction in pain to the patient’s comfort goal  Outcome: Progressing  Patient stating 4-7/10 pain throughout shift with interventions, states \"it's not terrible\" when asked about pain. PRN medications given throughout shift.     Problem: Knowledge Deficit - Standard  Goal: Patient and family/care givers will demonstrate understanding of plan of care, disease process/condition, diagnostic tests and medications  Outcome: Progressing  Patient updated on POC, all questions answered at this time.      Patient is not progressing towards the following goals:      "

## 2023-12-27 NOTE — PROGRESS NOTES
Monitor Summary    Rhythm: Paced  Rate: 80  Measurement: -/.14/.44  Ectopy: PVC (couplet)

## 2023-12-28 LAB
ANION GAP SERPL CALC-SCNC: 10 MMOL/L (ref 7–16)
ANION GAP SERPL CALC-SCNC: 10 MMOL/L (ref 7–16)
BUN SERPL-MCNC: 55 MG/DL (ref 8–22)
BUN SERPL-MCNC: 59 MG/DL (ref 8–22)
CALCIUM SERPL-MCNC: 9 MG/DL (ref 8.5–10.5)
CALCIUM SERPL-MCNC: 9.1 MG/DL (ref 8.5–10.5)
CHLORIDE SERPL-SCNC: 101 MMOL/L (ref 96–112)
CHLORIDE SERPL-SCNC: 98 MMOL/L (ref 96–112)
CO2 SERPL-SCNC: 21 MMOL/L (ref 20–33)
CO2 SERPL-SCNC: 23 MMOL/L (ref 20–33)
CREAT SERPL-MCNC: 1.54 MG/DL (ref 0.5–1.4)
CREAT SERPL-MCNC: 1.93 MG/DL (ref 0.5–1.4)
EKG IMPRESSION: NORMAL
GFR SERPLBLD CREATININE-BSD FMLA CKD-EPI: 40 ML/MIN/1.73 M 2
GFR SERPLBLD CREATININE-BSD FMLA CKD-EPI: 53 ML/MIN/1.73 M 2
GLUCOSE BLD STRIP.AUTO-MCNC: 170 MG/DL (ref 65–99)
GLUCOSE BLD STRIP.AUTO-MCNC: 190 MG/DL (ref 65–99)
GLUCOSE BLD STRIP.AUTO-MCNC: 194 MG/DL (ref 65–99)
GLUCOSE BLD STRIP.AUTO-MCNC: 234 MG/DL (ref 65–99)
GLUCOSE BLD STRIP.AUTO-MCNC: 255 MG/DL (ref 65–99)
GLUCOSE SERPL-MCNC: 199 MG/DL (ref 65–99)
GLUCOSE SERPL-MCNC: 229 MG/DL (ref 65–99)
MAGNESIUM SERPL-MCNC: 2.4 MG/DL (ref 1.5–2.5)
PHOSPHATE SERPL-MCNC: 4.1 MG/DL (ref 2.5–4.5)
POTASSIUM SERPL-SCNC: 5.5 MMOL/L (ref 3.6–5.5)
POTASSIUM SERPL-SCNC: 5.9 MMOL/L (ref 3.6–5.5)
POTASSIUM SERPL-SCNC: 5.9 MMOL/L (ref 3.6–5.5)
SODIUM SERPL-SCNC: 131 MMOL/L (ref 135–145)
SODIUM SERPL-SCNC: 132 MMOL/L (ref 135–145)

## 2023-12-28 PROCEDURE — 700101 HCHG RX REV CODE 250: Performed by: STUDENT IN AN ORGANIZED HEALTH CARE EDUCATION/TRAINING PROGRAM

## 2023-12-28 PROCEDURE — A9270 NON-COVERED ITEM OR SERVICE: HCPCS | Performed by: NURSE PRACTITIONER

## 2023-12-28 PROCEDURE — 93005 ELECTROCARDIOGRAM TRACING: CPT | Performed by: STUDENT IN AN ORGANIZED HEALTH CARE EDUCATION/TRAINING PROGRAM

## 2023-12-28 PROCEDURE — 700111 HCHG RX REV CODE 636 W/ 250 OVERRIDE (IP): Performed by: NURSE PRACTITIONER

## 2023-12-28 PROCEDURE — 700102 HCHG RX REV CODE 250 W/ 637 OVERRIDE(OP): Performed by: STUDENT IN AN ORGANIZED HEALTH CARE EDUCATION/TRAINING PROGRAM

## 2023-12-28 PROCEDURE — 82962 GLUCOSE BLOOD TEST: CPT | Mod: 91

## 2023-12-28 PROCEDURE — 80048 BASIC METABOLIC PNL TOTAL CA: CPT | Mod: 91

## 2023-12-28 PROCEDURE — 83735 ASSAY OF MAGNESIUM: CPT

## 2023-12-28 PROCEDURE — C9399 UNCLASSIFIED DRUGS OR BIOLOG: HCPCS | Performed by: STUDENT IN AN ORGANIZED HEALTH CARE EDUCATION/TRAINING PROGRAM

## 2023-12-28 PROCEDURE — 99233 SBSQ HOSP IP/OBS HIGH 50: CPT | Performed by: STUDENT IN AN ORGANIZED HEALTH CARE EDUCATION/TRAINING PROGRAM

## 2023-12-28 PROCEDURE — A9270 NON-COVERED ITEM OR SERVICE: HCPCS | Performed by: STUDENT IN AN ORGANIZED HEALTH CARE EDUCATION/TRAINING PROGRAM

## 2023-12-28 PROCEDURE — A9270 NON-COVERED ITEM OR SERVICE: HCPCS | Performed by: INTERNAL MEDICINE

## 2023-12-28 PROCEDURE — 84100 ASSAY OF PHOSPHORUS: CPT

## 2023-12-28 PROCEDURE — 84132 ASSAY OF SERUM POTASSIUM: CPT

## 2023-12-28 PROCEDURE — 700111 HCHG RX REV CODE 636 W/ 250 OVERRIDE (IP): Performed by: STUDENT IN AN ORGANIZED HEALTH CARE EDUCATION/TRAINING PROGRAM

## 2023-12-28 PROCEDURE — 700102 HCHG RX REV CODE 250 W/ 637 OVERRIDE(OP): Performed by: NURSE PRACTITIONER

## 2023-12-28 PROCEDURE — 700102 HCHG RX REV CODE 250 W/ 637 OVERRIDE(OP): Performed by: INTERNAL MEDICINE

## 2023-12-28 PROCEDURE — 770004 HCHG ROOM/CARE - ONCOLOGY PRIVATE *

## 2023-12-28 PROCEDURE — 36415 COLL VENOUS BLD VENIPUNCTURE: CPT

## 2023-12-28 PROCEDURE — 93010 ELECTROCARDIOGRAM REPORT: CPT | Performed by: STUDENT IN AN ORGANIZED HEALTH CARE EDUCATION/TRAINING PROGRAM

## 2023-12-28 PROCEDURE — 700111 HCHG RX REV CODE 636 W/ 250 OVERRIDE (IP): Mod: JZ

## 2023-12-28 RX ORDER — DEXTROSE MONOHYDRATE 25 G/50ML
25 INJECTION, SOLUTION INTRAVENOUS ONCE
Status: COMPLETED | OUTPATIENT
Start: 2023-12-28 | End: 2023-12-28

## 2023-12-28 RX ORDER — FUROSEMIDE 10 MG/ML
40 INJECTION INTRAMUSCULAR; INTRAVENOUS
Status: DISCONTINUED | OUTPATIENT
Start: 2023-12-28 | End: 2023-12-29

## 2023-12-28 RX ADMIN — INSULIN LISPRO 1 UNITS: 100 INJECTION, SOLUTION INTRAVENOUS; SUBCUTANEOUS at 08:02

## 2023-12-28 RX ADMIN — DEXTROSE MONOHYDRATE 25 G: 25 INJECTION, SOLUTION INTRAVENOUS at 11:52

## 2023-12-28 RX ADMIN — SODIUM ZIRCONIUM CYCLOSILICATE 10 G: 10 POWDER, FOR SUSPENSION ORAL at 23:20

## 2023-12-28 RX ADMIN — INSULIN LISPRO 3 UNITS: 100 INJECTION, SOLUTION INTRAVENOUS; SUBCUTANEOUS at 21:11

## 2023-12-28 RX ADMIN — POLYETHYLENE GLYCOL 3350 1 PACKET: 17 POWDER, FOR SOLUTION ORAL at 05:35

## 2023-12-28 RX ADMIN — OXYCODONE HYDROCHLORIDE 10 MG: 10 TABLET ORAL at 02:53

## 2023-12-28 RX ADMIN — OXYCODONE HYDROCHLORIDE 10 MG: 10 TABLET ORAL at 06:11

## 2023-12-28 RX ADMIN — APIXABAN 5 MG: 5 TABLET, FILM COATED ORAL at 18:00

## 2023-12-28 RX ADMIN — HYDRALAZINE HYDROCHLORIDE 25 MG: 25 TABLET ORAL at 14:09

## 2023-12-28 RX ADMIN — FUROSEMIDE 40 MG: 10 INJECTION, SOLUTION INTRAMUSCULAR; INTRAVENOUS at 02:46

## 2023-12-28 RX ADMIN — MORPHINE SULFATE 30 MG: 30 TABLET, FILM COATED, EXTENDED RELEASE ORAL at 05:35

## 2023-12-28 RX ADMIN — DULOXETINE HYDROCHLORIDE 30 MG: 30 CAPSULE, DELAYED RELEASE ORAL at 05:36

## 2023-12-28 RX ADMIN — ISOSORBIDE DINITRATE 10 MG: 10 TABLET ORAL at 14:09

## 2023-12-28 RX ADMIN — OXYCODONE HYDROCHLORIDE 10 MG: 10 TABLET ORAL at 11:52

## 2023-12-28 RX ADMIN — APIXABAN 5 MG: 5 TABLET, FILM COATED ORAL at 05:35

## 2023-12-28 RX ADMIN — FUROSEMIDE 40 MG: 10 INJECTION, SOLUTION INTRAMUSCULAR; INTRAVENOUS at 16:09

## 2023-12-28 RX ADMIN — INSULIN LISPRO 2 UNITS: 100 INJECTION, SOLUTION INTRAVENOUS; SUBCUTANEOUS at 17:23

## 2023-12-28 RX ADMIN — HYDRALAZINE HYDROCHLORIDE 25 MG: 25 TABLET ORAL at 21:10

## 2023-12-28 RX ADMIN — INSULIN LISPRO 1 UNITS: 100 INJECTION, SOLUTION INTRAVENOUS; SUBCUTANEOUS at 11:52

## 2023-12-28 RX ADMIN — SODIUM ZIRCONIUM CYCLOSILICATE 10 G: 10 POWDER, FOR SUSPENSION ORAL at 16:04

## 2023-12-28 RX ADMIN — ATORVASTATIN CALCIUM 40 MG: 40 TABLET, FILM COATED ORAL at 05:35

## 2023-12-28 RX ADMIN — HYDROMORPHONE HYDROCHLORIDE 1 MG: 1 INJECTION, SOLUTION INTRAMUSCULAR; INTRAVENOUS; SUBCUTANEOUS at 13:33

## 2023-12-28 RX ADMIN — MORPHINE SULFATE 30 MG: 30 TABLET, FILM COATED, EXTENDED RELEASE ORAL at 18:00

## 2023-12-28 RX ADMIN — SODIUM BICARBONATE 50 MEQ: 84 INJECTION, SOLUTION INTRAVENOUS at 12:18

## 2023-12-28 RX ADMIN — ISOSORBIDE DINITRATE 10 MG: 10 TABLET ORAL at 21:10

## 2023-12-28 RX ADMIN — OXYCODONE HYDROCHLORIDE 10 MG: 10 TABLET ORAL at 19:51

## 2023-12-28 ASSESSMENT — PAIN DESCRIPTION - PAIN TYPE
TYPE: ACUTE PAIN

## 2023-12-28 ASSESSMENT — ENCOUNTER SYMPTOMS: ABDOMINAL PAIN: 1

## 2023-12-28 NOTE — CARE PLAN
The patient is Stable - Low risk of patient condition declining or worsening    Shift Goals  Clinical Goals: PRS 5/10 or less. Patient will tolerate PO intake clear liquids  Patient Goals: Pain control  Family Goals: Not present    Progress made toward(s) clinical / shift goals:      Problem: Pain - Standard  Goal: Alleviation of pain or a reduction in pain to the patient’s comfort goal  Outcome: Progressing  Note: New pain management medications administered. Patient reports good response. Calls appropriately for intervention. PRS 0/10 late this shift.      Problem: Knowledge Deficit - Standard  Goal: Patient and family/care givers will demonstrate understanding of plan of care, disease process/condition, diagnostic tests and medications  Outcome: Progressing  Note: Discussed POC with patient and primary team. All questions answered       Patient is not progressing towards the following goals:      Problem: Nutrition  Goal: Patient's nutritional and fluid intake will be adequate or improve  Outcome: Not Progressing  Note: Patient tolerating clear liquids poorly; reports increase in pain with PO intake   The patient is Stable - Low risk of patient condition declining or worsening    Shift Goals  Clinical Goals: Pain control  Patient Goals: Rest  Family Goals: Not present    Progress made toward(s) clinical / shift goals:      Patient is not progressing towards the following goals:    The patient is Watcher - Medium risk of patient condition declining or worsening    Shift Goals  Clinical Goals: pain control on current medication regimen  Patient Goals: rest, pain control  Family Goals: N/A    Progress made toward(s) clinical / shift goals:      Patient is not progressing towards the following goals:

## 2023-12-28 NOTE — PROGRESS NOTES
Pt still with abd pain but much improved since yesterday.  However, pt is now on ADA diet and had intense stomach pain after eating only a few bites of solid food.  Pt verbalized being fearful that this will happen when he home.  Just gave lasix to hopefully help K+ to continue to come down.  Also still waiting for renal ultrasound.

## 2023-12-28 NOTE — DISCHARGE PLANNING
Case Management Discharge Planning    Admission Date: 12/24/2023  GMLOS: 4.8  ALOS: 4    6-Clicks ADL Score: 22  6-Clicks Mobility Score: 24      Anticipated Discharge Dispo: Discharge Disposition: Discharged to home/self care (01)    DME Needed: No    Action(s) Taken: Discussed in IDT rounds, pt's potassium remains elevated. Hospitalist consulted Nephrology yesterday, stated he will re discuss case with Nephrology. Hospitalist discussed starting pt on Lokelma to lower potassium levels.     Contacted Dr. Kapadia's office and scheduled appointment for next week. Pt to be seen on 1/3/23 @10AM prior to Dr. Sahu's appointment.     Escalations Completed: None    Medically Clear: No    Next Steps: Pending decreased potassium levels.     Barriers to Discharge: Medical clearance    Is the patient up for discharge tomorrow: No

## 2023-12-28 NOTE — PROGRESS NOTES
Hospital Medicine Daily Progress Note    Date of Service  12/28/2023    Chief Complaint  Lai Dailey is a 53 y.o. male admitted 12/24/2023 with abdominal pain    Hospital Course  53 male with past medical history of pancreatic cancer, diabetes, hyperlipidemia,HFrEF 20% presented with abdominal pain.  Surgery oncology evaluated .  Patient has nonoperative pancreatic cancer.  Surgery recommended outpatient celiac block.  Patient to follow-up with oncology Dr. Sahu for discussion regarding chemotherapy    Interval Problem Update    12/28/2023    Vitals remained stable  Remain asymptomatic  Labs noted persistent hyperkalemia, potassium 5.9  EKG reviewed noted A-fib    Treated with sodium bicarbonate insulin with D5, started on Lokelma.  On IV Lasix  Discontinue Entresto.  Pain well-controlled with narcotics, monitor for toxicity  Repeat BMP in a.m. to monitor electrolytes    I have discussed this patient's plan of care and discharge plan at IDT rounds today with Case Management, Nursing, Nursing leadership, and other members of the IDT team.    Consultants/Specialty  oncology    Code Status  DNAR/DNI    Disposition  The patient is not medically cleared for discharge to home or a post-acute facility.  Anticipate discharge to: home with close outpatient follow-up    I have placed the appropriate orders for post-discharge needs.    Review of Systems  Review of Systems   Constitutional:  Positive for malaise/fatigue.   Gastrointestinal:  Positive for abdominal pain.        Physical Exam  Temp:  [36 °C (96.8 °F)-36.6 °C (97.8 °F)] 36.4 °C (97.5 °F)  Pulse:  [63-88] 80  Resp:  [16-17] 17  BP: (108-132)/(67-91) 126/83  SpO2:  [90 %-95 %] 94 %    Physical Exam  Constitutional:       Appearance: He is ill-appearing.   Cardiovascular:      Rate and Rhythm: Normal rate.      Pulses: Normal pulses.      Heart sounds: Normal heart sounds.   Pulmonary:      Effort: Pulmonary effort is normal.      Breath sounds: Normal  breath sounds.   Abdominal:      Tenderness: There is abdominal tenderness.   Musculoskeletal:      Right lower leg: No edema.      Left lower leg: No edema.   Neurological:      General: No focal deficit present.      Mental Status: He is alert and oriented to person, place, and time.         Fluids  No intake or output data in the 24 hours ending 12/28/23 1426      Laboratory        Recent Labs     12/26/23  0040 12/27/23  0149 12/27/23  0756 12/27/23  1137 12/28/23  0112 12/28/23  0804   SODIUM 133* 135  --   --  132*  --    POTASSIUM 5.5 6.1*   < > 5.2 5.9* 5.9*   CHLORIDE 100 101  --   --  101  --    CO2 24 26  --   --  21  --    GLUCOSE 169* 183*  --   --  199*  --    BUN 47* 47*  --   --  55*  --    CREATININE 1.62* 1.74*  --   --  1.54*  --    CALCIUM 8.9 9.1  --   --  9.0  --     < > = values in this interval not displayed.                     Imaging  US-RENAL   Final Result      1.  No hydronephrosis.   2.  There are simple left renal cyst. No follow-up imaging is indicated per ACR guidelines.   3.  Nodular liver with moderate ascites consistent with underlying hepatocellular disease.           Assessment/Plan  * Cancer related pain- (present on admission)  Assessment & Plan  Noted to have worsening abdominal pain, decreased appetite with history of suspected pancreatic cancer  Start morphine ER  As needed oral oxycodone  -Patient to follow-up with outpatient oncology on 1/3   -per surgical oncology, not a surgical candidate at this time  Fluids      Hyperkalemia  Assessment & Plan  Treated with sodium bicarbonate insulin with D5, started on Lokelma.  On IV Lasix  Discontinue Entresto.  Repeat BMP in a.m. to monitor electrolytes    Hyperglycemia  Assessment & Plan  Sliding scale    Paroxysmal atrial fibrillation (HCC)- (present on admission)  Assessment & Plan  Continue Eliquis    Heart failure with reduced ejection fraction (HCC)- (present on admission)  Assessment & Plan  Known history of reduced  ejection fraction 20% status post AICD  Continue home medications         VTE prophylaxis: eliquis    I have performed a physical exam and reviewed and updated ROS and Plan today (12/28/2023). In review of yesterday's note (12/27/2023), there are no changes except as documented above.             Greater than 51 minutes spent preparing to see patient (e.g. review of tests) obtaining and/or reviewing separately obtained history. Performing a medically appropriate examination and/ evaluation.  Counseling and educating the patient/family/caregiver.  Ordering medications, tests, or procedures.  Referring and communicating with other health care professionals.  Documenting clinical information in EPIC.  Independently interpreting results and communicating results to patient/family/caregiver.  Care coordination.

## 2023-12-29 LAB
ANION GAP SERPL CALC-SCNC: 10 MMOL/L (ref 7–16)
ANION GAP SERPL CALC-SCNC: 11 MMOL/L (ref 7–16)
BUN SERPL-MCNC: 64 MG/DL (ref 8–22)
BUN SERPL-MCNC: 71 MG/DL (ref 8–22)
CALCIUM SERPL-MCNC: 8.9 MG/DL (ref 8.5–10.5)
CALCIUM SERPL-MCNC: 9.4 MG/DL (ref 8.5–10.5)
CHLORIDE SERPL-SCNC: 96 MMOL/L (ref 96–112)
CHLORIDE SERPL-SCNC: 97 MMOL/L (ref 96–112)
CO2 SERPL-SCNC: 21 MMOL/L (ref 20–33)
CO2 SERPL-SCNC: 24 MMOL/L (ref 20–33)
CREAT SERPL-MCNC: 2.03 MG/DL (ref 0.5–1.4)
CREAT SERPL-MCNC: 2.11 MG/DL (ref 0.5–1.4)
GFR SERPLBLD CREATININE-BSD FMLA CKD-EPI: 36 ML/MIN/1.73 M 2
GFR SERPLBLD CREATININE-BSD FMLA CKD-EPI: 38 ML/MIN/1.73 M 2
GLUCOSE BLD STRIP.AUTO-MCNC: 188 MG/DL (ref 65–99)
GLUCOSE BLD STRIP.AUTO-MCNC: 192 MG/DL (ref 65–99)
GLUCOSE BLD STRIP.AUTO-MCNC: 199 MG/DL (ref 65–99)
GLUCOSE BLD STRIP.AUTO-MCNC: 201 MG/DL (ref 65–99)
GLUCOSE SERPL-MCNC: 194 MG/DL (ref 65–99)
GLUCOSE SERPL-MCNC: 206 MG/DL (ref 65–99)
POTASSIUM SERPL-SCNC: 5 MMOL/L (ref 3.6–5.5)
POTASSIUM SERPL-SCNC: 5.3 MMOL/L (ref 3.6–5.5)
SODIUM SERPL-SCNC: 129 MMOL/L (ref 135–145)
SODIUM SERPL-SCNC: 130 MMOL/L (ref 135–145)

## 2023-12-29 PROCEDURE — 700102 HCHG RX REV CODE 250 W/ 637 OVERRIDE(OP): Performed by: STUDENT IN AN ORGANIZED HEALTH CARE EDUCATION/TRAINING PROGRAM

## 2023-12-29 PROCEDURE — 99233 SBSQ HOSP IP/OBS HIGH 50: CPT | Performed by: STUDENT IN AN ORGANIZED HEALTH CARE EDUCATION/TRAINING PROGRAM

## 2023-12-29 PROCEDURE — A9270 NON-COVERED ITEM OR SERVICE: HCPCS | Performed by: NURSE PRACTITIONER

## 2023-12-29 PROCEDURE — 99232 SBSQ HOSP IP/OBS MODERATE 35: CPT | Performed by: NURSE PRACTITIONER

## 2023-12-29 PROCEDURE — 700111 HCHG RX REV CODE 636 W/ 250 OVERRIDE (IP): Performed by: STUDENT IN AN ORGANIZED HEALTH CARE EDUCATION/TRAINING PROGRAM

## 2023-12-29 PROCEDURE — 770004 HCHG ROOM/CARE - ONCOLOGY PRIVATE *

## 2023-12-29 PROCEDURE — 82962 GLUCOSE BLOOD TEST: CPT | Mod: 91

## 2023-12-29 PROCEDURE — 36415 COLL VENOUS BLD VENIPUNCTURE: CPT

## 2023-12-29 PROCEDURE — A9270 NON-COVERED ITEM OR SERVICE: HCPCS | Performed by: STUDENT IN AN ORGANIZED HEALTH CARE EDUCATION/TRAINING PROGRAM

## 2023-12-29 PROCEDURE — 700102 HCHG RX REV CODE 250 W/ 637 OVERRIDE(OP): Performed by: NURSE PRACTITIONER

## 2023-12-29 PROCEDURE — 700111 HCHG RX REV CODE 636 W/ 250 OVERRIDE (IP): Mod: JZ

## 2023-12-29 PROCEDURE — C9399 UNCLASSIFIED DRUGS OR BIOLOG: HCPCS | Performed by: STUDENT IN AN ORGANIZED HEALTH CARE EDUCATION/TRAINING PROGRAM

## 2023-12-29 PROCEDURE — 80048 BASIC METABOLIC PNL TOTAL CA: CPT

## 2023-12-29 RX ORDER — FUROSEMIDE 40 MG/1
40 TABLET ORAL
Status: DISCONTINUED | OUTPATIENT
Start: 2023-12-29 | End: 2023-12-30 | Stop reason: HOSPADM

## 2023-12-29 RX ADMIN — INSULIN LISPRO 1 UNITS: 100 INJECTION, SOLUTION INTRAVENOUS; SUBCUTANEOUS at 20:28

## 2023-12-29 RX ADMIN — APIXABAN 5 MG: 5 TABLET, FILM COATED ORAL at 17:07

## 2023-12-29 RX ADMIN — INSULIN LISPRO 1 UNITS: 100 INJECTION, SOLUTION INTRAVENOUS; SUBCUTANEOUS at 17:18

## 2023-12-29 RX ADMIN — POLYETHYLENE GLYCOL 3350 1 PACKET: 17 POWDER, FOR SOLUTION ORAL at 06:13

## 2023-12-29 RX ADMIN — INSULIN LISPRO 2 UNITS: 100 INJECTION, SOLUTION INTRAVENOUS; SUBCUTANEOUS at 08:12

## 2023-12-29 RX ADMIN — APIXABAN 5 MG: 5 TABLET, FILM COATED ORAL at 06:13

## 2023-12-29 RX ADMIN — SODIUM ZIRCONIUM CYCLOSILICATE 10 G: 10 POWDER, FOR SUSPENSION ORAL at 17:07

## 2023-12-29 RX ADMIN — FUROSEMIDE 40 MG: 40 TABLET ORAL at 08:43

## 2023-12-29 RX ADMIN — MORPHINE SULFATE 30 MG: 30 TABLET, FILM COATED, EXTENDED RELEASE ORAL at 06:13

## 2023-12-29 RX ADMIN — DULOXETINE HYDROCHLORIDE 30 MG: 30 CAPSULE, DELAYED RELEASE ORAL at 06:13

## 2023-12-29 RX ADMIN — INSULIN GLARGINE-YFGN 10 UNITS: 100 INJECTION, SOLUTION SUBCUTANEOUS at 17:18

## 2023-12-29 RX ADMIN — MORPHINE SULFATE 30 MG: 30 TABLET, FILM COATED, EXTENDED RELEASE ORAL at 17:07

## 2023-12-29 RX ADMIN — SODIUM ZIRCONIUM CYCLOSILICATE 10 G: 10 POWDER, FOR SUSPENSION ORAL at 08:06

## 2023-12-29 RX ADMIN — FUROSEMIDE 40 MG: 10 INJECTION, SOLUTION INTRAMUSCULAR; INTRAVENOUS at 06:13

## 2023-12-29 RX ADMIN — ATORVASTATIN CALCIUM 40 MG: 40 TABLET, FILM COATED ORAL at 06:13

## 2023-12-29 RX ADMIN — INSULIN LISPRO 1 UNITS: 100 INJECTION, SOLUTION INTRAVENOUS; SUBCUTANEOUS at 12:05

## 2023-12-29 RX ADMIN — HYDRALAZINE HYDROCHLORIDE 25 MG: 25 TABLET ORAL at 14:21

## 2023-12-29 RX ADMIN — ISOSORBIDE DINITRATE 10 MG: 10 TABLET ORAL at 14:21

## 2023-12-29 ASSESSMENT — PAIN DESCRIPTION - PAIN TYPE
TYPE: ACUTE PAIN
TYPE: ACUTE PAIN

## 2023-12-29 ASSESSMENT — ENCOUNTER SYMPTOMS: ABDOMINAL PAIN: 1

## 2023-12-29 NOTE — PROGRESS NOTES
Inpatient Palliative Care     Location: [address]     HPI:   Lai Dailey is a 53 y.o. male with a recent diagnosis of pancreatic cancer per biopsy on December 19 and HFrEF, EF approximately 15 to 20%, who presented on December 24 with sharp left upper quadrant pain radiating down his groin.  Patiently reports worsening epigastric pain for a month and was seen at [facility] previously with CT scan revealing pancreatic mass, CA 19-9 elevated at 3258.  Patient additionally seen at [facility] for pain management.  On December 19 patient had endoscopic ultrasound and fine-needle biopsy of pancreatic mass.  CT abdomen and pelvis revealing poorly defined hypoattenuating infiltrative mass in the pancreatic body extending to the tail.  Mild concern nodularity liver which could indicate cirrhosis in the appropriate clinical setting and small abdominal pelvic ascites.  Patient reports since his discharge from [facility] he continues to have worsening abdominal pain.  He also reports decreased appetite and inability to consume fluids.  He states oxycodone is insufficient for pain.  He has follow-up with hematology oncology at [facility] on January 3    .  Interval:  Patient continues to tolerate morphine extended release 30 mg p.o. every 12 hours without difficulty.  Intermittent use of breakthrough oxycodone, reviewed last 24 hours 2 doses 10 mg utilized and 1 dose of IV Dilaudid 1 mg utilized.  Creatinine continues to rise however potassium is improved.    Summary:  Met with patient at bedside, family members tray and fiancé at bedside as well.  Patient reports pain is well-managed.  Dr. Miranda rounding during this provider's visit.  Discussed need for adequate number of pain medication prescription to be sent home with patient upon discharge.  Patient opted to stay 1 more day for additional lab work in a.m.  Dr. Miranda aware patient has appointment with Dr. Kapadia and Dr. Sahu on January 3.     Active  listening, reflection, reminiscing, validation & normalization, and empathic support utilized throughout this encounter.  All questions answered and contact information provided, encouraged to call with any questions or concerns.      Plan:     1) primary hospitalist to provide adequate prescription until patient's outpatient palliative care and oncology follow-up appointment on January 3  2) please send POLST home with patient.  3)        Thank you for allowing me the opportunity to participate in the care of  Lai Dailey.     I spent a total of 38 minutes reviewing medical records, direct face-to-face time with the patient and/or family, coordination of care, and documentation. This is separate from the time spent on advance care planning, which is documented above.     Elizabeth Best, MSN, APRN, ACNPC-AG.  Palliative Care Nurse Practitioner  [phone]

## 2023-12-29 NOTE — PROGRESS NOTES
Hospital Medicine Daily Progress Note    Date of Service  12/29/2023    Chief Complaint  Lai Dailey is a 53 y.o. male admitted 12/24/2023 with abdominal pain    Hospital Course  53 male with past medical history of pancreatic cancer, diabetes, hyperlipidemia,HFrEF 20% presented with abdominal pain.  Surgery oncology evaluated .  Patient has nonoperative pancreatic cancer.  Surgery recommended outpatient celiac block.  Patient to follow-up with oncology Dr. Sahu for discussion regarding chemotherapy    Interval Problem Update    12/29/2023  Vitals remained stable  Labs reviewed, normal white count, hemoglobin 18.5, sodium 129, glucose 194, worsening BUN/creatinine      Discontinue IV Lasix, starting oral Lasix  Continue Lokelma  Start low-dose of Entresto  Pain well-controlled with narcotics, monitor for toxicity  Repeat BMP in a.m. to monitor electrolytes, renal function    Plan discussed patient' son which is bedside    I have discussed this patient's plan of care and discharge plan at IDT rounds today with Case Management, Nursing, Nursing leadership, and other members of the IDT team.    Consultants/Specialty  oncology    Code Status  DNAR/DNI    Disposition  The patient is not medically cleared for discharge to home or a post-acute facility.  Anticipate discharge to: home with close outpatient follow-up    I have placed the appropriate orders for post-discharge needs.    Review of Systems  Review of Systems   Constitutional:  Positive for malaise/fatigue.   Gastrointestinal:  Positive for abdominal pain.        Physical Exam  Temp:  [35.8 °C (96.5 °F)-36.5 °C (97.7 °F)] 36.5 °C (97.7 °F)  Pulse:  [61-88] 78  Resp:  [16-18] 18  BP: (102-125)/(65-86) 102/86  SpO2:  [90 %-99 %] 94 %    Physical Exam  Constitutional:       Appearance: He is ill-appearing.   Cardiovascular:      Rate and Rhythm: Normal rate.      Pulses: Normal pulses.      Heart sounds: Normal heart sounds.   Pulmonary:      Effort:  Pulmonary effort is normal.      Breath sounds: Normal breath sounds.   Abdominal:      Tenderness: There is abdominal tenderness.   Musculoskeletal:      Right lower leg: No edema.      Left lower leg: No edema.   Neurological:      General: No focal deficit present.      Mental Status: He is alert and oriented to person, place, and time.         Fluids    Intake/Output Summary (Last 24 hours) at 12/29/2023 1536  Last data filed at 12/29/2023 0931  Gross per 24 hour   Intake 600 ml   Output --   Net 600 ml         Laboratory        Recent Labs     12/28/23  0112 12/28/23  0804 12/28/23  1540 12/29/23  0106   SODIUM 132*  --  131* 129*   POTASSIUM 5.9* 5.9* 5.5 5.0   CHLORIDE 101  --  98 97   CO2 21  --  23 21   GLUCOSE 199*  --  229* 194*   BUN 55*  --  59* 64*   CREATININE 1.54*  --  1.93* 2.11*   CALCIUM 9.0  --  9.1 8.9                     Imaging  US-RENAL   Final Result      1.  No hydronephrosis.   2.  There are simple left renal cyst. No follow-up imaging is indicated per ACR guidelines.   3.  Nodular liver with moderate ascites consistent with underlying hepatocellular disease.           Assessment/Plan  * Cancer related pain- (present on admission)  Assessment & Plan  Noted to have worsening abdominal pain, decreased appetite with history of suspected pancreatic cancer  Start morphine ER  As needed oral oxycodone  -Patient to follow-up with outpatient oncology on 1/3   -per surgical oncology, not a surgical candidate at this time  Fluids      Hyperkalemia  Assessment & Plan  Improving  On oral Lasix, Lokelma    Hyperglycemia  Assessment & Plan  Sliding scale    Paroxysmal atrial fibrillation (HCC)- (present on admission)  Assessment & Plan  Continue Eliquis    Heart failure with reduced ejection fraction (HCC)- (present on admission)  Assessment & Plan  Known history of reduced ejection fraction 20% status post AICD  Continue home medications         VTE prophylaxis: eliquis    I have performed a physical  exam and reviewed and updated ROS and Plan today (12/29/2023). In review of yesterday's note (12/28/2023), there are no changes except as documented above.                Greater than 51 minutes spent preparing to see patient (e.g. review of tests) obtaining and/or reviewing separately obtained history. Performing a medically appropriate examination and/ evaluation.  Counseling and educating the patient/family/caregiver.  Ordering medications, tests, or procedures.  Referring and communicating with other health care professionals.  Documenting clinical information in EPIC.  Independently interpreting results and communicating results to patient/family/caregiver.  Care coordination.

## 2023-12-29 NOTE — CARE PLAN
The patient is Stable - Low risk of patient condition declining or worsening    Shift Goals  Clinical Goals: monitor and trend K+, pain control  Patient Goals: Sleep  Family Goals: get better    Progress made toward(s) clinical / shift goals:    Problem: Pain - Standard  Goal: Alleviation of pain or a reduction in pain to the patient’s comfort goal  Outcome: Progressing  Note: PRN pain medication given with relief. Patient was able to rest/sleep most of the night. VSS     Problem: Knowledge Deficit - Standard  Goal: Patient and family/care givers will demonstrate understanding of plan of care, disease process/condition, diagnostic tests and medications  Outcome: Progressing  Note: Patient updated regarding plan of care and current treatment. K went down from 5.5 to 5.0. All questions answered. Pt voiced understanding.

## 2023-12-29 NOTE — CARE PLAN
The patient is Watcher - Medium risk of patient condition declining or worsening    Shift Goals  Clinical Goals: Monitor Labs, Pain control  Patient Goals: Rest  Family Goals: get better    Progress made toward(s) clinical / shift goals:        Problem: Pain - Standard  Goal: Alleviation of pain or a reduction in pain to the patient’s comfort goal  Description: Target End Date:  Prior to discharge or change in level of care    Document on Vitals flowsheet    1.  Document pain using the appropriate pain scale per order or unit policy  2.  Educate and implement non-pharmacologic comfort measures (i.e. relaxation, distraction, massage, cold/heat therapy, etc.)  3.  Pain management medications as ordered  4.  Reassess pain after pain med administration per policy  5.  If opiods administered assess patient's response to pain medication is appropriate per POSS sedation scale  6.  Follow pain management plan developed in collaboration with patient and interdisciplinary team (including palliative care or pain specialists if applicable)  Outcome: Progressing  Note: Patient will continue to rate pain using a scale of 1-10.      Problem: Knowledge Deficit - Standard  Goal: Patient and family/care givers will demonstrate understanding of plan of care, disease process/condition, diagnostic tests and medications  Description: Target End Date:  1-3 days or as soon as patient condition allows    Document in Patient Education    1.  Patient and family/caregiver oriented to unit, equipment, visitation policy and means for communicating concern  2.  Complete/review Learning Assessment  3.  Assess knowledge level of disease process/condition, treatment plan, diagnostic tests and medications  4.  Explain disease process/condition, treatment plan, diagnostic tests and medications  Outcome: Progressing  Note: Patient understands the need for continued monitoring of vitals.

## 2023-12-29 NOTE — DIETARY
Nutrition Update:    Day 5 of admit.  Lai Dailey is a 53 y.o. male with admitting DX of Cancer related pain   Patient being followed to optimize nutrition.    Current Diet: Consistent carbohydrate with Ensure supplements    Problem: Nutritional:  Goal: Achieve adequate nutritional intake  Description: Patient will consume 50% of meals  Outcome: Progressing.  He has been eating % of recent meals    Dietary staff available for ongoing meal snack and supplements preferences.  RD following.

## 2023-12-29 NOTE — PROGRESS NOTES
Monitor Summary    Rhythm: Paced  Rate: 80  Measurements: PVC/couplet/BBB  Ectopy: -/.15/.51

## 2023-12-29 NOTE — CARE PLAN
The patient is Watcher - Medium risk of patient condition declining or worsening    Shift Goals  Clinical Goals: pt will have potassium trend down to WDL  Patient Goals: pt will rest and have pain controlled  Family Goals: none present at this time    Progress made toward(s) clinical / shift goals: pt has had potassium trend down from 5.9 to 5.5 after interventions placed by MD. Pt has been resting comfortable and has made needs known to help with pain control.     Patient is not progressing towards the following goals: N/A

## 2023-12-29 NOTE — DISCHARGE PLANNING
Case Management Discharge Planning    Admission Date: 12/24/2023  GMLOS: 4.8  ALOS: 5    6-Clicks ADL Score: 22  6-Clicks Mobility Score: 24      Anticipated Discharge Dispo: Discharge Disposition: Discharged to home/self care (01)    DME Needed: No    Action(s) Taken: Discussed in IDT rounds, pt is not medically cleared to dc today. Pt's potassium is trending down but pt's GFR is decreasing. Possible dc home tomorrow, pt has Pain Management and Oncology appointment on 1/3/24 @ 10am with [facility]. No other needs at this time.     Escalations Completed: None    Medically Clear: No    Next Steps: Pending kidney function improvement.     Barriers to Discharge: Medical clearance    Is the patient up for discharge tomorrow: Yes    Is transport arranged for discharge disposition: Yes

## 2023-12-30 VITALS
RESPIRATION RATE: 18 BRPM | TEMPERATURE: 97 F | HEART RATE: 79 BPM | OXYGEN SATURATION: 94 % | DIASTOLIC BLOOD PRESSURE: 68 MMHG | SYSTOLIC BLOOD PRESSURE: 111 MMHG | BODY MASS INDEX: 25.44 KG/M2 | WEIGHT: 171.74 LBS | HEIGHT: 69 IN

## 2023-12-30 LAB
ANION GAP SERPL CALC-SCNC: 11 MMOL/L (ref 7–16)
BUN SERPL-MCNC: 73 MG/DL (ref 8–22)
CALCIUM SERPL-MCNC: 9.3 MG/DL (ref 8.5–10.5)
CHLORIDE SERPL-SCNC: 95 MMOL/L (ref 96–112)
CO2 SERPL-SCNC: 25 MMOL/L (ref 20–33)
CREAT SERPL-MCNC: 1.99 MG/DL (ref 0.5–1.4)
GFR SERPLBLD CREATININE-BSD FMLA CKD-EPI: 39 ML/MIN/1.73 M 2
GLUCOSE BLD STRIP.AUTO-MCNC: 185 MG/DL (ref 65–99)
GLUCOSE SERPL-MCNC: 195 MG/DL (ref 65–99)
POTASSIUM SERPL-SCNC: 4.7 MMOL/L (ref 3.6–5.5)
SODIUM SERPL-SCNC: 131 MMOL/L (ref 135–145)

## 2023-12-30 PROCEDURE — C9399 UNCLASSIFIED DRUGS OR BIOLOG: HCPCS | Performed by: STUDENT IN AN ORGANIZED HEALTH CARE EDUCATION/TRAINING PROGRAM

## 2023-12-30 PROCEDURE — 99239 HOSP IP/OBS DSCHRG MGMT >30: CPT | Performed by: STUDENT IN AN ORGANIZED HEALTH CARE EDUCATION/TRAINING PROGRAM

## 2023-12-30 PROCEDURE — 700102 HCHG RX REV CODE 250 W/ 637 OVERRIDE(OP): Performed by: NURSE PRACTITIONER

## 2023-12-30 PROCEDURE — A9270 NON-COVERED ITEM OR SERVICE: HCPCS | Performed by: STUDENT IN AN ORGANIZED HEALTH CARE EDUCATION/TRAINING PROGRAM

## 2023-12-30 PROCEDURE — 36415 COLL VENOUS BLD VENIPUNCTURE: CPT

## 2023-12-30 PROCEDURE — 82962 GLUCOSE BLOOD TEST: CPT

## 2023-12-30 PROCEDURE — A9270 NON-COVERED ITEM OR SERVICE: HCPCS | Performed by: NURSE PRACTITIONER

## 2023-12-30 PROCEDURE — 700102 HCHG RX REV CODE 250 W/ 637 OVERRIDE(OP): Performed by: STUDENT IN AN ORGANIZED HEALTH CARE EDUCATION/TRAINING PROGRAM

## 2023-12-30 PROCEDURE — 80048 BASIC METABOLIC PNL TOTAL CA: CPT

## 2023-12-30 PROCEDURE — 700111 HCHG RX REV CODE 636 W/ 250 OVERRIDE (IP): Performed by: STUDENT IN AN ORGANIZED HEALTH CARE EDUCATION/TRAINING PROGRAM

## 2023-12-30 RX ORDER — OXYCODONE HYDROCHLORIDE 10 MG/1
10 TABLET ORAL EVERY 6 HOURS PRN
Qty: 5 TABLET | Refills: 0 | Status: SHIPPED | OUTPATIENT
Start: 2023-12-30 | End: 2024-01-03

## 2023-12-30 RX ORDER — MORPHINE SULFATE 30 MG/1
30 TABLET, FILM COATED, EXTENDED RELEASE ORAL EVERY 12 HOURS
Qty: 8 TABLET | Refills: 0 | Status: SHIPPED | OUTPATIENT
Start: 2023-12-30 | End: 2024-01-02

## 2023-12-30 RX ADMIN — DULOXETINE HYDROCHLORIDE 30 MG: 30 CAPSULE, DELAYED RELEASE ORAL at 05:33

## 2023-12-30 RX ADMIN — INSULIN LISPRO 1 UNITS: 100 INJECTION, SOLUTION INTRAVENOUS; SUBCUTANEOUS at 07:52

## 2023-12-30 RX ADMIN — ATORVASTATIN CALCIUM 40 MG: 40 TABLET, FILM COATED ORAL at 05:33

## 2023-12-30 RX ADMIN — FUROSEMIDE 40 MG: 40 TABLET ORAL at 05:40

## 2023-12-30 RX ADMIN — POLYETHYLENE GLYCOL 3350 1 PACKET: 17 POWDER, FOR SOLUTION ORAL at 05:32

## 2023-12-30 RX ADMIN — SODIUM ZIRCONIUM CYCLOSILICATE 10 G: 10 POWDER, FOR SUSPENSION ORAL at 00:28

## 2023-12-30 RX ADMIN — MORPHINE SULFATE 30 MG: 30 TABLET, FILM COATED, EXTENDED RELEASE ORAL at 05:34

## 2023-12-30 RX ADMIN — SODIUM ZIRCONIUM CYCLOSILICATE 10 G: 10 POWDER, FOR SUSPENSION ORAL at 07:50

## 2023-12-30 RX ADMIN — APIXABAN 5 MG: 5 TABLET, FILM COATED ORAL at 05:31

## 2023-12-30 ASSESSMENT — PAIN DESCRIPTION - PAIN TYPE: TYPE: ACUTE PAIN

## 2023-12-30 NOTE — PROGRESS NOTES
Monitor Summary    Rhythm: Paced  Rate: 80's  Measurements: -/.14/.46  Ectopy:  PVC/couplet/trigem

## 2023-12-30 NOTE — CARE PLAN
The patient is Stable - Low risk of patient condition declining or worsening    Shift Goals  Clinical Goals: Monitor glucose, pain control  Patient Goals: Rest  Family Goals: moises    Progress made toward(s) clinical / shift goals:        Problem: Knowledge Deficit - Standard  Goal: Patient and family/care givers will demonstrate understanding of plan of care, disease process/condition, diagnostic tests and medications  Description: Target End Date:  1-3 days or as soon as patient condition allows    Document in Patient Education    1.  Patient and family/caregiver oriented to unit, equipment, visitation policy and means for communicating concern  2.  Complete/review Learning Assessment  3.  Assess knowledge level of disease process/condition, treatment plan, diagnostic tests and medications  4.  Explain disease process/condition, treatment plan, diagnostic tests and medications  Outcome: Progressing  Note: Patient understands the need for pain control. Patient will continue to rate pain using a scale of 1-10.

## 2023-12-30 NOTE — DISCHARGE SUMMARY
"Discharge Summary    CHIEF COMPLAINT ON ADMISSION  Chief Complaint   Patient presents with    LLQ Pain     Reports 9/10 \"sharp\" LUQ pain that radiates down to pt's groin.  Hx: pancreatic cancer, pancreatic biopsy on 12/19       Reason for Admission  Back Pain     Admission Date  12/24/2023    CODE STATUS  DNAR/DNI    HPI & HOSPITAL COURSE    53 male with past medical history of pancreatic cancer, diabetes, hyperlipidemia,HFrEF 20% presented with abdominal pain. Surgery oncology evaluated . Patient has nonoperative pancreatic cancer. Surgery recommended outpatient celiac block. Patient to follow-up with oncology Dr. Sahu for discussion regarding chemotherapy . Nephrology evaluated for SWETA and hyperkalemia. Patient doesn't wish for dialysis . Hyperkalemia improved . Palliative team were involved .    At the time of discharge  patient remain  hemodynamically stable ,asymptomatic ,labs remain unremarkable . I recommeded to repeats labs to monitor renal function and potassium in  1 week .  Held spironolactone and entersto resumed . He will follow up with cardiologist Dr Arriaga to adjust heart failure regimen .  He has upcoming appointment with Dr Sahu on January 3rd .  Patient will be discharged with close follow up with PCP, Dr tobar  .  Advised for medicine compliance.Discharge plan was discussed with patient in details .  Patient agreed with discharge plan  and  all questions answered.          Therefore, he is discharged in good and stable condition to home with close outpatient follow-up.    The patient met 2-midnight criteria for an inpatient stay at the time of discharge.    Discharge Date  12/30/2023          DISCHARGE DIAGNOSES  Principal Problem:    Cancer related pain (POA: Yes)  Active Problems:    Heart failure with reduced ejection fraction (HCC) (POA: Yes)    Paroxysmal atrial fibrillation (HCC) (POA: Yes)    Hyperglycemia (POA: Unknown)    Hyperkalemia (POA: Unknown)  Resolved Problems:    * No resolved " hospital problems. *      FOLLOW UP  Future Appointments   Date Time Provider Department Center   1/3/2024 10:00 AM Oniel Kapadia M.D. [facility] None   1/3/2024 11:00 AM Kash Sahu D.O. [facility] None     No follow-up provider specified.    MEDICATIONS ON DISCHARGE     Medication List        START taking these medications        Instructions   morphine ER 30 MG Tbcr tablet  Commonly known as: Ms Contin   Take 1 Tablet by mouth every 12 hours for 4 days.  Dose: 30 mg            CHANGE how you take these medications        Instructions   oxyCODONE immediate release 10 MG immediate release tablet  What changed:   medication strength  how much to take  when to take this  Another medication with the same name was removed. Continue taking this medication, and follow the directions you see here.  Commonly known as: Roxicodone   Take 1 Tablet by mouth every 6 hours as needed for Severe Pain for up to 4 days.  Dose: 10 mg            CONTINUE taking these medications        Instructions   acetaminophen 500 MG Tabs  Commonly known as: Tylenol   Take 1,000 mg by mouth one time as needed for Mild Pain. 1,000 mg = 2 tabs  Dose: 1,000 mg     apixaban 5mg Tabs  Commonly known as: Eliquis   Take 1 Tablet by mouth 2 times a day.  Dose: 5 mg     atorvastatin 40 MG Tabs  Commonly known as: Lipitor   Take 1 Tablet by mouth every day.  Dose: 40 mg     Entresto  MG Tabs  Generic drug: sacubitril-valsartan   Take 1 Tablet by mouth 2 times a day.  Dose: 1 Tablet     furosemide 20 MG Tabs  Commonly known as: Lasix   Take 1 Tablet by mouth every day.  Dose: 20 mg     hydrALAZINE 25 MG Tabs  Commonly known as: Apresoline   Take 1 Tablet by mouth 3 times a day.  Dose: 25 mg     insulin glargine 100 UNIT/ML Soln  Commonly known as: Lantus   Inject 10 Units as instructed every evening.  Dose: 10 Units     isosorbide dinitrate 10 MG Tabs  Commonly known as: Isordil   Take 1 Tablet by mouth 3 times a day.  Dose: 10 mg     Jardiance 10 MG  Tabs tablet  Generic drug: Empagliflozin   Take 1 Tablet by mouth every day.  Dose: 10 mg     magnesium oxide 400 (240 Mg) MG Tabs   Take 1 Tablet by mouth every day.  Dose: 400 mg     multivitamin Tabs   Take 1 Tablet by mouth every day.  Dose: 1 Tablet     polyethylene glycol/lytes Pack  Commonly known as: Miralax   Take 1 Packet by mouth every day. To prevent constipation while taking oxycodone.  Dose: 17 g            STOP taking these medications      DULoxetine 30 MG Cpep  Commonly known as: Cymbalta     spironolactone 25 MG Tabs  Commonly known as: Aldactone              Allergies  No Known Allergies    DIET  Orders Placed This Encounter   Procedures    Diet Order Diet: Consistent CHO (Diabetic)     Standing Status:   Standing     Number of Occurrences:   1     Order Specific Question:   Diet:     Answer:   Consistent CHO (Diabetic) [4]       ACTIVITY  As tolerated.  Weight bearing as tolerated    CONSULTATIONS  Surgery oncology   Nephrology   Palliative   PROCEDURES  US-RENAL   Final Result      1.  No hydronephrosis.   2.  There are simple left renal cyst. No follow-up imaging is indicated per ACR guidelines.   3.  Nodular liver with moderate ascites consistent with underlying hepatocellular disease.            LABORATORY  Lab Results   Component Value Date    SODIUM 131 (L) 12/30/2023    POTASSIUM 4.7 12/30/2023    CHLORIDE 95 (L) 12/30/2023    CO2 25 12/30/2023    GLUCOSE 195 (H) 12/30/2023    BUN 73 (H) 12/30/2023    CREATININE 1.99 (H) 12/30/2023        Lab Results   Component Value Date    WBC 8.5 12/25/2023    HEMOGLOBIN 18.5 (H) 12/25/2023    HEMATOCRIT 57.2 (H) 12/25/2023    PLATELETCT 255 12/25/2023        Total time of the discharge process exceeds 37  minutes.

## 2023-12-30 NOTE — PROGRESS NOTES
Monitor Summary:   Rhythm: paced  Rate: 80-92  Measurement: .07/.12/.45  Ectopy: pvc, coup, trig

## 2023-12-30 NOTE — CARE PLAN
The patient is Watcher - Medium risk of patient condition declining or worsening    Shift Goals  Clinical Goals: vitals wnl  Patient Goals: rest  Family Goals: moises    Progress made toward(s) clinical / shift goals:  Vitals WNL x BP     Patient is not progressing towards the following goals: BP running low normal, medications held due to parameters indicated on mar for bp medications.

## 2024-01-02 ENCOUNTER — APPOINTMENT (OUTPATIENT)
Dept: LAB | Facility: MEDICAL CENTER | Age: 54
End: 2024-01-02
Payer: MEDICARE

## 2024-01-02 DIAGNOSIS — C25.1 MALIGNANT NEOPLASM OF BODY OF PANCREAS (HCC): ICD-10-CM

## 2024-01-02 RX ORDER — MORPHINE SULFATE 15 MG/1
30 TABLET, FILM COATED, EXTENDED RELEASE ORAL EVERY 12 HOURS
Qty: 14 TABLET | Refills: 0 | Status: ON HOLD | OUTPATIENT
Start: 2024-01-02 | End: 2024-01-18

## 2024-01-02 NOTE — PROGRESS NOTES
Per Lyssa, [facility] oncology floor, patient unable to obtain 30 mg morphine extended release tablets from [facility] pharmacy.  Prescription changed to available supply which is morphine extended release 15 mg Sig take 2 tab to equal 30 mg every 12 hours.  Patient has follow-up with Dr. Sahu and Dr. Glass tomorrow 1/3.  7-day supply provided.

## 2024-01-03 ENCOUNTER — HOSPITAL ENCOUNTER (OUTPATIENT)
Dept: HEMATOLOGY ONCOLOGY | Facility: MEDICAL CENTER | Age: 54
End: 2024-01-03
Attending: STUDENT IN AN ORGANIZED HEALTH CARE EDUCATION/TRAINING PROGRAM
Payer: MEDICARE

## 2024-01-03 ENCOUNTER — HOSPITAL ENCOUNTER (OUTPATIENT)
Dept: HEMATOLOGY ONCOLOGY | Facility: MEDICAL CENTER | Age: 54
End: 2024-01-03
Attending: FAMILY MEDICINE
Payer: MEDICARE

## 2024-01-03 VITALS
HEART RATE: 80 BPM | BODY MASS INDEX: 29.33 KG/M2 | SYSTOLIC BLOOD PRESSURE: 130 MMHG | DIASTOLIC BLOOD PRESSURE: 90 MMHG | RESPIRATION RATE: 12 BRPM | OXYGEN SATURATION: 89 % | WEIGHT: 198 LBS | HEIGHT: 69 IN | TEMPERATURE: 97.4 F

## 2024-01-03 VITALS
RESPIRATION RATE: 12 BRPM | HEART RATE: 80 BPM | WEIGHT: 198 LBS | SYSTOLIC BLOOD PRESSURE: 130 MMHG | BODY MASS INDEX: 29.33 KG/M2 | OXYGEN SATURATION: 89 % | TEMPERATURE: 97.4 F | DIASTOLIC BLOOD PRESSURE: 90 MMHG | HEIGHT: 69 IN

## 2024-01-03 DIAGNOSIS — Z71.89 GOALS OF CARE, COUNSELING/DISCUSSION: ICD-10-CM

## 2024-01-03 DIAGNOSIS — K86.89 PANCREATIC MASS: ICD-10-CM

## 2024-01-03 DIAGNOSIS — C25.1 MALIGNANT NEOPLASM OF BODY OF PANCREAS (HCC): ICD-10-CM

## 2024-01-03 DIAGNOSIS — G89.3 CANCER RELATED PAIN: ICD-10-CM

## 2024-01-03 PROCEDURE — 99212 OFFICE O/P EST SF 10 MIN: CPT | Performed by: FAMILY MEDICINE

## 2024-01-03 PROCEDURE — 999999 HB NO CHARGE: Performed by: STUDENT IN AN ORGANIZED HEALTH CARE EDUCATION/TRAINING PROGRAM

## 2024-01-03 PROCEDURE — 99215 OFFICE O/P EST HI 40 MIN: CPT | Performed by: STUDENT IN AN ORGANIZED HEALTH CARE EDUCATION/TRAINING PROGRAM

## 2024-01-03 PROCEDURE — 99204 OFFICE O/P NEW MOD 45 MIN: CPT | Performed by: FAMILY MEDICINE

## 2024-01-03 RX ORDER — OXYCODONE HYDROCHLORIDE 15 MG/1
15 TABLET ORAL EVERY 4 HOURS PRN
Qty: 42 TABLET | Refills: 0 | Status: SHIPPED | OUTPATIENT
Start: 2024-01-03 | End: 2024-01-10

## 2024-01-03 RX ORDER — MORPHINE SULFATE 15 MG/1
30 TABLET, FILM COATED, EXTENDED RELEASE ORAL EVERY 12 HOURS
Qty: 28 TABLET | Refills: 0 | Status: SHIPPED | OUTPATIENT
Start: 2024-01-03 | End: 2024-01-10

## 2024-01-03 ASSESSMENT — PAIN SCALES - GENERAL
PAINLEVEL: 8=MODERATE-SEVERE PAIN
PAINLEVEL: 8=MODERATE-SEVERE PAIN

## 2024-01-03 ASSESSMENT — FIBROSIS 4 INDEX
FIB4 SCORE: 1.21
FIB4 SCORE: 1.21

## 2024-01-04 RX ORDER — OXYCODONE HYDROCHLORIDE 5 MG/1
15 TABLET ORAL EVERY 4 HOURS PRN
Qty: 126 TABLET | Refills: 0 | Status: SHIPPED | OUTPATIENT
Start: 2024-01-04 | End: 2024-01-11

## 2024-01-04 ASSESSMENT — ENCOUNTER SYMPTOMS
WHEEZING: 0
BRUISES/BLEEDS EASILY: 0
TINGLING: 0
SINUS PAIN: 0
PALPITATIONS: 0
MYALGIAS: 0
SORE THROAT: 0
ABDOMINAL PAIN: 1
HEARTBURN: 0
WEIGHT LOSS: 1
SHORTNESS OF BREATH: 0
DIAPHORESIS: 0
BACK PAIN: 0
FEVER: 0
HEADACHES: 0
NAUSEA: 0
BLOOD IN STOOL: 0
VOMITING: 0
WEAKNESS: 0
CONSTIPATION: 0
ORTHOPNEA: 0
DOUBLE VISION: 0
COUGH: 0
BLURRED VISION: 0
INSOMNIA: 0
SPUTUM PRODUCTION: 0
DIARRHEA: 0
NERVOUS/ANXIOUS: 0
DIZZINESS: 0
CHILLS: 0

## 2024-01-04 NOTE — PROGRESS NOTES
Consult:  Hematology/Oncology      Referring Physician: Tian Donald MD  Primary Care:  Tor Rdz M.D.    Diagnosis: Pancreatic adenocarcinoma    Chief Complaint:  Evaluation for systemic therapy    History of Presenting Illness:  Lai Dailey is a 53 y.o. -American  man with past medical history including heart failure with reduced ejection fraction of 20%, diabetes, cirrhosis, and atrial fibrillation who presents to the clinic for evaluation for systemic therapy for a new diagnosis of pancreatic adenocarcinoma of the body/tail. He had been having worsening abdominal pain and a poor appetite for around a month and was admitted to the hospital when a CT scan revealed a mass in the body of the pancreas. His CA 19-9 was found to be over 3000. He had a biopsy performed which confirmed adenocarcinoma and was determined to not be a surgical candidate due to his cardiac function. He was recommended for celiac plexus nerve block and follow up for palliative chemotherapy and consideration for radiation therapy as well. He was also referred to palliative care for evaluation.     Interval History:  Patient is here for consultation. He continues to struggle with abdominal pain. He is unsure about whether he would want to pursue palliative chemotherapy, and understands that he is not a surgical candidate. His wife is with him today.     Past Medical History:   Diagnosis Date    CHF (congestive heart failure) (HCC)     Diabetes (HCC)     Hyperlipidemia     Hypertension     Pacemaker     Pacemaker/AICD    Snoring 10/18/2021    No sleep study.       Past Surgical History:   Procedure Laterality Date    AICD IMPLANT  2010       Social History     Socioeconomic History    Marital status: Single     Spouse name: Not on file    Number of children: Not on file    Years of education: Not on file    Highest education level: Not on file   Occupational History    Not on file   Tobacco Use    Smoking status: Never     Smokeless tobacco: Never   Vaping Use    Vaping Use: Never used   Substance and Sexual Activity    Alcohol use: Yes     Comment: occ    Drug use: No     Comment: Marijuana use as youth    Sexual activity: Not on file   Other Topics Concern    Not on file   Social History Narrative    Not on file     Social Determinants of Health     Financial Resource Strain: Not on file   Food Insecurity: Not on file   Transportation Needs: Not on file   Physical Activity: Not on file   Stress: Not on file   Social Connections: Not on file   Intimate Partner Violence: Not on file   Housing Stability: Not on file       Family History   Problem Relation Age of Onset    Colon Cancer Mother     Hypertension Sister     Stroke Brother     Heart Disease Neg Hx        OB History   No obstetric history on file.       Allergies as of 01/03/2024    (No Known Allergies)         Current Outpatient Medications:     oxycodone (OXY-IR) 15 MG immediate release tablet, Take 1 Tablet by mouth every four hours as needed for Severe Pain for up to 7 days., Disp: 42 Tablet, Rfl: 0    morphine ER (MS CONTIN) 15 MG Tab CR tablet, Take 2 Tablets by mouth every 12 hours for 7 days., Disp: 28 Tablet, Rfl: 0    morphine ER (MS CONTIN) 15 MG Tab CR tablet, Take 2 Tablets by mouth every 12 hours., Disp: 14 Tablet, Rfl: 0    acetaminophen (TYLENOL) 500 MG Tab, Take 1,000 mg by mouth one time as needed for Mild Pain. 1,000 mg = 2 tabs, Disp: , Rfl:     polyethylene glycol/lytes (MIRALAX) Pack, Take 1 Packet by mouth every day. To prevent constipation while taking oxycodone., Disp: 30 Each, Rfl: 1    magnesium oxide 400 (240 Mg) MG Tab, Take 1 Tablet by mouth every day., Disp: 30 Tablet, Rfl: 1    isosorbide dinitrate (ISORDIL) 10 MG Tab, Take 1 Tablet by mouth 3 times a day. (Patient not taking: Reported on 1/3/2024), Disp: 270 Tablet, Rfl: 3    furosemide (LASIX) 20 MG Tab, Take 1 Tablet by mouth every day., Disp: 90 Tablet, Rfl: 3    apixaban (ELIQUIS) 5mg  "Tab, Take 1 Tablet by mouth 2 times a day., Disp: 180 Tablet, Rfl: 3    sacubitril-valsartan (ENTRESTO)  MG Tab, Take 1 Tablet by mouth 2 times a day., Disp: 180 Tablet, Rfl: 3    Empagliflozin (JARDIANCE) 10 MG Tab tablet, Take 1 Tablet by mouth every day., Disp: 30 Tablet, Rfl: 3    atorvastatin (LIPITOR) 40 MG Tab, Take 1 Tablet by mouth every day., Disp: 90 Tablet, Rfl: 3    hydrALAZINE (APRESOLINE) 25 MG Tab, Take 1 Tablet by mouth 3 times a day., Disp: 270 Tablet, Rfl: 3    multivitamin (THERAGRAN) Tab, Take 1 Tablet by mouth every day., Disp: , Rfl:     insulin glargine (LANTUS) 100 UNIT/ML Solution, Inject 10 Units as instructed every evening., Disp: 10 mL, Rfl: 0    Review of Systems:  Review of Systems   Constitutional:  Positive for malaise/fatigue and weight loss. Negative for chills, diaphoresis and fever.   HENT:  Negative for hearing loss, nosebleeds, sinus pain and sore throat.    Eyes:  Negative for blurred vision and double vision.   Respiratory:  Negative for cough, sputum production, shortness of breath and wheezing.    Cardiovascular:  Negative for chest pain, palpitations, orthopnea and leg swelling.   Gastrointestinal:  Positive for abdominal pain. Negative for blood in stool, constipation, diarrhea, heartburn, melena, nausea and vomiting.   Genitourinary:  Negative for dysuria, frequency, hematuria and urgency.   Musculoskeletal:  Negative for back pain, joint pain and myalgias.   Skin:  Negative for rash.   Neurological:  Negative for dizziness, tingling, weakness and headaches.   Endo/Heme/Allergies:  Does not bruise/bleed easily.   Psychiatric/Behavioral:  The patient is not nervous/anxious and does not have insomnia.           Physical Exam:  Vitals:    01/03/24 1040   BP: (!) 130/90   Pulse: 80   Resp: 12   Temp: 36.3 °C (97.4 °F)   TempSrc: Temporal   SpO2: 89%   Weight: 89.8 kg (198 lb)   Height: 1.753 m (5' 9\")       DESC; KARNOFSKY SCALE WITH ECOG EQUIVALENT: 90, Able to carry " on normal activity; minor signs or symptoms of disease (ECOG equivalent 0)    DISTRESS LEVEL: no acute distress    Physical Exam  Vitals and nursing note reviewed.   Constitutional:       General: He is awake. He is not in acute distress.     Appearance: Normal appearance. He is normal weight. He is not ill-appearing, toxic-appearing or diaphoretic.   HENT:      Head: Normocephalic and atraumatic.      Nose: Nose normal. No congestion.      Mouth/Throat:      Pharynx: Oropharynx is clear. No oropharyngeal exudate or posterior oropharyngeal erythema.   Eyes:      General: No scleral icterus.     Extraocular Movements: Extraocular movements intact.      Conjunctiva/sclera: Conjunctivae normal.      Pupils: Pupils are equal, round, and reactive to light.   Cardiovascular:      Rate and Rhythm: Normal rate and regular rhythm.      Pulses: Normal pulses.      Heart sounds: Normal heart sounds. No murmur heard.     No friction rub. No gallop.   Pulmonary:      Effort: Pulmonary effort is normal.      Breath sounds: Normal breath sounds. No decreased air movement. No wheezing, rhonchi or rales.   Abdominal:      General: Bowel sounds are normal. There is no distension.      Tenderness: There is abdominal tenderness in the epigastric area.   Musculoskeletal:         General: No deformity. Normal range of motion.      Cervical back: Normal range of motion and neck supple. No tenderness.      Right lower leg: No edema.      Left lower leg: No edema.   Lymphadenopathy:      Cervical: No cervical adenopathy.      Upper Body:      Right upper body: No axillary adenopathy.      Left upper body: No axillary adenopathy.      Lower Body: No right inguinal adenopathy. No left inguinal adenopathy.   Skin:     General: Skin is warm and dry.      Coloration: Skin is not jaundiced.      Findings: No erythema or rash.   Neurological:      General: No focal deficit present.      Mental Status: He is alert and oriented to person, place, and  time.      Sensory: Sensation is intact.      Motor: Motor function is intact. No weakness.      Gait: Gait is intact.   Psychiatric:         Attention and Perception: Attention normal.         Mood and Affect: Mood normal.         Behavior: Behavior normal. Behavior is cooperative.         Thought Content: Thought content normal.         Judgment: Judgment normal.          Depression Screening    Little interest or pleasure in doing things?      Feeling down, depressed , or hopeless?     Trouble falling or staying asleep, or sleeping too much?      Feeling tired or having little energy?      Poor appetite or overeating?      Feeling bad about yourself - or that you are a failure or have let yourself or your family down?     Trouble concentrating on things, such as reading the newspaper or watching television?     Moving or speaking so slowly that other people could have noticed.  Or the opposite - being so fidgety or restless that you have been moving around a lot more than usual?      Thoughts that you would be better off dead, or of hurting yourself?      Patient Health Questionnaire Score:         If depressive symptoms identified deferred to follow up visit unless specifically addressed in assesment and plan.    Interpretation of PHQ-9 Total Score   Score Severity   1-4 No Depression   5-9 Mild Depression   10-14 Moderate Depression   15-19 Moderately Severe Depression   20-27 Severe Depression    Labs:  No results displayed because visit has over 200 results.          Imaging:   All listed images below have been independently reviewed by me. I agree with the findings as summarized below:    US-RENAL    Result Date: 12/27/2023 12/27/2023 12:49 PM HISTORY/REASON FOR EXAM:  Abnormal Labs TECHNIQUE/EXAM DESCRIPTION: Renal ultrasound. COMPARISON:  None FINDINGS: The right kidney measures 10.11 cm.  The right kidney appears normal in contour and parenchymal echotexture. The corticomedullary differentiation is  preserved. The right renal collecting system is not dilated. No hydronephrosis. There are no renal calculi. The left kidney measures 10.46 cm. The left kidney appears normal in contour and parenchymal echotexture. The corticomedullary differentiation is preserved. There are several benign-appearing cysts in the left kidney with the largest measuring 2.4 x 2.6 x 2.2 cm in the superior pole. The left renal collecting system is not dilated. No hydronephrosis. There are no renal calculi. The bladder demonstrates no focal wall abnormality. Liver is small and somewhat nodular. There is moderate ascites.     1.  No hydronephrosis. 2.  There are simple left renal cyst. No follow-up imaging is indicated per ACR guidelines. 3.  Nodular liver with moderate ascites consistent with underlying hepatocellular disease.    CT Abdomen Pelvis with IV Contrast Only    Result Date: 12/20/2023  CLINICAL DATA: epigstric pain - hx pancreatic cancer - bx yest,, TECHNICAL: Volumetric CT of the abdomen and pelvis was performed with intravenous iodinated contrast (Omnipaque 350; 100 mL). 3 mm reconstructed axial, sagittal, coronal slice thickness.  Oral contrast was not administered, which limits evaluation of bowel. Dose reduction techniques were used including automated exposure control and adjustment of mA and/or kV according to patient size. CT count last 12 months: 0 NM myocardial perfusion scans last 12 months: 0 COMPARISON: No relevant prior imaging available FINDINGS: Lower chest: Marked cardiomegaly.  Minimal basilar atelectasis or scarring.  Partially imaged pacemaker leads Liver: Few tiny calcified granulomas.  Mild contour nodularity which could indicate cirrhosis in the appropriate clinical setting.  No mass seen. Gallbladder, bile ducts: No calcified gallstones.  No biliary ductal dilatation seen Spleen: Numerous tiny calcified granulomas.  Normal size Pancreas: Infiltrative poorly defined hypoattenuating mass in the pancreatic  body extending into the tail consistent with history of pancreatic malignancy measuring approximately 5.5 cm in length and 3.4 cm anterior posterior.  Infiltrative masslike density extends into the retroperitoneum posteroinferiorly appearing to abut the left renal vein and also appearing to abut the anterior inferior margin of the left adrenal gland (axial images 48-50).  It extends very close to the left lateral margin of SMA and approaches the celiac trunk bifurcation of common hepatic and splenic artery and does encase the splenic artery.  There appears to be a thin fat plane between the SMA and the celiac trunk bifurcation and the mass.   There is atrophy of the distal pancreatic tail.   Adrenal glands: Unremarkable Kidneys: Few renal cysts which do not require additional imaging evaluation.  No hydronephrosis, stones or enhancing mass. Lymph nodes: No bulky adenopathy seen Bowel: No evidence of bowel obstruction.  There is small abdominal pelvic ascites.  No free air or pneumatosis. Pelvis: Small pelvic ascites.  Bladder unremarkable degree distention.  Prominent prostate measuring 5.2 x 3.8 cm. Bones: Minimal chronic height loss mid to lower thoracic vertebral bodies.  Lumbar spine facet arthropathy.  No aggressive lesion or acute bone abnormality seen. Abdominal wall: Unremarkable Vascular structures: Small mixed atherosclerotic plaque involving abdominal aorta and iliacs.    Poorly defined hypoattenuating infiltrative mass in the pancreatic body extending into the tail detailed above consistent with history of pancreatic malignancy.   Mild contour nodularity liver which could indicate cirrhosis in the appropriate clinical setting.  Small abdominal pelvic ascites. Hepatic and splenic granulomas. Marked cardiomegaly    DX-CHEST-LIMITED (1 VIEW)    Result Date: 12/20/2023  CLINICAL DATA: SOB, PROCEDURE: XR CHEST 1 VIEW COMPARISON: None FINDINGS: Left chest wall pacemaker/AICD Cardiac shadow is large.  There is  central pulmonary vascular congestion.  No airspace consolidation, pleural effusion, or pneumothorax seen.    Cardiomegaly.  Central pulmonary vascular congestion suggesting early interstitial edema.  Pacemaker/AICD in place.       Pathology:      Assessment & Plan:  1. Malignant neoplasm of body of pancreas (HCC)  Referral to Genetics          This is a 53 year old -American man with pancreatic adenocarcinoma, [id number] stage III disease, in the setting of HFrEF with EF of 20%. He presents for evaluation.     Current Diagnosis and Staging: Pancreatic adenocarcinoma of body/tail, [id number] stage III disease    Treatment Plan: Gemcitabine/nab-paclitaxel vs hospice care    Treatment Citation: N[facility]N    Plan of Care:    Primary Therapy: TBD - patient to decide on therapy (gem/abraxane) or hospice care  Supportive Therapy: Pain control per palliative care  Toxicity: Patient is getting antineoplastic therapy and needs monitoring of blood counts, hepatic function, and renal function due to potential for organ dysfunction.   Labs: CBC with diff, CMP, Ca 19-9 monitoring if patient elects to move forward with therapy  Imaging: CT scans reviewed  Treatment Planning: Patient unsure about pursuing therapy. Given comorbidities, would select gem/abraxane for palliative therapy. Patient to decide on this vs hospice care.   Consultations: Surgical oncology (Dr. Appiah); palliative care (Dr. Kapadia)  Code Status: DNR/DNI  Miscellaneous: Referred to genetics for evaluation (mother with colon cancer, has 4 kids)  Return for Follow Up: PRN (patient to decide on care)    Any questions and concerns raised by the patient were answered to the best of my ability. Thank you for allowing me to participate in the care for this patient. Please feel free to contact me for any questions or concerns.     Total time spent on chart review, clinic encounter, and documentation: 61 minutes.

## 2024-01-07 PROBLEM — Z71.89 GOALS OF CARE, COUNSELING/DISCUSSION: Status: ACTIVE | Noted: 2024-01-07

## 2024-01-07 ASSESSMENT — ENCOUNTER SYMPTOMS
ABDOMINAL PAIN: 1
NAUSEA: 0
DEPRESSION: 0
VOMITING: 0
DIARRHEA: 0
COUGH: 0
NERVOUS/ANXIOUS: 0
FEVER: 0
BLURRED VISION: 0
PALPITATIONS: 0
SHORTNESS OF BREATH: 0
BRUISES/BLEEDS EASILY: 0
CHILLS: 0
WEIGHT LOSS: 1
CONSTIPATION: 0
HEADACHES: 0
BACK PAIN: 0

## 2024-01-07 NOTE — ASSESSMENT & PLAN NOTE
Patient with newly diagnosed pancreatic cancer.  Had discussion with patient that his cancer is not curable and any treatments would be to hopefully provide quality of time in the future.  Did discuss both options of pursuing treatment versus a comfort measures only plan of care.  Patient has appointment with his medical oncologist tomorrow.  Will plan to reevaluate goals of care at next appointment pending recommendations of medical oncology.

## 2024-01-07 NOTE — PROGRESS NOTES
Date & Time note created:    1/7/2024   12:20 PM       Requesting Provider: Dr. Appiah  Reason for Referral: Symptom management and goals of care    Chief Complaint: I have pain  HPI:   Lai Dailey is a 53 y.o. male presenting to palliative care clinic accompanied by his wife.  The role of palliative medicine was discussed and they were open to a conversation.    Patient shared his recent history of symptoms that led to a diagnosis of pancreatic cancer and a recent hospitalization for symptom management.  Patient was discharged with long and short acting morphine but did have some difficulty with obtaining medications due to inventory at the pharmacy.  He is in moderate distress today and lying on the exam table for most of the visit.  We then had a goals of care discussion and discussed that his cancer is not curable but with the use of chemotherapies there is a possibility of increased quality life in the future but I also shared that treatments for his cancer likely can come with many significant side effects.  Patient reports that quality of life for him is important.  He also shared that he is 100% service-connected  for his for heart failure which is also significant.  As it relates to goals of care we concluded that conversation with a plan for him to discuss with his medical oncologist tomorrow to help guide any decisions on whether to pursue treatments versus adopting a comfort measures only plan of care.  As it relates to his symptoms we discussed increasing his short acting and making sure he is able to obtain his long-acting morphine.  He currently has significant abdominal pain that radiates to his back and is dull and achy and sharp.    Oncological history: Stage III pancreatic cancer.    Past Medical History:   Diagnosis Date    CHF (congestive heart failure) (HCC)     Diabetes (HCC)     Hyperlipidemia     Hypertension     Pacemaker     Pacemaker/AICD    Snoring 10/18/2021    No sleep  study.     Past Surgical History:   Procedure Laterality Date    AICD IMPLANT  2010     Current Medications:  Current Outpatient Medications on File Prior to Encounter   Medication Sig Dispense Refill    morphine ER (MS CONTIN) 15 MG Tab CR tablet Take 2 Tablets by mouth every 12 hours. 14 Tablet 0    acetaminophen (TYLENOL) 500 MG Tab Take 1,000 mg by mouth one time as needed for Mild Pain. 1,000 mg = 2 tabs      polyethylene glycol/lytes (MIRALAX) Pack Take 1 Packet by mouth every day. To prevent constipation while taking oxycodone. 30 Each 1    magnesium oxide 400 (240 Mg) MG Tab Take 1 Tablet by mouth every day. 30 Tablet 1    furosemide (LASIX) 20 MG Tab Take 1 Tablet by mouth every day. 90 Tablet 3    apixaban (ELIQUIS) 5mg Tab Take 1 Tablet by mouth 2 times a day. 180 Tablet 3    sacubitril-valsartan (ENTRESTO)  MG Tab Take 1 Tablet by mouth 2 times a day. 180 Tablet 3    Empagliflozin (JARDIANCE) 10 MG Tab tablet Take 1 Tablet by mouth every day. 30 Tablet 3    atorvastatin (LIPITOR) 40 MG Tab Take 1 Tablet by mouth every day. 90 Tablet 3    hydrALAZINE (APRESOLINE) 25 MG Tab Take 1 Tablet by mouth 3 times a day. 270 Tablet 3    multivitamin (THERAGRAN) Tab Take 1 Tablet by mouth every day.      insulin glargine (LANTUS) 100 UNIT/ML Solution Inject 10 Units as instructed every evening. 10 mL 0    isosorbide dinitrate (ISORDIL) 10 MG Tab Take 1 Tablet by mouth 3 times a day. (Patient not taking: Reported on 1/3/2024) 270 Tablet 3     No current facility-administered medications on file prior to encounter.     Medication Allergy/Sensitivities:  No Known Allergies  Family History   Problem Relation Age of Onset    Colon Cancer Mother     Hypertension Sister     Stroke Brother     Heart Disease Neg Hx        Social History     Socioeconomic History    Marital status: Single     Spouse name: Not on file    Number of children: Not on file    Years of education: Not on file    Highest education level: Not on  "file   Occupational History    Not on file   Tobacco Use    Smoking status: Never    Smokeless tobacco: Never   Vaping Use    Vaping Use: Never used   Substance and Sexual Activity    Alcohol use: Yes     Comment: occ    Drug use: No     Comment: Marijuana use as youth    Sexual activity: Not on file   Other Topics Concern    Not on file   Social History Narrative    Not on file     Social Determinants of Health     Financial Resource Strain: Not on file   Food Insecurity: Not on file   Transportation Needs: Not on file   Physical Activity: Not on file   Stress: Not on file   Social Connections: Not on file   Intimate Partner Violence: Not on file   Housing Stability: Not on file     Living situation: Lives in [place] with his wife, did serve in the [employer].    Palliative Performance Scale: 90%  ECO    ROS:    Review of Systems   Constitutional:  Positive for malaise/fatigue and weight loss. Negative for chills and fever.   HENT:  Negative for congestion and hearing loss.    Eyes:  Negative for blurred vision.   Respiratory:  Negative for cough and shortness of breath.    Cardiovascular:  Negative for chest pain and palpitations.   Gastrointestinal:  Positive for abdominal pain. Negative for constipation, diarrhea, nausea and vomiting.   Musculoskeletal:  Negative for back pain and joint pain.   Skin:  Negative for rash.   Neurological:  Negative for headaches.   Endo/Heme/Allergies:  Does not bruise/bleed easily.   Psychiatric/Behavioral:  Negative for depression. The patient is not nervous/anxious.        PE:   Recent vital signs  Weight/BMI: Body mass index is 29.24 kg/m².  BP (!) 130/90   Pulse 80   Temp 36.3 °C (97.4 °F) (Temporal)   Resp 12   Ht 1.753 m (5' 9\")   Wt 89.8 kg (198 lb)   SpO2 89%   BMI 29.24 kg/m²   Vitals:    24 0957   BP: (!) 130/90   Pulse: 80   Resp: 12   Temp: 36.3 °C (97.4 °F)   TempSrc: Temporal   SpO2: 89%   Weight: 89.8 kg (198 lb)   Height: 1.753 m (5' 9\")     Oxygen " Therapy:     Physical Exam  Constitutional:       General: He is in acute distress.      Appearance: Normal appearance.   HENT:      Head: Normocephalic and atraumatic.      Mouth/Throat:      Mouth: Mucous membranes are moist.      Pharynx: Oropharynx is clear.   Eyes:      Extraocular Movements: Extraocular movements intact.      Pupils: Pupils are equal, round, and reactive to light.   Cardiovascular:      Rate and Rhythm: Normal rate.   Pulmonary:      Effort: Pulmonary effort is normal.   Abdominal:      General: Abdomen is flat.      Tenderness: There is abdominal tenderness.   Musculoskeletal:         General: Normal range of motion.      Cervical back: Normal range of motion.   Skin:     General: Skin is warm.   Neurological:      General: No focal deficit present.      Mental Status: He is alert.   Psychiatric:         Mood and Affect: Mood normal.         Behavior: Behavior normal.       ASSESSMENT/PLAN WITH SHARED DECISION MAKING:   Cancer related pain  Patient with known pancreatic cancer and recent hospitalization for pain exacerbation.  Is in moderate distress today related to his abdominal pain.  Did have trouble obtaining medications upon discharge due to inventory at pharmacy.  Due to the worsening of his symptoms will increase his oxycodone to 15 mg for breakthrough and continue 30 mg of extended release morphine.  Will have close follow-up in 1 week to reevaluate symptoms.  Did have prior celiac plexus block but during his last hospital stay was told he is no longer a candidate for repeat procedure.  PDMP reviewed, no signs of diversion or abuse, risk and benefits of medications discussed, controlled subs agreement on file.  Follow-up in 1 week for reevaluation    Goals of care, counseling/discussion  Patient with newly diagnosed pancreatic cancer.  Had discussion with patient that his cancer is not curable and any treatments would be to hopefully provide quality of time in the future.  Did  discuss both options of pursuing treatment versus a comfort measures only plan of care.  Patient has appointment with his medical oncologist tomorrow.  Will plan to reevaluate goals of care at next appointment pending recommendations of medical oncology.    45 minutes in total spent on patient encounter including chart review and consultation with patient's oncological providers.

## 2024-01-07 NOTE — ASSESSMENT & PLAN NOTE
Patient with known pancreatic cancer and recent hospitalization for pain exacerbation.  Is in moderate distress today related to his abdominal pain.  Did have trouble obtaining medications upon discharge due to inventory at pharmacy.  Due to the worsening of his symptoms will increase his oxycodone to 15 mg for breakthrough and continue 30 mg of extended release morphine.  Will have close follow-up in 1 week to reevaluate symptoms.  Did have prior celiac plexus block but during his last hospital stay was told he is no longer a candidate for repeat procedure.  PDMP reviewed, no signs of diversion or abuse, risk and benefits of medications discussed, controlled subs agreement on file.  Follow-up in 1 week for reevaluation

## 2024-01-09 ENCOUNTER — PHARMACY VISIT (OUTPATIENT)
Dept: PHARMACY | Facility: MEDICAL CENTER | Age: 54
End: 2024-01-09
Payer: COMMERCIAL

## 2024-01-09 ENCOUNTER — HOSPITAL ENCOUNTER (OUTPATIENT)
Dept: HEMATOLOGY ONCOLOGY | Facility: MEDICAL CENTER | Age: 54
End: 2024-01-09
Attending: FAMILY MEDICINE
Payer: MEDICARE

## 2024-01-09 VITALS
RESPIRATION RATE: 12 BRPM | BODY MASS INDEX: 27.72 KG/M2 | TEMPERATURE: 97.4 F | HEART RATE: 80 BPM | WEIGHT: 187.17 LBS | HEIGHT: 69 IN | DIASTOLIC BLOOD PRESSURE: 90 MMHG | SYSTOLIC BLOOD PRESSURE: 130 MMHG | OXYGEN SATURATION: 99 %

## 2024-01-09 DIAGNOSIS — C25.1 MALIGNANT NEOPLASM OF BODY OF PANCREAS (HCC): ICD-10-CM

## 2024-01-09 DIAGNOSIS — G89.3 CANCER RELATED PAIN: ICD-10-CM

## 2024-01-09 PROCEDURE — 99213 OFFICE O/P EST LOW 20 MIN: CPT | Performed by: FAMILY MEDICINE

## 2024-01-09 PROCEDURE — 99212 OFFICE O/P EST SF 10 MIN: CPT | Performed by: FAMILY MEDICINE

## 2024-01-09 PROCEDURE — RXMED WILLOW AMBULATORY MEDICATION CHARGE: Performed by: FAMILY MEDICINE

## 2024-01-09 RX ORDER — MORPHINE SULFATE 100 MG/5ML
20 SOLUTION ORAL
Qty: 15 ML | Refills: 0 | Status: ON HOLD | OUTPATIENT
Start: 2024-01-09 | End: 2024-01-18

## 2024-01-09 RX ORDER — FENTANYL 50 UG/1
1 PATCH TRANSDERMAL
Qty: 2 PATCH | Refills: 0 | Status: ON HOLD | OUTPATIENT
Start: 2024-01-09 | End: 2024-01-18

## 2024-01-09 ASSESSMENT — ENCOUNTER SYMPTOMS
COUGH: 0
PALPITATIONS: 0
CHILLS: 0
ABDOMINAL PAIN: 1
NAUSEA: 1
SHORTNESS OF BREATH: 0
WEIGHT LOSS: 1
CONSTIPATION: 1
DIARRHEA: 0
VOMITING: 0
FEVER: 0

## 2024-01-09 ASSESSMENT — FIBROSIS 4 INDEX: FIB4 SCORE: 1.21

## 2024-01-09 ASSESSMENT — PAIN SCALES - GENERAL: PAINLEVEL: 10=SEVERE PAIN

## 2024-01-09 NOTE — ASSESSMENT & PLAN NOTE
Patient with stage III pancreatic cancer.  Has been experiencing significant oncological pain.  Has met with medical oncology.  Patient's goals and wishes are to take a comfort focused approach to his cancer and not pursue disease modifying treatments.  Patient wishes to enroll in hospice.  Patient has qualifying diagnosis of pancreatic cancer.  Referral sent.  Prognosis weeks to months and most likely less than 6 months.  Patient does live in [place] and will need to establish with a hospice agency that covers that area.

## 2024-01-09 NOTE — ASSESSMENT & PLAN NOTE
Patient presenting to palliative care clinic for follow-up on oncological pain.  Upon entering the room was in significant distress with worsening of his abdominal pain secondary to his pancreatic cancer.  Has been using long-acting morphine and short acting oxycodone for breakthrough.  MEDD of 120 mg.  Through shared decision making we will transition to fentanyl patch and liquid morphine as he is also enrolling in hospice.  Will start with 100 mcg fentanyl patch and 20 mg morphine every 3 hours as needed.  1 week prescription provided.  PDMP reviewed, no signs of diversion or abuse, risk and benefits of medication discussed, controlled subs agreement on file.  Will plan for hospice to take over symptom management upon enrollment.

## 2024-01-09 NOTE — PROGRESS NOTES
Subjective:     Chief Complaint   Patient presents with    Cancer     Malignant neoplasm of body of pancreas       Lai Dailey is a 53 y.o. male here today for follow-up on symptom management for oncological pain.  Upon entering the room patient was in significant distress related to his abdominal pain.  He reports that he had a recent exacerbation with his pain and feel he now has distention in his abdomen.  It is the same pain he has been experiencing just an exacerbation of it.  He reports he has had some trouble with constipation but has been taking stool softeners and did have a bowel movement today.  He has been using his long and short acting opioid medication along with Tylenol.  We then discussed goals of care and he indicated he wants to take comfort focused approach to his cancer and was ready to accept a referral to hospice.  I let him know since we are taking a comfort focused approach to his care we would become more aggressive with his pain management.  He even noted that he was considering going to the ER and after discussing options he would like to try changes in his pain medications to see if that can prevent an ER visit.  Through shared decision making we decided to transition to a fentanyl patch along with liquid morphine which he can  at the end of this visit prior to traveling home to [place].  I did let him know that if his pain continues to be uncontrolled he does to present to the ER to let them know he is there for symptom management and that he is also enrolling in hospice.  We concluded the conversation with a plan for me to provide 1 week of medications along with a referral to hospice who will then be able to take over his symptom management.    Problem   Goals of Care, Counseling/Discussion   Cancer Related Pain   Malignant Neoplasm of Body of Pancreas (Hcc)        Current medicines (including changes today)  Current Outpatient Medications   Medication Sig Dispense  Refill    fentaNYL (DURAGESIC) 50 MCG/HR PATCH 72 HR Place 1 Patch on the skin every 72 hours for 6 days. 2 Patch 0    morphine (ROXANOL) 20 MG/ML Solution Take 1 mL by mouth every 3 hours as needed (for cancer related pain) for up to 7 days. 15 mL 0    oxyCODONE immediate-release (ROXICODONE) 5 MG Tab Take 3 Tablets by mouth every four hours as needed for Severe Pain for up to 7 days. 126 Tablet 0    oxycodone (OXY-IR) 15 MG immediate release tablet Take 1 Tablet by mouth every four hours as needed for Severe Pain for up to 7 days. 42 Tablet 0    morphine ER (MS CONTIN) 15 MG Tab CR tablet Take 2 Tablets by mouth every 12 hours for 7 days. 28 Tablet 0    morphine ER (MS CONTIN) 15 MG Tab CR tablet Take 2 Tablets by mouth every 12 hours. 14 Tablet 0    acetaminophen (TYLENOL) 500 MG Tab Take 1,000 mg by mouth one time as needed for Mild Pain. 1,000 mg = 2 tabs      polyethylene glycol/lytes (MIRALAX) Pack Take 1 Packet by mouth every day. To prevent constipation while taking oxycodone. 30 Each 1    magnesium oxide 400 (240 Mg) MG Tab Take 1 Tablet by mouth every day. 30 Tablet 1    furosemide (LASIX) 20 MG Tab Take 1 Tablet by mouth every day. 90 Tablet 3    apixaban (ELIQUIS) 5mg Tab Take 1 Tablet by mouth 2 times a day. 180 Tablet 3    sacubitril-valsartan (ENTRESTO)  MG Tab Take 1 Tablet by mouth 2 times a day. 180 Tablet 3    Empagliflozin (JARDIANCE) 10 MG Tab tablet Take 1 Tablet by mouth every day. 30 Tablet 3    atorvastatin (LIPITOR) 40 MG Tab Take 1 Tablet by mouth every day. 90 Tablet 3    hydrALAZINE (APRESOLINE) 25 MG Tab Take 1 Tablet by mouth 3 times a day. 270 Tablet 3    multivitamin (THERAGRAN) Tab Take 1 Tablet by mouth every day.      insulin glargine (LANTUS) 100 UNIT/ML Solution Inject 10 Units as instructed every evening. 10 mL 0    isosorbide dinitrate (ISORDIL) 10 MG Tab Take 1 Tablet by mouth 3 times a day. (Patient not taking: Reported on 1/3/2024) 270 Tablet 3     No current  "facility-administered medications for this encounter.       Social History     Tobacco Use    Smoking status: Never    Smokeless tobacco: Never   Vaping Use    Vaping Use: Never used   Substance Use Topics    Alcohol use: Yes     Comment: occ    Drug use: No     Comment: Marijuana use as youth     Past Medical History:   Diagnosis Date    CHF (congestive heart failure) (HCC)     Diabetes (HCC)     Hyperlipidemia     Hypertension     Pacemaker     Pacemaker/AICD    Snoring 10/18/2021    No sleep study.      Family History   Problem Relation Age of Onset    Colon Cancer Mother     Hypertension Sister     Stroke Brother     Heart Disease Neg Hx          Objective:     Vitals:    01/09/24 0857   BP: (!) 130/90   Pulse: 80   Resp: 12   Temp: 36.3 °C (97.4 °F)   TempSrc: Temporal   SpO2: 99%   Weight: 84.9 kg (187 lb 2.7 oz)   Height: 1.753 m (5' 9\")     Body mass index is 27.64 kg/m².     Review of Systems   Constitutional:  Positive for malaise/fatigue and weight loss. Negative for chills and fever.   Respiratory:  Negative for cough and shortness of breath.    Cardiovascular:  Negative for chest pain and palpitations.   Gastrointestinal:  Positive for abdominal pain, constipation and nausea. Negative for diarrhea and vomiting.     Physical Exam:   Gen: Moderate distress  Skin: Pink, warm, and dry  HEENT: conjunctiva non-injected, sclera non-icteric. EOMs intact.   Nasal mucosa without edema nor erythema.   Pinna normal.    Oral mucous membranes pink and moist with no lesions.  Neck: Supple, trachea midline. No adenopathy or masses in the neck or supraclavicular regions.  Lungs: Effort is normal.  CV: regular rate and rhythm  Abdomen: Positive tenderness, positive distention.  Ext: no edema, color normal, vascularity normal, temperature normal  Alert and oriented Eye contact is good, speech goal directed, affect calm    Assessment and Plan:   Cancer related pain  Patient presenting to palliative care clinic for " follow-up on oncological pain.  Upon entering the room was in significant distress with worsening of his abdominal pain secondary to his pancreatic cancer.  Has been using long-acting morphine and short acting oxycodone for breakthrough.  MEDD of 120 mg.  Through shared decision making we will transition to fentanyl patch and liquid morphine as he is also enrolling in hospice.  Will start with 100 mcg fentanyl patch and 20 mg morphine every 3 hours as needed.  1 week prescription provided.  PDMP reviewed, no signs of diversion or abuse, risk and benefits of medication discussed, controlled subs agreement on file.  Will plan for hospice to take over symptom management upon enrollment.    Malignant neoplasm of body of pancreas (HCC)  Patient with stage III pancreatic cancer.  Has been experiencing significant oncological pain.  Has met with medical oncology.  Patient's goals and wishes are to take a comfort focused approach to his cancer and not pursue disease modifying treatments.  Patient wishes to enroll in hospice.  Patient has qualifying diagnosis of pancreatic cancer.  Referral sent.  Prognosis weeks to months and most likely less than 6 months.  Patient does live in [place] and will need to establish with a hospice agency that covers that area.    25 min in total spent on patient encounter including chart review and consultation with patient oncology providers.     Followup: Return if symptoms worsen or fail to improve.    Oniel Kapadia M.D.

## 2024-01-14 ENCOUNTER — HOSPITAL ENCOUNTER (INPATIENT)
Facility: MEDICAL CENTER | Age: 54
LOS: 4 days | DRG: 948 | End: 2024-01-19
Attending: STUDENT IN AN ORGANIZED HEALTH CARE EDUCATION/TRAINING PROGRAM | Admitting: INTERNAL MEDICINE
Payer: MEDICARE

## 2024-01-14 DIAGNOSIS — C25.1 MALIGNANT NEOPLASM OF BODY OF PANCREAS (HCC): ICD-10-CM

## 2024-01-14 DIAGNOSIS — Z51.5 COMFORT MEASURES ONLY STATUS: ICD-10-CM

## 2024-01-14 DIAGNOSIS — E86.0 DEHYDRATION: ICD-10-CM

## 2024-01-14 DIAGNOSIS — I10 ESSENTIAL HYPERTENSION: ICD-10-CM

## 2024-01-14 DIAGNOSIS — Z71.89 ACP (ADVANCE CARE PLANNING): ICD-10-CM

## 2024-01-14 DIAGNOSIS — R18.8 CIRRHOSIS OF LIVER WITH ASCITES, UNSPECIFIED HEPATIC CIRRHOSIS TYPE (HCC): ICD-10-CM

## 2024-01-14 DIAGNOSIS — K74.60 CIRRHOSIS OF LIVER WITH ASCITES, UNSPECIFIED HEPATIC CIRRHOSIS TYPE (HCC): ICD-10-CM

## 2024-01-14 DIAGNOSIS — R10.84 GENERALIZED ABDOMINAL PAIN: ICD-10-CM

## 2024-01-14 PROBLEM — C25.9 PANCREATIC CANCER (HCC): Status: ACTIVE | Noted: 2024-01-14

## 2024-01-14 LAB
ALBUMIN SERPL BCP-MCNC: 3.7 G/DL (ref 3.2–4.9)
ALBUMIN/GLOB SERPL: 1.1 G/DL
ALP SERPL-CCNC: 285 U/L (ref 30–99)
ALT SERPL-CCNC: 57 U/L (ref 2–50)
ANION GAP SERPL CALC-SCNC: 17 MMOL/L (ref 7–16)
AST SERPL-CCNC: 48 U/L (ref 12–45)
BASOPHILS # BLD AUTO: 0.4 % (ref 0–1.8)
BASOPHILS # BLD: 0.03 K/UL (ref 0–0.12)
BILIRUB SERPL-MCNC: 1 MG/DL (ref 0.1–1.5)
BUN SERPL-MCNC: 27 MG/DL (ref 8–22)
CALCIUM ALBUM COR SERPL-MCNC: 9.7 MG/DL (ref 8.5–10.5)
CALCIUM SERPL-MCNC: 9.5 MG/DL (ref 8.5–10.5)
CHLORIDE SERPL-SCNC: 92 MMOL/L (ref 96–112)
CO2 SERPL-SCNC: 22 MMOL/L (ref 20–33)
CREAT SERPL-MCNC: 1.02 MG/DL (ref 0.5–1.4)
EOSINOPHIL # BLD AUTO: 0.05 K/UL (ref 0–0.51)
EOSINOPHIL NFR BLD: 0.7 % (ref 0–6.9)
ERYTHROCYTE [DISTWIDTH] IN BLOOD BY AUTOMATED COUNT: 42.3 FL (ref 35.9–50)
GFR SERPLBLD CREATININE-BSD FMLA CKD-EPI: 86 ML/MIN/1.73 M 2
GLOBULIN SER CALC-MCNC: 3.5 G/DL (ref 1.9–3.5)
GLUCOSE BLD STRIP.AUTO-MCNC: 225 MG/DL (ref 65–99)
GLUCOSE SERPL-MCNC: 248 MG/DL (ref 65–99)
HCT VFR BLD AUTO: 43.7 % (ref 42–52)
HGB BLD-MCNC: 15.1 G/DL (ref 14–18)
IMM GRANULOCYTES # BLD AUTO: 0.03 K/UL (ref 0–0.11)
IMM GRANULOCYTES NFR BLD AUTO: 0.4 % (ref 0–0.9)
LIPASE SERPL-CCNC: 12 U/L (ref 11–82)
LYMPHOCYTES # BLD AUTO: 1.15 K/UL (ref 1–4.8)
LYMPHOCYTES NFR BLD: 16.2 % (ref 22–41)
MCH RBC QN AUTO: 30.6 PG (ref 27–33)
MCHC RBC AUTO-ENTMCNC: 34.6 G/DL (ref 32.3–36.5)
MCV RBC AUTO: 88.5 FL (ref 81.4–97.8)
MONOCYTES # BLD AUTO: 0.53 K/UL (ref 0–0.85)
MONOCYTES NFR BLD AUTO: 7.4 % (ref 0–13.4)
NEUTROPHILS # BLD AUTO: 5.33 K/UL (ref 1.82–7.42)
NEUTROPHILS NFR BLD: 74.9 % (ref 44–72)
NRBC # BLD AUTO: 0 K/UL
NRBC BLD-RTO: 0 /100 WBC (ref 0–0.2)
PLATELET # BLD AUTO: 209 K/UL (ref 164–446)
PMV BLD AUTO: 11.1 FL (ref 9–12.9)
POTASSIUM SERPL-SCNC: 4.8 MMOL/L (ref 3.6–5.5)
PROT SERPL-MCNC: 7.2 G/DL (ref 6–8.2)
RBC # BLD AUTO: 4.94 M/UL (ref 4.7–6.1)
SODIUM SERPL-SCNC: 131 MMOL/L (ref 135–145)
WBC # BLD AUTO: 7.1 K/UL (ref 4.8–10.8)

## 2024-01-14 PROCEDURE — 36415 COLL VENOUS BLD VENIPUNCTURE: CPT

## 2024-01-14 PROCEDURE — 99223 1ST HOSP IP/OBS HIGH 75: CPT | Mod: 25 | Performed by: INTERNAL MEDICINE

## 2024-01-14 PROCEDURE — G0378 HOSPITAL OBSERVATION PER HR: HCPCS

## 2024-01-14 PROCEDURE — 83690 ASSAY OF LIPASE: CPT

## 2024-01-14 PROCEDURE — 96375 TX/PRO/DX INJ NEW DRUG ADDON: CPT

## 2024-01-14 PROCEDURE — 700105 HCHG RX REV CODE 258: Performed by: STUDENT IN AN ORGANIZED HEALTH CARE EDUCATION/TRAINING PROGRAM

## 2024-01-14 PROCEDURE — 99285 EMERGENCY DEPT VISIT HI MDM: CPT

## 2024-01-14 PROCEDURE — A9270 NON-COVERED ITEM OR SERVICE: HCPCS | Performed by: INTERNAL MEDICINE

## 2024-01-14 PROCEDURE — 96372 THER/PROPH/DIAG INJ SC/IM: CPT

## 2024-01-14 PROCEDURE — 82962 GLUCOSE BLOOD TEST: CPT

## 2024-01-14 PROCEDURE — 80053 COMPREHEN METABOLIC PANEL: CPT

## 2024-01-14 PROCEDURE — 96374 THER/PROPH/DIAG INJ IV PUSH: CPT

## 2024-01-14 PROCEDURE — 99497 ADVNCD CARE PLAN 30 MIN: CPT | Performed by: INTERNAL MEDICINE

## 2024-01-14 PROCEDURE — 85025 COMPLETE CBC W/AUTO DIFF WBC: CPT

## 2024-01-14 PROCEDURE — 700111 HCHG RX REV CODE 636 W/ 250 OVERRIDE (IP): Mod: JZ | Performed by: STUDENT IN AN ORGANIZED HEALTH CARE EDUCATION/TRAINING PROGRAM

## 2024-01-14 PROCEDURE — 700102 HCHG RX REV CODE 250 W/ 637 OVERRIDE(OP): Performed by: INTERNAL MEDICINE

## 2024-01-14 RX ORDER — HYDROMORPHONE HYDROCHLORIDE 1 MG/ML
1 INJECTION, SOLUTION INTRAMUSCULAR; INTRAVENOUS; SUBCUTANEOUS ONCE
Status: DISCONTINUED | OUTPATIENT
Start: 2024-01-14 | End: 2024-01-14

## 2024-01-14 RX ORDER — ATORVASTATIN CALCIUM 40 MG/1
40 TABLET, FILM COATED ORAL DAILY
Status: DISCONTINUED | OUTPATIENT
Start: 2024-01-15 | End: 2024-01-17

## 2024-01-14 RX ORDER — SODIUM CHLORIDE, SODIUM LACTATE, POTASSIUM CHLORIDE, CALCIUM CHLORIDE 600; 310; 30; 20 MG/100ML; MG/100ML; MG/100ML; MG/100ML
1000 INJECTION, SOLUTION INTRAVENOUS ONCE
Status: COMPLETED | OUTPATIENT
Start: 2024-01-14 | End: 2024-01-14

## 2024-01-14 RX ORDER — MORPHINE SULFATE 15 MG/1
30 TABLET, FILM COATED, EXTENDED RELEASE ORAL EVERY 12 HOURS
Status: DISCONTINUED | OUTPATIENT
Start: 2024-01-14 | End: 2024-01-15

## 2024-01-14 RX ORDER — ONDANSETRON 2 MG/ML
4 INJECTION INTRAMUSCULAR; INTRAVENOUS ONCE
Status: COMPLETED | OUTPATIENT
Start: 2024-01-14 | End: 2024-01-14

## 2024-01-14 RX ORDER — ENOXAPARIN SODIUM 100 MG/ML
40 INJECTION SUBCUTANEOUS DAILY
Status: DISCONTINUED | OUTPATIENT
Start: 2024-01-14 | End: 2024-01-14

## 2024-01-14 RX ORDER — BISACODYL 10 MG
10 SUPPOSITORY, RECTAL RECTAL
Status: DISCONTINUED | OUTPATIENT
Start: 2024-01-14 | End: 2024-01-19 | Stop reason: HOSPADM

## 2024-01-14 RX ORDER — POLYETHYLENE GLYCOL 3350 17 G/17G
1 POWDER, FOR SOLUTION ORAL
Status: DISCONTINUED | OUTPATIENT
Start: 2024-01-14 | End: 2024-01-19 | Stop reason: HOSPADM

## 2024-01-14 RX ORDER — HYDROMORPHONE HYDROCHLORIDE 1 MG/ML
1 INJECTION, SOLUTION INTRAMUSCULAR; INTRAVENOUS; SUBCUTANEOUS
Status: DISCONTINUED | OUTPATIENT
Start: 2024-01-14 | End: 2024-01-14

## 2024-01-14 RX ORDER — HYDROMORPHONE HYDROCHLORIDE 2 MG/ML
2 INJECTION, SOLUTION INTRAMUSCULAR; INTRAVENOUS; SUBCUTANEOUS ONCE
Status: COMPLETED | OUTPATIENT
Start: 2024-01-14 | End: 2024-01-14

## 2024-01-14 RX ORDER — ACETAMINOPHEN 500 MG
1000 TABLET ORAL
Status: DISCONTINUED | OUTPATIENT
Start: 2024-01-14 | End: 2024-01-14

## 2024-01-14 RX ORDER — MORPHINE SULFATE 100 MG/5ML
20 SOLUTION ORAL
Status: DISCONTINUED | OUTPATIENT
Start: 2024-01-14 | End: 2024-01-15

## 2024-01-14 RX ORDER — ACETAMINOPHEN 325 MG/1
650 TABLET ORAL EVERY 6 HOURS PRN
Status: DISCONTINUED | OUTPATIENT
Start: 2024-01-14 | End: 2024-01-15

## 2024-01-14 RX ORDER — HYDRALAZINE HYDROCHLORIDE 50 MG/1
25 TABLET, FILM COATED ORAL 3 TIMES DAILY
Status: DISCONTINUED | OUTPATIENT
Start: 2024-01-14 | End: 2024-01-17

## 2024-01-14 RX ORDER — FUROSEMIDE 20 MG/1
20 TABLET ORAL DAILY
Status: DISCONTINUED | OUTPATIENT
Start: 2024-01-15 | End: 2024-01-19 | Stop reason: HOSPADM

## 2024-01-14 RX ORDER — AMOXICILLIN 250 MG
2 CAPSULE ORAL 2 TIMES DAILY
Status: DISCONTINUED | OUTPATIENT
Start: 2024-01-14 | End: 2024-01-19 | Stop reason: HOSPADM

## 2024-01-14 RX ORDER — FENTANYL 50 UG/1
1 PATCH TRANSDERMAL
Status: DISCONTINUED | OUTPATIENT
Start: 2024-01-14 | End: 2024-01-18

## 2024-01-14 RX ADMIN — INSULIN GLARGINE-YFGN 10 UNITS: 100 INJECTION, SOLUTION SUBCUTANEOUS at 20:20

## 2024-01-14 RX ADMIN — DOCUSATE SODIUM 50 MG AND SENNOSIDES 8.6 MG 2 TABLET: 8.6; 5 TABLET, FILM COATED ORAL at 20:12

## 2024-01-14 RX ADMIN — HYDROMORPHONE HYDROCHLORIDE 1 MG: 1 INJECTION, SOLUTION INTRAMUSCULAR; INTRAVENOUS; SUBCUTANEOUS at 16:15

## 2024-01-14 RX ADMIN — HYDROMORPHONE HYDROCHLORIDE 2 MG: 2 INJECTION, SOLUTION INTRAMUSCULAR; INTRAVENOUS; SUBCUTANEOUS at 20:12

## 2024-01-14 RX ADMIN — HYDRALAZINE HYDROCHLORIDE 25 MG: 25 TABLET ORAL at 20:12

## 2024-01-14 RX ADMIN — SACUBITRIL AND VALSARTAN 1 TABLET: 97; 103 TABLET, FILM COATED ORAL at 20:12

## 2024-01-14 RX ADMIN — ONDANSETRON 4 MG: 2 INJECTION INTRAMUSCULAR; INTRAVENOUS at 16:14

## 2024-01-14 RX ADMIN — SODIUM CHLORIDE, POTASSIUM CHLORIDE, SODIUM LACTATE AND CALCIUM CHLORIDE 1000 ML: 600; 310; 30; 20 INJECTION, SOLUTION INTRAVENOUS at 16:15

## 2024-01-14 RX ADMIN — APIXABAN 5 MG: 5 TABLET, FILM COATED ORAL at 20:12

## 2024-01-14 RX ADMIN — FENTANYL 1 PATCH: 50 PATCH TRANSDERMAL at 20:13

## 2024-01-14 ASSESSMENT — ENCOUNTER SYMPTOMS
PHOTOPHOBIA: 0
MYALGIAS: 0
DIARRHEA: 0
NAUSEA: 0
DIZZINESS: 0
ORTHOPNEA: 0
CLAUDICATION: 0
CHILLS: 0
WEAKNESS: 0
SPEECH CHANGE: 0
CONSTIPATION: 0
DOUBLE VISION: 0
HEMOPTYSIS: 0
FEVER: 0
COUGH: 0
BLURRED VISION: 0
VOMITING: 0
WEIGHT LOSS: 0
NECK PAIN: 0
ABDOMINAL PAIN: 1
PALPITATIONS: 0

## 2024-01-14 ASSESSMENT — LIFESTYLE VARIABLES
EVER HAD A DRINK FIRST THING IN THE MORNING TO STEADY YOUR NERVES TO GET RID OF A HANGOVER: NO
ON A TYPICAL DAY WHEN YOU DRINK ALCOHOL HOW MANY DRINKS DO YOU HAVE: 0
TOTAL SCORE: 0
AVERAGE NUMBER OF DAYS PER WEEK YOU HAVE A DRINK CONTAINING ALCOHOL: 0
EVER FELT BAD OR GUILTY ABOUT YOUR DRINKING: NO
HAVE YOU EVER FELT YOU SHOULD CUT DOWN ON YOUR DRINKING: NO
ALCOHOL_USE: NO
HAVE PEOPLE ANNOYED YOU BY CRITICIZING YOUR DRINKING: NO
DOES PATIENT WANT TO STOP DRINKING: NO
CONSUMPTION TOTAL: NEGATIVE
TOTAL SCORE: 0
HOW MANY TIMES IN THE PAST YEAR HAVE YOU HAD 5 OR MORE DRINKS IN A DAY: 0
TOTAL SCORE: 0

## 2024-01-14 ASSESSMENT — FIBROSIS 4 INDEX
FIB4 SCORE: 1.67
FIB4 SCORE: 1.21

## 2024-01-14 ASSESSMENT — CHA2DS2 SCORE
VASCULAR DISEASE: YES
DIABETES: YES
CHF OR LEFT VENTRICULAR DYSFUNCTION: YES
HYPERTENSION: YES
AGE 75 OR GREATER: NO
PRIOR STROKE OR TIA OR THROMBOEMBOLISM: NO
CHA2DS2 VASC SCORE: 4
SEX: MALE
AGE 65 TO 74: NO

## 2024-01-14 ASSESSMENT — PAIN DESCRIPTION - PAIN TYPE
TYPE: CHRONIC PAIN
TYPE: CHRONIC PAIN
TYPE: ACUTE PAIN

## 2024-01-14 NOTE — ED TRIAGE NOTES
"Chief Complaint   Patient presents with    Abdominal Pain     Pt reports a recent diagnosis of pancreatic cancer. Pt states he is supposed to start hospice care but it hasn't begun yet. Pt states that he has a fentanyl patch and PO oxycodone for pain that he last took one hour ago but he is still in pain. Pt also reports that he hasn't had a solid BM in a few days and he feels constipated. Pt tried an enema at home last night without relief. Pt also endorses leg swelling despite taking his lasix.      /79   Pulse 81   Temp 36.5 °C (97.7 °F) (Temporal)   Resp 16   Ht 1.753 m (5' 9\")   Wt 83.5 kg (184 lb 1.4 oz)   SpO2 97%   BMI 27.18 kg/m²     "

## 2024-01-14 NOTE — ED PROVIDER NOTES
ED Provider Note    CHIEF COMPLAINT  Chief Complaint   Patient presents with    Abdominal Pain     Pt reports a recent diagnosis of pancreatic cancer. Pt states he is supposed to start hospice care but it hasn't begun yet. Pt states that he has a fentanyl patch and PO oxycodone for pain that he last took one hour ago but he is still in pain. Pt also reports that he hasn't had a solid BM in a few days and he feels constipated. Pt tried an enema at home last night without relief. Pt also endorses leg swelling despite taking his lasix.        EXTERNAL RECORDS REVIEWED  Inpatient Notes patient was discharged from the hospital 12/30/2023 after admission in the setting of severe cancer related pain.  History of recently diagnosed pancreatic cancer based on biopsy at the end of last year.  Cancer severity not amenable to treatment and patient currently in the process of considering hospice versus palliative chemotherapy.  Patient was discharged on multiple oral opiates including extended release morphine and short acting oxycodone for breakthrough pain.  and Outpatient Notes patient last saw palliative physician Dr. Glass 1/9/2024 for follow-up and symptom management of his ongoing cancer related pain.  At that time patient notes that he was continuing to experience severe breakthrough pain despite taking morphine and oxycodone.  He was transition to a fentanyl patch and liquid morphine.  He was initiated on 100 mcg fentanyl patch and 20 mg morphine every 3 hours as needed.  Patient in process of enrolling with hospice at that time though had not established yet.    HPI/ROS  LIMITATION TO HISTORY   Select: : None      Lai Dailey is a 53 y.o. male who presents to the emergency department for evaluation of abdominal pain and distention.  Patient reports ongoing severe generalized abdominal pain despite taking his multiple outpatient oral analgesics.  Pain is described as intermittent cramping severe pain and he  reports progressively worsening abdominal distention and bloating.  He is continuing to pass gas and had a bowel movement yesterday.  He reports nausea and states that he cannot eat as eating exacerbates his pain significantly.  He also reports that his legs are swelling.  He denies shortness of breath, chest pain, fevers or chills, urinary symptoms.    PAST MEDICAL HISTORY   has a past medical history of CHF (congestive heart failure) ([facility]), Diabetes (HCC), Hyperlipidemia, Hypertension, Pacemaker, and Snoring (10/18/2021).    SURGICAL HISTORY   has a past surgical history that includes aicd implant (2010).    FAMILY HISTORY  Family History   Problem Relation Age of Onset    Colon Cancer Mother     Hypertension Sister     Stroke Brother     Heart Disease Neg Hx        SOCIAL HISTORY  Social History     Tobacco Use    Smoking status: Never    Smokeless tobacco: Never   Vaping Use    Vaping Use: Never used   Substance and Sexual Activity    Alcohol use: Yes     Comment: occ    Drug use: No     Comment: Marijuana use as youth    Sexual activity: Not on file       CURRENT MEDICATIONS  Home Medications       Reviewed by Jeanne Epstein R.N. (Registered Nurse) on 01/14/24 at 1936  Med List Status: Complete     Medication Last Dose Status   acetaminophen (TYLENOL) 500 MG Tab 1/14/2024 Active   apixaban (ELIQUIS) 5mg Tab 1/14/2024 Active   atorvastatin (LIPITOR) 40 MG Tab 1/14/2024 Active   Empagliflozin (JARDIANCE) 10 MG Tab tablet 1/14/2024 Active   fentaNYL (DURAGESIC) 50 MCG/HR PATCH 72 HR 1/14/2024 Active   furosemide (LASIX) 20 MG Tab 1/14/2024 Active   hydrALAZINE (APRESOLINE) 25 MG Tab 1/14/2024 Active   insulin glargine (LANTUS) 100 UNIT/ML Solution 1/13/2024 Active   isosorbide dinitrate (ISORDIL) 10 MG Tab  Active   magnesium oxide 400 (240 Mg) MG Tab 1/14/2024 Active   morphine (ROXANOL) 20 MG/ML Solution 1/14/2024 Active   morphine ER (MS CONTIN) 15 MG Tab CR tablet 1/13/2024 Active   multivitamin (THERAGRAN) Tab  "1/14/2024 Active   polyethylene glycol/lytes (MIRALAX) Pack 1/14/2024 Active   sacubitril-valsartan (ENTRESTO)  MG Tab 1/14/2024 Active                    ALLERGIES  No Known Allergies    PHYSICAL EXAM  VITAL SIGNS: /74   Pulse 77   Temp 36.5 °C (97.7 °F) (Temporal)   Resp 14   Ht 1.765 m (5' 9.5\")   Wt 83.1 kg (183 lb 3.2 oz)   SpO2 92%   BMI 26.67 kg/m²    Constitutional: No acute distress  HEENT: Atraumatic, normocephalic, pupils are equal round reactive to light, nose normal, mouth shows moist mucous membranes  Neck: Supple, no JVD, no tracheal deviation  Cardiovascular: Regular rate and rhythm, no murmur, rub or gallop, 2+ pulses peripherally x4  Thorax & Lungs: No respiratory distress, no wheezes, rales or rhonchi, no chest wall tenderness.  GI: Distended, firm, diffusely tender, no rebound  Skin: Warm, dry, no acute rash or lesion  Musculoskeletal: Moving all extremities, no acute deformity, 2+ bilateral lower extremity edema, no tenderness  Neurologic: A&Ox3, at baseline mentation, cranial nerves II through XII are grossly intact, no sensory deficit, no ataxia  Psychiatric: Appropriate affect for situation at this time        DIAGNOSTIC STUDIES / PROCEDURES    LABS  Labs Reviewed   CBC WITH DIFFERENTIAL - Abnormal; Notable for the following components:       Result Value    Neutrophils-Polys 74.90 (*)     Lymphocytes 16.20 (*)     All other components within normal limits   COMP METABOLIC PANEL - Abnormal; Notable for the following components:    Sodium 131 (*)     Chloride 92 (*)     Anion Gap 17.0 (*)     Glucose 248 (*)     Bun 27 (*)     AST(SGOT) 48 (*)     ALT(SGPT) 57 (*)     Alkaline Phosphatase 285 (*)     All other components within normal limits   POCT GLUCOSE DEVICE RESULTS - Abnormal; Notable for the following components:    POC Glucose, Blood 225 (*)     All other components within normal limits   LIPASE   ESTIMATED GFR             COURSE & MEDICAL DECISION MAKING    ED " Observation Status? No; Patient does not meet criteria for ED Observation.     INITIAL ASSESSMENT, COURSE AND PLAN    Care Narrative:     Patient presents to the emergency department for evaluation of ongoing cancer-related pain despite taking his very high dose outpatient analgesic regiment.  Vitals are stable and abdomen is nonperitoneal at this time.  Symptom description highly consistent with his known ongoing chronic pain however unfortunately he is having difficulty with symptom control outpatient.  He is in the process of establishing care with hospice but has not done so yet.  He states that he would like to focus on comfort based medical therapy and avoid unnecessary tests however cannot go home at this point in the current condition that he is in due to his inability to eat and severity of this pain.  Patient is otherwise quite pleasant and per records review has no evidence of medication diversion or abuse.  Will plan for basic labs to screen for significant electrolyte abnormality or kidney injury.  Will provide IV fluids and escalating doses of IV Dilaudid, Zofran for symptom control.  At this point while patient is at risk of bowel obstruction he is continuing to pass gas and had a bowel movement yesterday.  Will avoid CT imaging at this point as I feel it with not necessarily contribute to his comfort focused therapy.    Laboratory workup remarkable for hyponatremia which I suspect is hypovolemic in the setting of his poor p.o. intake.  Anion gap metabolic acidosis likely in the setting of dehydration, possible starvation ketosis.  Remainder of labs at patient's baseline.    Despite multiple rounds of high-dose IV opiate patient with continued severe abdominal pain.  Will plan for admission for pain control.  Patient on board with this plan.    Case discussed with admitting hospitalist Dr. Reilly who accepts the patient for admission.    HYDRATION: Based on the patient's presentation of Dehydration  the patient was given IV fluids. IV Hydration was used because oral hydration was not adequate alone. Upon recheck following hydration, the patient was improved.      ADDITIONAL PROBLEM LIST  Pancreatic cancer, chronic cancer-related pain, dehydration, hyponatremia    DISPOSITION AND DISCUSSIONS  I have discussed management of the patient with the following physicians and DIXON's: Admitting hospitalist      Discussion of management with other Q or appropriate source(s): None     Escalation of care considered, and ultimately not performed:diagnostic imaging    Decision tools and prescription drugs considered including, but not limited to: Pain Medications Dilaudid and escalating doses .    FINAL DIAGNOSIS  1. Malignant neoplasm of body of pancreas (HCC)    2. Generalized abdominal pain    3. Dehydration    4. Essential hypertension    5. ACP (advance care planning)    6. Cirrhosis of liver with ascites, unspecified hepatic cirrhosis type (HCC)           Electronically signed by: Pineda Mcbride M.D., 1/14/2024 3:56 PM

## 2024-01-15 ENCOUNTER — APPOINTMENT (OUTPATIENT)
Dept: RADIOLOGY | Facility: MEDICAL CENTER | Age: 54
DRG: 948 | End: 2024-01-15
Attending: STUDENT IN AN ORGANIZED HEALTH CARE EDUCATION/TRAINING PROGRAM
Payer: MEDICARE

## 2024-01-15 PROBLEM — R52 INTRACTABLE PAIN: Status: ACTIVE | Noted: 2024-01-15

## 2024-01-15 LAB
GLUCOSE BLD STRIP.AUTO-MCNC: 182 MG/DL (ref 65–99)
GLUCOSE BLD STRIP.AUTO-MCNC: 211 MG/DL (ref 65–99)
GLUCOSE BLD STRIP.AUTO-MCNC: 212 MG/DL (ref 65–99)
GLUCOSE BLD STRIP.AUTO-MCNC: 225 MG/DL (ref 65–99)

## 2024-01-15 PROCEDURE — A9270 NON-COVERED ITEM OR SERVICE: HCPCS | Performed by: INTERNAL MEDICINE

## 2024-01-15 PROCEDURE — 99221 1ST HOSP IP/OBS SF/LOW 40: CPT | Mod: 25 | Performed by: STUDENT IN AN ORGANIZED HEALTH CARE EDUCATION/TRAINING PROGRAM

## 2024-01-15 PROCEDURE — 700111 HCHG RX REV CODE 636 W/ 250 OVERRIDE (IP): Performed by: HOSPITALIST

## 2024-01-15 PROCEDURE — 700111 HCHG RX REV CODE 636 W/ 250 OVERRIDE (IP): Mod: JZ

## 2024-01-15 PROCEDURE — 770001 HCHG ROOM/CARE - MED/SURG/GYN PRIV*

## 2024-01-15 PROCEDURE — 96375 TX/PRO/DX INJ NEW DRUG ADDON: CPT

## 2024-01-15 PROCEDURE — 99233 SBSQ HOSP IP/OBS HIGH 50: CPT | Mod: 25 | Performed by: HOSPITALIST

## 2024-01-15 PROCEDURE — A9270 NON-COVERED ITEM OR SERVICE: HCPCS | Performed by: STUDENT IN AN ORGANIZED HEALTH CARE EDUCATION/TRAINING PROGRAM

## 2024-01-15 PROCEDURE — 76705 ECHO EXAM OF ABDOMEN: CPT

## 2024-01-15 PROCEDURE — 96376 TX/PRO/DX INJ SAME DRUG ADON: CPT

## 2024-01-15 PROCEDURE — 74018 RADEX ABDOMEN 1 VIEW: CPT

## 2024-01-15 PROCEDURE — 700102 HCHG RX REV CODE 250 W/ 637 OVERRIDE(OP): Performed by: STUDENT IN AN ORGANIZED HEALTH CARE EDUCATION/TRAINING PROGRAM

## 2024-01-15 PROCEDURE — 302129 PCA PLUS W/IV POLE: Performed by: HOSPITALIST

## 2024-01-15 PROCEDURE — 99497 ADVNCD CARE PLAN 30 MIN: CPT | Performed by: HOSPITALIST

## 2024-01-15 PROCEDURE — 99497 ADVNCD CARE PLAN 30 MIN: CPT | Performed by: STUDENT IN AN ORGANIZED HEALTH CARE EDUCATION/TRAINING PROGRAM

## 2024-01-15 PROCEDURE — 82962 GLUCOSE BLOOD TEST: CPT | Mod: 91

## 2024-01-15 PROCEDURE — 700102 HCHG RX REV CODE 250 W/ 637 OVERRIDE(OP): Performed by: INTERNAL MEDICINE

## 2024-01-15 RX ORDER — MORPHINE SULFATE 100 MG/5ML
10-20 SOLUTION ORAL
Status: DISCONTINUED | OUTPATIENT
Start: 2024-01-15 | End: 2024-01-15

## 2024-01-15 RX ORDER — LACTULOSE 20 G/30ML
45 SOLUTION ORAL
Status: DISCONTINUED | OUTPATIENT
Start: 2024-01-15 | End: 2024-01-19 | Stop reason: HOSPADM

## 2024-01-15 RX ORDER — MORPHINE SULFATE 30 MG/1
30 TABLET, FILM COATED, EXTENDED RELEASE ORAL EVERY 8 HOURS
Status: DISCONTINUED | OUTPATIENT
Start: 2024-01-15 | End: 2024-01-19 | Stop reason: HOSPADM

## 2024-01-15 RX ORDER — MORPHINE SULFATE 4 MG/ML
4 INJECTION INTRAVENOUS
Status: DISCONTINUED | OUTPATIENT
Start: 2024-01-15 | End: 2024-01-15

## 2024-01-15 RX ORDER — LACTULOSE 20 G/30ML
30 SOLUTION ORAL
Status: DISCONTINUED | OUTPATIENT
Start: 2024-01-15 | End: 2024-01-15

## 2024-01-15 RX ADMIN — MORPHINE SULFATE 4 MG: 4 INJECTION, SOLUTION INTRAMUSCULAR; INTRAVENOUS at 07:48

## 2024-01-15 RX ADMIN — MORPHINE SULFATE 30 MG: 30 TABLET, FILM COATED, EXTENDED RELEASE ORAL at 21:23

## 2024-01-15 RX ADMIN — APIXABAN 5 MG: 5 TABLET, FILM COATED ORAL at 05:24

## 2024-01-15 RX ADMIN — MORPHINE SULFATE 4 MG: 4 INJECTION, SOLUTION INTRAMUSCULAR; INTRAVENOUS at 04:36

## 2024-01-15 RX ADMIN — SACUBITRIL AND VALSARTAN 1 TABLET: 97; 103 TABLET, FILM COATED ORAL at 17:41

## 2024-01-15 RX ADMIN — HYDRALAZINE HYDROCHLORIDE 25 MG: 25 TABLET ORAL at 17:33

## 2024-01-15 RX ADMIN — INSULIN HUMAN 2 UNITS: 100 INJECTION, SOLUTION PARENTERAL at 21:21

## 2024-01-15 RX ADMIN — SACUBITRIL AND VALSARTAN 1 TABLET: 97; 103 TABLET, FILM COATED ORAL at 05:27

## 2024-01-15 RX ADMIN — INSULIN GLARGINE-YFGN 10 UNITS: 100 INJECTION, SOLUTION SUBCUTANEOUS at 17:34

## 2024-01-15 RX ADMIN — Medication: at 10:43

## 2024-01-15 RX ADMIN — LACTULOSE 45 ML: 20 SOLUTION ORAL at 17:33

## 2024-01-15 RX ADMIN — HYDRALAZINE HYDROCHLORIDE 25 MG: 25 TABLET ORAL at 05:25

## 2024-01-15 RX ADMIN — DOCUSATE SODIUM 50 MG AND SENNOSIDES 8.6 MG 2 TABLET: 8.6; 5 TABLET, FILM COATED ORAL at 05:23

## 2024-01-15 RX ADMIN — FUROSEMIDE 20 MG: 40 TABLET ORAL at 05:24

## 2024-01-15 RX ADMIN — MORPHINE SULFATE 30 MG: 15 TABLET, FILM COATED, EXTENDED RELEASE ORAL at 05:24

## 2024-01-15 RX ADMIN — INSULIN HUMAN 1 UNITS: 100 INJECTION, SOLUTION PARENTERAL at 17:33

## 2024-01-15 RX ADMIN — DOCUSATE SODIUM 50 MG AND SENNOSIDES 8.6 MG 2 TABLET: 8.6; 5 TABLET, FILM COATED ORAL at 17:32

## 2024-01-15 RX ADMIN — ATORVASTATIN CALCIUM 40 MG: 40 TABLET, FILM COATED ORAL at 05:24

## 2024-01-15 RX ADMIN — MORPHINE SULFATE 20 MG: 100 SOLUTION ORAL at 00:48

## 2024-01-15 ASSESSMENT — ENCOUNTER SYMPTOMS
NEUROLOGICAL NEGATIVE: 1
BRUISES/BLEEDS EASILY: 0
PSYCHIATRIC NEGATIVE: 1
SHORTNESS OF BREATH: 0
CHILLS: 0
COUGH: 0
VOMITING: 0
EYES NEGATIVE: 1
WEAKNESS: 0
NAUSEA: 0
DIZZINESS: 0
FEVER: 0
BLURRED VISION: 0
DEPRESSION: 0
WEIGHT LOSS: 0
MYALGIAS: 0
DIAPHORESIS: 0
MUSCULOSKELETAL NEGATIVE: 1
RESPIRATORY NEGATIVE: 1
PALPITATIONS: 0
FOCAL WEAKNESS: 0
ABDOMINAL PAIN: 1
HEADACHES: 0
SORE THROAT: 0
CARDIOVASCULAR NEGATIVE: 1

## 2024-01-15 ASSESSMENT — LIFESTYLE VARIABLES: SUBSTANCE_ABUSE: 0

## 2024-01-15 ASSESSMENT — PAIN DESCRIPTION - PAIN TYPE
TYPE: CHRONIC PAIN
TYPE: CHRONIC PAIN
TYPE: CHRONIC PAIN;ACUTE PAIN
TYPE: CHRONIC PAIN

## 2024-01-15 NOTE — ASSESSMENT & PLAN NOTE
Continue Entresto, hydralazine, Lasix and atorvastatin  No signs of acute exacerbation  Comfort care

## 2024-01-15 NOTE — ASSESSMENT & PLAN NOTE
Discussion regarding goals of care, he confirmed he would like hospice and would like to go home, 10 minutes. Confirmed dnr/dni    1/17: Goal of care discussed with patient. He refused aggressive managements, no chemotherapy nor radiation therapy.  Patient is AOx4 and has medical decision-making capacity.  I have discussed comfort care measures. Patient is in agreement. I have updated to palliative Dr. Schwarz (Time spent: 17 mins)

## 2024-01-15 NOTE — CONSULTS
Inpatient Palliative Medicine Evaluation     Lai Dailey  53 y.o. male  [id number]    Location: [facility]  PCP: Tor Rdz M.D.  Referral Source: Crystal Day M.d.    Reason for palliative medicine consultation and/or visit: ACP         Assessment and Plan:     GOAL(S) OF CARE = Quality of Life, Optimization & Start Hospice upon DC    SYMPTOMS ETIOLOGY/CAUSES = abdominal pain secondary to pancreatic cancer & abdominal distention (malignant ascites? Us pending)    PROGNOSIS =   - YES hospice-eligible = advancing aggressive + pancreatic cancer, poor function now PPS=40-50%<70%, 183 IB vs 205 Ib in NOV 2023 (11% weight loss since NOV 2023)  - YES hospice-appropriate = expressed wish to start hospice care upon DC    CODE STATUS = DNAR DNI    ADVANCE CARE PLANNING =   Relevant history reviewed  Medical update    PALLIATIVE CARE TEAM INTERVENTIONS =   - Long-acting regimen =  Increased mscontin 30mg to q8H ==> 90 OME  Continue fentanyl patch 50mcg/hr ==> 100 OME  Next fentanyl patch replacement (ideally PM 1/16 or 1/17) will be fentanyl 100mcg/hr (200 OME), with simultaneous discontinue of mscontin    - Breakthrough regimen =   Continue PCA pump with current generous setting dilaudid 2mg IV q20min PRN  Will reassess later, after increased long-acting regimen takes effect.    - Bowel regimen =   Added lactulose 45mg q noon RTC, for better bowel regulation.  0 BM since admission.  2.  Continue daily docusenna 2 tabs BID and other PRN's    Adjunct =   Agree with Celiac plexus blockade (RE-)evaluation by IR, for potential pain relief. (NOTE: Celiac plexus neurolysis attempted/performed once at [facility] on 12/19/2023, per Care Everywhere)     Added limited abdominal  ultrasound to assess for possible ?Malignant ascites. ==> if positive, will consider paracentesis +/- IR indwelling drain prior to dc  ==>  UPDATE: Ascites noted in all quadrants. IR evaluation ordered to evaluate for 1) paracentesis 2) indwelling  path prior to dc home with hospice, as patient is not seeking further oncology treatments. (Hospice referral already in and filled by [facility])      Added Abdominal XR / KUB to assess for possible ?bowel obstruction ==> if positive, this may speak more for a GIP hospice scenario VS onc surgery consult for palliative surgery/procedure then home hospice  3.   Will call [facility] to try to schedule representative to help disable ICD defibrillation function, to be more aligned with comfort philosophy.      DISPO = conservative medical optimization prior to dc to resume hospice care again          Summary:   Lai Dailey is 53 y.o. with prostate cancer, originally referred to hospice 2024 by oncology team, but unfortunately readmitted 2024 due to worsening abdominal pain. History also noted for CHF EF=15% sp ICD implanted  ( "[employer] model 0293)  -----------------------------------------------------------------------------------------------------------------------------  Introduced my role as a PCMD and reason of this consultation = pain and symptom control. Patient was tired and still having significant pain, but was willing to proceed with this encounter.    It's been an aggressive disease since diagnosis about 2 months ago. Patient unfortunately had to defer hospice start date, due to having to attend an uncle's  in [place].      He has been working with oncology team outpatient. Fentanyl patch 50mcg/hr was helpful but no longer sufficient for his rapidly worsening disease and pain. He also did try Roxanol PRN outpatient, without noticeable adverse effects.    So far he responded well to the generous PCA @ 1mg dilaudid IV q10min PRN (1 shot given by the time of our visit). We discussed medication changes as listed above with hope for better pain and symptom control, also also his need for a bigger fentanyl patch by either tomorrow or WED when we replace it with a  100mcg/hr one.    Abdomen was distended and + wave sign on my physical exam, suspicious for extensive malignant ascites. Patient was agreeable to an US study and possible drainage if indicated.    Primary team has also ordered IR evaluation for potential celiac plexus blockade, which I'm also in agreement with.    Will continue following and reassess and optimize pain and symptom control.           Advance care planning:     Total time spent in ACP discussion 30 minutes, which is separate from the time spent completing the evaluation and management visit.     Thank you for allowing me the opportunity to participate in the care of Lai Sánchezony Asim     I spent a total of 45 minutes reviewing medical records, direct face-to-face time with the patient and/or family, documentation and coordination of care. This is separate from the time spent on advance care planning, which is documented above.         BHAVIN VERDE DO (TIM)  [facility]   [id number] [place]  [id number]  P: [phone]  F: [phone]  C: [phone]

## 2024-01-15 NOTE — CARE PLAN
The patient is Watcher - Medium risk of patient condition declining or worsening    Shift Goals  Clinical Goals: Pain management, long term pain plan  Patient Goals: Comfort  Family Goals: NA    Progress made toward(s) clinical / shift goals:    Problem: Pain - Standard  Goal: Alleviation of pain or a reduction in pain to the patient’s comfort goal  Description: Target End Date:  Prior to discharge or change in level of care    Document on Vitals flowsheet    1.  Document pain using the appropriate pain scale per order or unit policy  2.  Educate and implement non-pharmacologic comfort measures (i.e. relaxation, distraction, massage, cold/heat therapy, etc.)  3.  Pain management medications as ordered  4.  Reassess pain after pain med administration per policy  5.  If opiods administered assess patient's response to pain medication is appropriate per POSS sedation scale  6.  Follow pain management plan developed in collaboration with patient and interdisciplinary team (including palliative care or pain specialists if applicable)  Outcome: Progressing    Pt assessed for pain regularly and medicated PRN per MAR.        Patient is not progressing towards the following goals:

## 2024-01-15 NOTE — DISCHARGE PLANNING
note:  Received a call from [facility] nurse who stated that she will be in 1 hr to see pt.

## 2024-01-15 NOTE — DISCHARGE PLANNING
Met with pt who said he wants [facility] back. He discharge from them apparently because he wanted to go home to [facility] to be with family but now he is in a lot of pain. Choice signed by pt.    PCP is office of Dr. Tor Rdz.  Pharmacy is [facility] in [place].       Care Transition Team Assessment    Information Source  Orientation Level: Oriented X4  Information Given By: Patient  Who is responsible for making decisions for patient? : Patient    Readmission Evaluation  Is this a readmission?: No    Elopement Risk  Legal Hold: No  Ambulatory or Self Mobile in Wheelchair: No-Not an Elopement Risk  Elopement Risk: Not at Risk for Elopement    Interdisciplinary Discharge Planning  Does Admitting Nurse Feel This Could be a Complex Discharge?: Yes  Primary Care Physician: Office of Dr. Rdz  Lives with - Patient's Self Care Capacity: Significant Other  Patient or legal guardian wants to designate a caregiver: Yes  Caregiver name: Mal  Support Systems: Family Member(s), Spouse / Significant Other  Housing / Facility: [address]  Do You Take your Prescribed Medications Regularly: Yes  Able to Return to Previous ADL's: Yes  Mobility Issues: No  Prior Services: Home-Independent  Patient Prefers to be Discharged to:: Home with Hospice  Assistance Needed: Yes  Durable Medical Equipment: Not Applicable    Discharge Preparedness  What is your plan after discharge?: Home with help  What are your discharge supports?: Spouse, Child  Prior Functional Level: Ambulatory, Independent with Activities of Daily Living, Independent with Medication Management  Difficulity with ADLs: None  Difficulity with IADLs: Driving    Functional Assesment  Prior Functional Level: Ambulatory, Independent with Activities of Daily Living, Independent with Medication Management    Finances  Financial Barriers to Discharge: No  Prescription Coverage: Yes    Vision / Hearing Impairment  Vision Impairment : No  Hearing Impairment :  No    Values / Beliefs / Concerns  Values / Beliefs Concerns : No    Advance Directive  Advance Directive?: POLST, Living Will    Domestic Abuse  Have you ever been the victim of abuse or violence?: No  Physical Abuse or Sexual Abuse: No  Verbal Abuse or Emotional Abuse: No  Possible Abuse/Neglect Reported to:: Not Applicable         Discharge Risks or Barriers  Discharge risks or barriers?: Post-acute placement / services  Patient risk factors: Cognitive / sensory / physical deficit, Complex medical needs    Anticipated Discharge Information  Discharge Disposition: D/T to hospice home (50)

## 2024-01-15 NOTE — H&P
Hospital Medicine History & Physical Note    Date of Service  1/14/2024    Primary Care Physician  Tor Rdz M.D.    Consultants  None     Code Status  DNAR/DNI    Chief Complaint  Chief Complaint   Patient presents with    Abdominal Pain     Pt reports a recent diagnosis of pancreatic cancer. Pt states he is supposed to start hospice care but it hasn't begun yet. Pt states that he has a fentanyl patch and PO oxycodone for pain that he last took one hour ago but he is still in pain. Pt also reports that he hasn't had a solid BM in a few days and he feels constipated. Pt tried an enema at home last night without relief. Pt also endorses leg swelling despite taking his lasix.        History of Presenting Illness    52-year-old male with history of pancreatic cancer, diabetes, hypertension, cardiomyopathy with ejection fraction 20% came on 1/14 for abdominal pain.  Patient has history of nonoperative pancreatic cancer also patient refused any chemotherapy or radiation therapy.  During last hospitalization in December 2023, patient was evaluated by palliative team and discharged on morphine with hospice however today he ran out of his medication and hospice as outpatient was not set up yet, patient decided to come to the emergency for pain control, on admission vital signs were stable, labs around baseline with elevated liver enzymes, the plan of care was discussed with the patient and he does not want any aggressive treatment and he wants to be DNR/DNI.    I discussed the plan of care with patient, bedside RN, and ED physician .    Review of Systems  Review of Systems   Constitutional:  Negative for chills, fever and weight loss.   HENT:  Negative for ear pain, hearing loss and tinnitus.    Eyes:  Negative for blurred vision, double vision and photophobia.   Respiratory:  Negative for cough and hemoptysis.    Cardiovascular:  Negative for chest pain, palpitations, orthopnea and claudication.   Gastrointestinal:   Positive for abdominal pain. Negative for constipation, diarrhea, nausea and vomiting.   Genitourinary:  Negative for dysuria, frequency and urgency.   Musculoskeletal:  Negative for myalgias and neck pain.   Skin:  Negative for rash.   Neurological:  Negative for dizziness, speech change and weakness.       Past Medical History   has a past medical history of CHF (congestive heart failure) (Formerly [facility]), Diabetes (HCC), Hyperlipidemia, Hypertension, Pacemaker, and Snoring (10/18/2021).    Surgical History   has a past surgical history that includes aicd implant (2010).     Family History  family history includes Colon Cancer in his mother; Hypertension in his sister; Stroke in his brother.   Family history reviewed with patient. There is no family history that is pertinent to the chief complaint.     Social History   reports that he has never smoked. He has never used smokeless tobacco. He reports current alcohol use. He reports that he does not use drugs.    Allergies  No Known Allergies    Medications  Prior to Admission Medications   Prescriptions Last Dose Informant Patient Reported? Taking?   Empagliflozin (JARDIANCE) 10 MG Tab tablet  Patient No No   Sig: Take 1 Tablet by mouth every day.   acetaminophen (TYLENOL) 500 MG Tab  Patient Yes No   Sig: Take 1,000 mg by mouth one time as needed for Mild Pain. 1,000 mg = 2 tabs   apixaban (ELIQUIS) 5mg Tab  Patient No No   Sig: Take 1 Tablet by mouth 2 times a day.   atorvastatin (LIPITOR) 40 MG Tab  Patient No No   Sig: Take 1 Tablet by mouth every day.   fentaNYL (DURAGESIC) 50 MCG/HR PATCH 72 HR   No No   Sig: Place 1 Patch on the skin every 72 hours for 6 days.   furosemide (LASIX) 20 MG Tab  Patient No No   Sig: Take 1 Tablet by mouth every day.   hydrALAZINE (APRESOLINE) 25 MG Tab  Patient No No   Sig: Take 1 Tablet by mouth 3 times a day.   insulin glargine (LANTUS) 100 UNIT/ML Solution  Patient No No   Sig: Inject 10 Units as instructed every evening.   isosorbide  dinitrate (ISORDIL) 10 MG Tab  Patient No No   Sig: Take 1 Tablet by mouth 3 times a day.   Patient not taking: Reported on 1/3/2024   magnesium oxide 400 (240 Mg) MG Tab  Patient No No   Sig: Take 1 Tablet by mouth every day.   morphine (ROXANOL) 20 MG/ML Solution   No No   Sig: Take 1 mL by mouth every 3 hours as needed (for cancer related pain) for up to 7 days.   morphine ER (MS CONTIN) 15 MG Tab CR tablet   No No   Sig: Take 2 Tablets by mouth every 12 hours.   multivitamin (THERAGRAN) Tab  Patient Yes No   Sig: Take 1 Tablet by mouth every day.   polyethylene glycol/lytes (MIRALAX) Pack  Patient No No   Sig: Take 1 Packet by mouth every day. To prevent constipation while taking oxycodone.   sacubitril-valsartan (ENTRESTO)  MG Tab  Patient No No   Sig: Take 1 Tablet by mouth 2 times a day.      Facility-Administered Medications: None       Physical Exam  Temp:  [36.5 °C (97.7 °F)] 36.5 °C (97.7 °F)  Pulse:  [80-81] 80  Resp:  [14-18] 18  BP: (119-120)/(79-94) 120/94  SpO2:  [87 %-99 %] 99 %  Blood Pressure: (!) 120/94   Temperature: 36.5 °C (97.7 °F)   Pulse: 80   Respiration: 18   Pulse Oximetry: 99 %       Physical Exam  Constitutional:       General: He is not in acute distress.     Appearance: He is not ill-appearing.   Eyes:      General: No scleral icterus.  Cardiovascular:      Rate and Rhythm: Normal rate.      Heart sounds: No murmur heard.  Pulmonary:      Effort: No respiratory distress.      Breath sounds: No wheezing.   Abdominal:      General: There is no distension.      Palpations: There is mass.      Tenderness: There is abdominal tenderness. There is no right CVA tenderness, left CVA tenderness or guarding.   Musculoskeletal:      Right lower leg: No edema.      Left lower leg: No edema.   Lymphadenopathy:      Cervical: No cervical adenopathy.   Skin:     Coloration: Skin is not jaundiced.      Findings: No bruising, lesion or rash.   Neurological:      General: No focal deficit  "present.      Mental Status: He is alert and oriented to person, place, and time. Mental status is at baseline.      Cranial Nerves: No cranial nerve deficit.      Motor: No weakness.      Gait: Gait normal.         Laboratory:  Recent Labs     01/14/24  1616   WBC 7.1   RBC 4.94   HEMOGLOBIN 15.1   HEMATOCRIT 43.7   MCV 88.5   MCH 30.6   MCHC 34.6   RDW 42.3   PLATELETCT 209   MPV 11.1     Recent Labs     01/14/24  1616   SODIUM 131*   POTASSIUM 4.8   CHLORIDE 92*   CO2 22   GLUCOSE 248*   BUN 27*   CREATININE 1.02   CALCIUM 9.5     Recent Labs     01/14/24  1616   ALTSGPT 57*   ASTSGOT 48*   ALKPHOSPHAT 285*   TBILIRUBIN 1.0   LIPASE 12   GLUCOSE 248*         No results for input(s): \"NTPROBNP\" in the last 72 hours.      No results for input(s): \"TROPONINT\" in the last 72 hours.    Imaging:  No orders to display       X-Ray:  I have personally reviewed the images and compared with prior images.  EKG:  I have personally reviewed the images and compared with prior images.    Assessment/Plan:  Justification for Admission Status  I anticipate this patient is appropriate for observation status at this time because abdominal pain    Patient will need a Med/Surg bed on MEDICAL service .  The need is secondary to pain related to cancer.    * Cancer related pain- (present on admission)  Assessment & Plan  Consider celiac ablation as outpatient  Continue morphine 30 twice daily and as needed  Hospice referral    Pancreatic cancer (HCC)- (present on admission)  Assessment & Plan  Diagnosed around 2 months ago with weakness involving the splenic vein  PET scan did not show any other metastasis  Patient was not candidate for surgery and refused chemo or radiation  Patient agreed for comfort care and hospice  Patient was to be DNR/DNI  Continue morphine 30 mg twice daily and as needed  Hospice referral to continue care    ACP (advance care planning)- (present on admission)  Assessment & Plan  Patient is alert oriented x 4, on " 1/14, long discussion was done with the patient about the plan of care also discussed the goal of care, answered all his questions, patient is clear that he wants to be DNR/DNI and he wants to to continue hospice care, continue pain medications, hospice referral.  Time 30 minutes    Cirrhosis of liver with ascites (HCC)- (present on admission)  Assessment & Plan  Continue Lasix and spironolactone    Paroxysmal atrial fibrillation (HCC)- (present on admission)  Assessment & Plan  Continue Eliquis     Essential hypertension- (present on admission)  Assessment & Plan  Continue Entresto and Lasix with hydralazine    Nonischemic cardiomyopathy (HCC)- (present on admission)  Assessment & Plan  Continue Entresto, hydralazine, Lasix and atorvastatin  Consider reviewing medication with hospice agency    Implantable cardioverter-defibrillator (ICD) in situ- (present on admission)  Assessment & Plan  Due to cardiomyopathy    Type 2 diabetes mellitus with renal complication (HCC)- (present on admission)  Assessment & Plan  Sliding scale and continue Lantus 10 units daily        VTE prophylaxis: EliquisSCDs/TEDs and therapeutic anticoagulation with Eliquis

## 2024-01-15 NOTE — ASSESSMENT & PLAN NOTE
Intractable  On PCA pump  Continue bowel protocol  Coordinating with palliative care and hospice  Monitoring respiratory status and sedation score  1/18: transition PCA pump to fentanyl patch and ms contin   Discussed with palliative  Discussed with IR, unable to do celiac nerve block

## 2024-01-15 NOTE — ED NOTES
Pt transported off unit with transport tech. Pt awake and breathing with even, unlabored breaths on 5L O2. All belongings with Pt.

## 2024-01-15 NOTE — DISCHARGE PLANNING
note:  Rasheeda from [facility] here and pt signed hospice consent.  Please call Rasheeda at [phone] for DME and transport needs tomorrow.

## 2024-01-15 NOTE — PROGRESS NOTES
"Hospital Medicine Daily Progress Note    Date of Service  1/15/2024    Chief Complaint  Lai Dailey is a 53 y.o. male admitted 1/14/2024 with intractable pain    Hospital Course  Pain is a 52-year-old male with history of pancreatic cancer, diabetes, hypertension, and cardiomyopathy with ejection fraction 20% . He presented to [facility] on 1/14 with intractable abdominal pain. Patient has history of nonoperative pancreatic cancer also patient declined any chemotherapy or radiation therapy. During last hospitalization in December 2023, patient was evaluated by palliative team and discharged on morphine with hospice however today he ran out of his medication and hospice as outpatient was not set up yet, patient decided to come to the emergency for pain control, on admission vital signs were stable, labs around baseline with elevated liver enzymes, the plan of care was discussed with the patient and he does not want any aggressive treatment and he wants to be DNR/DNI.     Interval Problem Update  Axox3, he reports the higher doses of morphine have helped some but he remains doubled over in pain, rating his current diffuse pain at 8/10. + Flatus, he reports he is having bm's. He tells me he has a 7 year old daughter and would like to spend as much quality time with her as possible, his goal is to have better pain control but not be \"out of it drugged up\". We discussed a celiac plexus block which he is open to, also discussed started a morphine pcp now to help titrate up his dose of oral medications. Palliative and Hospice consults are pending. Vitals stable. ROS otherwise negative.    I have discussed this patient's plan of care and discharge plan at IDT rounds today with Case Management, Nursing, Nursing leadership, and other members of the IDT team.    Consultants/Specialty  Palliative Care  Hospice    Code Status  DNAR/DNI    Disposition  The patient is not medically cleared for discharge to home or a " post-acute facility.      I have placed the appropriate orders for post-discharge needs.    Review of Systems  Review of Systems   Constitutional:  Positive for malaise/fatigue. Negative for chills, diaphoresis, fever and weight loss.   HENT: Negative.  Negative for sore throat.    Eyes: Negative.  Negative for blurred vision.   Respiratory: Negative.  Negative for cough and shortness of breath.    Cardiovascular: Negative.  Negative for chest pain, palpitations and leg swelling.   Gastrointestinal:  Positive for abdominal pain. Negative for nausea and vomiting.   Genitourinary: Negative.  Negative for dysuria.   Musculoskeletal: Negative.  Negative for myalgias.   Skin: Negative.  Negative for itching and rash.   Neurological: Negative.  Negative for dizziness, focal weakness, weakness and headaches.   Endo/Heme/Allergies: Negative.  Does not bruise/bleed easily.   Psychiatric/Behavioral: Negative.  Negative for depression, substance abuse and suicidal ideas.    All other systems reviewed and are negative.       Physical Exam  Temp:  [36.1 °C (97 °F)-36.6 °C (97.9 °F)] 36.6 °C (97.9 °F)  Pulse:  [77-83] 80  Resp:  [14-18] 16  BP: (112-146)/(74-96) 117/78  SpO2:  [87 %-99 %] 98 %    Physical Exam  Vitals and nursing note reviewed. Exam conducted with a chaperone present.   Constitutional:       General: He is not in acute distress.     Appearance: Normal appearance. He is not diaphoretic.   HENT:      Head: Normocephalic.      Nose: Nose normal.      Mouth/Throat:      Mouth: Mucous membranes are moist.   Eyes:      Pupils: Pupils are equal, round, and reactive to light.   Cardiovascular:      Rate and Rhythm: Normal rate and regular rhythm.      Pulses: Normal pulses.      Heart sounds: Normal heart sounds.   Pulmonary:      Effort: Pulmonary effort is normal.      Breath sounds: Normal breath sounds.   Abdominal:      General: Abdomen is flat. Bowel sounds are normal. There is distension (with diffuse tenderness,  no guarding).      Palpations: Abdomen is soft. There is no mass.      Tenderness: There is abdominal tenderness. There is no right CVA tenderness, guarding or rebound.      Hernia: No hernia is present.   Musculoskeletal:         General: No swelling or deformity. Normal range of motion.   Skin:     General: Skin is warm and dry.      Capillary Refill: Capillary refill takes less than 2 seconds.   Neurological:      General: No focal deficit present.      Mental Status: He is alert and oriented to person, place, and time.      Cranial Nerves: No cranial nerve deficit.   Psychiatric:         Mood and Affect: Mood normal.         Behavior: Behavior normal.         Fluids    Intake/Output Summary (Last 24 hours) at 1/15/2024 1014  Last data filed at 1/15/2024 0418  Gross per 24 hour   Intake --   Output 125 ml   Net -125 ml       Laboratory  Recent Labs     01/14/24  1616   WBC 7.1   RBC 4.94   HEMOGLOBIN 15.1   HEMATOCRIT 43.7   MCV 88.5   MCH 30.6   MCHC 34.6   RDW 42.3   PLATELETCT 209   MPV 11.1     Recent Labs     01/14/24  1616   SODIUM 131*   POTASSIUM 4.8   CHLORIDE 92*   CO2 22   GLUCOSE 248*   BUN 27*   CREATININE 1.02   CALCIUM 9.5                   Imaging  IR-INJECT NERV BLOCK,CELIAC PLEX    (Results Pending)        Assessment/Plan  * Cancer related pain- (present on admission)  Assessment & Plan  Intractable  Options discussed with pt  IR celiac block pending  Will continue to titrate up opiates, as pain still uncontrolled at this time, will place patient on a morphine pca- following closely to establish ideal dose  Continue bowel protocol  Coordinating with palliative care and hospice    Pancreatic cancer (HCC)- (present on admission)  Assessment & Plan  Diagnosed around 2 months ago with weakness involving the splenic vein  PET scan did not show any other metastasis  Patient was not candidate for surgery and declined palliative chemo or radiation  Patient agreed for comfort care and hospice  Patient  was to be DNR/DNI  Pain management as described above  Hospice referral pending    ACP (advance care planning)- (present on admission)  Assessment & Plan  Discussion regarding goals of care, he confirmed he would like hospice and would like to go home, 10 minutes. Confirmed dnr/dni    Cirrhosis of liver with ascites (HCC)- (present on admission)  Assessment & Plan  Continue Lasix and spironolactone    Hyponatremia- (present on admission)  Assessment & Plan  Mild   Chronic  Pain likely contributing  Hospice pending    Paroxysmal atrial fibrillation (HCC)- (present on admission)  Assessment & Plan  Continue Eliquis     Essential hypertension- (present on admission)  Assessment & Plan  Continue Entresto and Lasix with hydralazine as tolerated    Nonischemic cardiomyopathy (HCC)- (present on admission)  Assessment & Plan  Continue Entresto, hydralazine, Lasix and atorvastatin  No signs of acute exacerbation    Implantable cardioverter-defibrillator (ICD) in situ- (present on admission)  Assessment & Plan  Due to cardiomyopathy    Type 2 diabetes mellitus with renal complication (HCC)- (present on admission)  Assessment & Plan  Sliding scale and continue Lantus 10 units daily         VTE prophylaxis: Eliquis    I have performed a physical exam and reviewed and updated ROS and Plan today (1/15/2024). In review of yesterday's note (1/14/2024), there are no changes except as documented above.

## 2024-01-15 NOTE — DISCHARGE PLANNING
Received Choice Form @: 4738  Agency/Facility Name: [facility]  Referral Sent Per Choice Form @: 2584

## 2024-01-15 NOTE — CARE PLAN
The patient is Stable - Low risk of patient condition declining or worsening    Shift Goals  Clinical Goals: Pain control  Patient Goals: Comfort, rest  Family Goals: Not at bedside    Progress made toward(s) clinical / shift goals:      Problem: Pain - Standard  Goal: Alleviation of pain or a reduction in pain to the patient’s comfort goal  Outcome: Progressing     Problem: Knowledge Deficit - Standard  Goal: Patient and family/care givers will demonstrate understanding of plan of care, disease process/condition, diagnostic tests and medications  Outcome: Progressing       Patient is not progressing towards the following goals:

## 2024-01-15 NOTE — PROGRESS NOTES
Report received. Assumed care. Pt AAOx4. Focused Assessment complete. VSS. C/O 10/10 abdominal pain; Pt with poor appetite. Pt was update for the care for the day. White board updated, All question answered. Pt has call light within reach, bed is in the lowest position. Pt has no other needs at this time.

## 2024-01-15 NOTE — ASSESSMENT & PLAN NOTE
Diagnosed around 2 months ago with weakness involving the splenic vein  PET scan did not show any other metastasis  Patient was not candidate for surgery and declined palliative chemo or radiation  Patient agreed for comfort care and hospice  Patient was to be DNR/DNI  Pain management as described above  Hospice referral pending

## 2024-01-15 NOTE — PROGRESS NOTES
4 Eyes Skin Assessment Completed by REGULO Angel and REGULO Santos.    Head WDL  Ears WDL  Nose WDL  Mouth WDL  Neck WDL  Breast/Chest WDL  Shoulder Blades WDL  Spine WDL  (R) Arm/Elbow/Hand WDL  (L) Arm/Elbow/Hand WDL  Abdomen WDL  Groin WDL  Scrotum/Coccyx/Buttocks WDL  (R) Leg WDL  (L) Leg WDL  (R) Heel/Foot/Toe Dry  (L) Heel/Foot/Toe Dry          Devices In Places Pulse Ox and Nasal Cannula      Interventions In Place Pillows    Possible Skin Injury No    Pictures Uploaded Into Epic N/A  Wound Consult Placed N/A  RN Wound Prevention Protocol Ordered No

## 2024-01-15 NOTE — PROGRESS NOTES
Received report from day shift RN. Pt arrived to the unit, ambulated with steady gait. Pt A&Ox4, NAD, VSS on 2L NC. C/o moderate abdominal pain. Medicated per MAR. POC discussed with patient, all questions addressed. Call light within reach.

## 2024-01-16 ENCOUNTER — APPOINTMENT (OUTPATIENT)
Dept: RADIOLOGY | Facility: MEDICAL CENTER | Age: 54
DRG: 948 | End: 2024-01-16
Attending: INTERNAL MEDICINE
Payer: MEDICARE

## 2024-01-16 PROBLEM — R10.9 INTRACTABLE ABDOMINAL PAIN: Status: ACTIVE | Noted: 2024-01-16

## 2024-01-16 LAB
GLUCOSE BLD STRIP.AUTO-MCNC: 133 MG/DL (ref 65–99)
GLUCOSE BLD STRIP.AUTO-MCNC: 135 MG/DL (ref 65–99)
GLUCOSE BLD STRIP.AUTO-MCNC: 144 MG/DL (ref 65–99)
GLUCOSE BLD STRIP.AUTO-MCNC: 199 MG/DL (ref 65–99)
INR PPP: 1.2 (ref 0.87–1.13)
PROTHROMBIN TIME: 15.4 SEC (ref 12–14.6)

## 2024-01-16 PROCEDURE — 85610 PROTHROMBIN TIME: CPT

## 2024-01-16 PROCEDURE — 770004 HCHG ROOM/CARE - ONCOLOGY PRIVATE *

## 2024-01-16 PROCEDURE — 99233 SBSQ HOSP IP/OBS HIGH 50: CPT | Mod: FS | Performed by: INTERNAL MEDICINE

## 2024-01-16 PROCEDURE — 700102 HCHG RX REV CODE 250 W/ 637 OVERRIDE(OP): Performed by: STUDENT IN AN ORGANIZED HEALTH CARE EDUCATION/TRAINING PROGRAM

## 2024-01-16 PROCEDURE — 82962 GLUCOSE BLOOD TEST: CPT

## 2024-01-16 PROCEDURE — 99233 SBSQ HOSP IP/OBS HIGH 50: CPT | Performed by: STUDENT IN AN ORGANIZED HEALTH CARE EDUCATION/TRAINING PROGRAM

## 2024-01-16 PROCEDURE — 700102 HCHG RX REV CODE 250 W/ 637 OVERRIDE(OP): Performed by: INTERNAL MEDICINE

## 2024-01-16 PROCEDURE — A9270 NON-COVERED ITEM OR SERVICE: HCPCS | Performed by: STUDENT IN AN ORGANIZED HEALTH CARE EDUCATION/TRAINING PROGRAM

## 2024-01-16 PROCEDURE — 700111 HCHG RX REV CODE 636 W/ 250 OVERRIDE (IP): Performed by: STUDENT IN AN ORGANIZED HEALTH CARE EDUCATION/TRAINING PROGRAM

## 2024-01-16 PROCEDURE — 36415 COLL VENOUS BLD VENIPUNCTURE: CPT

## 2024-01-16 PROCEDURE — A9270 NON-COVERED ITEM OR SERVICE: HCPCS | Performed by: INTERNAL MEDICINE

## 2024-01-16 RX ORDER — ENEMA 19; 7 G/133ML; G/133ML
1 ENEMA RECTAL ONCE
Status: DISPENSED | OUTPATIENT
Start: 2024-01-16 | End: 2024-01-17

## 2024-01-16 RX ADMIN — DOCUSATE SODIUM 50 MG AND SENNOSIDES 8.6 MG 2 TABLET: 8.6; 5 TABLET, FILM COATED ORAL at 17:41

## 2024-01-16 RX ADMIN — LACTULOSE 45 ML: 20 SOLUTION ORAL at 13:15

## 2024-01-16 RX ADMIN — Medication: at 09:32

## 2024-01-16 RX ADMIN — HYDRALAZINE HYDROCHLORIDE 25 MG: 25 TABLET ORAL at 05:55

## 2024-01-16 RX ADMIN — INSULIN GLARGINE-YFGN 10 UNITS: 100 INJECTION, SOLUTION SUBCUTANEOUS at 17:34

## 2024-01-16 RX ADMIN — ATORVASTATIN CALCIUM 40 MG: 40 TABLET, FILM COATED ORAL at 05:56

## 2024-01-16 RX ADMIN — FUROSEMIDE 20 MG: 40 TABLET ORAL at 05:55

## 2024-01-16 RX ADMIN — MORPHINE SULFATE 30 MG: 30 TABLET, FILM COATED, EXTENDED RELEASE ORAL at 05:55

## 2024-01-16 RX ADMIN — MORPHINE SULFATE 30 MG: 30 TABLET, FILM COATED, EXTENDED RELEASE ORAL at 20:14

## 2024-01-16 RX ADMIN — INSULIN HUMAN 1 UNITS: 100 INJECTION, SOLUTION PARENTERAL at 06:41

## 2024-01-16 RX ADMIN — SACUBITRIL AND VALSARTAN 1 TABLET: 97; 103 TABLET, FILM COATED ORAL at 05:57

## 2024-01-16 ASSESSMENT — ENCOUNTER SYMPTOMS
EYES NEGATIVE: 1
FEVER: 0
RESPIRATORY NEGATIVE: 1
CARDIOVASCULAR NEGATIVE: 1
ABDOMINAL PAIN: 1
MYALGIAS: 1
CHILLS: 0
WEAKNESS: 1
PSYCHIATRIC NEGATIVE: 1
CONSTIPATION: 1

## 2024-01-16 ASSESSMENT — PAIN DESCRIPTION - PAIN TYPE
TYPE: ACUTE PAIN;CHRONIC PAIN
TYPE: ACUTE PAIN
TYPE: ACUTE PAIN;CHRONIC PAIN
TYPE: ACUTE PAIN;CHRONIC PAIN

## 2024-01-16 NOTE — PROGRESS NOTES
Bedside report received from previous RN, pt care assumed, assessment complete, AO4, no complaints of pain at this time. Updated on POC, questions answered, bed locked in the lowest position, treaded socks on, call light and belongings within reach.

## 2024-01-16 NOTE — HOSPITAL COURSE
"Mr. Lai Dailey is a 52-year-old male with history of pancreatic cancer, diabetes, hypertension, and cardiomyopathy with ejection fraction 20% , and presented to [facility] on 1/14 with intractable abdominal pain.     Patient has history of nonoperative pancreatic cancer also patient declined any chemotherapy or radiation therapy. During last hospitalization in December 2023, patient was evaluated by palliative team and discharged on morphine with hospice however today he ran out of his medication and hospice as outpatient was not set up yet, patient decided to come to the emergency for pain control, on admission vital signs were stable, labs around baseline with elevated liver enzymes, the plan of care was discussed with the patient and he does not want any aggressive treatment and he wants to be DNR/DNI.      Patient was initiated on Morphine PCA. + Flatus, he reports he is having bm's prior to admission. Patient also reports he has a 7 year old daughter and would like to spend as much quality time with her as possible, his goal is to have better pain control but not be \"out of it drugged up\". Discussed celiac plexus block which he is open to, also discussed paracentesis. Palliative and Hospice consults are pending. Vitals stable.   "

## 2024-01-16 NOTE — PROGRESS NOTES
"Hospital Medicine Daily Progress Note    Date of Service  1/16/2024    Chief Complaint  Lai Dailey is a 53 y.o. male admitted 1/14/2024 with intractable abdominal pain    Hospital Course  Mr. Lai Dailey is a 52-year-old male with history of pancreatic cancer, diabetes, hypertension, and cardiomyopathy with ejection fraction 20% , and presented to [facility] on 1/14 with intractable abdominal pain.     Patient has history of nonoperative pancreatic cancer also patient declined any chemotherapy or radiation therapy. During last hospitalization in December 2023, patient was evaluated by palliative team and discharged on morphine with hospice however today he ran out of his medication and hospice as outpatient was not set up yet, patient decided to come to the emergency for pain control, on admission vital signs were stable, labs around baseline with elevated liver enzymes, the plan of care was discussed with the patient and he does not want any aggressive treatment and he wants to be DNR/DNI.      Patient was initiated on Morphine PCA. + Flatus, he reports he is having bm's prior to admission. Patient also reports he has a 7 year old daughter and would like to spend as much quality time with her as possible, his goal is to have better pain control but not be \"out of it drugged up\". Discussed celiac plexus block which he is open to, also discussed paracentesis. Palliative and Hospice consults are pending. Vitals stable.     Interval Problem Update  -Patient seen and examined.  Patient still endorses significant abdominal pain of which patient currently has a Morphine PCA pump.  Palliative team following patient.  Recommendations to pursue celiac plexus nerve block to help with pain management, paracentesis, and bowel movement protocol.  [employer] representative also notified to turn off patient's AICD as patient is wanting to pursue hospice at this time.  -Plan of care: Palliative team to reduce PCA pump and " switch to IV and oral pain medication; pursue celiac plexus nerve block with IR along with paracentesis; AICD to be turned off today  -Disposition: Patient anticipated to stay until celiac plexus nerve block and paracentesis has been then an AICD has been turned off; patient is to go home on hospice  -Lab work: Reviewed; expected  -VSS at this time    I have discussed this patient's plan of care and discharge plan at IDT rounds today with Case Management, Nursing, Nursing leadership, and other members of the IDT team.    Consultants/Specialty  palliative care    Code Status  DNAR/DNI    Disposition  The patient is not medically cleared for discharge to home or a post-acute facility.  Anticipate discharge to: home with close outpatient follow-up    I have placed the appropriate orders for post-discharge needs.    Review of Systems  Review of Systems   Constitutional:  Positive for malaise/fatigue. Negative for chills and fever.   HENT: Negative.     Eyes: Negative.    Respiratory: Negative.     Cardiovascular: Negative.    Gastrointestinal:  Positive for abdominal pain and constipation.   Genitourinary: Negative.    Musculoskeletal:  Positive for myalgias.   Skin: Negative.    Neurological:  Positive for weakness.   Endo/Heme/Allergies: Negative.    Psychiatric/Behavioral: Negative.          Physical Exam  Temp:  [36 °C (96.8 °F)-36.7 °C (98 °F)] 36.7 °C (98 °F)  Pulse:  [78-86] 79  Resp:  [16] 16  BP: (101-111)/(70-76) 110/76  SpO2:  [93 %-95 %] 93 %    Physical Exam  Vitals and nursing note reviewed.   HENT:      Head: Normocephalic.      Nose: Nose normal.      Mouth/Throat:      Mouth: Mucous membranes are moist.      Pharynx: Oropharynx is clear.   Eyes:      Pupils: Pupils are equal, round, and reactive to light.   Cardiovascular:      Rate and Rhythm: Normal rate and regular rhythm.      Pulses: Normal pulses.      Heart sounds: Normal heart sounds.   Pulmonary:      Effort: Pulmonary effort is normal.       Breath sounds: Normal breath sounds.   Abdominal:      General: Bowel sounds are normal.      Palpations: Abdomen is soft.   Musculoskeletal:         General: Tenderness present.      Cervical back: Normal range of motion and neck supple.   Skin:     General: Skin is dry.      Capillary Refill: Capillary refill takes 2 to 3 seconds.   Neurological:      Mental Status: He is alert. Mental status is at baseline.      Motor: Weakness present.         Fluids  No intake or output data in the 24 hours ending 01/16/24 1212    Laboratory  Recent Labs     01/14/24  1616   WBC 7.1   RBC 4.94   HEMOGLOBIN 15.1   HEMATOCRIT 43.7   MCV 88.5   MCH 30.6   MCHC 34.6   RDW 42.3   PLATELETCT 209   MPV 11.1     Recent Labs     01/14/24  1616   SODIUM 131*   POTASSIUM 4.8   CHLORIDE 92*   CO2 22   GLUCOSE 248*   BUN 27*   CREATININE 1.02   CALCIUM 9.5     Recent Labs     01/16/24  0912   INR 1.20*               Imaging  [id number] VIEW   Final Result      Nonspecific bowel gas pattern.      US-ABDOMEN LTD (SOFT TISSUE)   Final Result      Mild to moderate ascites      IR-INJECT NERV BLOCK,CELIAC PLEX    (Results Pending)   IR-CONSULT AND TREAT    (Results Pending)   US-PARACENTESIS, ABD WITH IMAGING    (Results Pending)        Assessment/Plan  * Cancer related pain- (present on admission)  Assessment & Plan  Intractable  Options discussed with pt  IR celiac block pending - holding eliquis for this procedure  Will continue to titrate up opiates, as pain still uncontrolled at this time, will place patient on a morphine pca- following closely to establish ideal dose  Continue bowel protocol  Coordinating with palliative care and hospice  IR celiac plexus nerve block and paracentesis  Also encourage bowel movements as patient is on high dose of pain medication causing constipation contributing to his abdominal pain    Pancreatic cancer (HCC)- (present on admission)  Assessment & Plan  Diagnosed around 2 months ago with weakness involving  the splenic vein  PET scan did not show any other metastasis  Patient was not candidate for surgery and declined palliative chemo or radiation  Patient agreed for comfort care and hospice  Patient was to be DNR/DNI  Pain management as described above  Hospice referral pending    ACP (advance care planning)- (present on admission)  Assessment & Plan  Discussion regarding goals of care, he confirmed he would like hospice and would like to go home, 10 minutes. Confirmed dnr/dni    Cirrhosis of liver with ascites (HCC)- (present on admission)  Assessment & Plan  Continue Lasix and spironolactone    Hyponatremia- (present on admission)  Assessment & Plan  Mild   Chronic  Pain likely contributing  Hospice pending    Paroxysmal atrial fibrillation (HCC)- (present on admission)  Assessment & Plan  Continue Eliquis     Essential hypertension- (present on admission)  Assessment & Plan  Continue Entresto and Lasix with hydralazine as tolerated    Nonischemic cardiomyopathy (HCC)- (present on admission)  Assessment & Plan  Continue Entresto, hydralazine, Lasix and atorvastatin  No signs of acute exacerbation    Implantable cardioverter-defibrillator (ICD) in situ- (present on admission)  Assessment & Plan  Due to cardiomyopathy    Type 2 diabetes mellitus with renal complication (HCC)- (present on admission)  Assessment & Plan  Sliding scale and continue Lantus 10 units daily         VTE prophylaxis:    therapeutic anticoagulation with eliquis 5 mg BID  HELD on 1/15/2024 for IR Celiac nerve block and Paracentesis     I have performed a physical exam and reviewed and updated ROS and Plan today (1/16/2024). In review of yesterday's note (1/15/2024), there are no changes except as documented above.      IKendal DNP performed a substantiated portion of the service face-to-face with same patient on the same date of service INDEPENDENTLY from the MD on assessment, examination and discussion in plan of care FOR  21 MINUTES.  I was personally involved in reviewing and conducting the medical decision making, including the information as described above.

## 2024-01-16 NOTE — CARE PLAN
The patient is Stable - Low risk of patient condition declining or worsening    Shift Goals  Clinical Goals: pain, long term paijn control  Patient Goals: comfort  Family Goals: na    Progress made toward(s) clinical / shift goals:        Problem: Pain - Standard  Goal: Alleviation of pain or a reduction in pain to the patient’s comfort goal  Outcome: Progressing     . Document pain using the appropriate pain scale per order or unit policy   2. Educate and implement non-pharmacologic comfort measures (i.e. relaxation, distraction, massage, cold/heat therapy, etc.)   3. Pain management medications as ordered   4. Reassess pain after pain med administration per policy   5. If opiods administered assess patient's response to pain medication is appropriate per POSS sedation scale   6. Follow pain management plan developed in collaboration with patient and interdisciplinary team (including palliative care or pain specialists if applicable)       Problem: Knowledge Deficit - Standard  Goal: Patient and family/care givers will demonstrate understanding of plan of care, disease process/condition, diagnostic tests and medications  Outcome: Progressing     . Document pain using the appropriate pain scale per order or unit policy   2. Educate and implement non-pharmacologic comfort measures (i.e. relaxation, distraction, massage, cold/heat therapy, etc.)   3. Pain management medications as ordered   4. Reassess pain after pain med administration per policy   5. If opiods administered assess patient's response to pain medication is appropriate per POSS sedation scale   6. Follow pain management plan developed in collaboration with patient and interdisciplinary team (including palliative care or pain specialists if applicable)

## 2024-01-16 NOTE — PROGRESS NOTES
Report received from REGULO Claros.  Assumed care of patient.  Assessment complete.  Plan of care gone over with the patient and all concerns addressed.  Patient resting in bed. .  Patient A & O x 4.  No apparent signs of distress.  Safety precautions in place.  Patient educated to call for assistance.  Hourly rounding in place.

## 2024-01-17 ENCOUNTER — APPOINTMENT (OUTPATIENT)
Dept: RADIOLOGY | Facility: MEDICAL CENTER | Age: 54
DRG: 948 | End: 2024-01-17
Attending: STUDENT IN AN ORGANIZED HEALTH CARE EDUCATION/TRAINING PROGRAM
Payer: MEDICARE

## 2024-01-17 ENCOUNTER — APPOINTMENT (OUTPATIENT)
Dept: RADIOLOGY | Facility: MEDICAL CENTER | Age: 54
DRG: 948 | End: 2024-01-17
Attending: INTERNAL MEDICINE
Payer: MEDICARE

## 2024-01-17 PROBLEM — Z51.5 COMFORT MEASURES ONLY STATUS: Status: ACTIVE | Noted: 2024-01-17

## 2024-01-17 LAB
ANION GAP SERPL CALC-SCNC: 10 MMOL/L (ref 7–16)
BUN SERPL-MCNC: 49 MG/DL (ref 8–22)
CALCIUM SERPL-MCNC: 9.3 MG/DL (ref 8.5–10.5)
CHLORIDE SERPL-SCNC: 92 MMOL/L (ref 96–112)
CO2 SERPL-SCNC: 29 MMOL/L (ref 20–33)
CREAT SERPL-MCNC: 3.47 MG/DL (ref 0.5–1.4)
ERYTHROCYTE [DISTWIDTH] IN BLOOD BY AUTOMATED COUNT: 44.9 FL (ref 35.9–50)
GFR SERPLBLD CREATININE-BSD FMLA CKD-EPI: 20 ML/MIN/1.73 M 2
GLUCOSE BLD STRIP.AUTO-MCNC: 151 MG/DL (ref 65–99)
GLUCOSE SERPL-MCNC: 168 MG/DL (ref 65–99)
HCT VFR BLD AUTO: 41.8 % (ref 42–52)
HGB BLD-MCNC: 14.1 G/DL (ref 14–18)
MCH RBC QN AUTO: 31.1 PG (ref 27–33)
MCHC RBC AUTO-ENTMCNC: 33.7 G/DL (ref 32.3–36.5)
MCV RBC AUTO: 92.1 FL (ref 81.4–97.8)
PLATELET # BLD AUTO: 200 K/UL (ref 164–446)
PMV BLD AUTO: 11.5 FL (ref 9–12.9)
POTASSIUM SERPL-SCNC: 4.9 MMOL/L (ref 3.6–5.5)
RBC # BLD AUTO: 4.54 M/UL (ref 4.7–6.1)
SODIUM SERPL-SCNC: 131 MMOL/L (ref 135–145)
WBC # BLD AUTO: 7 K/UL (ref 4.8–10.8)

## 2024-01-17 PROCEDURE — 700102 HCHG RX REV CODE 250 W/ 637 OVERRIDE(OP): Performed by: STUDENT IN AN ORGANIZED HEALTH CARE EDUCATION/TRAINING PROGRAM

## 2024-01-17 PROCEDURE — 700111 HCHG RX REV CODE 636 W/ 250 OVERRIDE (IP): Mod: JZ | Performed by: RADIOLOGY

## 2024-01-17 PROCEDURE — 80048 BASIC METABOLIC PNL TOTAL CA: CPT

## 2024-01-17 PROCEDURE — 49406 IMAGE CATH FLUID PERI/RETRO: CPT

## 2024-01-17 PROCEDURE — C1769 GUIDE WIRE: HCPCS

## 2024-01-17 PROCEDURE — 99497 ADVNCD CARE PLAN 30 MIN: CPT | Performed by: STUDENT IN AN ORGANIZED HEALTH CARE EDUCATION/TRAINING PROGRAM

## 2024-01-17 PROCEDURE — 99232 SBSQ HOSP IP/OBS MODERATE 35: CPT | Mod: 25 | Performed by: STUDENT IN AN ORGANIZED HEALTH CARE EDUCATION/TRAINING PROGRAM

## 2024-01-17 PROCEDURE — 85027 COMPLETE CBC AUTOMATED: CPT

## 2024-01-17 PROCEDURE — 36415 COLL VENOUS BLD VENIPUNCTURE: CPT

## 2024-01-17 PROCEDURE — 82962 GLUCOSE BLOOD TEST: CPT

## 2024-01-17 PROCEDURE — 700102 HCHG RX REV CODE 250 W/ 637 OVERRIDE(OP): Performed by: INTERNAL MEDICINE

## 2024-01-17 PROCEDURE — 0W9G30Z DRAINAGE OF PERITONEAL CAVITY WITH DRAINAGE DEVICE, PERCUTANEOUS APPROACH: ICD-10-PCS | Performed by: RADIOLOGY

## 2024-01-17 PROCEDURE — A9270 NON-COVERED ITEM OR SERVICE: HCPCS | Performed by: INTERNAL MEDICINE

## 2024-01-17 PROCEDURE — 700111 HCHG RX REV CODE 636 W/ 250 OVERRIDE (IP): Mod: JZ

## 2024-01-17 PROCEDURE — 770004 HCHG ROOM/CARE - ONCOLOGY PRIVATE *

## 2024-01-17 PROCEDURE — A9270 NON-COVERED ITEM OR SERVICE: HCPCS | Performed by: STUDENT IN AN ORGANIZED HEALTH CARE EDUCATION/TRAINING PROGRAM

## 2024-01-17 RX ORDER — ATROPINE SULFATE 10 MG/ML
2 SOLUTION/ DROPS OPHTHALMIC EVERY 4 HOURS PRN
Status: DISCONTINUED | OUTPATIENT
Start: 2024-01-17 | End: 2024-01-19 | Stop reason: HOSPADM

## 2024-01-17 RX ORDER — MIDAZOLAM HYDROCHLORIDE 1 MG/ML
INJECTION INTRAMUSCULAR; INTRAVENOUS
Status: COMPLETED
Start: 2024-01-17 | End: 2024-01-17

## 2024-01-17 RX ORDER — ONDANSETRON 2 MG/ML
4 INJECTION INTRAMUSCULAR; INTRAVENOUS PRN
Status: ACTIVE | OUTPATIENT
Start: 2024-01-17 | End: 2024-01-17

## 2024-01-17 RX ORDER — ONDANSETRON 2 MG/ML
INJECTION INTRAMUSCULAR; INTRAVENOUS
Status: COMPLETED
Start: 2024-01-17 | End: 2024-01-17

## 2024-01-17 RX ORDER — SODIUM CHLORIDE 9 MG/ML
500 INJECTION, SOLUTION INTRAVENOUS
Status: ACTIVE | OUTPATIENT
Start: 2024-01-17 | End: 2024-01-17

## 2024-01-17 RX ORDER — MIDAZOLAM HYDROCHLORIDE 1 MG/ML
.5-2 INJECTION INTRAMUSCULAR; INTRAVENOUS PRN
Status: ACTIVE | OUTPATIENT
Start: 2024-01-17 | End: 2024-01-17

## 2024-01-17 RX ADMIN — MORPHINE SULFATE 30 MG: 30 TABLET, FILM COATED, EXTENDED RELEASE ORAL at 04:39

## 2024-01-17 RX ADMIN — FENTANYL 1 PATCH: 50 PATCH TRANSDERMAL at 18:37

## 2024-01-17 RX ADMIN — LACTULOSE 45 ML: 20 SOLUTION ORAL at 18:16

## 2024-01-17 RX ADMIN — ATORVASTATIN CALCIUM 40 MG: 40 TABLET, FILM COATED ORAL at 04:39

## 2024-01-17 RX ADMIN — MIDAZOLAM HYDROCHLORIDE 1 MG: 1 INJECTION INTRAMUSCULAR; INTRAVENOUS at 12:10

## 2024-01-17 RX ADMIN — ONDANSETRON 4 MG: 2 INJECTION INTRAMUSCULAR; INTRAVENOUS at 12:51

## 2024-01-17 RX ADMIN — FENTANYL CITRATE 25 MCG: 50 INJECTION, SOLUTION INTRAMUSCULAR; INTRAVENOUS at 12:03

## 2024-01-17 RX ADMIN — MORPHINE SULFATE 30 MG: 30 TABLET, FILM COATED, EXTENDED RELEASE ORAL at 13:14

## 2024-01-17 RX ADMIN — MIDAZOLAM 1 MG: 1 INJECTION INTRAMUSCULAR; INTRAVENOUS at 12:10

## 2024-01-17 RX ADMIN — FENTANYL CITRATE 25 MCG: 50 INJECTION, SOLUTION INTRAMUSCULAR; INTRAVENOUS at 12:10

## 2024-01-17 ASSESSMENT — ENCOUNTER SYMPTOMS
ABDOMINAL PAIN: 1
PSYCHIATRIC NEGATIVE: 1
RESPIRATORY NEGATIVE: 1
FEVER: 0
CARDIOVASCULAR NEGATIVE: 1
MYALGIAS: 1
CHILLS: 0
EYES NEGATIVE: 1
WEAKNESS: 1
CONSTIPATION: 1

## 2024-01-17 ASSESSMENT — PAIN DESCRIPTION - PAIN TYPE
TYPE: CHRONIC PAIN

## 2024-01-17 NOTE — PROGRESS NOTES
Inpatient Palliative Medicine - Follow Up     Lai Dailey  53 y.o. male  [id number]    Location: [facility]  PCP: Tor Rdz M.D.  Referral Source: Kendal Adorno, *    Reason for palliative medicine consultation and/or visit: ACP         Assessment and Plan:     GOAL(S) OF CARE = Quality of Life, Optimization & Start Hospice upon DC    SYMPTOMS ETIOLOGY/CAUSES = abdominal pain secondary to pancreatic cancer & abdominal distention (malignant ascites? Us pending)    PROGNOSIS =   - YES hospice-eligible = advancing aggressive + pancreatic cancer, poor function now PPS=40-50%<70%, 183 IB vs 205 Ib in NOV 2023 (11% weight loss since NOV 2023)  - YES hospice-appropriate = expressed wish to start hospice care upon DC    CODE STATUS = DNAR DNI    ADVANCE CARE PLANNING =   Relevant history reviewed  Medical update    PALLIATIVE CARE TEAM INTERVENTIONS =   - Long-acting regimen =  Increased mscontin 30mg to q8H ==> 90 OME  Continue fentanyl patch 50mcg/hr ==> 100 OME  Next fentanyl patch replacement (ideally PM 1/17) will be fentanyl 100mcg/hr (200 OME), with simultaneous discontinue of mscontin    - Breakthrough regimen =   Continue PCA pump with current generous setting dilaudid 2mg IV q20min PRN (AVG 0.5mg/hr = 30-40 OME)  Will plan to disable PCA and make PO breakthrough transition AFTER paracentesis tomorrow  1/17/2024 (unfortunately delayed today due to Eliquis)  Given limited OME breakthrough use, PO transition should be relatively easy    - Bowel regimen =   Added lactulose 45mg q noon RTC, for better bowel regulation.  0 BM since admission.  2.  Continue daily docusenna 2 tabs BID and other PRN's  3.  1X Fleet enema given today      Adjunct =   Agree with Celiac plexus blockade (RE-)evaluation by IR, for potential pain relief. (NOTE: Celiac plexus neurolysis attempted/performed once at [facility] on 12/19/2023, per Care Everywhere)     Added limited abdominal  ultrasound to assess for  possible ?Malignant ascites. ==> if positive, will consider paracentesis +/- IR indwelling drain prior to dc  ==>  UPDATE: Ascites noted in all quadrants. IR evaluation ordered to evaluate for 1) paracentesis 2) indwelling path prior to dc home with hospice, as patient is not seeking further oncology treatments. (Hospice referral already in and filled by [facility])    XR/KUB 1/15/2024 showed no compelling evidence for SBO or toxic megacolon.    4.  [employer] to try again tomorrow to disable ICD defibrillation 1/17/2024. Re-education provided to patient today. Patient understood now and is amenable to proceed.    DISPO = conservative medical optimization prior to dc to resume hospice care again          Summary:   Lai Dailey is 53 y.o. with prostate cancer, originally referred to hospice 1/9/2024 by oncology team, but unfortunately readmitted 1/14/2024 due to worsening abdominal pain. History also noted for CHF EF=15% sp ICD implanted 2018 ( [employer] model 0293)  -----------------------------------------------------------------------------------------------------------------------------  1/16/2024  Paracentesis unfortunately postponed by another day, due to Eliquis use earlier today. Primary team already holding it at this point.    Discussed AICD disable with [employer] representative Sofia. When Sofia came and saw patient, patient was not quite mentally ready for the disable today. I provided education to patient about hospice philosophy and the incongruent nature of AICD while he was trying to live out life at home naturally with hospice. Recommended patient to strongly consider this disable opportunity, as shocks at the end of one's natural life is not considered comfortable, nor natural.     Patient expressed understanding and stated he was more ready now. I called Sofia back and updated him. Sofia will come by again tomorrow to attempt AICD disable again with  patient.        1/15/2024  Introduced my role as a PCMD and reason of this consultation = pain and symptom control. Patient was tired and still having significant pain, but was willing to proceed with this encounter.    It's been an aggressive disease since diagnosis about 2 months ago. Patient unfortunately had to defer hospice start date, due to having to attend an uncle's  in [place].      He has been working with oncology team outpatient. Fentanyl patch 50mcg/hr was helpful but no longer sufficient for his rapidly worsening disease and pain. He also did try Roxanol PRN outpatient, without noticeable adverse effects.    So far he responded well to the generous PCA @ 1mg dilaudid IV q10min PRN (1 shot given by the time of our visit). We discussed medication changes as listed above with hope for better pain and symptom control, also also his need for a bigger fentanyl patch by either tomorrow or WED when we replace it with a 100mcg/hr one.    Abdomen was distended and + wave sign on my physical exam, suspicious for extensive malignant ascites. Patient was agreeable to an US study and possible drainage if indicated.    Primary team has also ordered IR evaluation for potential celiac plexus blockade, which I'm also in agreement with.    Will continue following and reassess and optimize pain and symptom control.           Advance care planning:     Total time spent in ACP discussion 30 minutes, which is separate from the time spent completing the evaluation and management visit.     Thank you for allowing me the opportunity to participate in the care of Lai Dailey     I spent a total of 45 minutes reviewing medical records, direct face-to-face time with the patient and/or family, documentation and coordination of care. This is separate from the time spent on advance care planning, which is documented above.         BHAVIN VERDE DO (TIM)  [facility]   [id number]  [place]  [id number]  P: [phone]  F: [phone]  C: [phone]

## 2024-01-17 NOTE — PROGRESS NOTES
4 Eyes Skin Assessment Completed by REGULO box and REGULO galdamez.    Head WDL  Ears WDL  Nose WDL  Mouth WDL  Neck WDL  Breast/Chest WDL  Shoulder Blades WDL  Spine WDL  (R) Arm/Elbow/Hand WDL  (L) Arm/Elbow/Hand WDL  Abdomen ascites  Groin WDL  Scrotum/Coccyx/Buttocks WDL  (R) Leg WDL  (L) Leg WDL  (R) Heel/Foot/Toe WDL  (L) Heel/Foot/Toe WDL          Devices In Places Blood Pressure Cuff      Interventions In Place N/A    Possible Skin Injury No    Pictures Uploaded Into Epic N/A  Wound Consult Placed N/A  RN Wound Prevention Protocol Ordered No

## 2024-01-17 NOTE — PROGRESS NOTES
I gave report to REGULO Srivastava.  All of the patient's belongings have been gathered, and he is ready for transport. Patient will go to [facility] via transport.

## 2024-01-17 NOTE — PROGRESS NOTES
Pt presents to IR 1. Patient was consented by MD at bedside, confirmed by this RN and consent at bedside. Pt transferred to IR table in supine position.  Patient experiencing significant pain when laying flat, notified MD and received telephone order to give 25 mcg Fentanyl IV.  Read back and carried out order.   Patient underwent a Tunneled peritoneal catheter placement by Dr. Graf. Procedure site was marked by MD and verified using imaging guidance. Pt placed on monitor, prepped and draped in a sterile fashion. Vitals were taken every 5 minutes and remained stable during procedure (see doc flow sheet for results). CO2 waveform capnography was monitored and remained WNL throughout procedure.  4250mL of peritoneal fluid removed from patient.   Report called to Karen RAJPUT. Pt transported by stretcher with RN to room.         [facility]ira -   Peritoneal Drainage Catheter  REF: [id number]  LOT: [id number]  EXP: 07/31/2025

## 2024-01-17 NOTE — PROGRESS NOTES
1300 - pt back to unit by bed. PCA continued.       1320 - Weaned back down to 2L NC. Peritoneal drain to RLQ. Minimal complaints of pain but drowsy. JACKSON meds given, pt requests to wait until more alert and ambulating to give lactulose. Post op vitals in place.

## 2024-01-17 NOTE — CARE PLAN
The patient is Stable - Low risk of patient condition declining or worsening    Shift Goals  Clinical Goals: Pain control, VSS  Patient Goals: Pain control, NPO at midnight  Family Goals: na    Progress made toward(s) clinical / shift goals:    Pt A&Ox4, medicated for pain per MAR, NPO since midnight for paracentesis and spinal block. Bed in lowest and locked position, call light and personal belongings within reach.     Problem: Pain - Standard  Goal: Alleviation of pain or a reduction in pain to the patient’s comfort goal  Outcome: Progressing     Problem: Knowledge Deficit - Standard  Goal: Patient and family/care givers will demonstrate understanding of plan of care, disease process/condition, diagnostic tests and medications  Outcome: Progressing       Patient is not progressing towards the following goals:

## 2024-01-17 NOTE — DIETARY
"Nutrition services: Day 2 of admit.  Lai Dailey is a 53 y.o. male with admitting DX of cancer-related pain.    Consult received for MST 3 for reported 14-23 lb wt loss x 1 month and decreased appetite.    Assessment:  Height: 176.5 cm (5' 9.5\")  Weight: 83.1 kg (183 lb 3.2 oz) taken via stand up scale on 1/14  Body mass index is 26.67 kg/m². BMI classification: overweight  Diet/Intake: NPO / per ADLs, PO intake has been 50-75% x 2 meals and 0% x 1 meal.    Evaluation:   Pt presented w/ abdominal pain r/t recent dx of non-operative pancreatic cancer. Pt also complains of not having a solid BM for a few days PTA and feeling constipated.  Per chart review, pt has lost 45 lb (19.7% BW) x 7 months, which is severe.  Wt Readings from Last 9 Encounters:   01/14/24 83.1 kg (183 lb 3.2 oz)   01/09/24 84.9 kg (187 lb 2.7 oz)   01/03/24 89.8 kg (198 lb)   01/03/24 89.8 kg (198 lb)   12/24/23 77.9 kg (171 lb 11.8 oz)   12/05/23 87.1 kg (192 lb)   11/24/23 93 kg (205 lb 0.4 oz)   06/12/23 103 kg (228 lb)   12/16/22 100 kg (221 lb)   Labs: sodium 131. Chloride 92, glucose 168, BUN 49, creatinine 3.47, GFR 20  Meds: SSI, Lasix, BM protocol  Last BM: 1/17    Malnutrition Risk: Pt at risk for severe malnutrition given severe wt loss as established above. RD to not complete full assessment at this time given pt's comfort care status.    Recommendations/Plan:  RD available to provide nutrition interventions per pt request.   Encourage intake of meals as tolerated.  Monitor weight.      RD available PRN.    "

## 2024-01-17 NOTE — PROGRESS NOTES
Pt HOTN this AM. Assessment completed. Pt asymptomatic, maintaining similar BP range. Denies s/s. Teach-back performed on how to call for help/assistance if he develops s/s. Refuses bed alarm. Pt is ambulatory to bathroom.

## 2024-01-17 NOTE — PROGRESS NOTES
Attempted to contact IR for appointment time. Unable to reach. Pt NPO. Will try again at a later time.

## 2024-01-17 NOTE — OR SURGEON
Immediate Post- Operative Note        Findings: Malignant ascites.      Procedure(s): Aspira drain placement.       Estimated Blood Loss: Less than 5 ml        Complications: None            1/17/2024     1312 AM     Denton Graf M.D.

## 2024-01-18 PROCEDURE — RXMED WILLOW AMBULATORY MEDICATION CHARGE: Performed by: STUDENT IN AN ORGANIZED HEALTH CARE EDUCATION/TRAINING PROGRAM

## 2024-01-18 PROCEDURE — 700102 HCHG RX REV CODE 250 W/ 637 OVERRIDE(OP): Performed by: STUDENT IN AN ORGANIZED HEALTH CARE EDUCATION/TRAINING PROGRAM

## 2024-01-18 PROCEDURE — A9270 NON-COVERED ITEM OR SERVICE: HCPCS | Performed by: STUDENT IN AN ORGANIZED HEALTH CARE EDUCATION/TRAINING PROGRAM

## 2024-01-18 PROCEDURE — 700102 HCHG RX REV CODE 250 W/ 637 OVERRIDE(OP): Performed by: INTERNAL MEDICINE

## 2024-01-18 PROCEDURE — 99497 ADVNCD CARE PLAN 30 MIN: CPT | Performed by: STUDENT IN AN ORGANIZED HEALTH CARE EDUCATION/TRAINING PROGRAM

## 2024-01-18 PROCEDURE — 770004 HCHG ROOM/CARE - ONCOLOGY PRIVATE *

## 2024-01-18 PROCEDURE — A9270 NON-COVERED ITEM OR SERVICE: HCPCS | Performed by: INTERNAL MEDICINE

## 2024-01-18 PROCEDURE — 99232 SBSQ HOSP IP/OBS MODERATE 35: CPT | Mod: 25 | Performed by: STUDENT IN AN ORGANIZED HEALTH CARE EDUCATION/TRAINING PROGRAM

## 2024-01-18 RX ORDER — MORPHINE SULFATE 100 MG/5ML
20 SOLUTION ORAL
Status: DISCONTINUED | OUTPATIENT
Start: 2024-01-18 | End: 2024-01-19 | Stop reason: HOSPADM

## 2024-01-18 RX ORDER — FENTANYL 100 UG/1
1 PATCH TRANSDERMAL
Status: DISCONTINUED | OUTPATIENT
Start: 2024-01-18 | End: 2024-01-19 | Stop reason: HOSPADM

## 2024-01-18 RX ORDER — FENTANYL 100 UG/1
1 PATCH TRANSDERMAL
Qty: 1 PATCH | Refills: 0 | Status: SHIPPED
Start: 2024-01-21 | End: 2024-01-24

## 2024-01-18 RX ORDER — MORPHINE SULFATE 15 MG/1
30 TABLET, FILM COATED, EXTENDED RELEASE ORAL EVERY 8 HOURS
Qty: 12 TABLET | Refills: 0 | Status: SHIPPED | OUTPATIENT
Start: 2024-01-18 | End: 2024-01-21

## 2024-01-18 RX ORDER — MORPHINE SULFATE 100 MG/5ML
10 SOLUTION ORAL
Status: DISCONTINUED | OUTPATIENT
Start: 2024-01-18 | End: 2024-01-19 | Stop reason: HOSPADM

## 2024-01-18 RX ORDER — LACTULOSE 20 G/30ML
45 SOLUTION ORAL DAILY
Qty: 946 ML | Refills: 0 | Status: SHIPPED | OUTPATIENT
Start: 2024-01-18 | End: 2024-02-17

## 2024-01-18 RX ADMIN — DOCUSATE SODIUM 50 MG AND SENNOSIDES 8.6 MG 2 TABLET: 8.6; 5 TABLET, FILM COATED ORAL at 18:08

## 2024-01-18 RX ADMIN — MORPHINE SULFATE 30 MG: 30 TABLET, FILM COATED, EXTENDED RELEASE ORAL at 05:57

## 2024-01-18 RX ADMIN — FENTANYL 1 PATCH: 100 PATCH TRANSDERMAL at 10:50

## 2024-01-18 RX ADMIN — LACTULOSE 45 ML: 20 SOLUTION ORAL at 13:21

## 2024-01-18 RX ADMIN — MORPHINE SULFATE 30 MG: 30 TABLET, FILM COATED, EXTENDED RELEASE ORAL at 13:21

## 2024-01-18 ASSESSMENT — ENCOUNTER SYMPTOMS
MYALGIAS: 1
PSYCHIATRIC NEGATIVE: 1
RESPIRATORY NEGATIVE: 1
CONSTIPATION: 1
HEADACHES: 0
ABDOMINAL PAIN: 0
CARDIOVASCULAR NEGATIVE: 1
SHORTNESS OF BREATH: 0
WEAKNESS: 1
NAUSEA: 0
FOCAL WEAKNESS: 0
EYES NEGATIVE: 1
ABDOMINAL PAIN: 1
CHILLS: 0
COUGH: 0
FEVER: 0

## 2024-01-18 ASSESSMENT — PAIN DESCRIPTION - PAIN TYPE
TYPE: ACUTE PAIN
TYPE: ACUTE PAIN

## 2024-01-18 NOTE — DISCHARGE INSTRUCTIONS
Discharge Instructions per Bre Ruth M.D.    Please follow-up with hospice    Return to ER in the event of new or worsening symptoms. Please note importance of compliance and the patient has agreed to proceed with all medical recommendations and follow up plan indicated above. All medications come with benefits and risks. Risks may include permanent injury or death and these risks can be minimized with close reassessment and monitoring. Please make it to your scheduled follow ups with hospice

## 2024-01-18 NOTE — DISCHARGE PLANNING
Case Management Discharge Planning    Admission Date: 1/14/2024  GMLOS: 2.6  ALOS: 3    6-Clicks ADL Score:    6-Clicks Mobility Score:        Anticipated Discharge Dispo: Discharge Disposition: D/T to hospice home (50)    DME Needed: No    Action(s) Taken: Pt is comfort care. RNCM met with pt at bedside to discuss DC plan. Pt's plan is to go home with [facility], consents have been signed. Pt needs to be off PCA pump and tolerating oral pain medications prior to DC home. Per pt, he does not need any DME at this time. Per pt, [facility] will be transporting him home once he is medically cleared.     Escalations Completed: None    Medically Clear: No    Next Steps: Pending transition to oral medication    Barriers to Discharge: Medical clearance

## 2024-01-18 NOTE — PROGRESS NOTES
Radiology Progress Note   Author: Munira Cosby D.N.P. Date & Time created: 1/18/2024  8:36 AM   Date of admission  1/14/2024  Note to reader: this note follows the APSO format rather than the historical SOAP format. Assessment and plan located at the top of the note for ease of use.    Chief Complaint  53 y.o. male admitted 1/14/2024 with   Chief Complaint   Patient presents with    Abdominal Pain     Pt reports a recent diagnosis of pancreatic cancer. Pt states he is supposed to start hospice care but it hasn't begun yet. Pt states that he has a fentanyl patch and PO oxycodone for pain that he last took one hour ago but he is still in pain. Pt also reports that he hasn't had a solid BM in a few days and he feels constipated. Pt tried an enema at home last night without relief. Pt also endorses leg swelling despite taking his lasix.        HPI  Lai Dailey is a 54 yo male with PMH significant for HFrEF, left ventricular systolic dysfunction, NYHA class 2, servere mitral valve regurgitation, sigmoid diverticulitis, and pancreatic cancer with palliative care who presented with severe abdominal pain while enrolling in out patient hospice. 1/17/24 IR Dr. Graf completed paracentesis with 4,250 ml serous fluid removed and Aspira catheter placement.    Interval History IR:  1/18/24: Aspira drain with dressing to RLQ abdomen clean, dry, and intact. Skin surrounding dressing is pink warm and dry without discharge or erythema. Coordinated care with hospitalist and nursing staff.    Assessment/Plan  Interval History   Principal Problem:    Cancer related pain  Active Problems:    Type 2 diabetes mellitus with renal complication (HCC)    Implantable cardioverter-defibrillator (ICD) in situ    Nonischemic cardiomyopathy (HCC)    Essential hypertension    Paroxysmal atrial fibrillation (HCC)    Hyponatremia    Cirrhosis of liver with ascites (HCC)    ACP (advance care planning)    Pancreatic cancer (HCC)    Intractable pain     Intractable abdominal pain    Comfort measures only status      Plan IR  -- Ok to use tunneled peritoneal catheter  - Drainage catheter bags and dressing kit at bedside   - Recommend hospice team involvement to acquire more drainage bags once DC home    - Change dressing bandages PRN or every 5-7 days   - Wound education provided to patient  - Don't get bandages wet  - Ok shower with waterproof dressing across peritoneal catheter site   - No baths or fully submerging catheter underwater   - Notify IR ([phone]) if new or increased pain at the site, increased swelling or a growing bruise at the site, pus or fluid coming from incision, or area is hot, tender, red, or irritated.  - IR signing off     -Thank you for allowing Interventional Radiology team to participate in the patients care, if any additional care or requests are needed in the future please do not hesitate call or place IR order   [phone]      IR:            Review of Systems  Physical Exam   Review of Systems   Constitutional:  Negative for chills and fever.   Respiratory:  Negative for cough and shortness of breath.    Cardiovascular:  Negative for chest pain.   Gastrointestinal:  Negative for abdominal pain and nausea.   Musculoskeletal:  Positive for myalgias.   Neurological:  Negative for focal weakness and headaches.   Psychiatric/Behavioral: Negative.        Vitals:    01/18/24 0800   BP: (!) 75/46   Pulse: 80   Resp: 16   Temp: 36.4 °C (97.6 °F)   SpO2: 97%        Physical Exam  Constitutional:       Appearance: Normal appearance. He is normal weight. He is ill-appearing.   Cardiovascular:      Rate and Rhythm: Normal rate.      Pulses: Normal pulses.   Pulmonary:      Effort: Pulmonary effort is normal. No respiratory distress.   Abdominal:      General: Abdomen is flat.      Palpations: Abdomen is soft.   Musculoskeletal:         General: Normal range of motion.      Cervical back: Normal range of motion.   Skin:     General: Skin is warm and  "dry.      Capillary Refill: Capillary refill takes 2 to 3 seconds.      Comments: Dressing to RLQ abdomen is clean, dry, and intact.   Neurological:      General: No focal deficit present.      Mental Status: He is alert and oriented to person, place, and time.   Psychiatric:         Mood and Affect: Mood normal.             Labs    Recent Labs     01/17/24  0657   WBC 7.0   RBC 4.54*   HEMOGLOBIN 14.1   HEMATOCRIT 41.8*   MCV 92.1   MCH 31.1   MCHC 33.7   RDW 44.9   PLATELETCT 200   MPV 11.5     Recent Labs     01/17/24  0657   SODIUM 131*   POTASSIUM 4.9   CHLORIDE 92*   CO2 29   GLUCOSE 168*   BUN 49*   CREATININE 3.47*   CALCIUM 9.3     Recent Labs     01/17/24  0657   CREATININE 3.47*     IR-INSERT PERITONEAL CATH PERM   Final Result         Ultrasound and fluoroscopic guided percutaneous placement of tunneled peritoneal catheter via a right approach.      [id number] VIEW   Final Result      Nonspecific bowel gas pattern.      US-ABDOMEN LTD (SOFT TISSUE)   Final Result      Mild to moderate ascites      IR-INJECT NERV BLOCK,CELIAC PLEX    (Results Pending)       INR   Date Value Ref Range Status   01/16/2024 1.20 (H) 0.87 - 1.13 Final     Comment:     INR - Non-therapeutic Reference Range: 0.87-1.13  INR - Therapeutic Reference Range: 2.0-4.0       No results found for: \"POCINR\"   No intake or output data in the 24 hours ending 01/18/24 0836   Labs not explicitly included in this progress note were reviewed by the author. Radiology/imaging not explicitly included in this progress note was reviewed by the author.     I have performed a physical exam and reviewed and updated ROS and Plan today (1/18/2024).     35 minutes in directly providing and coordinating care and extensive data review.  No time overlap and excludes procedures.  "

## 2024-01-18 NOTE — PROGRESS NOTES
Hospital Medicine Daily Progress Note    Date of Service  1/17/2024    Chief Complaint  Lai Dailey is a 53 y.o. male admitted 1/14/2024 with intractable abdominal pain    Hospital Course  Lai Dailey is a 53 y.o. male with history of pancreatic cancer, diabetes, hypertension, and cardiomyopathy with ejection fraction 20%, who presented on 1/14 with intractable abdominal pain. Patient's pancreatic cancer is nonoperative and also declined chemotherapy and radiotherapy.  He was discharged on morphine with hospice on December 2023, however he ran out of his medications.  Reportedly outpatient hospice has not been set up yet.      On evaluation, labs are within his baseline, with elevated liver enzymes.  X-ray showed no evidence of bowel obstruction.  Ultrasound only showed mild to moderate ascites.  Patient reiterated that he does not want any aggressive treatment.  He is started on pain medications including PCA.  Palliative care and hospice were consulted.    S/p Aspira drain placement for malignant ascites 1/17  IR planning for celiac plexus nerve block  AICD turned off 1/17    Interval Problem Update  Patient seen and examined. On PCA pump.   Planning Aspira drain placement today  Celiac plexus nerve block   Goal of care discussed with patient. He refused aggressive managements, no chemotherapy nor radiation therapy. Comfort care discussed. Patient is in agreement. I have updated to palliative Dr. Schwarz  Reported home hospice has been arranged.     I have discussed this patient's plan of care and discharge plan at IDT rounds today with Case Management, Nursing, Nursing leadership, and other members of the IDT team.    Consultants/Specialty  palliative care    Code Status  Comfort Care/DNR    Disposition  The patient is medically cleared for discharge to home or a post-acute facility.  Anticipate discharge to: hospice  1/17:  I have placed the appropriate orders for post-discharge needs.    Review of  Systems  Review of Systems   Constitutional:  Positive for malaise/fatigue. Negative for chills and fever.   HENT: Negative.     Eyes: Negative.    Respiratory: Negative.     Cardiovascular: Negative.    Gastrointestinal:  Positive for abdominal pain and constipation.   Genitourinary: Negative.    Musculoskeletal:  Positive for myalgias.   Skin: Negative.    Neurological:  Positive for weakness.   Endo/Heme/Allergies: Negative.    Psychiatric/Behavioral: Negative.          Physical Exam  Temp:  [36.1 °C (97 °F)-36.9 °C (98.4 °F)] 36.3 °C (97.3 °F)  Pulse:  [74-96] 79  Resp:  [12-18] 16  BP: ()/(54-83) 90/65  SpO2:  [91 %-100 %] 98 %    Physical Exam  Vitals and nursing note reviewed.   HENT:      Head: Normocephalic.      Nose: Nose normal.      Mouth/Throat:      Mouth: Mucous membranes are moist.      Pharynx: Oropharynx is clear.   Eyes:      Pupils: Pupils are equal, round, and reactive to light.   Cardiovascular:      Rate and Rhythm: Normal rate and regular rhythm.      Pulses: Normal pulses.      Heart sounds: Normal heart sounds.   Pulmonary:      Effort: Pulmonary effort is normal.      Breath sounds: Normal breath sounds.   Abdominal:      General: Bowel sounds are normal.      Palpations: Abdomen is soft.   Musculoskeletal:         General: Tenderness present.      Cervical back: Normal range of motion and neck supple.   Skin:     General: Skin is dry.      Capillary Refill: Capillary refill takes 2 to 3 seconds.   Neurological:      Mental Status: He is alert. Mental status is at baseline.      Motor: Weakness present.         Fluids  No intake or output data in the 24 hours ending 01/17/24 1712    Laboratory  Recent Labs     01/17/24  0657   WBC 7.0   RBC 4.54*   HEMOGLOBIN 14.1   HEMATOCRIT 41.8*   MCV 92.1   MCH 31.1   MCHC 33.7   RDW 44.9   PLATELETCT 200   MPV 11.5       Recent Labs     01/17/24  0657   SODIUM 131*   POTASSIUM 4.9   CHLORIDE 92*   CO2 29   GLUCOSE 168*   BUN 49*   CREATININE  3.47*   CALCIUM 9.3       Recent Labs     01/16/24  0912   INR 1.20*                 Imaging  [id number] VIEW   Final Result      Nonspecific bowel gas pattern.      US-ABDOMEN LTD (SOFT TISSUE)   Final Result      Mild to moderate ascites      IR-INJECT NERV BLOCK,CELIAC PLEX    (Results Pending)   IR-INSERT PERITONEAL CATH PERM    (Results Pending)        Assessment/Plan  * Cancer related pain- (present on admission)  Assessment & Plan  Intractable  IR celiac block pending   On PCA pump  Continue bowel protocol  Coordinating with palliative care and hospice  Monitoring respiratory status and sedation score      Nonischemic cardiomyopathy (HCC)- (present on admission)  Assessment & Plan  Continue Entresto, hydralazine, Lasix and atorvastatin  No signs of acute exacerbation  Comfort care    Comfort measures only status  Assessment & Plan  1/17: Goal of care discussed with patient. He refused aggressive managements, no chemotherapy nor radiation therapy.  Patient is AOx4 and has medical decision-making capacity.  I have discussed comfort care measures. Patient is in agreement. I have updated to palliative Dr. Schwarz  Comfort care  Reported home hospice has been arranged.   Continue Aspira drain placement for malignant ascites and celiac plexus nerve block for comfort purpose.     Pancreatic cancer (HCC)- (present on admission)  Assessment & Plan  Diagnosed around 2 months ago with weakness involving the splenic vein  PET scan did not show any other metastasis  Patient was not candidate for surgery and declined palliative chemo or radiation  Patient agreed for comfort care and hospice  Patient was to be DNR/DNI  Pain management as described above  Hospice referral pending      ACP (advance care planning)- (present on admission)  Assessment & Plan  Discussion regarding goals of care, he confirmed he would like hospice and would like to go home, 10 minutes. Confirmed dnr/dni    1/17: Goal of care discussed with patient.  He refused aggressive managements, no chemotherapy nor radiation therapy.  Patient is AOx4 and has medical decision-making capacity.  I have discussed comfort care measures. Patient is in agreement. I have updated to palliative Dr. Schwarz (Time spent: 17 mins)    Cirrhosis of liver with ascites (HCC)- (present on admission)  Assessment & Plan  Continue Lasix and spironolactone  Comfort care    Hyponatremia- (present on admission)  Assessment & Plan  Mild   Chronic  Pain likely contributing  Hospice pending    Comfort care    Paroxysmal atrial fibrillation (HCC)- (present on admission)  Assessment & Plan  Continue Eliquis   Comfort care. Eliquis discontinued     Essential hypertension- (present on admission)  Assessment & Plan  Continue Entresto and Lasix with hydralazine as tolerated  Comfort care    Implantable cardioverter-defibrillator (ICD) in situ- (present on admission)  Assessment & Plan  Due to cardiomyopathy  AICD turned off 1/17    Type 2 diabetes mellitus with renal complication (HCC)- (present on admission)  Assessment & Plan  Sliding scale and continue Lantus 10 units daily  Comfort care         VTE prophylaxis: SCD    I have performed a physical exam and reviewed and updated ROS and Plan today (1/17/2024). In review of yesterday's note (1/16/2024), there are no changes except as documented above.

## 2024-01-18 NOTE — ASSESSMENT & PLAN NOTE
1/17: Goal of care discussed with patient. He refused aggressive managements, no chemotherapy nor radiation therapy.  Patient is AOx4 and has medical decision-making capacity.  I have discussed comfort care measures. Patient is in agreement. I have updated to palliative Dr. Schwarz  Comfort care  Reported home hospice has been arranged.   Continue Aspira drain placement for malignant ascites and celiac plexus nerve block for comfort purpose.

## 2024-01-19 ENCOUNTER — PHARMACY VISIT (OUTPATIENT)
Dept: PHARMACY | Facility: MEDICAL CENTER | Age: 54
End: 2024-01-19
Payer: COMMERCIAL

## 2024-01-19 VITALS
DIASTOLIC BLOOD PRESSURE: 45 MMHG | WEIGHT: 183.2 LBS | BODY MASS INDEX: 26.23 KG/M2 | HEIGHT: 70 IN | OXYGEN SATURATION: 89 % | HEART RATE: 84 BPM | TEMPERATURE: 97.9 F | SYSTOLIC BLOOD PRESSURE: 84 MMHG | RESPIRATION RATE: 14 BRPM

## 2024-01-19 PROCEDURE — 700111 HCHG RX REV CODE 636 W/ 250 OVERRIDE (IP): Performed by: NURSE PRACTITIONER

## 2024-01-19 PROCEDURE — RXMED WILLOW AMBULATORY MEDICATION CHARGE: Performed by: STUDENT IN AN ORGANIZED HEALTH CARE EDUCATION/TRAINING PROGRAM

## 2024-01-19 PROCEDURE — 700111 HCHG RX REV CODE 636 W/ 250 OVERRIDE (IP): Mod: JZ

## 2024-01-19 PROCEDURE — A9270 NON-COVERED ITEM OR SERVICE: HCPCS | Performed by: STUDENT IN AN ORGANIZED HEALTH CARE EDUCATION/TRAINING PROGRAM

## 2024-01-19 PROCEDURE — 700102 HCHG RX REV CODE 250 W/ 637 OVERRIDE(OP): Performed by: STUDENT IN AN ORGANIZED HEALTH CARE EDUCATION/TRAINING PROGRAM

## 2024-01-19 PROCEDURE — 99239 HOSP IP/OBS DSCHRG MGMT >30: CPT | Performed by: STUDENT IN AN ORGANIZED HEALTH CARE EDUCATION/TRAINING PROGRAM

## 2024-01-19 PROCEDURE — 99232 SBSQ HOSP IP/OBS MODERATE 35: CPT | Performed by: NURSE PRACTITIONER

## 2024-01-19 RX ORDER — ONDANSETRON 2 MG/ML
4 INJECTION INTRAMUSCULAR; INTRAVENOUS ONCE
Status: COMPLETED | OUTPATIENT
Start: 2024-01-19 | End: 2024-01-19

## 2024-01-19 RX ORDER — ONDANSETRON 4 MG/1
4-8 TABLET, ORALLY DISINTEGRATING ORAL EVERY 4 HOURS PRN
Status: DISCONTINUED | OUTPATIENT
Start: 2024-01-19 | End: 2024-01-19 | Stop reason: HOSPADM

## 2024-01-19 RX ADMIN — ONDANSETRON 4 MG: 2 INJECTION INTRAMUSCULAR; INTRAVENOUS at 06:40

## 2024-01-19 RX ADMIN — MORPHINE SULFATE 20 MG: 100 SOLUTION ORAL at 09:06

## 2024-01-19 RX ADMIN — MORPHINE SULFATE 20 MG: 100 SOLUTION ORAL at 02:59

## 2024-01-19 RX ADMIN — ONDANSETRON 4 MG: 4 TABLET, ORALLY DISINTEGRATING ORAL at 13:44

## 2024-01-19 RX ADMIN — LACTULOSE 45 ML: 20 SOLUTION ORAL at 11:44

## 2024-01-19 RX ADMIN — MORPHINE SULFATE 30 MG: 30 TABLET, FILM COATED, EXTENDED RELEASE ORAL at 06:40

## 2024-01-19 RX ADMIN — MORPHINE SULFATE 20 MG: 100 SOLUTION ORAL at 00:16

## 2024-01-19 RX ADMIN — MORPHINE SULFATE 30 MG: 30 TABLET, FILM COATED, EXTENDED RELEASE ORAL at 13:44

## 2024-01-19 ASSESSMENT — PAIN DESCRIPTION - PAIN TYPE
TYPE: ACUTE PAIN
TYPE: ACUTE PAIN

## 2024-01-19 NOTE — PROGRESS NOTES
"Inpatient Palliative Care     Location: Cancer nursing unit [id number]     HPI:   Summary:   Lai Dailey is 53 y.o. with prostate cancer, originally referred to hospice 1/9/2024 by oncology team, but unfortunately readmitted 1/14/2024 due to worsening abdominal pain. History also noted for CHF EF=15% sp ICD implanted 2018 ( [employer] model 0293)   .  Interval:  Patient is now off hydromorphone PCA.  Pain management consists of fentanyl patch 100 mcg, morphine sulfate extended release 30 mg every 8 hours orally, and Roxanol 20 mg sublingual every 2 hours as needed for breakthrough pain.  Nurse reports he is intermittently confused blood pressure is somewhat soft.  Wife is reportedly having a difficult time with patient's choice to go home on hospice, palliative care is asked to revisit and discuss with wife and other family members.    Summary:  Met with patient at bedside as well as wife Dinorah, longtime friends Jyoti and Mariama.  Began by having patient reiterates stated goal.  He reports that he wants to live the rest of his life comfortably surrounded by those he loves doing the things he loves and at home.  His wife, Dinorah, feels that he is \"giving up\".  Explored this statement further.  Wife feels that patient should have chemotherapy.  Patient is adamant that he does not want to spend what ever remaining time he has receiving infusions and dealing with side effects of infusions.,  Did not seem to understand that patient's cancer is very advanced and terminal at this time.  Assisted with helping her understand this.  Fielded additional questions regarding AICD from Jyoti who asks why AICD function was disabled.  Explained patient's wishes to allow a natural death and interference from AICD and possible prolongation of suffering and pain with use of AICD defibrillator function.  Family and friends expressed understanding of explanation as provided by provider.  Lai states he would prefer to have his " family drive him home today he is still committed to going home with hospice.  Dinorah would like him to go with oxygen which she is resistant to.    Communicated patient's desire to go home to primary hospitalist and care coordinators.  He is alert and oriented with periods of somnolence.  His strength functionally is still good.  Should be okay to ride home with family.  Discussed with nurse.     Active listening, reflection, reminiscing, validation & normalization, and empathic support utilized throughout this encounter.  All questions answered and contact information provided, encouraged to call with any questions or concerns.      Plan:     1) Zofran ordered for nausea  2) patient to discharge today with [facility] hosp[facility]  3) nurse will assess for additional medication prior to discharge.     Thank you for allowing me the opportunity to participate in the care of  Lai Dailey.     I spent a total of 38 minutes reviewing medical records, direct face-to-face time with the patient and/or family, coordination of care, and documentation. This is separate from the time spent on advance care planning, which is documented above.     Elizabeth Best, MSN, APRN, ACNPC-AG.  Palliative Care Nurse Practitioner  [phone]

## 2024-01-19 NOTE — DISCHARGE PLANNING
Case Management Discharge Planning    Admission Date: 1/14/2024  GMLOS: 2.6  ALOS: 4    6-Clicks ADL Score:    6-Clicks Mobility Score:        Anticipated Discharge Dispo: Discharge Disposition: D/T to hospice home (50)    DME Needed: No    Action(s) Taken: Pt is medically cleared and will discharge home today with [facility] hosp[facility]. Pt's wife, Dinorah will transport pt home. Once pt is home, pt stated he will call [facility] and they will be able to see pt today.    Escalations Completed: None    Medically Clear: Yes    Next Steps: Pt has no other CM needs at this time.    Barriers to Discharge: None    Is the patient up for discharge tomorrow: Discharging today          normal...

## 2024-01-19 NOTE — PROGRESS NOTES
Hospital Medicine Daily Progress Note    Date of Service  1/18/2024    Chief Complaint  Lai Dailey is a 53 y.o. male admitted 1/14/2024 with intractable abdominal pain    Hospital Course  Lai Dailey is a 53 y.o. male with history of pancreatic cancer, diabetes, hypertension, and cardiomyopathy with ejection fraction 20%, who presented on 1/14 with intractable abdominal pain. Patient's pancreatic cancer is nonoperative and also declined chemotherapy and radiotherapy.  He was discharged on morphine with hospice on December 2023, however he ran out of his medications.  Reportedly outpatient hospice has not been set up yet.      On evaluation, labs are within his baseline, with elevated liver enzymes.  X-ray showed no evidence of bowel obstruction.  Ultrasound only showed mild to moderate ascites.  Patient reiterated that he does not want any aggressive treatment.  He is started on pain medications including PCA.  Palliative care and hospice were consulted.    S/p Aspira drain placement for malignant ascites 1/17  AICD turned off 1/17    Interval Problem Update  Patient seen and examined. Discussed with palliative, will dc PCA pump today and transition to oral meds  Pending hospice.     1630: Planning to discharge patient today. Hospice has already arranged and will visit him today once he is home.   However patient changes mind and states he might consider aggressive treatments.   I went to bedside, discussed with patient and family at bedside  Patient is currently hospice/comfort care. I discussed with patient about hospice vs. Aggressive treatment such as chemo and radiation therapy. Per chart review, Patient was last seen Dr. Sahu and Dr. Donald and he is determined not surgical candidate. Patient has HTN, DM2, cardiomyopathy with EF 20%, aggressive chemoradiation might prolong his life, however he will go through all complications associated with this. Patient states he still wants keep  "quality of life and does not want to suffer. Sons at bedside agree. He wants to discuss this with his \"wife\". He wants to keep comfort care/hospice now   Time spent: 19 mins    I have discussed this patient's plan of care and discharge plan at IDT rounds today with Case Management, Nursing, Nursing leadership, and other members of the IDT team.    Consultants/Specialty  palliative care    Code Status  Comfort Care/DNR    Disposition  The patient is not medically cleared for discharge to home or a post-acute facility.    1/17:  I have placed the appropriate orders for post-discharge needs.    Review of Systems  Review of Systems   Constitutional:  Positive for malaise/fatigue. Negative for chills and fever.   HENT: Negative.     Eyes: Negative.    Respiratory: Negative.     Cardiovascular: Negative.    Gastrointestinal:  Positive for abdominal pain and constipation.   Genitourinary: Negative.    Musculoskeletal:  Positive for myalgias.   Skin: Negative.    Neurological:  Positive for weakness.   Endo/Heme/Allergies: Negative.    Psychiatric/Behavioral: Negative.          Physical Exam  Temp:  [36.4 °C (97.6 °F)-37.2 °C (98.9 °F)] 36.4 °C (97.6 °F)  Pulse:  [80] 80  Resp:  [16-18] 16  BP: (75-88)/(46-61) 75/46  SpO2:  [96 %-97 %] 97 %    Physical Exam  Vitals and nursing note reviewed.   HENT:      Head: Normocephalic.      Nose: Nose normal.      Mouth/Throat:      Mouth: Mucous membranes are moist.      Pharynx: Oropharynx is clear.   Eyes:      Pupils: Pupils are equal, round, and reactive to light.   Cardiovascular:      Rate and Rhythm: Normal rate and regular rhythm.      Pulses: Normal pulses.      Heart sounds: Normal heart sounds.   Pulmonary:      Effort: Pulmonary effort is normal.      Breath sounds: Normal breath sounds.   Abdominal:      General: Bowel sounds are normal.      Palpations: Abdomen is soft.   Musculoskeletal:         General: Tenderness present.      Cervical back: Normal range of motion " and neck supple.   Skin:     General: Skin is dry.      Capillary Refill: Capillary refill takes 2 to 3 seconds.   Neurological:      Mental Status: He is alert. Mental status is at baseline.      Motor: Weakness present.         Fluids  No intake or output data in the 24 hours ending 01/18/24 1859    Laboratory  Recent Labs     01/17/24  0657   WBC 7.0   RBC 4.54*   HEMOGLOBIN 14.1   HEMATOCRIT 41.8*   MCV 92.1   MCH 31.1   MCHC 33.7   RDW 44.9   PLATELETCT 200   MPV 11.5       Recent Labs     01/17/24  0657   SODIUM 131*   POTASSIUM 4.9   CHLORIDE 92*   CO2 29   GLUCOSE 168*   BUN 49*   CREATININE 3.47*   CALCIUM 9.3       Recent Labs     01/16/24  0912   INR 1.20*                 Imaging  IR-INSERT PERITONEAL CATH PERM   Final Result         Ultrasound and fluoroscopic guided percutaneous placement of tunneled peritoneal catheter via a right approach.      [id number] VIEW   Final Result      Nonspecific bowel gas pattern.      US-ABDOMEN LTD (SOFT TISSUE)   Final Result      Mild to moderate ascites           Assessment/Plan  * Cancer related pain- (present on admission)  Assessment & Plan  Intractable  On PCA pump  Continue bowel protocol  Coordinating with palliative care and hospice  Monitoring respiratory status and sedation score  1/18: transition PCA pump to fentanyl patch and ms contin   Discussed with palliative  Discussed with IR, unable to do celiac nerve block       Nonischemic cardiomyopathy (HCC)- (present on admission)  Assessment & Plan  Continue Entresto, hydralazine, Lasix and atorvastatin  No signs of acute exacerbation  Comfort care    Comfort measures only status  Assessment & Plan  1/17: Goal of care discussed with patient. He refused aggressive managements, no chemotherapy nor radiation therapy.  Patient is AOx4 and has medical decision-making capacity.  I have discussed comfort care measures. Patient is in agreement. I have updated to palliative Dr. Schwarz  Comfort care  Reported home hospice  has been arranged.   Continue Aspira drain placement for malignant ascites and celiac plexus nerve block for comfort purpose.     Pancreatic cancer (HCC)- (present on admission)  Assessment & Plan  Diagnosed around 2 months ago with weakness involving the splenic vein  PET scan did not show any other metastasis  Patient was not candidate for surgery and declined palliative chemo or radiation  Patient agreed for comfort care and hospice  Patient was to be DNR/DNI  Pain management as described above  Hospice referral pending      ACP (advance care planning)- (present on admission)  Assessment & Plan  Discussion regarding goals of care, he confirmed he would like hospice and would like to go home, 10 minutes. Confirmed dnr/dni    1/17: Goal of care discussed with patient. He refused aggressive managements, no chemotherapy nor radiation therapy.  Patient is AOx4 and has medical decision-making capacity.  I have discussed comfort care measures. Patient is in agreement. I have updated to palliative Dr. Schwarz (Time spent: 17 mins)    Cirrhosis of liver with ascites (HCC)- (present on admission)  Assessment & Plan  Continue Lasix and spironolactone  Comfort care    Hyponatremia- (present on admission)  Assessment & Plan  Mild   Chronic  Pain likely contributing  Hospice pending    Comfort care    Paroxysmal atrial fibrillation (HCC)- (present on admission)  Assessment & Plan  Continue Eliquis   Comfort care. Eliquis discontinued     Essential hypertension- (present on admission)  Assessment & Plan  Continue Entresto and Lasix with hydralazine as tolerated  Comfort care    Implantable cardioverter-defibrillator (ICD) in situ- (present on admission)  Assessment & Plan  Due to cardiomyopathy  AICD turned off 1/17    Type 2 diabetes mellitus with renal complication (HCC)- (present on admission)  Assessment & Plan  Sliding scale and continue Lantus 10 units daily  Comfort care         VTE prophylaxis: SCD    I have performed a  physical exam and reviewed and updated ROS and Plan today (1/18/2024). In review of yesterday's note (1/17/2024), there are no changes except as documented above.

## 2024-01-20 ENCOUNTER — HOSPITAL ENCOUNTER (EMERGENCY)
Facility: MEDICAL CENTER | Age: 54
End: 2024-01-20
Attending: EMERGENCY MEDICINE
Payer: MEDICARE

## 2024-01-20 VITALS
TEMPERATURE: 97 F | OXYGEN SATURATION: 95 % | BODY MASS INDEX: 27.13 KG/M2 | DIASTOLIC BLOOD PRESSURE: 53 MMHG | HEIGHT: 69 IN | WEIGHT: 183.2 LBS | HEART RATE: 79 BPM | SYSTOLIC BLOOD PRESSURE: 92 MMHG | RESPIRATION RATE: 16 BRPM

## 2024-01-20 DIAGNOSIS — R09.02 HYPOXIA: ICD-10-CM

## 2024-01-20 PROCEDURE — 99283 EMERGENCY DEPT VISIT LOW MDM: CPT

## 2024-01-20 ASSESSMENT — FIBROSIS 4 INDEX: FIB4 SCORE: 1.78

## 2024-01-20 NOTE — DISCHARGE INSTRUCTIONS
You were seen in the ER after you accidentally cut the tip of your drain.  This was fixed by our IR staff.  You are safe for discharge.  Please follow-up with your outpatient doctors.  Return with new or worsening symptoms.

## 2024-01-20 NOTE — ED NOTES
Chief Complaint   Patient presents with    Other     Pt has pancreatic cancer and accidentally cut off the pigtail to drain.   Had procedure to place drain two days ago.      Pt reports that he is starting hospice, would like drain replaced, but does not want any other treatment. States that he will authorize an IV if necessary for drain placement and will wear O2 while in the hospital but will not go home with O2.  Pt with noted hypotension and hypoxia on RA.  Pt placed on monitor. Chart up for ERP.

## 2024-01-20 NOTE — ED NOTES
Pt updated on POC, drain repaired by IR.  Pt much more alert and interactive after receiving O2 since arrival.  Reports that he would be willing to set up home O2 as he is feeling much better. ERP notified. Case mgt notified.

## 2024-01-20 NOTE — DISCHARGE PLANNING
Patient confirmed address on face sheet and states wife at home to accept O2 delivery.  Hilda at [facility] setting O2 up for home and we will dc patient via ambulance.

## 2024-01-20 NOTE — ED TRIAGE NOTES
Lai Dailey  54 y.o. male  Chief Complaint   Patient presents with    Other     Pt has pancreatic cancer and accidentally cut off the pigtail to drain.   Had procedure to place drain two days ago.        Pt to triage for above complaint. Place into w/c   Pt is alert and oriented, speaking in full sentences, follows commands and responds appropriately to questions. Not in any apparent distress. Respirations are even and unlabored.  Pt placed in lobby. Pt educated on triage process. Pt encouraged to alert staff for any changes.

## 2024-01-20 NOTE — ED PROVIDER NOTES
ED Provider Note    CHIEF COMPLAINT  Chief Complaint   Patient presents with    Other     Pt has pancreatic cancer and accidentally cut off the pigtail to drain.   Had procedure to place drain two days ago.        EXTERNAL RECORDS REVIEWED  Inpatient Notes admitted to this facility 1/14/2024 with intractable abdominal pain.  He was recently diagnosed with pancreatic cancer which is nonoperative and he declined chemo and radiotherapy.  Outpatient hospice had not yet been set up.  He once again reiterated that he did not want aggressive treatment.  He was started on multimodal pain medications including PCA pump which was then transition to fentanyl patch.  Hospice was arranged.  He underwent a Spira drain placement 1/17 for malignant ascites.  His AICD was also turned off 1/17.    HPI/ROS  LIMITATION TO HISTORY   Select: : None  OUTSIDE HISTORIAN(S):  None    Lai Dailey is a 54 y.o. male who presents after he accidentally clipped/cut the tip off of the a Spira drain that was placed on 1/17 for his malignant ascites.  Unfortunately, the patient has pancreatic cancer and has elected not to treat it in any way.  He is being admitted to hospice although has not yet been seen in person for the hospice intake.  He does not want any intervention today other than fixing or replacing the drain.    PAST MEDICAL HISTORY   has a past medical history of CHF (congestive heart failure) (HCC), Diabetes (HCC), Hyperlipidemia, Hypertension, Pacemaker, and Snoring (10/18/2021).    SURGICAL HISTORY   has a past surgical history that includes aicd implant (2010).    FAMILY HISTORY  Family History   Problem Relation Age of Onset    Colon Cancer Mother     Hypertension Sister     Stroke Brother     Heart Disease Neg Hx        SOCIAL HISTORY  Social History     Tobacco Use    Smoking status: Never    Smokeless tobacco: Never   Vaping Use    Vaping Use: Never used   Substance and Sexual Activity    Alcohol use: Yes     Comment: occ  "   Drug use: No     Comment: Marijuana use as youth    Sexual activity: Not on file       CURRENT MEDICATIONS  Home Medications       Reviewed by Malia Rangel R.N. (Registered Nurse) on 01/20/24 at 1234  Med List Status: Partial     Medication Last Dose Status   acetaminophen (TYLENOL) 500 MG Tab  Active   fentaNYL (DURAGESIC) 100 MCG/HR PATCH 72 HR  Active   lactulose 20 GM/30ML Solution  Active   magnesium oxide 400 (240 Mg) MG Tab  Active   morphine ER (MS CONTIN) 15 MG Tab CR tablet  Active   multivitamin (THERAGRAN) Tab  Active   polyethylene glycol/lytes (MIRALAX) Pack  Active                    ALLERGIES  No Known Allergies    PHYSICAL EXAM  VITAL SIGNS: BP 99/65   Pulse 75   Temp 35.8 °C (96.5 °F) (Temporal)   Resp 16   Ht 1.753 m (5' 9\")   Wt 83.1 kg (183 lb 3.2 oz)   SpO2 96%   BMI 27.05 kg/m²    Physical Exam  Vitals and nursing note reviewed.   Constitutional:       Appearance: Normal appearance.   HENT:      Head: Normocephalic.      Right Ear: External ear normal.      Left Ear: External ear normal.      Nose: Nose normal.      Mouth/Throat:      Mouth: Mucous membranes are dry.      Pharynx: Oropharynx is clear.   Eyes:      Extraocular Movements: Extraocular movements intact.      Conjunctiva/sclera: Conjunctivae normal.      Pupils: Pupils are equal, round, and reactive to light.   Cardiovascular:      Rate and Rhythm: Normal rate.   Pulmonary:      Effort: Pulmonary effort is normal.   Abdominal:      Palpations: Abdomen is soft.      Tenderness: There is no abdominal tenderness.      Comments: Aspira drain in place, bandaged but clean, dry, and intact.  There is a binder clip on the end of the tubing with clear yellow fluid within the tubing itself.   Musculoskeletal:         General: Normal range of motion.      Cervical back: Normal range of motion and neck supple.   Skin:     General: Skin is warm and dry.   Neurological:      General: No focal deficit present.      Mental Status: He " is alert and oriented to person, place, and time.   Psychiatric:         Mood and Affect: Mood normal.         Behavior: Behavior normal.       DIAGNOSTIC STUDIES / PROCEDURES  LABS  Results for orders placed or performed during the hospital encounter of 01/14/24   CBC WITH DIFFERENTIAL   Result Value Ref Range    WBC 7.1 4.8 - 10.8 K/uL    RBC 4.94 4.70 - 6.10 M/uL    Hemoglobin 15.1 14.0 - 18.0 g/dL    Hematocrit 43.7 42.0 - 52.0 %    MCV 88.5 81.4 - 97.8 fL    MCH 30.6 27.0 - 33.0 pg    MCHC 34.6 32.3 - 36.5 g/dL    RDW 42.3 35.9 - 50.0 fL    Platelet Count 209 164 - 446 K/uL    MPV 11.1 9.0 - 12.9 fL    Neutrophils-Polys 74.90 (H) 44.00 - 72.00 %    Lymphocytes 16.20 (L) 22.00 - 41.00 %    Monocytes 7.40 0.00 - 13.40 %    Eosinophils 0.70 0.00 - 6.90 %    Basophils 0.40 0.00 - 1.80 %    Immature Granulocytes 0.40 0.00 - 0.90 %    Nucleated RBC 0.00 0.00 - 0.20 /100 WBC    Neutrophils (Absolute) 5.33 1.82 - 7.42 K/uL    Lymphs (Absolute) 1.15 1.00 - 4.80 K/uL    Monos (Absolute) 0.53 0.00 - 0.85 K/uL    Eos (Absolute) 0.05 0.00 - 0.51 K/uL    Baso (Absolute) 0.03 0.00 - 0.12 K/uL    Immature Granulocytes (abs) 0.03 0.00 - 0.11 K/uL    NRBC (Absolute) 0.00 K/uL   COMP METABOLIC PANEL   Result Value Ref Range    Sodium 131 (L) 135 - 145 mmol/L    Potassium 4.8 3.6 - 5.5 mmol/L    Chloride 92 (L) 96 - 112 mmol/L    Co2 22 20 - 33 mmol/L    Anion Gap 17.0 (H) 7.0 - 16.0    Glucose 248 (H) 65 - 99 mg/dL    Bun 27 (H) 8 - 22 mg/dL    Creatinine 1.02 0.50 - 1.40 mg/dL    Calcium 9.5 8.5 - 10.5 mg/dL    Correct Calcium 9.7 8.5 - 10.5 mg/dL    AST(SGOT) 48 (H) 12 - 45 U/L    ALT(SGPT) 57 (H) 2 - 50 U/L    Alkaline Phosphatase 285 (H) 30 - 99 U/L    Total Bilirubin 1.0 0.1 - 1.5 mg/dL    Albumin 3.7 3.2 - 4.9 g/dL    Total Protein 7.2 6.0 - 8.2 g/dL    Globulin 3.5 1.9 - 3.5 g/dL    A-G Ratio 1.1 g/dL   LIPASE   Result Value Ref Range    Lipase 12 11 - 82 U/L   ESTIMATED GFR   Result Value Ref Range    GFR (CKD-EPI) 86 >60  mL/min/1.73 m 2   Prothrombin Time   Result Value Ref Range    PT 15.4 (H) 12.0 - 14.6 sec    INR 1.20 (H) 0.87 - 1.13   CBC WITHOUT DIFFERENTIAL   Result Value Ref Range    WBC 7.0 4.8 - 10.8 K/uL    RBC 4.54 (L) 4.70 - 6.10 M/uL    Hemoglobin 14.1 14.0 - 18.0 g/dL    Hematocrit 41.8 (L) 42.0 - 52.0 %    MCV 92.1 81.4 - 97.8 fL    MCH 31.1 27.0 - 33.0 pg    MCHC 33.7 32.3 - 36.5 g/dL    RDW 44.9 35.9 - 50.0 fL    Platelet Count 200 164 - 446 K/uL    MPV 11.5 9.0 - 12.9 fL   Basic Metabolic Panel   Result Value Ref Range    Sodium 131 (L) 135 - 145 mmol/L    Potassium 4.9 3.6 - 5.5 mmol/L    Chloride 92 (L) 96 - 112 mmol/L    Co2 29 20 - 33 mmol/L    Glucose 168 (H) 65 - 99 mg/dL    Bun 49 (H) 8 - 22 mg/dL    Creatinine 3.47 (H) 0.50 - 1.40 mg/dL    Calcium 9.3 8.5 - 10.5 mg/dL    Anion Gap 10.0 7.0 - 16.0   ESTIMATED GFR   Result Value Ref Range    GFR (CKD-EPI) 20 (A) >60 mL/min/1.73 m 2   POCT glucose device results   Result Value Ref Range    POC Glucose, Blood 225 (H) 65 - 99 mg/dL   POCT glucose device results   Result Value Ref Range    POC Glucose, Blood 211 (H) 65 - 99 mg/dL   POCT glucose device results   Result Value Ref Range    POC Glucose, Blood 225 (H) 65 - 99 mg/dL   POCT glucose device results   Result Value Ref Range    POC Glucose, Blood 182 (H) 65 - 99 mg/dL   POCT glucose device results   Result Value Ref Range    POC Glucose, Blood 212 (H) 65 - 99 mg/dL   POCT glucose device results   Result Value Ref Range    POC Glucose, Blood 199 (H) 65 - 99 mg/dL   POCT glucose device results   Result Value Ref Range    POC Glucose, Blood 135 (H) 65 - 99 mg/dL   POCT glucose device results   Result Value Ref Range    POC Glucose, Blood 133 (H) 65 - 99 mg/dL   POCT glucose device results   Result Value Ref Range    POC Glucose, Blood 144 (H) 65 - 99 mg/dL   POCT glucose device results   Result Value Ref Range    POC Glucose, Blood 151 (H) 65 - 99 mg/dL     RADIOLOGY  Radiologist interpretation:  None    COURSE & MEDICAL DECISION MAKING    ED Observation Status? No; Patient does not meet criteria for ED Observation.     INITIAL ASSESSMENT, COURSE AND PLAN  Care Narrative: This is a very sharad 54-year-old male who unfortunately has recently been diagnosed with pancreatic cancer and has elected not to receive any treatment for such who is now here after the aspire drain that was placed in his abdomen to drain his malignant ascites was actually cut earlier today.  He has been undergoing discussions with hospice care but has not yet been seen by the hospice .  Here he is hypoxic and hypotensive but again reiterates that he does not wish for any intervention which is well within his right and I  him to have capacity to make these decisions on his behalf.  Additionally, his son is at bedside who understands and respects the patient's wishes.  His abdomen is soft.  The drain is in place with a clip at the end and serous fluid in the tubing.  I spoke with Dr. Cortes, our interventional radiologist, who asked IR staff to assist with troubleshooting the drain and they were ultimately able to fix the drain without having to replace it.  While patient was in the ED he was placed on supplemental oxygen which brightened his countenance and he ultimately agreed to having home oxygen available.  Our  assisted with contacting hospice so that he can receive home oxygen and this apparently will be available for him when he returns home.  He was discharged to follow-up as an outpatient.    ADDITIONAL PROBLEM LIST  None  DISPOSITION AND DISCUSSIONS  I have discussed management of the patient with the following physicians and DIXON's: Dr. Cortes, interventional radiologist.    Discussion of management with other [facility] or appropriate source(s): Social Work -contacted hospice to assist with obtaining home oxygen       Escalation of care considered, and ultimately not performed:after discussion with the  patient / family, they have elected to decline an escalation in care    Barriers to care at this time, including but not limited to:  None .     Decision tools and prescription drugs considered including, but not limited to:  None .    FINAL DIAGNOSIS  1. Hypoxia      Electronically signed by: Tahir Ruiz M.D., 1/20/2024 1:03 PM

## 2024-01-20 NOTE — DISCHARGE SUMMARY
Discharge Summary    CHIEF COMPLAINT ON ADMISSION  Chief Complaint   Patient presents with    Abdominal Pain     Pt reports a recent diagnosis of pancreatic cancer. Pt states he is supposed to start hospice care but it hasn't begun yet. Pt states that he has a fentanyl patch and PO oxycodone for pain that he last took one hour ago but he is still in pain. Pt also reports that he hasn't had a solid BM in a few days and he feels constipated. Pt tried an enema at home last night without relief. Pt also endorses leg swelling despite taking his lasix.        Reason for Admission  Lower abd pain     Admission Date  1/14/2024    CODE STATUS  Prior    HPI & HOSPITAL COURSE  Lai Dailey is a 53 y.o. male with history of pancreatic cancer, diabetes, hypertension, and cardiomyopathy with ejection fraction 20%, who presented on 1/14 with intractable abdominal pain. Patient's pancreatic cancer is nonoperative and also declined chemotherapy and radiotherapy.  He was discharged on morphine with hospice on December 2023, however he ran out of his medications.  Reportedly outpatient hospice has not been set up yet.     Patient was admitted. Ultrasound only showed mild to moderate ascites.  Patient reiterated that he does not want any aggressive treatment.  Palliative care and hospice were consulted. He was started on multimodal pain medications including PCA pump, which was transitioned to fentanyl patch. Goal of care discussed by myself, he is in agreement for comfort care measures. Hospice arranged. For malignant ascites, he underwent Aspira drain placement 1/17  AICD was turned off 1/17.     Therefore, he is discharged in guarded and stable condition to hospice.    The patient met 2-midnight criteria for an inpatient stay at the time of discharge.    Discharge Date  1/19/2024    FOLLOW UP ITEMS POST DISCHARGE  Hospice     DISCHARGE DIAGNOSES  Principal Problem:    Cancer related pain (POA: Yes)  Active Problems:     Nonischemic cardiomyopathy (HCC) (POA: Yes)      Overview: Non-ischemic, unknown etiology per patient.     Type 2 diabetes mellitus with renal complication (HCC) (POA: Yes)      Overview: Patient has a history of diabetes type 2  On treatment with metformin.      Denies any sequalae of neuropathy ,nephropathy,or retinopathy.      Patient had a blood glucose of    Implantable cardioverter-defibrillator (ICD) in situ (POA: Yes)    Essential hypertension (POA: Yes)    Paroxysmal atrial fibrillation (HCC) (POA: Yes)    Hyponatremia (POA: Yes)    Cirrhosis of liver with ascites (HCC) (POA: Yes)    ACP (advance care planning) (POA: Yes)    Pancreatic cancer (HCC) (POA: Yes)    Intractable pain (POA: Yes)    Intractable abdominal pain (POA: Yes)    Comfort measures only status (POA: Unknown)  Resolved Problems:    * No resolved hospital problems. *      FOLLOW UP  No future appointments.  Tor Rdz M.D.  [id number] [facility] [id number] [phone]            MEDICATIONS ON DISCHARGE     Medication List        START taking these medications        Instructions   fentaNYL 100 MCG/HR Pt72  Start taking on: January 21, 2024  Commonly known as: Duragesic  Replaces: fentaNYL 50 MCG/HR Pt72   Place 1 Patch on the skin every 72 hours for 3 days.  Dose: 1 Patch     lactulose 10 GM/15ML Soln   Take 45 mL by mouth every day for 30 days.  Dose: 45 mL            CHANGE how you take these medications        Instructions   morphine ER 15 MG Tbcr tablet  What changed:   when to take this  Another medication with the same name was removed. Continue taking this medication, and follow the directions you see here.  Commonly known as: Ms Contin   Doctor's comments: Patient is terminally ill; please dispense 7 days at a time.  Take 2 Tablets by mouth every 8 hours for 2 days.  Dose: 30 mg            CONTINUE taking these medications        Instructions   acetaminophen 500 MG Tabs  Commonly known as: Tylenol   Take 1,000  mg by mouth one time as needed for Mild Pain. 1,000 mg = 2 tabs  Dose: 1,000 mg     magnesium oxide 400 (240 Mg) MG Tabs   Take 1 Tablet by mouth every day.  Dose: 400 mg     multivitamin Tabs   Take 1 Tablet by mouth every day.  Dose: 1 Tablet     polyethylene glycol/lytes Pack  Commonly known as: Miralax   Take 1 Packet by mouth every day. To prevent constipation while taking oxycodone.  Dose: 17 g            STOP taking these medications      apixaban 5mg Tabs  Commonly known as: Eliquis     atorvastatin 40 MG Tabs  Commonly known as: Lipitor     Entresto  MG Tabs  Generic drug: sacubitril-valsartan     fentaNYL 50 MCG/HR Pt72  Commonly known as: Duragesic  Replaced by: fentaNYL 100 MCG/HR Pt72     furosemide 20 MG Tabs  Commonly known as: Lasix     hydrALAZINE 25 MG Tabs  Commonly known as: Apresoline     insulin glargine 100 UNIT/ML Soln  Commonly known as: Lantus     isosorbide dinitrate 10 MG Tabs  Commonly known as: Isordil     Jardiance 10 MG Tabs tablet  Generic drug: Empagliflozin              Allergies  No Known Allergies    DIET  No orders of the defined types were placed in this encounter.      ACTIVITY  As tolerated.  Weight bearing as tolerated    CONSULTATIONS  Palliative and hospice     PROCEDURES  S/p  Aspira drain placement for malignant ascites 1/17  AICD turned off 1/17    LABORATORY  Lab Results   Component Value Date    SODIUM 131 (L) 01/17/2024    POTASSIUM 4.9 01/17/2024    CHLORIDE 92 (L) 01/17/2024    CO2 29 01/17/2024    GLUCOSE 168 (H) 01/17/2024    BUN 49 (H) 01/17/2024    CREATININE 3.47 (H) 01/17/2024        Lab Results   Component Value Date    WBC 7.0 01/17/2024    HEMOGLOBIN 14.1 01/17/2024    HEMATOCRIT 41.8 (L) 01/17/2024    PLATELETCT 200 01/17/2024      IR-INSERT PERITONEAL CATH PERM   Final Result         Ultrasound and fluoroscopic guided percutaneous placement of tunneled peritoneal catheter via a right approach.      [id number] VIEW   Final Result      Nonspecific  bowel gas pattern.      US-ABDOMEN LTD (SOFT TISSUE)   Final Result      Mild to moderate ascites            Total time of the discharge process 32 minutes.

## 2024-01-21 NOTE — ED NOTES
REMSA at bedside. Discharge instructions reviewed with patient/ family. Patient verbalizes understanding of follow up care, medication management, and reasons to return to ER. Report to [facility].

## 2024-01-21 NOTE — ED NOTES
Bedside report received from off going RN: Aliza, assumed care of patient.  POC discussed with patient. Call light within reach, all needs addressed at this time.       Fall risk interventions in place: Give patient urinal if applicable and Keep floor surfaces clean and dry (all applicable per [facility] Fall risk assessment)   Continuous monitoring: Cardiac Leads, Pulse Ox, or Blood Pressure  IVF/IV medications: Not Applicable   Oxygen: How many liters 2L, Traced the line to wall oxygen, and No oxygen tank in room  Bedside sitter: Not Applicable   Isolation: Not Applicable

## 2024-01-21 NOTE — DISCHARGE PLANNING
Wife called stating no contact from [facility] and not on service.  Called [facility] they found him and will have on call nurse contact him.  Phoned wife and informed her of this at

## 2024-02-07 ENCOUNTER — TELEPHONE (OUTPATIENT)
Dept: CARDIOLOGY | Facility: MEDICAL CENTER | Age: 54
End: 2024-02-07
Payer: MEDICARE

## 2024-02-07 NOTE — TELEPHONE ENCOUNTER
Per chart review, pt is currently admitted and planning to go home on hospice. Returned Bren's call and informed her.

## 2024-02-07 NOTE — TELEPHONE ENCOUNTER
TT    Caller:  [facility] - [facility]    Topic/issue: Would like a call to discuss recent meds and why they have been cancelled.     Callback Number: [phone]    Thank you,   Marline BUCKNER

## 2024-11-04 NOTE — CARE PLAN
Problem: Safety  Goal: Will remain free from falls  Outcome: PROGRESSING AS EXPECTED  Pt educated to use call light before getting out of bed. Call light in reach. Bed locked in lowest position with 2 upper bed rails up. Pt encouraged to sit at edge of bed before standing up. No c/o dizziness.  Room floor is free from clutter. Treaded socks on pt. Bed alarm is on.      Problem: Knowledge Deficit  Goal: Knowledge of disease process/condition, treatment plan, diagnostic tests, and medications will improve    Intervention: Explain information regarding disease process/condition, treatment plan, diagnostic tests, and medications and document in education  Pt given heart failure packet. Pt educated on heart failure packet, daily weights, and when to call the doctor when discharged.         Standing/Walking/Toileting/Moving from bed to chair